# Patient Record
Sex: FEMALE | Race: BLACK OR AFRICAN AMERICAN | NOT HISPANIC OR LATINO | Employment: FULL TIME | URBAN - METROPOLITAN AREA
[De-identification: names, ages, dates, MRNs, and addresses within clinical notes are randomized per-mention and may not be internally consistent; named-entity substitution may affect disease eponyms.]

---

## 2017-01-02 ENCOUNTER — OFFICE VISIT (OUTPATIENT)
Dept: FAMILY MEDICINE | Facility: CLINIC | Age: 47
End: 2017-01-02
Payer: COMMERCIAL

## 2017-01-02 VITALS
BODY MASS INDEX: 23.18 KG/M2 | HEIGHT: 66 IN | RESPIRATION RATE: 20 BRPM | DIASTOLIC BLOOD PRESSURE: 74 MMHG | SYSTOLIC BLOOD PRESSURE: 116 MMHG | HEART RATE: 85 BPM | OXYGEN SATURATION: 98 % | TEMPERATURE: 98.9 F | WEIGHT: 144.2 LBS

## 2017-01-02 DIAGNOSIS — G89.4 CHRONIC PAIN SYNDROME: Chronic | ICD-10-CM

## 2017-01-02 DIAGNOSIS — E55.9 VITAMIN D DEFICIENCY: ICD-10-CM

## 2017-01-02 DIAGNOSIS — E78.2 MIXED HYPERLIPIDEMIA: ICD-10-CM

## 2017-01-02 DIAGNOSIS — E78.5 HYPERLIPIDEMIA LDL GOAL <100: Chronic | ICD-10-CM

## 2017-01-02 PROCEDURE — 99214 OFFICE O/P EST MOD 30 MIN: CPT | Performed by: FAMILY MEDICINE

## 2017-01-02 RX ORDER — ERGOCALCIFEROL 1.25 MG/1
50000 CAPSULE, LIQUID FILLED ORAL
Qty: 8 CAPSULE | Refills: 6 | Status: SHIPPED | OUTPATIENT
Start: 2017-01-02 | End: 2019-06-14

## 2017-01-02 RX ORDER — ATORVASTATIN CALCIUM 80 MG/1
80 TABLET, FILM COATED ORAL DAILY
Qty: 90 TABLET | Refills: 3 | Status: SHIPPED | OUTPATIENT
Start: 2017-01-02 | End: 2017-01-13

## 2017-01-02 NOTE — PROGRESS NOTES
SUBJECTIVE:                                                    Karen Braxton is a 46 year old female who presents to clinic today for the following health issues:  Health Maintenance Due   Topic Date Due     FOOT EXAM Q1 YEAR( NO INBASKET)  07/14/2016     INFLUENZA VACCINE (SYSTEM ASSIGNED)  09/01/2016     Health Maintenance reviewed at today's visit patient asked to schedule/complete:   Immunizations:  Patient declined    Diabetes Follow-up    Patient is checking blood sugars: four times daily.  Results are as follows:         am - 134         lunchtime - 160              postprandial after lunch- 190              postprandial supper- 200    Diabetic concerns: other - high      Symptoms of hypoglycemia (low blood sugar): none     Paresthesias (numbness or burning in feet) or sores: No     Date of last diabetic eye exam: 4/2016       Amount of exercise or physical activity: 6-7 days/week for an average of 15-30 minutes    Problems taking medications regularly: No    Medication side effects: none    Diet: regular (no restrictions)    LETTERS FOR EXERCISE ADULT DAY CARE AND EVENTUALLY [facility]     DENTAL     SHOULDER     VITAMIN D REPLACEMENT     HISTORY OF PREMATURE MENOPAUSAL    HYPERTENSION WITH GOAL OF LESS THAN 140/80     DIARRHEA IMPROVED     IMPROVED DIABETES 2 GOAL 8%     GASTROESOPHAGEAL REFLUX DISEASE WITHOUT ESOPHAGITIS     DEPRESSION with ANXIETY IMPROVED with EXERCISE     DIABETES 2 GOAL 8%     CHRONIC PAIN SYNDROME       Problem list and histories reviewed & adjusted, as indicated.  Additional history: as documented      Patient Active Problem List   Diagnosis     Other dental caries     Disorder of bursae and tendons in shoulder region     Insomnia     Vitamin D deficiency     Premature menopause     ASCUS favor benign     Hyperlipidemia LDL goal <100     Comprehensive diabetic foot examination, type 2 DM, encounter for (H)     Hypertension goal BP (blood pressure) < 140/90     DiarrheaX 2 over last 24hrs w  one explosive r/o 2ndary to acid gastritis     Uncontrolled diabetes mellitus type 2 without complications (H)     Esophageal reflux 2ndary to hi continuous intake of sugared  tea     Depression with anxiety     Hyperlipidemia with target LDL less than 100     Diabetes type 2, controlled (H)     Intractable chronic migraine without aura and without status migrainosus     Chronic pain syndrome     Needle stick injury, subsequent encounter     Past Surgical History   Procedure Laterality Date     Cholecystectomy  9/14/2007     laproscopic     Facial reconstruction surgery  2 years old     cleft lip repair     Orthopedic surgery  6/2001     left knee       Social History   Substance Use Topics     Smoking status: Current Some Day Smoker     Types: Cigarettes     Smokeless tobacco: Never Used      Comment: 3 cigarettes daily     Alcohol Use: No     Family History   Problem Relation Age of Onset     DIABETES Mother      Hypertension Mother      HEART DISEASE Mother      Lipids Mother      Asthma Mother      DIABETES Father      Hypertension Father      CANCER No family hx of      No known family hx of skin cancer     Coronary Artery Disease No family hx of      Hyperlipidemia No family hx of      CEREBROVASCULAR DISEASE No family hx of      Breast Cancer No family hx of      Colon Cancer No family hx of      Prostate Cancer No family hx of      Other Cancer No family hx of      Depression No family hx of      Anxiety Disorder No family hx of      MENTAL ILLNESS No family hx of      Substance Abuse No family hx of      Anesthesia Reaction No family hx of      OSTEOPOROSIS No family hx of      Genetic Disorder No family hx of      Thyroid Disease No family hx of      Obesity No family hx of      Unknown/Adopted No family hx of          Current Outpatient Prescriptions   Medication Sig Dispense Refill     vitamin D (ERGOCALCIFEROL) 98675 UNIT capsule Take 1 capsule (50,000 Units) by mouth every 7 days for 8 doses 8 capsule  "6     atorvastatin (LIPITOR) 80 MG tablet Take 1 tablet (80 mg) by mouth daily 90 tablet 3     insulin lispro (HUMALOG KWIKPEN) 100 UNIT/ML injection 30 units before breakfast, 35 units before lunch, 35 units before dinner 3 Month 3     guaiFENesin-dextromethorphan (ROBITUSSIN DM) 100-10 MG/5ML syrup Take 5 mLs by mouth every 4 hours as needed 240 mL 1     albuterol (PROAIR HFA/PROVENTIL HFA/VENTOLIN HFA) 108 (90 BASE) MCG/ACT Inhaler Inhale 2 puffs into the lungs every 6 hours as needed for shortness of breath / dyspnea or wheezing 1 Inhaler 0     B-D U/F 31G X 8 MM insulin pen needle TEST FOUR TIMES A DAY OR AS DIRECTED 400 each 1     metFORMIN (GLUCOPHAGE) 1000 MG tablet TAKE ONE-HALF TABLET BY MOUTH TWICE DAILY WITH MEALS 90 tablet 1     calcium-vitamin D (CALTRATE) 600-400 MG-UNIT per tablet Take 1 tablet by mouth 2 times daily 180 tablet 3     insulin aspart (NOVOLOG FLEXPEN) 100 UNIT/ML soln Needs 5 pens; continue current amount. 3 Month 3     LANTUS SOLOSTAR 100 UNIT/ML soln 70 units every morning. 15 mL 11     ondansetron (ZOFRAN) 4 MG tablet TAKE 1 TABLET (4MG) BY MOUTH EVERY 8 HOURS AS NEEDED FOR NAUSEA 18 tablet 2     vitamin D (ERGOCALCIFEROL) 73378 UNIT capsule TAKE 1 CAPLET ONCE WEEKLY  Profile Rx: patient will contact pharmacy when needed 4 capsule 5     insulin syringe-needle U-100 (BD INSULIN SYRINGE ULTRAFINE) 30G X 1/2\" 1 ML Use one syringe daily or as directed.  3 month supply 100 each prn     Ostomy Supplies (SKIN PREP WIPES) MISC Use one daily for application of the V-go insulin delivery pod. 3 month supply 100 each 3     insulin pen needle (LITETOUCH PEN NEEDLES) 31G X 8 MM Pt uses Lantus insulin 1x/day 100 each 3     ASPIRIN NOT PRESCRIBED (INTENTIONAL) continuous prn for other Antiplatelet medication not prescribed intentionally due to Not indicated based on age/GI distress       acetaminophen (TYLENOL) 500 MG tablet Take 500-1,000 mg by mouth every 6 hours as needed for mild pain       " diclofenac (VOLTAREN) 1 % GEL Apply 4 grams to knees or 2 grams to hands four times daily using enclosed dosing card. 100 g 11     order for DME Equipment being ordered: Glucometer- Asmita contour- patient lost hers 1 Device 0     lisinopril (PRINIVIL,ZESTRIL) 2.5 MG tablet Take 1 tablet (2.5 mg) by mouth daily 90 tablet 3     fluocin-hydroquinone-tretinoin 0.01-4-0.05 % CREA Use a pea sized amount to the face at night, avoid the mouth and eyes 30 g 3     blood glucose monitoring (ASMITA CONTOUR NEXT) test strip Use to test blood sugar 4 times daily or as directed. 200 each 11     nystatin (MYCOSTATIN) cream Apply topically 2 times daily 60 g 6     pioglitazone (ACTOS) 45 MG tablet Take 1 tablet (45 mg) by mouth daily 90 tablet 3     ezetimibe (ZETIA) 10 MG tablet Take 1 tablet (10 mg) by mouth daily 90 tablet 3     canagliflozin (INVOKANA) 300 MG tablet Take 1 tablet (300 mg) by mouth every morning (before breakfast) 90 tablet 3     Aluminum & Magnesium Hydroxide (MYLANTA ULTIMATE STRENGTH) 500-500 MG/5ML SUSP Take 1-2 tsp. by mouth 4 times daily (with meals and nightly) 355 mL PRN     rosuvastatin (CRESTOR) 40 MG tablet Take 1 tablet (40 mg) by mouth daily 90 tablet 3     blood glucose monitoring (ASMITA MICROLET) lancets Use to test blood sugar 4 times daily or as directed. 2 Box 6     ORDER FOR DME Equipment being ordered: two Trilok braces 2 Device 0     ORDER FOR DME Equipment being ordered: LONGITUDINAL ARCH SUPPORTS ONE PAIR USE IN WORK SHOES  FOR HEEL; PAIN AND DIABETES NEUROPATHY 1 Device 1     No Known Allergies  Recent Labs   Lab Test  11/14/16   1058  03/21/16   1030  01/25/16   1703  06/23/15   1652  12/01/14   1222  07/30/14   0829  05/29/14   1045  03/18/14   1518   A1C  8.1*  8.1*  8.1*  8.1*  8.5*  10.1*  11.4*   --    LDL   --   142*   --    --   114  146*  64   --    HDL   --   44*   --    --   36*  39*  36*   --    TRIG   --   223*   --    --   323*  261*  362*   --    ALT   --   30   --   21   --   "  --   23  30   CR  0.83  0.70   --   0.80  0.80  0.60  0.70  0.69   GFRESTIMATED  73  90   --   77  77  >90  >90  >90   GFRESTBLACK  89  >90   --   >90  >90  >90  >90  >90   POTASSIUM  4.1  4.4   --   4.2  4.0  4.6  4.1  4.0   TSH   --   1.31   --    --    --    --    --   1.55      BP Readings from Last 3 Encounters:   01/02/17 116/74   12/20/16 118/68   12/05/16 131/84    Wt Readings from Last 3 Encounters:   01/02/17 144 lb 3.2 oz (65.409 kg)   12/20/16 147 lb (66.679 kg)   12/05/16 146 lb 12.8 oz (66.588 kg)                  Labs reviewed in [facility]  Problem list, Medication list, Allergies, and Medical/Social/Surgical histories reviewed in [facility] and updated as appropriate.    ROS: has Other dental caries; Disorder of bursae and tendons in shoulder region; Insomnia; Vitamin D deficiency; Premature menopause; ASCUS favor benign; Hyperlipidemia LDL goal <100; Comprehensive diabetic foot examination, type 2 DM, encounter for (H); Hypertension goal BP (blood pressure) < 140/90; DiarrheaX 2 over last 24hrs w one explosive r/o 2ndary to acid gastritis; Uncontrolled diabetes mellitus type 2 without complications (H); Esophageal reflux 2ndary to hi continuous intake of sugared  tea; Depression with anxiety; Hyperlipidemia with target LDL less than 100; Diabetes type 2, controlled (H); Intractable chronic migraine without aura and without status migrainosus; Chronic pain syndrome; and Needle stick injury, subsequent encounter on her problem list.   Constitutional, HEENT, cardiovascular, pulmonary, gi and gu systems are negative, except as otherwise noted.    OBJECTIVE:                                                      /74 mmHg  Pulse 85  Temp(Src) 98.9  F (37.2  C) (Tympanic)  Resp 20  Ht 5' 5.5\" (1.664 m)  Wt 144 lb 3.2 oz (65.409 kg)  BMI 23.62 kg/m2  SpO2 98%  LMP  (LMP Unknown)  Body mass index is 23.62 kg/(m^2).  GENERAL: healthy, alert and no distress  EYES: Eyes grossly normal to inspection, PERRL " and conjunctivae and sclerae normal  HENT: ear canals and TM's normal, nose and mouth without ulcers or lesions  NECK: no adenopathy, no asymmetry, masses, or scars and thyroid normal to palpation  RESP: lungs clear to auscultation - no rales, rhonchi or wheezes  CV: regular rate and rhythm, normal S1 S2, no S3 or S4, no murmur, click or rub, no peripheral edema and peripheral pulses strong  ABDOMEN: soft, nontender, no hepatosplenomegaly, no masses and bowel sounds normal  MS: no gross musculoskeletal defects noted, no edema  SKIN: no suspicious lesions or rashes  NEURO: Normal strength and tone, mentation intact and speech normal  BACK: no CVA tenderness, no paralumbar tenderness  PSYCH: mentation appears normal, affect normal/bright  LYMPH: no cervical, supraclavicular, axillary, or inguinal adenopathy    Diagnostic Test Results:  Results for orders placed or performed in visit on 11/14/16   Basic metabolic panel   Result Value Ref Range    Sodium 139 133 - 144 mmol/L    Potassium 4.1 3.4 - 5.3 mmol/L    Chloride 104 94 - 109 mmol/L    Carbon Dioxide 28 20 - 32 mmol/L    Anion Gap 7.5 3 - 14 mmol/L    Glucose 301 (H) 70 - 99 mg/dL    Urea Nitrogen 8 5 - 24 mg/dL    Creatinine 0.83 0.52 - 1.04 mg/dL    GFR Estimate 73 >60 mL/min/1.7m2    GFR Estimate If Black 89 >60 mL/min/1.7m2    Calcium 9.0 8.5 - 10.4 mg/dL   Hemoglobin A1c   Result Value Ref Range    Hemoglobin A1C 8.1 (H) 4.3 - 6.0 %   Microalbumin quantitative, random urine   Result Value Ref Range    Creatinine Urine 38 mg/dL    Albumin Urine mg/L 8 mg/L    Albumin Urine mg/g Cr 22.35 0 - 25 mg/g Cr        ASSESSMENT/PLAN:                                                        ICD-10-CM    1. Uncontrolled type 2 diabetes mellitus without complication, with long-term current use of insulin (H) E11.65 insulin lispro (HUMALOG KWIKPEN) 100 UNIT/ML injection    Z79.4    2. Hyperlipidemia LDL goal <100 E78.5 atorvastatin (LIPITOR) 80 MG tablet   3. Chronic pain  syndrome G89.4    4. Vitamin D deficiency E55.9 vitamin D (ERGOCALCIFEROL) 30412 UNIT capsule   5. Mixed hyperlipidemia E78.2        Patient Instructions   (E11.65,  Z79.4) Uncontrolled type 2 diabetes mellitus without complication, with long-term current use of insulin (H)  (primary encounter diagnosis)  Comment:    Plan: insulin lispro (HUMALOG KWIKPEN) 100 UNIT/ML         Injection 30 UNITS IN AM MEAL   35 UNITS IN PM MEAL   35 UNITS IN PM MEAL              (E78.5) [id number] LDL goal <100  Comment:  CHANGE IN CRESTOR 40MG PER INSURANCE   Plan: atorvastatin (LIPITOR) 80 MG tablet             (G89.4) Chronic pain syndrome  Comment:    Plan:      (E55.9) Vitamin D deficiency  Comment:  CHANGE   Plan: vitamin D (ERGOCALCIFEROL) 24297 UNIT capsule             (E78.2) Mixed hyperlipidemia  Comment:    Plan:        LINDA ALCANTAR MD  [facility]

## 2017-01-02 NOTE — MR AVS SNAPSHOT
After Visit Summary   1/2/2017    Karen Braxton    MRN: [id number]           Patient Information     Date Of Birth          1970        Visit Information        Provider Department      1/2/2017 4:00 PM Raymond Engel MD [facility]        Today's Diagnoses     Uncontrolled type 2 diabetes mellitus without complication, with long-term current use of insulin (H)    -  1     Hyperlipidemia LDL goal <100         Chronic pain syndrome         Vitamin D deficiency         Mixed hyperlipidemia           Care Instructions    (E11.65,  Z79.4) Uncontrolled type 2 diabetes mellitus without complication, with long-term current use of insulin (H)  (primary encounter diagnosis)  Comment:    Plan: insulin lispro (HUMALOG KWIKPEN) 100 UNIT/ML         injection             (E78.5) Hyperlipidemia LDL goal <100  Comment:    Plan: atorvastatin (LIPITOR) 80 MG tablet             (G89.4) Chronic pain syndrome  Comment:    Plan:      (E55.9) Vitamin D deficiency  Comment:    Plan: vitamin D (ERGOCALCIFEROL) 87512 UNIT capsule             (E78.2) Mixed hyperlipidemia  Comment:    Plan:                Follow-ups after your visit        Your next 10 appointments already scheduled     Feb 01, 2017  3:00 PM   Return Visit with Ralph Hawley DPM   [facility]    [address] [place] [id number] [phone]            Mar 09, 2017  3:30 PM   (Arrive by 3:00 PM)   RETURN DIABETES with Cecile Juarez PA-C   [facility] ([facility])    [address]  3rd Floor  [facility] [address] [phone]            Mar 21, 2017  4:00 PM   SHORT with Raymond Engel MD   [facility] ([facility])    [address]  [address]  [facility] [id number] [phone]            Jun 05, 2017  3:30  "PM   (Arrive by 3:00 PM)   RETURN DIABETES with Melisa Phillips MD   [facility] ([facility] [place])    [address] [address] [phone]              Who to contact     If you have questions or need follow up information about today's clinic visit or your schedule please contact [facility] directly at [phone].  Normal or non-critical lab and imaging results will be communicated to you by [employer], letter or phone within 4 business days after the clinic has received the results. If you do not hear from us within 7 days, please contact the clinic through "[employer]"t or phone. If you have a critical or abnormal lab result, we will notify you by phone as soon as possible.  Submit refill requests through Slice or call your pharmacy and they will forward the refill request to us. Please allow 3 business days for your refill to be completed.          Additional Information About Your Visit        Slice Information     Slice lets you send messages to your doctor, view your test results, renew your prescriptions, schedule appointments and more. To sign up, go to [email or web address] . Click on \"Log in\" on the left side of the screen, which will take you to the Welcome page. Then click on \"Sign up Now\" on the right side of the page.     You will be asked to enter the access code listed below, as well as some personal information. Please follow the directions to create your username and password.     Your access code is: [email or web address]  Expires: 2017  6:30 AM     Your access code will  in 90 days. If you need help or a new code, please call your [facility] or [phone].        Your Vitals Were     Pulse Temperature Respirations    85 98.9  F (37.2  C) (Tympanic) 20    Height BMI (Body Mass Index) Pulse Oximetry    5' 5.5\" (1.664 m) 23.62 kg/m2 98%    Last Period          (LMP Unknown)         " Blood Pressure from Last 3 Encounters:   01/02/17 116/74   12/20/16 118/68   12/05/16 131/84    Weight from Last 3 Encounters:   01/02/17 144 lb 3.2 oz (65.409 kg)   12/20/16 147 lb (66.679 kg)   12/05/16 146 lb 12.8 oz (66.588 kg)              Today, you had the following     No orders found for display         Today's Medication Changes          These changes are accurate as of: 1/2/17  4:49 PM.  If you have any questions, ask your nurse or doctor.               Start taking these medicines.        Dose/Directions    atorvastatin 80 MG tablet   Commonly known as:  LIPITOR   Used for:  Hyperlipidemia LDL goal <100   Started by:  Raymond Engel MD        Dose:  80 mg   Take 1 tablet (80 mg) by mouth daily   Quantity:  90 tablet   Refills:  3       insulin lispro 100 UNIT/ML injection   Commonly known as:  HumaLOG KWIKpen   Used for:  Uncontrolled type 2 diabetes mellitus without complication, with long-term current use of insulin (H)   Started by:  Raymond Engel MD        30 units before breakfast, 35 units before lunch, 35 units before dinner   Quantity:  3 Month   Refills:  3         These medicines have changed or have updated prescriptions.        Dose/Directions    * vitamin D 59250 UNIT capsule   Commonly known as:  ERGOCALCIFEROL   This may have changed:  Another medication with the same name was added. Make sure you understand how and when to take each.   Used for:  Vitamin D deficiency   Changed by:  Raymond Engel MD        TAKE 1 CAPLET ONCE WEEKLY Profile Rx: patient will contact pharmacy when needed   Quantity:  4 capsule   Refills:  5       * vitamin D 10105 UNIT capsule   Commonly known as:  ERGOCALCIFEROL   This may have changed:  You were already taking a medication with the same name, and this prescription was added. Make sure you understand how and when to take each.   Used for:  Vitamin D deficiency   Changed by:  Raymond Engel MD        Dose:  98791  Units   Take 1 capsule (50,000 Units) by mouth every 7 days for 8 doses   Quantity:  8 capsule   Refills:  6       * Notice:  This list has 2 medication(s) that are the same as other medications prescribed for you. Read the directions carefully, and ask your doctor or other care provider to review them with you.         Where to get your medicines      These medications were sent to [facility] - [place], [place] - [address], [address]  [address], [facility] [address]     Phone:  [phone]    - atorvastatin 80 MG tablet  - insulin lispro 100 UNIT/ML injection  - vitamin D 98187 UNIT capsule             Primary Care Provider Office Phone # Fax #    Raymond Engel [phone]       [facility] [address]  [facility] [address]        Thank you!     Thank you for choosing [facility]  for your care. Our goal is always to provide you with excellent care. Hearing back from our patients is one way we can continue to improve our services. Please take a few minutes to complete the written survey that you may receive in the mail after your visit with us. Thank you!             Your Updated Medication List - Protect others around you: Learn how to safely use, store and throw away your medicines at www.disposemymeds.org.          This list is accurate as of: 1/2/17  4:49 PM.  Always use your most recent med list.                   Brand Name Dispense Instructions for use    acetaminophen 500 MG tablet    TYLENOL     Take 500-1,000 mg by mouth every 6 hours as needed for mild pain       albuterol 108 (90 BASE) MCG/ACT Inhaler    PROAIR HFA/PROVENTIL HFA/VENTOLIN HFA    1 Inhaler    Inhale 2 puffs into the lungs every 6 hours as needed for shortness of breath / dyspnea or wheezing       Aluminum & Magnesium Hydroxide 500-500 MG/5ML Susp    MYLANTA ULTIMATE STRENGTH    355 mL    Take 1-2 tsp. by mouth 4  "times daily (with meals and nightly)       ASPIRIN NOT PRESCRIBED    INTENTIONAL     continuous prn for other Antiplatelet medication not prescribed intentionally due to Not indicated based on age/GI distress       atorvastatin 80 MG tablet    LIPITOR    90 tablet    Take 1 tablet (80 mg) by mouth daily       blood glucose monitoring lancets     2 Box    Use to test blood sugar 4 times daily or as directed.       blood glucose monitoring test strip    JONATHAN CONTOUR NEXT    200 each    Use to test blood sugar 4 times daily or as directed.       calcium-vitamin D 600-400 MG-UNIT per tablet    CALTRATE    180 tablet    Take 1 tablet by mouth 2 times daily       canagliflozin 300 MG tablet    INVOKANA    90 tablet    Take 1 tablet (300 mg) by mouth every morning (before breakfast)       diclofenac 1 % Gel topical gel    VOLTAREN    100 g    Apply 4 grams to knees or 2 grams to hands four times daily using enclosed dosing card.       ezetimibe 10 MG tablet    ZETIA    90 tablet    Take 1 tablet (10 mg) by mouth daily       fluocin-hydroquinone-tretinoin 0.01-4-0.05 % Crea     30 g    Use a pea sized amount to the face at night, avoid the mouth and eyes       guaiFENesin-dextromethorphan 100-10 MG/5ML syrup    ROBITUSSIN DM    240 mL    Take 5 mLs by mouth every 4 hours as needed       insulin aspart 100 UNIT/ML injection    NovoLOG FLEXPEN    3 Month    Needs 5 pens; continue current amount.       insulin lispro 100 UNIT/ML injection    HumaLOG KWIKpen    3 Month    30 units before breakfast, 35 units before lunch, 35 units before dinner       * insulin pen needle 31G X 8 MM    LITETOUCH PEN NEEDLES    100 each    Pt uses Lantus insulin 1x/day       * B-D U/F 31G X 8 MM   Generic drug:  insulin pen needle     400 each    TEST FOUR TIMES A DAY OR AS DIRECTED       insulin syringe-needle U-100 30G X 1/2\" 1 ML    BD insulin syringe ultrafine    100 each    Use one syringe daily or as directed.  3 month supply       LANTUS " SOLOSTAR 100 UNIT/ML injection   Generic drug:  insulin glargine     15 mL    70 units every morning.       lisinopril 2.5 MG tablet    PRINIVIL/Zestril    90 tablet    Take 1 tablet (2.5 mg) by mouth daily       metFORMIN 1000 MG tablet    GLUCOPHAGE    90 tablet    TAKE ONE-HALF TABLET BY MOUTH TWICE DAILY WITH MEALS       nystatin cream    MYCOSTATIN    60 g    Apply topically 2 times daily       ondansetron 4 MG tablet    ZOFRAN    18 tablet    TAKE 1 TABLET (4MG) BY MOUTH EVERY 8 HOURS AS NEEDED FOR NAUSEA       * order for DME     1 Device    Equipment being ordered: LONGITUDINAL ARCH SUPPORTS ONE PAIR USE IN WORK SHOES  FOR HEEL; PAIN AND DIABETES NEUROPATHY       * order for DME     2 Device    Equipment being ordered: two Trilok braces       order for DME     1 Device    Equipment being ordered: Glucometer- Asmita contour- patient lost hers       pioglitazone 45 MG tablet    ACTOS    90 tablet    Take 1 tablet (45 mg) by mouth daily       rosuvastatin 40 MG tablet    CRESTOR    90 tablet    Take 1 tablet (40 mg) by mouth daily       SKIN PREP WIPES Misc     100 each    Use one daily for application of the V-go insulin delivery pod. 3 month supply       * vitamin D 96652 UNIT capsule    ERGOCALCIFEROL    4 capsule    TAKE 1 CAPLET ONCE WEEKLY Profile Rx: patient will contact pharmacy when needed       * vitamin D 30169 UNIT capsule    ERGOCALCIFEROL    8 capsule    Take 1 capsule (50,000 Units) by mouth every 7 days for 8 doses       * Notice:  This list has 6 medication(s) that are the same as other medications prescribed for you. Read the directions carefully, and ask your doctor or other care provider to review them with you.

## 2017-01-02 NOTE — Clinical Note
[address]  [address]  [facility] [address] [phone]                                                                                                           Karen Braxton  [address]   [facility] [address]    January 2, 2017      Adult day care    Karen Braxton,       please consider this patient for your program  At a reduced rate   She has limited resources   Needs to exercise to help her diabetes   Patient Active Problem List   Diagnosis     Other dental caries     Disorder of bursae and tendons in shoulder region     Insomnia     Vitamin D deficiency     Premature menopause     ASCUS favor benign     Hyperlipidemia LDL goal <100     Comprehensive diabetic foot examination, type 2 DM, encounter for (H)     Hypertension goal BP (blood pressure) < 140/90     DiarrheaX 2 over last 24hrs w one explosive r/o 2ndary to acid gastritis     Uncontrolled diabetes mellitus type 2 without complications (H)     Esophageal reflux 2ndary to hi continuous intake of sugared  tea     Depression with anxiety     Hyperlipidemia with target LDL less than 100     Diabetes type 2, controlled (H)     Intractable chronic migraine without aura and without status migrainosus     Chronic pain syndrome     Needle stick injury, subsequent encounter         Sincerely,    Raymond Engel Jr MD

## 2017-01-02 NOTE — PATIENT INSTRUCTIONS
(E11.65,  Z79.4) Uncontrolled type 2 diabetes mellitus without complication, with long-term current use of insulin (H)  (primary encounter diagnosis)  Comment:    Plan: insulin lispro (HUMALOG KWIKPEN) 100 UNIT/ML         injection             (E78.5) Hyperlipidemia LDL goal <100  Comment:    Plan: atorvastatin (LIPITOR) 80 MG tablet             (G89.4) Chronic pain syndrome  Comment:    Plan:      (E55.9) Vitamin D deficiency  Comment:    Plan: vitamin D (ERGOCALCIFEROL) 78229 UNIT capsule             (E78.2) Mixed hyperlipidemia  Comment:    Plan:

## 2017-01-02 NOTE — NURSING NOTE
"Chief Complaint   Patient presents with     Diabetes       Initial /74 mmHg  Pulse 85  Temp(Src) 98.9  F (37.2  C) (Tympanic)  Resp 20  Ht 5' 5.5\" (1.664 m)  Wt 144 lb 3.2 oz (65.409 kg)  BMI 23.62 kg/m2  SpO2 98%  LMP  (LMP Unknown) Estimated body mass index is 23.62 kg/(m^2) as calculated from the following:    Height as of this encounter: 5' 5.5\" (1.664 m).    Weight as of this encounter: 144 lb 3.2 oz (65.409 kg).  BP completed using cuff size: regular  Radha Pierre CMA      "

## 2017-01-10 DIAGNOSIS — E11.9 DIABETES TYPE 2, CONTROLLED (H): Primary | ICD-10-CM

## 2017-01-10 DIAGNOSIS — I10 HYPERTENSION GOAL BP (BLOOD PRESSURE) < 140/80: ICD-10-CM

## 2017-01-10 DIAGNOSIS — K21.9 GASTROESOPHAGEAL REFLUX DISEASE WITHOUT ESOPHAGITIS: ICD-10-CM

## 2017-01-10 DIAGNOSIS — E78.5 HYPERLIPIDEMIA LDL GOAL <100: ICD-10-CM

## 2017-01-10 DIAGNOSIS — E11.9 DIABETES TYPE 2, CONTROLLED (H): ICD-10-CM

## 2017-01-10 DIAGNOSIS — R11.15 NON-INTRACTABLE CYCLICAL VOMITING WITH NAUSEA: Primary | ICD-10-CM

## 2017-01-10 DIAGNOSIS — E78.5 HYPERLIPIDEMIA LDL GOAL <100: Chronic | ICD-10-CM

## 2017-01-10 NOTE — TELEPHONE ENCOUNTER
Lisinopril 2.5 mg       Last Written Prescription Date: 1/25/16  Last Fill Quantity: 90, # refills: 3  Last Office Visit with [facility] or  Health prescribing provider: 1/2/17   Next 5 appointments (look out 90 days)     Feb 01, 2017  3:00 PM   Return Visit with Ralph Hawley DPM   [facility]    [address] [place] [address]   [phone]            Mar 21, 2017  4:00 PM   SHORT with Raymond Engel MD   [facility]    [address] [facility] [address]  [address]  [facility] [address] [phone]                   POTASSIUM   Date Value Ref Range Status   11/14/2016 4.1 3.4 - 5.3 mmol/L Final     CREATININE   Date Value Ref Range Status   11/14/2016 0.83 0.52 - 1.04 mg/dL Final     BP Readings from Last 3 Encounters:   01/02/17 116/74   12/20/16 118/68   12/05/16 131/84     actos 45 mg          Last Written Prescription Date: 1/5/16  Last Fill Quantity: 90, # refills: 3  Last Office Visit with [facility] or [facility] prescribing provider:  1/2/17   Next 5 appointments (look out 90 days)     Feb 01, 2017  3:00 PM   Return Visit with Ralph Hawley DPM   [facility] ([facility])    [address] [place] [address]   [phone]            Mar 21, 2017  4:00 PM   SHORT with Raymond Engel MD   [facility] ([facility])    [address] [facility] [address]  [address]  [facility] [address] [phone]                   BP Readings from Last 3 Encounters:   01/02/17 116/74   12/20/16 118/68   12/05/16 131/84     MICROL        8   11/14/2016  No results found for this basename: microalbumin  CREATININE   Date Value Ref Range Status   11/14/2016 0.83 0.52 - 1.04 mg/dL Final   ]  GFR ESTIMATE   Date Value Ref Range Status   11/14/2016 73 >60 mL/min/1.7m2  Final   03/21/2016 90 >60 mL/min/1.7m2 Final   06/23/2015 77 >60 mL/min/1.7m2 Final     GFR ESTIMATE IF BLACK   Date Value Ref Range Status   11/14/2016 89 >60 mL/min/1.7m2 Final   03/21/2016 >90 >60 mL/min/1.7m2 Final   06/23/2015 >90 >60 mL/min/1.7m2 Final     CHOL      231   3/21/2016  HDL       44   3/21/2016  LDL      142   3/21/2016  LDL      140   11/26/2013  TRIG      223   3/21/2016  CHOLHDLRATIO      6.0   12/1/2014  AST       17   1/9/2015  ALT       30   3/21/2016  A1C      8.1   11/14/2016  A1C      8.1   3/21/2016  A1C      8.1   1/25/2016  A1C      8.1   6/23/2015  A1C      8.5   12/1/2014  POTASSIUM   Date Value Ref Range Status   11/14/2016 4.1 3.4 - 5.3 mmol/L Final       mylanta ultimate strength       Last Written Prescription Date: 1/5/16  Last Fill Quantity: 355 ml,  # refills: prn   Last Office Visit with [facility] or [facility] prescribing provider: 1/2/17                                         Next 5 appointments (look out 90 days)     Feb 01, 2017  3:00 PM   Return Visit with Ralph Hawley DPM   [facility] ([facility])    [address] [place] [phone]            Mar 21, 2017  4:00 PM   SHORT with Raymond Engel MD   [facility] ([facility])    [address] [address] [phone]

## 2017-01-10 NOTE — TELEPHONE ENCOUNTER
BD PEN NEEDLE SHORT 31G X 8 MM MISC   Last Written Prescription Date: 12/14/16  Last Fill Quantity: 400,  # refills: 1   Last Office Visit with [facility] or [facility] prescribing provider: 1/2/17                                           Next 5 appointments (look out 90 days)     Feb 01, 2017  3:00 PM   Return Visit with Ralph Hawley DPM   [facility]    [address] [place] [address]   [phone]            Mar 21, 2017  4:00 PM   SHORT with Raymond Engel MD   [facility]    [facility] [address] [phone]

## 2017-01-10 NOTE — TELEPHONE ENCOUNTER
Asmita lancets       Last Written Prescription Date: 1/5/16  Last Fill Quantity: 2 boxes,  # refills: 6  Last Office Visit with [facility] or [facility] prescribing provider: 1/2/17                                         Next 5 appointments (look out 90 days)     Feb 01, 2017  3:00 PM   Return Visit with Ralph Hawley DPM   [facility]    [address] [place] [address]   [phone]            Mar 21, 2017  4:00 PM   SHORT with Raymond Engel MD   [facility] ([facility])    [address] [place] [address]  [address]  [facility] [address]   [phone]                                          Asmita strips       Last Written Prescription Date: 1/5/16  Last Fill Quantity: 200,  # refills: 11   Last Office Visit with [facility], [facility] or [facility] prescribing provider: 1/2/17                                         Next 5 appointments (look out 90 days)     Feb 01, 2017  3:00 PM   Return Visit with Ralph Hawley DPM   [facility]    [address] [place] [address]   [phone]            Mar 21, 2017  4:00 PM   SHORT with Raymond Engel MD   [facility] ([facility])    [address] [place] [address]  [address] [place] [address]   [phone]                    zetia 10 mg        Last Written Prescription Date: 1/5/16  Last Fill Quantity: 90, # refills: 3    Last Office Visit with [facility], [facility] or [facility] prescribing provider:  1/2/17   Future Office Visit:    Next 5 appointments (look out 90 days)     Feb 01, 2017  3:00 PM   Return Visit with Ralph Hawley DPM   [facility]    [address] [place] [address]   [phone]            Mar 21, 2017  4:00 PM   SHORT with Raymond Engel MD    [facility] ([facility])    [address] [facility]  [address]  [facility] [address] [phone]                  CHOLESTEROL   Date Value Ref Range Status   03/21/2016 231* <200 mg/dL Final     Comment:     Desirable:       <200 mg/dl     HDL CHOLESTEROL   Date Value Ref Range Status   03/21/2016 44* >49 mg/dL Final     LDL CHOLESTEROL CALCULATED   Date Value Ref Range Status   03/21/2016 142* <100 mg/dL Final     Comment:     Above desirable:  100-129 mg/dl   Borderline High:  130-159 mg/dL   High:             160-189 mg/dL   Very high:       >189 mg/dl       LDL CHOLESTEROL DIRECT   Date Value Ref Range Status   11/26/2013 140* 0 - 129 mg/dL Final     Comment:     Optimal:         <100 mg/dL   Near Optimal:     100-129 mg/dL   Borderline High:  130-159 mg/dL   High:             160-189 mg/dL   Very high:  greater than or equal to 190 mg/dL   Cannot estimate LDL when triglyceride exceeds 400 mg/dL     TRIGLYCERIDES   Date Value Ref Range Status   03/21/2016 223* <150 mg/dL Final     Comment:     Non Fasting   Borderline high:  150-199 mg/dl   High:             200-499 mg/dl   Very high:       >499 mg/dl       CHOLESTEROL/HDL RATIO   Date Value Ref Range Status   12/01/2014 6.0* 0.0 - 5.0 Final     ALT   Date Value Ref Range Status   03/21/2016 30 0 - 50 U/L Final

## 2017-01-10 NOTE — TELEPHONE ENCOUNTER
crestor 40 mg      Last Written Prescription Date: 1/5/16  Last Fill Quantity: 90, # refills: 3  Last Office Visit with [facility], [facility] or [facility] prescribing provider: 1/2/17   Next 5 appointments (look out 90 days)     Feb 01, 2017  3:00 PM   Return Visit with Ralph Hawley DPM   [facility]    [address] [facility] [address]   [phone]            Mar 21, 2017  4:00 PM   SHORT with Raymond Engel MD   [facility]    [address] [place] [address]  [address]  [facility] [address] [phone]                   CHOL      231   3/21/2016  HDL       44   3/21/2016  LDL      142   3/21/2016  LDL      140   11/26/2013  TRIG      223   3/21/2016  CHOLHDLRATIO      6.0   12/1/2014       invokana 300 mg          Last Written Prescription Date: 1/5/16  Last Fill Quantity: 90, # refills: 3  Last Office Visit with [facility], [facility] or [facility] prescribing provider:  1/2/17   Next 5 appointments (look out 90 days)     Feb 01, 2017  3:00 PM   Return Visit with Ralph Hawley DPM   [facility]    [id number] [place] [address]   [phone]            Mar 21, 2017  4:00 PM   SHORT with Raymond Engel MD   [facility]    [address] [place] [address]  [address]  [facility] [address]   [phone]                   BP Readings from Last 3 Encounters:   01/02/17 116/74   12/20/16 118/68   12/05/16 131/84     MICROL        8   11/14/2016  No results found for this basename: microalbumin  CREATININE   Date Value Ref Range Status   11/14/2016 0.83 0.52 - 1.04 mg/dL Final   ]  GFR ESTIMATE   Date Value Ref Range Status   11/14/2016 73 >60 mL/min/1.7m2 Final   03/21/2016 90 >60 mL/min/1.7m2 Final   06/23/2015 77 >60 mL/min/1.7m2 Final     GFR  ESTIMATE IF BLACK   Date Value Ref Range Status   11/14/2016 89 >60 mL/min/1.7m2 Final   03/21/2016 >90 >60 mL/min/1.7m2 Final   06/23/2015 >90 >60 mL/min/1.7m2 Final     CHOL      231   3/21/2016  HDL       44   3/21/2016  LDL      142   3/21/2016  LDL      140   11/26/2013  TRIG      223   3/21/2016  CHOLHDLRATIO      6.0   12/1/2014  AST       17   1/9/2015  ALT       30   3/21/2016  A1C      8.1   11/14/2016  A1C      8.1   3/21/2016  A1C      8.1   1/25/2016  A1C      8.1   6/23/2015  A1C      8.5   12/1/2014  POTASSIUM   Date Value Ref Range Status   11/14/2016 4.1 3.4 - 5.3 mmol/L Final

## 2017-01-11 RX ORDER — PEN NEEDLE, DIABETIC 31 GX5/16"
NEEDLE, DISPOSABLE MISCELLANEOUS
Qty: 100 EACH | Refills: 6 | Status: SHIPPED | OUTPATIENT
Start: 2017-01-11 | End: 2017-08-07

## 2017-01-12 ENCOUNTER — TELEPHONE (OUTPATIENT)
Dept: FAMILY MEDICINE | Facility: CLINIC | Age: 47
End: 2017-01-12

## 2017-01-12 DIAGNOSIS — E78.5 HYPERLIPIDEMIA LDL GOAL <100: Primary | Chronic | ICD-10-CM

## 2017-01-12 RX ORDER — PIOGLITAZONEHYDROCHLORIDE 45 MG/1
45 TABLET ORAL DAILY
Qty: 90 TABLET | Refills: 3 | Status: SHIPPED | OUTPATIENT
Start: 2017-01-12 | End: 2017-12-27

## 2017-01-12 RX ORDER — EZETIMIBE 10 MG/1
10 TABLET ORAL DAILY
Qty: 90 TABLET | Refills: 3 | Status: SHIPPED | OUTPATIENT
Start: 2017-01-12 | End: 2019-07-30

## 2017-01-12 RX ORDER — LISINOPRIL 2.5 MG/1
2.5 TABLET ORAL DAILY
Qty: 90 TABLET | Refills: 3 | Status: SHIPPED | OUTPATIENT
Start: 2017-01-12 | End: 2017-12-27

## 2017-01-12 RX ORDER — ROSUVASTATIN CALCIUM 40 MG/1
40 TABLET, COATED ORAL DAILY
Qty: 90 TABLET | Refills: 3 | Status: SHIPPED | OUTPATIENT
Start: 2017-01-12 | End: 2017-01-13

## 2017-01-12 NOTE — TELEPHONE ENCOUNTER
blood glucose monitoring (JONATHAN CONTOUR NEXT) test strip & blood glucose monitoring (JONATHAN MICROLET) lancets are no longer covered by insurance please send new rx's for meter, test strips and lancets

## 2017-01-12 NOTE — TELEPHONE ENCOUNTER
rosuvastatin (CRESTOR) 40 MG tablet not covered send alternative or start PA   Insurance ID : [id number]  Insurance number [phone]    CHANGED TO ATROV 80MG    EARLIER OFFICE VISIT     SIDE EFFECTS BENEFITS AND RISKS DISCUSSED      TREATMENT PROGNOSIS BENEFITS AND RISKS DISCUSSED     MEDICATION RISKS SIDE EFFECTS BENEFITS AND RISKS DISCUSSED         LINDA ALCANTAR JR., MD

## 2017-01-12 NOTE — TELEPHONE ENCOUNTER
Prescription approved per [facility] Refill Protocol.

## 2017-01-12 NOTE — TELEPHONE ENCOUNTER
Prior Authorization Request    1. Prior Authorization for the medication       LANTUS SOLOSTAR 100 UNIT/ML soln         Requesting Provider: Raymond Engel          Pt name: Karen Braxton        Pt : 1970        Pt MRN: [id number]        Last Office Visit: 2017           Insurance: Payor: MEDICA / Plan: MEDICA CHOICE [place] CARE / Product Type: HMO /         Insurance ID Number: [unfilled] [id number]        Prior Auth Contact Phone number:[phone]     pharmacy believes that Tresiba is covered     To be completed by provider:     2.   Refuse or Start Prior Auth:  Do not start Prior Auth, medication change required.  Please see the updated orders.      If requesting prior auth initiation:     Diagnosis (with code):     ICD-10-CM    1. Uncontrolled type 2 diabetes mellitus without complication, with long-term current use of insulin (H) E11.65 insulin degludec (TRESIBA FLEXTOUCH) 200 UNIT/ML pen    Z79.4

## 2017-01-13 RX ORDER — ATORVASTATIN CALCIUM 80 MG/1
80 TABLET, FILM COATED ORAL DAILY
Qty: 90 TABLET | Refills: 3 | Status: SHIPPED | OUTPATIENT
Start: 2017-01-13 | End: 2017-03-27

## 2017-01-16 ENCOUNTER — TELEPHONE (OUTPATIENT)
Dept: NURSING | Facility: CLINIC | Age: 47
End: 2017-01-16

## 2017-01-16 NOTE — TELEPHONE ENCOUNTER
[facility] Prior Authorization Team Request    Medication: PA request for diclofenac  Dosing:   NDC (required for Medicaid members):     Insurance   BIN: [id number]  PCN: [id number]  Grp: [id number]  ID: [id number]    CoverMyMeds Key (if applicable):     Additional documentation:     Filling Pharmacy: [facility] [address]  Phone Number: [phone]  Contact: VALERIA [facility]ARM CHRISTEN BRANCH (P [id number]) please send all responses to this pool.  Pharmacy NPI (required for Medicaid members):

## 2017-02-01 ENCOUNTER — TELEPHONE (OUTPATIENT)
Dept: PHARMACY | Facility: CLINIC | Age: 47
End: 2017-02-01

## 2017-02-01 NOTE — TELEPHONE ENCOUNTER
Called patient to schedule a f/u MTM appt. LM for patient to return call.  Rhiannon Montaño, Pharm.D, BCACP  Medication Therapy Management Pharmacist

## 2017-02-01 NOTE — TELEPHONE ENCOUNTER
[facility] Prior Authorization Team   Phone: [phone]  Fax: [phone]      PA Initiation    Medication: diclofenac  Insurance Company: [employer] - Phone [phone] Fax [phone]  Pharmacy Filling the Rx: [place], [place] - [address] [address] [place], [place] [address], [address]  Filling Pharmacy Phone: [phone]  Filling Pharmacy Fax: [phone]  Start Date: 2/1/2017

## 2017-02-03 NOTE — TELEPHONE ENCOUNTER
PRIOR AUTHORIZATION DENIED    Medication: diclofenac- denied    Denial Date: 2/3/2017    Denial Rational: script is denied because this script is considered experimental or not proven to be safe or effective for pt's diagnosis              Appeal Information:

## 2017-02-06 ENCOUNTER — OFFICE VISIT (OUTPATIENT)
Dept: PODIATRY | Facility: CLINIC | Age: 47
End: 2017-02-06
Payer: COMMERCIAL

## 2017-02-06 VITALS — OXYGEN SATURATION: 98 % | DIASTOLIC BLOOD PRESSURE: 78 MMHG | SYSTOLIC BLOOD PRESSURE: 118 MMHG | HEART RATE: 87 BPM

## 2017-02-06 DIAGNOSIS — L84 TYLOMA: Primary | ICD-10-CM

## 2017-02-06 DIAGNOSIS — E11.49 DIABETIC NEUROPATHY WITH NEUROLOGIC COMPLICATION (H): ICD-10-CM

## 2017-02-06 DIAGNOSIS — E11.40 DIABETIC NEUROPATHY WITH NEUROLOGIC COMPLICATION (H): ICD-10-CM

## 2017-02-06 PROCEDURE — 11055 PARING/CUTG B9 HYPRKER LES 1: CPT | Performed by: PODIATRIST

## 2017-02-06 PROCEDURE — 99212 OFFICE O/P EST SF 10 MIN: CPT | Mod: 25 | Performed by: PODIATRIST

## 2017-02-06 NOTE — PROGRESS NOTES
Past Medical History   Diagnosis Date     ASCUS favor benign 2012     neg HPV  Plan cotest in 3 yrs.     Arthritis      Diabetes (H)      Hypertension      Patient Active Problem List   Diagnosis     Other dental caries     Disorder of bursae and tendons in shoulder region     Insomnia     Vitamin D deficiency     Premature menopause     ASCUS favor benign     Hyperlipidemia LDL goal <100     Comprehensive diabetic foot examination, type 2 DM, encounter for (H)     Hypertension goal BP (blood pressure) < 140/90     DiarrheaX 2 over last 24hrs w one explosive r/o 2ndary to acid gastritis     Uncontrolled diabetes mellitus type 2 without complications (H)     Esophageal reflux 2ndary to hi continuous intake of sugared  tea     Depression with anxiety     Hyperlipidemia with target LDL less than 100     Diabetes type 2, controlled (H)     Intractable chronic migraine without aura and without status migrainosus     Chronic pain syndrome     Needle stick injury, subsequent encounter     Past Surgical History   Procedure Laterality Date     Cholecystectomy  9/14/2007     laproscopic     Facial reconstruction surgery  2 years old     cleft lip repair     Orthopedic surgery  6/2001     left knee     Social History     Social History     Marital Status:      Spouse Name: N/A     Number of Children: N/A     Years of Education: N/A     Occupational History     Not on file.     Social History Main Topics     Smoking status: Current Some Day Smoker     Types: Cigarettes     Smokeless tobacco: Never Used      Comment: 3 cigarettes daily     Alcohol Use: No     Drug Use: No     Sexual Activity:     Partners: Male     Other Topics Concern     Parent/Sibling W/ Cabg, Mi Or Angioplasty Before 65f 55m? No     Social History Narrative     Family History   Problem Relation Age of Onset     DIABETES Mother      Hypertension Mother      HEART DISEASE Mother      Lipids Mother      Asthma Mother      DIABETES Father       Hypertension Father      CANCER No family hx of      No known family hx of skin cancer     Coronary Artery Disease No family hx of      Hyperlipidemia No family hx of      CEREBROVASCULAR DISEASE No family hx of      Breast Cancer No family hx of      Colon Cancer No family hx of      Prostate Cancer No family hx of      Other Cancer No family hx of      Depression No family hx of      Anxiety Disorder No family hx of      MENTAL ILLNESS No family hx of      Substance Abuse No family hx of      Anesthesia Reaction No family hx of      OSTEOPOROSIS No family hx of      Genetic Disorder No family hx of      Thyroid Disease No family hx of      Obesity No family hx of      Unknown/Adopted No family hx of        A1C      8.1   11/14/2016   SUBJECTIVE:  A 46-year-old female returns to clinic for diabetic foot check and callus on the right fourth toe that is painful.  She relates no ulcers or sores since we have seen her last.  She relates no neuropathy.  She relates she has her diabetic shoes and she has been wearing those.  She did not have surgery on it yet.  She is going to maybe do that in June for the hammertoe.  She relates to relief with previous debridement and recurrence.      OBJECTIVE:  DP and PT are 2/4 bilaterally.  Deep tendon reflexes are intact bilaterally.  Sharp/dull is decreased with a 5.07 [place] monofilament on the right plantar heel.  Otherwise, it is intact bilaterally.  There is no erythema, no drainage, no odor, no calor bilaterally.  She has nucleated hyperkeratotic tissue buildup, dorsal right fourth toe.  She has dorsally contracted digits 1-5 bilaterally.      ASSESSMENT AND PLAN:  Tyloma causing pain, right fourth toe.  She is diabetic with peripheral neuropathy, although that is doing well today.  Diagnosis and treatment options discussed with the patient.  Tyloma on the right fourth toe was sharp debrided with a #15 blade upon consent.  She related if it starts coming back in  about a month, she wants to come back in about a month.  I will see her back in 1 month.

## 2017-02-06 NOTE — NURSING NOTE
"Karen Bratxon's goals for this visit include: Recheck left foot pain  She requests these members of her care team be copied on today's visit information: yes    PCP: Raymond Engel    Referring Provider:  No referring provider defined for this encounter.    Chief Complaint   Patient presents with     RECHECK     Right foot pain recheck       Initial /78 mmHg  Pulse 87  SpO2 98%  LMP  (LMP Unknown) Estimated body mass index is 23.62 kg/(m^2) as calculated from the following:    Height as of 1/2/17: 1.664 m (5' 5.5\").    Weight as of 1/2/17: 65.409 kg (144 lb 3.2 oz).  Medication Reconciliation: complete    "

## 2017-02-14 NOTE — TELEPHONE ENCOUNTER
Patient not reachable after several attempts. Would be happy to see patient in the future. Routing to PCP as FYI.  Rhiannon Montaño, Pharm.D, BCACP  Medication Therapy Management Pharmacist

## 2017-03-09 ENCOUNTER — OFFICE VISIT (OUTPATIENT)
Dept: ENDOCRINOLOGY | Facility: CLINIC | Age: 47
End: 2017-03-09

## 2017-03-09 VITALS
WEIGHT: 142 LBS | HEIGHT: 66 IN | SYSTOLIC BLOOD PRESSURE: 131 MMHG | DIASTOLIC BLOOD PRESSURE: 84 MMHG | BODY MASS INDEX: 22.82 KG/M2 | HEART RATE: 80 BPM

## 2017-03-09 DIAGNOSIS — Z79.4 TYPE 2 DIABETES MELLITUS WITH HYPERGLYCEMIA, WITH LONG-TERM CURRENT USE OF INSULIN (H): Primary | ICD-10-CM

## 2017-03-09 DIAGNOSIS — E11.65 TYPE 2 DIABETES MELLITUS WITH HYPERGLYCEMIA, WITH LONG-TERM CURRENT USE OF INSULIN (H): Primary | ICD-10-CM

## 2017-03-09 RX ORDER — MAGNESIUM HYDROXIDE/ALUMINUM HYDROXICE/SIMETHICONE 120; 1200; 1200 MG/30ML; MG/30ML; MG/30ML
30 SUSPENSION ORAL EVERY 4 HOURS PRN
COMMUNITY
End: 2017-05-11

## 2017-03-09 ASSESSMENT — PAIN SCALES - GENERAL: PAINLEVEL: NO PAIN (0)

## 2017-03-09 NOTE — PROGRESS NOTES
HPI:  Irais Jones is a 46 year old female with type 2 diabetes  Here today for a follow up visit.  For her diabetes, she is currently taking Tresiba 70 units SQ at hs, Humalog 30 units with breakfast, 35 units with lunch and 35 units with dinner, Invokana 300 mg daily,   Metformin 500 mg BID and Actos 45 mg daily.  Her A1C is 7.8 % today and her previous A1C was 8.1 % in Nov 2016.  We downloaded her glucose meter and her blood sugar values have improved.  Her average FBS is 146 and average predinner bs is 156.  No frequent hypoglycemia.  On ROS today, she reports feeling well.  Pt denies any visual problems, SOB at rest, cough or diarrhea.  No dysuria or hematuria.  She denies any pain or numbness in her feet or hands.  No edema.  No groin rash or pruritis.    ROS:   Please see under HPI.    ALLERGIES:     No Known Allergies      Current Outpatient Prescriptions   Medication Sig Dispense Refill     alum & mag hydroxide-simethicone (ANTACID) 200-200-20 MG/5ML SUSP suspension Take 30 mLs by mouth every 4 hours as needed for indigestion       insulin degludec (TRESIBA FLEXTOUCH) 200 UNIT/ML pen Inject 70 Units Subcutaneous daily 9 mL 11     atorvastatin (LIPITOR) 80 MG tablet Take 1 tablet (80 mg) by mouth daily 90 tablet 3     blood glucose monitoring (JONATHAN CONTOUR NEXT) test strip Use to test blood sugar 4 times daily or as directed. 200 each 11     blood glucose monitoring (JONATHAN MICROLET) lancets Use to test blood sugar 4 times daily or as directed. 2 Box 6     ezetimibe (ZETIA) 10 MG tablet Take 1 tablet (10 mg) by mouth daily 90 tablet 3     pioglitazone (ACTOS) 45 MG tablet Take 1 tablet (45 mg) by mouth daily 90 tablet 3     lisinopril (PRINIVIL/ZESTRIL) 2.5 MG tablet Take 1 tablet (2.5 mg) by mouth daily 90 tablet 3     canagliflozin (INVOKANA) 300 MG tablet Take 1 tablet (300 mg) by mouth every morning (before breakfast) 90 tablet 3     B-D U/F 31G X 8 MM insulin pen needle TEST FOUR TIMES A DAY OR AS  "DIRECTED 100 each 6     insulin lispro (HUMALOG KWIKPEN) 100 UNIT/ML injection 30 units before breakfast, 35 units before lunch, 35 units before dinner 3 Month 3     B-D U/F 31G X 8 MM insulin pen needle TEST FOUR TIMES A DAY OR AS DIRECTED 400 each 1     metFORMIN (GLUCOPHAGE) 1000 MG tablet TAKE ONE-HALF TABLET BY MOUTH TWICE DAILY WITH MEALS 90 tablet 1     calcium-vitamin D (CALTRATE) 600-400 MG-UNIT per tablet Take 1 tablet by mouth 2 times daily 180 tablet 3     vitamin D (ERGOCALCIFEROL) 57283 UNIT capsule TAKE 1 CAPLET ONCE WEEKLY  Profile Rx: patient will contact pharmacy when needed 4 capsule 5     insulin syringe-needle U-100 (BD INSULIN SYRINGE ULTRAFINE) 30G X 1/2\" 1 ML Use one syringe daily or as directed.  3 month supply 100 each prn     ASPIRIN NOT PRESCRIBED (INTENTIONAL) continuous prn for other Antiplatelet medication not prescribed intentionally due to Not indicated based on age/GI distress       SHX:  Smoke: yes.  ETOH: none.   with 5 children.  She works full time.    FHX:  Several family members with diabetes.    PMHX:   1.  Type 2 DM dx 8 years ago.  2.  Hyperlipidemia.  3.  Amenorrhea.  4.  GERD.  5.  S/P choley.  6.  Sebaceous cyst.  Past Medical History   Diagnosis Date     Arthritis      ASCUS favor benign 2012     neg HPV  Plan cotest in 3 yrs.     Diabetes (H)      Hypertension      Past Surgical History   Procedure Laterality Date     Cholecystectomy  9/14/2007     laproscopic     Facial reconstruction surgery  2 years old     cleft lip repair     Orthopedic surgery  6/2001     left knee       EXAM:  Constitutional:   Vitals:    03/09/17 1527   BP: 131/84   Pulse: 80   Weight: 64.4 kg (142 lb)   Height: 1.664 m (5' 5.5\")         RESULTS:    Creatinine   Date Value Ref Range Status   11/14/2016 0.83 0.52 - 1.04 mg/dL Final     GFR Estimate   Date Value Ref Range Status   11/14/2016 73 >60 mL/min/1.7m2 Final     Hemoglobin A1C   Date Value Ref Range Status   11/14/2016 8.1 (H) " 4.3 - 6.0 % Final     Potassium   Date Value Ref Range Status   11/14/2016 4.1 3.4 - 5.3 mmol/L Final     ALT   Date Value Ref Range Status   03/21/2016 30 0 - 50 U/L Final     AST   Date Value Ref Range Status   01/09/2015 17 0 - 45 U/L Final     TSH   Date Value Ref Range Status   03/21/2016 1.31 0.40 - 5.00 mU/L Final     T4 Free   Date Value Ref Range Status   08/04/2011 1.22 0.70 - 1.85 ng/dL Final         Cholesterol   Date Value Ref Range Status   03/21/2016 231 (H) <200 mg/dL Final     Comment:     Desirable:       <200 mg/dl   12/01/2014 215 (H) <200 mg/dL Final     Comment:     LDL Cholesterol is the primary guide to therapy.   The NCEP recommends further evaluation of: patients with cholesterol greater   than 200 mg/dL if additional risk factors are present, cholesterol greater   than   240 mg/dL, triglycerides greater than 150 mg/dL, or HDL less than 40 mg/dL.       HDL Cholesterol   Date Value Ref Range Status   03/21/2016 44 (L) >49 mg/dL Final   12/01/2014 36 (L) >50 mg/dL Final     LDL Cholesterol Calculated   Date Value Ref Range Status   03/21/2016 142 (H) <100 mg/dL Final     Comment:     Above desirable:  100-129 mg/dl   Borderline High:  130-159 mg/dL   High:             160-189 mg/dL   Very high:       >189 mg/dl     12/01/2014 114 0 - 129 mg/dL Final     Comment:     LDL Cholesterol is the primary guide to therapy: LDL-cholesterol goal in high   risk patients is <100 mg/dL and in very high risk patients is <70 mg/dL.       LDL Cholesterol Direct   Date Value Ref Range Status   11/26/2013 140 (H) 0 - 129 mg/dL Final     Comment:     Optimal:         <100 mg/dL   Near Optimal:     100-129 mg/dL   Borderline High:  130-159 mg/dL   High:             160-189 mg/dL   Very high:  greater than or equal to 190 mg/dL   Cannot estimate LDL when triglyceride exceeds 400 mg/dL   07/24/2013 148 (H) 0 - 129 mg/dL Final     Comment:     Optimal:         <100 mg/dL   Near Optimal:     100-129 mg/dL    Borderline High:  130-159 mg/dL   High:             160-189 mg/dL   Very high:  greater than or equal to 190 mg/dL   Cannot estimate LDL when triglyceride exceeds 400 mg/dL     Triglycerides   Date Value Ref Range Status   03/21/2016 223 (H) <150 mg/dL Final     Comment:     Non Fasting   Borderline high:  150-199 mg/dl   High:             200-499 mg/dl   Very high:       >499 mg/dl     12/01/2014 323 (H) 0 - 150 mg/dL Final     Comment:     Non Fasting     Cholesterol/HDL Ratio   Date Value Ref Range Status   12/01/2014 6.0 (H) 0.0 - 5.0 Final   07/30/2014 6.1 (H) 0.0 - 5.0 Final     A1C      7.8   3/9/2017  A1C      8.1   12/2016  A1C      8.9   6/28/2016  A1C      8.1   6/23/2015  A1C      9.0   3/3/2015  A1C      8.5   12/1/2014  A1C     11.4  5/29/2014  A1C     10.7   4/1/2014  A1C     10.3   1/8/2014  A1C      9.8   11/26/2013  A1C      8.8   7/24/2013  A1C      8.9   5/28/2013    ASSESSMENT/PLAN:    1. TYPE 2 DIABETES MELLITUS: Pt's blood sugar values have improved.  She is to remain on her current dose of Tresiba, Humalog, Invokana, Metformin and Actos.  Encouraged her to continue to check her blood sugar premeals and at hs.   Pt remains on daily ASA.  Pt remains normotensive.  She was seen by Oph in April 2016. She plans to see an [facility] in [facility] this spring.  Pt's creat was 0.83  with GFR 73 mL/min in 11/2016 with normal K+.   Pt's urine microalbuminuria was negative in March 2016.  TSH normal in 3/2016.    2.  HYPERLIPIDEMIA:   in 3/2016.  She is to take Crestor and Zetia daily.    3.  FOOT CARE: Pt has seen Dr. Hawley.    4.  Return to Endocrine Clinic to see Dr. Phillips June 2017.

## 2017-03-09 NOTE — LETTER
3/9/2017       RE: Karen Braxton  [address]   [place] [address]     Dear Colleague,    Thank you for referring your patient, Karen Braxton, to the [facility] at [facility] Please see a copy of my visit note below.    HPI:  Irais Jones is a 46 year old female with type 2 diabetes  Here today for a follow up visit.  For her diabetes, she is currently taking Tresiba 70 units SQ at hs, Humalog 30 units with breakfast, 35 units with lunch and 35 units with dinner, Invokana 300 mg daily,   Metformin 500 mg BID and Actos 45 mg daily.  Her A1C is 7.8 % today and her previous A1C was 8.1 % in Nov 2016.  We downloaded her glucose meter and her blood sugar values have improved.  Her average FBS is 146 and average predinner bs is 156.  No frequent hypoglycemia.  On ROS today, she reports feeling well.  Pt denies any visual problems, SOB at rest, cough or diarrhea.  No dysuria or hematuria.  She denies any pain or numbness in her feet or hands.  No edema.  No groin rash or pruritis.    ROS:   Please see under HPI.    ALLERGIES:     No Known Allergies      Current Outpatient Prescriptions   Medication Sig Dispense Refill     alum & mag hydroxide-simethicone (ANTACID) 200-200-20 MG/5ML SUSP suspension Take 30 mLs by mouth every 4 hours as needed for indigestion       insulin degludec (TRESIBA FLEXTOUCH) 200 UNIT/ML pen Inject 70 Units Subcutaneous daily 9 mL 11     atorvastatin (LIPITOR) 80 MG tablet Take 1 tablet (80 mg) by mouth daily 90 tablet 3     blood glucose monitoring (JONATHAN CONTOUR NEXT) test strip Use to test blood sugar 4 times daily or as directed. 200 each 11     blood glucose monitoring (JONATHAN MICROLET) lancets Use to test blood sugar 4 times daily or as directed. 2 Box 6     ezetimibe (ZETIA) 10 MG tablet Take 1 tablet (10 mg) by mouth daily 90 tablet 3     pioglitazone (ACTOS) 45 MG tablet Take 1 tablet (45 mg) by mouth daily 90 tablet 3      "lisinopril (PRINIVIL/ZESTRIL) 2.5 MG tablet Take 1 tablet (2.5 mg) by mouth daily 90 tablet 3     canagliflozin (INVOKANA) 300 MG tablet Take 1 tablet (300 mg) by mouth every morning (before breakfast) 90 tablet 3     B-D U/F 31G X 8 MM insulin pen needle TEST FOUR TIMES A DAY OR AS DIRECTED 100 each 6     insulin lispro (HUMALOG KWIKPEN) 100 UNIT/ML injection 30 units before breakfast, 35 units before lunch, 35 units before dinner 3 Month 3     B-D U/F 31G X 8 MM insulin pen needle TEST FOUR TIMES A DAY OR AS DIRECTED 400 each 1     metFORMIN (GLUCOPHAGE) 1000 MG tablet TAKE ONE-HALF TABLET BY MOUTH TWICE DAILY WITH MEALS 90 tablet 1     calcium-vitamin D (CALTRATE) 600-400 MG-UNIT per tablet Take 1 tablet by mouth 2 times daily 180 tablet 3     vitamin D (ERGOCALCIFEROL) 31722 UNIT capsule TAKE 1 CAPLET ONCE WEEKLY  Profile Rx: patient will contact pharmacy when needed 4 capsule 5     insulin syringe-needle U-100 (BD INSULIN SYRINGE ULTRAFINE) 30G X 1/2\" 1 ML Use one syringe daily or as directed.  3 month supply 100 each prn     ASPIRIN NOT PRESCRIBED (INTENTIONAL) continuous prn for other Antiplatelet medication not prescribed intentionally due to Not indicated based on age/GI distress       SHX:  Smoke: yes.  ETOH: none.   with 5 children.  She works full time.    FHX:  Several family members with diabetes.    PMHX:   1.  Type 2 DM dx 8 years ago.  2.  Hyperlipidemia.  3.  Amenorrhea.  4.  GERD.  5.  S/P choley.  6.  Sebaceous cyst.  Past Medical History   Diagnosis Date     Arthritis      ASCUS favor benign 2012     neg HPV  Plan cotest in 3 yrs.     Diabetes (H)      Hypertension      Past Surgical History   Procedure Laterality Date     Cholecystectomy  9/14/2007     laproscopic     Facial reconstruction surgery  2 years old     cleft lip repair     Orthopedic surgery  6/2001     left knee       EXAM:  Constitutional:   Vitals:    03/09/17 1527   BP: 131/84   Pulse: 80   Weight: 64.4 kg (142 lb) " "  Height: 1.664 m (5' 5.5\")         RESULTS:    Creatinine   Date Value Ref Range Status   11/14/2016 0.83 0.52 - 1.04 mg/dL Final     GFR Estimate   Date Value Ref Range Status   11/14/2016 73 >60 mL/min/1.7m2 Final     Hemoglobin A1C   Date Value Ref Range Status   11/14/2016 8.1 (H) 4.3 - 6.0 % Final     Potassium   Date Value Ref Range Status   11/14/2016 4.1 3.4 - 5.3 mmol/L Final     ALT   Date Value Ref Range Status   03/21/2016 30 0 - 50 U/L Final     AST   Date Value Ref Range Status   01/09/2015 17 0 - 45 U/L Final     TSH   Date Value Ref Range Status   03/21/2016 1.31 0.40 - 5.00 mU/L Final     T4 Free   Date Value Ref Range Status   08/04/2011 1.22 0.70 - 1.85 ng/dL Final         Cholesterol   Date Value Ref Range Status   03/21/2016 231 (H) <200 mg/dL Final     Comment:     Desirable:       <200 mg/dl   12/01/2014 215 (H) <200 mg/dL Final     Comment:     LDL Cholesterol is the primary guide to therapy.   The NCEP recommends further evaluation of: patients with cholesterol greater   than 200 mg/dL if additional risk factors are present, cholesterol greater   than   240 mg/dL, triglycerides greater than 150 mg/dL, or HDL less than 40 mg/dL.       HDL Cholesterol   Date Value Ref Range Status   03/21/2016 44 (L) >49 mg/dL Final   12/01/2014 36 (L) >50 mg/dL Final     LDL Cholesterol Calculated   Date Value Ref Range Status   03/21/2016 142 (H) <100 mg/dL Final     Comment:     Above desirable:  100-129 mg/dl   Borderline High:  130-159 mg/dL   High:             160-189 mg/dL   Very high:       >189 mg/dl     12/01/2014 114 0 - 129 mg/dL Final     Comment:     LDL Cholesterol is the primary guide to therapy: LDL-cholesterol goal in high   risk patients is <100 mg/dL and in very high risk patients is <70 mg/dL.       LDL Cholesterol Direct   Date Value Ref Range Status   11/26/2013 140 (H) 0 - 129 mg/dL Final     Comment:     Optimal:         <100 mg/dL   Near Optimal:     100-129 mg/dL   Borderline High:  " 130-159 mg/dL   High:             160-189 mg/dL   Very high:  greater than or equal to 190 mg/dL   Cannot estimate LDL when triglyceride exceeds 400 mg/dL   07/24/2013 148 (H) 0 - 129 mg/dL Final     Comment:     Optimal:         <100 mg/dL   Near Optimal:     100-129 mg/dL   Borderline High:  130-159 mg/dL   High:             160-189 mg/dL   Very high:  greater than or equal to 190 mg/dL   Cannot estimate LDL when triglyceride exceeds 400 mg/dL     Triglycerides   Date Value Ref Range Status   03/21/2016 223 (H) <150 mg/dL Final     Comment:     Non Fasting   Borderline high:  150-199 mg/dl   High:             200-499 mg/dl   Very high:       >499 mg/dl     12/01/2014 323 (H) 0 - 150 mg/dL Final     Comment:     Non Fasting     Cholesterol/HDL Ratio   Date Value Ref Range Status   12/01/2014 6.0 (H) 0.0 - 5.0 Final   07/30/2014 6.1 (H) 0.0 - 5.0 Final     A1C      7.8   3/9/2017  A1C      8.1   12/2016  A1C      8.9   6/28/2016  A1C      8.1   6/23/2015  A1C      9.0   3/3/2015  A1C      8.5   12/1/2014  A1C     11.4  5/29/2014  A1C     10.7   4/1/2014  A1C     10.3   1/8/2014  A1C      9.8   11/26/2013  A1C      8.8   7/24/2013  A1C      8.9   5/28/2013    ASSESSMENT/PLAN:    1. TYPE 2 DIABETES MELLITUS: Pt's blood sugar values have improved.  She is to remain on her current dose of Tresiba, Humalog, Invokana, Metformin and Actos.  Encouraged her to continue to check her blood sugar premeals and at hs.   Pt remains on daily ASA.  Pt remains normotensive.  She was seen by Oph in April 2016. She plans to see an Oph in [facility] this spring.  Pt's creat was 0.83  with GFR 73 mL/min in 11/2016 with normal K+.   Pt's urine microalbuminuria was negative in March 2016.  TSH normal in 3/2016.    2.  HYPERLIPIDEMIA:   in 3/2016.  She is to take Crestor and Zetia daily.    3.  FOOT CARE: Pt has seen Dr. Chandan.    4.  Return to Endocrine Clinic to see Dr. Phillips June 2017.    Again, thank you for allowing me to  participate in the care of your patient.      Sincerely,    Cecile Juarez PA-C

## 2017-03-09 NOTE — MR AVS SNAPSHOT
After Visit Summary   3/9/2017    Karen Braxton    MRN: [id number]           Patient Information     Date Of Birth          1970        Visit Information        Provider Department      3/9/2017 3:30 PM Cecile Juarez PA-C [facility]        Today's Diagnoses     Type 2 diabetes mellitus with hyperglycemia, with long-term current use of insulin (H)    -  1       Follow-ups after your visit        Your next 10 appointments already scheduled     Mar 27, 2017  2:00 PM CDT   SHORT with Raymond Engel MD   [facility] ([facility]    [address]  [address]  [place] [address]   [phone]            Jun 05, 2017  3:30 PM CDT   (Arrive by 3:00 PM)   RETURN DIABETES with Melisa Phillips MD   [facility] ([facility] [place])    [address]  [address] [place] [id number] [phone]              Who to contact     Please call your clinic at [phone] to:    Ask questions about your health    Make or cancel appointments    Discuss your medicines    Learn about your test results    Speak to your doctor   If you have compliments or concerns about an experience at your clinic, or if you wish to file a complaint, please contact [facility] Patient Relations at [phone] or email us at [email or web address]         Additional Information About Your Visit        MyChart Information     [email or web address] is an electronic gateway that provides easy, online access to your medical records. With [employer] you can request a clinic appointment, read your test results, renew a prescription or communicate with your care team.     To sign up for [email or web address] visit the website at [email or web address]   You will be asked to enter the access code listed below, as well as some personal information. Please follow the directions to  "create your username and password.     Your access code is: [email or web address]  Expires: 2017  6:31 AM     Your access code will  in 90 days. If you need help or a new code, please contact your [facility] or call [phone] for assistance.        Care EveryWhere ID     This is your Care EveryWhere ID. This could be used by other organizations to access your [place] medical records  [id number]        Your Vitals Were     Pulse Height Last Period BMI (Body Mass Index)          80 1.664 m (5' 5.5\") (LMP Unknown) 23.27 kg/m2         Blood Pressure from Last 3 Encounters:   17 131/84   17 118/78   17 116/74    Weight from Last 3 Encounters:   17 64.4 kg (142 lb)   17 65.4 kg (144 lb 3.2 oz)   16 66.7 kg (147 lb)              Today, you had the following     No orders found for display         Today's Medication Changes          These changes are accurate as of: 3/9/17  4:47 PM.  If you have any questions, ask your nurse or doctor.               These medicines have changed or have updated prescriptions.        Dose/Directions    * B-D U/F 31G X 8 MM   This may have changed:  Another medication with the same name was removed. Continue taking this medication, and follow the directions you see here.   Used for:  Diabetes type 2, controlled (H)   Generic drug:  insulin pen needle   Changed by:  Dimitri Duke MD        TEST FOUR TIMES A DAY OR AS DIRECTED   Quantity:  400 each   Refills:  1       * B-D U/F 31G X 8 MM   This may have changed:  Another medication with the same name was removed. Continue taking this medication, and follow the directions you see here.   Used for:  Diabetes type 2, controlled (H)   Generic drug:  insulin pen needle   Changed by:  Dimitri Duke MD        TEST FOUR TIMES A DAY OR AS DIRECTED   Quantity:  100 each   Refills:  6       * Notice:  This list has 2 medication(s) that are the same as other " medications prescribed for you. Read the directions carefully, and ask your doctor or other care provider to review them with you.      Stop taking these medicines if you haven't already. Please contact your care team if you have questions.     acetaminophen 500 MG tablet   Commonly known as:  TYLENOL   Stopped by:  Cecile Juarez PA-C           albuterol 108 (90 BASE) MCG/ACT Inhaler   Commonly known as:  PROAIR HFA/PROVENTIL HFA/VENTOLIN HFA   Stopped by:  Cecile Juarez PA-C           Aluminum & Magnesium Hydroxide 500-500 MG/5ML Susp   Commonly known as:  MYLANTA ULTIMATE STRENGTH   Stopped by:  Cecile Juarez PA-C           diclofenac 1 % Gel topical gel   Commonly known as:  VOLTAREN   Stopped by:  Cecile Juarez PA-C           fluocin-hydroquinone-tretinoin 0.01-4-0.05 % Crea   Stopped by:  Cecile Juarez PA-C           guaiFENesin-dextromethorphan 100-10 MG/5ML syrup   Commonly known as:  ROBITUSSIN DM   Stopped by:  Cecile Juarez PA-C           insulin aspart 100 UNIT/ML injection   Commonly known as:  NovoLOG FLEXPEN   Stopped by:  Cecile Juarez PA-C           LANTUS SOLOSTAR 100 UNIT/ML injection   Generic drug:  insulin glargine   Stopped by:  Cecile Juarez PA-C           nystatin cream   Commonly known as:  MYCOSTATIN   Stopped by:  Cecile Juarez PA-C           ondansetron 4 MG tablet   Commonly known as:  ZOFRAN   Stopped by:  Cecile Juarez PA-C           order for DME   Stopped by:  Cecile Juarez PA-C           order for DME   Stopped by:  Cecile Juarez PA-C           SKIN PREP WIPES Misc   Stopped by:  Cecile Juarez PA-C                    Primary Care Provider Office Phone # Fax #    Raymond Engel [phone]       [facility] [address]  [facility] [address]        Thank you!     Thank you for choosing [facility] ENDOCRIN[facility]  for your care.  Our goal is always to provide you with excellent care. Hearing back from our patients is one way we can continue to improve our services. Please take a few minutes to complete the written survey that you may receive in the mail after your visit with us. Thank you!             Your Updated Medication List - Protect others around you: Learn how to safely use, store and throw away your medicines at www.disposemymeds.org.          This list is accurate as of: 3/9/17  4:47 PM.  Always use your most recent med list.                   Brand Name Dispense Instructions for use    antacid 200-200-20 MG/5ML Susp suspension   Generic drug:  alum & mag hydroxide-simethicone      Take 30 mLs by mouth every 4 hours as needed for indigestion       ASPIRIN NOT PRESCRIBED    INTENTIONAL     continuous prn for other Antiplatelet medication not prescribed intentionally due to Not indicated based on age/GI distress       atorvastatin 80 MG tablet    LIPITOR    90 tablet    Take 1 tablet (80 mg) by mouth daily       * B-D U/F 31G X 8 MM   Generic drug:  insulin pen needle     400 each    TEST FOUR TIMES A DAY OR AS DIRECTED       * B-D U/F 31G X 8 MM   Generic drug:  insulin pen needle     100 each    TEST FOUR TIMES A DAY OR AS DIRECTED       blood glucose monitoring lancets     2 Box    Use to test blood sugar 4 times daily or as directed.       blood glucose monitoring test strip    JONATHAN CONTOUR NEXT    200 each    Use to test blood sugar 4 times daily or as directed.       calcium-vitamin D 600-400 MG-UNIT per tablet    CALTRATE    180 tablet    Take 1 tablet by mouth 2 times daily       canagliflozin 300 MG tablet    INVOKANA    90 tablet    Take 1 tablet (300 mg) by mouth every morning (before breakfast)       ezetimibe 10 MG tablet    ZETIA    90 tablet    Take 1 tablet (10 mg) by mouth daily       insulin degludec 200 UNIT/ML pen    TRESIBA FLEXTOUCH    9 mL    Inject 70 Units Subcutaneous daily       insulin lispro 100 UNIT/ML  "injection    HumaLOG Donato    3 Month    30 units before breakfast, 35 units before lunch, 35 units before dinner       insulin syringe-needle U-100 30G X 1/2\" 1 ML    BD insulin syringe ultrafine    100 each    Use one syringe daily or as directed.  3 month supply       lisinopril 2.5 MG tablet    PRINIVIL/Zestril    90 tablet    Take 1 tablet (2.5 mg) by mouth daily       metFORMIN 1000 MG tablet    GLUCOPHAGE    90 tablet    TAKE ONE-HALF TABLET BY MOUTH TWICE DAILY WITH MEALS       pioglitazone 45 MG tablet    ACTOS    90 tablet    Take 1 tablet (45 mg) by mouth daily       vitamin D 21122 UNIT capsule    ERGOCALCIFEROL    4 capsule    TAKE 1 CAPLET ONCE WEEKLY Profile Rx: patient will contact pharmacy when needed       * Notice:  This list has 2 medication(s) that are the same as other medications prescribed for you. Read the directions carefully, and ask your doctor or other care provider to review them with you.      "

## 2017-03-27 ENCOUNTER — OFFICE VISIT (OUTPATIENT)
Dept: FAMILY MEDICINE | Facility: CLINIC | Age: 47
End: 2017-03-27
Payer: COMMERCIAL

## 2017-03-27 VITALS
SYSTOLIC BLOOD PRESSURE: 108 MMHG | BODY MASS INDEX: 22.7 KG/M2 | HEART RATE: 91 BPM | RESPIRATION RATE: 16 BRPM | WEIGHT: 138.5 LBS | TEMPERATURE: 98.6 F | OXYGEN SATURATION: 98 % | DIASTOLIC BLOOD PRESSURE: 62 MMHG

## 2017-03-27 DIAGNOSIS — Z13.89 SCREENING FOR DIABETIC PERIPHERAL NEUROPATHY: ICD-10-CM

## 2017-03-27 DIAGNOSIS — I10 HYPERTENSION GOAL BP (BLOOD PRESSURE) < 140/90: Chronic | ICD-10-CM

## 2017-03-27 DIAGNOSIS — M20.41 HAMMER TOE OF RIGHT FOOT: ICD-10-CM

## 2017-03-27 DIAGNOSIS — E55.9 VITAMIN D DEFICIENCY: ICD-10-CM

## 2017-03-27 DIAGNOSIS — E11.9 COMPREHENSIVE DIABETIC FOOT EXAMINATION, TYPE 2 DM, ENCOUNTER FOR (H): ICD-10-CM

## 2017-03-27 DIAGNOSIS — K21.9 GASTROESOPHAGEAL REFLUX DISEASE WITHOUT ESOPHAGITIS: ICD-10-CM

## 2017-03-27 DIAGNOSIS — E78.5 HYPERLIPIDEMIA LDL GOAL <100: Chronic | ICD-10-CM

## 2017-03-27 DIAGNOSIS — Z20.2 STD EXPOSURE: ICD-10-CM

## 2017-03-27 LAB — HBA1C MFR BLD: 8.2 % (ref 4.3–6)

## 2017-03-27 PROCEDURE — 83036 HEMOGLOBIN GLYCOSYLATED A1C: CPT | Performed by: FAMILY MEDICINE

## 2017-03-27 PROCEDURE — 99214 OFFICE O/P EST MOD 30 MIN: CPT | Performed by: FAMILY MEDICINE

## 2017-03-27 PROCEDURE — 87389 HIV-1 AG W/HIV-1&-2 AB AG IA: CPT | Performed by: FAMILY MEDICINE

## 2017-03-27 PROCEDURE — 36415 COLL VENOUS BLD VENIPUNCTURE: CPT | Performed by: FAMILY MEDICINE

## 2017-03-27 PROCEDURE — 86803 HEPATITIS C AB TEST: CPT | Performed by: FAMILY MEDICINE

## 2017-03-27 PROCEDURE — 80061 LIPID PANEL: CPT | Performed by: FAMILY MEDICINE

## 2017-03-27 PROCEDURE — 86706 HEP B SURFACE ANTIBODY: CPT | Performed by: FAMILY MEDICINE

## 2017-03-27 PROCEDURE — 87340 HEPATITIS B SURFACE AG IA: CPT | Performed by: FAMILY MEDICINE

## 2017-03-27 RX ORDER — ATORVASTATIN CALCIUM 80 MG/1
80 TABLET, FILM COATED ORAL DAILY
Qty: 90 TABLET | Refills: 3 | Status: SHIPPED | OUTPATIENT
Start: 2017-03-27 | End: 2018-04-14

## 2017-03-27 NOTE — LETTER
[place] [address] [facility]  [address]  [facility] [address] [phone]                                                                                                           Karen Braxton  [address]   [place] [address]    March 29, 2017      Dear Karen,    The results of your recent tests were reviewed and are enclosed.     NORMAL HUMAN IMMUNO VIRUS   NORMAL HEPATITIS C   NORMAL HEPATITIS B ANTIBODY AND ANTIGEN   THREE MONTH GLUCOSE AVERAGE 8.2MG%     Results for orders placed or performed in visit on 03/27/17   Lipid panel reflex to direct LDL   Result Value Ref Range    Cholesterol 242 (H) <200 mg/dL    Triglycerides 171 (H) <150 mg/dL    HDL Cholesterol 48 (L) >49 mg/dL    LDL Cholesterol Calculated 160 (H) <100 mg/dL    Non HDL Cholesterol 194 (H) <130 mg/dL   Hemoglobin A1c   Result Value Ref Range    Hemoglobin A1C 8.2 (H) 4.3 - 6.0 %   HIV Antigen Antibody Combo   Result Value Ref Range    HIV Antigen Antibody Combo  NR     Nonreactive   HIV-1 p24 Ag & HIV-1/HIV-2 Ab Not Detected     Hepatitis C antibody   Result Value Ref Range    Hepatitis C Antibody  NR     Nonreactive   Assay performance characteristics have not been established for newborns,   infants, and children     Hepatitis B Surface Antibody   Result Value Ref Range    Hepatitis B Surface Antibody 0.19 <8.00 m[IU]/mL   Hepatitis B surface antigen   Result Value Ref Range    Hep B Surface Agn Nonreactive NR           Thank you for choosing [facility].  We appreciate the opportunity to serve you and look forward to supporting your healthcare needs in the future.    If you have any questions or concerns, please call me or my staff at [phone].      Sincerely,    Raymond Engel Jr MD

## 2017-03-27 NOTE — PROGRESS NOTES
SUBJECTIVE:                                                    Karen Braxton is a 46 year old female who presents to clinic today for the following health issues:  Health Maintenance Due   Topic Date Due     FOOT EXAM Q1 YEAR( NO INBASKET)  07/14/2016     LIPID MONITORING Q1 YEAR( NO INBASKET)  03/21/2017     Health Maintenance reviewed at today's visit patient asked to schedule/complete:   labs      Diabetes Follow-up    Patient is checking blood sugars: four times daily.    Blood sugar testing frequency justification: Adjustment of medication(s) and Patient modifying lifestyle changes (diet, exercise) with blood sugars  Results are as follows:         am - 30              postprandial after lunch- 135              postprandial after supper- 35         bedtime - 145    Diabetic concerns: None     Symptoms of hypoglycemia (low blood sugar): none     Paresthesias (numbness or burning in feet) or sores: No     Date of last diabetic eye exam: 5/2016       Amount of exercise or physical activity: 4-5 days/week for an average of 15-30 minutes    Problems taking medications regularly: No    Medication side effects: none    Diet: regular (no restrictions)      Hyperlipidemia Follow-Up      Rate your low fat/cholesterol diet?: good    Taking statin?  Yes, no muscle aches from statin    Other lipid medications/supplements?:  Zetia, without side effects              Problem list and histories reviewed & adjusted, as indicated.  Additional history: as documented    Patient Active Problem List   Diagnosis     Other dental caries     Disorder of bursae and tendons in shoulder region     Insomnia     Vitamin D deficiency     Premature menopause     ASCUS favor benign     Hyperlipidemia LDL goal <100     Comprehensive diabetic foot examination, type 2 DM, encounter for (H)     Hypertension goal BP (blood pressure) < 140/90     DiarrheaX 2 over last 24hrs w one explosive r/o 2ndary to acid gastritis     Uncontrolled diabetes  mellitus type 2 without complications (H)     Esophageal reflux 2ndary to hi continuous intake of sugared  tea     Hyperlipidemia with target LDL less than 100     Diabetes type 2, controlled (H)     Intractable chronic migraine without aura and without status migrainosus     Needle stick injury, subsequent encounter     Hammer toe of right foot     Past Surgical History:   Procedure Laterality Date     CHOLECYSTECTOMY  9/14/2007    laproscopic     FACIAL RECONSTRUCTION SURGERY  2 years old    cleft lip repair     ORTHOPEDIC SURGERY  6/2001    left knee       Social History   Substance Use Topics     Smoking status: Current Some Day Smoker     Types: Cigarettes     Smokeless tobacco: Never Used      Comment: 3 cigarettes daily     Alcohol use No     Family History   Problem Relation Age of Onset     DIABETES Mother      Hypertension Mother      HEART DISEASE Mother      Lipids Mother      Asthma Mother      DIABETES Father      Hypertension Father      CANCER No family hx of      No known family hx of skin cancer     Coronary Artery Disease No family hx of      Hyperlipidemia No family hx of      CEREBROVASCULAR DISEASE No family hx of      Breast Cancer No family hx of      Colon Cancer No family hx of      Prostate Cancer No family hx of      Other Cancer No family hx of      Depression No family hx of      Anxiety Disorder No family hx of      MENTAL ILLNESS No family hx of      Substance Abuse No family hx of      Anesthesia Reaction No family hx of      OSTEOPOROSIS No family hx of      Genetic Disorder No family hx of      Thyroid Disease No family hx of      Obesity No family hx of      Unknown/Adopted No family hx of          Current Outpatient Prescriptions   Medication Sig Dispense Refill     atorvastatin (LIPITOR) 80 MG tablet Take 1 tablet (80 mg) by mouth daily 90 tablet 3     alum & mag hydroxide-simethicone (ANTACID) 200-200-20 MG/5ML SUSP suspension Take 30 mLs by mouth every 4 hours as needed for  "indigestion       insulin degludec (TRESIBA FLEXTOUCH) 200 UNIT/ML pen Inject 70 Units Subcutaneous daily 9 mL 11     blood glucose monitoring (JONATHAN CONTOUR NEXT) test strip Use to test blood sugar 4 times daily or as directed. 200 each 11     blood glucose monitoring (JONATHAN MICROLET) lancets Use to test blood sugar 4 times daily or as directed. 2 Box 6     ezetimibe (ZETIA) 10 MG tablet Take 1 tablet (10 mg) by mouth daily 90 tablet 3     pioglitazone (ACTOS) 45 MG tablet Take 1 tablet (45 mg) by mouth daily 90 tablet 3     lisinopril (PRINIVIL/ZESTRIL) 2.5 MG tablet Take 1 tablet (2.5 mg) by mouth daily 90 tablet 3     canagliflozin (INVOKANA) 300 MG tablet Take 1 tablet (300 mg) by mouth every morning (before breakfast) 90 tablet 3     B-D U/F 31G X 8 MM insulin pen needle TEST FOUR TIMES A DAY OR AS DIRECTED 100 each 6     insulin lispro (HUMALOG KWIKPEN) 100 UNIT/ML injection 30 units before breakfast, 35 units before lunch, 35 units before dinner 3 Month 3     B-D U/F 31G X 8 MM insulin pen needle TEST FOUR TIMES A DAY OR AS DIRECTED 400 each 1     metFORMIN (GLUCOPHAGE) 1000 MG tablet TAKE ONE-HALF TABLET BY MOUTH TWICE DAILY WITH MEALS 90 tablet 1     calcium-vitamin D (CALTRATE) 600-400 MG-UNIT per tablet Take 1 tablet by mouth 2 times daily 180 tablet 3     vitamin D (ERGOCALCIFEROL) 41805 UNIT capsule TAKE 1 CAPLET ONCE WEEKLY  Profile Rx: patient will contact pharmacy when needed 4 capsule 5     insulin syringe-needle U-100 (BD INSULIN SYRINGE ULTRAFINE) 30G X 1/2\" 1 ML Use one syringe daily or as directed.  3 month supply 100 each prn     ASPIRIN NOT PRESCRIBED (INTENTIONAL) continuous prn for other Antiplatelet medication not prescribed intentionally due to Not indicated based on age/GI distress       [DISCONTINUED] atorvastatin (LIPITOR) 80 MG tablet Take 1 tablet (80 mg) by mouth daily 90 tablet 3     No Known Allergies  Recent Labs   Lab Test  03/27/17   1442  11/14/16   1058  03/21/16   1030   " 06/23/15   1652  12/01/14   1222  07/30/14   0829  05/29/14   1045  03/18/14   1518   A1C  8.2*  8.1*  8.1*   < >  8.1*  8.5*  10.1*  11.4*   --    LDL   --    --   142*   --    --   114  146*  64   --    HDL   --    --   44*   --    --   36*  39*  36*   --    TRIG   --    --   223*   --    --   323*  261*  362*   --    ALT   --    --   30   --   21   --    --   23  30   CR   --   0.83  0.70   --   0.80  0.80  0.60  0.70  0.69   GFRESTIMATED   --   73  90   --   77  77  >90  >90  >90   GFRESTBLACK   --   89  >90   --   >90  >90  >90  >90  >90   POTASSIUM   --   4.1  4.4   --   4.2  4.0  4.6  4.1  4.0   TSH   --    --   1.31   --    --    --    --    --   1.55    < > = values in this interval not displayed.      BP Readings from Last 3 Encounters:   03/27/17 108/62   03/09/17 131/84   02/06/17 118/78    Wt Readings from Last 3 Encounters:   03/27/17 138 lb 8 oz (62.8 kg)   03/09/17 142 lb (64.4 kg)   01/02/17 144 lb 3.2 oz (65.4 kg)                  Labs reviewed in EPIC    Reviewed and updated as needed this visit by clinical staff       Reviewed and updated as needed this visit by Provider         ROS: has Other dental caries; Disorder of bursae and tendons in shoulder region; Insomnia; Vitamin D deficiency; Premature menopause; ASCUS favor benign; Hyperlipidemia LDL goal <100; Comprehensive diabetic foot examination, type 2 DM, encounter for (H); Hypertension goal BP (blood pressure) < 140/90; DiarrheaX 2 over last 24hrs w one explosive r/o 2ndary to acid gastritis; Uncontrolled diabetes mellitus type 2 without complications (H); Esophageal reflux 2ndary to hi continuous intake of sugared  tea; Hyperlipidemia with target LDL less than 100; Diabetes type 2, controlled (H); Intractable chronic migraine without aura and without status migrainosus; Needle stick injury, subsequent encounter; and Hammer toe of right foot on her problem list.    C: NEGATIVE for fever, chills, change in weight  I: NEGATIVE for worrisome  rashes, moles or lesions  E: NEGATIVE for vision changes or irritation  E/M: NEGATIVE for ear, mouth and throat problems  R: NEGATIVE for significant cough or SOB  B: NEGATIVE for masses, tenderness or discharge  CV: NEGATIVE for chest pain, palpitations or peripheral edema  GI: NEGATIVE for nausea, abdominal pain, heartburn, or change in bowel habits  : NEGATIVE for frequency, dysuria, or hematuria  MUSCULOSKELETAL: BACK AND KNEE PAIN   RIGHT 4TH HAMMERTOE with CALLOSITY  N: NEGATIVE for weakness, dizziness or paresthesias  E: NEGATIVE for temperature intolerance, skin/hair changes  H: NEGATIVE for bleeding problems  P: NEGATIVE for changes in mood or affect    OBJECTIVE:                                                    /62 (BP Location: Right arm, Cuff Size: Adult Regular)  Pulse 91  Temp 98.6  F (37  C) (Tympanic)  Resp 16  Wt 138 lb 8 oz (62.8 kg)  LMP  (LMP Unknown)  SpO2 98%  BMI 22.7 kg/m2  Body mass index is 22.7 kg/(m^2).  GENERAL: healthy, alert and no distress  EYES: Eyes grossly normal to inspection, PERRL and conjunctivae and sclerae normal  HENT: ear canals and TM's normal, nose and mouth without ulcers or lesions  NECK: no adenopathy, no asymmetry, masses, or scars and thyroid normal to palpation  RESP: lungs clear to auscultation - no rales, rhonchi or wheezes  CV: regular rate and rhythm, normal S1 S2, no S3 or S4, no murmur, click or rub, no peripheral edema and peripheral pulses strong  ABDOMEN: soft, nontender, no hepatosplenomegaly, no masses and bowel sounds normal  MS: CALLOSITY RIGHT 4TH FOOT  NAILS TRIMMED WITHOUT COMPLICATION     Diagnostic Test Results:  Results for orders placed or performed in visit on 03/27/17   Hemoglobin A1c   Result Value Ref Range    Hemoglobin A1C 8.2 (H) 4.3 - 6.0 %        ASSESSMENT/PLAN:                                                          ICD-10-CM    1. Uncontrolled type 2 diabetes mellitus without complication, with long-term current use  of insulin (H) E11.65 Hemoglobin A1c    Z79.4    2. Screening for diabetic peripheral neuropathy Z13.89 FOOT EXAM  NO CHARGE [65950.114]   3. Hyperlipidemia LDL goal <100 E78.5 Lipid panel reflex to direct LDL     atorvastatin (LIPITOR) 80 MG tablet   4. Comprehensive diabetic foot examination, type 2 DM, encounter for (H) E11.9    5. Gastroesophageal reflux disease without esophagitis K21.9    6. Hypertension goal BP (blood pressure) < 140/90 I10    7. Hammer toe of right foot M20.41    8. STD exposure Z20.2 HIV Antigen Antibody Combo     Hepatitis C antibody     Hepatitis B Surface Antibody     Hepatitis B surface antigen   9. Vitamin D deficiency E55.9        Patient Instructions       LINDA ALCANTAR MD  [facility]

## 2017-03-27 NOTE — NURSING NOTE
"Chief Complaint   Patient presents with     Diabetes       Initial /62 (BP Location: Right arm, Cuff Size: Adult Regular)  Pulse 91  Temp 98.6  F (37  C) (Tympanic)  Resp 16  Wt 138 lb 8 oz (62.8 kg)  LMP  (LMP Unknown)  SpO2 98%  BMI 22.7 kg/m2 Estimated body mass index is 22.7 kg/(m^2) as calculated from the following:    Height as of 3/9/17: 5' 5.5\" (1.664 m).    Weight as of this encounter: 138 lb 8 oz (62.8 kg).  Medication Reconciliation: complete   Radha Pierre CMA    "

## 2017-03-27 NOTE — MR AVS SNAPSHOT
After Visit Summary   3/27/2017    Karen Braxton    MRN: [id number]           Patient Information     Date Of Birth          1970        Visit Information        Provider Department      3/27/2017 2:00 PM Raymond Engel MD [facility]        Today's Diagnoses     Screening for diabetic peripheral neuropathy    -  1    Uncontrolled type 2 diabetes mellitus without complication, with long-term current use of insulin (H)        Hyperlipidemia LDL goal <100        Comprehensive diabetic foot examination, type 2 DM, encounter for (H)        Gastroesophageal reflux disease without esophagitis        Hypertension goal BP (blood pressure) < 140/90        Hammer toe of right foot          Care Instructions      ICD-10-CM    1. Screening for diabetic peripheral neuropathy Z13.89 FOOT EXAM  NO CHARGE [11429.114]   2. Uncontrolled type 2 diabetes mellitus without complication, with long-term current use of insulin (H) E11.65 Hemoglobin A1c    Z79.4    3. Hyperlipidemia LDL goal <100 E78.5 Lipid panel reflex to direct LDL     atorvastatin (LIPITOR) 80 MG tablet   4. Comprehensive diabetic foot examination, type 2 DM, encounter for (H) E11.9    5. Gastroesophageal reflux disease without esophagitis K21.9    6. Hypertension goal BP (blood pressure) < 140/90 I10    7. Hammer toe of right foot M20.41              Follow-ups after your visit        Your next 10 appointments already scheduled     Jun 05, 2017  3:30 PM CDT   (Arrive by 3:00 PM)   RETURN DIABETES with Melisa Phillips MD   [facility] ([facility])    [address] [place] [id number] [phone]              Who to contact     If you have questions or need follow up information about today's clinic visit or your schedule please contact [facility] directly at [phone].  Normal or non-critical lab and  "imaging results will be communicated to you by MyChart, letter or phone within 4 business days after the clinic has received the results. If you do not hear from us within 7 days, please contact the clinic through [employer] or phone. If you have a critical or abnormal lab result, we will notify you by phone as soon as possible.  Submit refill requests through [email or web address] or call your pharmacy and they will forward the refill request to us. Please allow 3 business days for your refill to be completed.          Additional Information About Your Visit        [email or web address] Information     [email or web address] lets you send messages to your doctor, view your test results, renew your prescriptions, schedule appointments and more. To sign up, go to [email or web address] . Click on \"Log in\" on the left side of the screen, which will take you to the Welcome page. Then click on \"Sign up Now\" on the right side of the page.     You will be asked to enter the access code listed below, as well as some personal information. Please follow the directions to create your username and password.     Your access code is: [email or web address]  Expires: 2017  7:31 AM     Your access code will  in 90 days. If you need help or a new code, please call your [place] clinic or [phone].        Care EveryWhere ID     This is your Care EveryWhere ID. This could be used by other organizations to access your [place] medical records  [id number]        Your Vitals Were     Pulse Temperature Respirations Last Period Pulse Oximetry BMI (Body Mass Index)    91 98.6  F (37  C) (Tympanic) 16 (LMP Unknown) 98% 22.7 kg/m2       Blood Pressure from Last 3 Encounters:   17 108/62   17 131/84   17 118/78    Weight from Last 3 Encounters:   17 138 lb 8 oz (62.8 kg)   17 142 lb (64.4 kg)   17 144 lb 3.2 oz (65.4 kg)              We Performed the Following     FOOT EXAM  NO CHARGE [86206.114]     Hemoglobin A1c     Lipid panel reflex to " direct LDL          Where to get your medicines      These medications were sent to [facility] - [place], [place] - [address], [address]  [address], [address], [facility] [address]     Phone:  [phone]     atorvastatin 80 MG tablet          Primary Care Provider Office Phone # Fax #    Raymond Aurea Engel [phone]       [facility] [address]  [facility] [address]        Thank you!     Thank you for choosing [facility]  for your care. Our goal is always to provide you with excellent care. Hearing back from our patients is one way we can continue to improve our services. Please take a few minutes to complete the written survey that you may receive in the mail after your visit with us. Thank you!             Your Updated Medication List - Protect others around you: Learn how to safely use, store and throw away your medicines at www.disposemymeds.org.          This list is accurate as of: 3/27/17  2:37 PM.  Always use your most recent med list.                   Brand Name Dispense Instructions for use    antacid 200-200-20 MG/5ML Susp suspension   Generic drug:  alum & mag hydroxide-simethicone      Take 30 mLs by mouth every 4 hours as needed for indigestion       ASPIRIN NOT PRESCRIBED    INTENTIONAL     continuous prn for other Antiplatelet medication not prescribed intentionally due to Not indicated based on age/GI distress       atorvastatin 80 MG tablet    LIPITOR    90 tablet    Take 1 tablet (80 mg) by mouth daily       * B-D U/F 31G X 8 MM   Generic drug:  insulin pen needle     400 each    TEST FOUR TIMES A DAY OR AS DIRECTED       * B-D U/F 31G X 8 MM   Generic drug:  insulin pen needle     100 each    TEST FOUR TIMES A DAY OR AS DIRECTED       blood glucose monitoring lancets     2 Box    Use to test blood sugar 4 times daily or as directed.       blood glucose monitoring test strip     "JONATHAN CONTOUR NEXT    200 each    Use to test blood sugar 4 times daily or as directed.       calcium-vitamin D 600-400 MG-UNIT per tablet    CALTRATE    180 tablet    Take 1 tablet by mouth 2 times daily       canagliflozin 300 MG tablet    INVOKANA    90 tablet    Take 1 tablet (300 mg) by mouth every morning (before breakfast)       ezetimibe 10 MG tablet    ZETIA    90 tablet    Take 1 tablet (10 mg) by mouth daily       insulin degludec 200 UNIT/ML pen    TRESIBA FLEXTOUCH    9 mL    Inject 70 Units Subcutaneous daily       insulin lispro 100 UNIT/ML injection    HumaLOG KWIKpen    3 Month    30 units before breakfast, 35 units before lunch, 35 units before dinner       insulin syringe-needle U-100 30G X 1/2\" 1 ML    BD insulin syringe ultrafine    100 each    Use one syringe daily or as directed.  3 month supply       lisinopril 2.5 MG tablet    PRINIVIL/Zestril    90 tablet    Take 1 tablet (2.5 mg) by mouth daily       metFORMIN 1000 MG tablet    GLUCOPHAGE    90 tablet    TAKE ONE-HALF TABLET BY MOUTH TWICE DAILY WITH MEALS       pioglitazone 45 MG tablet    ACTOS    90 tablet    Take 1 tablet (45 mg) by mouth daily       vitamin D 08347 UNIT capsule    ERGOCALCIFEROL    4 capsule    TAKE 1 CAPLET ONCE WEEKLY Profile Rx: patient will contact pharmacy when needed       * Notice:  This list has 2 medication(s) that are the same as other medications prescribed for you. Read the directions carefully, and ask your doctor or other care provider to review them with you.      "

## 2017-03-27 NOTE — LETTER
"      [address]  [address]  [facility] [address] [phone]                                                                                                           Karen Braxton  [address]   [facility] [address]    March 30, 2017      Dear Karen,    The results of your recent tests were reviewed and are enclosed.     ABNORMAL TOTAL CHOLESTEROL   BORDERLINE  TRIGLYCERIDES   HIGH LDL OR \"BAD\" CHOLESTEROL   HIGH VERY LOW DENSITY CHOLESTEROL   WOULD BE WISE TO GO BACK ON ZETIA 10MG DAILY   PRIOR AUTHORIZATION     NORMAL HUMAN IMMUNO VIRUS   NORMAL HEPATITIS C   NORMAL HEPATITIS B ANTIBODY AND ANTIGEN   THREE MONTH GLUCOSE AVERAGE 8.2MG%     Results for orders placed or performed in visit on 03/27/17   Lipid panel reflex to direct LDL   Result Value Ref Range    Cholesterol 242 (H) <200 mg/dL    Triglycerides 171 (H) <150 mg/dL    HDL Cholesterol 48 (L) >49 mg/dL    LDL Cholesterol Calculated 160 (H) <100 mg/dL    Non HDL Cholesterol 194 (H) <130 mg/dL   Hemoglobin A1c   Result Value Ref Range    Hemoglobin A1C 8.2 (H) 4.3 - 6.0 %   HIV Antigen Antibody Combo   Result Value Ref Range    HIV Antigen Antibody Combo  NR     Nonreactive   HIV-1 p24 Ag & HIV-1/HIV-2 Ab Not Detected     Hepatitis C antibody   Result Value Ref Range    Hepatitis C Antibody  NR     Nonreactive   Assay performance characteristics have not been established for newborns,   infants, and children     Hepatitis B Surface Antibody   Result Value Ref Range    Hepatitis B Surface Antibody 0.19 <8.00 m[IU]/mL   Hepatitis B surface antigen   Result Value Ref Range    Hep B Surface Agn Nonreactive NR             Thank you for choosing [facility].  We appreciate the opportunity to serve you and look forward to supporting your healthcare needs in the future.    If you have any questions or concerns, please call me or my staff at [phone].      Sincerely,    Raymond Engel Jr MD

## 2017-03-28 LAB
HBV SURFACE AB SERPL IA-ACNC: 0.19 M[IU]/ML
HBV SURFACE AG SERPL QL IA: NONREACTIVE
HCV AB SERPL QL IA: NORMAL
HIV 1+2 AB+HIV1 P24 AG SERPL QL IA: NORMAL

## 2017-03-28 NOTE — PROGRESS NOTES
THREE MONTH GLUCOSE AVERAGE ABOVE TARGET LEVEL    FIT BIT TWITCH RECOMMENDED  OR WALK AFTER EACH MEAL   CUT BACK ON SUGAR   LINDA ALCANTAR JR., MD

## 2017-03-29 LAB
CHOLEST SERPL-MCNC: 242 MG/DL
HDLC SERPL-MCNC: 48 MG/DL
LDLC SERPL CALC-MCNC: 160 MG/DL
NONHDLC SERPL-MCNC: 194 MG/DL
TRIGL SERPL-MCNC: 171 MG/DL

## 2017-03-29 NOTE — PROGRESS NOTES
Please send normal lab letter when labs are complete  NORMAL HUMAN IMMUNO VIRUS   NORMAL HEPATITIS C   NORMAL HEPATITIS B ANTIBODY AND ANTIGEN   THREE MONTH GLUCOSE AVERAGE 8.2MG%   LINDA ALCANTAR JR., MD

## 2017-05-11 DIAGNOSIS — K21.9 GASTROESOPHAGEAL REFLUX DISEASE WITHOUT ESOPHAGITIS: Primary | ICD-10-CM

## 2017-05-11 RX ORDER — MAGNESIUM HYDROXIDE/ALUMINUM HYDROXICE/SIMETHICONE 120; 1200; 1200 MG/30ML; MG/30ML; MG/30ML
30 SUSPENSION ORAL EVERY 4 HOURS PRN
Qty: 30 ML | Refills: 11 | Status: SHIPPED | OUTPATIENT
Start: 2017-05-11 | End: 2018-03-07

## 2017-05-11 NOTE — TELEPHONE ENCOUNTER
Prescription approved per [facility] Refill Protocol for 12 months of refills since last appointment, which was 3/27/17

## 2017-05-11 NOTE — TELEPHONE ENCOUNTER
alum & mag hydroxide-simethicone (ANTACID) 200-200-20 MG/5ML SUSP suspension      Last Written Prescription Date:    Last Fill Quantity:  ,  # refills:     Last Office Visit with [facility], [facility] or [facility] prescribing provider: 3/27/17

## 2017-05-12 ENCOUNTER — CARE COORDINATION (OUTPATIENT)
Dept: CARE COORDINATION | Facility: CLINIC | Age: 47
End: 2017-05-12

## 2017-05-12 NOTE — PROGRESS NOTES
Clinic Care Coordination       Per chart review. 46 year old insulin dependent diabetic. Patient had decreased A1C from 11.4 to 7.8. Patient is followed by endocrinology. She has routine foot care done by podiatry. Patient works full time. She has had not ED visits or inpatient stays in past two years.    Clinic care coordination is not indicated.    Carmen Hemphill RN, CCM - Care Coordinator     5/12/2017    9:32 AM  [phone]

## 2017-05-23 ENCOUNTER — TRANSFERRED RECORDS (OUTPATIENT)
Dept: HEALTH INFORMATION MANAGEMENT | Facility: CLINIC | Age: 47
End: 2017-05-23

## 2017-06-05 ENCOUNTER — OFFICE VISIT (OUTPATIENT)
Dept: ENDOCRINOLOGY | Facility: CLINIC | Age: 47
End: 2017-06-05

## 2017-06-05 VITALS
BODY MASS INDEX: 22.64 KG/M2 | DIASTOLIC BLOOD PRESSURE: 84 MMHG | SYSTOLIC BLOOD PRESSURE: 131 MMHG | HEIGHT: 66 IN | WEIGHT: 140.9 LBS | HEART RATE: 82 BPM

## 2017-06-05 LAB — HBA1C MFR BLD: 8.2 % (ref 4.3–6)

## 2017-06-05 ASSESSMENT — PAIN SCALES - GENERAL: PAINLEVEL: NO PAIN (0)

## 2017-06-05 NOTE — LETTER
6/5/2017       RE: Karen Braxton  [address]     [facility] [address]     Dear Colleague,    Thank you for referring your patient, Karen Braxton, to the [facility] ENDOCRINOLOGY at [facility]. Please see a copy of my visit note below.    This 47 year old woman with a 10+  year history of type 2 diabetes is here for f/u.She was seen without an  today.  She doesn't think she needs one.  She sees Dr. Engel for PC and is co managed by me with Micaela Juarez.  She now is taking  Tresiba U200 70 units in the PM, actos, invokana, metformin and humalog 30 units at breakfast and 35 units with  lunch and her evening meal.  She checks her sugars 4 times a day.  Fasting values average 162, pre lunch values average ~ 180, she checks less often later in the day. Overall her average was 153 this month with  Range of 48 - 313.  She feels low when she has values less than 90.  She knows that values above 70 are not considered low, but she is concerned about them, feels bad, and consequently will drink 1/2 cup of milk or eat a piece of fruit when her sugars are below 90.  She is concerned she might be gaining weight with this practice.  She has values < 90 1 - 2 times each day. She is now working only one job during the day until 3:30 pm.  She isn't fasting for Ramadan.     She recently saw the eye MD and was told she needed surgery on her right eye. She has trouble reading things up close both otherwise denies vision problems.  Denies CP, SOB, vaginal itching, polyuria.  Reports she is taking her meds.  No foot concerns.      Current Outpatient Prescriptions on File Prior to Visit:  alum & mag hydroxide-simethicone (ANTACID) 200-200-20 MG/5ML SUSP suspension Take 30 mLs by mouth every 4 hours as needed for indigestion   atorvastatin (LIPITOR) 80 MG tablet Take 1 tablet (80 mg) by mouth daily   insulin degludec (TRESIBA FLEXTOUCH) 200 UNIT/ML pen Inject 70 Units  "Subcutaneous daily   blood glucose monitoring (JONATHAN CONTOUR NEXT) test strip Use to test blood sugar 4 times daily or as directed.   blood glucose monitoring (JONATHAN MICROLET) lancets Use to test blood sugar 4 times daily or as directed.   ezetimibe (ZETIA) 10 MG tablet Take 1 tablet (10 mg) by mouth daily   pioglitazone (ACTOS) 45 MG tablet Take 1 tablet (45 mg) by mouth daily   lisinopril (PRINIVIL/ZESTRIL) 2.5 MG tablet Take 1 tablet (2.5 mg) by mouth daily   canagliflozin (INVOKANA) 300 MG tablet Take 1 tablet (300 mg) by mouth every morning (before breakfast)   B-D U/F 31G X 8 MM insulin pen needle TEST FOUR TIMES A DAY OR AS DIRECTED   insulin lispro (HUMALOG KWIKPEN) 100 UNIT/ML injection 30 units before breakfast, 35 units before lunch, 35 units before dinner   B-D U/F 31G X 8 MM insulin pen needle TEST FOUR TIMES A DAY OR AS DIRECTED   metFORMIN (GLUCOPHAGE) 1000 MG tablet TAKE ONE-HALF TABLET BY MOUTH TWICE DAILY WITH MEALS   calcium-vitamin D (CALTRATE) 600-400 MG-UNIT per tablet Take 1 tablet by mouth 2 times daily   vitamin D (ERGOCALCIFEROL) 39622 UNIT capsule TAKE 1 CAPLET ONCE WEEKLYProfile Rx: patient will contact pharmacy when needed   insulin syringe-needle U-100 (BD INSULIN SYRINGE ULTRAFINE) 30G X 1/2\" 1 ML Use one syringe daily or as directed.  3 month supply   ASPIRIN NOT PRESCRIBED (INTENTIONAL) continuous prn for other Antiplatelet medication not prescribed intentionally due to Not indicated based on age/GI distress     No current facility-administered medications on file prior to visit.     ROS: 10 point ROS neg other than the symptoms noted above in the HPI.    So Hx - Works as   Vital signs:   /84  Pulse 82  Ht 1.664 m (5' 5.5\")  Wt 63.9 kg (140 lb 14.4 oz)  LMP  (LMP Unknown)  BMI 23.09 kg/m2  Estimated body mass index is 23.09 kg/(m^2) as calculated from the following:    Height as of this encounter: 1.664 m (5' 5.5\").    Weight as of this encounter: 63.9 kg " (140 lb 14.4 oz).   Weight not up from last time    VSS  NAD  Eyes - no periorbital edema, conjunctival injection, scleral icterus  CV -No edema  Skin - normal texture   Feet - no ulcers     Recent Labs   Lab Test  03/27/17   1442  11/14/16   1105  11/14/16   1058 06/28/16 03/21/16   1039  03/21/16   1030  09/21/15   03/18/14   1518   08/04/11   1620   A1C  8.2*   --   8.1*   --    --   8.1*   < >   --    < >   --    < >   --    HEMOGLOBINA1   --    --    --   8.9*   --    --    --   8.3*   < >   --    < >   --    TSH   --    --    --    --    --   1.31   --    --    --   1.55   < >  1.48   T4   --    --    --    --    --    --    --    --    --    --    --   1.22   LDL  160*   --    --    --    --   142*   --    --    < >   --    < >   --    HDL  48*   --    --    --    --   44*   --    --    < >   --    < >   --    TRIG  171*   --    --    --    --   223*   --    --    < >   --    < >   --    CR   --    --   0.83   --    --   0.70   --    --    < >  0.69   < >  0.63   MICROL   --   8   --    --   8   --    --    --    < >   --    < >   --     < > = values in this interval not displayed.       A1c today 8.2    Assessment and plan:    1.  Diabetes control. Her sugars look good but her A1c is above target.  She is feeling low a couple of times each day and while these values are all > 70 they are troubling to her. Will reduce tresiba by 2 units.  Tolerating meds well.    2.  Diabetes complications.  No retinopathy.  Has normal albumin excretion and normal Cr on ACE.  Feet are OK.    3.  Hyperlipidemia.  She remains above target on high dose statin and ezetimibe. She hasn't had event, but would consider starting PSCK9 inhibitor if she did    4.  HTN. BP at target.      I spent 25 minutes with this patient face to face and explained the conditions and plans (more than 50% of time was counseling/coordination of diabetes care, including reviewing her complicated regimen.) . The patient understood and is satisfied with  today's visit.     F/u with Micaela Juarez in 3 mo and f/u with me in 6 mo    Melisa Phillips MD

## 2017-06-05 NOTE — PROGRESS NOTES
This 47 year old woman with a 10+  year history of type 2 diabetes is here for f/u.She was seen without an  today.  She doesn't think she needs one.  She sees Dr. Engel for PC and is co managed by me with Micaela Juarez.  She now is taking  Tresiba U200 70 units in the PM, actos, invokana, metformin and humalog 30 units at breakfast and 35 units with  lunch and her evening meal.  She checks her sugars 4 times a day.  Fasting values average 162, pre lunch values average ~ 180, she checks less often later in the day. Overall her average was 153 this month with  Range of 48 - 313.  She feels low when she has values less than 90.  She knows that values above 70 are not considered low, but she is concerned about them, feels bad, and consequently will drink 1/2 cup of milk or eat a piece of fruit when her sugars are below 90.  She is concerned she might be gaining weight with this practice.  She has values < 90 1 - 2 times each day. She is now working only one job during the day until 3:30 pm.  She isn't fasting for Ramadan.     She recently saw the eye MD and was told she needed surgery on her right eye. She has trouble reading things up close both otherwise denies vision problems.  Denies CP, SOB, vaginal itching, polyuria.  Reports she is taking her meds.  No foot concerns.      Current Outpatient Prescriptions on File Prior to Visit:  alum & mag hydroxide-simethicone (ANTACID) 200-200-20 MG/5ML SUSP suspension Take 30 mLs by mouth every 4 hours as needed for indigestion   atorvastatin (LIPITOR) 80 MG tablet Take 1 tablet (80 mg) by mouth daily   insulin degludec (TRESIBA FLEXTOUCH) 200 UNIT/ML pen Inject 70 Units Subcutaneous daily   blood glucose monitoring (JONATHAN CONTOUR NEXT) test strip Use to test blood sugar 4 times daily or as directed.   blood glucose monitoring (JONATHAN MICROLET) lancets Use to test blood sugar 4 times daily or as directed.   ezetimibe (ZETIA) 10 MG tablet Take 1 tablet (10 mg) by  "mouth daily   pioglitazone (ACTOS) 45 MG tablet Take 1 tablet (45 mg) by mouth daily   lisinopril (PRINIVIL/ZESTRIL) 2.5 MG tablet Take 1 tablet (2.5 mg) by mouth daily   canagliflozin (INVOKANA) 300 MG tablet Take 1 tablet (300 mg) by mouth every morning (before breakfast)   B-D U/F 31G X 8 MM insulin pen needle TEST FOUR TIMES A DAY OR AS DIRECTED   insulin lispro (HUMALOG KWIKPEN) 100 UNIT/ML injection 30 units before breakfast, 35 units before lunch, 35 units before dinner   B-D U/F 31G X 8 MM insulin pen needle TEST FOUR TIMES A DAY OR AS DIRECTED   metFORMIN (GLUCOPHAGE) 1000 MG tablet TAKE ONE-HALF TABLET BY MOUTH TWICE DAILY WITH MEALS   calcium-vitamin D (CALTRATE) 600-400 MG-UNIT per tablet Take 1 tablet by mouth 2 times daily   vitamin D (ERGOCALCIFEROL) 71018 UNIT capsule TAKE 1 CAPLET ONCE WEEKLYProfile Rx: patient will contact pharmacy when needed   insulin syringe-needle U-100 (BD INSULIN SYRINGE ULTRAFINE) 30G X 1/2\" 1 ML Use one syringe daily or as directed.  3 month supply   ASPIRIN NOT PRESCRIBED (INTENTIONAL) continuous prn for other Antiplatelet medication not prescribed intentionally due to Not indicated based on age/GI distress     No current facility-administered medications on file prior to visit.     ROS: 10 point ROS neg other than the symptoms noted above in the HPI.    So Hx - Works as   Vital signs:   /84  Pulse 82  Ht 1.664 m (5' 5.5\")  Wt 63.9 kg (140 lb 14.4 oz)  LMP  (LMP Unknown)  BMI 23.09 kg/m2  Estimated body mass index is 23.09 kg/(m^2) as calculated from the following:    Height as of this encounter: 1.664 m (5' 5.5\").    Weight as of this encounter: 63.9 kg (140 lb 14.4 oz).   Weight not up from last time    VSS  NAD  Eyes - no periorbital edema, conjunctival injection, scleral icterus  CV -No edema  Skin - normal texture   Feet - no ulcers     Recent Labs   Lab Test  03/27/17   1442  11/14/16   1105  11/14/16   1058 06/28/16 03/21/16   1039  " 03/21/16   1030  09/21/15   03/18/14   1518   08/04/11   1620   A1C  8.2*   --   8.1*   --    --   8.1*   < >   --    < >   --    < >   --    HEMOGLOBINA1   --    --    --   8.9*   --    --    --   8.3*   < >   --    < >   --    TSH   --    --    --    --    --   1.31   --    --    --   1.55   < >  1.48   T4   --    --    --    --    --    --    --    --    --    --    --   1.22   LDL  160*   --    --    --    --   142*   --    --    < >   --    < >   --    HDL  48*   --    --    --    --   44*   --    --    < >   --    < >   --    TRIG  171*   --    --    --    --   223*   --    --    < >   --    < >   --    CR   --    --   0.83   --    --   0.70   --    --    < >  0.69   < >  0.63   MICROL   --   8   --    --   8   --    --    --    < >   --    < >   --     < > = values in this interval not displayed.       A1c today 8.2    Assessment and plan:    1.  Diabetes control. Her sugars look good but her A1c is above target.  She is feeling low a couple of times each day and while these values are all > 70 they are troubling to her. Will reduce tresiba by 2 units.  Tolerating meds well.    2.  Diabetes complications.  No retinopathy.  Has normal albumin excretion and normal Cr on ACE.  Feet are OK.    3.  Hyperlipidemia.  She remains above target on high dose statin and ezetimibe. She hasn't had event, but would consider starting PSCK9 inhibitor if she did    4.  HTN. BP at target.      I spent 25 minutes with this patient face to face and explained the conditions and plans (more than 50% of time was counseling/coordination of diabetes care, including reviewing her complicated regimen.) . The patient understood and is satisfied with today's visit.     F/u with Micaela Juarez in 3 mo and f/u with me in 6 mo    Melisa Phillips MD

## 2017-06-05 NOTE — MR AVS SNAPSHOT
After Visit Summary   6/5/2017    Karen Braxton    MRN: [id number]           Patient Information     Date Of Birth          1970        Visit Information        Provider Department      6/5/2017 3:30 PM Melisa Phillips MD [facility]        Care Instructions    Reduce tresiba to 68 units each day          Follow-ups after your visit        Follow-up notes from your care team     Return for f/u 3 mo with Micaela Juarez and f/u 6 mo with me.      Your next 10 appointments already scheduled     Sep 05, 2017 11:00 AM CDT   (Arrive by 10:45 AM)   RETURN DIABETES with JEAN CLAUDE Zimmerman [facility] ([facility])    [address] [place] [id number] [phone]            Dec 05, 2017 11:00 AM CST   (Arrive by 10:45 AM)   RETURN DIABETES with MD DERECK Mills [facility] ([facility])    [address] [place] [address] [phone]              Who to contact     Please call your clinic at [phone] to:    Ask questions about your health    Make or cancel appointments    Discuss your medicines    Learn about your test results    Speak to your doctor   If you have compliments or concerns about an experience at your clinic, or if you wish to file a complaint, please contact [facility] Patient Relations at [phone] or email us at [email or web address]         Additional Information About Your Visit        MyChart Information     [employer] is an electronic gateway that provides easy, online access to your medical records. With [employer], you can request a clinic appointment, read your test results, renew a prescription or communicate with your care team.     To sign up for [email or web address] visit the website at [email or web address]   You will be asked to enter the access code listed below, as well as some personal  "information. Please follow the directions to create your username and password.     Your access code is: [email or web address]  Expires: 9/3/2017  4:22 PM     Your access code will  in 90 days. If you need help or a new code, please contact your [facility] or call [phone] for assistance.        Care EveryWhere ID     This is your Care EveryWhere ID. This could be used by other organizations to access your [place] medical records  [id number]        Your Vitals Were     Pulse Height Last Period BMI (Body Mass Index)          82 1.664 m (5' 5.5\") (LMP Unknown) 23.09 kg/m2         Blood Pressure from Last 3 Encounters:   17 131/84   17 108/62   17 131/84    Weight from Last 3 Encounters:   17 63.9 kg (140 lb 14.4 oz)   17 62.8 kg (138 lb 8 oz)   17 64.4 kg (142 lb)              Today, you had the following     No orders found for display       Primary Care Provider Office Phone # Fax #    Raymond Aurea Engel [phone]       [facility] [address] [facility] [address]        Thank you!     Thank you for choosing [facility]  for your care. Our goal is always to provide you with excellent care. Hearing back from our patients is one way we can continue to improve our services. Please take a few minutes to complete the written survey that you may receive in the mail after your visit with us. Thank you!             Your Updated Medication List - Protect others around you: Learn how to safely use, store and throw away your medicines at www.disposemymeds.org.          This list is accurate as of: 17  4:22 PM.  Always use your most recent med list.                   Brand Name Dispense Instructions for use    alum & mag hydroxide-simethicone 200-200-20 MG/5ML Susp suspension    antacid    30 mL    Take 30 mLs by mouth every 4 hours as needed for indigestion       ASPIRIN NOT PRESCRIBED    INTENTIONAL " "    continuous prn for other Antiplatelet medication not prescribed intentionally due to Not indicated based on age/GI distress       atorvastatin 80 MG tablet    LIPITOR    90 tablet    Take 1 tablet (80 mg) by mouth daily       * B-D U/F 31G X 8 MM   Generic drug:  insulin pen needle     400 each    TEST FOUR TIMES A DAY OR AS DIRECTED       * B-D U/F 31G X 8 MM   Generic drug:  insulin pen needle     100 each    TEST FOUR TIMES A DAY OR AS DIRECTED       blood glucose monitoring lancets     2 Box    Use to test blood sugar 4 times daily or as directed.       blood glucose monitoring test strip    JONATHAN CONTOUR NEXT    200 each    Use to test blood sugar 4 times daily or as directed.       calcium-vitamin D 600-400 MG-UNIT per tablet    CALTRATE    180 tablet    Take 1 tablet by mouth 2 times daily       canagliflozin 300 MG tablet    INVOKANA    90 tablet    Take 1 tablet (300 mg) by mouth every morning (before breakfast)       ezetimibe 10 MG tablet    ZETIA    90 tablet    Take 1 tablet (10 mg) by mouth daily       insulin degludec 200 UNIT/ML pen    TRESIBA FLEXTOUCH    9 mL    Inject 70 Units Subcutaneous daily       insulin lispro 100 UNIT/ML injection    HumaLOG KWIKpen    3 Month    30 units before breakfast, 35 units before lunch, 35 units before dinner       insulin syringe-needle U-100 30G X 1/2\" 1 ML    BD insulin syringe ultrafine    100 each    Use one syringe daily or as directed.  3 month supply       lisinopril 2.5 MG tablet    PRINIVIL/Zestril    90 tablet    Take 1 tablet (2.5 mg) by mouth daily       metFORMIN 1000 MG tablet    GLUCOPHAGE    90 tablet    TAKE ONE-HALF TABLET BY MOUTH TWICE DAILY WITH MEALS       pioglitazone 45 MG tablet    ACTOS    90 tablet    Take 1 tablet (45 mg) by mouth daily       vitamin D 79612 UNIT capsule    ERGOCALCIFEROL    4 capsule    TAKE 1 CAPLET ONCE WEEKLY Profile Rx: patient will contact pharmacy when needed       * Notice:  This list has 2 medication(s) " that are the same as other medications prescribed for you. Read the directions carefully, and ask your doctor or other care provider to review them with you.

## 2017-06-09 DIAGNOSIS — E11.9 DIABETES MELLITUS, TYPE 2 (H): Primary | ICD-10-CM

## 2017-06-09 NOTE — TELEPHONE ENCOUNTER
metFORMIN (GLUCOPHAGE) 1000 MG tablet         Last Written Prescription Date: 12/14/16  Last Fill Quantity: 90, # refills: 1  Last Office Visit with G, [facility] or [facility] prescribing provider:  3/27/17        BP Readings from Last 3 Encounters:   06/05/17 131/84   03/27/17 108/62   03/09/17 131/84     Lab Results   Component Value Date    MICROL 8 11/14/2016     Lab Results   Component Value Date    UMALCR 22.35 11/14/2016     Creatinine   Date Value Ref Range Status   11/14/2016 0.83 0.52 - 1.04 mg/dL Final   ]  GFR Estimate   Date Value Ref Range Status   11/14/2016 73 >60 mL/min/1.7m2 Final   03/21/2016 90 >60 mL/min/1.7m2 Final   06/23/2015 77 >60 mL/min/1.7m2 Final     GFR Estimate If Black   Date Value Ref Range Status   11/14/2016 89 >60 mL/min/1.7m2 Final   03/21/2016 >90 >60 mL/min/1.7m2 Final   06/23/2015 >90 >60 mL/min/1.7m2 Final     Lab Results   Component Value Date    CHOL 242 03/27/2017     Lab Results   Component Value Date    HDL 48 03/27/2017     Lab Results   Component Value Date     03/27/2017     Lab Results   Component Value Date    TRIG 171 03/27/2017     Lab Results   Component Value Date    CHOLHDLRATIO 6.0 12/01/2014     Lab Results   Component Value Date    AST 17 01/09/2015     Lab Results   Component Value Date    ALT 30 03/21/2016     Lab Results   Component Value Date    A1C 8.2 03/27/2017    A1C 8.1 11/14/2016    A1C 8.1 03/21/2016    A1C 8.1 01/25/2016    A1C 8.1 06/23/2015     Potassium   Date Value Ref Range Status   11/14/2016 4.1 3.4 - 5.3 mmol/L Final

## 2017-08-07 ENCOUNTER — TELEPHONE (OUTPATIENT)
Dept: FAMILY MEDICINE | Facility: CLINIC | Age: 47
End: 2017-08-07

## 2017-08-07 DIAGNOSIS — E11.9 TYPE 2 DIABETES MELLITUS WITHOUT COMPLICATION, WITH LONG-TERM CURRENT USE OF INSULIN (H): Primary | ICD-10-CM

## 2017-08-07 DIAGNOSIS — Z79.4 TYPE 2 DIABETES MELLITUS WITHOUT COMPLICATION, WITH LONG-TERM CURRENT USE OF INSULIN (H): Primary | ICD-10-CM

## 2017-08-07 DIAGNOSIS — E11.9 DIABETES TYPE 2, CONTROLLED (H): ICD-10-CM

## 2017-08-07 NOTE — TELEPHONE ENCOUNTER
Prior Authorization Request    1. Prior Authorization for the medication insulin degludec (TRESIBA FLEXTOUCH) 200 UNIT/ML pen        Requesting Provider: Raymond Engel          Pt name: Karen Braxton        Pt : 1970        Pt MRN: [id number]        Last Office Visit: 3/27/2017           Insurance: Payor: [facility] / Plan: [facility] / Product Type: HMO /         Insurance ID Number: [unfilled] [id number]        Prior Auth Contact Phone number: [phone]       BIN#:[id number]   PCN#:            To be completed by provider:     2.   Refuse or Start Prior Auth:  Change medication      If requesting prior auth initiation:     Diagnosis (with code):     ICD-10-CM    1. Type 2 diabetes mellitus without complication, with long-term current use of insulin (H) E11.9 insulin glargine (LANTUS VIAL) 100 UNIT/ML injection    Z79.4           (70 units at hour of sleep via syringe )      Patient has been on this medication since:  Change medications from tresiba

## 2017-08-08 RX ORDER — PEN NEEDLE, DIABETIC 31 GX5/16"
NEEDLE, DISPOSABLE MISCELLANEOUS
Qty: 100 EACH | Refills: 1 | Status: SHIPPED | OUTPATIENT
Start: 2017-08-08 | End: 2017-10-04

## 2017-08-08 RX ORDER — INSULIN GLARGINE 100 [IU]/ML
14 INJECTION, SOLUTION SUBCUTANEOUS AT BEDTIME
Qty: 15 ML | Refills: 3 | Status: SHIPPED | OUTPATIENT
Start: 2017-08-08 | End: 2017-09-05

## 2017-08-14 ENCOUNTER — OFFICE VISIT (OUTPATIENT)
Dept: FAMILY MEDICINE | Facility: CLINIC | Age: 47
End: 2017-08-14
Payer: COMMERCIAL

## 2017-08-14 VITALS
BODY MASS INDEX: 22.86 KG/M2 | DIASTOLIC BLOOD PRESSURE: 64 MMHG | HEART RATE: 87 BPM | OXYGEN SATURATION: 98 % | WEIGHT: 139.5 LBS | SYSTOLIC BLOOD PRESSURE: 112 MMHG | TEMPERATURE: 98.5 F | RESPIRATION RATE: 16 BRPM

## 2017-08-14 DIAGNOSIS — R00.2 PALPITATIONS: ICD-10-CM

## 2017-08-14 DIAGNOSIS — E11.9 COMPREHENSIVE DIABETIC FOOT EXAMINATION, TYPE 2 DM, ENCOUNTER FOR (H): ICD-10-CM

## 2017-08-14 PROCEDURE — 93005 ELECTROCARDIOGRAM TRACING: CPT | Performed by: FAMILY MEDICINE

## 2017-08-14 PROCEDURE — 99213 OFFICE O/P EST LOW 20 MIN: CPT | Performed by: FAMILY MEDICINE

## 2017-08-14 PROCEDURE — 99207 ZZC FOOT EXAM  NO CHARGE: CPT | Performed by: FAMILY MEDICINE

## 2017-08-14 NOTE — PATIENT INSTRUCTIONS
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

## 2017-08-14 NOTE — NURSING NOTE
"Chief Complaint   Patient presents with     Forms     DMV Insulin treated diabetes     Diabetes       Initial /64  Pulse 87  Temp 98.5  F (36.9  C) (Tympanic)  Resp 16  Wt 139 lb 8 oz (63.3 kg)  LMP  (LMP Unknown)  SpO2 98%  BMI 22.86 kg/m2 Estimated body mass index is 22.86 kg/(m^2) as calculated from the following:    Height as of 6/5/17: 5' 5.5\" (1.664 m).    Weight as of this encounter: 139 lb 8 oz (63.3 kg).  Medication Reconciliation: complete   Radha Pierre CMA    "

## 2017-08-14 NOTE — PROGRESS NOTES
SUBJECTIVE:                                                    Karen Braxton is a 47 year old female who presents to clinic today for the following health issues:      Diabetes Follow-up    Patient is checking blood sugars: four times daily.    Blood sugar testing frequency justification: Uncontrolled diabetes  Results are as follows:       am - 180            postprandial after lunch- 178            BEFORE DINNER- 140-150       bedtime - 160-200        Diabetic concerns: need for form filled out to drive     Symptoms of hypoglycemia (low blood sugar): shaky     Paresthesias (numbness or burning in feet) or sores: No   Date of last diabetic eye exam: 5/2017      Amount of exercise or physical activity: 4-5 days/week for an average of 30-45 minutes    Problems taking medications regularly: No    Medication side effects: none  Diet: regular (no restrictions)          Form for DMV  No SIGNIFICANT HYPOGLYCEMIA  TWO YEAR RETURN TO CLINIC  GIVEN   SHE HAS HAS TO CHANGE BACK TO LANTUS FOR INSURANCE REASONS  DOING WELL ON LANTUS 60 UNITS AT HOUR OF SLEEP   DOING WELL NOVOLOG 30 UNITS WITH BREAKFAST AND LUNCH  35 UNITS AT PM MEAL       Problem list and histories reviewed & ajusted, as indicated.  Additional history: {NONE - AS DOCUMEdjusted, as indicated.  Additional history: as documented      Patient Active Problem List   Diagnosis     Other dental caries     Disorder of bursae and tendons in shoulder region     Insomnia     Vitamin D deficiency     Premature menopause     ASCUS favor benign     Hyperlipidemia LDL goal <100     Comprehensive diabetic foot examination, type 2 DM, encounter for (H)     Hypertension goal BP (blood pressure) < 140/90     DiarrheaX 2 over last 24hrs w one explosive r/o 2ndary to acid gastritis     Uncontrolled diabetes mellitus type 2 without complications (H)     Esophageal reflux 2ndary to hi continuous intake of sugared  tea     Hyperlipidemia with target LDL less than 100     Diabetes type  2, controlled (H)     Intractable chronic migraine without aura and without status migrainosus     Needle stick injury, subsequent encounter     Hammer toe of right foot     Past Surgical History:   Procedure Laterality Date     CHOLECYSTECTOMY  9/14/2007    laproscopic     FACIAL RECONSTRUCTION SURGERY  2 years old    cleft lip repair     ORTHOPEDIC SURGERY  6/2001    left knee       Social History   Substance Use Topics     Smoking status: Current Some Day Smoker     Types: Cigarettes     Smokeless tobacco: Never Used      Comment: 3 cigarettes daily     Alcohol use No     Family History   Problem Relation Age of Onset     DIABETES Mother      Hypertension Mother      HEART DISEASE Mother      Lipids Mother      Asthma Mother      DIABETES Father      Hypertension Father      CANCER No family hx of      No known family hx of skin cancer     Coronary Artery Disease No family hx of      Hyperlipidemia No family hx of      CEREBROVASCULAR DISEASE No family hx of      Breast Cancer No family hx of      Colon Cancer No family hx of      Prostate Cancer No family hx of      Other Cancer No family hx of      Depression No family hx of      Anxiety Disorder No family hx of      MENTAL ILLNESS No family hx of      Substance Abuse No family hx of      Anesthesia Reaction No family hx of      OSTEOPOROSIS No family hx of      Genetic Disorder No family hx of      Thyroid Disease No family hx of      Obesity No family hx of      Unknown/Adopted No family hx of          Current Outpatient Prescriptions   Medication Sig Dispense Refill     insulin glargine (LANTUS SOLOSTAR) 100 UNIT/ML injection Inject 60 Units Subcutaneous At Bedtime 15 mL 11     insulin lispro (HUMALOG KWIKPEN) 100 UNIT/ML injection 30 units before breakfast, 35 units before lunch, 35 units before dinner 15 mL 11     B-D U/F 31G X 8 MM insulin pen needle TEST FOUR TIMES A DAY OR AS DIRECTED 100 each 1     metFORMIN (GLUCOPHAGE) 1000 MG tablet TAKE ONE-HALF  "TABLET BY MOUTH TWICE DAILY WITH MEALS 90 tablet 1     alum & mag hydroxide-simethicone (ANTACID) 200-200-20 MG/5ML SUSP suspension Take 30 mLs by mouth every 4 hours as needed for indigestion 30 mL 11     atorvastatin (LIPITOR) 80 MG tablet Take 1 tablet (80 mg) by mouth daily 90 tablet 3     blood glucose monitoring (JONATHAN CONTOUR NEXT) test strip Use to test blood sugar 4 times daily or as directed. 200 each 11     blood glucose monitoring (JONATHAN MICROLET) lancets Use to test blood sugar 4 times daily or as directed. 2 Box 6     ezetimibe (ZETIA) 10 MG tablet Take 1 tablet (10 mg) by mouth daily 90 tablet 3     pioglitazone (ACTOS) 45 MG tablet Take 1 tablet (45 mg) by mouth daily 90 tablet 3     lisinopril (PRINIVIL/ZESTRIL) 2.5 MG tablet Take 1 tablet (2.5 mg) by mouth daily 90 tablet 3     canagliflozin (INVOKANA) 300 MG tablet Take 1 tablet (300 mg) by mouth every morning (before breakfast) 90 tablet 3     B-D U/F 31G X 8 MM insulin pen needle TEST FOUR TIMES A DAY OR AS DIRECTED 400 each 1     calcium-vitamin D (CALTRATE) 600-400 MG-UNIT per tablet Take 1 tablet by mouth 2 times daily 180 tablet 3     vitamin D (ERGOCALCIFEROL) 09165 UNIT capsule TAKE 1 CAPLET ONCE WEEKLY  Profile Rx: patient will contact pharmacy when needed 4 capsule 5     insulin syringe-needle U-100 (BD INSULIN SYRINGE ULTRAFINE) 30G X 1/2\" 1 ML Use one syringe daily or as directed.  3 month supply 100 each prn     ASPIRIN NOT PRESCRIBED (INTENTIONAL) continuous prn for other Antiplatelet medication not prescribed intentionally due to Not indicated based on age/GI distress       insulin glargine (LANTUS VIAL) 100 UNIT/ML injection Inject 14 Units Subcutaneous At Bedtime (Patient not taking: Reported on 8/14/2017) 15 mL 3     No Known Allergies  Recent Labs   Lab Test  03/27/17   1442  11/14/16   1058  03/21/16   1030   06/23/15   1652  12/01/14   1222   05/29/14   1045  03/18/14   1518   A1C  8.2*  8.1*  8.1*   < >  8.1*  8.5*   < >  " 11.4*   --    LDL  160*   --   142*   --    --   114   < >  64   --    HDL  48*   --   44*   --    --   36*   < >  36*   --    TRIG  171*   --   223*   --    --   323*   < >  362*   --    ALT   --    --   30   --   21   --    --   23  30   CR   --   0.83  0.70   --   0.80  0.80   < >  0.70  0.69   GFRESTIMATED   --   73  90   --   77  77   < >  >90  >90   GFRESTBLACK   --   89  >90   --   >90  >90   < >  >90  >90   POTASSIUM   --   4.1  4.4   --   4.2  4.0   < >  4.1  4.0   TSH   --    --   1.31   --    --    --    --    --   1.55    < > = values in this interval not displayed.      BP Readings from Last 3 Encounters:   08/14/17 112/64   06/05/17 131/84   03/27/17 108/62    Wt Readings from Last 3 Encounters:   08/14/17 139 lb 8 oz (63.3 kg)   06/05/17 140 lb 14.4 oz (63.9 kg)   03/27/17 138 lb 8 oz (62.8 kg)                  Labs reviewed in EPIC          Reviewed and updated as needed this visit by clinical staff     Reviewed and updated as needed this visit by Provider         ROS:  C: NEGATIVE for fever, chills, change in weight  I: NEGATIVE for worrisome rashes, moles or lesions  E: NEGATIVE for vision changes or irritation  E/M: NEGATIVE for ear, mouth and throat problems  R: NEGATIVE for significant cough or SOB  B: NEGATIVE for masses, tenderness or discharge  CV: NEGATIVE for chest pain, palpitations or peripheral edema  CV: RECENT EXCESSIVE COFFEE INTAKE and palpitations  GI: NEGATIVE for nausea, abdominal pain, heartburn, or change in bowel habits  : NEGATIVE for frequency, dysuria, or hematuria  M: NEGATIVE for significant arthralgias or myalgia  N: NEGATIVE for weakness, dizziness or paresthesias  E: NEGATIVE for temperature intolerance, skin/hair changes  H: NEGATIVE for bleeding problems  P: NEGATIVE for changes in mood or affect    OBJECTIVE:     /64  Pulse 87  Temp 98.5  F (36.9  C) (Tympanic)  Resp 16  Wt 139 lb 8 oz (63.3 kg)  LMP  (LMP Unknown)  SpO2 98%  BMI 22.86 kg/m2  Body mass  index is 22.86 kg/(m^2).  GENERAL: healthy, alert and no distress  NECK: no adenopathy, no asymmetry, masses, or scars and thyroid normal to palpation  RESP: lungs clear to auscultation - no rales, rhonchi or wheezes  CV: regular rate and rhythm, normal S1 S2, no S3 or S4, no murmur, click or rub, no peripheral edema and peripheral pulses strong  ABDOMEN: soft, nontender, no hepatosplenomegaly, no masses and bowel sounds normal  MS: no gross musculoskeletal defects noted, no edema  SKIN: no suspicious lesions or rashes  Diabetic foot exam: normal DP and PT pulses, no trophic changes or ulcerative lesions, normal sensory exam and normal monofilament exam    Diagnostic Test Results:  Results for orders placed or performed in visit on 06/05/17   Hemoglobin A1c POCT   Result Value Ref Range    Hemoglobin A1C 8.2 (A) 4.3 - 6 %       ASSESSMENT/PLAN:           ICD-10-CM    1. Uncontrolled type 1 diabetes mellitus without complication (H) E10.9 insulin glargine (LANTUS SOLOSTAR) 100 UNIT/ML injection   2. Uncontrolled type 2 diabetes mellitus without complication, with long-term current use of insulin (H) E11.65 insulin lispro (HUMALOG KWIKPEN) 100 UNIT/ML injection    Z79.4    3. 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

## 2017-08-14 NOTE — MR AVS SNAPSHOT
After Visit Summary   8/14/2017    Karen Braxton    MRN: [id number]           Patient Information     Date Of Birth          1970        Visit Information        Provider Department      8/14/2017 2:45 PM Raymond Engel MD [facility]        Today's Diagnoses     Uncontrolled type 1 diabetes mellitus without complication (H)    -  1    Uncontrolled type 2 diabetes mellitus without complication, with long-term current use of insulin (H)        Palpitations          Care Instructions    (E10.9) Uncontrolled type 1 diabetes mellitus without complication (H)  (primary encounter diagnosis)  Comment:    Plan: insulin glargine (LANTUS SOLOSTAR) 100 UNIT/ML         injection             (E11.65,  Z79.4) Uncontrolled type 2 diabetes mellitus without complication, with long-term current use of insulin (H)  Comment:    Plan: insulin lispro (HUMALOG KWIKPEN) 100 UNIT/ML         injection             (R00.2) Palpitations  Comment:    Plan: EKG 12-lead complete w/read - Clinics  License 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                           Follow-ups after your visit        Your next 10 appointments already scheduled     Sep 05, 2017 11:00 AM CDT   (Arrive by 10:45 AM)   RETURN DIABETES with Cecile Juarez PA-C   [facility] ([facility])    [address] [place] [id number] [phone]            Dec 05, 2017 11:00 AM CST   (Arrive by 10:45 AM)   RETURN DIABETES with Melisa Phillips MD   [facility] ([facility])    [address] [place] [id number] [phone]              Who to contact     If you have questions or need follow up information about today's clinic visit or your schedule please contact [facility] directly at [phone].  Normal or non-critical lab and imaging results will be  "communicated to you by Zelnashart, letter or phone within 4 business days after the clinic has received the results. If you do not hear from us within 7 days, please contact the clinic through ZanAqua or phone. If you have a critical or abnormal lab result, we will notify you by phone as soon as possible.  Submit refill requests through ZanAqua or call your pharmacy and they will forward the refill request to us. Please allow 3 business days for your refill to be completed.          Additional Information About Your Visit        ZanAqua Information     ZanAqua lets you send messages to your doctor, view your test results, renew your prescriptions, schedule appointments and more. To sign up, go to [email or web address] . Click on \"Log in\" on the left side of the screen, which will take you to the Welcome page. Then click on \"Sign up Now\" on the right side of the page.     You will be asked to enter the access code listed below, as well as some personal information. Please follow the directions to create your username and password.     Your access code is: [email or web address]  Expires: 9/3/2017  4:22 PM     Your access code will  in 90 days. If you need help or a new code, please call your [facility] clinic or [phone].        Care EveryWhere ID     This is your Care EveryWhere ID. This could be used by other organizations to access your [facility] medical records  [id number]        Your Vitals Were     Pulse Temperature Respirations Last Period Pulse Oximetry BMI (Body Mass Index)    87 98.5  F (36.9  C) (Tympanic) 16 (LMP Unknown) 98% 22.86 kg/m2       Blood Pressure from Last 3 Encounters:   17 112/64   17 131/84   17 108/62    Weight from Last 3 Encounters:   17 139 lb 8 oz (63.3 kg)   17 140 lb 14.4 oz (63.9 kg)   17 138 lb 8 oz (62.8 kg)              We Performed the Following     EKG 12-lead complete w/read - Clinics          Today's Medication Changes          These changes " are accurate as of: 8/14/17  4:01 PM.  If you have any questions, ask your nurse or doctor.               These medicines have changed or have updated prescriptions.        Dose/Directions    * insulin glargine 100 UNIT/ML injection   Commonly known as:  LANTUS VIAL   This may have changed:  Another medication with the same name was added. Make sure you understand how and when to take each.   Used for:  Type 2 diabetes mellitus without complication, with long-term current use of insulin (H)   Changed by:  Raymond Engel MD        Dose:  14 Units   Inject 14 Units Subcutaneous At Bedtime   Quantity:  15 mL   Refills:  3       * insulin glargine 100 UNIT/ML injection   Commonly known as:  LANTUS SOLOSTAR   This may have changed:  You were already taking a medication with the same name, and this prescription was added. Make sure you understand how and when to take each.   Used for:  Uncontrolled type 1 diabetes mellitus without complication (H)   Changed by:  Raymond Engel MD        Dose:  60 Units   Inject 60 Units Subcutaneous At Bedtime   Quantity:  15 mL   Refills:  11       * Notice:  This list has 2 medication(s) that are the same as other medications prescribed for you. Read the directions carefully, and ask your doctor or other care provider to review them with you.         Where to get your medicines      These medications were sent to [facility] [address], [address]  [address], [address], [facility] [address]     Phone:  [phone]     insulin glargine 100 UNIT/ML injection    insulin lispro 100 UNIT/ML injection                Primary Care Provider Office Phone # Fax #    Raymond Engel [phone] [address] [facility] [address]        Equal Access to Services     PEYTON BURGOS AH: Anmol Stringer, therese veras, jeanmarie jonas, nrianjan varela  la'yayon timo. So [facility] [phone].    ATENCIÓN: Si habla español, tiene a merrill disposición servicios gratuitos de asistencia lingüística. Jonathan doyle [phone].    We comply with applicable federal civil rights laws and [place] laws. We do not discriminate on the basis of race, color, national origin, age, disability sex, sexual orientation or gender identity.            Thank you!     Thank you for choosing [facility]  for your care. Our goal is always to provide you with excellent care. Hearing back from our patients is one way we can continue to improve our services. Please take a few minutes to complete the written survey that you may receive in the mail after your visit with us. Thank you!             Your Updated Medication List - Protect others around you: Learn how to safely use, store and throw away your medicines at www.disposemymeds.org.          This list is accurate as of: 8/14/17  4:01 PM.  Always use your most recent med list.                   Brand Name Dispense Instructions for use Diagnosis    alum & mag hydroxide-simethicone 200-200-20 MG/5ML Susp suspension    antacid    30 mL    Take 30 mLs by mouth every 4 hours as needed for indigestion    Gastroesophageal reflux disease without esophagitis       ASPIRIN NOT PRESCRIBED    INTENTIONAL     continuous prn for other Antiplatelet medication not prescribed intentionally due to Not indicated based on age/GI distress        atorvastatin 80 MG tablet    LIPITOR    90 tablet    Take 1 tablet (80 mg) by mouth daily    Hyperlipidemia LDL goal <100       * B-D U/F 31G X 8 MM   Generic drug:  insulin pen needle     400 each    TEST FOUR TIMES A DAY OR AS DIRECTED    Diabetes type 2, controlled (H)       * B-D U/F 31G X 8 MM   Generic drug:  insulin pen needle     100 each    TEST FOUR TIMES A DAY OR AS DIRECTED    Diabetes type 2, controlled (H)       blood glucose monitoring lancets     2 Box    Use to test blood sugar 4  "times daily or as directed.    Diabetes type 2, controlled (H)       blood glucose monitoring test strip    JONATHAN CONTOUR NEXT    200 each    Use to test blood sugar 4 times daily or as directed.    Diabetes type 2, controlled (H)       calcium-vitamin D 600-400 MG-UNIT per tablet    CALTRATE    180 tablet    Take 1 tablet by mouth 2 times daily    Vitamin D deficiency       canagliflozin 300 MG tablet    INVOKANA    90 tablet    Take 1 tablet (300 mg) by mouth every morning (before breakfast)    Diabetes type 2, controlled (H)       ezetimibe 10 MG tablet    ZETIA    90 tablet    Take 1 tablet (10 mg) by mouth daily    Hyperlipidemia LDL goal <100       * insulin glargine 100 UNIT/ML injection    LANTUS VIAL    15 mL    Inject 14 Units Subcutaneous At Bedtime    Type 2 diabetes mellitus without complication, with long-term current use of insulin (H)       * insulin glargine 100 UNIT/ML injection    LANTUS SOLOSTAR    15 mL    Inject 60 Units Subcutaneous At Bedtime    Uncontrolled type 1 diabetes mellitus without complication (H)       insulin lispro 100 UNIT/ML injection    HumaLOG KWIKpen    15 mL    30 units before breakfast, 35 units before lunch, 35 units before dinner    Uncontrolled type 2 diabetes mellitus without complication, with long-term current use of insulin (H)       insulin syringe-needle U-100 30G X 1/2\" 1 ML    BD insulin syringe ultrafine    100 each    Use one syringe daily or as directed.  3 month supply    Diabetes type 2, controlled (H)       lisinopril 2.5 MG tablet    PRINIVIL/Zestril    90 tablet    Take 1 tablet (2.5 mg) by mouth daily    Hypertension goal BP (blood pressure) < 140/80       metFORMIN 1000 MG tablet    GLUCOPHAGE    90 tablet    TAKE ONE-HALF TABLET BY MOUTH TWICE DAILY WITH MEALS    Diabetes mellitus, type 2 (H)       pioglitazone 45 MG tablet    ACTOS    90 tablet    Take 1 tablet (45 mg) by mouth daily    Diabetes type 2, controlled (H)       vitamin D 56969 UNIT " capsule    ERGOCALCIFEROL    4 capsule    TAKE 1 CAPLET ONCE WEEKLY Profile Rx: patient will contact pharmacy when needed    Vitamin D deficiency       * Notice:  This list has 4 medication(s) that are the same as other medications prescribed for you. Read the directions carefully, and ask your doctor or other care provider to review them with you.

## 2017-08-31 ENCOUNTER — OFFICE VISIT (OUTPATIENT)
Dept: FAMILY MEDICINE | Facility: CLINIC | Age: 47
End: 2017-08-31
Payer: COMMERCIAL

## 2017-08-31 ENCOUNTER — RADIANT APPOINTMENT (OUTPATIENT)
Dept: GENERAL RADIOLOGY | Facility: CLINIC | Age: 47
End: 2017-08-31
Attending: FAMILY MEDICINE
Payer: COMMERCIAL

## 2017-08-31 VITALS
OXYGEN SATURATION: 97 % | RESPIRATION RATE: 15 BRPM | BODY MASS INDEX: 22.78 KG/M2 | WEIGHT: 139 LBS | TEMPERATURE: 98.5 F | HEART RATE: 87 BPM | SYSTOLIC BLOOD PRESSURE: 130 MMHG | DIASTOLIC BLOOD PRESSURE: 78 MMHG

## 2017-08-31 DIAGNOSIS — S43.421A SPRAIN OF RIGHT ROTATOR CUFF CAPSULE, INITIAL ENCOUNTER: ICD-10-CM

## 2017-08-31 DIAGNOSIS — Z23 NEED FOR PROPHYLACTIC VACCINATION AND INOCULATION AGAINST INFLUENZA: Primary | ICD-10-CM

## 2017-08-31 PROCEDURE — 99214 OFFICE O/P EST MOD 30 MIN: CPT | Performed by: FAMILY MEDICINE

## 2017-08-31 PROCEDURE — 73030 X-RAY EXAM OF SHOULDER: CPT | Mod: RT

## 2017-08-31 RX ORDER — MELOXICAM 15 MG/1
15 TABLET ORAL DAILY
Qty: 30 TABLET | Refills: 1 | Status: SHIPPED | OUTPATIENT
Start: 2017-08-31 | End: 2019-07-30

## 2017-08-31 ASSESSMENT — PATIENT HEALTH QUESTIONNAIRE - PHQ9
5. POOR APPETITE OR OVEREATING: NOT AT ALL
SUM OF ALL RESPONSES TO PHQ QUESTIONS 1-9: 0

## 2017-08-31 ASSESSMENT — ANXIETY QUESTIONNAIRES
2. NOT BEING ABLE TO STOP OR CONTROL WORRYING: NOT AT ALL
GAD7 TOTAL SCORE: 0
IF YOU CHECKED OFF ANY PROBLEMS ON THIS QUESTIONNAIRE, HOW DIFFICULT HAVE THESE PROBLEMS MADE IT FOR YOU TO DO YOUR WORK, TAKE CARE OF THINGS AT HOME, OR GET ALONG WITH OTHER PEOPLE: NOT DIFFICULT AT ALL
1. FEELING NERVOUS, ANXIOUS, OR ON EDGE: NOT AT ALL
6. BECOMING EASILY ANNOYED OR IRRITABLE: NOT AT ALL
7. FEELING AFRAID AS IF SOMETHING AWFUL MIGHT HAPPEN: NOT AT ALL
5. BEING SO RESTLESS THAT IT IS HARD TO SIT STILL: NOT AT ALL
3. WORRYING TOO MUCH ABOUT DIFFERENT THINGS: NOT AT ALL

## 2017-08-31 NOTE — LETTER
September 1, 2017      Karen Braxton  [address]   [place] [place] [facility] [address]        Dear ,    We are writing to inform you of your test results.    Your test results fall within the expected range(s) or remain unchanged from previous results.  Please continue with current treatment plan.    Resulted Orders   XR Shoulder Right G/E 3 Views    Narrative    XR SHOULDER RT G/E 3 VW 8/31/2017 1:59 PM    COMPARISON: None.    HISTORY: Rotator cuff sprain.      Impression    IMPRESSION: No fracture or dislocation seen in the right shoulder.  Joints are preserved and in normal alignment. No significant soft  tissue swelling.    LIDA STILES MD       If you have any questions or concerns, please call the clinic at the number listed above.       Sincerely,        [facility] XRAY ROOM 1

## 2017-08-31 NOTE — MR AVS SNAPSHOT
After Visit Summary   8/31/2017    Karen Braxton    MRN: [id number]           Patient Information     Date Of Birth          1970        Visit Information        Provider Department      8/31/2017 1:30 PM Raymond Engel MD [facility]        Today's Diagnoses     Need for prophylactic vaccination and inoculation against influenza    -  1    Sprain of right rotator cuff capsule, initial encounter          Care Instructions    (Z23) Need for prophylactic vaccination and inoculation against influenza  (primary encounter diagnosis)  Comment:      Plan:      (S43.421A) Sprain of right rotator cuff capsule, initial encounter  Comment:    Plan: XR Shoulder Right G/E 3 Views, PHYSICAL THERAPY        REFERRAL, meloxicam (MOBIC) 15 MG tablet           CODMAN'S EXERCISES  ,THAT IS PENDULUM RANGE OF MOTION 30 EACH TWICE DAILY    THUMB UP EXERCISES INTO PAIN 30 EACH TWICE DAILY    INTERNAL  AND EXTERNAL ROTATION  with AND WITHOUT RESISTANCE OR WEIGHT 30 EACH TWICE DAILY    SWORD SHEATH EXERCISE 30 EACH TWICE DAILY    WALL STRETCH EXERCISE 30 SECONDS EACH TWICE DAILY    POSTERIOR SHOULDER STRETCH AND HOLD 3 10 SECOND STRETCHES TWICE DAILY    ISOMETRIC EXERCISE 60 DEGREES ABDUCTION, ADDUCTION, 90 DEGREE THUMB UP POSITION    AVOID PAINFUL ARC    ICE TWICE DAILY X 5 MINUTES PRIOR TO EXERCISE OR AS NEEDED FOR PAIN CONTROL    Raymond Engel Jr., MD   '                Follow-ups after your visit        Additional Services     PHYSICAL THERAPY REFERRAL       PHYSICAL THERAPY: [facility]  [address] [place] [address] [phone]        DIAGNOSIS ; right rotator cuff syndrome  In diabetic   LENGTH :; 2 x per week x 4 wee   SPECIAL:  As indicated       Treatment: Evaluation & Treatment  Special Instructions/Modalities:  DEEP MASSAGE  TENS UNIT   HOME EXERCISE PROGRAM PLEASE   Special Programs:   Right shoulder     Please be aware that coverage of  "these services is subject to the terms and limitations of your health insurance plan.  Call member services at your health plan with any benefit or coverage questions.      **Note to Provider** To refer patients to therapy outside of the location list, change the order class to External Referral in the order composer.                  Your next 10 appointments already scheduled     Sep 05, 2017 11:00 AM CDT   (Arrive by 10:45 AM)   RETURN DIABETES with Cecile Juarez PA-C   [facility] ([facility])    [address] [place] [id number] [phone]            Dec 05, 2017 11:00 AM CST   (Arrive by 10:45 AM)   RETURN DIABETES with MD [facility] ([facility])    [address] [place] [id number] [phone]              Who to contact     If you have questions or need follow up information about today's clinic visit or your schedule please contact [facility] directly at [phone].  Normal or non-critical lab and imaging results will be communicated to you by Patient Communicatorhart, letter or phone within 4 business days after the clinic has received the results. If you do not hear from us within 7 days, please contact the clinic through Patient Communicatorhart or phone. If you have a critical or abnormal lab result, we will notify you by phone as soon as possible.  Submit refill requests through LayerGloss or call your pharmacy and they will forward the refill request to us. Please allow 3 business days for your refill to be completed.          Additional Information About Your Visit        Patient Communicatorhart Information     LayerGloss lets you send messages to your doctor, view your test results, renew your prescriptions, schedule appointments and more. To sign up, go to [email or web address] . Click on \"Log in\" on the left side of the screen, which will take you to the " "Welcome page. Then click on \"Sign up Now\" on the right side of the page.     You will be asked to enter the access code listed below, as well as some personal information. Please follow the directions to create your username and password.     Your access code is: [email or web address]  Expires: 9/3/2017  4:22 PM     Your access code will  in 90 days. If you need help or a new code, please call your [facility] clinic or [phone].        Care EveryWhere ID     This is your Care EveryWhere ID. This could be used by other organizations to access your [facility] medical records  [id number]        Your Vitals Were     Pulse Temperature Respirations Last Period Pulse Oximetry BMI (Body Mass Index)    87 98.5  F (36.9  C) (Tympanic) 15 (LMP Unknown) 97% 22.78 kg/m2       Blood Pressure from Last 3 Encounters:   17 130/78   17 112/64   17 131/84    Weight from Last 3 Encounters:   17 139 lb (63 kg)   17 139 lb 8 oz (63.3 kg)   17 140 lb 14.4 oz (63.9 kg)              We Performed the Following     PHYSICAL THERAPY REFERRAL          Today's Medication Changes          These changes are accurate as of: 17  2:25 PM.  If you have any questions, ask your nurse or doctor.               Start taking these medicines.        Dose/Directions    meloxicam 15 MG tablet   Commonly known as:  MOBIC   Used for:  Sprain of right rotator cuff capsule, initial encounter   Started by:  Raymond Engel MD        Dose:  15 mg   Take 1 tablet (15 mg) by mouth daily   Quantity:  30 tablet   Refills:  1            Where to get your medicines      These medications were sent to [facility] - [place], [place] - [address], [address]  [address], [address], [facility] [address]     Phone:  [phone]     meloxicam 15 MG tablet                Primary Care Provider Office Phone # Fax #    Raymond Engel [phone] [address]  [facility] [address]        Equal Access to Services     PEYTON BURGOS : Hadii pilar ku hadjennifero Soidaali, waaxda luqadaha, qaybta kaalmada veronicaana luisasanjana, niranjan ovallesphilenrique greenwood. So [facility] [phone].    ATENCIÓN: Si habla español, tiene a merrill disposición servicios gratuitos de asistencia lingüística. Jonathan al [phone].    We comply with applicable federal civil rights laws and [place] laws. We do not discriminate on the basis of race, color, national origin, age, disability sex, sexual orientation or gender identity.            Thank you!     Thank you for choosing [facility]  for your care. Our goal is always to provide you with excellent care. Hearing back from our patients is one way we can continue to improve our services. Please take a few minutes to complete the written survey that you may receive in the mail after your visit with us. Thank you!             Your Updated Medication List - Protect others around you: Learn how to safely use, store and throw away your medicines at www.disposemymeds.org.          This list is accurate as of: 8/31/17  2:25 PM.  Always use your most recent med list.                   Brand Name Dispense Instructions for use Diagnosis    alum & mag hydroxide-simethicone 200-200-20 MG/5ML Susp suspension    antacid    30 mL    Take 30 mLs by mouth every 4 hours as needed for indigestion    Gastroesophageal reflux disease without esophagitis       ASPIRIN NOT PRESCRIBED    INTENTIONAL     continuous prn for other Antiplatelet medication not prescribed intentionally due to Not indicated based on age/GI distress        atorvastatin 80 MG tablet    LIPITOR    90 tablet    Take 1 tablet (80 mg) by mouth daily    Hyperlipidemia LDL goal <100       * B-D U/F 31G X 8 MM   Generic drug:  insulin pen needle     400 each    TEST FOUR TIMES A DAY OR AS DIRECTED    Diabetes type 2, controlled (H)       * B-D U/F 31G X 8 MM   Generic drug:   "insulin pen needle     100 each    TEST FOUR TIMES A DAY OR AS DIRECTED    Diabetes type 2, controlled (H)       blood glucose monitoring lancets     2 Box    Use to test blood sugar 4 times daily or as directed.    Diabetes type 2, controlled (H)       blood glucose monitoring test strip    JONATHAN CONTOUR NEXT    200 each    Use to test blood sugar 4 times daily or as directed.    Diabetes type 2, controlled (H)       calcium-vitamin D 600-400 MG-UNIT per tablet    CALTRATE    180 tablet    Take 1 tablet by mouth 2 times daily    Vitamin D deficiency       canagliflozin 300 MG tablet    INVOKANA    90 tablet    Take 1 tablet (300 mg) by mouth every morning (before breakfast)    Diabetes type 2, controlled (H)       ezetimibe 10 MG tablet    ZETIA    90 tablet    Take 1 tablet (10 mg) by mouth daily    Hyperlipidemia LDL goal <100       * insulin glargine 100 UNIT/ML injection    LANTUS VIAL    15 mL    Inject 14 Units Subcutaneous At Bedtime    Type 2 diabetes mellitus without complication, with long-term current use of insulin (H)       * insulin glargine 100 UNIT/ML injection    LANTUS SOLOSTAR    15 mL    Inject 60 Units Subcutaneous At Bedtime    Uncontrolled type 1 diabetes mellitus without complication (H)       insulin lispro 100 UNIT/ML injection    HumaLOG KWIKpen    15 mL    30 units before breakfast, 35 units before lunch, 35 units before dinner    Uncontrolled type 2 diabetes mellitus without complication, with long-term current use of insulin (H)       insulin syringe-needle U-100 30G X 1/2\" 1 ML    BD insulin syringe ultrafine    100 each    Use one syringe daily or as directed.  3 month supply    Diabetes type 2, controlled (H)       lisinopril 2.5 MG tablet    PRINIVIL/Zestril    90 tablet    Take 1 tablet (2.5 mg) by mouth daily    Hypertension goal BP (blood pressure) < 140/80       meloxicam 15 MG tablet    MOBIC    30 tablet    Take 1 tablet (15 mg) by mouth daily    Sprain of right rotator cuff " capsule, initial encounter       metFORMIN 1000 MG tablet    GLUCOPHAGE    90 tablet    TAKE ONE-HALF TABLET BY MOUTH TWICE DAILY WITH MEALS    Diabetes mellitus, type 2 (H)       pioglitazone 45 MG tablet    ACTOS    90 tablet    Take 1 tablet (45 mg) by mouth daily    Diabetes type 2, controlled (H)       vitamin D 30822 UNIT capsule    ERGOCALCIFEROL    4 capsule    TAKE 1 CAPLET ONCE WEEKLY Profile Rx: patient will contact pharmacy when needed    Vitamin D deficiency       * Notice:  This list has 4 medication(s) that are the same as other medications prescribed for you. Read the directions carefully, and ask your doctor or other care provider to review them with you.

## 2017-08-31 NOTE — PROGRESS NOTES
Please send normal lab letter when labs are complete  NORMAL SHOULDER XRAY AS DISCUSSED    XR SHOULDER RT G/E 3 VW 8/31/2017 1:59 PM    COMPARISON: None.    HISTORY: Rotator cuff sprain.           Impression            IMPRESSION: No fracture or dislocation seen in the right shoulder.  Joints are preserved and in normal alignment. No significant soft  tissue swelling.    MD LINDA JJ JR., MD

## 2017-08-31 NOTE — NURSING NOTE
"Chief Complaint   Patient presents with     Shoulder Pain       Initial /78 (BP Location: Left arm, Patient Position: Chair, Cuff Size: Adult Regular)  Pulse 87  Temp 98.5  F (36.9  C) (Tympanic)  Resp 15  Wt 139 lb (63 kg)  LMP  (LMP Unknown)  SpO2 97%  BMI 22.78 kg/m2 Estimated body mass index is 22.78 kg/(m^2) as calculated from the following:    Height as of 6/5/17: 5' 5.5\" (1.664 m).    Weight as of this encounter: 139 lb (63 kg).  Medication Reconciliation: complete    "

## 2017-08-31 NOTE — PROGRESS NOTES
SUBJECTIVE:   Karen Braxton is a 47 year old female who presents to clinic today for the following health issues:      Joint Pain    RIGHT SHOULDER PAINOnset: 2 weeks    Description:   Location: right shoulder  Character: Sharp    Intensity: moderate, severe    Progression of Symptoms: worse    Accompanying Signs & Symptoms:  Other symptoms: radiation of pain to fingers    History:   Previous similar pain: no       Precipitating factors:   Trauma or overuse: no     Alleviating factors:  Improved by: nothing    Therapies Tried and outcome: ibuprofen-helps for 3 hours and then stops working      .LINDA ALCANTAR MD .8/31/2017 7:54 AM .September 1, 2017      Current Outpatient Prescriptions   Medication Sig Dispense Refill     meloxicam (MOBIC) 15 MG tablet Take 1 tablet (15 mg) by mouth daily 30 tablet 1     insulin glargine (LANTUS SOLOSTAR) 100 UNIT/ML injection Inject 60 Units Subcutaneous At Bedtime 15 mL 11     insulin lispro (HUMALOG KWIKPEN) 100 UNIT/ML injection 30 units before breakfast, 35 units before lunch, 35 units before dinner 15 mL 11     B-D U/F 31G X 8 MM insulin pen needle TEST FOUR TIMES A DAY OR AS DIRECTED 100 each 1     insulin glargine (LANTUS VIAL) 100 UNIT/ML injection Inject 14 Units Subcutaneous At Bedtime 15 mL 3     metFORMIN (GLUCOPHAGE) 1000 MG tablet TAKE ONE-HALF TABLET BY MOUTH TWICE DAILY WITH MEALS 90 tablet 1     alum & mag hydroxide-simethicone (ANTACID) 200-200-20 MG/5ML SUSP suspension Take 30 mLs by mouth every 4 hours as needed for indigestion 30 mL 11     atorvastatin (LIPITOR) 80 MG tablet Take 1 tablet (80 mg) by mouth daily 90 tablet 3     blood glucose monitoring (JONATHAN CONTOUR NEXT) test strip Use to test blood sugar 4 times daily or as directed. 200 each 11     blood glucose monitoring (JONATHAN MICROLET) lancets Use to test blood sugar 4 times daily or as directed. 2 Box 6     ezetimibe (ZETIA) 10 MG tablet Take 1 tablet (10 mg) by mouth daily 90 tablet 3      "pioglitazone (ACTOS) 45 MG tablet Take 1 tablet (45 mg) by mouth daily 90 tablet 3     lisinopril (PRINIVIL/ZESTRIL) 2.5 MG tablet Take 1 tablet (2.5 mg) by mouth daily 90 tablet 3     canagliflozin (INVOKANA) 300 MG tablet Take 1 tablet (300 mg) by mouth every morning (before breakfast) 90 tablet 3     B-D U/F 31G X 8 MM insulin pen needle TEST FOUR TIMES A DAY OR AS DIRECTED 400 each 1     calcium-vitamin D (CALTRATE) 600-400 MG-UNIT per tablet Take 1 tablet by mouth 2 times daily 180 tablet 3     vitamin D (ERGOCALCIFEROL) 11152 UNIT capsule TAKE 1 CAPLET ONCE WEEKLY  Profile Rx: patient will contact pharmacy when needed 4 capsule 5     insulin syringe-needle U-100 (BD INSULIN SYRINGE ULTRAFINE) 30G X 1/2\" 1 ML Use one syringe daily or as directed.  3 month supply 100 each prn     ASPIRIN NOT PRESCRIBED (INTENTIONAL) continuous prn for other Antiplatelet medication not prescribed intentionally due to Not indicated based on age/GI distress          No Known Allergies    Immunization History   Administered Date(s) Administered     Influenza (IIV3) 11/26/2007, 10/22/2008, 09/18/2009, 09/20/2010, 11/26/2013, 10/28/2014     Influenza Vaccine IM 3yrs+ 4 Valent IIV4 09/29/2015     Pneumococcal (PCV 13) 10/03/2011     TD (ADULT, 7+) 01/01/1998     Tdap (Adacel,Boostrix) 02/10/2009         reports that she does not drink alcohol.      reports that she does not use illicit drugs.    family history includes Asthma in her mother; DIABETES in her father and mother; HEART DISEASE in her mother; Hypertension in her father and mother; Lipids in her mother. There is no history of CANCER, Coronary Artery Disease, Hyperlipidemia, CEREBROVASCULAR DISEASE, Breast Cancer, Colon Cancer, Prostate Cancer, Other Cancer, Depression, Anxiety Disorder, MENTAL ILLNESS, Substance Abuse, Anesthesia Reaction, OSTEOPOROSIS, Genetic Disorder, Thyroid Disease, Obesity, or Unknown/Adopted.    indicated that the status of her no family hx of is " unknown. She indicated that her mother is . She indicated that her father is .      has a past surgical history that includes Cholecystectomy (2007); Facial reconstruction surgery (2 years old); and orthopedic surgery (2001).     reports that she currently engages in sexual activity and has had male partners.  .  Pediatric History   Patient Guardian Status     Not on file.     Other Topics Concern     Parent/Sibling W/ Cabg, Mi Or Angioplasty Before 65f 55m? No     Social History Narrative         reports that she has been smoking Cigarettes.  She has never used smokeless tobacco.    Medical, social, surgical, and family histories reviewed.    Labs reviewed in EPIC  Patient Active Problem List   Diagnosis     Other dental caries     Disorder of bursae and tendons in shoulder region     Insomnia     Vitamin D deficiency     Premature menopause     ASCUS favor benign     Hyperlipidemia LDL goal <100     Comprehensive diabetic foot examination, type 2 DM, encounter for (H)     Hypertension goal BP (blood pressure) < 140/90     DiarrheaX 2 over last 24hrs w one explosive r/o 2ndary to acid gastritis     Uncontrolled diabetes mellitus type 2 without complications (H)     Esophageal reflux 2ndary to hi continuous intake of sugared  tea     Hyperlipidemia with target LDL less than 100     Diabetes type 2, controlled (H)     Intractable chronic migraine without aura and without status migrainosus     Needle stick injury, subsequent encounter     Hammer toe of right foot     Past Surgical History:   Procedure Laterality Date     CHOLECYSTECTOMY  2007    laproscopic     FACIAL RECONSTRUCTION SURGERY  2 years old    cleft lip repair     ORTHOPEDIC SURGERY  2001    left knee       Social History   Substance Use Topics     Smoking status: Current Some Day Smoker     Types: Cigarettes     Smokeless tobacco: Never Used      Comment: 3 cigarettes daily     Alcohol use No     Family History   Problem  Relation Age of Onset     DIABETES Mother      Hypertension Mother      HEART DISEASE Mother      Lipids Mother      Asthma Mother      DIABETES Father      Hypertension Father      CANCER No family hx of      No known family hx of skin cancer     Coronary Artery Disease No family hx of      Hyperlipidemia No family hx of      CEREBROVASCULAR DISEASE No family hx of      Breast Cancer No family hx of      Colon Cancer No family hx of      Prostate Cancer No family hx of      Other Cancer No family hx of      Depression No family hx of      Anxiety Disorder No family hx of      MENTAL ILLNESS No family hx of      Substance Abuse No family hx of      Anesthesia Reaction No family hx of      OSTEOPOROSIS No family hx of      Genetic Disorder No family hx of      Thyroid Disease No family hx of      Obesity No family hx of      Unknown/Adopted No family hx of          No Known Allergies  Recent Labs   Lab Test  03/27/17   1442  11/14/16   1058  03/21/16   1030   06/23/15   1652  12/01/14   1222   05/29/14   1045  03/18/14   1518   A1C  8.2*  8.1*  8.1*   < >  8.1*  8.5*   < >  11.4*   --    LDL  160*   --   142*   --    --   114   < >  64   --    HDL  48*   --   44*   --    --   36*   < >  36*   --    TRIG  171*   --   223*   --    --   323*   < >  362*   --    ALT   --    --   30   --   21   --    --   23  30   CR   --   0.83  0.70   --   0.80  0.80   < >  0.70  0.69   GFRESTIMATED   --   73  90   --   77  77   < >  >90  >90   GFRESTBLACK   --   89  >90   --   >90  >90   < >  >90  >90   POTASSIUM   --   4.1  4.4   --   4.2  4.0   < >  4.1  4.0   TSH   --    --   1.31   --    --    --    --    --   1.55    < > = values in this interval not displayed.        BP Readings from Last 6 Encounters:   08/31/17 130/78   08/14/17 112/64   06/05/17 131/84   03/27/17 108/62   03/09/17 131/84   02/06/17 118/78       Wt Readings from Last 3 Encounters:   08/31/17 139 lb (63 kg)   08/14/17 139 lb 8 oz (63.3 kg)   06/05/17 140 lb 14.4  oz (63.9 kg)         Positive symptoms or findings indicated by bold designation:     ROS: 10 point ROS neg other than the symptoms noted above in the HPI.except  has Other dental caries; Disorder of bursae and tendons in shoulder region; Insomnia; Vitamin D deficiency; Premature menopause; ASCUS favor benign; Hyperlipidemia LDL goal <100; Comprehensive diabetic foot examination, type 2 DM, encounter for (H); Hypertension goal BP (blood pressure) < 140/90; DiarrheaX 2 over last 24hrs w one explosive r/o 2ndary to acid gastritis; Uncontrolled diabetes mellitus type 2 without complications (H); Esophageal reflux 2ndary to hi continuous intake of sugared  tea; Hyperlipidemia with target LDL less than 100; Diabetes type 2, controlled (H); Intractable chronic migraine without aura and without status migrainosus; Needle stick injury, subsequent encounter; and Hammer toe of right foot on her problem list.   Constitutional: The patient denied fatigue, fever, insomnia, night sweats, recent illness and weight loss.  WEIGHT STABLE     Eyes: The patient denied blindness, eye pain, eye tearing, photophobia, vision change and visual disturbance. NORMAL VISION       Ears/Nose/Throat/Neck: The patient denied dizziness, facial pain, hearing loss, nasal discharge, oral pain, otalgia, postnasal drip, sinus congestion, sore throat, tinnitus and voice change.       Cardiovascular: The patient denied arrhythmia, chest pain/pressure, claudication, edema, exercise intolerance, fatigue, orthopnea, palpitations and syncope.      Respiratory: The patient denied asthma, chest congestion, cough, dyspnea on exertion, dyspnea/shortness of breath, hemoptysis, pedal edema, pleuritic pain, productive sputum, snoring and wheezing.     Gastrointestinal: The patient denied abdominal pain, anorexia, constipation, diarrhea, dysphagia, gastroesophageal reflux, hematochezia, hemorrhoids, melena, nausea and vomiting .      Genitourinary/Nephrology: The  patient denied breast complaint, dysuria, nocturia sexual dysfunction, t, urinary frequency, urinary incontinence, urinary urgency          Musculoskeletal: The patient denied arthralgia(s), back pain, joint complaint, muscle weakness, myalgias, osteoporosis, sciatica, stiffness and swelling.  SEE HISTORY OF PRESENT ILLNESS   RIGHT SHOULDER PAIN X 2 WEEKS     Dermatoligic:: The patient denied acne, dermatitis, ecchymosis, itching, mole change, rash, skin cancer, skin lesion and sores.      Neurologic: The patient denied dizziness, gait abnormality, headache, memory loss, mental status change, paresis, paresthesia, seizure, syncope, tremor and vision change.     Psychiatric: The patient denied anxiety, depression, disturbances of memory, drug abuse, insomnia, mood swings and relationship difficulties.      Endocrine: The patient denied , goiter, obesity, polyuria and thyroid disease.      Hematologic/Lymphatic: The patient denied abnormal bleeding and bruising, abnormal ecchymoses, anemia, lymph node enlargement/mass, petechiae and venous  Thrombosis.      Allergy/Immunology: The patient denied food allergy and  Allergic rhinitis or conjunctivitis.        PE:  /78 (BP Location: Left arm, Patient Position: Chair, Cuff Size: Adult Regular)  Pulse 87  Temp 98.5  F (36.9  C) (Tympanic)  Resp 15  Wt 139 lb (63 kg)  LMP  (LMP Unknown)  SpO2 97%  BMI 22.78 kg/m2 Body mass index is 22.78 kg/(m^2).    Constitutional: general appearance, well nourished, well developed, in no acute distress, well developed, appears stated age, normal body habitus,      Eyes:; The patient has normal eyelids sclerae and conjunctivae :      Ears/Nose/Throat: external ear, overall: normal appearance; external nose, overall: benign appearance, normal moujth gums and lips  The patient has:      Neck: thyroid, overall: normal size, normal consistency, nontender,      Respiratory:  palpation of chest, overall: normal excursion,    Clear to  percussion and auscultation  NORMAL     Tachypnea  NORMAL   Color  NORMAL     Cardiovascular:  Good color with no peripheral edema  NORMAL   Regular sinus rhythm without murmur. Physiologic heart sounds Heart is unelarged  .   Musculoskeletal:  Brief ortho exam normal except:   RIGHT SUBACHROMIAL BURSA  PAIN   PAIN INTERNAL  AND EXTERNAL ROTATION   PAINFUL ARC   ABLE TO DO THUMB UP AND CODMAN'S EXERCISES     Integument: inspection of skin, no rash, lesions; and, palpation, no induration, no tenderness.      Neurologic mental status, overall: alert and oriented; gait, no ataxia, no unsteadiness; coordination, no tremors; cranial nerves, overall: normal motor, overall: normal bulk, tone.      Psychiatric: orientation/consciousness, overall: oriented to person, place and time; behavior/psychomotor activity, no tics, normal psychomotor activity; mood and affect, overall: normal mood and affect; appearance, overall: well-groomed, good eye contact; speech, overall: normal quality, no aphasia and normal quality, quantity, intact.      Diagnostic Test Results:  Results for orders placed or performed in visit on 06/05/17   Hemoglobin A1c POCT   Result Value Ref Range    Hemoglobin A1C 8.2 (A) 4.3 - 6 %         ICD-10-CM    1. Need for prophylactic vaccination and inoculation against influenza Z23    2. Sprain of right rotator cuff capsule, initial encounter S43.421A XR Shoulder Right G/E 3 Views     PHYSICAL THERAPY REFERRAL     meloxicam (MOBIC) 15 MG tablet        .    Side effects benefits and risks thoroughly discussed. .she may come in early if unimproved or getting worse          Importance of adhering to regimen discussed and if medications were dispensed, the importance of taking medications discussed and bringing in the medications after every visit for chronic problems         Please drink 2 glasses of water prior to meals and walk 15-30 minutes after meals    I spent  25 MINUTES SPENT  with patient discussing the  following issues   The primary encounter diagnosis was Need for prophylactic vaccination and inoculation against influenza. A diagnosis of Sprain of right rotator cuff capsule, initial encounter was also pertinent to this visit. over half of which involved counseling and coordination of care.    Patient Instructions   (Z23) Need for prophylactic vaccination and inoculation against influenza  (primary encounter diagnosis)  Comment:      Plan:      (S43.421A) Sprain of right rotator cuff capsule, initial encounter  Comment:    Plan: XR Shoulder Right G/E 3 Views, PHYSICAL THERAPY        REFERRAL, meloxicam (MOBIC) 15 MG tablet           CODMAN'S EXERCISES  ,THAT IS PENDULUM RANGE OF MOTION 30 EACH TWICE DAILY    THUMB UP EXERCISES INTO PAIN 30 EACH TWICE DAILY    INTERNAL  AND EXTERNAL ROTATION  with AND WITHOUT RESISTANCE OR WEIGHT 30 EACH TWICE DAILY    SWORD SHEATH EXERCISE 30 EACH TWICE DAILY    WALL STRETCH EXERCISE 30 SECONDS EACH TWICE DAILY    POSTERIOR SHOULDER STRETCH AND HOLD 3 10 SECOND STRETCHES TWICE DAILY    ISOMETRIC EXERCISE 60 DEGREES ABDUCTION, ADDUCTION, 90 DEGREE THUMB UP POSITION    AVOID PAINFUL ARC    ICE TWICE DAILY X 5 MINUTES PRIOR TO EXERCISE OR AS NEEDED FOR PAIN CONTROL    Linda Alcantar Jr., MD   '        TREATMENT PROGNOSIS BENEFITS AND RISKS DISCUSSED       ALL THE ABOVE PROBLEMS ARE STABLE AND MED CHANGES AS NOTED    Diet:  MEDITERRANEAN DIET AND DIABETES     Exercise:  FIT BIT TWITCH RECOMMENDED  AND SHOULDER EXERCISES   Exercises Range of motion, balance, isometric, and strengthening exercises 30 repetitions twice daily of involved joints      .LINDA ALCANTAR MD 8/31/2017 7:54 AM  September 1, 2017

## 2017-08-31 NOTE — PATIENT INSTRUCTIONS
(Z23) Need for prophylactic vaccination and inoculation against influenza  (primary encounter diagnosis)  Comment:      Plan:      (S43.421A) Sprain of right rotator cuff capsule, initial encounter  Comment:    Plan: XR Shoulder Right G/E 3 Views, PHYSICAL THERAPY        REFERRAL, meloxicam (MOBIC) 15 MG tablet           CODMAN'S EXERCISES  ,THAT IS PENDULUM RANGE OF MOTION 30 EACH TWICE DAILY    THUMB UP EXERCISES INTO PAIN 30 EACH TWICE DAILY    INTERNAL  AND EXTERNAL ROTATION  with AND WITHOUT RESISTANCE OR WEIGHT 30 EACH TWICE DAILY    SWORD SHEATH EXERCISE 30 EACH TWICE DAILY    WALL STRETCH EXERCISE 30 SECONDS EACH TWICE DAILY    POSTERIOR SHOULDER STRETCH AND HOLD 3 10 SECOND STRETCHES TWICE DAILY    ISOMETRIC EXERCISE 60 DEGREES ABDUCTION, ADDUCTION, 90 DEGREE THUMB UP POSITION    AVOID PAINFUL ARC    ICE TWICE DAILY X 5 MINUTES PRIOR TO EXERCISE OR AS NEEDED FOR PAIN CONTROL    Raymond Engel Jr., MD   '

## 2017-09-01 ASSESSMENT — ANXIETY QUESTIONNAIRES: GAD7 TOTAL SCORE: 0

## 2017-09-05 ENCOUNTER — OFFICE VISIT (OUTPATIENT)
Dept: ENDOCRINOLOGY | Facility: CLINIC | Age: 47
End: 2017-09-05

## 2017-09-05 VITALS
SYSTOLIC BLOOD PRESSURE: 122 MMHG | HEART RATE: 83 BPM | WEIGHT: 140.4 LBS | HEIGHT: 66 IN | BODY MASS INDEX: 22.56 KG/M2 | DIASTOLIC BLOOD PRESSURE: 85 MMHG

## 2017-09-05 DIAGNOSIS — E11.65 TYPE 2 DIABETES MELLITUS WITH HYPERGLYCEMIA, WITH LONG-TERM CURRENT USE OF INSULIN (H): Primary | ICD-10-CM

## 2017-09-05 DIAGNOSIS — Z79.4 TYPE 2 DIABETES MELLITUS WITH HYPERGLYCEMIA, WITH LONG-TERM CURRENT USE OF INSULIN (H): Primary | ICD-10-CM

## 2017-09-05 LAB — HBA1C MFR BLD: 8 % (ref 4.3–6)

## 2017-09-05 ASSESSMENT — PAIN SCALES - GENERAL: PAINLEVEL: NO PAIN (0)

## 2017-09-05 NOTE — LETTER
9/5/2017       RE: Karen Braxton  [address]   [facility] [address]     Dear Colleague,    Thank you for referring your patient, Karen Braxton, to the [facility] ENDOCRINOLOGY at [facility]. Please see a copy of my visit note below.    HPI:  Irais Jones is a 47 year old female with type 2 diabetes  Here today for a follow up visit.  For her diabetes, she is currently taking Latus 60 units SQ at hs, Humalog 30 units with breakfast, 35 units with lunch and 35 units with dinner, Invokana 300 mg daily,   Metformin 500 mg BID and Actos 45 mg daily.  Her A1C is 8.0 % today and her previous A1C was 8.2 % in June 2017.  We downloaded her glucose meter and her FBS values are in the  range.  Her evening blood sugar values are high intermittently in the high 100-low 200 range. Some of these blood sugar readings are postmeals.  No hypoglycemia.  On ROS today,  she reports feeling well and looks good today.  She is working 1 job now Fri,Sat and Sundays 8 hrs days and is less fatigued.  Pt denies any visual problems, SOB at rest, cough or diarrhea.  No dysuria or hematuria.  She denies any pain or numbness in her feet or hands.  No edema.  No groin rash or pruritis.    ROS:   Please see under HPI.    ALLERGIES:     No Known Allergies      Current Outpatient Prescriptions   Medication Sig Dispense Refill     meloxicam (MOBIC) 15 MG tablet Take 1 tablet (15 mg) by mouth daily 30 tablet 1     insulin glargine (LANTUS SOLOSTAR) 100 UNIT/ML injection Inject 60 Units Subcutaneous At Bedtime 15 mL 11     insulin lispro (HUMALOG KWIKPEN) 100 UNIT/ML injection 30 units before breakfast, 35 units before lunch, 35 units before dinner 15 mL 11     B-D U/F 31G X 8 MM insulin pen needle TEST FOUR TIMES A DAY OR AS DIRECTED 100 each 1     metFORMIN (GLUCOPHAGE) 1000 MG tablet TAKE ONE-HALF TABLET BY MOUTH TWICE DAILY WITH MEALS 90 tablet 1     alum & mag hydroxide-simethicone (ANTACID)  "200-200-20 MG/5ML SUSP suspension Take 30 mLs by mouth every 4 hours as needed for indigestion 30 mL 11     atorvastatin (LIPITOR) 80 MG tablet Take 1 tablet (80 mg) by mouth daily 90 tablet 3     blood glucose monitoring (JONATHAN CONTOUR NEXT) test strip Use to test blood sugar 4 times daily or as directed. 200 each 11     blood glucose monitoring (JONATHAN MICROLET) lancets Use to test blood sugar 4 times daily or as directed. 2 Box 6     ezetimibe (ZETIA) 10 MG tablet Take 1 tablet (10 mg) by mouth daily 90 tablet 3     pioglitazone (ACTOS) 45 MG tablet Take 1 tablet (45 mg) by mouth daily 90 tablet 3     lisinopril (PRINIVIL/ZESTRIL) 2.5 MG tablet Take 1 tablet (2.5 mg) by mouth daily 90 tablet 3     canagliflozin (INVOKANA) 300 MG tablet Take 1 tablet (300 mg) by mouth every morning (before breakfast) 90 tablet 3     B-D U/F 31G X 8 MM insulin pen needle TEST FOUR TIMES A DAY OR AS DIRECTED 400 each 1     calcium-vitamin D (CALTRATE) 600-400 MG-UNIT per tablet Take 1 tablet by mouth 2 times daily 180 tablet 3     vitamin D (ERGOCALCIFEROL) 77372 UNIT capsule TAKE 1 CAPLET ONCE WEEKLY  Profile Rx: patient will contact pharmacy when needed 4 capsule 5     insulin syringe-needle U-100 (BD INSULIN SYRINGE ULTRAFINE) 30G X 1/2\" 1 ML Use one syringe daily or as directed.  3 month supply 100 each prn     ASPIRIN NOT PRESCRIBED (INTENTIONAL) continuous prn for other Antiplatelet medication not prescribed intentionally due to Not indicated based on age/GI distress       [DISCONTINUED] insulin glargine (LANTUS VIAL) 100 UNIT/ML injection Inject 14 Units Subcutaneous At Bedtime 15 mL 3     SHX:  Smoke: yes.  ETOH: none.   with 5 children.  Working 3 days per week.    FHX:  Several family members with diabetes.    PMHX:   1.  Type 2 DM dx 8 years ago.  2.  Hyperlipidemia.  3.  Amenorrhea.  4.  GERD.  5.  S/P choley.  6.  Sebaceous cyst.  Past Medical History:   Diagnosis Date     Arthritis      ASCUS favor benign 2012 " "   neg HPV  Plan cotest in 3 yrs.     Diabetes (H)      Hypertension      Past Surgical History:   Procedure Laterality Date     CHOLECYSTECTOMY  9/14/2007    laproscopic     FACIAL RECONSTRUCTION SURGERY  2 years old    cleft lip repair     ORTHOPEDIC SURGERY  6/2001    left knee       EXAM:  Constitutional:   Vitals:    09/05/17 1115   BP: 122/85   BP Location: Right arm   Patient Position: Sitting   Cuff Size: Adult Regular   Pulse: 83   Weight: 63.7 kg (140 lb 6.4 oz)   Height: 1.664 m (5' 5.5\")   GENERAL: Pt looks good and in no distress.  SKIN: no rash.  HEENT: PERRLA; fundi not examined.  NECK: No thyroid tenderness.  LUNGS: Clear b/l.  CARDIAC: RRR.  ABDOMEN: Nontender.  EXTREMITIES: No pretibial edema.  FEET: No ulcers; normal monofilamentous exam.      RESULTS:    Creatinine   Date Value Ref Range Status   11/14/2016 0.83 0.52 - 1.04 mg/dL Final     GFR Estimate   Date Value Ref Range Status   11/14/2016 73 >60 mL/min/1.7m2 Final     Hemoglobin A1C   Date Value Ref Range Status   03/27/2017 8.2 (H) 4.3 - 6.0 % Final     Potassium   Date Value Ref Range Status   11/14/2016 4.1 3.4 - 5.3 mmol/L Final     ALT   Date Value Ref Range Status   03/21/2016 30 0 - 50 U/L Final     AST   Date Value Ref Range Status   01/09/2015 17 0 - 45 U/L Final     TSH   Date Value Ref Range Status   03/21/2016 1.31 0.40 - 5.00 mU/L Final     T4 Free   Date Value Ref Range Status   08/04/2011 1.22 0.70 - 1.85 ng/dL Final         Cholesterol   Date Value Ref Range Status   03/27/2017 242 (H) <200 mg/dL Final     Comment:     Desirable:       <200 mg/dl   03/21/2016 231 (H) <200 mg/dL Final     Comment:     Desirable:       <200 mg/dl     HDL Cholesterol   Date Value Ref Range Status   03/27/2017 48 (L) >49 mg/dL Final   03/21/2016 44 (L) >49 mg/dL Final     LDL Cholesterol Calculated   Date Value Ref Range Status   03/27/2017 160 (H) <100 mg/dL Final     Comment:     Above desirable:  100-129 mg/dl   Borderline High:  130-159 " mg/dL   High:             160-189 mg/dL   Very high:       >189 mg/dl     03/21/2016 142 (H) <100 mg/dL Final     Comment:     Above desirable:  100-129 mg/dl   Borderline High:  130-159 mg/dL   High:             160-189 mg/dL   Very high:       >189 mg/dl       Triglycerides   Date Value Ref Range Status   03/27/2017 171 (H) <150 mg/dL Final     Comment:     Borderline high:  150-199 mg/dl   High:             200-499 mg/dl   Very high:       >499 mg/dl     03/21/2016 223 (H) <150 mg/dL Final     Comment:     Non Fasting   Borderline high:  150-199 mg/dl   High:             200-499 mg/dl   Very high:       >499 mg/dl       Cholesterol/HDL Ratio   Date Value Ref Range Status   12/01/2014 6.0 (H) 0.0 - 5.0 Final   07/30/2014 6.1 (H) 0.0 - 5.0 Final     A1C      8.0   9/5/2017  A1C      8.2   6/2017  A1C      7.8   3/9/2017  A1C      8.1   12/2016  A1C      8.9   6/28/2016  A1C      8.1   6/23/2015  A1C      9.0   3/3/2015  A1C      8.5   12/1/2014  A1C     11.4  5/29/2014  A1C     10.7   4/1/2014  A1C     10.3   1/8/2014  A1C      9.8   11/26/2013  A1C      8.8   7/24/2013  A1C      8.9   5/28/2013    ASSESSMENT/PLAN:    1. TYPE 2 DIABETES MELLITUS: Pt's blood sugar values have improved.  She is to remain on her current dose of Lantus, Humalog, Invokana, Metformin and Actos.  Encouraged her to continue to check her blood sugar premeals and at hs.   If her FBS values are > 140-150 on a regular basis, she was instructed to increase her Lantus 62 units SQ at hs.  Pt remains normotensive.  She was seen by Oph in May 2017.   Pt's creat was 0.83  with GFR 73 mL/min in 11/2016 with normal K+.   Pt's urine microalbuminuria was negative in Nov 2016.  TSH normal in 3/2016.    2.  HYPERLIPIDEMIA:   in 3/2017.   She was instructed to take her Crestor and Zetia daily.    3.  FOOT CARE: Good condition and she has been seen by Podiatry.    4.  Return to Endocrine Clinic to see Dr. Phillips Dec 2017.      Cecile Juarez,  JEAN CLAUDE

## 2017-09-05 NOTE — MR AVS SNAPSHOT
After Visit Summary   2017    Karen Braxton    MRN: [id number]           Patient Information     Date Of Birth          1970        Visit Information        Provider Department      2017 11:00 AM Cecile Juarez PA-C [facility] Endocrinology        Today's Diagnoses     Type 2 diabetes mellitus with hyperglycemia, with long-term current use of insulin (H)    -  1       Follow-ups after your visit        Your next 10 appointments already scheduled     Dec 05, 2017 11:00 AM CST   (Arrive by 10:45 AM)   RETURN DIABETES with MD DERECK [facility] Endocrinology ([facility])    [address] [place] [id number] [phone]              Who to contact     Please call your clinic at [phone] to:    Ask questions about your health    Make or cancel appointments    Discuss your medicines    Learn about your test results    Speak to your doctor   If you have compliments or concerns about an experience at your clinic, or if you wish to file a complaint, please contact [facility] Patient Relations at [phone] or email us at [email or web address]         Additional Information About Your Visit        MyChart Information     [email or web address] is an electronic gateway that provides easy, online access to your medical records. With [employer], you can request a clinic appointment, read your test results, renew a prescription or communicate with your care team.     To sign up for [email or web address] visit the website at [email or web address]   You will be asked to enter the access code listed below, as well as some personal information. Please follow the directions to create your username and password.     Your access code is: [email or web address]  Expires: 2017 11:41 AM     Your access code will  in 90 days. If you need help or a new code, please contact your [facility] Clinic or call  "[phone] for assistance.        Care EveryWhere ID     This is your Care EveryWhere ID. This could be used by other organizations to access your [facility] medical records  [id number]        Your Vitals Were     Pulse Height Last Period BMI (Body Mass Index)          83 1.664 m (5' 5.5\") (LMP Unknown) 23.01 kg/m2         Blood Pressure from Last 3 Encounters:   09/05/17 122/85   08/31/17 130/78   08/14/17 112/64    Weight from Last 3 Encounters:   09/05/17 63.7 kg (140 lb 6.4 oz)   08/31/17 63 kg (139 lb)   08/14/17 63.3 kg (139 lb 8 oz)              Today, you had the following     No orders found for display         Today's Medication Changes          These changes are accurate as of: 9/5/17 11:41 AM.  If you have any questions, ask your nurse or doctor.               These medicines have changed or have updated prescriptions.        Dose/Directions    insulin glargine 100 UNIT/ML injection   Commonly known as:  LANTUS SOLOSTAR   This may have changed:  Another medication with the same name was removed. Continue taking this medication, and follow the directions you see here.   Used for:  Uncontrolled type 1 diabetes mellitus without complication (H)        Dose:  60 Units   Inject 60 Units Subcutaneous At Bedtime   Quantity:  15 mL   Refills:  11                Primary Care Provider Office Phone # Fax #    Raymond Aurea Engel [phone] [address] [facility] [address]        Equal Access to Services     SUSAN BURGOS AH: Hadii aad ku hadasho Soidaali, waaxda luqadaha, qaybta kaalmada adeegyasanjana, waxay bridgett figueroa . So [facility] [phone].    ATENCIÓN: Si habla español, tiene a merrill disposición servicios gratuitos de asistencia lingüística. Llame al [phone].    We comply with applicable federal civil rights laws and [place] laws. We do not discriminate on the basis of race, color, national origin, age, disability sex, sexual orientation or gender " identity.            Thank you!     Thank you for choosing [facility] ENDOCRINOLOGY  for your care. Our goal is always to provide you with excellent care. Hearing back from our patients is one way we can continue to improve our services. Please take a few minutes to complete the written survey that you may receive in the mail after your visit with us. Thank you!             Your Updated Medication List - Protect others around you: Learn how to safely use, store and throw away your medicines at www.disposemymeds.org.          This list is accurate as of: 9/5/17 11:41 AM.  Always use your most recent med list.                   Brand Name Dispense Instructions for use Diagnosis    alum & mag hydroxide-simethicone 200-200-20 MG/5ML Susp suspension    antacid    30 mL    Take 30 mLs by mouth every 4 hours as needed for indigestion    Gastroesophageal reflux disease without esophagitis       ASPIRIN NOT PRESCRIBED    INTENTIONAL     continuous prn for other Antiplatelet medication not prescribed intentionally due to Not indicated based on age/GI distress        atorvastatin 80 MG tablet    LIPITOR    90 tablet    Take 1 tablet (80 mg) by mouth daily    Hyperlipidemia LDL goal <100       * B-D U/F 31G X 8 MM   Generic drug:  insulin pen needle     400 each    TEST FOUR TIMES A DAY OR AS DIRECTED    Diabetes type 2, controlled (H)       * B-D U/F 31G X 8 MM   Generic drug:  insulin pen needle     100 each    TEST FOUR TIMES A DAY OR AS DIRECTED    Diabetes type 2, controlled (H)       blood glucose monitoring lancets     2 Box    Use to test blood sugar 4 times daily or as directed.    Diabetes type 2, controlled (H)       blood glucose monitoring test strip    JONATHAN CONTOUR NEXT    200 each    Use to test blood sugar 4 times daily or as directed.    Diabetes type 2, controlled (H)       calcium-vitamin D 600-400 MG-UNIT per tablet    CALTRATE    180 tablet    Take 1 tablet by mouth 2 times daily    Vitamin D deficiency     "   canagliflozin 300 MG tablet    INVOKANA    90 tablet    Take 1 tablet (300 mg) by mouth every morning (before breakfast)    Diabetes type 2, controlled (H)       ezetimibe 10 MG tablet    ZETIA    90 tablet    Take 1 tablet (10 mg) by mouth daily    Hyperlipidemia LDL goal <100       insulin glargine 100 UNIT/ML injection    LANTUS SOLOSTAR    15 mL    Inject 60 Units Subcutaneous At Bedtime    Uncontrolled type 1 diabetes mellitus without complication (H)       insulin lispro 100 UNIT/ML injection    HumaLOG KWIKpen    15 mL    30 units before breakfast, 35 units before lunch, 35 units before dinner    Uncontrolled type 2 diabetes mellitus without complication, with long-term current use of insulin (H)       insulin syringe-needle U-100 30G X 1/2\" 1 ML    BD insulin syringe ultrafine    100 each    Use one syringe daily or as directed.  3 month supply    Diabetes type 2, controlled (H)       lisinopril 2.5 MG tablet    PRINIVIL/Zestril    90 tablet    Take 1 tablet (2.5 mg) by mouth daily    Hypertension goal BP (blood pressure) < 140/80       meloxicam 15 MG tablet    MOBIC    30 tablet    Take 1 tablet (15 mg) by mouth daily    Sprain of right rotator cuff capsule, initial encounter       metFORMIN 1000 MG tablet    GLUCOPHAGE    90 tablet    TAKE ONE-HALF TABLET BY MOUTH TWICE DAILY WITH MEALS    Diabetes mellitus, type 2 (H)       pioglitazone 45 MG tablet    ACTOS    90 tablet    Take 1 tablet (45 mg) by mouth daily    Diabetes type 2, controlled (H)       vitamin D 68504 UNIT capsule    ERGOCALCIFEROL    4 capsule    TAKE 1 CAPLET ONCE WEEKLY Profile Rx: patient will contact pharmacy when needed    Vitamin D deficiency       * Notice:  This list has 2 medication(s) that are the same as other medications prescribed for you. Read the directions carefully, and ask your doctor or other care provider to review them with you.      "

## 2017-09-05 NOTE — NURSING NOTE
"Chief Complaint   Patient presents with     RECHECK     DIABETES TYPE 2       Initial /85 (BP Location: Right arm, Patient Position: Sitting, Cuff Size: Adult Regular)  Pulse 83  Ht 1.664 m (5' 5.5\")  Wt 63.7 kg (140 lb 6.4 oz)  LMP  (LMP Unknown)  BMI 23.01 kg/m2 Estimated body mass index is 23.01 kg/(m^2) as calculated from the following:    Height as of this encounter: 1.664 m (5' 5.5\").    Weight as of this encounter: 63.7 kg (140 lb 6.4 oz).  Medication Reconciliation: complete       Performed A1C test - patient tolerated well.    Shayla Zurita [facility]       "

## 2017-09-05 NOTE — PROGRESS NOTES
HPI:  Irais Jones is a 47 year old female with type 2 diabetes  Here today for a follow up visit.  For her diabetes, she is currently taking Latus 60 units SQ at hs, Humalog 30 units with breakfast, 35 units with lunch and 35 units with dinner, Invokana 300 mg daily,   Metformin 500 mg BID and Actos 45 mg daily.  Her A1C is 8.0 % today and her previous A1C was 8.2 % in June 2017.  We downloaded her glucose meter and her FBS values are in the  range.  Her evening blood sugar values are high intermittently in the high 100-low 200 range. Some of these blood sugar readings are postmeals.  No hypoglycemia.  On ROS today,  she reports feeling well and looks good today.  She is working 1 job now Fri,Sat and Sundays 8 hrs days and is less fatigued.  Pt denies any visual problems, SOB at rest, cough or diarrhea.  No dysuria or hematuria.  She denies any pain or numbness in her feet or hands.  No edema.  No groin rash or pruritis.    ROS:   Please see under HPI.    ALLERGIES:     No Known Allergies      Current Outpatient Prescriptions   Medication Sig Dispense Refill     meloxicam (MOBIC) 15 MG tablet Take 1 tablet (15 mg) by mouth daily 30 tablet 1     insulin glargine (LANTUS SOLOSTAR) 100 UNIT/ML injection Inject 60 Units Subcutaneous At Bedtime 15 mL 11     insulin lispro (HUMALOG KWIKPEN) 100 UNIT/ML injection 30 units before breakfast, 35 units before lunch, 35 units before dinner 15 mL 11     B-D U/F 31G X 8 MM insulin pen needle TEST FOUR TIMES A DAY OR AS DIRECTED 100 each 1     metFORMIN (GLUCOPHAGE) 1000 MG tablet TAKE ONE-HALF TABLET BY MOUTH TWICE DAILY WITH MEALS 90 tablet 1     alum & mag hydroxide-simethicone (ANTACID) 200-200-20 MG/5ML SUSP suspension Take 30 mLs by mouth every 4 hours as needed for indigestion 30 mL 11     atorvastatin (LIPITOR) 80 MG tablet Take 1 tablet (80 mg) by mouth daily 90 tablet 3     blood glucose monitoring (Pixelle CONTOUR NEXT) test strip Use to test blood sugar 4 times  "daily or as directed. 200 each 11     blood glucose monitoring (JONATHAN MICROLET) lancets Use to test blood sugar 4 times daily or as directed. 2 Box 6     ezetimibe (ZETIA) 10 MG tablet Take 1 tablet (10 mg) by mouth daily 90 tablet 3     pioglitazone (ACTOS) 45 MG tablet Take 1 tablet (45 mg) by mouth daily 90 tablet 3     lisinopril (PRINIVIL/ZESTRIL) 2.5 MG tablet Take 1 tablet (2.5 mg) by mouth daily 90 tablet 3     canagliflozin (INVOKANA) 300 MG tablet Take 1 tablet (300 mg) by mouth every morning (before breakfast) 90 tablet 3     B-D U/F 31G X 8 MM insulin pen needle TEST FOUR TIMES A DAY OR AS DIRECTED 400 each 1     calcium-vitamin D (CALTRATE) 600-400 MG-UNIT per tablet Take 1 tablet by mouth 2 times daily 180 tablet 3     vitamin D (ERGOCALCIFEROL) 47392 UNIT capsule TAKE 1 CAPLET ONCE WEEKLY  Profile Rx: patient will contact pharmacy when needed 4 capsule 5     insulin syringe-needle U-100 (BD INSULIN SYRINGE ULTRAFINE) 30G X 1/2\" 1 ML Use one syringe daily or as directed.  3 month supply 100 each prn     ASPIRIN NOT PRESCRIBED (INTENTIONAL) continuous prn for other Antiplatelet medication not prescribed intentionally due to Not indicated based on age/GI distress       [DISCONTINUED] insulin glargine (LANTUS VIAL) 100 UNIT/ML injection Inject 14 Units Subcutaneous At Bedtime 15 mL 3     SHX:  Smoke: yes.  ETOH: none.   with 5 children.  Working 3 days per week.    FHX:  Several family members with diabetes.    PMHX:   1.  Type 2 DM dx 8 years ago.  2.  Hyperlipidemia.  3.  Amenorrhea.  4.  GERD.  5.  S/P choley.  6.  Sebaceous cyst.  Past Medical History:   Diagnosis Date     Arthritis      ASCUS favor benign 2012    neg HPV  Plan cotest in 3 yrs.     Diabetes (H)      Hypertension      Past Surgical History:   Procedure Laterality Date     CHOLECYSTECTOMY  9/14/2007    laproscopic     FACIAL RECONSTRUCTION SURGERY  2 years old    cleft lip repair     ORTHOPEDIC SURGERY  6/2001    left knee " "      EXAM:  Constitutional:   Vitals:    09/05/17 1115   BP: 122/85   BP Location: Right arm   Patient Position: Sitting   Cuff Size: Adult Regular   Pulse: 83   Weight: 63.7 kg (140 lb 6.4 oz)   Height: 1.664 m (5' 5.5\")   GENERAL: Pt looks good and in no distress.  SKIN: no rash.  HEENT: PERRLA; fundi not examined.  NECK: No thyroid tenderness.  LUNGS: Clear b/l.  CARDIAC: RRR.  ABDOMEN: Nontender.  EXTREMITIES: No pretibial edema.  FEET: No ulcers; normal monofilamentous exam.      RESULTS:    Creatinine   Date Value Ref Range Status   11/14/2016 0.83 0.52 - 1.04 mg/dL Final     GFR Estimate   Date Value Ref Range Status   11/14/2016 73 >60 mL/min/1.7m2 Final     Hemoglobin A1C   Date Value Ref Range Status   03/27/2017 8.2 (H) 4.3 - 6.0 % Final     Potassium   Date Value Ref Range Status   11/14/2016 4.1 3.4 - 5.3 mmol/L Final     ALT   Date Value Ref Range Status   03/21/2016 30 0 - 50 U/L Final     AST   Date Value Ref Range Status   01/09/2015 17 0 - 45 U/L Final     TSH   Date Value Ref Range Status   03/21/2016 1.31 0.40 - 5.00 mU/L Final     T4 Free   Date Value Ref Range Status   08/04/2011 1.22 0.70 - 1.85 ng/dL Final         Cholesterol   Date Value Ref Range Status   03/27/2017 242 (H) <200 mg/dL Final     Comment:     Desirable:       <200 mg/dl   03/21/2016 231 (H) <200 mg/dL Final     Comment:     Desirable:       <200 mg/dl     HDL Cholesterol   Date Value Ref Range Status   03/27/2017 48 (L) >49 mg/dL Final   03/21/2016 44 (L) >49 mg/dL Final     LDL Cholesterol Calculated   Date Value Ref Range Status   03/27/2017 160 (H) <100 mg/dL Final     Comment:     Above desirable:  100-129 mg/dl   Borderline High:  130-159 mg/dL   High:             160-189 mg/dL   Very high:       >189 mg/dl     03/21/2016 142 (H) <100 mg/dL Final     Comment:     Above desirable:  100-129 mg/dl   Borderline High:  130-159 mg/dL   High:             160-189 mg/dL   Very high:       >189 mg/dl       Triglycerides   Date " Value Ref Range Status   03/27/2017 171 (H) <150 mg/dL Final     Comment:     Borderline high:  150-199 mg/dl   High:             200-499 mg/dl   Very high:       >499 mg/dl     03/21/2016 223 (H) <150 mg/dL Final     Comment:     Non Fasting   Borderline high:  150-199 mg/dl   High:             200-499 mg/dl   Very high:       >499 mg/dl       Cholesterol/HDL Ratio   Date Value Ref Range Status   12/01/2014 6.0 (H) 0.0 - 5.0 Final   07/30/2014 6.1 (H) 0.0 - 5.0 Final     A1C      8.0   9/5/2017  A1C      8.2   6/2017  A1C      7.8   3/9/2017  A1C      8.1   12/2016  A1C      8.9   6/28/2016  A1C      8.1   6/23/2015  A1C      9.0   3/3/2015  A1C      8.5   12/1/2014  A1C     11.4  5/29/2014  A1C     10.7   4/1/2014  A1C     10.3   1/8/2014  A1C      9.8   11/26/2013  A1C      8.8   7/24/2013  A1C      8.9   5/28/2013    ASSESSMENT/PLAN:    1. TYPE 2 DIABETES MELLITUS: Pt's blood sugar values have improved.  She is to remain on her current dose of Lantus, Humalog, Invokana, Metformin and Actos.  Encouraged her to continue to check her blood sugar premeals and at hs.   If her FBS values are > 140-150 on a regular basis, she was instructed to increase her Lantus 62 units SQ at hs.  Pt remains normotensive.  She was seen by Oph in May 2017.   Pt's creat was 0.83  with GFR 73 mL/min in 11/2016 with normal K+.   Pt's urine microalbuminuria was negative in Nov 2016.  TSH normal in 3/2016.    2.  HYPERLIPIDEMIA:   in 3/2017.   She was instructed to take her Crestor and Zetia daily.    3.  FOOT CARE: Good condition and she has been seen by Podiatry.    4.  Return to Endocrine Clinic to see Dr. Phillips Dec 2017.

## 2017-10-04 DIAGNOSIS — E11.9 DIABETES TYPE 2, CONTROLLED (H): ICD-10-CM

## 2017-10-04 RX ORDER — PEN NEEDLE, DIABETIC 31 GX5/16"
NEEDLE, DISPOSABLE MISCELLANEOUS
Qty: 400 EACH | Refills: 1 | Status: SHIPPED | OUTPATIENT
Start: 2017-10-04 | End: 2018-03-16

## 2017-10-04 NOTE — TELEPHONE ENCOUNTER
BD PEN NEEDLE SHORT 31G X 8 MM MISC      Last Written Prescription Date: 8/8/2017  Last Fill Quantity: 100,  # refills: 1   Last Office Visit with [facility], [facility] or [facility] prescribing provider: 8/31/2017

## 2017-10-25 ENCOUNTER — ALLIED HEALTH/NURSE VISIT (OUTPATIENT)
Dept: NURSING | Facility: CLINIC | Age: 47
End: 2017-10-25
Payer: COMMERCIAL

## 2017-10-25 VITALS — TEMPERATURE: 97.8 F

## 2017-10-25 DIAGNOSIS — Z23 NEED FOR PROPHYLACTIC VACCINATION AND INOCULATION AGAINST INFLUENZA: Primary | ICD-10-CM

## 2017-10-25 PROCEDURE — 90686 IIV4 VACC NO PRSV 0.5 ML IM: CPT

## 2017-10-25 PROCEDURE — 90471 IMMUNIZATION ADMIN: CPT

## 2017-10-25 NOTE — PROGRESS NOTES
"  Injectable Influenza Immunization Documentation    1.  Is the person to be vaccinated sick today?  {YES/NO DEFAULT NO:00516::\" No\"}    2. Does the person to be vaccinated have an allergy to a component   of the vaccine?  {YES/NO DEFAULT NO:81144::\" No\"}  Egg Allergy Algorithm Link    3. Has the person to be vaccinated ever had a serious reaction   to influenza vaccine in the past?  {YES/NO DEFAULT NO:21009::\" No\"}    4. Has the person to be vaccinated ever had Guillain-Barré syndrome?  {YES/NO DEFAULT NO:96920::\" No\"}    Form completed by ***         "

## 2017-10-25 NOTE — MR AVS SNAPSHOT
"              After Visit Summary   10/25/2017    Karen Braxton    MRN: [id number]           Patient Information     Date Of Birth          1970        Visit Information        Provider Department      10/25/2017 1:00 PM BM NURSE [facility]        Today's Diagnoses     Need for prophylactic vaccination and inoculation against influenza    -  1       Follow-ups after your visit        Your next 10 appointments already scheduled     Dec 05, 2017 11:00 AM CST   (Arrive by 10:45 AM)   RETURN DIABETES with Melisa Phillips MD   [facility] End[facility]ology ([facility])    [address] [place] [address] [phone]              Who to contact     If you have questions or need follow up information about today's clinic visit or your schedule please contact [facility] directly at [phone].  Normal or non-critical lab and imaging results will be communicated to you by MyChart, letter or phone within 4 business days after the clinic has received the results. If you do not hear from us within 7 days, please contact the clinic through MyChart or phone. If you have a critical or abnormal lab result, we will notify you by phone as soon as possible.  Submit refill requests through TandemLaunch or call your pharmacy and they will forward the refill request to us. Please allow 3 business days for your refill to be completed.          Additional Information About Your Visit        MyChart Information     TandemLaunch lets you send messages to your doctor, view your test results, renew your prescriptions, schedule appointments and more. To sign up, go to [email or web address] . Click on \"Log in\" on the left side of the screen, which will take you to the Welcome page. Then click on \"Sign up Now\" on the right side of the page.     You will be asked to enter the access code listed below, as well as some personal " information. Please follow the directions to create your username and password.     Your access code is: [email or web address]  Expires: 2017 11:41 AM     Your access code will  in 90 days. If you need help or a new code, please call your [place] clinic or [phone].        Care EveryWhere ID     This is your Care EveryWhere ID. This could be used by other organizations to access your [place] medical records  [id number]        Your Vitals Were     Temperature Last Period                97.8  F (36.6  C) (Tympanic) (LMP Unknown)           Blood Pressure from Last 3 Encounters:   17 122/85   17 130/78   17 112/64    Weight from Last 3 Encounters:   17 140 lb 6.4 oz (63.7 kg)   17 139 lb (63 kg)   17 139 lb 8 oz (63.3 kg)              We Performed the Following     FLU VAC, SPLIT VIRUS IM > 3 YO (QUADRIVALENT) [90310]        Primary Care Provider Office Phone # Fax #    Raymond Aurea Engel [phone] [address] [facility] [address]        Equal Access to Services     PEYTON BURGOS AH: Hadii pilar ku hadasho Soomaali, waaxda luqadaha, qaybta kaalmada adeegyada, niranjan greenwood. So [facility] [phone].    ATENCIÓN: Si habla español, tiene a merrill disposición servicios gratuitos de asistencia lingüística. Llame al [phone].    We comply with applicable federal civil rights laws and [place] laws. We do not discriminate on the basis of race, color, national origin, age, disability, sex, sexual orientation, or gender identity.            Thank you!     Thank you for choosing [facility]  for your care. Our goal is always to provide you with excellent care. Hearing back from our patients is one way we can continue to improve our services. Please take a few minutes to complete the written survey that you may receive in the mail after your visit with us. Thank you!             Your Updated  Medication List - Protect others around you: Learn how to safely use, store and throw away your medicines at www.disposemymeds.org.          This list is accurate as of: 10/25/17  1:22 PM.  Always use your most recent med list.                   Brand Name Dispense Instructions for use Diagnosis    alum & mag hydroxide-simethicone 200-200-20 MG/5ML Susp suspension    antacid    30 mL    Take 30 mLs by mouth every 4 hours as needed for indigestion    Gastroesophageal reflux disease without esophagitis       ASPIRIN NOT PRESCRIBED    INTENTIONAL     continuous prn for other Antiplatelet medication not prescribed intentionally due to Not indicated based on age/GI distress        atorvastatin 80 MG tablet    LIPITOR    90 tablet    Take 1 tablet (80 mg) by mouth daily    Hyperlipidemia LDL goal <100       * B-D U/F 31G X 8 MM   Generic drug:  insulin pen needle     400 each    TEST FOUR TIMES A DAY OR AS DIRECTED    Diabetes type 2, controlled (H)       * B-D U/F 31G X 8 MM   Generic drug:  insulin pen needle     400 each    TEST FOUR TIMES A DAY OR AS DIRECTED    Diabetes type 2, controlled (H)       blood glucose monitoring lancets     2 Box    Use to test blood sugar 4 times daily or as directed.    Diabetes type 2, controlled (H)       blood glucose monitoring test strip    JONATHAN CONTOUR NEXT    200 each    Use to test blood sugar 4 times daily or as directed.    Diabetes type 2, controlled (H)       calcium-vitamin D 600-400 MG-UNIT per tablet    CALTRATE    180 tablet    Take 1 tablet by mouth 2 times daily    Vitamin D deficiency       canagliflozin 300 MG tablet    INVOKANA    90 tablet    Take 1 tablet (300 mg) by mouth every morning (before breakfast)    Diabetes type 2, controlled (H)       ezetimibe 10 MG tablet    ZETIA    90 tablet    Take 1 tablet (10 mg) by mouth daily    Hyperlipidemia LDL goal <100       insulin glargine 100 UNIT/ML injection    LANTUS SOLOSTAR    15 mL    Inject 60 Units Subcutaneous  "At Bedtime    Uncontrolled type 1 diabetes mellitus without complication (H)       insulin lispro 100 UNIT/ML injection    HumaLOG KWIKpen    15 mL    30 units before breakfast, 35 units before lunch, 35 units before dinner    Uncontrolled type 2 diabetes mellitus without complication, with long-term current use of insulin (H)       insulin syringe-needle U-100 30G X 1/2\" 1 ML    BD insulin syringe ultrafine    100 each    Use one syringe daily or as directed.  3 month supply    Diabetes type 2, controlled (H)       lisinopril 2.5 MG tablet    PRINIVIL/Zestril    90 tablet    Take 1 tablet (2.5 mg) by mouth daily    Hypertension goal BP (blood pressure) < 140/80       meloxicam 15 MG tablet    MOBIC    30 tablet    Take 1 tablet (15 mg) by mouth daily    Sprain of right rotator cuff capsule, initial encounter       metFORMIN 1000 MG tablet    GLUCOPHAGE    90 tablet    TAKE ONE-HALF TABLET BY MOUTH TWICE DAILY WITH MEALS    Diabetes mellitus, type 2 (H)       pioglitazone 45 MG tablet    ACTOS    90 tablet    Take 1 tablet (45 mg) by mouth daily    Diabetes type 2, controlled (H)       vitamin D 46322 UNIT capsule    ERGOCALCIFEROL    4 capsule    TAKE 1 CAPLET ONCE WEEKLY Profile Rx: patient will contact pharmacy when needed    Vitamin D deficiency       * Notice:  This list has 2 medication(s) that are the same as other medications prescribed for you. Read the directions carefully, and ask your doctor or other care provider to review them with you.      "

## 2017-10-25 NOTE — NURSING NOTE
"Injectable Influenza Immunization Documentation    1.  Are you sick today? (Fever of 100.5 or higher on the day of the clinic)   No    2.  Have you ever had Guillain-Schenevus Syndrome within 6 weeks of an influenza vaccionation?  No    3. Do you have a life-threatening allergy to eggs?  No    4. Do you have a life-threatening allergy to a component of the vaccine? May include antibiotics, gelatin or latex.  No     5. Have you ever had a reaction to a dose of flu vaccine that needed immediate medical attention?  No     Form completed by Princess MADYSON Zamora CMA      No chief complaint on file.      Initial Temp 97.8  F (36.6  C) (Tympanic)  LMP  (LMP Unknown) Estimated body mass index is 23.01 kg/(m^2) as calculated from the following:    Height as of 9/5/17: 5' 5.5\" (1.664 m).    Weight as of 9/5/17: 140 lb 6.4 oz (63.7 kg).  Medication Reconciliation: unable or not appropriate to perform    "

## 2017-11-29 DIAGNOSIS — E55.9 VITAMIN D DEFICIENCY: ICD-10-CM

## 2017-11-30 NOTE — TELEPHONE ENCOUNTER
Prescription approved per [facility] Refill Protocol for 12 months of refills since last appointment, which was 8/31/17    Prescription approved per [facility] Refill Protocol for 6 months of refills since last appointment, which was 8/31/17

## 2017-12-05 ENCOUNTER — OFFICE VISIT (OUTPATIENT)
Dept: ENDOCRINOLOGY | Facility: CLINIC | Age: 47
End: 2017-12-05

## 2017-12-05 VITALS
HEART RATE: 77 BPM | DIASTOLIC BLOOD PRESSURE: 85 MMHG | BODY MASS INDEX: 22.18 KG/M2 | WEIGHT: 138 LBS | SYSTOLIC BLOOD PRESSURE: 127 MMHG | HEIGHT: 66 IN

## 2017-12-05 DIAGNOSIS — E11.9 TYPE 2 DIABETES MELLITUS WITHOUT COMPLICATION, WITH LONG-TERM CURRENT USE OF INSULIN (H): Primary | ICD-10-CM

## 2017-12-05 DIAGNOSIS — Z79.4 TYPE 2 DIABETES MELLITUS WITHOUT COMPLICATION, WITH LONG-TERM CURRENT USE OF INSULIN (H): Primary | ICD-10-CM

## 2017-12-05 LAB — HBA1C MFR BLD: 8.2 % (ref 4.3–6)

## 2017-12-05 ASSESSMENT — PAIN SCALES - GENERAL: PAINLEVEL: NO PAIN (0)

## 2017-12-05 NOTE — NURSING NOTE
Chief Complaint   Patient presents with     RECHECK     F/U TYPE I DM     Arlin Peña, [facility]  Endocrinology & Diabetes 3G

## 2017-12-05 NOTE — MR AVS SNAPSHOT
After Visit Summary   12/5/2017    Karen Braxton    MRN: [id number]           Patient Information     Date Of Birth          1970        Visit Information        Provider Department      12/5/2017 11:00 AM Melisa Phillips MD [facility] Endocrinology        Today's Diagnoses     Type 2 diabetes mellitus without complication, with long-term current use of insulin (H)    -  1       Follow-ups after your visit        Additional Services     PODIATRY/FOOT & ANKLE SURGERY REFERRAL       Your provider has referred you to: [facility]: [facility]: [address] [place] [address] Patient Scheduling Line: [phone] option 1 (scheduling and directions)    Please be aware that coverage of these services is subject to the terms and limitations of your health insurance plan.  Call member services at your health plan with any benefit or coverage questions.      Please bring the following to your appointment:  >>   Any x-rays, CTs or MRIs which have been performed.  Contact the facility where they were done to arrange for  prior to your scheduled appointment.    >>   List of current medications   >>   This referral request   >>   Any documents/labs given to you for this referral                  Follow-up notes from your care team     Return for f/u 3 mo with Micaela Juarez and f/u 6 mo with me.      Your next 10 appointments already scheduled     Dec 05, 2017  2:20 PM CST   (Arrive by 2:05 PM)   RETURN FOOT/ANKLE with Yohan Ashley DPM   [facility] ([facility])    [address] [place] [address]  [address] [facility] [address] [phone]            Mar 06, 2018 11:00 AM CST   (Arrive by 10:45 AM)   RETURN DIABETES with Cecile Juarez PA-C   [facility] End[facility]ology ([facility])    [address] [place] [address]  [address]rd [facility] [address]   [phone]            Jun 05, 2018 11:30 AM CDT   (Arrive by 11:15 AM)    RETURN DIABETES with Melisa Phillips MD   [facility] Endocrinology ([facility])    [address] [address] [phone]              Future tests that were ordered for you today     Open Future Orders        Priority Expected Expires Ordered    Albumin Random Urine Quantitative with Creat Ratio Routine 2017    Creatinine Routine 2017    Electrolyte panel Routine 2017    Urea nitrogen Routine 2017    Vitamin B12 Routine 2017            Who to contact     Please call your clinic at [phone] to:    Ask questions about your health    Make or cancel appointments    Discuss your medicines    Learn about your test results    Speak to your doctor   If you have compliments or concerns about an experience at your clinic, or if you wish to file a complaint, please contact [facility] Patient Relations at [phone] or email us at [email or web address]         Additional Information About Your Visit        [employer] Information     Algramot is an electronic gateway that provides easy, online access to your medical records. With Xero, you can request a clinic appointment, read your test results, renew a prescription or communicate with your care team.     To sign up for [employer] visit the website at [email or web address]   You will be asked to enter the access code listed below, as well as some personal information. Please follow the directions to create your username and password.     Your access code is: [email or web address]  Expires: 3/5/2018 11:42 AM     Your access code will  in 90 days. If you need help or a new code, please contact your [facility] or call [phone] for assistance.        Care EveryWhere ID     This is your Care EveryWhere ID. This could  "be used by other organizations to access your [place] medical records  [id number]        Your Vitals Were     Pulse Height Last Period BMI (Body Mass Index)          77 1.664 m (5' 5.5\") (LMP Unknown) 22.62 kg/m2         Blood Pressure from Last 3 Encounters:   12/05/17 127/85   09/05/17 122/85   08/31/17 130/78    Weight from Last 3 Encounters:   12/05/17 62.6 kg (138 lb)   09/05/17 63.7 kg (140 lb 6.4 oz)   08/31/17 63 kg (139 lb)              We Performed the Following     PODIATRY/FOOT & ANKLE SURGERY REFERRAL        Primary Care Provider Office Phone # Fax #    Raymond Engel [phone] [address] [facility] [address]        Equal Access to Services     [facility]: Hadii pilar stack hadasho Soeris, waaxda luqadaha, qaybta kaalmada adeegyada, niranjan figueroa . So [facility] [phone].    ATENCIÓN: Si habla español, tiene a merrill disposición servicios gratuitos de asistencia lingüística. Llame al [phone].    We comply with applicable federal civil rights laws and [place] laws. We do not discriminate on the basis of race, color, national origin, age, disability, sex, sexual orientation, or gender identity.            Thank you!     Thank you for choosing [facility] ENDOCRINOLOGY  for your care. Our goal is always to provide you with excellent care. Hearing back from our patients is one way we can continue to improve our services. Please take a few minutes to complete the written survey that you may receive in the mail after your visit with us. Thank you!             Your Updated Medication List - Protect others around you: Learn how to safely use, store and throw away your medicines at www.disposemymeds.org.          This list is accurate as of: 12/5/17 11:42 AM.  Always use your most recent med list.                   Brand Name Dispense Instructions for use Diagnosis    alum & mag hydroxide-simethicone 200-200-20 MG/5ML Susp suspension    " antacid    30 mL    Take 30 mLs by mouth every 4 hours as needed for indigestion    Gastroesophageal reflux disease without esophagitis       ASPIRIN NOT PRESCRIBED    INTENTIONAL     continuous prn for other Antiplatelet medication not prescribed intentionally due to Not indicated based on age/GI distress        atorvastatin 80 MG tablet    LIPITOR    90 tablet    Take 1 tablet (80 mg) by mouth daily    Hyperlipidemia LDL goal <100       * B-D U/F 31G X 8 MM   Generic drug:  insulin pen needle     400 each    TEST FOUR TIMES A DAY OR AS DIRECTED    Diabetes type 2, controlled (H)       * B-D U/F 31G X 8 MM   Generic drug:  insulin pen needle     400 each    TEST FOUR TIMES A DAY OR AS DIRECTED    Diabetes type 2, controlled (H)       * B-D U/F 31G X 8 MM   Generic drug:  insulin pen needle     400 each    TEST FOUR TIMES A DAY OR AS DIRECTED    Diabetes type 2, controlled (H)       blood glucose monitoring lancets     2 Box    Use to test blood sugar 4 times daily or as directed.    Diabetes type 2, controlled (H)       blood glucose monitoring test strip    JONATHAN CONTOUR NEXT    200 each    Use to test blood sugar 4 times daily or as directed.    Diabetes type 2, controlled (H)       calcium-vitamin D 600-400 MG-UNIT per tablet    CALTRATE    180 tablet    Take 1 tablet by mouth 2 times daily    Vitamin D deficiency       canagliflozin 300 MG tablet    INVOKANA    90 tablet    Take 1 tablet (300 mg) by mouth every morning (before breakfast)    Diabetes type 2, controlled (H)       ezetimibe 10 MG tablet    ZETIA    90 tablet    Take 1 tablet (10 mg) by mouth daily    Hyperlipidemia LDL goal <100       insulin glargine 100 UNIT/ML injection    LANTUS SOLOSTAR    15 mL    Inject 60 Units Subcutaneous At Bedtime    Uncontrolled type 1 diabetes mellitus without complication (H)       insulin lispro 100 UNIT/ML injection    HumaLOG KWIKpen    15 mL    30 units before breakfast, 35 units before lunch, 35 units before  "dinner    Uncontrolled type 2 diabetes mellitus without complication, with long-term current use of insulin (H)       insulin syringe-needle U-100 30G X 1/2\" 1 ML    BD insulin syringe ultrafine    100 each    Use one syringe daily or as directed.  3 month supply    Diabetes type 2, controlled (H)       lisinopril 2.5 MG tablet    PRINIVIL/Zestril    90 tablet    Take 1 tablet (2.5 mg) by mouth daily    Hypertension goal BP (blood pressure) < 140/80       meloxicam 15 MG tablet    MOBIC    30 tablet    Take 1 tablet (15 mg) by mouth daily    Sprain of right rotator cuff capsule, initial encounter       metFORMIN 1000 MG tablet    GLUCOPHAGE    90 tablet    TAKE ONE-HALF TABLET BY MOUTH TWICE DAILY WITH MEALS    Uncontrolled type 2 diabetes mellitus without complication, with long-term current use of insulin (H)       pioglitazone 45 MG tablet    ACTOS    90 tablet    Take 1 tablet (45 mg) by mouth daily    Diabetes type 2, controlled (H)       vitamin D 70912 UNIT capsule    ERGOCALCIFEROL    4 capsule    TAKE 1 CAPLET ONCE WEEKLY Profile Rx: patient will contact pharmacy when needed    Vitamin D deficiency       * Notice:  This list has 3 medication(s) that are the same as other medications prescribed for you. Read the directions carefully, and ask your doctor or other care provider to review them with you.      "

## 2017-12-05 NOTE — PROGRESS NOTES
This 47 year old woman with a 10+  year history of type 2 diabetes is here for f/u.She was seen without an  today.  She doesn't think she needs one.  She sees Dr. Engel for PC and is co managed by me with Micaela Juarez.  She now is taking  Lantus 60 units at hs, actos 45 mg a day, invokana 300 mg a day,  Metformin 500 bid  and humalog 30 units at breakfast and 35 units with  lunch and 30 with  her evening meal.  She checks her sugars 3- 4 times a day.  Average over the last month was 174 with range from 62 - 400.  She feels low when she has values less than 90. The highest values occur with after an argument with one of her children. She is in the mid 100s or lower at least once each day.  She reports she sometimes does get sx of hypoglycemia and will ask her children to bring her some food/drink as a treatment. She has never needed the paramedics to be called.  She is now working as a  for 5.5 hours on Monday, Tues, Wed.  She is also very active on her days off cleaning her house, etc.  She doesn't miss doses of her meds.    She saw eye MD earlier this year.  She has no paresthesias in her feet but does have pain on the 5th toe on the left and the 2nd toe on the right. Her shoes press against these toes and have caused her to develop corns.  Sometimes they are so painful she cannot wear her shoes.      Denies CP, SOB, diarrhea.  No vaginal infections. No GI sx.    Current Outpatient Prescriptions on File Prior to Visit:  calcium-vitamin D (CALTRATE) 600-400 MG-UNIT per tablet Take 1 tablet by mouth 2 times daily   metFORMIN (GLUCOPHAGE) 1000 MG tablet TAKE ONE-HALF TABLET BY MOUTH TWICE DAILY WITH MEALS   B-D U/F 31G X 8 MM insulin pen needle TEST FOUR TIMES A DAY OR AS DIRECTED   B-D U/F 31G X 8 MM insulin pen needle TEST FOUR TIMES A DAY OR AS DIRECTED   meloxicam (MOBIC) 15 MG tablet Take 1 tablet (15 mg) by mouth daily   insulin glargine (LANTUS SOLOSTAR) 100 UNIT/ML injection Inject 60 Units  "Subcutaneous At Bedtime   insulin lispro (HUMALOG KWIKPEN) 100 UNIT/ML injection 30 units before breakfast, 35 units before lunch, 35 units before dinner   alum & mag hydroxide-simethicone (ANTACID) 200-200-20 MG/5ML SUSP suspension Take 30 mLs by mouth every 4 hours as needed for indigestion   atorvastatin (LIPITOR) 80 MG tablet Take 1 tablet (80 mg) by mouth daily   blood glucose monitoring (JONATHAN CONTOUR NEXT) test strip Use to test blood sugar 4 times daily or as directed.   blood glucose monitoring (JONATHAN MICROLET) lancets Use to test blood sugar 4 times daily or as directed.   ezetimibe (ZETIA) 10 MG tablet Take 1 tablet (10 mg) by mouth daily   pioglitazone (ACTOS) 45 MG tablet Take 1 tablet (45 mg) by mouth daily   lisinopril (PRINIVIL/ZESTRIL) 2.5 MG tablet Take 1 tablet (2.5 mg) by mouth daily   canagliflozin (INVOKANA) 300 MG tablet Take 1 tablet (300 mg) by mouth every morning (before breakfast)   B-D U/F 31G X 8 MM insulin pen needle TEST FOUR TIMES A DAY OR AS DIRECTED   vitamin D (ERGOCALCIFEROL) 37731 UNIT capsule TAKE 1 CAPLET ONCE WEEKLYProfile Rx: patient will contact pharmacy when needed   insulin syringe-needle U-100 (BD INSULIN SYRINGE ULTRAFINE) 30G X 1/2\" 1 ML Use one syringe daily or as directed.  3 month supply   ASPIRIN NOT PRESCRIBED (INTENTIONAL) continuous prn for other Antiplatelet medication not prescribed intentionally due to Not indicated based on age/GI distress     No current facility-administered medications on file prior to visit.     ROS: 10 point ROS neg other than the symptoms noted above in the HPI.  Vital signs:   /85  Pulse 77  Ht 1.664 m (5' 5.5\")  Wt 62.6 kg (138 lb)  LMP  (LMP Unknown)  BMI 22.62 kg/m2  Estimated body mass index is 22.62 kg/(m^2) as calculated from the following:    Height as of this encounter: 1.664 m (5' 5.5\").    Weight as of this encounter: 62.6 kg (138 lb).   VSS  NAD  Eyes - no periorbital edema, conjunctival injection, scleral " icterus  CV - RRR.  Normal pulses in feet.  No edema  Neuro - sensation intact to monofilament on soles of feet.  DTR 2/4 biceps  Skin - normal texture    Feet - no ulcers, has corn on anterior surface 4th toe on right about 4 mm in diameter, smaller corn on 5th toe on right.    Recent Labs   Lab Test 09/05/17 06/05/17 03/27/17   1442  11/14/16   1105  11/14/16   1058   03/21/16   1039  03/21/16   1030   03/18/14   1518   08/04/11   1620   A1C   --    --   8.2*   --   8.1*   --    --   8.1*   < >   --    < >   --    HEMOGLOBINA1  8.0*  8.2*   --    --    --    < >   --    --    < >   --    < >   --    TSH   --    --    --    --    --    --    --   1.31   --   1.55   < >  1.48   T4   --    --    --    --    --    --    --    --    --    --    --   1.22   LDL   --    --   160*   --    --    --    --   142*   < >   --    < >   --    HDL   --    --   48*   --    --    --    --   44*   < >   --    < >   --    TRIG   --    --   171*   --    --    --    --   223*   < >   --    < >   --    CR   --    --    --    --   0.83   --    --   0.70   < >  0.69   < >  0.63   MICROL   --    --    --   8   --    --   8   --    < >   --    < >   --     < > = values in this interval not displayed.       Assessment and plan:    1.  Diabetes control.  Doing well.  No changes today.     2.  Diabetes complications.  Will check renal function today.  Up to date with eye visits.  Has pressure related lesions on feet. Will refer to podiatry.    3. CVD risk.  BP is OK.  Patient is on an appropriate dose of statin for the risk factors present.    4.  Risk B12 deficiency.  Will check today.    F/u with Micaela Juarez in 3 mo and f/u with me in 6 mo    Melisa Phillips MD

## 2017-12-05 NOTE — LETTER
12/5/2017       RE: Karen Braxton  [address]   [place] [facility] [id number]     Dear Colleague,    Thank you for referring your patient, Karen Braxton, to the [facility] at [facility]. Please see a copy of my visit note below.    This 47 year old woman with a 10+  year history of type 2 diabetes is here for f/u.She was seen without an  today.  She doesn't think she needs one.  She sees Dr. Engel for PC and is co managed by me with Micaela Juarez.  She now is taking  Lantus 60 units at hs, actos 45 mg a day, invokana 300 mg a day,  Metformin 500 bid  and humalog 30 units at breakfast and 35 units with  lunch and 30 with  her evening meal.  She checks her sugars 3- 4 times a day.  Average over the last month was 174 with range from 62 - 400.  She feels low when she has values less than 90. The highest values occur with after an argument with one of her children. She is in the mid 100s or lower at least once each day.  She reports she sometimes does get sx of hypoglycemia and will ask her children to bring her some food/drink as a treatment. She has never needed the paramedics to be called.  She is now working as a  for 5.5 hours on Monday, Tues, Wed.  She is also very active on her days off cleaning her house, etc.  She doesn't miss doses of her meds.    She saw eye MD earlier this year.  She has no paresthesias in her feet but does have pain on the 5th toe on the left and the 2nd toe on the right. Her shoes press against these toes and have caused her to develop corns.  Sometimes they are so painful she cannot wear her shoes.      Denies CP, SOB, diarrhea.  No vaginal infections. No GI sx.    Current Outpatient Prescriptions on File Prior to Visit:  calcium-vitamin D (CALTRATE) 600-400 MG-UNIT per tablet Take 1 tablet by mouth 2 times daily   metFORMIN (GLUCOPHAGE) 1000 MG tablet TAKE ONE-HALF TABLET BY MOUTH TWICE DAILY WITH MEALS   B-D U/F 31G  "X 8 MM insulin pen needle TEST FOUR TIMES A DAY OR AS DIRECTED   B-D U/F 31G X 8 MM insulin pen needle TEST FOUR TIMES A DAY OR AS DIRECTED   meloxicam (MOBIC) 15 MG tablet Take 1 tablet (15 mg) by mouth daily   insulin glargine (LANTUS SOLOSTAR) 100 UNIT/ML injection Inject 60 Units Subcutaneous At Bedtime   insulin lispro (HUMALOG KWIKPEN) 100 UNIT/ML injection 30 units before breakfast, 35 units before lunch, 35 units before dinner   alum & mag hydroxide-simethicone (ANTACID) 200-200-20 MG/5ML SUSP suspension Take 30 mLs by mouth every 4 hours as needed for indigestion   atorvastatin (LIPITOR) 80 MG tablet Take 1 tablet (80 mg) by mouth daily   blood glucose monitoring (JONATHAN CONTOUR NEXT) test strip Use to test blood sugar 4 times daily or as directed.   blood glucose monitoring (JONATHAN MICROLET) lancets Use to test blood sugar 4 times daily or as directed.   ezetimibe (ZETIA) 10 MG tablet Take 1 tablet (10 mg) by mouth daily   pioglitazone (ACTOS) 45 MG tablet Take 1 tablet (45 mg) by mouth daily   lisinopril (PRINIVIL/ZESTRIL) 2.5 MG tablet Take 1 tablet (2.5 mg) by mouth daily   canagliflozin (INVOKANA) 300 MG tablet Take 1 tablet (300 mg) by mouth every morning (before breakfast)   B-D U/F 31G X 8 MM insulin pen needle TEST FOUR TIMES A DAY OR AS DIRECTED   vitamin D (ERGOCALCIFEROL) 01944 UNIT capsule TAKE 1 CAPLET ONCE WEEKLYProfile Rx: patient will contact pharmacy when needed   insulin syringe-needle U-100 (BD INSULIN SYRINGE ULTRAFINE) 30G X 1/2\" 1 ML Use one syringe daily or as directed.  3 month supply   ASPIRIN NOT PRESCRIBED (INTENTIONAL) continuous prn for other Antiplatelet medication not prescribed intentionally due to Not indicated based on age/GI distress     No current facility-administered medications on file prior to visit.     ROS: 10 point ROS neg other than the symptoms noted above in the HPI.  Vital signs:   /85  Pulse 77  Ht 1.664 m (5' 5.5\")  Wt 62.6 kg (138 lb)  LMP  (LMP " "Unknown)  BMI 22.62 kg/m2  Estimated body mass index is 22.62 kg/(m^2) as calculated from the following:    Height as of this encounter: 1.664 m (5' 5.5\").    Weight as of this encounter: 62.6 kg (138 lb).   VSS  NAD  Eyes - no periorbital edema, conjunctival injection, scleral icterus  CV - RRR.  Normal pulses in feet.  No edema  Neuro - sensation intact to monofilament on soles of feet.  DTR 2/4 biceps  Skin - normal texture    Feet - no ulcers, has corn on anterior surface 4th toe on right about 4 mm in diameter, smaller corn on 5th toe on right.    Recent Labs   Lab Test 09/05/17 06/05/17 03/27/17   1442  11/14/16   1105  11/14/16   1058   03/21/16   1039  03/21/16   1030   03/18/14   1518   08/04/11   1620   A1C   --    --   8.2*   --   8.1*   --    --   8.1*   < >   --    < >   --    HEMOGLOBINA1  8.0*  8.2*   --    --    --    < >   --    --    < >   --    < >   --    TSH   --    --    --    --    --    --    --   1.31   --   1.55   < >  1.48   T4   --    --    --    --    --    --    --    --    --    --    --   1.22   LDL   --    --   160*   --    --    --    --   142*   < >   --    < >   --    HDL   --    --   48*   --    --    --    --   44*   < >   --    < >   --    TRIG   --    --   171*   --    --    --    --   223*   < >   --    < >   --    CR   --    --    --    --   0.83   --    --   0.70   < >  0.69   < >  0.63   MICROL   --    --    --   8   --    --   8   --    < >   --    < >   --     < > = values in this interval not displayed.       Assessment and plan:    1.  Diabetes control.  Doing well.  No changes today.     2.  Diabetes complications.  Will check renal function today.  Up to date with eye visits.  Has pressure related lesions on feet. Will refer to podiatry.    3. CVD risk.  BP is OK.  Patient is on an appropriate dose of statin for the risk factors present.    4.  Risk B12 deficiency.  Will check today.    F/u with Micaela Juarez in 3 mo and f/u with me in 6 mo    Melisa Phillips " MD

## 2017-12-13 ENCOUNTER — OFFICE VISIT (OUTPATIENT)
Dept: ORTHOPEDICS | Facility: CLINIC | Age: 47
End: 2017-12-13
Payer: COMMERCIAL

## 2017-12-13 DIAGNOSIS — L84 TYLOMA: Primary | ICD-10-CM

## 2017-12-13 DIAGNOSIS — E11.51 CONTROLLED TYPE 2 DIABETES MELLITUS WITH DIABETIC PERIPHERAL ANGIOPATHY WITHOUT GANGRENE, WITH LONG-TERM CURRENT USE OF INSULIN (H): ICD-10-CM

## 2017-12-13 DIAGNOSIS — M20.41 HAMMER TOE OF RIGHT FOOT: ICD-10-CM

## 2017-12-13 DIAGNOSIS — Z79.4 CONTROLLED TYPE 2 DIABETES MELLITUS WITH DIABETIC PERIPHERAL ANGIOPATHY WITHOUT GANGRENE, WITH LONG-TERM CURRENT USE OF INSULIN (H): ICD-10-CM

## 2017-12-13 RX ORDER — UREA 200 MG/G
CREAM TOPICAL PRN
Qty: 85 G | Refills: 3 | Status: SHIPPED | OUTPATIENT
Start: 2017-12-13 | End: 2020-09-23

## 2017-12-13 NOTE — NURSING NOTE
Reason For Visit:   Chief Complaint   Patient presents with     Consult     Tyloma, right 4th toe. Pt stated that she it causes pain. Pt stated that she is a type 2 diabetic. Pt seen Dr. Hawley in February, 2017. Pt stated that her shoes cause a lot of pain.        Pain Assessment  Patient Currently in Pain: Yes  Primary Pain Location:  (4th toe)  Pain Orientation: Right  Aggravating Factors: Walking, Standing (shoes)            Current Outpatient Prescriptions   Medication Sig Dispense Refill     calcium-vitamin D (CALTRATE) 600-400 MG-UNIT per tablet Take 1 tablet by mouth 2 times daily 180 tablet 3     metFORMIN (GLUCOPHAGE) 1000 MG tablet TAKE ONE-HALF TABLET BY MOUTH TWICE DAILY WITH MEALS 90 tablet 1     B-D U/F 31G X 8 MM insulin pen needle TEST FOUR TIMES A DAY OR AS DIRECTED 400 each 3     B-D U/F 31G X 8 MM insulin pen needle TEST FOUR TIMES A DAY OR AS DIRECTED 400 each 1     meloxicam (MOBIC) 15 MG tablet Take 1 tablet (15 mg) by mouth daily 30 tablet 1     insulin glargine (LANTUS SOLOSTAR) 100 UNIT/ML injection Inject 60 Units Subcutaneous At Bedtime 15 mL 11     insulin lispro (HUMALOG KWIKPEN) 100 UNIT/ML injection 30 units before breakfast, 35 units before lunch, 35 units before dinner 15 mL 11     alum & mag hydroxide-simethicone (ANTACID) 200-200-20 MG/5ML SUSP suspension Take 30 mLs by mouth every 4 hours as needed for indigestion 30 mL 11     atorvastatin (LIPITOR) 80 MG tablet Take 1 tablet (80 mg) by mouth daily 90 tablet 3     blood glucose monitoring (JONATHAN CONTOUR NEXT) test strip Use to test blood sugar 4 times daily or as directed. 200 each 11     blood glucose monitoring (JONATHAN MICROLET) lancets Use to test blood sugar 4 times daily or as directed. 2 Box 6     ezetimibe (ZETIA) 10 MG tablet Take 1 tablet (10 mg) by mouth daily 90 tablet 3     pioglitazone (ACTOS) 45 MG tablet Take 1 tablet (45 mg) by mouth daily 90 tablet 3     lisinopril (PRINIVIL/ZESTRIL) 2.5 MG tablet Take 1 tablet  "(2.5 mg) by mouth daily 90 tablet 3     canagliflozin (INVOKANA) 300 MG tablet Take 1 tablet (300 mg) by mouth every morning (before breakfast) 90 tablet 3     B-D U/F 31G X 8 MM insulin pen needle TEST FOUR TIMES A DAY OR AS DIRECTED 400 each 1     vitamin D (ERGOCALCIFEROL) 17152 UNIT capsule TAKE 1 CAPLET ONCE WEEKLY  Profile Rx: patient will contact pharmacy when needed 4 capsule 5     insulin syringe-needle U-100 (BD INSULIN SYRINGE ULTRAFINE) 30G X 1/2\" 1 ML Use one syringe daily or as directed.  3 month supply 100 each prn     ASPIRIN NOT PRESCRIBED (INTENTIONAL) continuous prn for other Antiplatelet medication not prescribed intentionally due to Not indicated based on age/GI distress          No Known Allergies            "

## 2017-12-13 NOTE — PROGRESS NOTES
Chief Complaint:   Chief Complaint   Patient presents with     Consult     Tyloma, right 4th toe. Pt stated that she it causes pain. Pt stated that she is a type 2 diabetic. Pt seen Dr. Hawley in February, 2017. Pt stated that her shoes cause a lot of pain.         No Known Allergies      Subjective: Karen is a 47 year old female who presents to the clinic today for a follow up of right 4th toe callus. She relates that she has not been seen since 2/2017. She relates that she will be moving to [place] from [place] in May and wants to transfer all of her care here. She relates that the callus has returned on the right 4th toe and causes pain when she is wearing shoes.     Objective    Lab Results   Component Value Date    A1C 8.2 03/27/2017    A1C 8.1 11/14/2016    A1C 8.1 03/21/2016    A1C 8.1 01/25/2016    A1C 8.1 06/23/2015         DP pulse is 2/4 on the right. Non-palpable PT. Diminished pedal hair.   Gross sensation is intact.  Equinus and pes planus noted with flexor stabilizing hammertoes to the lesser digits on the right.   Tyloma noted on the right 4th digit at the PIPJ. No wounds. Nails normal.    Assessment: DM2 with mild peripheral angiopathy and tyloma.     Plan:   - Pt seen and evaluated  - Tyloma debrided x 1.  - Rx for urea cream for the tyloma.  - Dispensed a toe sleeve.   - Pt to return to clinic in 4 months.

## 2017-12-13 NOTE — MR AVS SNAPSHOT
After Visit Summary   12/13/2017    Karen Braxton    MRN: [id number]           Patient Information     Date Of Birth          1970        Visit Information        Provider Department      12/13/2017 10:40 AM Yohan Ashley DPM [facility]        Today's Diagnoses     Tyloma    -  1    Hammer toe of right foot        Controlled type 2 diabetes mellitus with diabetic peripheral angiopathy without gangrene, with long-term current use of insulin (H)           Follow-ups after your visit        Your next 10 appointments already scheduled     Dec 13, 2017 10:40 AM CST   (Arrive by 10:25 AM)   RETURN FOOT/ANKLE with Yohan Ashley DPM   [facility] ([facility])    [address] [place] [address]  4th [facility] [id number] [phone]            Mar 06, 2018 11:00 AM CST   (Arrive by 10:45 AM)   RETURN DIABETES with Cecile Juarez PA-C   [facility] ([facility])    [address] [place] [address]  3rd [facility] [id number] [phone]            Jun 05, 2018 11:30 AM CDT   (Arrive by 11:15 AM)   RETURN DIABETES with Melisa Phillips MD   [facility] ([facility])    [address] [place] [id number] [phone]              Who to contact     Please call your clinic at [phone] to:    Ask questions about your health    Make or cancel appointments    Discuss your medicines    Learn about your test results    Speak to your doctor   If you have compliments or concerns about an experience at your clinic, or if you wish to file a complaint, please contact [facility] Patient Relations at [phone] or email us at [email or web address]         Additional Information About Your Visit        MyChart Information     Ping4 is an electronic gateway that provides easy, online access to  your medical records. With Stat, you can request a clinic appointment, read your test results, renew a prescription or communicate with your care team.     To sign up for Stat visit the website at [email or web address]   You will be asked to enter the access code listed below, as well as some personal information. Please follow the directions to create your username and password.     Your access code is: [email or web address]  Expires: 3/5/2018 11:42 AM     Your access code will  in 90 days. If you need help or a new code, please contact your [facility] or call [phone] for assistance.        Care EveryWhere ID     This is your Care EveryWhere ID. This could be used by other organizations to access your [facility]  [id number]        Your Vitals Were     Last Period                   (LMP Unknown)            Blood Pressure from Last 3 Encounters:   17 127/85   17 122/85   17 130/78    Weight from Last 3 Encounters:   17 62.6 kg (138 lb)   17 63.7 kg (140 lb 6.4 oz)   17 63 kg (139 lb)              We Performed the Following     TRIM HYPERKERATOTIC SKIN LESION, ONE          Today's Medication Changes          These changes are accurate as of: 17 10:37 AM.  If you have any questions, ask your nurse or doctor.               Start taking these medicines.        Dose/Directions    Urea 20 % Crea cream   Used for:  Tyloma   Started by:  Yohan Ashley DPM        Apply topically as needed   Quantity:  85 g   Refills:  3            Where to get your medicines      These medications were sent to [facility] - [place], [place] - [address], [address]  [address], [address], [facility] [address]     Phone:  [phone]     Urea 20 % Crea cream                Primary Care Provider Office Phone # Fax #    Raymond Engel [phone] [address]  [facility] [address]        Equal Access to Services     [facility] CenterGIANNA : Hadii pilar ku hadjennifero Soeris, waaxda luqadaha, qaybta kaalmada veronicaana luisasanjana, niranjan ovallesphilenrique greenwood. So [facility] [phone].    ATENCIÓN: Si habla español, tiene a merrill disposición servicios gratuitos de asistencia lingüística. Jonathan al [phone].    We comply with applicable federal civil rights laws and [place] laws. We do not discriminate on the basis of race, color, national origin, age, disability, sex, sexual orientation, or gender identity.            Thank you!     Thank you for choosing [facility]  for your care. Our goal is always to provide you with excellent care. Hearing back from our patients is one way we can continue to improve our services. Please take a few minutes to complete the written survey that you may receive in the mail after your visit with us. Thank you!             Your Updated Medication List - Protect others around you: Learn how to safely use, store and throw away your medicines at www.disposemymeds.org.          This list is accurate as of: 12/13/17 10:37 AM.  Always use your most recent med list.                   Brand Name Dispense Instructions for use Diagnosis    alum & mag hydroxide-simethicone 200-200-20 MG/5ML Susp suspension    antacid    30 mL    Take 30 mLs by mouth every 4 hours as needed for indigestion    Gastroesophageal reflux disease without esophagitis       ASPIRIN NOT PRESCRIBED    INTENTIONAL     continuous prn for other Antiplatelet medication not prescribed intentionally due to Not indicated based on age/GI distress        atorvastatin 80 MG tablet    LIPITOR    90 tablet    Take 1 tablet (80 mg) by mouth daily    Hyperlipidemia LDL goal <100       * B-D U/F 31G X 8 MM   Generic drug:  insulin pen needle     400 each    TEST FOUR TIMES A DAY OR AS DIRECTED    Diabetes type 2, controlled (H)       * B-D U/F 31G X 8 MM   Generic drug:  insulin pen needle  "    400 each    TEST FOUR TIMES A DAY OR AS DIRECTED    Diabetes type 2, controlled (H)       * B-D U/F 31G X 8 MM   Generic drug:  insulin pen needle     400 each    TEST FOUR TIMES A DAY OR AS DIRECTED    Diabetes type 2, controlled (H)       blood glucose monitoring lancets     2 Box    Use to test blood sugar 4 times daily or as directed.    Diabetes type 2, controlled (H)       blood glucose monitoring test strip    JONATHAN CONTOUR NEXT    200 each    Use to test blood sugar 4 times daily or as directed.    Diabetes type 2, controlled (H)       calcium-vitamin D 600-400 MG-UNIT per tablet    CALTRATE    180 tablet    Take 1 tablet by mouth 2 times daily    Vitamin D deficiency       canagliflozin 300 MG tablet    INVOKANA    90 tablet    Take 1 tablet (300 mg) by mouth every morning (before breakfast)    Diabetes type 2, controlled (H)       ezetimibe 10 MG tablet    ZETIA    90 tablet    Take 1 tablet (10 mg) by mouth daily    Hyperlipidemia LDL goal <100       insulin glargine 100 UNIT/ML injection    LANTUS SOLOSTAR    15 mL    Inject 60 Units Subcutaneous At Bedtime    Uncontrolled type 1 diabetes mellitus without complication (H)       insulin lispro 100 UNIT/ML injection    HumaLOG KWIKpen    15 mL    30 units before breakfast, 35 units before lunch, 35 units before dinner    Uncontrolled type 2 diabetes mellitus without complication, with long-term current use of insulin (H)       insulin syringe-needle U-100 30G X 1/2\" 1 ML    BD insulin syringe ultrafine    100 each    Use one syringe daily or as directed.  3 month supply    Diabetes type 2, controlled (H)       lisinopril 2.5 MG tablet    PRINIVIL/Zestril    90 tablet    Take 1 tablet (2.5 mg) by mouth daily    Hypertension goal BP (blood pressure) < 140/80       meloxicam 15 MG tablet    MOBIC    30 tablet    Take 1 tablet (15 mg) by mouth daily    Sprain of right rotator cuff capsule, initial encounter       metFORMIN 1000 MG tablet    GLUCOPHAGE    " 90 tablet    TAKE ONE-HALF TABLET BY MOUTH TWICE DAILY WITH MEALS    Uncontrolled type 2 diabetes mellitus without complication, with long-term current use of insulin (H)       pioglitazone 45 MG tablet    ACTOS    90 tablet    Take 1 tablet (45 mg) by mouth daily    Diabetes type 2, controlled (H)       Urea 20 % Crea cream     85 g    Apply topically as needed    Tyloma       vitamin D 48845 UNIT capsule    ERGOCALCIFEROL    4 capsule    TAKE 1 CAPLET ONCE WEEKLY Profile Rx: patient will contact pharmacy when needed    Vitamin D deficiency       * Notice:  This list has 3 medication(s) that are the same as other medications prescribed for you. Read the directions carefully, and ask your doctor or other care provider to review them with you.

## 2017-12-13 NOTE — LETTER
12/13/2017       RE: Karen Braxton  [address]   [facility] [address]     Dear Colleague,    Thank you for referring your patient, Karen Braxton, to the [facility] at [facility]. Please see a copy of my visit note below.    Chief Complaint:   Chief Complaint   Patient presents with     Consult     Tyloma, right 4th toe. Pt stated that she it causes pain. Pt stated that she is a type 2 diabetic. Pt seen Dr. Hawley in February, 2017. Pt stated that her shoes cause a lot of pain.         No Known Allergies      Subjective: Karen is a 47 year old female who presents to the clinic today for a follow up of right 4th toe callus. She relates that she has not been seen since 2/2017. She relates that she will be moving to [facility] from [place] in May and wants to transfer all of her care here. She relates that the callus has returned on the right 4th toe and causes pain when she is wearing shoes.     Objective    Lab Results   Component Value Date    A1C 8.2 03/27/2017    A1C 8.1 11/14/2016    A1C 8.1 03/21/2016    A1C 8.1 01/25/2016    A1C 8.1 06/23/2015         DP pulse is 2/4 on the right. Non-palpable PT. Diminished pedal hair.   Gross sensation is intact.  Equinus and pes planus noted with flexor stabilizing hammertoes to the lesser digits on the right.   Tyloma noted on the right 4th digit at the PIPJ. No wounds. Nails normal.    Assessment: DM2 with mild peripheral angiopathy and tyloma.     Plan:   - Pt seen and evaluated  - Tyloma debrided x 1.  - Rx for urea cream for the tyloma.  - Dispensed a toe sleeve.   - Pt to return to clinic in 4 months.       Again, thank you for allowing me to participate in the care of your patient.      Sincerely,    Yohan Ashley DPM

## 2017-12-27 DIAGNOSIS — I10 HYPERTENSION GOAL BP (BLOOD PRESSURE) < 140/80: ICD-10-CM

## 2017-12-27 NOTE — TELEPHONE ENCOUNTER
Requested Prescriptions   Pending Prescriptions Disp Refills     canagliflozin (INVOKANA) 300 MG tablet  Last Written Prescription Date:  1/12/17  Last Fill Quantity: 90 t,  # refills: 3   Last Office Visit with [facility] prescribing provider:  8/31/17   Future Office Visit:      90 tablet 3     Sig: Take 1 tablet (300 mg) by mouth every morning (before breakfast)    There is no refill protocol information for this order        Requested Prescriptions   Pending Prescriptions Disp Refills     pioglitazone (ACTOS) 45 MG tablet  Last Written Prescription Date:  1/12/17  Last Fill Quantity: 90 t,  # refills: 3   Last Office Visit with [facility] or [facility] prescribing provider:  8/31/17   Future Office Visit:      90 tablet 3         There is no refill protocol information for this order        pioglitazone (ACTOS) 45 MG tablet 90 tablet 3     Sig: Take 1 tablet (45 mg) by mouth daily    There is no refill protocol information for this order        Requested Prescriptions   Pending Prescriptions Disp Refills     lisinopril (PRINIVIL/ZESTRIL) 2.5 MG tablet       Last Written Prescription Date:  1/12/17  Last Fill Quantity: 90,  # refills: 3   Last Office Visit with [facility] or [facility] prescribing provider:  8/31/17   Future Office Visit:       There is no refill protocol information for this order                           lisinopril (PRINIVIL/ZESTRIL) 2.5 MG tablet 90 tablet 3     Sig: Take 1 tablet (2.5 mg) by mouth daily    There is no refill protocol information for this order

## 2018-01-04 RX ORDER — PIOGLITAZONEHYDROCHLORIDE 45 MG/1
45 TABLET ORAL DAILY
Qty: 90 TABLET | Refills: 0 | Status: SHIPPED | OUTPATIENT
Start: 2018-01-04 | End: 2018-04-14

## 2018-01-04 RX ORDER — LISINOPRIL 2.5 MG/1
2.5 TABLET ORAL DAILY
Qty: 90 TABLET | Refills: 0 | Status: SHIPPED | OUTPATIENT
Start: 2018-01-04 | End: 2018-04-14

## 2018-01-04 NOTE — TELEPHONE ENCOUNTER
Requested Prescriptions   Pending Prescriptions Disp Refills     canagliflozin (INVOKANA) 300 MG tablet 90 tablet 0     Sig: Take 1 tablet (300 mg) by mouth every morning (before breakfast)    Sodium Glucose Co-Transport Inhibitor Agents Failed    12/27/2017 12:18 PM       Failed - Patient has had a Microalbumin in the past 12 mos.    Recent Labs   Lab Test  11/14/16   1105   07/16/12   1539   VC1381   --    --   5.0   PO0362   --    --   13.3   MICROL  8   < >   --    UMALCR  22.35   < >   --     < > = values in this interval not displayed.            Failed - Patient has documented normal Potassium within the last 12 mos.    Recent Labs   Lab Test  11/14/16   1058   POTASSIUM  4.1            Passed - Patient's BP is less than 140/90    BP Readings from Last 3 Encounters:   12/05/17 127/85   09/05/17 122/85   08/31/17 130/78                Passed - Patient has documented LDL within the past 12 mos.    Recent Labs   Lab Test  03/27/17   1442   LDL  160*            Passed - Patient has documented A1c within the specified period of time.    Recent Labs   Lab Test  03/27/17   1442   A1C  8.2*            Passed - No creatinine >1.4 or GFR <45 within the past 12 mos    Recent Labs   Lab Test  11/14/16   1058   GFRESTIMATED  73   GFRESTBLACK  89       Recent Labs   Lab Test  11/14/16   1058   CR  0.83            Passed - Patient is age 18 or older       Passed - Patient is not pregnant       Passed - Patient has no positive pregnancy test within the past 12 mos.       Passed - Patient has had an appointment within the past 6 mos.or has a future appt within the next 30 days    Patient had office visit in the last 6 months or has a visit in the next 30 days with authorizing provider.  See chart review.             pioglitazone (ACTOS) 45 MG tablet 90 tablet 3     Sig: Take 1 tablet (45 mg) by mouth daily    Thiazolidinedione Agents (TZD's)  Failed    12/27/2017 12:18 PM       Failed - Patient has a normal ALT within the  past 12 mos.    Recent Labs   Lab Test  03/21/16   1030   ALT  30            Failed - Patient has a normal AST within the past 12 mos.     Recent Labs   Lab Test  01/09/15   1656   AST  17            Failed - Patient has had a Microalbumin in the past 12 mos.    Recent Labs   Lab Test  11/14/16   1105   07/16/12   1539   [id number]   --    --   5.0   [id number]   --    --   13.3   MICROL  8   < >   --    UMALCR  22.35   < >   --     < > = values in this interval not displayed.            Failed - Patient has a normal serum Creatinine in the past 12 months    Recent Labs   Lab Test  11/14/16   1058   CR  0.83            Passed - Patient's BP is less than 140/90    BP Readings from Last 3 Encounters:   12/05/17 127/85   09/05/17 122/85   08/31/17 130/78                Passed - Patient has documented LDL within the past 12 mos.    Recent Labs   Lab Test  03/27/17   1442   LDL  160*            Passed - Patient has documented A1c within the specified period of time.    Recent Labs   Lab Test  03/27/17   1442   A1C  8.2*            Passed - Diagnosis not CHF       Passed - Patient is age 18 or older       Passed - Patient is not pregnant       Passed - Patient has not had a positive pregnancy test within the past 12 mos.       Passed - Patient has had an appointment with authorizing provider within the past 6 mos.or within the next 30 days.    Patient had office visit in the last 6 months or has a visit in the next 30 days with authorizing provider.  See chart review.             lisinopril (PRINIVIL/ZESTRIL) 2.5 MG tablet 90 tablet 3     Sig: Take 1 tablet (2.5 mg) by mouth daily    ACE Inhibitors (Including Combos) Protocol Failed    12/27/2017 12:18 PM       Failed - Normal serum creatinine on file in past 12 months    Recent Labs   Lab Test  11/14/16   1058   CR  0.83            Failed - Normal serum potassium on file in past 12 months    Recent Labs   Lab Test  11/14/16   1058   POTASSIUM  4.1            Passed - Blood  pressure under 140/90    BP Readings from Last 3 Encounters:   12/05/17 127/85   09/05/17 122/85   08/31/17 130/78                Passed - Recent or future visit with authorizing provider's specialty    Patient had office visit in the last year or has a visit in the next 30 days with authorizing provider.  See chart review.              Passed - Patient is age 18 or older       Passed - No active pregnancy on record       Passed - No positive pregnancy test in past 12 months

## 2018-01-04 NOTE — TELEPHONE ENCOUNTER
Routing refill request to provider for review/approval because:  Labs not current:  Labs are in place --thanks

## 2018-02-10 RX ORDER — INSULIN GLARGINE 100 [IU]/ML
60 INJECTION, SOLUTION SUBCUTANEOUS AT BEDTIME
Qty: 15 ML | Refills: 12 | Status: SHIPPED | OUTPATIENT
Start: 2018-02-10 | End: 2018-03-08

## 2018-03-07 DIAGNOSIS — E55.9 VITAMIN D DEFICIENCY: ICD-10-CM

## 2018-03-07 DIAGNOSIS — K21.9 GASTROESOPHAGEAL REFLUX DISEASE WITHOUT ESOPHAGITIS: ICD-10-CM

## 2018-03-08 ENCOUNTER — TELEPHONE (OUTPATIENT)
Dept: ENDOCRINOLOGY | Facility: CLINIC | Age: 48
End: 2018-03-08

## 2018-03-08 ENCOUNTER — OFFICE VISIT (OUTPATIENT)
Dept: ENDOCRINOLOGY | Facility: CLINIC | Age: 48
End: 2018-03-08
Payer: COMMERCIAL

## 2018-03-08 VITALS
SYSTOLIC BLOOD PRESSURE: 139 MMHG | HEART RATE: 74 BPM | DIASTOLIC BLOOD PRESSURE: 82 MMHG | BODY MASS INDEX: 22.29 KG/M2 | WEIGHT: 138.7 LBS | HEIGHT: 66 IN

## 2018-03-08 DIAGNOSIS — E11.65 TYPE 2 DIABETES MELLITUS WITH HYPERGLYCEMIA, WITH LONG-TERM CURRENT USE OF INSULIN (H): Primary | ICD-10-CM

## 2018-03-08 DIAGNOSIS — Z79.4 TYPE 2 DIABETES MELLITUS WITH HYPERGLYCEMIA, WITH LONG-TERM CURRENT USE OF INSULIN (H): Primary | ICD-10-CM

## 2018-03-08 LAB — HBA1C MFR BLD: 8.5 % (ref 4.3–6)

## 2018-03-08 RX ORDER — INSULIN GLARGINE 100 [IU]/ML
INJECTION, SOLUTION SUBCUTANEOUS
Qty: 15 ML | Refills: 12 | COMMUNITY
Start: 2018-03-08 | End: 2018-03-08

## 2018-03-08 RX ORDER — INSULIN GLARGINE 100 [IU]/ML
INJECTION, SOLUTION SUBCUTANEOUS
Qty: 15 ML | Refills: 12 | COMMUNITY
Start: 2018-03-08 | End: 2018-03-16

## 2018-03-08 RX ORDER — FLASH GLUCOSE SENSOR
KIT MISCELLANEOUS
Qty: 9 EACH | Refills: 1 | Status: SHIPPED | OUTPATIENT
Start: 2018-03-08 | End: 2018-04-10

## 2018-03-08 RX ORDER — FLASH GLUCOSE SENSOR
1 KIT MISCELLANEOUS DAILY
Qty: 1 DEVICE | Refills: 0 | Status: SHIPPED | OUTPATIENT
Start: 2018-03-08 | End: 2018-04-10

## 2018-03-08 NOTE — LETTER
3/8/2018       RE: Karen Braxton  [address]   [facility] [address]     Dear Colleague,    Thank you for referring your patient, Karen Braxton, to the [facility] ENDOCRINOLOGY at [facility]. Please see a copy of my visit note below.    HPI:  Irais Jones is a 47 year old female with type 2 diabetes  Here today for a follow up visit.  She was last seen in our clinic by Dr. Phillips in Dec 2017.  No  available today.  For her diabetes, she is currently taking Basaglar 60 units SQ at hs, Humalog 30 units with breakfast, 35 units with lunch and 35 units with dinner, Invokana 300 mg daily, Metformin 500 mg BID and Actos 45 mg daily.  Her A1C is 8.5 % today and her previous A1C was 8.2 % in Dec 2017.  She has no glucose meter with her today and she is no really able to tell me what her blood sugar values have been lately.  She denied symptoms of frequent hypoglycemia.  On ROS today,  she reports feeling well.  She has pain 5th toe left foot and was seen by Podiatry here in Dec 2017.  She was given a shoe sleeve which she does not wear.  I suggested she follow the Podiatrist recommendations.  No foot ulcers.  She denies blurred vision, n/v, SOB at rest, cough or chest pain.  Pt denies abd pain, diarrhea, dysuria, hematuria, vaginal discharge or rash/pruritis.  No edema.  She denies numbness or tingling in her feet or hands.    ROS:   Please see under HPI.    ALLERGIES:   No Known Allergies      Current Outpatient Prescriptions   Medication Sig Dispense Refill     Continuous Blood Gluc  (FREESTYLE DODIE READER) DENNISE 1 Device daily 1 Device 0     continuous blood glucose monitoring (FREESTYLE DODIE) sensor For use with Freestyle Dodie Flash  for continuous monitioring of blood glucose levels. Replace sensor every 10 days. 9 each 1     BASAGLAR 100 UNIT/ML injection Inject 60 units SQ each at bedtime. 15 mL 12     canagliflozin (INVOKANA) 300 MG tablet  "Take 1 tablet (300 mg) by mouth every morning (before breakfast) 90 tablet 0     pioglitazone (ACTOS) 45 MG tablet Take 1 tablet (45 mg) by mouth daily 90 tablet 0     lisinopril (PRINIVIL/ZESTRIL) 2.5 MG tablet Take 1 tablet (2.5 mg) by mouth daily 90 tablet 0     Urea 20 % CREA cream Apply topically as needed 85 g 3     calcium-vitamin D (CALTRATE) 600-400 MG-UNIT per tablet Take 1 tablet by mouth 2 times daily 180 tablet 3     metFORMIN (GLUCOPHAGE) 1000 MG tablet TAKE ONE-HALF TABLET BY MOUTH TWICE DAILY WITH MEALS 90 tablet 1     B-D U/F 31G X 8 MM insulin pen needle TEST FOUR TIMES A DAY OR AS DIRECTED 400 each 3     B-D U/F 31G X 8 MM insulin pen needle TEST FOUR TIMES A DAY OR AS DIRECTED 400 each 1     meloxicam (MOBIC) 15 MG tablet Take 1 tablet (15 mg) by mouth daily 30 tablet 1     insulin lispro (HUMALOG KWIKPEN) 100 UNIT/ML injection 30 units before breakfast, 35 units before lunch, 35 units before dinner 15 mL 11     alum & mag hydroxide-simethicone (ANTACID) 200-200-20 MG/5ML SUSP suspension Take 30 mLs by mouth every 4 hours as needed for indigestion 30 mL 11     atorvastatin (LIPITOR) 80 MG tablet Take 1 tablet (80 mg) by mouth daily 90 tablet 3     blood glucose monitoring (JONATHAN CONTOUR NEXT) test strip Use to test blood sugar 4 times daily or as directed. 200 each 11     blood glucose monitoring (JONATHAN MICROLET) lancets Use to test blood sugar 4 times daily or as directed. 2 Box 6     ezetimibe (ZETIA) 10 MG tablet Take 1 tablet (10 mg) by mouth daily 90 tablet 3     B-D U/F 31G X 8 MM insulin pen needle TEST FOUR TIMES A DAY OR AS DIRECTED 400 each 1     vitamin D (ERGOCALCIFEROL) 93435 UNIT capsule TAKE 1 CAPLET ONCE WEEKLY  Profile Rx: patient will contact pharmacy when needed 4 capsule 5     insulin syringe-needle U-100 (BD INSULIN SYRINGE ULTRAFINE) 30G X 1/2\" 1 ML Use one syringe daily or as directed.  3 month supply 100 each prn     ASPIRIN NOT PRESCRIBED (INTENTIONAL) continuous prn for " "other Antiplatelet medication not prescribed intentionally due to Not indicated based on age/GI distress       order for DME Test strips for pt's glucometer, brand as covered by insurance Test bid and prn. 200 each 4     order for DME Lancets bid and prn.  Fill per patient's insurance. 200 each 4     SHX:  Smoke: yes.  ETOH: none.   with 5 children.    FHX:  Several family members with diabetes.    PMHX:   1.  Type 2 DM dx 8 years ago.  2.  Hyperlipidemia.  3.  Amenorrhea.  4.  GERD.  5.  S/P choley.  6.  Sebaceous cyst.  Past Medical History:   Diagnosis Date     Arthritis      ASCUS favor benign 2012    neg HPV  Plan cotest in 3 yrs.     Diabetes (H)      Hypertension      Past Surgical History:   Procedure Laterality Date     CHOLECYSTECTOMY  9/14/2007    laproscopic     FACIAL RECONSTRUCTION SURGERY  2 years old    cleft lip repair     ORTHOPEDIC SURGERY  6/2001    left knee       EXAM:  Constitutional:   Vitals:    03/08/18 1159 03/08/18 1200   BP: 145/82 139/82   BP Location: Right arm    Pulse: 74    Weight: 62.9 kg (138 lb 11.2 oz)    Height: 1.664 m (5' 5.5\")    GENERAL: Pt looks good and in no distress.  SKIN: no rash.  HEENT: PERRLA; fundi not examined.  NECK: No thyroid tenderness.  LUNGS: Clear b/l.  CARDIAC: RRR.  ABDOMEN: Nontender.  EXTREMITIES: No pretibial edema.  FEET: No ulcers; normal monofilamentous exam. Her 5 th toe left foot without ulcer.      RESULTS:    Creatinine   Date Value Ref Range Status   11/14/2016 0.83 0.52 - 1.04 mg/dL Final     GFR Estimate   Date Value Ref Range Status   11/14/2016 73 >60 mL/min/1.7m2 Final     Hemoglobin A1C   Date Value Ref Range Status   03/27/2017 8.2 (H) 4.3 - 6.0 % Final     Potassium   Date Value Ref Range Status   11/14/2016 4.1 3.4 - 5.3 mmol/L Final     ALT   Date Value Ref Range Status   03/21/2016 30 0 - 50 U/L Final     AST   Date Value Ref Range Status   01/09/2015 17 0 - 45 U/L Final     TSH   Date Value Ref Range Status   03/21/2016 " 1.31 0.40 - 5.00 mU/L Final     T4 Free   Date Value Ref Range Status   08/04/2011 1.22 0.70 - 1.85 ng/dL Final         Cholesterol   Date Value Ref Range Status   03/27/2017 242 (H) <200 mg/dL Final     Comment:     Desirable:       <200 mg/dl   03/21/2016 231 (H) <200 mg/dL Final     Comment:     Desirable:       <200 mg/dl     HDL Cholesterol   Date Value Ref Range Status   03/27/2017 48 (L) >49 mg/dL Final   03/21/2016 44 (L) >49 mg/dL Final     LDL Cholesterol Calculated   Date Value Ref Range Status   03/27/2017 160 (H) <100 mg/dL Final     Comment:     Above desirable:  100-129 mg/dl   Borderline High:  130-159 mg/dL   High:             160-189 mg/dL   Very high:       >189 mg/dl     03/21/2016 142 (H) <100 mg/dL Final     Comment:     Above desirable:  100-129 mg/dl   Borderline High:  130-159 mg/dL   High:             160-189 mg/dL   Very high:       >189 mg/dl       Triglycerides   Date Value Ref Range Status   03/27/2017 171 (H) <150 mg/dL Final     Comment:     Borderline high:  150-199 mg/dl   High:             200-499 mg/dl   Very high:       >499 mg/dl     03/21/2016 223 (H) <150 mg/dL Final     Comment:     Non Fasting   Borderline high:  150-199 mg/dl   High:             200-499 mg/dl   Very high:       >499 mg/dl       Cholesterol/HDL Ratio   Date Value Ref Range Status   12/01/2014 6.0 (H) 0.0 - 5.0 Final   07/30/2014 6.1 (H) 0.0 - 5.0 Final     A1C      8.5   3/8/2018  A1C      8.2   12/5/2017  A1C      8.0   9/5/2017  A1C      8.2   6/2017  A1C      7.8   3/9/2017  A1C      8.1   12/2016  A1C      8.9   6/28/2016  A1C      8.1   6/23/2015  A1C      9.0   3/3/2015  A1C      8.5   12/1/2014  A1C     11.4  5/29/2014  A1C     10.7   4/1/2014  A1C     10.3   1/8/2014  A1C      9.8   11/26/2013  A1C      8.8   7/24/2013  A1C      8.9   5/28/2013    ASSESSMENT/PLAN:    1. TYPE 2 DIABETES MELLITUS: Uncontrolled type 2 diabetes mellitus.  I discussed using a Freestyle Dodie sensor with patient today and  she is interested in using this sensor.  I told her I would like her to meet with our diabetes educator prior to using the sensor.  She is to remain on her current dose of Lantus, Humalog, Invokana, Metformin and Actos.  Encouraged her to continue to check her blood sugar fasting and premeal until she has been shown how to use the Dodie sensor device.  Her BP was higher today- reassess next visit.  She was seen by Oph in Dec 2017.   Pt's creat was 0.83  with GFR 73 mL/min in 11/2016 with normal K+.   Pt's urine microalbuminuria was negative in Nov 2016.  TSH normal in 3/2016.  Reminded her to have her labs done today.    2.  HYPERLIPIDEMIA:   in 3/2017.   She was instructed to take her Crestor and Zetia daily.    3.  FOOT CARE: Pt seen by Podiatry here in Dec 2017.  Instructed pt to follow Podiatrist recommendations.    4.  Return to Endocrine Clinic to see in 4/2018 and  Dr. Phillips in June 2018.      Sincerely,    Cecile Juarez PA-C

## 2018-03-08 NOTE — MR AVS SNAPSHOT
After Visit Summary   3/8/2018    Karen Braxton    MRN: [id number]           Patient Information     Date Of Birth          1970        Visit Information        Provider Department      3/8/2018 12:00 PM Cecile Juarez PA-C [facility]        Today's Diagnoses     Type 2 diabetes mellitus with hyperglycemia, with long-term current use of insulin (H)    -  1    Uncontrolled type 1 diabetes mellitus without complication (H)        Uncontrolled type 2 diabetes mellitus without complication, with long-term current use of insulin (H)           Follow-ups after your visit        Your next 10 appointments already scheduled     Mar 30, 2018  8:30 AM CDT   (Arrive by 8:15 AM)   Office Visit with Melvina Gustafson RN   [facility] ([facility])    [address] [place] [address] [phone]           Bring a current list of meds and any records pertaining to this visit. For Physicals, please bring immunization records and any forms needing to be filled out. Please arrive 10 minutes early to complete paperwork.            Apr 13, 2018 11:00 AM CDT   (Arrive by 10:45 AM)   RETURN DIABETES with JEAN CLAUDE Zimmerman [facility] Endocrinology ([facility])    [address] [place] [address] [phone]            Jun 05, 2018 11:30 AM CDT   (Arrive by 11:15 AM)   RETURN DIABETES with Melisa Phillips MD   [facility] Endocrinology ([facility])    [address] [place] [address] [phone]              Who to contact     Please call your clinic at [phone] to:    Ask questions about your health    Make or cancel appointments    Discuss your medicines    Learn about your test results    Speak to your doctor            Additional Information About Your Visit        MyChart Information     People to Remembert is an electronic gateway  "that provides easy, online access to your medical records. With Tech urSelf, you can request a clinic appointment, read your test results, renew a prescription or communicate with your care team.     To sign up for Tech [employer]Self visit the website at [email or web address]   You will be asked to enter the access code listed below, as well as some personal information. Please follow the directions to create your username and password.     Your access code is: [email or web address]  Expires: 2018  6:30 AM     Your access code will  in 90 days. If you need help or a new code, please contact your [facility] or call [phone] for assistance.        Care EveryWhere ID     This is your Care EveryWhere ID. This could be used by other organizations to access your [place] medical records  [id number]        Your Vitals Were     Pulse Height Last Period BMI (Body Mass Index)          74 1.664 m (5' 5.5\") (LMP Unknown) 22.73 kg/m2         Blood Pressure from Last 3 Encounters:   18 139/82   17 127/85   17 122/85    Weight from Last 3 Encounters:   18 62.9 kg (138 lb 11.2 oz)   17 62.6 kg (138 lb)   17 63.7 kg (140 lb 6.4 oz)              Today, you had the following     No orders found for display         Today's Medication Changes          These changes are accurate as of 3/8/18 12:35 PM.  If you have any questions, ask your nurse or doctor.               Start taking these medicines.        Dose/Directions    continuous blood glucose monitoring sensor   Used for:  Type 2 diabetes mellitus with hyperglycemia, with long-term current use of insulin (H)        For use with Freestyle Dodie Flash  for continuous monitioring of blood glucose levels. Replace sensor every 10 days.   Quantity:  9 each   Refills:  1       FREESTYLE DODIE READER Cathy   Used for:  Type 2 diabetes mellitus with hyperglycemia, with long-term current use of insulin (H)        Dose:  1 " Device   1 Device daily   Quantity:  1 Device   Refills:  0         These medicines have changed or have updated prescriptions.        Dose/Directions    BASAGLAR 100 UNIT/ML injection   This may have changed:    - how much to take  - how to take this  - when to take this  - additional instructions   Used for:  Uncontrolled type 1 diabetes mellitus without complication (H), Uncontrolled type 2 diabetes mellitus without complication, with long-term current use of insulin (H)        Inject 60 units SQ each at bedtime.   Quantity:  15 mL   Refills:  12            Where to get your medicines      These medications were sent to [facility] - [place], [place] - [address], [address]  [address], [facility] [address]     Phone:  [phone]     continuous blood glucose monitoring sensor    FREESTYLE RAHEEM READER Cathy                Primary Care Provider Office Phone # Fax #    Raymond Engel [phone] [address] [facility] [address]        Equal Access to Services     SUSAN BURGOS : Hadii aad ku hadasho Soomaali, waaxda luqadaha, qaybta kaalmada adeegyada, waxay mitchelin toy figueroa . So [facility] [phone].    ATENCIÓN: Si habla español, tiene a merrill disposición servicios gratuitos de asistencia lingüística. [facility] [phone].    We comply with applicable federal civil rights laws and [place] laws. We do not discriminate on the basis of race, color, national origin, age, disability, sex, sexual orientation, or gender identity.            Thank you!     Thank you for choosing [facility] ENDOCRINOLOGY  for your care. Our goal is always to provide you with excellent care. Hearing back from our patients is one way we can continue to improve our services. Please take a few minutes to complete the written survey that you may receive in the mail after your visit with us. Thank you!             Your Updated Medication List -  Protect others around you: Learn how to safely use, store and throw away your medicines at www.disposemymeds.org.          This list is accurate as of 3/8/18 12:35 PM.  Always use your most recent med list.                   Brand Name Dispense Instructions for use Diagnosis    alum & mag hydroxide-simethicone 200-200-20 MG/5ML Susp suspension    antacid    30 mL    Take 30 mLs by mouth every 4 hours as needed for indigestion    Gastroesophageal reflux disease without esophagitis       ASPIRIN NOT PRESCRIBED    INTENTIONAL     continuous prn for other Antiplatelet medication not prescribed intentionally due to Not indicated based on age/GI distress        atorvastatin 80 MG tablet    LIPITOR    90 tablet    Take 1 tablet (80 mg) by mouth daily    Hyperlipidemia LDL goal <100       * B-D U/F 31G X 8 MM   Generic drug:  insulin pen needle     400 each    TEST FOUR TIMES A DAY OR AS DIRECTED    Diabetes type 2, controlled (H)       * B-D U/F 31G X 8 MM   Generic drug:  insulin pen needle     400 each    TEST FOUR TIMES A DAY OR AS DIRECTED    Diabetes type 2, controlled (H)       * B-D U/F 31G X 8 MM   Generic drug:  insulin pen needle     400 each    TEST FOUR TIMES A DAY OR AS DIRECTED    Diabetes type 2, controlled (H)       BASAGLAR 100 UNIT/ML injection     15 mL    Inject 60 units SQ each at bedtime.    Uncontrolled type 1 diabetes mellitus without complication (H), Uncontrolled type 2 diabetes mellitus without complication, with long-term current use of insulin (H)       blood glucose monitoring lancets     2 Box    Use to test blood sugar 4 times daily or as directed.    Diabetes type 2, controlled (H)       blood glucose monitoring test strip    JONATHAN CONTOUR NEXT    200 each    Use to test blood sugar 4 times daily or as directed.    Diabetes type 2, controlled (H)       calcium-vitamin D 600-400 MG-UNIT per tablet    CALTRATE    180 tablet    Take 1 tablet by mouth 2 times daily    Vitamin D deficiency        "canagliflozin 300 MG tablet    INVOKANA    90 tablet    Take 1 tablet (300 mg) by mouth every morning (before breakfast)    Uncontrolled type 2 diabetes mellitus without complication, with long-term current use of insulin (H)       continuous blood glucose monitoring sensor     9 each    For use with Freestyle Dodie Flash  for continuous monitioring of blood glucose levels. Replace sensor every 10 days.    Type 2 diabetes mellitus with hyperglycemia, with long-term current use of insulin (H)       ezetimibe 10 MG tablet    ZETIA    90 tablet    Take 1 tablet (10 mg) by mouth daily    Hyperlipidemia LDL goal <100       FREESTYLE DODIE READER Cathy     1 Device    1 Device daily    Type 2 diabetes mellitus with hyperglycemia, with long-term current use of insulin (H)       insulin lispro 100 UNIT/ML injection    HumaLOG KWIKpen    15 mL    30 units before breakfast, 35 units before lunch, 35 units before dinner    Uncontrolled type 2 diabetes mellitus without complication, with long-term current use of insulin (H)       insulin syringe-needle U-100 30G X 1/2\" 1 ML    BD insulin syringe ultrafine    100 each    Use one syringe daily or as directed.  3 month supply    Diabetes type 2, controlled (H)       lisinopril 2.5 MG tablet    PRINIVIL/Zestril    90 tablet    Take 1 tablet (2.5 mg) by mouth daily    Hypertension goal BP (blood pressure) < 140/80, Uncontrolled type 2 diabetes mellitus without complication, with long-term current use of insulin (H)       meloxicam 15 MG tablet    MOBIC    30 tablet    Take 1 tablet (15 mg) by mouth daily    Sprain of right rotator cuff capsule, initial encounter       metFORMIN 1000 MG tablet    GLUCOPHAGE    90 tablet    TAKE ONE-HALF TABLET BY MOUTH TWICE DAILY WITH MEALS    Uncontrolled type 2 diabetes mellitus without complication, with long-term current use of insulin (H)       pioglitazone 45 MG tablet    ACTOS    90 tablet    Take 1 tablet (45 mg) by mouth daily    " Uncontrolled type 2 diabetes mellitus without complication, with long-term current use of insulin (H)       Urea 20 % Crea cream     85 g    Apply topically as needed    Tyloma       vitamin D 99978 UNIT capsule    ERGOCALCIFEROL    4 capsule    TAKE 1 CAPLET ONCE WEEKLY Profile Rx: patient will contact pharmacy when needed    Vitamin D deficiency       * Notice:  This list has 3 medication(s) that are the same as other medications prescribed for you. Read the directions carefully, and ask your doctor or other care provider to review them with you.

## 2018-03-08 NOTE — NURSING NOTE
"Chief Complaint   Patient presents with     RECHECK     type 2       Initial /82  Pulse 74  Ht 1.664 m (5' 5.5\")  Wt 62.9 kg (138 lb 11.2 oz)  LMP  (LMP Unknown)  BMI 22.73 kg/m2 Estimated body mass index is 22.73 kg/(m^2) as calculated from the following:    Height as of this encounter: 1.664 m (5' 5.5\").    Weight as of this encounter: 62.9 kg (138 lb 11.2 oz).  Medication Reconciliation: complete       Performed A1C test - patient tolerated well.    Shayla Zurita, [facility]       "

## 2018-03-09 DIAGNOSIS — K21.9 GASTROESOPHAGEAL REFLUX DISEASE WITHOUT ESOPHAGITIS: ICD-10-CM

## 2018-03-09 NOTE — TELEPHONE ENCOUNTER
One touch delica Lancets    Last Written Prescription Date:  historical  Last Fill Quantity: historical ,   # refills: historical  Last Office Visit: historical   Future Office visit:    Next 5 appointments (look out 90 days)     Mar 30, 2018  8:30 AM CDT   (Arrive by 8:15 AM)   Office Visit with Melvina Gustafson RN    [facility] ([facility])    [address] [place] [address]   [phone]                   Routing refill request to provider for review/approval because:  Drug not on the [facility], [facility] or [facility] refill protocol or controlled substance    Ultra Blue Test Strips      Last Written Prescription Date:  Historical   Last Fill Quantity: Historical,   # refills: Historical  Last Office Visit: Historical  Future Office visit:    Next 5 appointments (look out 90 days)     Mar 30, 2018  8:30 AM CDT   (Arrive by 8:15 AM)   Office Visit with Melvina Gustafson RN   [facility] ([facility])    [address] [place] [id number]   [phone]                   Routing refill request to provider for review/approval because:  Drug not on the [facility], [facility] or [facility] refill protocol or controlled substance

## 2018-03-09 NOTE — TELEPHONE ENCOUNTER
Pt called requesting one touch lancets and test strips. DME orders pended. Please print and sign if agree with orders.

## 2018-03-12 RX ORDER — ERGOCALCIFEROL 1.25 MG/1
CAPSULE, LIQUID FILLED ORAL
Qty: 4 CAPSULE | Refills: 5 | Status: SHIPPED | OUTPATIENT
Start: 2018-03-12 | End: 2018-09-07

## 2018-03-12 RX ORDER — MAGNESIUM HYDROXIDE/ALUMINUM HYDROXICE/SIMETHICONE 120; 1200; 1200 MG/30ML; MG/30ML; MG/30ML
15 SUSPENSION ORAL EVERY 4 HOURS PRN
Qty: 30 ML | Refills: 11 | Status: SHIPPED | OUTPATIENT
Start: 2018-03-12 | End: 2018-03-13

## 2018-03-12 NOTE — PROGRESS NOTES
HPI:  Irais Jones is a 47 year old female with type 2 diabetes  Here today for a follow up visit.  She was last seen in our clinic by Dr. Phillips in Dec 2017.  No  available today.  For her diabetes, she is currently taking Basaglar 60 units SQ at hs, Humalog 30 units with breakfast, 35 units with lunch and 35 units with dinner, Invokana 300 mg daily, Metformin 500 mg BID and Actos 45 mg daily.  Her A1C is 8.5 % today and her previous A1C was 8.2 % in Dec 2017.  She has no glucose meter with her today and she is no really able to tell me what her blood sugar values have been lately.  She denied symptoms of frequent hypoglycemia.  On ROS today,  she reports feeling well.  She has pain 5th toe left foot and was seen by Podiatry here in Dec 2017.  She was given a shoe sleeve which she does not wear.  I suggested she follow the Podiatrist recommendations.  No foot ulcers.  She denies blurred vision, n/v, SOB at rest, cough or chest pain.  Pt denies abd pain, diarrhea, dysuria, hematuria, vaginal discharge or rash/pruritis.  No edema.  She denies numbness or tingling in her feet or hands.    ROS:   Please see under HPI.    ALLERGIES:   No Known Allergies      Current Outpatient Prescriptions   Medication Sig Dispense Refill     Continuous Blood Gluc  (FREESTYLE RAHEEM READER) DENNISE 1 Device daily 1 Device 0     continuous blood glucose monitoring (FREESTYLE RAHEEM) sensor For use with Freestyle Raheem Flash  for continuous monitioring of blood glucose levels. Replace sensor every 10 days. 9 each 1     BASAGLAR 100 UNIT/ML injection Inject 60 units SQ each at bedtime. 15 mL 12     canagliflozin (INVOKANA) 300 MG tablet Take 1 tablet (300 mg) by mouth every morning (before breakfast) 90 tablet 0     pioglitazone (ACTOS) 45 MG tablet Take 1 tablet (45 mg) by mouth daily 90 tablet 0     lisinopril (PRINIVIL/ZESTRIL) 2.5 MG tablet Take 1 tablet (2.5 mg) by mouth daily 90 tablet 0     Urea 20 % CREA  "cream Apply topically as needed 85 g 3     calcium-vitamin D (CALTRATE) 600-400 MG-UNIT per tablet Take 1 tablet by mouth 2 times daily 180 tablet 3     metFORMIN (GLUCOPHAGE) 1000 MG tablet TAKE ONE-HALF TABLET BY MOUTH TWICE DAILY WITH MEALS 90 tablet 1     B-D U/F 31G X 8 MM insulin pen needle TEST FOUR TIMES A DAY OR AS DIRECTED 400 each 3     B-D U/F 31G X 8 MM insulin pen needle TEST FOUR TIMES A DAY OR AS DIRECTED 400 each 1     meloxicam (MOBIC) 15 MG tablet Take 1 tablet (15 mg) by mouth daily 30 tablet 1     insulin lispro (HUMALOG KWIKPEN) 100 UNIT/ML injection 30 units before breakfast, 35 units before lunch, 35 units before dinner 15 mL 11     alum & mag hydroxide-simethicone (ANTACID) 200-200-20 MG/5ML SUSP suspension Take 30 mLs by mouth every 4 hours as needed for indigestion 30 mL 11     atorvastatin (LIPITOR) 80 MG tablet Take 1 tablet (80 mg) by mouth daily 90 tablet 3     blood glucose monitoring (JONATHAN CONTOUR NEXT) test strip Use to test blood sugar 4 times daily or as directed. 200 each 11     blood glucose monitoring (JONATHAN MICROLET) lancets Use to test blood sugar 4 times daily or as directed. 2 Box 6     ezetimibe (ZETIA) 10 MG tablet Take 1 tablet (10 mg) by mouth daily 90 tablet 3     B-D U/F 31G X 8 MM insulin pen needle TEST FOUR TIMES A DAY OR AS DIRECTED 400 each 1     vitamin D (ERGOCALCIFEROL) 29030 UNIT capsule TAKE 1 CAPLET ONCE WEEKLY  Profile Rx: patient will contact pharmacy when needed 4 capsule 5     insulin syringe-needle U-100 (BD INSULIN SYRINGE ULTRAFINE) 30G X 1/2\" 1 ML Use one syringe daily or as directed.  3 month supply 100 each prn     ASPIRIN NOT PRESCRIBED (INTENTIONAL) continuous prn for other Antiplatelet medication not prescribed intentionally due to Not indicated based on age/GI distress       order for DME Test strips for pt's glucometer, brand as covered by insurance Test bid and prn. 200 each 4     order for DME Lancets bid and prn.  Fill per patient's " "insurance. 200 each 4     SHX:  Smoke: yes.  ETOH: none.   with 5 children.    FHX:  Several family members with diabetes.    PMHX:   1.  Type 2 DM dx 8 years ago.  2.  Hyperlipidemia.  3.  Amenorrhea.  4.  GERD.  5.  S/P choley.  6.  Sebaceous cyst.  Past Medical History:   Diagnosis Date     Arthritis      ASCUS favor benign 2012    neg HPV  Plan cotest in 3 yrs.     Diabetes (H)      Hypertension      Past Surgical History:   Procedure Laterality Date     CHOLECYSTECTOMY  9/14/2007    laproscopic     FACIAL RECONSTRUCTION SURGERY  2 years old    cleft lip repair     ORTHOPEDIC SURGERY  6/2001    left knee       EXAM:  Constitutional:   Vitals:    03/08/18 1159 03/08/18 1200   BP: 145/82 139/82   BP Location: Right arm    Pulse: 74    Weight: 62.9 kg (138 lb 11.2 oz)    Height: 1.664 m (5' 5.5\")    GENERAL: Pt looks good and in no distress.  SKIN: no rash.  HEENT: PERRLA; fundi not examined.  NECK: No thyroid tenderness.  LUNGS: Clear b/l.  CARDIAC: RRR.  ABDOMEN: Nontender.  EXTREMITIES: No pretibial edema.  FEET: No ulcers; normal monofilamentous exam. Her 5 th toe left foot without ulcer.      RESULTS:    Creatinine   Date Value Ref Range Status   11/14/2016 0.83 0.52 - 1.04 mg/dL Final     GFR Estimate   Date Value Ref Range Status   11/14/2016 73 >60 mL/min/1.7m2 Final     Hemoglobin A1C   Date Value Ref Range Status   03/27/2017 8.2 (H) 4.3 - 6.0 % Final     Potassium   Date Value Ref Range Status   11/14/2016 4.1 3.4 - 5.3 mmol/L Final     ALT   Date Value Ref Range Status   03/21/2016 30 0 - 50 U/L Final     AST   Date Value Ref Range Status   01/09/2015 17 0 - 45 U/L Final     TSH   Date Value Ref Range Status   03/21/2016 1.31 0.40 - 5.00 mU/L Final     T4 Free   Date Value Ref Range Status   08/04/2011 1.22 0.70 - 1.85 ng/dL Final         Cholesterol   Date Value Ref Range Status   03/27/2017 242 (H) <200 mg/dL Final     Comment:     Desirable:       <200 mg/dl   03/21/2016 231 (H) <200 mg/dL " Final     Comment:     Desirable:       <200 mg/dl     HDL Cholesterol   Date Value Ref Range Status   03/27/2017 48 (L) >49 mg/dL Final   03/21/2016 44 (L) >49 mg/dL Final     LDL Cholesterol Calculated   Date Value Ref Range Status   03/27/2017 160 (H) <100 mg/dL Final     Comment:     Above desirable:  100-129 mg/dl   Borderline High:  130-159 mg/dL   High:             160-189 mg/dL   Very high:       >189 mg/dl     03/21/2016 142 (H) <100 mg/dL Final     Comment:     Above desirable:  100-129 mg/dl   Borderline High:  130-159 mg/dL   High:             160-189 mg/dL   Very high:       >189 mg/dl       Triglycerides   Date Value Ref Range Status   03/27/2017 171 (H) <150 mg/dL Final     Comment:     Borderline high:  150-199 mg/dl   High:             200-499 mg/dl   Very high:       >499 mg/dl     03/21/2016 223 (H) <150 mg/dL Final     Comment:     Non Fasting   Borderline high:  150-199 mg/dl   High:             200-499 mg/dl   Very high:       >499 mg/dl       Cholesterol/HDL Ratio   Date Value Ref Range Status   12/01/2014 6.0 (H) 0.0 - 5.0 Final   07/30/2014 6.1 (H) 0.0 - 5.0 Final     A1C      8.5   3/8/2018  A1C      8.2   12/5/2017  A1C      8.0   9/5/2017  A1C      8.2   6/2017  A1C      7.8   3/9/2017  A1C      8.1   12/2016  A1C      8.9   6/28/2016  A1C      8.1   6/23/2015  A1C      9.0   3/3/2015  A1C      8.5   12/1/2014  A1C     11.4  5/29/2014  A1C     10.7   4/1/2014  A1C     10.3   1/8/2014  A1C      9.8   11/26/2013  A1C      8.8   7/24/2013  A1C      8.9   5/28/2013    ASSESSMENT/PLAN:    1. TYPE 2 DIABETES MELLITUS: Uncontrolled type 2 diabetes mellitus.  I discussed using a Freestyle Dodie sensor with patient today and she is interested in using this sensor.  I told her I would like her to meet with our diabetes educator prior to using the sensor.  She is to remain on her current dose of Lantus, Humalog, Invokana, Metformin and Actos.  Encouraged her to continue to check her blood sugar  fasting and premeal until she has been shown how to use the Dodie sensor device.  Her BP was higher today- reassess next visit.  She was seen by [facility] in Dec 2017.   Pt's creat was 0.83  with GFR 73 mL/min in 11/2016 with normal K+.   Pt's urine microalbuminuria was negative in Nov 2016.  TSH normal in 3/2016.  Reminded her to have her labs done today.    2.  HYPERLIPIDEMIA:   in 3/2017.   She was instructed to take her Crestor and Zetia daily.    3.  FOOT CARE: Pt seen by Podiatry here in Dec 2017.  Instructed pt to follow Podiatrist recommendations.    4.  Return to Endocrine Clinic to see in 4/2018 and  Dr. Phillips in June 2018.

## 2018-03-13 RX ORDER — MAGNESIUM HYDROXIDE/ALUMINUM HYDROXICE/SIMETHICONE 120; 1200; 1200 MG/30ML; MG/30ML; MG/30ML
15 SUSPENSION ORAL EVERY 4 HOURS PRN
Qty: 355 ML | Refills: 11 | Status: SHIPPED | OUTPATIENT
Start: 2018-03-13 | End: 2019-04-10

## 2018-03-16 ENCOUNTER — OFFICE VISIT (OUTPATIENT)
Dept: FAMILY MEDICINE | Facility: CLINIC | Age: 48
End: 2018-03-16
Payer: COMMERCIAL

## 2018-03-16 VITALS
BODY MASS INDEX: 22.04 KG/M2 | DIASTOLIC BLOOD PRESSURE: 68 MMHG | RESPIRATION RATE: 16 BRPM | HEART RATE: 74 BPM | OXYGEN SATURATION: 98 % | SYSTOLIC BLOOD PRESSURE: 116 MMHG | TEMPERATURE: 97.3 F | WEIGHT: 134.5 LBS

## 2018-03-16 DIAGNOSIS — E78.5 HYPERLIPIDEMIA LDL GOAL <100: Primary | Chronic | ICD-10-CM

## 2018-03-16 DIAGNOSIS — E55.9 VITAMIN D DEFICIENCY: ICD-10-CM

## 2018-03-16 DIAGNOSIS — F17.200 TOBACCO DEPENDENCE SYNDROME: ICD-10-CM

## 2018-03-16 LAB — HBA1C MFR BLD: 8.5 % (ref 4.3–6)

## 2018-03-16 PROCEDURE — 84443 ASSAY THYROID STIM HORMONE: CPT | Performed by: FAMILY MEDICINE

## 2018-03-16 PROCEDURE — 80061 LIPID PANEL: CPT | Performed by: FAMILY MEDICINE

## 2018-03-16 PROCEDURE — 82043 UR ALBUMIN QUANTITATIVE: CPT | Performed by: FAMILY MEDICINE

## 2018-03-16 PROCEDURE — 80048 BASIC METABOLIC PNL TOTAL CA: CPT | Performed by: FAMILY MEDICINE

## 2018-03-16 PROCEDURE — 99214 OFFICE O/P EST MOD 30 MIN: CPT | Performed by: FAMILY MEDICINE

## 2018-03-16 PROCEDURE — 84460 ALANINE AMINO (ALT) (SGPT): CPT | Performed by: FAMILY MEDICINE

## 2018-03-16 PROCEDURE — 83036 HEMOGLOBIN GLYCOSYLATED A1C: CPT | Performed by: FAMILY MEDICINE

## 2018-03-16 PROCEDURE — 36415 COLL VENOUS BLD VENIPUNCTURE: CPT | Performed by: FAMILY MEDICINE

## 2018-03-16 RX ORDER — INSULIN GLARGINE 100 [IU]/ML
INJECTION, SOLUTION SUBCUTANEOUS
Qty: 15 ML | Refills: 12 | Status: SHIPPED | OUTPATIENT
Start: 2018-03-16 | End: 2018-10-30

## 2018-03-16 NOTE — MR AVS SNAPSHOT
After Visit Summary   3/16/2018    Karen Braxton    MRN: [id number]           Patient Information     Date Of Birth          1970        Visit Information        Provider Department      3/16/2018 11:00 AM Raymond Engel MD [facility]        Today's Diagnoses     Hyperlipidemia LDL goal <100    -  1    Uncontrolled type 2 diabetes mellitus without complication, with long-term current use of insulin (H)        Uncontrolled type 1 diabetes mellitus without complication (H)        Vitamin D deficiency        Tobacco dependence syndrome          Care Instructions    The 10-year ASCVD risk score (Pleasantonchelita GUZMAN Jr, et al., 2013) is: 13.9%    Values used to calculate the score:      Age: 47 years      Sex: Female      Is Non- : Yes      Diabetic: Yes      Tobacco smoker: Yes      Systolic Blood Pressure: 116 mmHg      Is BP treated: Yes      HDL Cholesterol: 48 mg/dL      Total Cholesterol: 242 mg/dL   (E78.5) Hyperlipidemia LDL goal <100  (primary encounter diagnosis)  Comment:    Plan: ALT, Albumin Random Urine Quantitative with         Creat Ratio, Lipid panel reflex to direct LDL         Fasting, Basic metabolic panel, aspirin 81 MG         EC tablet             (E11.65,  Z79.4) Uncontrolled type 2 diabetes mellitus without complication, with long-term current use of insulin (H)  Comment:    Plan: canagliflozin (INVOKANA) 300 MG tablet,         BASAGLAR 100 UNIT/ML injection, blood glucose         monitoring (NO BRAND SPECIFIED) test strip,         Hemoglobin A1c, TSH with free T4 reflex,         aspirin 81 MG EC tablet             (E10.65) Uncontrolled type 1 diabetes mellitus without complication (H)  Comment:    Plan: BASAGLAR 100 UNIT/ML injection, blood glucose         monitoring (NO BRAND SPECIFIED) test strip,         aspirin 81 MG EC tablet             (E55.9) Vitamin D deficiency  Comment:    Plan:      (F17.200) Tobacco dependence  syndrome  Comment:    Plan: nicotine (NICOTROL) 10 MG Inhaler                           Follow-ups after your visit        Your next 10 appointments already scheduled     Mar 30, 2018  8:30 AM CDT   (Arrive by 8:15 AM)   Office Visit with Melvina Gustafson RN   [facility] ([facility])    [address] [place] [id number] [phone]           Bring a current list of meds and any records pertaining to this visit. For Physicals, please bring immunization records and any forms needing to be filled out. Please arrive 10 minutes early to complete paperwork.            Apr 13, 2018 11:00 AM CDT   (Arrive by 10:45 AM)   RETURN DIABETES with Cecile Juarez PA-C   [facility] ([facility])    [address] [place] [id number] [phone]            Jun 05, 2018 11:30 AM CDT   (Arrive by 11:15 AM)   RETURN DIABETES with Melisa Phillips MD   [facility] ([facility])    [address] [place] [id number] [phone]              Who to contact     If you have questions or need follow up information about today's clinic visit or your schedule please contact [facility] directly at [phone].  Normal or non-critical lab and imaging results will be communicated to you by MyChart, letter or phone within 4 business days after the clinic has received the results. If you do not hear from us within 7 days, please contact the clinic through MyChart or phone. If you have a critical or abnormal lab result, we will notify you by phone as soon as possible.  Submit refill requests through [employer] or call your pharmacy and they will forward the refill request to us. Please allow 3 business days for your refill to be completed.          Additional Information About Your Visit        MyChart Information      "[employer]io lets you send messages to your doctor, view your test results, renew your prescriptions, schedule appointments and more. To sign up, go to [email or web address] . Click on \"Log in\" on the left side of the screen, which will take you to the Welcome page. Then click on \"Sign up Now\" on the right side of the page.     You will be asked to enter the access code listed below, as well as some personal information. Please follow the directions to create your username and password.     Your access code is: [email or web address]  Expires: 2018  7:30 AM     Your access code will  in 90 days. If you need help or a new code, please call your [facility] clinic or [phone].        Care EveryWhere ID     This is your Care EveryWhere ID. This could be used by other organizations to access your [facility] medical records  [id number]        Your Vitals Were     Pulse Temperature Respirations Last Period Pulse Oximetry BMI (Body Mass Index)    74 97.3  F (36.3  C) (Tympanic) 16 (LMP Unknown) 98% 22.04 kg/m2       Blood Pressure from Last 3 Encounters:   18 116/68   18 139/82   17 127/85    Weight from Last 3 Encounters:   18 134 lb 8 oz (61 kg)   18 138 lb 11.2 oz (62.9 kg)   17 138 lb (62.6 kg)              We Performed the Following     Albumin Random Urine Quantitative with Creat Ratio     ALT     Basic metabolic panel     Hemoglobin A1c     Lipid panel reflex to direct LDL Fasting     TSH with free T4 reflex          Today's Medication Changes          These changes are accurate as of 3/16/18 12:08 PM.  If you have any questions, ask your nurse or doctor.               Start taking these medicines.        Dose/Directions    aspirin 81 MG EC tablet   Used for:  Uncontrolled type 1 diabetes mellitus without complication (H), Uncontrolled type 2 diabetes mellitus without complication, with long-term current use of insulin (H), Hyperlipidemia LDL goal <100   Started by:  Maren " Raymond Villar MD        Dose:  81 mg   Take 1 tablet (81 mg) by mouth daily   Quantity:  90 tablet   Refills:  3       nicotine 10 MG Inhaler   Commonly known as:  NICOTROL   Used for:  Tobacco dependence syndrome   Started by:  Raymond Engel MD        Dose:  6-16 Cartridge   Inhale 6-16 Cartridges into the lungs daily as needed for smoking cessation   Quantity:  180 each   Refills:  11         These medicines have changed or have updated prescriptions.        Dose/Directions    * blood glucose monitoring test strip   Commonly known as:  JONATHAN CONTOUR NEXT   This may have changed:  Another medication with the same name was added. Make sure you understand how and when to take each.   Used for:  Diabetes type 2, controlled (H)   Changed by:  Raymond Engel MD        Use to test blood sugar 4 times daily or as directed.   Quantity:  200 each   Refills:  11       * blood glucose monitoring test strip   Commonly known as:  no brand specified   This may have changed:  You were already taking a medication with the same name, and this prescription was added. Make sure you understand how and when to take each.   Used for:  Uncontrolled type 2 diabetes mellitus without complication, with long-term current use of insulin (H), Uncontrolled type 1 diabetes mellitus without complication (H)   Changed by:  Raymond Engel MD        Use to test blood sugars qid  times daily or as directed   Quantity:  200 strip   Refills:  11       * Notice:  This list has 2 medication(s) that are the same as other medications prescribed for you. Read the directions carefully, and ask your doctor or other care provider to review them with you.         Where to get your medicines      These medications were sent to [facility] - [place], [place] - [address], [address]  [address], [address], [facility] [address]     Phone:  [phone]     aspirin 81 MG EC tablet    BASAGLAR 100  UNIT/ML injection    blood glucose monitoring test strip    canagliflozin 300 MG tablet    nicotine 10 MG Inhaler                Primary Care Provider Office Phone # Fax #    Raymond Aurea Engel [phone] [address]  [facility] [address]        Equal Access to Services     PEYTON BURGOS : Hadii aad ku hadasho Soomaali, waaxda luqadaha, qaybta kaalmada adeegyada, waxay idiin hayaan adeeg khphilsh la'yayon ah. So [facility] [phone].    ATENCIÓN: Si habla español, tiene a merrill disposición servicios gratuitos de asistencia lingüística. Llame al [phone].    We comply with applicable federal civil rights laws and [place] laws. We do not discriminate on the basis of race, color, national origin, age, disability, sex, sexual orientation, or gender identity.            Thank you!     Thank you for choosing [facility]  for your care. Our goal is always to provide you with excellent care. Hearing back from our patients is one way we can continue to improve our services. Please take a few minutes to complete the written survey that you may receive in the mail after your visit with us. Thank you!             Your Updated Medication List - Protect others around you: Learn how to safely use, store and throw away your medicines at www.disposemymeds.org.          This list is accurate as of 3/16/18 12:08 PM.  Always use your most recent med list.                   Brand Name Dispense Instructions for use Diagnosis    alum & mag hydroxide-simethicone 200-200-20 MG/5ML Susp suspension    antacid    355 mL    Take 15 mLs by mouth every 4 hours as needed for indigestion    Gastroesophageal reflux disease without esophagitis       aspirin 81 MG EC tablet     90 tablet    Take 1 tablet (81 mg) by mouth daily    Uncontrolled type 1 diabetes mellitus without complication (H), Uncontrolled type 2 diabetes mellitus without complication, with long-term current use of insulin  (H), Hyperlipidemia LDL goal <100       ASPIRIN NOT PRESCRIBED    INTENTIONAL     continuous prn for other Antiplatelet medication not prescribed intentionally due to Not indicated based on age/GI distress        atorvastatin 80 MG tablet    LIPITOR    90 tablet    Take 1 tablet (80 mg) by mouth daily    Hyperlipidemia LDL goal <100       B-D U/F 31G X 8 MM   Generic drug:  insulin pen needle     400 each    TEST FOUR TIMES A DAY OR AS DIRECTED    Diabetes type 2, controlled (H)       BASAGLAR 100 UNIT/ML injection     15 mL    Inject 60 units SQ each at bedtime.    Uncontrolled type 1 diabetes mellitus without complication (H), Uncontrolled type 2 diabetes mellitus without complication, with long-term current use of insulin (H)       blood glucose monitoring lancets     2 Box    Use to test blood sugar 4 times daily or as directed.    Diabetes type 2, controlled (H)       * blood glucose monitoring test strip    JONATHAN CONTOUR NEXT    200 each    Use to test blood sugar 4 times daily or as directed.    Diabetes type 2, controlled (H)       * blood glucose monitoring test strip    no brand specified    200 strip    Use to test blood sugars qid  times daily or as directed    Uncontrolled type 2 diabetes mellitus without complication, with long-term current use of insulin (H), Uncontrolled type 1 diabetes mellitus without complication (H)       calcium-vitamin D 600-400 MG-UNIT per tablet    CALTRATE    180 tablet    Take 1 tablet by mouth 2 times daily    Vitamin D deficiency       canagliflozin 300 MG tablet    INVOKANA    90 tablet    Take 1 tablet (300 mg) by mouth every morning (before breakfast)    Uncontrolled type 2 diabetes mellitus without complication, with long-term current use of insulin (H)       continuous blood glucose monitoring sensor     9 each    For use with Freestyle Dodie Flash  for continuous monitioring of blood glucose levels. Replace sensor every 10 days.    Type 2 diabetes mellitus  "with hyperglycemia, with long-term current use of insulin (H)       ezetimibe 10 MG tablet    ZETIA    90 tablet    Take 1 tablet (10 mg) by mouth daily    Hyperlipidemia LDL goal <100       FREESTYLE RAHEEM READER Cathy     1 Device    1 Device daily    Type 2 diabetes mellitus with hyperglycemia, with long-term current use of insulin (H)       insulin lispro 100 UNIT/ML injection    HumaLOG KWIKpen    15 mL    30 units before breakfast, 35 units before lunch, 35 units before dinner    Uncontrolled type 2 diabetes mellitus without complication, with long-term current use of insulin (H)       insulin syringe-needle U-100 30G X 1/2\" 1 ML    BD insulin syringe ultrafine    100 each    Use one syringe daily or as directed.  3 month supply    Diabetes type 2, controlled (H)       lisinopril 2.5 MG tablet    PRINIVIL/Zestril    90 tablet    Take 1 tablet (2.5 mg) by mouth daily    Hypertension goal BP (blood pressure) < 140/80, Uncontrolled type 2 diabetes mellitus without complication, with long-term current use of insulin (H)       meloxicam 15 MG tablet    MOBIC    30 tablet    Take 1 tablet (15 mg) by mouth daily    Sprain of right rotator cuff capsule, initial encounter       metFORMIN 1000 MG tablet    GLUCOPHAGE    90 tablet    TAKE ONE-HALF TABLET BY MOUTH TWICE DAILY WITH MEALS    Uncontrolled type 2 diabetes mellitus without complication, with long-term current use of insulin (H)       nicotine 10 MG Inhaler    NICOTROL    180 each    Inhale 6-16 Cartridges into the lungs daily as needed for smoking cessation    Tobacco dependence syndrome       pioglitazone 45 MG tablet    ACTOS    90 tablet    Take 1 tablet (45 mg) by mouth daily    Uncontrolled type 2 diabetes mellitus without complication, with long-term current use of insulin (H)       Urea 20 % Crea cream     85 g    Apply topically as needed    Tyloma       vitamin D 86045 UNIT capsule    ERGOCALCIFEROL    4 capsule    TAKE 1 CAPLET ONCE WEEKLY Profile Rx: " patient will contact pharmacy when needed    Vitamin D deficiency       * Notice:  This list has 2 medication(s) that are the same as other medications prescribed for you. Read the directions carefully, and ask your doctor or other care provider to review them with you.

## 2018-03-16 NOTE — LETTER
My Depression Action Plan  Name: Karen Braxton   Date of Birth 1970  Date: 3/16/2018    My doctor: Raymond Engel   My clinic: [address]  [facility] [address] [phone]          GREEN    ZONE   Good Control    What it looks like:     Things are going generally well. You have normal up s and down s. You may even feel depressed from time to time, but bad moods usually last less than a day.   What you need to do:  1. Continue to care for yourself (see self care plan)  2. Check your depression survival kit and update it as needed  3. Follow your physician s recommendations including any medication.  4. Do not stop taking medication unless you consult with your physician first.           YELLOW         ZONE Getting Worse    What it looks like:     Depression is starting to interfere with your life.     It may be hard to get out of bed; you may be starting to isolate yourself from others.    Symptoms of depression are starting to last most all day and this has happened for several days.     You may have suicidal thoughts but they are not constant.   What you need to do:     1. Call your care team, your response to treatment will improve if you keep your care team informed of your progress. Yellow periods are signs an adjustment may need to be made.     2. Continue your self-care, even if you have to fake it!    3. Talk to someone in your support network    4. Open up your depression survival kit           RED    ZONE Medical Alert - Get Help    What it looks like:     Depression is seriously interfering with your life.     You may experience these or other symptoms: You can t get out of bed most days, can t work or engage in other necessary activities, you have trouble taking care of basic hygiene, or basic responsibilities, thoughts of suicide or death that will not go away, self-injurious behavior.     What you need to  do:  1. Call your care team and request a same-day appointment. If they are not available (weekends or after hours) call your local crisis line, emergency room or 911.            Depression Self Care Plan / Survival Kit    Self-Care for Depression  Here s the deal. Your body and mind are really not as separate as most people think.  What you do and think affects how you feel and how you feel influences what you do and think. This means if you do things that people who feel good do, it will help you feel better.  Sometimes this is all it takes.  There is also a place for medication and therapy depending on how severe your depression is, so be sure to consult with your medical provider and/ or Behavioral Health Consultant if your symptoms are worsening or not improving.     In order to better manage my stress, I will:    Exercise  Get some form of exercise, every day. This will help reduce pain and release endorphins, the  feel good  chemicals in your brain. This is almost as good as taking antidepressants!  This is not the same as joining a gym and then never going! (they count on that by the way ) It can be as simple as just going for a walk or doing some gardening, anything that will get you moving.      Hygiene   Maintain good hygiene (Get out of bed in the morning, Make your bed, Brush your teeth, Take a shower, and Get dressed like you were going to work, even if you are unemployed).  If your clothes don't fit try to get ones that do.    Diet  I will strive to eat foods that are good for me, drink plenty of water, and avoid excessive sugar, caffeine, alcohol, and other mood-altering substances.  Some foods that are helpful in depression are: complex carbohydrates, B vitamins, flaxseed, fish or fish oil, fresh fruits and vegetables.    Psychotherapy  I agree to participate in Individual Therapy (if recommended).    Medication  If prescribed medications, I agree to take them.  Missing doses can result in serious  side effects.  I understand that drinking alcohol, or other illicit drug use, may cause potential side effects.  I will not stop my medication abruptly without first discussing it with my provider.    Staying Connected With Others  I will stay in touch with my friends, family members, and my primary care provider/team.    Use your imagination  Be creative.  We all have a creative side; it doesn t matter if it s oil painting, sand castles, or mud pies! This will also kick up the endorphins.    Witness Beauty  (AKA stop and smell the roses) Take a look outside, even in mid-winter. Notice colors, textures. Watch the squirrels and birds.     Service to others  Be of service to others.  There is always someone else in need.  By helping others we can  get out of ourselves  and remember the really important things.  This also provides opportunities for practicing all the other parts of the program.    Humor  Laugh and be silly!  Adjust your TV habits for less news and crime-drama and more comedy.    Control your stress  Try breathing deep, massage therapy, biofeedback, and meditation. Find time to relax each day.     My support system    Clinic Contact:  Phone number:    Contact 1:  Phone number:    Contact 2:  Phone number:    Anabaptist/:  Phone number:    Therapist:  Phone number:    Local crisis center:    Phone number:    Other community support:  Phone number:

## 2018-03-16 NOTE — NURSING NOTE
"Chief Complaint   Patient presents with     Diabetes       Initial /68 (Cuff Size: Adult Regular)  Pulse 74  Temp 97.3  F (36.3  C) (Tympanic)  Resp 16  Wt 134 lb 8 oz (61 kg)  LMP  (LMP Unknown)  SpO2 98%  BMI 22.04 kg/m2 Estimated body mass index is 22.04 kg/(m^2) as calculated from the following:    Height as of 3/8/18: 5' 5.5\" (1.664 m).    Weight as of this encounter: 134 lb 8 oz (61 kg).  Medication Reconciliation: complete   Radha Pierre CMA    "

## 2018-03-16 NOTE — PATIENT INSTRUCTIONS
The 10-year ASCVD risk score (Yazmin GUZMAN Jr, et al., 2013) is: 13.9%    Values used to calculate the score:      Age: 47 years      Sex: Female      Is Non- : Yes      Diabetic: Yes      Tobacco smoker: Yes      Systolic Blood Pressure: 116 mmHg      Is BP treated: Yes      HDL Cholesterol: 48 mg/dL      Total Cholesterol: 242 mg/dL   (E78.5) Hyperlipidemia LDL goal <100  (primary encounter diagnosis)  Comment:    Plan: ALT, Albumin Random Urine Quantitative with         Creat Ratio, Lipid panel reflex to direct LDL         Fasting, Basic metabolic panel, aspirin 81 MG         EC tablet             (E11.65,  Z79.4) Uncontrolled type 2 diabetes mellitus without complication, with long-term current use of insulin (H)  Comment:    Plan: canagliflozin (INVOKANA) 300 MG tablet,         BASAGLAR 100 UNIT/ML injection, blood glucose         monitoring (NO BRAND SPECIFIED) test strip,         Hemoglobin A1c, TSH with free T4 reflex,         aspirin 81 MG EC tablet             (E10.65) Uncontrolled type 1 diabetes mellitus without complication (H)  Comment:    Plan: BASAGLAR 100 UNIT/ML injection, blood glucose         monitoring (NO BRAND SPECIFIED) test strip,         aspirin 81 MG EC tablet             (E55.9) Vitamin D deficiency  Comment:    Plan:      (F17.200) Tobacco dependence syndrome  Comment:    Plan: nicotine (NICOTROL) 10 MG Inhaler

## 2018-03-16 NOTE — LETTER
"March 19, 2018      Karen Braxton  [address]   [facility] [address]        Dear ,    We are writing to inform you of your test results.    Normal DIABETES URINE PROTEIN TEST   NORMAL THYROID STIMULATING HORMONE TEST   NORMAL LIVER FUNCTION TEST   GLUCOSE, RENAL AND BLOOD SALTS  WITHIN NORMAL LIMITS   EXCEPT VERY HIGH GLUCOSE LEVEL BUT BETTER THAN ONE YEAR AGO   HIGH TOTAL CHOLESTEROL   HIGH TRIGLYCERIDES   LOW HDL OR \"GOOD\" CHOLESTEROL   MODERATE HIGH LDL OR \"BAD\" CHOLESTEROL   HIGH VERY LOW DENSITY CHOLESTEROL   THREE MONTH GLUCOSE AVERAGE ABOVE TARGET LEVEL  OF 8     Resulted Orders   Hemoglobin A1c   Result Value Ref Range    Hemoglobin A1C 8.5 (H) 4.3 - 6.0 %   ALT   Result Value Ref Range    ALT 16 0 - 50 U/L   Albumin Random Urine Quantitative with Creat Ratio   Result Value Ref Range    Creatinine Urine 48 mg/dL    Albumin Urine mg/L 12 mg/L    Albumin Urine mg/g Cr 24.21 0 - 25 mg/g Cr   Lipid panel reflex to direct LDL Fasting   Result Value Ref Range    Cholesterol 229 (H) <200 mg/dL      Comment:      Desirable:       <200 mg/dl    Triglycerides 183 (H) <150 mg/dL      Comment:      Borderline high:  150-199 mg/dl  High:             200-499 mg/dl  Very high:       >499 mg/dl  Non Fasting      HDL Cholesterol 43 (L) >49 mg/dL    LDL Cholesterol Calculated 149 (H) <100 mg/dL      Comment:      Above desirable:  100-129 mg/dl  Borderline High:  130-159 mg/dL  High:             160-189 mg/dL  Very high:       >189 mg/dl      Non HDL Cholesterol 186 (H) <130 mg/dL      Comment:      Above Desirable:  130-159 mg/dl  Borderline high:  160-189 mg/dl  High:             190-219 mg/dl  Very high:       >219 mg/dl     Basic metabolic panel   Result Value Ref Range    Sodium 136 133 - 144 mmol/L    Potassium 4.2 3.4 - 5.3 mmol/L    Chloride 104 94 - 109 mmol/L    Carbon Dioxide 27 20 - 32 mmol/L    Anion Gap 5 3 - 14 mmol/L    Glucose 268 (H) 70 - 99 mg/dL      Comment:      Non Fasting    Urea " Nitrogen 10 7 - 30 mg/dL    Creatinine 0.67 0.52 - 1.04 mg/dL    GFR Estimate >90 >60 mL/min/1.7m2      Comment:      Non  GFR Calc    GFR Estimate If Black >90 >60 mL/min/1.7m2      Comment:       GFR Calc    Calcium 9.0 8.5 - 10.1 mg/dL   TSH with free T4 reflex   Result Value Ref Range    TSH 2.12 0.40 - 4.00 mU/L       If you have any questions or concerns, please call the clinic at the number listed above.       Sincerely,        LINDA ALCANTAR MD

## 2018-03-16 NOTE — PROGRESS NOTES
SUBJECTIVE:   Karen Braxton is a 47 year old female who presents to clinic today for the following health issues:      Diabetes Follow-up     Patient is checking blood sugars: 4 times daily.    Blood sugar testing frequency justification: Uncontrolled diabetes  Results are as follows:         am - 179-300 varies             Diabetic concerns: other - change of medication do to insurance     Symptoms of hypoglycemia (low blood sugar): none     Paresthesias (numbness or burning in feet) or sores: No     Date of last diabetic eye exam: 5/2017    BP Readings from Last 2 Encounters:   03/08/18 139/82   12/05/17 127/85     Hemoglobin A1C (%)   Date Value   03/27/2017 8.2 (H)   11/14/2016 8.1 (H)     LDL Cholesterol Calculated (mg/dL)   Date Value   03/27/2017 160 (H)   03/21/2016 142 (H)       Amount of exercise or physical activity: walking    Problems taking medications regularly: No    Medication side effects: none    Diet: regular (no restrictions)      .LINDA ALCANTAR MD .3/16/2018 3:32 PM .March 16, 2018        Karen Braxton is a 47 year old female who is who presents with  smoker    Onset : many years      Severity: moderate       Home treatments   Prolonged taper  Down to 2 cigarettes per day      Additional Symptoms: none     Course  Improving     Wants to try inhalers                 Topic Date Due     CREATININE Q1 YEAR  11/14/2017     MICROALBUMIN Q1 YEAR  11/14/2017     DEPRESSION ACTION PLAN Q1 YR  12/20/2017     PHQ-9 Q6 MONTHS  02/28/2018     TSH W/ FREE T4 REFLEX Q2 YEAR  03/21/2018     LIPID MONITORING Q1 YEAR  03/27/2018     PAP Q3 YR  07/14/2018               .  Current Outpatient Prescriptions   Medication Sig Dispense Refill     canagliflozin (INVOKANA) 300 MG tablet Take 1 tablet (300 mg) by mouth every morning (before breakfast) 90 tablet 3     BASAGLAR 100 UNIT/ML injection Inject 60 units SQ each at bedtime. 15 mL 12     blood glucose monitoring (NO BRAND SPECIFIED) test strip Use to test blood  sugars qid  times daily or as directed 200 strip 11     aspirin 81 MG EC tablet Take 1 tablet (81 mg) by mouth daily 90 tablet 3     nicotine (NICOTROL) 10 MG Inhaler Inhale 6-16 Cartridges into the lungs daily as needed for smoking cessation 180 each 11     alum & mag hydroxide-simethicone (ANTACID) 200-200-20 MG/5ML SUSP suspension Take 15 mLs by mouth every 4 hours as needed for indigestion 355 mL 11     vitamin D (ERGOCALCIFEROL) 64362 UNIT capsule TAKE 1 CAPLET ONCE WEEKLY Profile Rx: patient will contact pharmacy when needed 4 capsule 5     Continuous Blood Gluc  (FREESTYLE RAHEEM READER) DENNISE 1 Device daily 1 Device 0     continuous blood glucose monitoring (FREESTYLE RAHEEM) sensor For use with Freestyle Raheem Flash  for continuous monitioring of blood glucose levels. Replace sensor every 10 days. 9 each 1     pioglitazone (ACTOS) 45 MG tablet Take 1 tablet (45 mg) by mouth daily 90 tablet 0     lisinopril (PRINIVIL/ZESTRIL) 2.5 MG tablet Take 1 tablet (2.5 mg) by mouth daily 90 tablet 0     Urea 20 % CREA cream Apply topically as needed 85 g 3     calcium-vitamin D (CALTRATE) 600-400 MG-UNIT per tablet Take 1 tablet by mouth 2 times daily 180 tablet 3     metFORMIN (GLUCOPHAGE) 1000 MG tablet TAKE ONE-HALF TABLET BY MOUTH TWICE DAILY WITH MEALS 90 tablet 1     meloxicam (MOBIC) 15 MG tablet Take 1 tablet (15 mg) by mouth daily 30 tablet 1     insulin lispro (HUMALOG KWIKPEN) 100 UNIT/ML injection 30 units before breakfast, 35 units before lunch, 35 units before dinner 15 mL 11     atorvastatin (LIPITOR) 80 MG tablet Take 1 tablet (80 mg) by mouth daily 90 tablet 3     blood glucose monitoring (JONATHAN CONTOUR NEXT) test strip Use to test blood sugar 4 times daily or as directed. 200 each 11     blood glucose monitoring (JONATHAN MICROLET) lancets Use to test blood sugar 4 times daily or as directed. 2 Box 6     ezetimibe (ZETIA) 10 MG tablet Take 1 tablet (10 mg) by mouth daily 90 tablet 3     B-D  "U/F 31G X 8 MM insulin pen needle TEST FOUR TIMES A DAY OR AS DIRECTED 400 each 1     insulin syringe-needle U-100 (BD INSULIN SYRINGE ULTRAFINE) 30G X 1/2\" 1 ML Use one syringe daily or as directed.  3 month supply 100 each prn     [DISCONTINUED] BASAGLAR 100 UNIT/ML injection Inject 60 units SQ each at bedtime. 15 mL 12     ASPIRIN NOT PRESCRIBED (INTENTIONAL) continuous prn for other Antiplatelet medication not prescribed intentionally due to Not indicated based on age/GI distress                No Known Allergies      Immunization History   Administered Date(s) Administered     Influenza (IIV3) PF 2007, 10/22/2008, 2009, 2010, 2013, 10/28/2014     Influenza Vaccine IM 3yrs+ 4 Valent IIV4 2015, 10/25/2017     Pneumo Conj 13-V (2010&after) 10/03/2011     TD (ADULT, 7+) 1998     Tdap (Adacel,Boostrix) 02/10/2009               reports that she does not drink alcohol.          reports that she does not use illicit drugs.        family history includes Asthma in her mother; DIABETES in her father and mother; HEART DISEASE in her mother; Hypertension in her father and mother; Lipids in her mother. There is no history of CANCER, Coronary Artery Disease, Hyperlipidemia, CEREBROVASCULAR DISEASE, Breast Cancer, Colon Cancer, Prostate Cancer, Other Cancer, Depression, Anxiety Disorder, MENTAL ILLNESS, Substance Abuse, Anesthesia Reaction, OSTEOPOROSIS, Genetic Disorder, Thyroid Disease, Obesity, or Unknown/Adopted.        indicated that the status of her no family hx of is unknown. She indicated that her mother is . She indicated that her father is .          has a past surgical history that includes Cholecystectomy (2007); Facial reconstruction surgery (2 years old); and orthopedic surgery (2001).         reports that she currently engages in sexual activity and has had male partners.    .  Pediatric History   Patient Guardian Status     Not on file.     Other " Topics Concern     Parent/Sibling W/ Cabg, Mi Or Angioplasty Before 65f 55m? No     Social History Narrative               reports that she has been smoking Cigarettes.  She has never used smokeless tobacco.        Medical, social, surgical, and family histories reviewed.        Labs reviewed in EPIC  Patient Active Problem List   Diagnosis     Other dental caries     Disorder of bursae and tendons in shoulder region     Insomnia     Vitamin D deficiency     Premature menopause     ASCUS favor benign     Hyperlipidemia LDL goal <100     Comprehensive diabetic foot examination, type 2 DM, encounter for (H)     Hypertension goal BP (blood pressure) < 140/90     DiarrheaX 2 over last 24hrs w one explosive r/o 2ndary to acid gastritis     Uncontrolled type 2 diabetes mellitus without complication, with long-term current use of insulin (H)     Esophageal reflux 2ndary to hi continuous intake of sugared  tea     Hyperlipidemia with target LDL less than 100     Diabetes type 2, controlled (H)     Intractable chronic migraine without aura and without status migrainosus     Needle stick injury, subsequent encounter     Hammer toe of right foot     Type 2 diabetes mellitus without complication, with long-term current use of insulin (H)       Past Surgical History:   Procedure Laterality Date     CHOLECYSTECTOMY  9/14/2007    laproscopic     FACIAL RECONSTRUCTION SURGERY  2 years old    cleft lip repair     ORTHOPEDIC SURGERY  6/2001    left knee         Social History   Substance Use Topics     Smoking status: Current Some Day Smoker     Types: Cigarettes     Smokeless tobacco: Never Used      Comment: 3 cigarettes daily     Alcohol use No       Family History   Problem Relation Age of Onset     DIABETES Mother      Hypertension Mother      HEART DISEASE Mother      Lipids Mother      Asthma Mother      DIABETES Father      Hypertension Father      CANCER No family hx of      No known family hx of skin cancer     Coronary  Artery Disease No family hx of      Hyperlipidemia No family hx of      CEREBROVASCULAR DISEASE No family hx of      Breast Cancer No family hx of      Colon Cancer No family hx of      Prostate Cancer No family hx of      Other Cancer No family hx of      Depression No family hx of      Anxiety Disorder No family hx of      MENTAL ILLNESS No family hx of      Substance Abuse No family hx of      Anesthesia Reaction No family hx of      OSTEOPOROSIS No family hx of      Genetic Disorder No family hx of      Thyroid Disease No family hx of      Obesity No family hx of      Unknown/Adopted No family hx of              Current Outpatient Prescriptions   Medication Sig Dispense Refill     canagliflozin (INVOKANA) 300 MG tablet Take 1 tablet (300 mg) by mouth every morning (before breakfast) 90 tablet 3     BASAGLAR 100 UNIT/ML injection Inject 60 units SQ each at bedtime. 15 mL 12     blood glucose monitoring (NO BRAND SPECIFIED) test strip Use to test blood sugars qid  times daily or as directed 200 strip 11     aspirin 81 MG EC tablet Take 1 tablet (81 mg) by mouth daily 90 tablet 3     nicotine (NICOTROL) 10 MG Inhaler Inhale 6-16 Cartridges into the lungs daily as needed for smoking cessation 180 each 11     alum & mag hydroxide-simethicone (ANTACID) 200-200-20 MG/5ML SUSP suspension Take 15 mLs by mouth every 4 hours as needed for indigestion 355 mL 11     vitamin D (ERGOCALCIFEROL) 44101 UNIT capsule TAKE 1 CAPLET ONCE WEEKLY Profile Rx: patient will contact pharmacy when needed 4 capsule 5     Continuous Blood Gluc  (FREESTYLE RAHEEM READER) DENNISE 1 Device daily 1 Device 0     continuous blood glucose monitoring (FREESTYLE RAHEEM) sensor For use with Freestyle Raheem Flash  for continuous monitioring of blood glucose levels. Replace sensor every 10 days. 9 each 1     pioglitazone (ACTOS) 45 MG tablet Take 1 tablet (45 mg) by mouth daily 90 tablet 0     lisinopril (PRINIVIL/ZESTRIL) 2.5 MG tablet Take  "1 tablet (2.5 mg) by mouth daily 90 tablet 0     Urea 20 % CREA cream Apply topically as needed 85 g 3     calcium-vitamin D (CALTRATE) 600-400 MG-UNIT per tablet Take 1 tablet by mouth 2 times daily 180 tablet 3     metFORMIN (GLUCOPHAGE) 1000 MG tablet TAKE ONE-HALF TABLET BY MOUTH TWICE DAILY WITH MEALS 90 tablet 1     meloxicam (MOBIC) 15 MG tablet Take 1 tablet (15 mg) by mouth daily 30 tablet 1     insulin lispro (HUMALOG KWIKPEN) 100 UNIT/ML injection 30 units before breakfast, 35 units before lunch, 35 units before dinner 15 mL 11     atorvastatin (LIPITOR) 80 MG tablet Take 1 tablet (80 mg) by mouth daily 90 tablet 3     blood glucose monitoring (JONATHAN CONTOUR NEXT) test strip Use to test blood sugar 4 times daily or as directed. 200 each 11     blood glucose monitoring (JONATHAN MICROLET) lancets Use to test blood sugar 4 times daily or as directed. 2 Box 6     ezetimibe (ZETIA) 10 MG tablet Take 1 tablet (10 mg) by mouth daily 90 tablet 3     B-D U/F 31G X 8 MM insulin pen needle TEST FOUR TIMES A DAY OR AS DIRECTED 400 each 1     insulin syringe-needle U-100 (BD INSULIN SYRINGE ULTRAFINE) 30G X 1/2\" 1 ML Use one syringe daily or as directed.  3 month supply 100 each prn     [DISCONTINUED] BASAGLAR 100 UNIT/ML injection Inject 60 units SQ each at bedtime. 15 mL 12     ASPIRIN NOT PRESCRIBED (INTENTIONAL) continuous prn for other Antiplatelet medication not prescribed intentionally due to Not indicated based on age/GI distress             Recent Labs   Lab Test  03/16/18   1211  03/27/17   1442  11/14/16   1058  03/21/16   1030   06/23/15   1652  12/01/14   1222   05/29/14   1045  03/18/14   1518   A1C  8.5*  8.2*  8.1*  8.1*   < >  8.1*  8.5*   < >  11.4*   --    LDL   --   160*   --   142*   --    --   114   < >  64   --    HDL   --   48*   --   44*   --    --   36*   < >  36*   --    TRIG   --   171*   --   223*   --    --   323*   < >  362*   --    ALT   --    --    --   30   --   21   --    --   23 30 "   CR   --    --   0.83  0.70   --   0.80  0.80   < >  0.70  0.69   GFRESTIMATED   --    --   73  90   --   77  77   < >  >90  >90   GFRESTBLACK   --    --   89  >90   --   >90  >90   < >  >90  >90   POTASSIUM   --    --   4.1  4.4   --   4.2  4.0   < >  4.1  4.0   TSH   --    --    --   1.31   --    --    --    --    --   1.55    < > = values in this interval not displayed.            BP Readings from Last 6 Encounters:   03/16/18 116/68   03/08/18 139/82   12/05/17 127/85   09/05/17 122/85   08/31/17 130/78   08/14/17 112/64           Wt Readings from Last 3 Encounters:   03/16/18 134 lb 8 oz (61 kg)   03/08/18 138 lb 11.2 oz (62.9 kg)   12/05/17 138 lb (62.6 kg)                 Positive symptoms or findings indicated by bold designation:         ROS: 10 point ROS neg other than the symptoms noted above in the HPI.except  has Other dental caries; Disorder of bursae and tendons in shoulder region; Insomnia; Vitamin D deficiency; Premature menopause; ASCUS favor benign; Hyperlipidemia LDL goal <100; Comprehensive diabetic foot examination, type 2 DM, encounter for (H); Hypertension goal BP (blood pressure) < 140/90; DiarrheaX 2 over last 24hrs w one explosive r/o 2ndary to acid gastritis; Uncontrolled type 2 diabetes mellitus without complication, with long-term current use of insulin (H); Esophageal reflux 2ndary to hi continuous intake of sugared  tea; Hyperlipidemia with target LDL less than 100; Diabetes type 2, controlled (H); Intractable chronic migraine without aura and without status migrainosus; Needle stick injury, subsequent encounter; Hammer toe of right foot; and Type 2 diabetes mellitus without complication, with long-term current use of insulin (H) on her problem list.  Review Of Systems    Skin: negative    Eyes: negative    Ears/Nose/Throat: negative    Respiratory: No shortness of breath, dyspnea on exertion, cough, or hemoptysis    Smoker      Cardiovascular: negative    Gastrointestinal:  negative    Genitourinary: negative    Musculoskeletal: negative    Neurologic: negative    Psychiatric: negative    Hematologic/Lymphatic/Immunologic: negative    Endocrine: negative and diabetes                PE:  /68 (Cuff Size: Adult Regular)  Pulse 74  Temp 97.3  F (36.3  C) (Tympanic)  Resp 16  Wt 134 lb 8 oz (61 kg)  LMP  (LMP Unknown)  SpO2 98%  BMI 22.04 kg/m2 Body mass index is 22.04 kg/(m^2).        Constitutional: general appearance, well nourished, well developed, in no acute distress, well developed, appears stated age, normal body habitus,          Eyes:; The patient has normal eyelids sclerae and conjunctivae :          Ears/Nose/Throat: external ear, overall: normal appearance; external nose, overall: benign appearance, normal moujth gums and lips           Neck: thyroid, overall: normal size, normal consistency, nontender,          Respiratory:  palpation of chest, overall: normal excursion,    Clear to percussion and auscultation     NO Tachypnea    NORMAL  Color          Cardiovascular:  Good color with no peripheral edema    Regular sinus rhythm without murmur.  Physiologic heart sounds   Heart is unelarged    .     Chest/Breast: normal shape           Abdominal exam,  Liver and spleen are  unenlarged        Tenderness    Scars              Urogenital; no renal, flank or bladder  tenderness;          Lymphatic: neck nodes,     Other nodes         Musculoskeletal:  Brief ortho exam normal except:   Within normal limits         Integument: inspection of skin, no rash, lesions; and, palpation, no induration, no tenderness.          Neurologic mental status, overall: alert and oriented; gait, no ataxia, no unsteadiness; coordination, no tremors; cranial nerves, overall: normal motor, overall: normal bulk, tone.          Psychiatric: orientation/consciousness, overall: oriented to person, place and time; behavior/psychomotor activity, no tics, normal psychomotor activity; mood and  affect, overall: normal mood and affect; appearance, overall: well-groomed, good eye contact; speech, overall: normal quality, no aphasia and normal quality, quantity, intact.        Diagnostic Test Results:  Results for orders placed or performed in visit on 03/16/18   Hemoglobin A1c   Result Value Ref Range    Hemoglobin A1C 8.5 (H) 4.3 - 6.0 %           ICD-10-CM    1. Hyperlipidemia LDL goal <100 E78.5 ALT     Albumin Random Urine Quantitative with Creat Ratio     Lipid panel reflex to direct LDL Fasting     Basic metabolic panel     aspirin 81 MG EC tablet   2. Uncontrolled type 2 diabetes mellitus without complication, with long-term current use of insulin (H) E11.65 canagliflozin (INVOKANA) 300 MG tablet    Z79.4 BASAGLAR 100 UNIT/ML injection     blood glucose monitoring (NO BRAND SPECIFIED) test strip     Hemoglobin A1c     TSH with free T4 reflex     aspirin 81 MG EC tablet   3. Uncontrolled type 1 diabetes mellitus without complication (H) E10.65 BASAGLAR 100 UNIT/ML injection     blood glucose monitoring (NO BRAND SPECIFIED) test strip     aspirin 81 MG EC tablet   4. Vitamin D deficiency E55.9    5. Tobacco dependence syndrome F17.200 nicotine (NICOTROL) 10 MG Inhaler              .    Side effects benefits and risks thoroughly discussed. .she may come in early if unimproved or getting worse          Please drink 2 glasses of water prior to meals and walk 15-30 minutes after meals        I spent  25 minutes   with patient discussing the following issues    The primary encounter diagnosis was Hyperlipidemia LDL goal <100. Diagnoses of Uncontrolled type 2 diabetes mellitus without complication, with long-term current use of insulin (H), Uncontrolled type 1 diabetes mellitus without complication (H), Vitamin D deficiency, and Tobacco dependence syndrome were also pertinent to this visit. over half of which involved counseling and coordination of care.      Patient Instructions   The 10-year ASCVD risk  score (Yazmin GUZMAN Jr, et al., 2013) is: 13.9%    Values used to calculate the score:      Age: 47 years      Sex: Female      Is Non- : Yes      Diabetic: Yes      Tobacco smoker: Yes      Systolic Blood Pressure: 116 mmHg      Is BP treated: Yes      HDL Cholesterol: 48 mg/dL      Total Cholesterol: 242 mg/dL   (E78.5) Hyperlipidemia LDL goal <100  (primary encounter diagnosis)  Comment:    Plan: ALT, Albumin Random Urine Quantitative with         Creat Ratio, Lipid panel reflex to direct LDL         Fasting, Basic metabolic panel, aspirin 81 MG         EC tablet             (E11.65,  Z79.4) Uncontrolled type 2 diabetes mellitus without complication, with long-term current use of insulin (H)  Comment:    Plan: canagliflozin (INVOKANA) 300 MG tablet,         BASAGLAR 100 UNIT/ML injection, blood glucose         monitoring (NO BRAND SPECIFIED) test strip,         Hemoglobin A1c, TSH with free T4 reflex,         aspirin 81 MG EC tablet             (E10.65) Uncontrolled type 1 diabetes mellitus without complication (H)  Comment:    Plan: BASAGLAR 100 UNIT/ML injection, blood glucose         monitoring (NO BRAND SPECIFIED) test strip,         aspirin 81 MG EC tablet             (E55.9) Vitamin D deficiency  Comment:    Plan:      (F17.200) Tobacco dependence syndrome  Comment:    Plan: nicotine (NICOTROL) 10 MG Inhaler                             ALL THE ABOVE PROBLEMS ARE STABLE AND MED CHANGES AS NOTED        Diet:  Mediterranean diet and Diabetes         Exercise: aerobic   Exercises Range of motion, balance, isometric, and strengthening exercises 30 repetitions twice daily of involved joints            .LINDA ALCANTAR MD 3/16/2018 3:32 PM  March 16, 2018

## 2018-03-17 LAB
ALT SERPL W P-5'-P-CCNC: 16 U/L (ref 0–50)
ANION GAP SERPL CALCULATED.3IONS-SCNC: 5 MMOL/L (ref 3–14)
BUN SERPL-MCNC: 10 MG/DL (ref 7–30)
CALCIUM SERPL-MCNC: 9 MG/DL (ref 8.5–10.1)
CHLORIDE SERPL-SCNC: 104 MMOL/L (ref 94–109)
CHOLEST SERPL-MCNC: 229 MG/DL
CO2 SERPL-SCNC: 27 MMOL/L (ref 20–32)
CREAT SERPL-MCNC: 0.67 MG/DL (ref 0.52–1.04)
CREAT UR-MCNC: 48 MG/DL
GFR SERPL CREATININE-BSD FRML MDRD: >90 ML/MIN/1.7M2
GLUCOSE SERPL-MCNC: 268 MG/DL (ref 70–99)
HDLC SERPL-MCNC: 43 MG/DL
LDLC SERPL CALC-MCNC: 149 MG/DL
MICROALBUMIN UR-MCNC: 12 MG/L
MICROALBUMIN/CREAT UR: 24.21 MG/G CR (ref 0–25)
NONHDLC SERPL-MCNC: 186 MG/DL
POTASSIUM SERPL-SCNC: 4.2 MMOL/L (ref 3.4–5.3)
SODIUM SERPL-SCNC: 136 MMOL/L (ref 133–144)
TRIGL SERPL-MCNC: 183 MG/DL
TSH SERPL DL<=0.005 MIU/L-ACNC: 2.12 MU/L (ref 0.4–4)

## 2018-03-17 ASSESSMENT — PATIENT HEALTH QUESTIONNAIRE - PHQ9: SUM OF ALL RESPONSES TO PHQ QUESTIONS 1-9: 0

## 2018-03-30 ENCOUNTER — OFFICE VISIT (OUTPATIENT)
Dept: EDUCATION SERVICES | Facility: CLINIC | Age: 48
End: 2018-03-30
Payer: COMMERCIAL

## 2018-03-30 DIAGNOSIS — E11.9 TYPE 2 DIABETES MELLITUS WITHOUT COMPLICATION, WITH LONG-TERM CURRENT USE OF INSULIN (H): Primary | ICD-10-CM

## 2018-03-30 DIAGNOSIS — Z79.4 TYPE 2 DIABETES MELLITUS WITHOUT COMPLICATION, WITH LONG-TERM CURRENT USE OF INSULIN (H): Primary | ICD-10-CM

## 2018-03-30 NOTE — PROGRESS NOTES
Diabetes Education Note:    Karen came in to start a Dodie Flash monitoring system, but unfortunately we do not have any of these available, so we were unable to accomplish this.    I explained to her how the system works and demonstrated with a demonstration unit how glucose is obtained from the sensor.   She has [email or web address] insurance and this system is not covered by [email or web address] at this time, so all costs would be out of pocket for her, about $100.00 per month.  She was unaware of this cost, and not sure that this is something she can afford.      We agreed that once I have some of the starter units back in stock, we can discuss whether or not she wants to move forward with getting it.      Time spent in this visit was less than 30 minutes.  No charges apply.

## 2018-03-30 NOTE — MR AVS SNAPSHOT
After Visit Summary   3/30/2018    Karen Braxton    MRN: [id number]           Patient Information     Date Of Birth          1970        Visit Information        Provider Department      3/30/2018 8:30 AM Melvina Gustafson RN [facility]        Today's Diagnoses     Type 2 diabetes mellitus without complication, with long-term current use of insulin (H)    -  1       Follow-ups after your visit        Your next 10 appointments already scheduled     2018 11:00 AM CDT   (Arrive by 10:45 AM)   RETURN DIABETES with Cecile Juarez PA-C   [facility] ([facility])    [address] [place] [id number] [phone]            2018 11:30 AM CDT   (Arrive by 11:15 AM)   RETURN DIABETES with Melisa Phillips MD   [facility] ([facility])    [address] [place] [id number] [phone]              Who to contact     Please call your clinic at [phone] to:    Ask questions about your health    Make or cancel appointments    Discuss your medicines    Learn about your test results    Speak to your doctor            Additional Information About Your Visit        MyChart Information     [employer] is an electronic gateway that provides easy, online access to your medical records. With [employer], you can request a clinic appointment, read your test results, renew a prescription or communicate with your care team.     To sign up for [email or web address] visit the website at [email or web address]   You will be asked to enter the access code listed below, as well as some personal information. Please follow the directions to create your username and password.     Your access code is: [email or web address]  Expires: 2018  7:30 AM     Your access code will  in 90 days. If you need help or a new code, please contact your [facility] or call  [phone] for assistance.        Care EveryWhere ID     This is your Care EveryWhere ID. This could be used by other organizations to access your [place] medical records  [id number]        Your Vitals Were     Last Period                   (LMP Unknown)            Blood Pressure from Last 3 Encounters:   03/16/18 116/68   03/08/18 139/82   12/05/17 127/85    Weight from Last 3 Encounters:   03/16/18 61 kg (134 lb 8 oz)   03/08/18 62.9 kg (138 lb 11.2 oz)   12/05/17 62.6 kg (138 lb)              Today, you had the following     No orders found for display       Primary Care Provider Office Phone # Fax #    Raymond Aurea Engel [phone] [address] [facility] [address]        Equal Access to Services     [facility]: Hadii aad ku hadasho Soomaali, waaxda luqadaha, qaybta kaalmada adeegyada, niranjan figueroa . So [facility] [phone].    ATENCIÓN: Si habla español, tiene a merrill disposición servicios gratuitos de asistencia lingüística. Jonathan al [phone].    We comply with applicable federal civil rights laws and [place] laws. We do not discriminate on the basis of race, color, national origin, age, disability, sex, sexual orientation, or gender identity.            Thank you!     Thank you for choosing [facility]  for your care. Our goal is always to provide you with excellent care. Hearing back from our patients is one way we can continue to improve our services. Please take a few minutes to complete the written survey that you may receive in the mail after your visit with us. Thank you!             Your Updated Medication List - Protect others around you: Learn how to safely use, store and throw away your medicines at www.disposemymeds.org.          This list is accurate as of 3/30/18 12:52 PM.  Always use your most recent med list.                   Brand Name Dispense Instructions for use Diagnosis    alum & mag hydroxide-simethicone  200-200-20 MG/5ML Susp suspension    antacid    355 mL    Take 15 mLs by mouth every 4 hours as needed for indigestion    Gastroesophageal reflux disease without esophagitis       aspirin 81 MG EC tablet     90 tablet    Take 1 tablet (81 mg) by mouth daily    Uncontrolled type 1 diabetes mellitus without complication (H), Uncontrolled type 2 diabetes mellitus without complication, with long-term current use of insulin (H), Hyperlipidemia LDL goal <100       ASPIRIN NOT PRESCRIBED    INTENTIONAL     continuous prn for other Antiplatelet medication not prescribed intentionally due to Not indicated based on age/GI distress        atorvastatin 80 MG tablet    LIPITOR    90 tablet    Take 1 tablet (80 mg) by mouth daily    Hyperlipidemia LDL goal <100       B-D U/F 31G X 8 MM   Generic drug:  insulin pen needle     400 each    TEST FOUR TIMES A DAY OR AS DIRECTED    Diabetes type 2, controlled (H)       BASAGLAR 100 UNIT/ML injection     15 mL    Inject 60 units SQ each at bedtime.    Uncontrolled type 1 diabetes mellitus without complication (H), Uncontrolled type 2 diabetes mellitus without complication, with long-term current use of insulin (H)       blood glucose monitoring lancets     2 Box    Use to test blood sugar 4 times daily or as directed.    Diabetes type 2, controlled (H)       * blood glucose monitoring test strip    JONATHAN CONTOUR NEXT    200 each    Use to test blood sugar 4 times daily or as directed.    Diabetes type 2, controlled (H)       * blood glucose monitoring test strip    no brand specified    200 strip    Use to test blood sugars qid  times daily or as directed    Uncontrolled type 2 diabetes mellitus without complication, with long-term current use of insulin (H), Uncontrolled type 1 diabetes mellitus without complication (H)       calcium-vitamin D 600-400 MG-UNIT per tablet    CALTRATE    180 tablet    Take 1 tablet by mouth 2 times daily    Vitamin D deficiency       canagliflozin 300 MG  "tablet    INVOKANA    90 tablet    Take 1 tablet (300 mg) by mouth every morning (before breakfast)    Uncontrolled type 2 diabetes mellitus without complication, with long-term current use of insulin (H)       continuous blood glucose monitoring sensor     9 each    For use with Freestyle Dodie Flash  for continuous monitioring of blood glucose levels. Replace sensor every 10 days.    Type 2 diabetes mellitus with hyperglycemia, with long-term current use of insulin (H)       ezetimibe 10 MG tablet    ZETIA    90 tablet    Take 1 tablet (10 mg) by mouth daily    Hyperlipidemia LDL goal <100       FREESTYLE DODIE READER Cathy     1 Device    1 Device daily    Type 2 diabetes mellitus with hyperglycemia, with long-term current use of insulin (H)       insulin lispro 100 UNIT/ML injection    HumaLOG KWIKpen    15 mL    30 units before breakfast, 35 units before lunch, 35 units before dinner    Uncontrolled type 2 diabetes mellitus without complication, with long-term current use of insulin (H)       insulin syringe-needle U-100 30G X 1/2\" 1 ML    BD insulin syringe ultrafine    100 each    Use one syringe daily or as directed.  3 month supply    Diabetes type 2, controlled (H)       lisinopril 2.5 MG tablet    PRINIVIL/Zestril    90 tablet    Take 1 tablet (2.5 mg) by mouth daily    Hypertension goal BP (blood pressure) < 140/80, Uncontrolled type 2 diabetes mellitus without complication, with long-term current use of insulin (H)       meloxicam 15 MG tablet    MOBIC    30 tablet    Take 1 tablet (15 mg) by mouth daily    Sprain of right rotator cuff capsule, initial encounter       metFORMIN 1000 MG tablet    GLUCOPHAGE    90 tablet    TAKE ONE-HALF TABLET BY MOUTH TWICE DAILY WITH MEALS    Uncontrolled type 2 diabetes mellitus without complication, with long-term current use of insulin (H)       nicotine 10 MG Inhaler    NICOTROL    180 each    Inhale 6-16 Cartridges into the lungs daily as needed for smoking " cessation    Tobacco dependence syndrome       pioglitazone 45 MG tablet    ACTOS    90 tablet    Take 1 tablet (45 mg) by mouth daily    Uncontrolled type 2 diabetes mellitus without complication, with long-term current use of insulin (H)       Urea 20 % Crea cream     85 g    Apply topically as needed    Tyloma       vitamin D 53124 UNIT capsule    ERGOCALCIFEROL    4 capsule    TAKE 1 CAPLET ONCE WEEKLY Profile Rx: patient will contact pharmacy when needed    Vitamin D deficiency       * Notice:  This list has 2 medication(s) that are the same as other medications prescribed for you. Read the directions carefully, and ask your doctor or other care provider to review them with you.

## 2018-04-10 ENCOUNTER — TELEPHONE (OUTPATIENT)
Dept: ENDOCRINOLOGY | Facility: CLINIC | Age: 48
End: 2018-04-10

## 2018-04-10 NOTE — TELEPHONE ENCOUNTER
Karen called questioning  why she cannot get the Dodie meter that is not covered by  Insurance  . She tells me she has another meter and strips she can use. SHe also was aksing for Parking as she was charged doubled  At her last visit. I asked her to bring her receipt with Friday  When she is here and take it to the [facility] parking department.  for refund. She was very difficult to understand with bad connection  so I did call her back  with .

## 2018-04-13 ENCOUNTER — TELEPHONE (OUTPATIENT)
Dept: ENDOCRINOLOGY | Facility: CLINIC | Age: 48
End: 2018-04-13

## 2018-04-13 ENCOUNTER — OFFICE VISIT (OUTPATIENT)
Dept: ENDOCRINOLOGY | Facility: CLINIC | Age: 48
End: 2018-04-13
Payer: COMMERCIAL

## 2018-04-13 VITALS
HEIGHT: 66 IN | WEIGHT: 137.4 LBS | HEART RATE: 74 BPM | SYSTOLIC BLOOD PRESSURE: 135 MMHG | BODY MASS INDEX: 22.08 KG/M2 | DIASTOLIC BLOOD PRESSURE: 81 MMHG

## 2018-04-13 DIAGNOSIS — E11.65 TYPE 2 DIABETES MELLITUS WITH HYPERGLYCEMIA, WITH LONG-TERM CURRENT USE OF INSULIN (H): ICD-10-CM

## 2018-04-13 DIAGNOSIS — Z79.4 TYPE 2 DIABETES MELLITUS WITH HYPERGLYCEMIA, WITH LONG-TERM CURRENT USE OF INSULIN (H): Primary | ICD-10-CM

## 2018-04-13 DIAGNOSIS — E11.65 TYPE 2 DIABETES MELLITUS WITH HYPERGLYCEMIA, WITH LONG-TERM CURRENT USE OF INSULIN (H): Primary | ICD-10-CM

## 2018-04-13 DIAGNOSIS — Z79.4 TYPE 2 DIABETES MELLITUS WITH HYPERGLYCEMIA, WITH LONG-TERM CURRENT USE OF INSULIN (H): ICD-10-CM

## 2018-04-13 RX ORDER — FLASH GLUCOSE SENSOR
KIT MISCELLANEOUS
Qty: 9 EACH | Refills: 3 | Status: SHIPPED | OUTPATIENT
Start: 2018-04-13 | End: 2018-04-13

## 2018-04-13 RX ORDER — FLASH GLUCOSE SENSOR
1 KIT MISCELLANEOUS DAILY
Qty: 1 DEVICE | Refills: 0 | COMMUNITY
Start: 2018-04-13 | End: 2018-10-30

## 2018-04-13 RX ORDER — FLASH GLUCOSE SENSOR
KIT MISCELLANEOUS
Qty: 9 EACH | Refills: 3 | Status: SHIPPED | OUTPATIENT
Start: 2018-04-13 | End: 2018-10-30

## 2018-04-13 ASSESSMENT — PAIN SCALES - GENERAL: PAINLEVEL: NO PAIN (0)

## 2018-04-13 NOTE — LETTER
4/13/2018       RE: Karen Braxton  [address]   [place] [address]     Dear Colleague,    Thank you for referring your patient, Karen Braxton, to the [facility] ENDOCRINOLOGY at [facility]. Please see a copy of my visit note below.    HPI:  Irais Jones is a 47 year old female with type 2 diabetes  Here today for a follow up visit.  For her diabetes, she is currently taking Basaglar 60 units SQ at hs, Humalog 30 units with breakfast, 35 units with lunch and 35 units with dinner, Invokana 300 mg daily, Metformin 500 mg BID and Actos 45 mg daily.  Her A1C was 8.5 % in March 2018 and her previous A1C was 8.2 % in Dec 2017.  We downloaded her glucose meter today and her average glucose was 183 with SD 72.  Her FBS values range from .  Her prelunch bs range from .  Her predinner blood sugars are usually in the high 100-low 200 range.  No frequent hypoglycemia.  On ROS today,  she reports feeling well.  She denies frequent headaches, blurred vision, n/v, SOB at rest, cough or chest pain.  Pt denies abd pain, diarrhea, dysuria, hematuria, vaginal discharge or rash/pruritis.  No edema.  She denies numbness or tingling in her feet or hands.  No ulcers.    ROS:   Please see under HPI.    ALLERGIES:   No Known Allergies      Current Outpatient Prescriptions   Medication Sig Dispense Refill     continuous blood glucose monitoring (FREESTYLE DODIE) sensor For use with Freestyle Dodie Flash  for continuous monitioring of blood glucose levels. Replace sensor every 10 days. 9 each 3     Continuous Blood Gluc  (FREESTYLE DODIE READER) DENNISE 1 Device daily 1 Device 0     canagliflozin (INVOKANA) 300 MG tablet Take 1 tablet (300 mg) by mouth every morning (before breakfast) 90 tablet 3     BASAGLAR 100 UNIT/ML injection Inject 60 units SQ each at bedtime. 15 mL 12     blood glucose monitoring (NO BRAND SPECIFIED) test strip Use to test blood sugars qid  times  "daily or as directed 200 strip 11     aspirin 81 MG EC tablet Take 1 tablet (81 mg) by mouth daily 90 tablet 3     nicotine (NICOTROL) 10 MG Inhaler Inhale 6-16 Cartridges into the lungs daily as needed for smoking cessation 180 each 11     alum & mag hydroxide-simethicone (ANTACID) 200-200-20 MG/5ML SUSP suspension Take 15 mLs by mouth every 4 hours as needed for indigestion 355 mL 11     vitamin D (ERGOCALCIFEROL) 36899 UNIT capsule TAKE 1 CAPLET ONCE WEEKLY Profile Rx: patient will contact pharmacy when needed 4 capsule 5     pioglitazone (ACTOS) 45 MG tablet Take 1 tablet (45 mg) by mouth daily 90 tablet 0     lisinopril (PRINIVIL/ZESTRIL) 2.5 MG tablet Take 1 tablet (2.5 mg) by mouth daily 90 tablet 0     Urea 20 % CREA cream Apply topically as needed 85 g 3     calcium-vitamin D (CALTRATE) 600-400 MG-UNIT per tablet Take 1 tablet by mouth 2 times daily 180 tablet 3     metFORMIN (GLUCOPHAGE) 1000 MG tablet TAKE ONE-HALF TABLET BY MOUTH TWICE DAILY WITH MEALS 90 tablet 1     meloxicam (MOBIC) 15 MG tablet Take 1 tablet (15 mg) by mouth daily 30 tablet 1     insulin lispro (HUMALOG KWIKPEN) 100 UNIT/ML injection 30 units before breakfast, 35 units before lunch, 35 units before dinner 15 mL 11     atorvastatin (LIPITOR) 80 MG tablet Take 1 tablet (80 mg) by mouth daily 90 tablet 3     blood glucose monitoring (JONATHAN CONTOUR NEXT) test strip Use to test blood sugar 4 times daily or as directed. 200 each 11     blood glucose monitoring (JONATHAN MICROLET) lancets Use to test blood sugar 4 times daily or as directed. 2 Box 6     ezetimibe (ZETIA) 10 MG tablet Take 1 tablet (10 mg) by mouth daily 90 tablet 3     B-D U/F 31G X 8 MM insulin pen needle TEST FOUR TIMES A DAY OR AS DIRECTED 400 each 1     insulin syringe-needle U-100 (BD INSULIN SYRINGE ULTRAFINE) 30G X 1/2\" 1 ML Use one syringe daily or as directed.  3 month supply 100 each prn     ASPIRIN NOT PRESCRIBED (INTENTIONAL) continuous prn for other Antiplatelet " "medication not prescribed intentionally due to Not indicated based on age/GI distress       SHX:  Smoke: yes.  ETOH: none.   with 5 children.    FHX:  Several family members with diabetes.    PMHX:   1.  Type 2 DM dx 8 years ago.  2.  Hyperlipidemia.  3.  Amenorrhea.  4.  GERD.  5.  S/P choley.  6.  Sebaceous cyst.  Past Medical History:   Diagnosis Date     Arthritis      ASCUS favor benign 2012    neg HPV  Plan cotest in 3 yrs.     Diabetes (H)      Hypertension      Past Surgical History:   Procedure Laterality Date     CHOLECYSTECTOMY  9/14/2007    laproscopic     FACIAL RECONSTRUCTION SURGERY  2 years old    cleft lip repair     ORTHOPEDIC SURGERY  6/2001    left knee       EXAM:  Constitutional:   Vitals:    04/13/18 1057   BP: 135/81   Pulse: 74   Weight: 62.3 kg (137 lb 6.4 oz)   Height: 1.664 m (5' 5.5\")   GENERAL: Pt looks good and in no distress.  SKIN: no rash.  HEENT: PERRLA; fundi not examined.  NECK: No thyroid tenderness.  LUNGS: Clear b/l.  CARDIAC: RRR.  ABDOMEN: Nontender.  EXTREMITIES: No pretibial edema.  FEET: No ulcers; normal monofilamentous exam.     RESULTS:    Creatinine   Date Value Ref Range Status   03/16/2018 0.67 0.52 - 1.04 mg/dL Final     GFR Estimate   Date Value Ref Range Status   03/16/2018 >90 >60 mL/min/1.7m2 Final     Comment:     Non  GFR Calc     Hemoglobin A1C   Date Value Ref Range Status   03/16/2018 8.5 (H) 4.3 - 6.0 % Final     Potassium   Date Value Ref Range Status   03/16/2018 4.2 3.4 - 5.3 mmol/L Final     ALT   Date Value Ref Range Status   03/16/2018 16 0 - 50 U/L Final     AST   Date Value Ref Range Status   01/09/2015 17 0 - 45 U/L Final     TSH   Date Value Ref Range Status   03/16/2018 2.12 0.40 - 4.00 mU/L Final     T4 Free   Date Value Ref Range Status   08/04/2011 1.22 0.70 - 1.85 ng/dL Final         Cholesterol   Date Value Ref Range Status   03/16/2018 229 (H) <200 mg/dL Final     Comment:     Desirable:       <200 mg/dl "   03/27/2017 242 (H) <200 mg/dL Final     Comment:     Desirable:       <200 mg/dl     HDL Cholesterol   Date Value Ref Range Status   03/16/2018 43 (L) >49 mg/dL Final   03/27/2017 48 (L) >49 mg/dL Final     LDL Cholesterol Calculated   Date Value Ref Range Status   03/16/2018 149 (H) <100 mg/dL Final     Comment:     Above desirable:  100-129 mg/dl  Borderline High:  130-159 mg/dL  High:             160-189 mg/dL  Very high:       >189 mg/dl     03/27/2017 160 (H) <100 mg/dL Final     Comment:     Above desirable:  100-129 mg/dl   Borderline High:  130-159 mg/dL   High:             160-189 mg/dL   Very high:       >189 mg/dl       Triglycerides   Date Value Ref Range Status   03/16/2018 183 (H) <150 mg/dL Final     Comment:     Borderline high:  150-199 mg/dl  High:             200-499 mg/dl  Very high:       >499 mg/dl  Non Fasting     03/27/2017 171 (H) <150 mg/dL Final     Comment:     Borderline high:  150-199 mg/dl   High:             200-499 mg/dl   Very high:       >499 mg/dl       Cholesterol/HDL Ratio   Date Value Ref Range Status   12/01/2014 6.0 (H) 0.0 - 5.0 Final   07/30/2014 6.1 (H) 0.0 - 5.0 Final     A1C      8.5   3/8/2018  A1C      8.2   12/5/2017  A1C      8.0   9/5/2017  A1C      8.2   6/2017  A1C      7.8   3/9/2017  A1C      8.1   12/2016  A1C      8.9   6/28/2016  A1C      8.1   6/23/2015  A1C      9.0   3/3/2015  A1C      8.5   12/1/2014  A1C     11.4  5/29/2014  A1C     10.7   4/1/2014  A1C     10.3   1/8/2014  A1C      9.8   11/26/2013  A1C      8.8   7/24/2013  A1C      8.9   5/28/2013    ASSESSMENT/PLAN:    1. TYPE 2 DIABETES MELLITUS: Uncontrolled type 2 diabetes mellitus.  I discussed using a Freestyle Dodie sensor with patient last visit and she did meet with our CDE, but our CDE did not have a Dodie device or sensor and patient was concerned about the monthly cost of using the Dodie.  Karen would now like me to order the Dodie device and sensors.  She is aware that she may need to  pay $ 100-120.00 per month.  She is to remain on her current dose of Lantus, Humalog, Invokana, Metformin and Actos.  Reminded her to bring her Dodie device to her appointment so we can review her blood sugar data. If she has any questions, she is to call me.  Her BP is better today.  She was seen by Oph in Dec 2017.   Pt's creat was 0.67with GFR >90 mL/min in 3/2018 with normal K+.   Pt's urine microalbuminuria was negative in March 2018.   TSH normal in 3/2018.    2.  HYPERLIPIDEMIA:   on 3/16/2018.  She was instructed to take her Crestor and Zetia daily.  She will also work on reducing fat in her diet and get her diabetes under better control.    3.  FOOT CARE: Pt seen by Podiatry here in Dec 2017.  Instructed pt to follow Podiatrist recommendations.    4.  Return to Endocrine Clinic to see  Dr. Phillips in early June 2018.  She is to call me if she has any questions regarding her Dodie device or diabetes care.      Sincerely,    Cecile Juarez PA-C

## 2018-04-13 NOTE — PROGRESS NOTES
HPI:  Irais Jones is a 47 year old female with type 2 diabetes  Here today for a follow up visit.  For her diabetes, she is currently taking Basaglar 60 units SQ at hs, Humalog 30 units with breakfast, 35 units with lunch and 35 units with dinner, Invokana 300 mg daily, Metformin 500 mg BID and Actos 45 mg daily.  Her A1C was 8.5 % in March 2018 and her previous A1C was 8.2 % in Dec 2017.  We downloaded her glucose meter today and her average glucose was 183 with SD 72.  Her FBS values range from .  Her prelunch bs range from .  Her predinner blood sugars are usually in the high 100-low 200 range.  No frequent hypoglycemia.  On ROS today,  she reports feeling well.  She denies frequent headaches, blurred vision, n/v, SOB at rest, cough or chest pain.  Pt denies abd pain, diarrhea, dysuria, hematuria, vaginal discharge or rash/pruritis.  No edema.  She denies numbness or tingling in her feet or hands.  No ulcers.    ROS:   Please see under HPI.    ALLERGIES:   No Known Allergies      Current Outpatient Prescriptions   Medication Sig Dispense Refill     continuous blood glucose monitoring (FREESTYLE RAHEEM) sensor For use with Freestyle Raheem Flash  for continuous monitioring of blood glucose levels. Replace sensor every 10 days. 9 each 3     Continuous Blood Gluc  (FREESTYLE RAHEEM READER) DENNISE 1 Device daily 1 Device 0     canagliflozin (INVOKANA) 300 MG tablet Take 1 tablet (300 mg) by mouth every morning (before breakfast) 90 tablet 3     BASAGLAR 100 UNIT/ML injection Inject 60 units SQ each at bedtime. 15 mL 12     blood glucose monitoring (NO BRAND SPECIFIED) test strip Use to test blood sugars qid  times daily or as directed 200 strip 11     aspirin 81 MG EC tablet Take 1 tablet (81 mg) by mouth daily 90 tablet 3     nicotine (NICOTROL) 10 MG Inhaler Inhale 6-16 Cartridges into the lungs daily as needed for smoking cessation 180 each 11     alum & mag hydroxide-simethicone (ANTACID)  "200-200-20 MG/5ML SUSP suspension Take 15 mLs by mouth every 4 hours as needed for indigestion 355 mL 11     vitamin D (ERGOCALCIFEROL) 14510 UNIT capsule TAKE 1 CAPLET ONCE WEEKLY Profile Rx: patient will contact pharmacy when needed 4 capsule 5     pioglitazone (ACTOS) 45 MG tablet Take 1 tablet (45 mg) by mouth daily 90 tablet 0     lisinopril (PRINIVIL/ZESTRIL) 2.5 MG tablet Take 1 tablet (2.5 mg) by mouth daily 90 tablet 0     Urea 20 % CREA cream Apply topically as needed 85 g 3     calcium-vitamin D (CALTRATE) 600-400 MG-UNIT per tablet Take 1 tablet by mouth 2 times daily 180 tablet 3     metFORMIN (GLUCOPHAGE) 1000 MG tablet TAKE ONE-HALF TABLET BY MOUTH TWICE DAILY WITH MEALS 90 tablet 1     meloxicam (MOBIC) 15 MG tablet Take 1 tablet (15 mg) by mouth daily 30 tablet 1     insulin lispro (HUMALOG KWIKPEN) 100 UNIT/ML injection 30 units before breakfast, 35 units before lunch, 35 units before dinner 15 mL 11     atorvastatin (LIPITOR) 80 MG tablet Take 1 tablet (80 mg) by mouth daily 90 tablet 3     blood glucose monitoring (JONATHAN CONTOUR NEXT) test strip Use to test blood sugar 4 times daily or as directed. 200 each 11     blood glucose monitoring (JONATHAN MICROLET) lancets Use to test blood sugar 4 times daily or as directed. 2 Box 6     ezetimibe (ZETIA) 10 MG tablet Take 1 tablet (10 mg) by mouth daily 90 tablet 3     B-D U/F 31G X 8 MM insulin pen needle TEST FOUR TIMES A DAY OR AS DIRECTED 400 each 1     insulin syringe-needle U-100 (BD INSULIN SYRINGE ULTRAFINE) 30G X 1/2\" 1 ML Use one syringe daily or as directed.  3 month supply 100 each prn     ASPIRIN NOT PRESCRIBED (INTENTIONAL) continuous prn for other Antiplatelet medication not prescribed intentionally due to Not indicated based on age/GI distress       SHX:  Smoke: yes.  ETOH: none.   with 5 children.    FHX:  Several family members with diabetes.    PMHX:   1.  Type 2 DM dx 8 years ago.  2.  Hyperlipidemia.  3.  Amenorrhea.  4.  " "GERD.  5.  S/P choley.  6.  Sebaceous cyst.  Past Medical History:   Diagnosis Date     Arthritis      ASCUS favor benign 2012    neg HPV  Plan cotest in 3 yrs.     Diabetes (H)      Hypertension      Past Surgical History:   Procedure Laterality Date     CHOLECYSTECTOMY  9/14/2007    laproscopic     FACIAL RECONSTRUCTION SURGERY  2 years old    cleft lip repair     ORTHOPEDIC SURGERY  6/2001    left knee       EXAM:  Constitutional:   Vitals:    04/13/18 1057   BP: 135/81   Pulse: 74   Weight: 62.3 kg (137 lb 6.4 oz)   Height: 1.664 m (5' 5.5\")   GENERAL: Pt looks good and in no distress.  SKIN: no rash.  HEENT: PERRLA; fundi not examined.  NECK: No thyroid tenderness.  LUNGS: Clear b/l.  CARDIAC: RRR.  ABDOMEN: Nontender.  EXTREMITIES: No pretibial edema.  FEET: No ulcers; normal monofilamentous exam.     RESULTS:    Creatinine   Date Value Ref Range Status   03/16/2018 0.67 0.52 - 1.04 mg/dL Final     GFR Estimate   Date Value Ref Range Status   03/16/2018 >90 >60 mL/min/1.7m2 Final     Comment:     Non  GFR Calc     Hemoglobin A1C   Date Value Ref Range Status   03/16/2018 8.5 (H) 4.3 - 6.0 % Final     Potassium   Date Value Ref Range Status   03/16/2018 4.2 3.4 - 5.3 mmol/L Final     ALT   Date Value Ref Range Status   03/16/2018 16 0 - 50 U/L Final     AST   Date Value Ref Range Status   01/09/2015 17 0 - 45 U/L Final     TSH   Date Value Ref Range Status   03/16/2018 2.12 0.40 - 4.00 mU/L Final     T4 Free   Date Value Ref Range Status   08/04/2011 1.22 0.70 - 1.85 ng/dL Final         Cholesterol   Date Value Ref Range Status   03/16/2018 229 (H) <200 mg/dL Final     Comment:     Desirable:       <200 mg/dl   03/27/2017 242 (H) <200 mg/dL Final     Comment:     Desirable:       <200 mg/dl     HDL Cholesterol   Date Value Ref Range Status   03/16/2018 43 (L) >49 mg/dL Final   03/27/2017 48 (L) >49 mg/dL Final     LDL Cholesterol Calculated   Date Value Ref Range Status   03/16/2018 149 (H) <100 " mg/dL Final     Comment:     Above desirable:  100-129 mg/dl  Borderline High:  130-159 mg/dL  High:             160-189 mg/dL  Very high:       >189 mg/dl     03/27/2017 160 (H) <100 mg/dL Final     Comment:     Above desirable:  100-129 mg/dl   Borderline High:  130-159 mg/dL   High:             160-189 mg/dL   Very high:       >189 mg/dl       Triglycerides   Date Value Ref Range Status   03/16/2018 183 (H) <150 mg/dL Final     Comment:     Borderline high:  150-199 mg/dl  High:             200-499 mg/dl  Very high:       >499 mg/dl  Non Fasting     03/27/2017 171 (H) <150 mg/dL Final     Comment:     Borderline high:  150-199 mg/dl   High:             200-499 mg/dl   Very high:       >499 mg/dl       Cholesterol/HDL Ratio   Date Value Ref Range Status   12/01/2014 6.0 (H) 0.0 - 5.0 Final   07/30/2014 6.1 (H) 0.0 - 5.0 Final     A1C      8.5   3/8/2018  A1C      8.2   12/5/2017  A1C      8.0   9/5/2017  A1C      8.2   6/2017  A1C      7.8   3/9/2017  A1C      8.1   12/2016  A1C      8.9   6/28/2016  A1C      8.1   6/23/2015  A1C      9.0   3/3/2015  A1C      8.5   12/1/2014  A1C     11.4  5/29/2014  A1C     10.7   4/1/2014  A1C     10.3   1/8/2014  A1C      9.8   11/26/2013  A1C      8.8   7/24/2013  A1C      8.9   5/28/2013    ASSESSMENT/PLAN:    1. TYPE 2 DIABETES MELLITUS: Uncontrolled type 2 diabetes mellitus.  I discussed using a Freestyle Dodie sensor with patient last visit and she did meet with our CDE, but our CDE did not have a Dodie device or sensor and patient was concerned about the monthly cost of using the Dodie.  Karen would now like me to order the Dodie device and sensors.  She is aware that she may need to pay $ 100-120.00 per month.  She is to remain on her current dose of Lantus, Humalog, Invokana, Metformin and Actos.  Reminded her to bring her Dodie device to her appointment so we can review her blood sugar data. If she has any questions, she is to call me.  Her BP is better today.  She  was seen by Oph in Dec 2017.   Pt's creat was 0.67with GFR >90 mL/min in 3/2018 with normal K+.   Pt's urine microalbuminuria was negative in March 2018.   TSH normal in 3/2018.    2.  HYPERLIPIDEMIA:   on 3/16/2018.  She was instructed to take her Crestor and Zetia daily.  She will also work on reducing fat in her diet and get her diabetes under better control.    3.  FOOT CARE: Pt seen by Podiatry here in Dec 2017.  Instructed pt to follow Podiatrist recommendations.    4.  Return to Endocrine Clinic to see  Dr. Phillips in early June 2018.  She is to call me if she has any questions regarding her Dodie device or diabetes care.

## 2018-04-13 NOTE — TELEPHONE ENCOUNTER
Hossein w/ Pt who requested email be sent with clinic number to inform us of her pharmacy choice for the Dodie, as she was waiting for a bus and couldn't speak.

## 2018-04-13 NOTE — MR AVS SNAPSHOT
"              After Visit Summary   2018    Karen Braxton    MRN: [id number]           Patient Information     Date Of Birth          1970        Visit Information        Provider Department      2018 11:00 AM eCcile Juarez PA-C [facility]         Follow-ups after your visit        Your next 10 appointments already scheduled     2018 11:30 AM CDT   (Arrive by 11:15 AM)   RETURN DIABETES with MD [facility] ([facility])    [address] [place] [id number] [phone]              Who to contact     Please call your clinic at [phone] to:    Ask questions about your health    Make or cancel appointments    Discuss your medicines    Learn about your test results    Speak to your doctor            Additional Information About Your Visit        MyChart Information     [employer] is an electronic gateway that provides easy, online access to your medical records. With [employer]elleflex, you can request a clinic appointment, read your test results, renew a prescription or communicate with your care team.     To sign up for [employer] visit the website at [email or web address]   You will be asked to enter the access code listed below, as well as some personal information. Please follow the directions to create your username and password.     Your access code is: [email or web address]  Expires: 2018  7:30 AM     Your access code will  in 90 days. If you need help or a new code, please contact your [facility] or call [phone] for assistance.        Care EveryWhere ID     This is your Care EveryWhere ID. This could be used by other organizations to access your [facility] medical records  [id number]        Your Vitals Were     Pulse Height Last Period BMI (Body Mass Index)          74 1.664 m (5' 5.5\") (LMP Unknown) 22.52 kg/m2         Blood Pressure from Last 3 " Encounters:   04/13/18 135/81   03/16/18 116/68   03/08/18 139/82    Weight from Last 3 Encounters:   04/13/18 62.3 kg (137 lb 6.4 oz)   03/16/18 61 kg (134 lb 8 oz)   03/08/18 62.9 kg (138 lb 11.2 oz)              Today, you had the following     No orders found for display       Primary Care Provider Office Phone # Fax #    Raymond Aurea Engel [phone] [address] [facility] [address]        Equal Access to Services     [facility]: Hadii aad ku hadasho Soeris, waaxda luqadaha, qaybta kaalmada adefernandoyasanjana, niranjan figueroa . So [facility] [phone].    ATENCIÓN: Si habla español, tiene a merrill disposición servicios gratuitos de asistencia lingüística. [facility] [phone].    We comply with applicable federal civil rights laws and [place] laws. We do not discriminate on the basis of race, color, national origin, age, disability, sex, sexual orientation, or gender identity.            Thank you!     Thank you for choosing [facility]  for your care. Our goal is always to provide you with excellent care. Hearing back from our patients is one way we can continue to improve our services. Please take a few minutes to complete the written survey that you may receive in the mail after your visit with us. Thank you!             Your Updated Medication List - Protect others around you: Learn how to safely use, store and throw away your medicines at www.disposemymeds.org.          This list is accurate as of 4/13/18 11:16 AM.  Always use your most recent med list.                   Brand Name Dispense Instructions for use Diagnosis    alum & mag hydroxide-simethicone 200-200-20 MG/5ML Susp suspension    antacid    355 mL    Take 15 mLs by mouth every 4 hours as needed for indigestion    Gastroesophageal reflux disease without esophagitis       aspirin 81 MG EC tablet     90 tablet    Take 1 tablet (81 mg) by mouth daily    Uncontrolled type 1 diabetes  mellitus without complication (H), Uncontrolled type 2 diabetes mellitus without complication, with long-term current use of insulin (H), Hyperlipidemia LDL goal <100       ASPIRIN NOT PRESCRIBED    INTENTIONAL     continuous prn for other Antiplatelet medication not prescribed intentionally due to Not indicated based on age/GI distress        atorvastatin 80 MG tablet    LIPITOR    90 tablet    Take 1 tablet (80 mg) by mouth daily    Hyperlipidemia LDL goal <100       B-D U/F 31G X 8 MM   Generic drug:  insulin pen needle     400 each    TEST FOUR TIMES A DAY OR AS DIRECTED    Diabetes type 2, controlled (H)       BASAGLAR 100 UNIT/ML injection     15 mL    Inject 60 units SQ each at bedtime.    Uncontrolled type 1 diabetes mellitus without complication (H), Uncontrolled type 2 diabetes mellitus without complication, with long-term current use of insulin (H)       blood glucose monitoring lancets     2 Box    Use to test blood sugar 4 times daily or as directed.    Diabetes type 2, controlled (H)       * blood glucose monitoring test strip    JONATHAN CONTOUR NEXT    200 each    Use to test blood sugar 4 times daily or as directed.    Diabetes type 2, controlled (H)       * blood glucose monitoring test strip    no brand specified    200 strip    Use to test blood sugars qid  times daily or as directed    Uncontrolled type 2 diabetes mellitus without complication, with long-term current use of insulin (H), Uncontrolled type 1 diabetes mellitus without complication (H)       calcium-vitamin D 600-400 MG-UNIT per tablet    CALTRATE    180 tablet    Take 1 tablet by mouth 2 times daily    Vitamin D deficiency       canagliflozin 300 MG tablet    INVOKANA    90 tablet    Take 1 tablet (300 mg) by mouth every morning (before breakfast)    Uncontrolled type 2 diabetes mellitus without complication, with long-term current use of insulin (H)       ezetimibe 10 MG tablet    ZETIA    90 tablet    Take 1 tablet (10 mg) by mouth  "daily    Hyperlipidemia LDL goal <100       insulin lispro 100 UNIT/ML injection    HumaLOG KWIKpen    15 mL    30 units before breakfast, 35 units before lunch, 35 units before dinner    Uncontrolled type 2 diabetes mellitus without complication, with long-term current use of insulin (H)       insulin syringe-needle U-100 30G X 1/2\" 1 ML    BD insulin syringe ultrafine    100 each    Use one syringe daily or as directed.  3 month supply    Diabetes type 2, controlled (H)       lisinopril 2.5 MG tablet    PRINIVIL/Zestril    90 tablet    Take 1 tablet (2.5 mg) by mouth daily    Hypertension goal BP (blood pressure) < 140/80, Uncontrolled type 2 diabetes mellitus without complication, with long-term current use of insulin (H)       meloxicam 15 MG tablet    MOBIC    30 tablet    Take 1 tablet (15 mg) by mouth daily    Sprain of right rotator cuff capsule, initial encounter       metFORMIN 1000 MG tablet    GLUCOPHAGE    90 tablet    TAKE ONE-HALF TABLET BY MOUTH TWICE DAILY WITH MEALS    Uncontrolled type 2 diabetes mellitus without complication, with long-term current use of insulin (H)       nicotine 10 MG Inhaler    NICOTROL    180 each    Inhale 6-16 Cartridges into the lungs daily as needed for smoking cessation    Tobacco dependence syndrome       pioglitazone 45 MG tablet    ACTOS    90 tablet    Take 1 tablet (45 mg) by mouth daily    Uncontrolled type 2 diabetes mellitus without complication, with long-term current use of insulin (H)       Urea 20 % Crea cream     85 g    Apply topically as needed    Tyloma       vitamin D 68549 UNIT capsule    ERGOCALCIFEROL    4 capsule    TAKE 1 CAPLET ONCE WEEKLY Profile Rx: patient will contact pharmacy when needed    Vitamin D deficiency       * Notice:  This list has 2 medication(s) that are the same as other medications prescribed for you. Read the directions carefully, and ask your doctor or other care provider to review them with you.      "

## 2018-04-17 ENCOUNTER — TELEPHONE (OUTPATIENT)
Dept: EDUCATION SERVICES | Facility: CLINIC | Age: 48
End: 2018-04-17

## 2018-04-17 DIAGNOSIS — E11.65 TYPE 2 DIABETES MELLITUS WITH HYPERGLYCEMIA, WITH LONG-TERM CURRENT USE OF INSULIN (H): ICD-10-CM

## 2018-04-17 DIAGNOSIS — Z79.4 TYPE 2 DIABETES MELLITUS WITH HYPERGLYCEMIA, WITH LONG-TERM CURRENT USE OF INSULIN (H): ICD-10-CM

## 2018-04-17 NOTE — TELEPHONE ENCOUNTER
I called the pharmacy  and they have been trying to reach her as the insurance she has does not cover  the Dodie.  Sensors alone  are $130  . I left Karen a message that itis not covered by her insurance so if she wants it she has to  pay  out of pocket. No PA can be done .

## 2018-04-17 NOTE — TELEPHONE ENCOUNTER
[facility] Call Center    Phone Message    May a detailed message be left on voicemail: yes    Reason for Call: Other: Patient is requesting a call back regarding her Dodie.     Action Taken: Message routed to:  [facility] ([facility]): Leela

## 2018-04-19 ENCOUNTER — TELEPHONE (OUTPATIENT)
Dept: FAMILY MEDICINE | Facility: CLINIC | Age: 48
End: 2018-04-19

## 2018-04-19 DIAGNOSIS — E11.65 CONTROLLED TYPE 2 DIABETES MELLITUS WITH HYPERGLYCEMIA, WITH LONG-TERM CURRENT USE OF INSULIN (H): ICD-10-CM

## 2018-04-19 DIAGNOSIS — Z79.4 CONTROLLED TYPE 2 DIABETES MELLITUS WITH HYPERGLYCEMIA, WITH LONG-TERM CURRENT USE OF INSULIN (H): ICD-10-CM

## 2018-04-19 DIAGNOSIS — E11.9 DIABETES TYPE 2, CONTROLLED (H): ICD-10-CM

## 2018-04-19 NOTE — TELEPHONE ENCOUNTER
Hello,    Pt would like new rx's for both pen needles and test strips-- pt states that she is using 4 times daily for both.  Can we please get updated rx    Thank You,  Cara Frederick  Pharmacy Technician  [facility]  [phone]

## 2018-04-19 NOTE — TELEPHONE ENCOUNTER
(E11.9) Diabetes type 2, controlled (H)  Comment:    Plan: DISCONTINUED: insulin pen needle (B-D U/F) 31G         X 8 MM             (E11.65,  Z79.4) Uncontrolled type 2 diabetes mellitus without complication, with long-term current use of insulin (H)  Comment:    Plan: blood glucose monitoring (NO BRAND SPECIFIED)         test strip, DISCONTINUED: blood glucose         monitoring (NO BRAND SPECIFIED) test strip             (E10.65) Uncontrolled type 1 diabetes mellitus without complication (H)  Comment:    Plan: blood glucose monitoring (NO BRAND SPECIFIED)         test strip, DISCONTINUED: blood glucose         monitoring (NO BRAND SPECIFIED) test strip             (E11.65,  Z79.4) Controlled type 2 diabetes mellitus with hyperglycemia, with long-term current use of insulin (H)  Comment:    Plan: insulin pen needle (B-D U/F) 31G X 8 MM

## 2018-06-05 ENCOUNTER — OFFICE VISIT (OUTPATIENT)
Dept: ENDOCRINOLOGY | Facility: CLINIC | Age: 48
End: 2018-06-05
Payer: COMMERCIAL

## 2018-06-05 VITALS
HEART RATE: 82 BPM | HEIGHT: 66 IN | BODY MASS INDEX: 22.5 KG/M2 | WEIGHT: 140 LBS | SYSTOLIC BLOOD PRESSURE: 105 MMHG | DIASTOLIC BLOOD PRESSURE: 72 MMHG

## 2018-06-05 LAB — HBA1C MFR BLD: 8.6 % (ref 4.3–6)

## 2018-06-05 NOTE — Clinical Note
Is it true that [facility] won't cover a shauna sensor?  This patient would really benefit from it. Have you had any luck with appealing their decision not to cover it? Carmelita

## 2018-06-05 NOTE — PROGRESS NOTES
This 48 year old woman with a 10+  year history of type 2 diabetes is here for f/u.She was seen without an  today.  She doesn't think she needs one.  She sees Dr. Engel for PC and is co managed by me with Micaela Juarez.  She now is taking  basaglar 50 units at hs, actos 45 mg a day, invokana 300 mg a day,  Metformin 500 bid  and humalog 30 units at breakfast and 35 units with  lunch and 30 with  her evening meal.  She checks her sugars 3- 4 times a day.  Average over the last month was 190 with range from 77 - 393.  She feels low when she has values less than 90. She says she isn't really having lows lately.  She is now working as a  for 5.5 hours on Monday, Tues, Wed.  She is also very active on her days off cleaning her house, etc.  She doesn't miss doses of her meds.    She wore a shauna sensor in April and liked it very much.  She would like to wear one all the time but [facility] will not cover it.  She says it let her be more confident with her control. If she was high she would do a correction, something she would never do if she was only checking blood sugars.  She continues to eat a small breakfast of tea and one piece of bread.  Lunch is her largest meal and is bread, chicken.  Dinner is small.  Review of the shauna data from April shows she usually drops overnight from around 200 to 100 by 6 am.  She goes up with her meals and comes back to target about 50% of the time.  About 50% of the time she stays high after lunch for hours.    She saw eye MD earlier this year.  She has no paresthesias in her feet.    Denies CP, SOB, diarrhea.  No vaginal infections. No GI sx.  She says she feels well.      Current Outpatient Prescriptions on File Prior to Visit:  alum & mag hydroxide-simethicone (ANTACID) 200-200-20 MG/5ML SUSP suspension Take 15 mLs by mouth every 4 hours as needed for indigestion   aspirin 81 MG EC tablet Take 1 tablet (81 mg) by mouth daily   atorvastatin (LIPITOR) 80 MG tablet Take 1  tablet (80 mg) by mouth daily   BASAGLAR 100 UNIT/ML injection Inject 60 units SQ each at bedtime.   calcium-vitamin D (CALTRATE) 600-400 MG-UNIT per tablet Take 1 tablet by mouth 2 times daily   canagliflozin (INVOKANA) 300 MG tablet Take 1 tablet (300 mg) by mouth every morning (before breakfast)   ezetimibe (ZETIA) 10 MG tablet Take 1 tablet (10 mg) by mouth daily   insulin lispro (HUMALOG KWIKPEN) 100 UNIT/ML injection 30 units before breakfast, 35 units before lunch, 35 units before dinner   lisinopril (PRINIVIL/ZESTRIL) 2.5 MG tablet Take 1 tablet (2.5 mg) by mouth daily   meloxicam (MOBIC) 15 MG tablet Take 1 tablet (15 mg) by mouth daily   metFORMIN (GLUCOPHAGE) 1000 MG tablet TAKE ONE-HALF TABLET BY MOUTH TWICE DAILY WITH MEALS   nicotine (NICOTROL) 10 MG Inhaler Inhale 6-16 Cartridges into the lungs daily as needed for smoking cessation   pioglitazone (ACTOS) 45 MG tablet Take 1 tablet (45 mg) by mouth daily   vitamin D (ERGOCALCIFEROL) 97506 UNIT capsule TAKE 1 CAPLET ONCE WEEKLY Profile Rx: patient will contact pharmacy when needed   ASPIRIN NOT PRESCRIBED (INTENTIONAL) continuous prn for other Antiplatelet medication not prescribed intentionally due to Not indicated based on age/GI distress   blood glucose monitoring (JONATHAN CONTOUR NEXT) test strip Use to test blood sugar 4 times daily or as directed.   blood glucose monitoring (JONATHAN MICROLET) lancets Use to test blood sugar 4 times daily or as directed.   blood glucose monitoring (NO BRAND SPECIFIED) test strip Use to test blood sugars qid  times daily or as directed   Continuous Blood Gluc  (FREESTYLE RAHEEM READER) DENNISE 1 Device daily   continuous blood glucose monitoring (FREESTYLE RAHEEM) sensor For use with Freestyle Raheem Flash  for continuous monitioring of blood glucose levels. Replace sensor every 10 days.   insulin pen needle (B-D U/F) 31G X 8 MM TEST FOUR TIMES A DAY OR AS DIRECTED   insulin syringe-needle U-100 (BD INSULIN  "SYRINGE ULTRAFINE) 30G X 1/2\" 1 ML Use one syringe daily or as directed.  3 month supply   Urea 20 % CREA cream Apply topically as needed (Patient not taking: Reported on 6/5/2018)     No current facility-administered medications on file prior to visit.     ROS: 10 point ROS neg other than the symptoms noted above in the HPI.    So Hx -  with 5 children    Vital signs:   /72 (BP Location: Right arm, Patient Position: Sitting, Cuff Size: Adult Regular)  Pulse 82  Ht 1.664 m (5' 5.5\")  Wt 63.5 kg (140 lb)  LMP  (LMP Unknown)  BMI 22.94 kg/m2  Estimated body mass index is 22.94 kg/(m^2) as calculated from the following:    Height as of this encounter: 1.664 m (5' 5.5\").    Weight as of this encounter: 63.5 kg (140 lb).    VSS  NAD  Eyes - no periorbital edema, conjunctival injection, scleral icterus  CV -  No edema  Skin - normal texture   Mood - cheerful    Recent Labs   Lab Test 06/05/18 03/16/18   1212  03/16/18   1211 03/08/18 03/27/17   1442  11/14/16   1105  11/14/16   1058   03/21/16   1030   08/04/11   1620   A1C   --    --   8.5*   --    --   8.2*   --   8.1*   --   8.1*   < >   --    HEMOGLOBINA1  8.6*   --    --   8.5*   < >   --    --    --    < >   --    < >   --    TSH   --    --   2.12   --    --    --    --    --    --   1.31   < >  1.48   T4   --    --    --    --    --    --    --    --    --    --    --   1.22   LDL   --    --   149*   --    --   160*   --    --    --   142*   < >   --    HDL   --    --   43*   --    --   48*   --    --    --   44*   < >   --    TRIG   --    --   183*   --    --   171*   --    --    --   223*   < >   --    CR   --    --   0.67   --    --    --    --   0.83   --   0.70   < >  0.63   MICROL   --   12   --    --    --    --   8   --    < >   --    < >   --     < > = values in this interval not displayed.       Assessment and plan:    1.  Diabetes control.  She is about the same as always and is above optimal target.  She is very fearful of " hypoglycemia and is reluctant to do corrections without wearing the shauna. Will see if we can obtain one for her.  No changes in meds today. She is tolerating them all OK.    2.  Diabetes complications. Up to date with screens and has none.    3. CVD risk. BP is OK. She is on statin      F/u with Micaela Juarez in 3 mo and f/u with me in 6 mo      I spent 25 minutes with this patient face to face and explained the conditions and plans (more than 50% of time was counseling/coordination of diabetes care with emphasis on obtaining sensor) . The patient understood and is satisfied with today's visit.     Melisa Phillips MD

## 2018-06-05 NOTE — NURSING NOTE
Chief Complaint   Patient presents with     RECHECK     Type II Diabetes f/u      Jayson Arreguin, CMA

## 2018-06-05 NOTE — LETTER
6/5/2018       RE: Karen Braxton  [address]  [facility] [address]     Dear Colleague,    Thank you for referring your patient, Karen Braxton, to the [facility] ENDOCRINOLOGY at [facility]. Please see a copy of my visit note below.    This 48 year old woman with a 10+  year history of type 2 diabetes is here for f/u.She was seen without an  today.  She doesn't think she needs one.  She sees Dr. Engel for PC and is co managed by me with Micaela Juarez.  She now is taking  basaglar 50 units at hs, actos 45 mg a day, invokana 300 mg a day,  Metformin 500 bid  and humalog 30 units at breakfast and 35 units with  lunch and 30 with  her evening meal.  She checks her sugars 3- 4 times a day.  Average over the last month was 190 with range from 77 - 393.  She feels low when she has values less than 90. She says she isn't really having lows lately.  She is now working as a  for 5.5 hours on Monday, Tues, Wed.  She is also very active on her days off cleaning her house, etc.  She doesn't miss doses of her meds.    She wore a shauna sensor in April and liked it very much.  She would like to wear one all the time but [facility] will not cover it.  She says it let her be more confident with her control. If she was high she would do a correction, something she would never do if she was only checking blood sugars.  She continues to eat a small breakfast of tea and one piece of bread.  Lunch is her largest meal and is bread, chicken.  Dinner is small.  Review of the shauna data from April shows she usually drops overnight from around 200 to 100 by 6 am.  She goes up with her meals and comes back to target about 50% of the time.  About 50% of the time she stays high after lunch for hours.    She saw eye MD earlier this year.  She has no paresthesias in her feet.    Denies CP, SOB, diarrhea.  No vaginal infections. No GI sx.  She says she feels well.      Current Outpatient  Prescriptions on File Prior to Visit:  alum & mag hydroxide-simethicone (ANTACID) 200-200-20 MG/5ML SUSP suspension Take 15 mLs by mouth every 4 hours as needed for indigestion   aspirin 81 MG EC tablet Take 1 tablet (81 mg) by mouth daily   atorvastatin (LIPITOR) 80 MG tablet Take 1 tablet (80 mg) by mouth daily   BASAGLAR 100 UNIT/ML injection Inject 60 units SQ each at bedtime.   calcium-vitamin D (CALTRATE) 600-400 MG-UNIT per tablet Take 1 tablet by mouth 2 times daily   canagliflozin (INVOKANA) 300 MG tablet Take 1 tablet (300 mg) by mouth every morning (before breakfast)   ezetimibe (ZETIA) 10 MG tablet Take 1 tablet (10 mg) by mouth daily   insulin lispro (HUMALOG KWIKPEN) 100 UNIT/ML injection 30 units before breakfast, 35 units before lunch, 35 units before dinner   lisinopril (PRINIVIL/ZESTRIL) 2.5 MG tablet Take 1 tablet (2.5 mg) by mouth daily   meloxicam (MOBIC) 15 MG tablet Take 1 tablet (15 mg) by mouth daily   metFORMIN (GLUCOPHAGE) 1000 MG tablet TAKE ONE-HALF TABLET BY MOUTH TWICE DAILY WITH MEALS   nicotine (NICOTROL) 10 MG Inhaler Inhale 6-16 Cartridges into the lungs daily as needed for smoking cessation   pioglitazone (ACTOS) 45 MG tablet Take 1 tablet (45 mg) by mouth daily   vitamin D (ERGOCALCIFEROL) 69461 UNIT capsule TAKE 1 CAPLET ONCE WEEKLY Profile Rx: patient will contact pharmacy when needed   ASPIRIN NOT PRESCRIBED (INTENTIONAL) continuous prn for other Antiplatelet medication not prescribed intentionally due to Not indicated based on age/GI distress   blood glucose monitoring (JONATHAN CONTOUR NEXT) test strip Use to test blood sugar 4 times daily or as directed.   blood glucose monitoring (JONATHAN MICROLET) lancets Use to test blood sugar 4 times daily or as directed.   blood glucose monitoring (NO BRAND SPECIFIED) test strip Use to test blood sugars qid  times daily or as directed   Continuous Blood Gluc  (FREESTYLE RAHEEM READER) DENNISE 1 Device daily   continuous blood glucose  "monitoring (FREESTYLE RAHEEM) sensor For use with Freestyle Raheem Flash  for continuous monitioring of blood glucose levels. Replace sensor every 10 days.   insulin pen needle (B-D U/F) 31G X 8 MM TEST FOUR TIMES A DAY OR AS DIRECTED   insulin syringe-needle U-100 (BD INSULIN SYRINGE ULTRAFINE) 30G X 1/2\" 1 ML Use one syringe daily or as directed.  3 month supply   Urea 20 % CREA cream Apply topically as needed (Patient not taking: Reported on 6/5/2018)     No current facility-administered medications on file prior to visit.     ROS: 10 point ROS neg other than the symptoms noted above in the HPI.    So Hx -  with 5 children    Vital signs:   /72 (BP Location: Right arm, Patient Position: Sitting, Cuff Size: Adult Regular)  Pulse 82  Ht 1.664 m (5' 5.5\")  Wt 63.5 kg (140 lb)  LMP  (LMP Unknown)  BMI 22.94 kg/m2  Estimated body mass index is 22.94 kg/(m^2) as calculated from the following:    Height as of this encounter: 1.664 m (5' 5.5\").    Weight as of this encounter: 63.5 kg (140 lb).    VSS  NAD  Eyes - no periorbital edema, conjunctival injection, scleral icterus  CV -  No edema  Skin - normal texture   Mood - cheerful    Recent Labs   Lab Test 06/05/18 03/16/18   1212  03/16/18   1211 03/08/18 03/27/17   1442  11/14/16   1105  11/14/16   1058   03/21/16   1030   08/04/11   1620   A1C   --    --   8.5*   --    --   8.2*   --   8.1*   --   8.1*   < >   --    HEMOGLOBINA1  8.6*   --    --   8.5*   < >   --    --    --    < >   --    < >   --    TSH   --    --   2.12   --    --    --    --    --    --   1.31   < >  1.48   T4   --    --    --    --    --    --    --    --    --    --    --   1.22   LDL   --    --   149*   --    --   160*   --    --    --   142*   < >   --    HDL   --    --   43*   --    --   48*   --    --    --   44*   < >   --    TRIG   --    --   183*   --    --   171*   --    --    --   223*   < >   --    CR   --    --   0.67   --    --    --    --   0.83   --   " 0.70   < >  0.63   MICROL   --   12   --    --    --    --   8   --    < >   --    < >   --     < > = values in this interval not displayed.       Assessment and plan:    1.  Diabetes control.  She is about the same as always and is above optimal target.  She is very fearful of hypoglycemia and is reluctant to do corrections without wearing the shauna. Will see if we can obtain one for her.  No changes in meds today. She is tolerating them all OK.    2.  Diabetes complications. Up to date with screens and has none.    3. CVD risk. BP is OK. She is on statin      F/u with Micaela Juarez in 3 mo and f/u with me in 6 mo      I spent 25 minutes with this patient face to face and explained the conditions and plans (more than 50% of time was counseling/coordination of diabetes care with emphasis on obtaining sensor) . The patient understood and is satisfied with today's visit.     Melisa Phillips MD

## 2018-06-05 NOTE — MR AVS SNAPSHOT
"              After Visit Summary   6/5/2018    Karen Braxton    MRN: [id number]           Patient Information     Date Of Birth          1970        Visit Information        Provider Department      6/5/2018 11:30 AM eMlisa Phillips MD [facility]        Today's Diagnoses     Diabetes mellitus, type 2 (H)    -  1       Follow-ups after your visit        Follow-up notes from your care team     Return for f/u 3 mo with Micaela Juarez and f/u 6 mo with me.      Your next 10 appointments already scheduled     Sep 11, 2018  8:30 AM CDT   (Arrive by 8:15 AM)   RETURN DIABETES with Cecile Jaurez PA-C   [facility] ([facility])    [address] [place] [address] [phone]            Dec 18, 2018 10:00 AM CST   (Arrive by 9:45 AM)   RETURN DIABETES with Melisa Phillips MD   [facility] ([facility])    [address] [place] [address] [phone]              Who to contact     Please call your clinic at [phone] to:    Ask questions about your health    Make or cancel appointments    Discuss your medicines    Learn about your test results    Speak to your doctor            Additional Information About Your Visit        Care EveryWhere ID     This is your Care EveryWhere ID. This could be used by other organizations to access your [place] medical records  [id number]        Your Vitals Were     Pulse Height Last Period BMI (Body Mass Index)          82 1.664 m (5' 5.5\") (LMP Unknown) 22.94 kg/m2         Blood Pressure from Last 3 Encounters:   06/05/18 105/72   04/13/18 135/81   03/16/18 116/68    Weight from Last 3 Encounters:   06/05/18 63.5 kg (140 lb)   04/13/18 62.3 kg (137 lb 6.4 oz)   03/16/18 61 kg (134 lb 8 oz)              We Performed the Following     Hemoglobin A1c POCT        Primary Care Provider Office Phone # Fax #    Raymond Engel, " [phone]       [address]  [facility] [address]        Equal Access to Services     PEYTON BURGOS : Hadii aad ku hadjenniferporfirio Stringer, therese ghadajenniferha, jeanmarie lornefaisalda pritesh, niranjan cartagenajeaneth timo. So [facility] [phone].    ATENCIÓN: Si habla español, tiene a merrill disposición servicios gratuitos de asistencia lingüística. Llame al [phone].    We comply with applicable federal civil rights laws and [place] laws. We do not discriminate on the basis of race, color, national origin, age, disability, sex, sexual orientation, or gender identity.            Thank you!     Thank you for choosing [facility]  for your care. Our goal is always to provide you with excellent care. Hearing back from our patients is one way we can continue to improve our services. Please take a few minutes to complete the written survey that you may receive in the mail after your visit with us. Thank you!             Your Updated Medication List - Protect others around you: Learn how to safely use, store and throw away your medicines at www.disposemymeds.org.          This list is accurate as of 6/5/18 11:39 AM.  Always use your most recent med list.                   Brand Name Dispense Instructions for use Diagnosis    alum & mag hydroxide-simethicone 200-200-20 MG/5ML Susp suspension    antacid    355 mL    Take 15 mLs by mouth every 4 hours as needed for indigestion    Gastroesophageal reflux disease without esophagitis       aspirin 81 MG EC tablet     90 tablet    Take 1 tablet (81 mg) by mouth daily    Uncontrolled type 1 diabetes mellitus without complication (H), Uncontrolled type 2 diabetes mellitus without complication, with long-term current use of insulin (H), Hyperlipidemia LDL goal <100       ASPIRIN NOT PRESCRIBED    INTENTIONAL     continuous prn for other Antiplatelet medication not prescribed intentionally due to Not indicated based on age/GI distress         atorvastatin 80 MG tablet    LIPITOR    90 tablet    Take 1 tablet (80 mg) by mouth daily    Hyperlipidemia LDL goal <100       BASAGLAR 100 UNIT/ML injection     15 mL    Inject 60 units SQ each at bedtime.    Uncontrolled type 1 diabetes mellitus without complication (H), Uncontrolled type 2 diabetes mellitus without complication, with long-term current use of insulin (H)       blood glucose monitoring lancets     2 Box    Use to test blood sugar 4 times daily or as directed.    Diabetes type 2, controlled (H)       * blood glucose monitoring test strip    JONATHAN CONTOUR NEXT    200 each    Use to test blood sugar 4 times daily or as directed.    Diabetes type 2, controlled (H)       * blood glucose monitoring test strip    no brand specified    400 strip    Use to test blood sugars qid  times daily or as directed    Uncontrolled type 2 diabetes mellitus without complication, with long-term current use of insulin (H), Uncontrolled type 1 diabetes mellitus without complication (H)       calcium-vitamin D 600-400 MG-UNIT per tablet    CALTRATE    180 tablet    Take 1 tablet by mouth 2 times daily    Vitamin D deficiency       canagliflozin 300 MG tablet    INVOKANA    90 tablet    Take 1 tablet (300 mg) by mouth every morning (before breakfast)    Uncontrolled type 2 diabetes mellitus without complication, with long-term current use of insulin (H)       continuous blood glucose monitoring sensor     9 each    For use with Freestyle Dodie Flash  for continuous monitioring of blood glucose levels. Replace sensor every 10 days.    Type 2 diabetes mellitus with hyperglycemia, with long-term current use of insulin (H)       ezetimibe 10 MG tablet    ZETIA    90 tablet    Take 1 tablet (10 mg) by mouth daily    Hyperlipidemia LDL goal <100       FREESTYLE DODIE READER Cathy     1 Device    1 Device daily        insulin lispro 100 UNIT/ML injection    HumaLOG KWIKpen    15 mL    30 units before breakfast, 35 units  "before lunch, 35 units before dinner    Uncontrolled type 2 diabetes mellitus without complication, with long-term current use of insulin (H)       insulin pen needle 31G X 8 MM    B-D U/F    400 each    TEST FOUR TIMES A DAY OR AS DIRECTED    Controlled type 2 diabetes mellitus with hyperglycemia, with long-term current use of insulin (H)       insulin syringe-needle U-100 30G X 1/2\" 1 ML    BD insulin syringe ultrafine    100 each    Use one syringe daily or as directed.  3 month supply    Diabetes type 2, controlled (H)       lisinopril 2.5 MG tablet    PRINIVIL/Zestril    90 tablet    Take 1 tablet (2.5 mg) by mouth daily    Hypertension goal BP (blood pressure) < 140/80, Uncontrolled type 2 diabetes mellitus without complication, with long-term current use of insulin (H)       meloxicam 15 MG tablet    MOBIC    30 tablet    Take 1 tablet (15 mg) by mouth daily    Sprain of right rotator cuff capsule, initial encounter       metFORMIN 1000 MG tablet    GLUCOPHAGE    90 tablet    TAKE ONE-HALF TABLET BY MOUTH TWICE DAILY WITH MEALS    Uncontrolled type 2 diabetes mellitus without complication, with long-term current use of insulin (H)       nicotine 10 MG Inhaler    NICOTROL    180 each    Inhale 6-16 Cartridges into the lungs daily as needed for smoking cessation    Tobacco dependence syndrome       pioglitazone 45 MG tablet    ACTOS    90 tablet    Take 1 tablet (45 mg) by mouth daily    Uncontrolled type 2 diabetes mellitus without complication, with long-term current use of insulin (H)       Urea 20 % Crea cream     85 g    Apply topically as needed    Tyloma       vitamin D 32643 UNIT capsule    ERGOCALCIFEROL    4 capsule    TAKE 1 CAPLET ONCE WEEKLY Profile Rx: patient will contact pharmacy when needed    Vitamin D deficiency       * Notice:  This list has 2 medication(s) that are the same as other medications prescribed for you. Read the directions carefully, and ask your doctor or other care provider to " review them with you.

## 2018-06-11 NOTE — TELEPHONE ENCOUNTER
Requested Prescriptions   Pending Prescriptions Disp Refills     metFORMIN (GLUCOPHAGE) 1000 MG tablet  Last Written Prescription Date:  11/30/17  Last Fill Quantity: 90,  # refills: 1   Last office visit: 3/16/2018 with prescribing provider:  Maren   Future Office Visit:     90 tablet 1     Sig: TAKE ONE-HALF TABLET BY MOUTH TWICE DAILY WITH MEALS    Biguanide Agents Passed    6/11/2018  2:09 PM       Passed - Blood pressure less than 140/90 in past 6 months    BP Readings from Last 3 Encounters:   06/05/18 105/72   04/13/18 135/81   03/16/18 116/68                Passed - Patient has documented LDL within the past 12 mos.    Recent Labs   Lab Test  03/16/18   1211   LDL  149*            Passed - Patient has had a Microalbumin in the past 12 mos.    Recent Labs   Lab Test  03/16/18   1212   07/16/12   1539   [id number]   --    --   5.0   [id number]   --    --   13.3   MICROL  12   < >   --    UMALCR  24.21   < >   --     < > = values in this interval not displayed.            Passed - Patient is age 10 or older       Passed - Patient has documented A1c within the specified period of time.    If HgbA1C is 8 or greater, it needs to be on file within the past 3 months.  If less than 8, must be on file within the past 6 months.     Recent Labs   Lab Test 06/05/18 03/16/18   1211   A1C   --   8.5*   HEMOGLOBINA1  8.6*   --             Passed - Patient's CR is NOT>1.4 OR Patient's EGFR is NOT<45 within past 12 mos.    Recent Labs   Lab Test  03/16/18   1211   GFRESTIMATED  >90   GFRESTBLACK  >90       Recent Labs   Lab Test  03/16/18   1211   CR  0.67            Passed - Patient does NOT have a diagnosis of CHF.       Passed - Patient is not pregnant       Passed - Patient has not had a positive pregnancy test within the past 12 mos.        Passed - Recent (6 mo) or future (30 days) visit within the authorizing provider's specialty    Patient had office visit in the last 6 months or has a visit in the next 30 days with  "authorizing provider or within the authorizing provider's specialty.  See \"Patient Info\" tab in inbasket, or \"Choose Columns\" in Meds & Orders section of the refill encounter.              "

## 2018-06-11 NOTE — TELEPHONE ENCOUNTER
Prescription approved per [facility] Refill Protocol for 6 months of refills since last appointment, which was 3/16/18

## 2018-07-09 ENCOUNTER — OFFICE VISIT (OUTPATIENT)
Dept: FAMILY MEDICINE | Facility: CLINIC | Age: 48
End: 2018-07-09
Payer: COMMERCIAL

## 2018-07-09 ENCOUNTER — TRANSFERRED RECORDS (OUTPATIENT)
Dept: HEALTH INFORMATION MANAGEMENT | Facility: CLINIC | Age: 48
End: 2018-07-09

## 2018-07-09 VITALS
TEMPERATURE: 98.3 F | SYSTOLIC BLOOD PRESSURE: 120 MMHG | OXYGEN SATURATION: 98 % | BODY MASS INDEX: 23.11 KG/M2 | HEART RATE: 79 BPM | RESPIRATION RATE: 16 BRPM | DIASTOLIC BLOOD PRESSURE: 64 MMHG | WEIGHT: 141 LBS

## 2018-07-09 DIAGNOSIS — F51.01 PRIMARY INSOMNIA: ICD-10-CM

## 2018-07-09 DIAGNOSIS — I10 HYPERTENSION GOAL BP (BLOOD PRESSURE) < 140/90: Chronic | ICD-10-CM

## 2018-07-09 DIAGNOSIS — M71.9 DISORDER OF BURSAE AND TENDONS IN SHOULDER REGION: ICD-10-CM

## 2018-07-09 DIAGNOSIS — M67.919 DISORDER OF BURSAE AND TENDONS IN SHOULDER REGION: ICD-10-CM

## 2018-07-09 DIAGNOSIS — Z79.4 TYPE 2 DIABETES MELLITUS WITH DIABETIC NEUROPATHY, WITH LONG-TERM CURRENT USE OF INSULIN (H): ICD-10-CM

## 2018-07-09 DIAGNOSIS — E11.40 TYPE 2 DIABETES MELLITUS WITH DIABETIC NEUROPATHY, WITH LONG-TERM CURRENT USE OF INSULIN (H): ICD-10-CM

## 2018-07-09 DIAGNOSIS — E78.5 HYPERLIPIDEMIA LDL GOAL <100: Chronic | ICD-10-CM

## 2018-07-09 DIAGNOSIS — K91.5 POST-CHOLECYSTECTOMY SYNDROME: ICD-10-CM

## 2018-07-09 DIAGNOSIS — E55.9 VITAMIN D DEFICIENCY: ICD-10-CM

## 2018-07-09 DIAGNOSIS — M20.41 HAMMER TOE OF RIGHT FOOT: ICD-10-CM

## 2018-07-09 LAB
ANION GAP SERPL CALCULATED.3IONS-SCNC: 12 MMOL/L (ref 3–14)
BUN SERPL-MCNC: 16 MG/DL (ref 7–30)
CALCIUM SERPL-MCNC: 8.6 MG/DL (ref 8.5–10.1)
CHLORIDE SERPL-SCNC: 105 MMOL/L (ref 94–109)
CO2 SERPL-SCNC: 21 MMOL/L (ref 20–32)
CREAT SERPL-MCNC: 0.66 MG/DL (ref 0.52–1.04)
GFR SERPL CREATININE-BSD FRML MDRD: >90 ML/MIN/1.7M2
GLUCOSE SERPL-MCNC: 257 MG/DL (ref 70–99)
HBA1C MFR BLD: 8.4 % (ref 0–5.6)
POTASSIUM SERPL-SCNC: 3.6 MMOL/L (ref 3.4–5.3)
SODIUM SERPL-SCNC: 138 MMOL/L (ref 133–144)

## 2018-07-09 PROCEDURE — 80048 BASIC METABOLIC PNL TOTAL CA: CPT | Performed by: FAMILY MEDICINE

## 2018-07-09 PROCEDURE — 36415 COLL VENOUS BLD VENIPUNCTURE: CPT | Performed by: FAMILY MEDICINE

## 2018-07-09 PROCEDURE — 99214 OFFICE O/P EST MOD 30 MIN: CPT | Performed by: FAMILY MEDICINE

## 2018-07-09 PROCEDURE — 83036 HEMOGLOBIN GLYCOSYLATED A1C: CPT | Performed by: FAMILY MEDICINE

## 2018-07-09 RX ORDER — COLESEVELAM HYDROCHLORIDE 3.75 G/1
3.75 POWDER, FOR SUSPENSION ORAL
Qty: 30 EACH | Refills: 11 | Status: SHIPPED | OUTPATIENT
Start: 2018-07-09 | End: 2019-07-19

## 2018-07-09 NOTE — PROGRESS NOTES
"  SUBJECTIVE:   Karen Braxton is a 48 year old female who presents to clinic today for the following health issues:      Diabetes Follow-up    Patient is checking blood sugars: once daily.  Results are as follows:         am - 144    Diabetic concerns: None     Symptoms of hypoglycemia (low blood sugar): none     Paresthesias (numbness or burning in feet) or sores: No     Date of last diabetic eye exam: going later today    BP Readings from Last 2 Encounters:   06/05/18 105/72   04/13/18 135/81     Hemoglobin A1C (%)   Date Value   03/16/2018 8.5 (H)   03/27/2017 8.2 (H)     LDL Cholesterol Calculated (mg/dL)   Date Value   03/16/2018 149 (H)   03/27/2017 160 (H)       Diabetes Management Resources    Amount of exercise or physical activity: 6-7 days/week for an average of 30-45 minutes    Problems taking medications regularly: No    Medication side effects: none    Diet: low fat/cholesterol      .LINDA ALCANTAR MD .7/9/2018 8:38 AM .July 9, 2018        Karen Braxton is a 48 year old female who is who presents with FOLLOW UP  DIABETES         Onset : DIFFICULT TO CONTROL FOR LAST 10 YEARS      Severity: SEVERE       Home treatments POORLY DONTROLLED LDL OR \"BAD\" CHOLESTEROL AND BORDERLINE  TRIGLYCERIDES      Additional Symptoms: RIGHT UPPER QUADRANT ABDOMINAL PAIN AFTER GALLBLADDER REMOVAL      Course ONGOING SYMPTOMS         Concern -  RIGHT UPPER QUADRANT ABDOMINAL PAIN      Onset:  LAST YEAR    Description:      RIGHT UPPER QUADRANT PAIN WITH WITH VOMITING  ,THAT IS POSTCHOLECYSTECTOMY SYNDDROME     Intensity: moderate    Progression of Symptoms:  worsening    Accompanying Signs & Symptoms:     EPISODE LAST NIGTH            Previous history of similar problem:      YES     Precipitating factors:     Worsened by:  UNCERTAIN     Alleviating factors:    Improved by:  UNCERTAIN          Therapies Tried and outcome:  NONE                Diabetes Follow-up    Patient is checking blood sugars:  FOUR TIMES DAILY "   Diabetic concerns: blood sugar frequently over 200   Symptoms of hypoglycemia (low blood sugar): none   Paresthesias (numbness or burning in feet) or sores: YES    Date of last diabetic eye exam:      Diabetes Management Resources    Hyperlipidemia Follow-Up    Rate your low fat/cholesterol diet?: good  Taking statin?  Yes, no muscle aches from statin  Other lipid medications/supplements?:  none    Hypertension Follow-up    Outpatient blood pressures are not being checked.  Low Salt Diet: no added salt    BP Readings from Last 2 Encounters:   07/09/18 120/64   06/05/18 105/72     Hemoglobin A1C (%)   Date Value   03/16/2018 8.5 (H)   03/27/2017 8.2 (H)     LDL Cholesterol Calculated (mg/dL)   Date Value   03/16/2018 149 (H)   03/27/2017 160 (H)     Amount of exercise or physical activity: 6-7 days/week for an average of 45-60 minutes  Problems taking medications regularly: No  Medication side effects: none  Diet: low salt, low fat/cholesterol and diabetic                 Topic Date Due     EYE EXAM Q1 YEAR  05/23/2018     MAMMO SCREEN Q2 YR (SYSTEM ASSIGNED)  05/30/2018     COLON CANCER SCREEN (SYSTEM ASSIGNED)  05/30/2018     PAP Q3 YR  07/14/2018               .  Current Outpatient Prescriptions   Medication Sig Dispense Refill     alum & mag hydroxide-simethicone (ANTACID) 200-200-20 MG/5ML SUSP suspension Take 15 mLs by mouth every 4 hours as needed for indigestion 355 mL 11     aspirin 81 MG EC tablet Take 1 tablet (81 mg) by mouth daily 90 tablet 3     atorvastatin (LIPITOR) 80 MG tablet Take 1 tablet (80 mg) by mouth daily 90 tablet 3     BASAGLAR 100 UNIT/ML injection Inject 60 units SQ each at bedtime. 15 mL 12     calcium-vitamin D (CALTRATE) 600-400 MG-UNIT per tablet Take 1 tablet by mouth 2 times daily 180 tablet 3     canagliflozin (INVOKANA) 300 MG tablet Take 1 tablet (300 mg) by mouth every morning (before breakfast) 90 tablet 1     colesevelam (WELCHOL) 3.75 g PACK Packet Take 3.75 g by mouth  "daily (with breakfast) 30 each 11     ezetimibe (ZETIA) 10 MG tablet Take 1 tablet (10 mg) by mouth daily 90 tablet 3     insulin lispro (HUMALOG KWIKPEN) 100 UNIT/ML injection 30 units before breakfast, 35 units before lunch, 35 units before dinner 15 mL 11     lisinopril (PRINIVIL/ZESTRIL) 2.5 MG tablet Take 1 tablet (2.5 mg) by mouth daily 90 tablet 2     meloxicam (MOBIC) 15 MG tablet Take 1 tablet (15 mg) by mouth daily 30 tablet 1     metFORMIN (GLUCOPHAGE) 1000 MG tablet TAKE ONE-HALF TABLET BY MOUTH TWICE DAILY WITH MEALS 90 tablet 1     pioglitazone (ACTOS) 45 MG tablet Take 1 tablet (45 mg) by mouth daily 90 tablet 2     vitamin D (ERGOCALCIFEROL) 07348 UNIT capsule TAKE 1 CAPLET ONCE WEEKLY Profile Rx: patient will contact pharmacy when needed 4 capsule 5     ASPIRIN NOT PRESCRIBED (INTENTIONAL) continuous prn for other Antiplatelet medication not prescribed intentionally due to Not indicated based on age/GI distress       blood glucose monitoring (JONATHAN CONTOUR NEXT) test strip Use to test blood sugar 4 times daily or as directed. 200 each 11     blood glucose monitoring (JONATHAN MICROLET) lancets Use to test blood sugar 4 times daily or as directed. 2 Box 6     blood glucose monitoring (NO BRAND SPECIFIED) test strip Use to test blood sugars qid  times daily or as directed 400 strip 11     Continuous Blood Gluc  (FREESTYLE RAHEEM READER) DENNISE 1 Device daily 1 Device 0     continuous blood glucose monitoring (FREESTYLE RAHEEM) sensor For use with Freestyle Raheem Flash  for continuous monitioring of blood glucose levels. Replace sensor every 10 days. 9 each 3     insulin pen needle (B-D U/F) 31G X 8 MM TEST FOUR TIMES A DAY OR AS DIRECTED 400 each 11     insulin syringe-needle U-100 (BD INSULIN SYRINGE ULTRAFINE) 30G X 1/2\" 1 ML Use one syringe daily or as directed.  3 month supply 100 each prn     nicotine (NICOTROL) 10 MG Inhaler Inhale 6-16 Cartridges into the lungs daily as needed for " smoking cessation (Patient not taking: Reported on 2018) 180 each 11     Urea 20 % CREA cream Apply topically as needed (Patient not taking: Reported on 2018) 85 g 3              No Known Allergies      Immunization History   Administered Date(s) Administered     Influenza (IIV3) PF 2007, 10/22/2008, 2009, 2010, 2013, 10/28/2014     Influenza Vaccine IM 3yrs+ 4 Valent IIV4 2015, 10/25/2017     Pneumo Conj 13-V (2010&after) 10/03/2011     TD (ADULT, 7+) 1998     Tdap (Adacel,Boostrix) 02/10/2009               reports that she does not drink alcohol.          reports that she does not use illicit drugs.        family history includes Asthma in her mother; Diabetes in her father and mother; HEART DISEASE in her mother; Hypertension in her father and mother; Lipids in her mother. There is no history of Cancer, Coronary Artery Disease, Hyperlipidemia, Cerebrovascular Disease, Breast Cancer, Colon Cancer, Prostate Cancer, Other Cancer, Depression, Anxiety Disorder, Mental Illness, Substance Abuse, Anesthesia Reaction, Osteoperosis, Genetic Disorder, Thyroid Disease, Obesity, or Unknown/Adopted.        indicated that the status of her no family hx of is unknown. She indicated that her mother is . She indicated that her father is .          has a past surgical history that includes Cholecystectomy (2007); Facial reconstruction surgery (2 years old); and orthopedic surgery (2001).         reports that she currently engages in sexual activity and has had male partners.    .  Pediatric History   Patient Guardian Status     Not on file.     Other Topics Concern     Parent/Sibling W/ Cabg, Mi Or Angioplasty Before 65f 55m? No     Social History Narrative               reports that she has been smoking Cigarettes.  She has never used smokeless tobacco.        Medical, social, surgical, and family histories reviewed.        Labs reviewed in EPIC  Patient Active  Problem List   Diagnosis     Other dental caries     Disorder of bursae and tendons in shoulder region     Insomnia     Vitamin D deficiency     Premature menopause     ASCUS favor benign     Hyperlipidemia LDL goal <100     Comprehensive diabetic foot examination, type 2 DM, encounter for (H)     Hypertension goal BP (blood pressure) < 140/90     DiarrheaX 2 over last 24hrs w one explosive r/o 2ndary to acid gastritis     Uncontrolled type 2 diabetes mellitus without complication, with long-term current use of insulin (H)     Esophageal reflux 2ndary to hi continuous intake of sugared  tea     Hyperlipidemia with target LDL less than 100     Diabetes type 2, controlled (H)     Intractable chronic migraine without aura and without status migrainosus     Needle stick injury, subsequent encounter     Hammer toe of right foot     Type 2 diabetes mellitus without complication, with long-term current use of insulin (H)       Past Surgical History:   Procedure Laterality Date     CHOLECYSTECTOMY  9/14/2007    laproscopic     FACIAL RECONSTRUCTION SURGERY  2 years old    cleft lip repair     ORTHOPEDIC SURGERY  6/2001    left knee         Social History   Substance Use Topics     Smoking status: Current Some Day Smoker     Types: Cigarettes     Smokeless tobacco: Never Used      Comment: 3 cigarettes daily     Alcohol use No       Family History   Problem Relation Age of Onset     Diabetes Mother      Hypertension Mother      HEART DISEASE Mother      Lipids Mother      Asthma Mother      Diabetes Father      Hypertension Father      Cancer No family hx of      No known family hx of skin cancer     Coronary Artery Disease No family hx of      Hyperlipidemia No family hx of      Cerebrovascular Disease No family hx of      Breast Cancer No family hx of      Colon Cancer No family hx of      Prostate Cancer No family hx of      Other Cancer No family hx of      Depression No family hx of      Anxiety Disorder No family hx of       Mental Illness No family hx of      Substance Abuse No family hx of      Anesthesia Reaction No family hx of      Osteoperosis No family hx of      Genetic Disorder No family hx of      Thyroid Disease No family hx of      Obesity No family hx of      Unknown/Adopted No family hx of              Current Outpatient Prescriptions   Medication Sig Dispense Refill     alum & mag hydroxide-simethicone (ANTACID) 200-200-20 MG/5ML SUSP suspension Take 15 mLs by mouth every 4 hours as needed for indigestion 355 mL 11     aspirin 81 MG EC tablet Take 1 tablet (81 mg) by mouth daily 90 tablet 3     atorvastatin (LIPITOR) 80 MG tablet Take 1 tablet (80 mg) by mouth daily 90 tablet 3     BASAGLAR 100 UNIT/ML injection Inject 60 units SQ each at bedtime. 15 mL 12     calcium-vitamin D (CALTRATE) 600-400 MG-UNIT per tablet Take 1 tablet by mouth 2 times daily 180 tablet 3     canagliflozin (INVOKANA) 300 MG tablet Take 1 tablet (300 mg) by mouth every morning (before breakfast) 90 tablet 1     colesevelam (WELCHOL) 3.75 g PACK Packet Take 3.75 g by mouth daily (with breakfast) 30 each 11     ezetimibe (ZETIA) 10 MG tablet Take 1 tablet (10 mg) by mouth daily 90 tablet 3     insulin lispro (HUMALOG KWIKPEN) 100 UNIT/ML injection 30 units before breakfast, 35 units before lunch, 35 units before dinner 15 mL 11     lisinopril (PRINIVIL/ZESTRIL) 2.5 MG tablet Take 1 tablet (2.5 mg) by mouth daily 90 tablet 2     meloxicam (MOBIC) 15 MG tablet Take 1 tablet (15 mg) by mouth daily 30 tablet 1     metFORMIN (GLUCOPHAGE) 1000 MG tablet TAKE ONE-HALF TABLET BY MOUTH TWICE DAILY WITH MEALS 90 tablet 1     pioglitazone (ACTOS) 45 MG tablet Take 1 tablet (45 mg) by mouth daily 90 tablet 2     vitamin D (ERGOCALCIFEROL) 83373 UNIT capsule TAKE 1 CAPLET ONCE WEEKLY Profile Rx: patient will contact pharmacy when needed 4 capsule 5     ASPIRIN NOT PRESCRIBED (INTENTIONAL) continuous prn for other Antiplatelet medication not prescribed  "intentionally due to Not indicated based on age/GI distress       blood glucose monitoring (JONATHAN CONTOUR NEXT) test strip Use to test blood sugar 4 times daily or as directed. 200 each 11     blood glucose monitoring (JONATHAN MICROLET) lancets Use to test blood sugar 4 times daily or as directed. 2 Box 6     blood glucose monitoring (NO BRAND SPECIFIED) test strip Use to test blood sugars qid  times daily or as directed 400 strip 11     Continuous Blood Gluc  (FREESTYLE RAHEEM READER) DENNISE 1 Device daily 1 Device 0     continuous blood glucose monitoring (FREESTYLE RAHEEM) sensor For use with Freestyle Raheem Flash  for continuous monitioring of blood glucose levels. Replace sensor every 10 days. 9 each 3     insulin pen needle (B-D U/F) 31G X 8 MM TEST FOUR TIMES A DAY OR AS DIRECTED 400 each 11     insulin syringe-needle U-100 (BD INSULIN SYRINGE ULTRAFINE) 30G X 1/2\" 1 ML Use one syringe daily or as directed.  3 month supply 100 each prn     nicotine (NICOTROL) 10 MG Inhaler Inhale 6-16 Cartridges into the lungs daily as needed for smoking cessation (Patient not taking: Reported on 7/9/2018) 180 each 11     Urea 20 % CREA cream Apply topically as needed (Patient not taking: Reported on 6/5/2018) 85 g 3           Recent Labs   Lab Test  03/16/18   1211  03/27/17   1442  11/14/16   1058  03/21/16   1030   06/23/15   1652   A1C  8.5*  8.2*  8.1*  8.1*   < >  8.1*   LDL  149*  160*   --   142*   --    --    HDL  43*  48*   --   44*   --    --    TRIG  183*  171*   --   223*   --    --    ALT  16   --    --   30   --   21   CR  0.67   --   0.83  0.70   --   0.80   GFRESTIMATED  >90   --   73  90   --   77   GFRESTBLACK  >90   --   89  >90   --   >90   POTASSIUM  4.2   --   4.1  4.4   --   4.2   TSH  2.12   --    --   1.31   --    --     < > = values in this interval not displayed.            BP Readings from Last 6 Encounters:   07/09/18 120/64   06/05/18 105/72   04/13/18 135/81   03/16/18 116/68 "   03/08/18 139/82   12/05/17 127/85           Wt Readings from Last 3 Encounters:   07/09/18 141 lb (64 kg)   06/05/18 140 lb (63.5 kg)   04/13/18 137 lb 6.4 oz (62.3 kg)                 Positive symptoms or findings indicated by bold designation:         ROS: 10 point ROS neg other than the symptoms noted above in the HPI.except  has Other dental caries; Disorder of bursae and tendons in shoulder region; Insomnia; Vitamin D deficiency; Premature menopause; ASCUS favor benign; Hyperlipidemia LDL goal <100; Comprehensive diabetic foot examination, type 2 DM, encounter for (H); Hypertension goal BP (blood pressure) < 140/90; DiarrheaX 2 over last 24hrs w one explosive r/o 2ndary to acid gastritis; Uncontrolled type 2 diabetes mellitus without complication, with long-term current use of insulin (H); Esophageal reflux 2ndary to hi continuous intake of sugared  tea; Hyperlipidemia with target LDL less than 100; Diabetes type 2, controlled (H); Intractable chronic migraine without aura and without status migrainosus; Needle stick injury, subsequent encounter; Hammer toe of right foot; and Type 2 diabetes mellitus without complication, with long-term current use of insulin (H) on her problem list.  Review Of Systems    Skin: negative    Eyes: negative    Ears/Nose/Throat: negative    Respiratory: No shortness of breath, dyspnea on exertion, cough, or hemoptysis    Cardiovascular: negative    Gastrointestinal: poor appetite, nausea, vomiting, abdominal pain and RIGHT UPPER QUADRANT PAIN    Genitourinary: negative    Musculoskeletal: foot pain  BILATERAL     Neurologic: negative    Psychiatric: negative    Hematologic/Lymphatic/Immunologic: negative    Endocrine: negative                PE:  /64 (Cuff Size: Adult Regular)  Pulse 79  Temp 98.3  F (36.8  C) (Tympanic)  Resp 16  Wt 141 lb (64 kg)  LMP  (LMP Unknown)  SpO2 98%  BMI 23.11 kg/m2 Body mass index is 23.11 kg/(m^2).        Constitutional: general  appearance, well nourished, well developed, in no acute distress, well developed, appears stated age, normal body habitus,          Eyes:; The patient has normal eyelids sclerae and conjunctivae :          Ears/Nose/Throat: external ear, overall: normal appearance; external nose, overall: benign appearance, normal moujth gums and lips           Neck: thyroid, overall: normal size, normal consistency, nontender,          Respiratory:  palpation of chest, overall: normal excursion,     Clear to percussion and auscultation     NO Tachypnea    NORMAL  Color          Cardiovascular:  Good color with no peripheral edema    Regular sinus rhythm without murmur.  Physiologic heart sounds   Heart is unelarged    .     Chest/Breast: normal shape           Abdominal exam,  Liver and spleen are  unenlarged        Tenderness  RIGHT UPPER QUADRANT   Scars              Urogenital; no renal, flank or bladder  tenderness;          Lymphatic: neck nodes,     Other nodes         Musculoskeletal:  Brief ortho exam normal except:           Integument: inspection of skin, no rash, lesions; and, palpation, no induration, no tenderness.          Neurologic mental status, overall: alert and oriented; gait, no ataxia, no unsteadiness; coordination, no tremors; cranial nerves, overall: normal motor, overall: normal bulk, tone.          Psychiatric: orientation/consciousness, overall: oriented to person, place and time; behavior/psychomotor activity, no tics, normal psychomotor activity; mood and affect, overall: normal mood and affect; appearance, overall: well-groomed, good eye contact; speech, overall: normal quality, no aphasia and normal quality, quantity, intact.        Diagnostic Test Results:  Results for orders placed or performed in visit on 06/05/18   Hemoglobin A1c POCT   Result Value Ref Range    Hemoglobin A1C 8.6 (A) 4.3 - 6 %           ICD-10-CM    1. Uncontrolled type 2 diabetes mellitus without complication, with long-term  current use of insulin (H) E11.65 Hemoglobin A1c    Z79.4 Basic metabolic panel   2. Hyperlipidemia LDL goal <100 E78.5    3. Hypertension goal BP (blood pressure) < 140/90 I10    4. Vitamin D deficiency E55.9    5. Hammer toe of right foot M20.41    6. Disorder of bursae and tendons in shoulder region M71.9     M67.919    7. Primary insomnia F51.01    8. Type 2 diabetes mellitus with diabetic neuropathy, with long-term current use of insulin (H) E11.40     Z79.4    9. Post-cholecystectomy syndrome K91.5 colesevelam (WELCHOL) 3.75 g PACK Packet    ruq pain wth vominiting  recurrent              .    Side effects benefits and risks thoroughly discussed. .she may come in early if unimproved or getting worse          Please drink 2 glasses of water prior to meals and walk 15-30 minutes after meals        I spent  25 MINUTES SPENT  with patient discussing the following issues   The primary encounter diagnosis was Uncontrolled type 2 diabetes mellitus without complication, with long-term current use of insulin (H). Diagnoses of Hyperlipidemia LDL goal <100, Hypertension goal BP (blood pressure) < 140/90, Vitamin D deficiency, Hammer toe of right foot, Disorder of bursae and tendons in shoulder region, Primary insomnia, Type 2 diabetes mellitus with diabetic neuropathy, with long-term current use of insulin (H), and Post-cholecystectomy syndrome were also pertinent to this visit. over half of which involved counseling and coordination of care.      Patient Instructions   Right upper quadrant abdominal pain recurrent  (E11.65,  Z79.4) Uncontrolled type 2 diabetes mellitus without complication, with long-term current use of insulin (H)  (primary encounter diagnosis)  Comment:    Plan: Hemoglobin A1c, Basic metabolic panel  1/2 package of welchol dialy              (E78.5) Hyperlipidemia LDL goal <100  Comment:    Plan:      (I10) Hypertension goal BP (blood pressure) < 140/90  Comment:    Plan:      (E55.9) Vitamin D  deficiency  Comment:    Plan:      (M20.41) Hammer toe of right foot  Comment:    Plan:       (M71.9,  M67.919) Disorder of bursae and tendons in shoulder region  Comment:    Plan:      (F51.01) Primary insomnia  Comment:    Plan:      (E11.40,  Z79.4) Type 2 diabetes mellitus with diabetic neuropathy, with long-term current use of insulin (H)  Comment:    Plan:                      ALL THE ABOVE PROBLEMS ARE STABLE AND MED CHANGES AS NOTED        Diet:  MEDITERRANEAN DIET AND DIABETES         Exercise:  UPPER EXTREMETIES AND LOWER EXTREMITY AND WALKING  Exercises Range of motion, balance, isometric, and strengthening exercises 30 repetitions twice daily of involved joints    INFORMED REFUSAL FOR ALPHALIPOIC ACID         .LINDA ALCANTAR MD 7/9/2018 8:38 AM  July 9, 2018

## 2018-07-09 NOTE — LETTER
July 10, 2018      Karen Braxton  [address]   [place] [place] [id number]        Dear ,    We are writing to inform you of your test results.    HIGH BLOOD SUGAR   ABNORMAL THREE MONTH GLUCOSE AVERAGE   REST BLOOD SALTS WITHIN NORMAL LIMITS   NORMAL RENAL FUNCTION     Resulted Orders   Hemoglobin A1c   Result Value Ref Range    Hemoglobin A1C 8.4 (H) 0 - 5.6 %      Comment:      Normal <5.7% Prediabetes 5.7-6.4%  Diabetes 6.5% or higher - adopted from ADA   consensus guidelines.     Basic metabolic panel   Result Value Ref Range    Sodium 138 133 - 144 mmol/L    Potassium 3.6 3.4 - 5.3 mmol/L    Chloride 105 94 - 109 mmol/L    Carbon Dioxide 21 20 - 32 mmol/L    Anion Gap 12 3 - 14 mmol/L    Glucose 257 (H) 70 - 99 mg/dL    Urea Nitrogen 16 7 - 30 mg/dL    Creatinine 0.66 0.52 - 1.04 mg/dL    GFR Estimate >90 >60 mL/min/1.7m2      Comment:      Non  GFR Calc    GFR Estimate If Black >90 >60 mL/min/1.7m2      Comment:       GFR Calc    Calcium 8.6 8.5 - 10.1 mg/dL       If you have any questions or concerns, please call the clinic at the number listed above.       Sincerely,        LINDA ALCANTAR MD

## 2018-07-09 NOTE — MR AVS SNAPSHOT
After Visit Summary   7/9/2018    Karen Braxton    MRN: [id number]           Patient Information     Date Of Birth          1970        Visit Information        Provider Department      7/9/2018 7:45 AM Raymond Engel MD [facility]        Today's Diagnoses     Uncontrolled type 2 diabetes mellitus without complication, with long-term current use of insulin (H)    -  1    Hyperlipidemia LDL goal <100        Hypertension goal BP (blood pressure) < 140/90        Vitamin D deficiency        Hammer toe of right foot        Disorder of bursae and tendons in shoulder region        Primary insomnia        Type 2 diabetes mellitus with diabetic neuropathy, with long-term current use of insulin (H)        Post-cholecystectomy syndrome          Care Instructions    Right upper quadrant abdominal pain recurrent  (E11.65,  Z79.4) Uncontrolled type 2 diabetes mellitus without complication, with long-term current use of insulin (H)  (primary encounter diagnosis)  Comment:    Plan: Hemoglobin A1c, Basic metabolic panel  1/2 package of welchol dialy              (E78.5) Hyperlipidemia LDL goal <100  Comment:    Plan:      (I10) Hypertension goal BP (blood pressure) < 140/90  Comment:    Plan:      (E55.9) Vitamin D deficiency  Comment:    Plan:      (M20.41) Hammer toe of right foot  Comment:    Plan:       (M71.9,  M67.919) Disorder of bursae and tendons in shoulder region  Comment:    Plan:      (F51.01) Primary insomnia  Comment:    Plan:      (E11.40,  Z79.4) Type 2 diabetes mellitus with diabetic neuropathy, with long-term current use of insulin (H)  Comment:    Plan:                    Follow-ups after your visit        Your next 10 appointments already scheduled     Sep 11, 2018  8:30 AM CDT   (Arrive by 8:15 AM)   RETURN DIABETES with Cecile Juarez PA-C   [facility] ([facility])    [address] [place] [address]  [facility] [id number] [phone]            Dec 18, 2018 10:00 AM CST   (Arrive by 9:45 AM)   RETURN DIABETES with Melisa Phillips MD   [facility] ([facility])    [address] [facility]  [address] [facility] [id number] [phone]              Who to contact     If you have questions or need follow up information about today's clinic visit or your schedule please contact [facility] directly at [phone].  Normal or non-critical lab and imaging results will be communicated to you by MyChart, letter or phone within 4 business days after the clinic has received the results. If you do not hear from us within 7 days, please contact the clinic through MyChart or phone. If you have a critical or abnormal lab result, we will notify you by phone as soon as possible.  Submit refill requests through [employer] or call your pharmacy and they will forward the refill request to us. Please allow 3 business days for your refill to be completed.          Additional Information About Your Visit        Care EveryWhere ID     This is your Care EveryWhere ID. This could be used by other organizations to access your [place] medical records  [id number]        Your Vitals Were     Pulse Temperature Respirations Last Period Pulse Oximetry BMI (Body Mass Index)    79 98.3  F (36.8  C) (Tympanic) 16 (LMP Unknown) 98% 23.11 kg/m2       Blood Pressure from Last 3 Encounters:   07/09/18 120/64   06/05/18 105/72   04/13/18 135/81    Weight from Last 3 Encounters:   07/09/18 141 lb (64 kg)   06/05/18 140 lb (63.5 kg)   04/13/18 137 lb 6.4 oz (62.3 kg)              We Performed the Following     Basic metabolic panel     Hemoglobin A1c          Today's Medication Changes          These changes are accurate as of 7/9/18  8:20 AM.  If you have any questions, ask your nurse or doctor.               Start taking these medicines.        Dose/Directions     colesevelam 3.75 g Pack Packet   Commonly known as:  WELCHOL   Used for:  Post-cholecystectomy syndrome   Started by:  Raymond Engel MD        Dose:  3.75 g   Take 3.75 g by mouth daily (with breakfast)   Quantity:  30 each   Refills:  11            Where to get your medicines      These medications were sent to [facility] - [place], [place] - [address], [address]  [address], [address], [facility] [address]     Phone:  [phone]     colesevelam 3.75 g Pack Packet                Primary Care Provider Office Phone # Fax #    Raymond Engel [phone] [address] [facility] [address]        Equal Access to Services     [facility]: Hadii aad ku hadasho Soomaali, waaxda luqadaha, qaybta kaalmada adeegyada, waxay mitchelin toy martin kharaenrique figueroa . So [facility] [phone].    ATENCIÓN: Si habla español, tiene a merrill disposición servicios gratuitos de asistencia lingüística. [facility] [phone].    We comply with applicable federal civil rights laws and [place] laws. We do not discriminate on the basis of race, color, national origin, age, disability, sex, sexual orientation, or gender identity.            Thank you!     Thank you for choosing [facility]  for your care. Our goal is always to provide you with excellent care. Hearing back from our patients is one way we can continue to improve our services. Please take a few minutes to complete the written survey that you may receive in the mail after your visit with us. Thank you!             Your Updated Medication List - Protect others around you: Learn how to safely use, store and throw away your medicines at www.disposemymeds.org.          This list is accurate as of 7/9/18  8:20 AM.  Always use your most recent med list.                   Brand Name Dispense Instructions for use Diagnosis    alum & mag hydroxide-simethicone 200-200-20  MG/5ML Susp suspension    antacid    355 mL    Take 15 mLs by mouth every 4 hours as needed for indigestion    Gastroesophageal reflux disease without esophagitis       aspirin 81 MG EC tablet     90 tablet    Take 1 tablet (81 mg) by mouth daily    Uncontrolled type 1 diabetes mellitus without complication (H), Uncontrolled type 2 diabetes mellitus without complication, with long-term current use of insulin (H), Hyperlipidemia LDL goal <100       ASPIRIN NOT PRESCRIBED    INTENTIONAL     continuous prn for other Antiplatelet medication not prescribed intentionally due to Not indicated based on age/GI distress        atorvastatin 80 MG tablet    LIPITOR    90 tablet    Take 1 tablet (80 mg) by mouth daily    Hyperlipidemia LDL goal <100       BASAGLAR 100 UNIT/ML injection     15 mL    Inject 60 units SQ each at bedtime.    Uncontrolled type 1 diabetes mellitus without complication (H), Uncontrolled type 2 diabetes mellitus without complication, with long-term current use of insulin (H)       blood glucose monitoring lancets     2 Box    Use to test blood sugar 4 times daily or as directed.    Diabetes type 2, controlled (H)       * blood glucose monitoring test strip    JONATHAN CONTOUR NEXT    200 each    Use to test blood sugar 4 times daily or as directed.    Diabetes type 2, controlled (H)       * blood glucose monitoring test strip    no brand specified    400 strip    Use to test blood sugars qid  times daily or as directed    Uncontrolled type 2 diabetes mellitus without complication, with long-term current use of insulin (H), Uncontrolled type 1 diabetes mellitus without complication (H)       calcium-vitamin D 600-400 MG-UNIT per tablet    CALTRATE    180 tablet    Take 1 tablet by mouth 2 times daily    Vitamin D deficiency       canagliflozin 300 MG tablet    INVOKANA    90 tablet    Take 1 tablet (300 mg) by mouth every morning (before breakfast)    Uncontrolled type 2 diabetes mellitus without  "complication, with long-term current use of insulin (H)       colesevelam 3.75 g Pack Packet    WELCHOL    30 each    Take 3.75 g by mouth daily (with breakfast)    Post-cholecystectomy syndrome       continuous blood glucose monitoring sensor     9 each    For use with Freestyle Dodie Flash  for continuous monitioring of blood glucose levels. Replace sensor every 10 days.    Type 2 diabetes mellitus with hyperglycemia, with long-term current use of insulin (H)       ezetimibe 10 MG tablet    ZETIA    90 tablet    Take 1 tablet (10 mg) by mouth daily    Hyperlipidemia LDL goal <100       FREESTYLE DODIE READER Cathy     1 Device    1 Device daily        insulin lispro 100 UNIT/ML injection    HumaLOG KWIKpen    15 mL    30 units before breakfast, 35 units before lunch, 35 units before dinner    Uncontrolled type 2 diabetes mellitus without complication, with long-term current use of insulin (H)       insulin pen needle 31G X 8 MM    B-D U/F    400 each    TEST FOUR TIMES A DAY OR AS DIRECTED    Controlled type 2 diabetes mellitus with hyperglycemia, with long-term current use of insulin (H)       insulin syringe-needle U-100 30G X 1/2\" 1 ML    BD insulin syringe ultrafine    100 each    Use one syringe daily or as directed.  3 month supply    Diabetes type 2, controlled (H)       lisinopril 2.5 MG tablet    PRINIVIL/Zestril    90 tablet    Take 1 tablet (2.5 mg) by mouth daily    Hypertension goal BP (blood pressure) < 140/80, Uncontrolled type 2 diabetes mellitus without complication, with long-term current use of insulin (H)       meloxicam 15 MG tablet    MOBIC    30 tablet    Take 1 tablet (15 mg) by mouth daily    Sprain of right rotator cuff capsule, initial encounter       metFORMIN 1000 MG tablet    GLUCOPHAGE    90 tablet    TAKE ONE-HALF TABLET BY MOUTH TWICE DAILY WITH MEALS    Uncontrolled type 2 diabetes mellitus without complication, with long-term current use of insulin (H)       nicotine 10 MG " Inhaler    NICOTROL    180 each    Inhale 6-16 Cartridges into the lungs daily as needed for smoking cessation    Tobacco dependence syndrome       pioglitazone 45 MG tablet    ACTOS    90 tablet    Take 1 tablet (45 mg) by mouth daily    Uncontrolled type 2 diabetes mellitus without complication, with long-term current use of insulin (H)       Urea 20 % Crea cream     85 g    Apply topically as needed    Tyloma       vitamin D 31589 UNIT capsule    ERGOCALCIFEROL    4 capsule    TAKE 1 CAPLET ONCE WEEKLY Profile Rx: patient will contact pharmacy when needed    Vitamin D deficiency       * Notice:  This list has 2 medication(s) that are the same as other medications prescribed for you. Read the directions carefully, and ask your doctor or other care provider to review them with you.

## 2018-07-09 NOTE — PATIENT INSTRUCTIONS
Right upper quadrant abdominal pain recurrent  (E11.65,  Z79.4) Uncontrolled type 2 diabetes mellitus without complication, with long-term current use of insulin (H)  (primary encounter diagnosis)  Comment:    Plan: Hemoglobin A1c, Basic metabolic panel  1/2 package of welchol dialy              (E78.5) Hyperlipidemia LDL goal <100  Comment:    Plan:      (I10) Hypertension goal BP (blood pressure) < 140/90  Comment:    Plan:      (E55.9) Vitamin D deficiency  Comment:    Plan:      (M20.41) Hammer toe of right foot  Comment:    Plan:       (M71.9,  M67.919) Disorder of bursae and tendons in shoulder region  Comment:    Plan:      (F51.01) Primary insomnia  Comment:    Plan:      (E11.40,  Z79.4) Type 2 diabetes mellitus with diabetic neuropathy, with long-term current use of insulin (H)  Comment:    Plan:

## 2018-08-26 ENCOUNTER — HOSPITAL ENCOUNTER (EMERGENCY)
Facility: CLINIC | Age: 48
Discharge: HOME | End: 2018-08-26
Attending: EMERGENCY MEDICINE | Admitting: EMERGENCY MEDICINE
Payer: COMMERCIAL

## 2018-08-26 VITALS
OXYGEN SATURATION: 96 % | RESPIRATION RATE: 16 BRPM | DIASTOLIC BLOOD PRESSURE: 88 MMHG | SYSTOLIC BLOOD PRESSURE: 133 MMHG | BODY MASS INDEX: 24.16 KG/M2 | TEMPERATURE: 97.7 F | HEIGHT: 65 IN | WEIGHT: 145 LBS

## 2018-08-26 DIAGNOSIS — F43.22 ADJUSTMENT DISORDER WITH ANXIOUS MOOD: ICD-10-CM

## 2018-08-26 PROCEDURE — 99283 EMERGENCY DEPT VISIT LOW MDM: CPT | Performed by: EMERGENCY MEDICINE

## 2018-08-26 PROCEDURE — 99283 EMERGENCY DEPT VISIT LOW MDM: CPT | Mod: Z6 | Performed by: EMERGENCY MEDICINE

## 2018-08-26 PROCEDURE — 25000132 ZZH RX MED GY IP 250 OP 250 PS 637: Performed by: EMERGENCY MEDICINE

## 2018-08-26 RX ORDER — HYDROXYZINE HYDROCHLORIDE 25 MG/1
25 TABLET, FILM COATED ORAL ONCE
Status: COMPLETED | OUTPATIENT
Start: 2018-08-26 | End: 2018-08-26

## 2018-08-26 RX ORDER — HYDROXYZINE HYDROCHLORIDE 25 MG/1
25 TABLET, FILM COATED ORAL EVERY 8 HOURS PRN
Qty: 30 TABLET | Refills: 0 | Status: SHIPPED | OUTPATIENT
Start: 2018-08-26 | End: 2018-09-11

## 2018-08-26 RX ADMIN — HYDROXYZINE HYDROCHLORIDE 25 MG: 25 TABLET ORAL at 04:35

## 2018-08-26 NOTE — ED PROVIDER NOTES
History     Chief Complaint   Patient presents with     Panic Attack     HPI  Karen Braxton is a 48 year old female who pesents with panic attack.  She comes by ambulance.  She states that she has a history of diabetes hypercholesterolemia.  She is is having a conflict with her landlord is been notified that they are possibly being evicted in the next week.  She is upset because they have accused her family of using drugs in the home and she adamantly denies this.  She has been crying a lot and having a hard time calming down this evening. States that she is planning on working with  and a  as she feels that they are being wrongly evicted.  Denying abdominal pain, nausea, vomiting, diarrhea, chest pain.  Vitals are stable.  She screened positive on risk of harm screening and was placed on one-to-one monitoring. This was able to be cleared when she denied suicidal ideation or plan, and ability to stay safe while in the ED.  She denies use of alcohol or other drugs.  No past history of anxiety or depression that she is aware of.    [facility].    I have reviewed the Medications, Allergies, Past Medical and Surgical History, and Social History in the [employer] system.    Past Medical History:   Diagnosis Date     Arthritis      ASCUS favor benign 2012    neg HPV  Plan cotest in 3 yrs.     Diabetes (H)      Hypertension        Past Surgical History:   Procedure Laterality Date     CHOLECYSTECTOMY  9/14/2007    laproscopic     FACIAL RECONSTRUCTION SURGERY  2 years old    cleft lip repair     ORTHOPEDIC SURGERY  6/2001    left knee       Family History   Problem Relation Age of Onset     Diabetes Mother      Hypertension Mother      HEART DISEASE Mother      Lipids Mother      Asthma Mother      Diabetes Father      Hypertension Father      Cancer No family hx of      No known family hx of skin cancer     Coronary Artery Disease No family hx of   "    Hyperlipidemia No family hx of      Cerebrovascular Disease No family hx of      Breast Cancer No family hx of      Colon Cancer No family hx of      Prostate Cancer No family hx of      Other Cancer No family hx of      Depression No family hx of      Anxiety Disorder No family hx of      Mental Illness No family hx of      Substance Abuse No family hx of      Anesthesia Reaction No family hx of      Osteoperosis No family hx of      Genetic Disorder No family hx of      Thyroid Disease No family hx of      Obesity No family hx of      Unknown/Adopted No family hx of        Social History   Substance Use Topics     Smoking status: Current Some Day Smoker     Types: Cigarettes     Smokeless tobacco: Never Used      Comment: 3 cigarettes daily     Alcohol use No       Review of Systems   Constitutional: Negative.    HENT: Negative.    Respiratory: Negative.    Cardiovascular: Negative.    Genitourinary: Negative.    Psychiatric/Behavioral: Positive for dysphoric mood and sleep disturbance. Negative for hallucinations and suicidal ideas. The patient is nervous/anxious.    All other systems reviewed and are negative.         Physical Exam   BP: 144/89  Heart Rate: 80  Temp: 97.7  F (36.5  C)  Resp: 16  Height: 165.1 cm (5' 5\")  Weight: 65.8 kg (145 lb)  SpO2: 97 %      Physical Exam   Gen:A&Ox3, upset but able to calm and give history  HEENT:PERRL, no facial tenderness or wounds, head atraumatic  CV:RRR without murmurs  PULM:Clear to auscultation bilaterally  Abd:soft, nontender, nondistended. Bowel sounds present and normal  UE:No traumatic injuries, skin normal  LE:no traumatic injuries, skin normal  Neuro:CN II-XII intact, strength 5/5 throughout, gait stable.   Skin: no rashes or ecchymoses    ED Course     ED Course     Procedures             Critical Care time:  none    Assessments & Plan (with Medical Decision Making)   49 yo F presenting with anxiety and depressed mood in the setting of adjustment disorder " from stress of housing issue.   Vitals stable.   No SI, HI or psychosis.   I offered virtual DEC assessment for referral to therapy but pt is feeling better and would prefer to go home and follow up with primary care.  Pt and her family were also given information about [facility] in the event of worsening symptoms or prolonged delay to follow up in clinic.   Given hydroxyzine 25mg PO x1 now. Will continue the PRN at bedtime and up to q8 hrs if needed.     I have reviewed the nursing notes.    I have reviewed the findings, diagnosis, plan and need for follow up with the patient.    Discharge Medication List as of 8/26/2018  4:42 AM      START taking these medications    Details   hydrOXYzine (ATARAX) 25 MG tablet Take 1 tablet (25 mg) by mouth every 8 hours as needed for anxiety, Disp-30 tablet, R-0, Local Print             Final diagnoses:   Adjustment disorder with anxious mood       8/26/2018   [facility] [place], EMERGENCY DEPARTMENT    MD ESAU Brock Katrina Anne, MD  08/27/18 1048

## 2018-08-26 NOTE — DISCHARGE INSTRUCTIONS
Adjustment Disorder  Life changes--work, family, parents, children--each can cause a great deal of stress in life. An adjustment disorder means you have trouble dealing with this change and stress. This problem can have serious results. You may feel helpless, depressed, make bad decisions, or even feel like you want to hurt yourself.  Adjustment disorder can cause anxiety or depression. It is triggered by daily stresses such as:    Death of a loved one    Divorce    Marriage    General life changes such as changing or leaving a job    Moving    Illness or other health issue for you or a family member    Sex    Money     Symptoms may include:    Sadness or crying    Anxiety    Insomnia    Poor concentration    Trouble doing simple things    New problems at work or with family or friends    Loss of self-esteem    Sense of hopelessness    Feeling trapped or cut off from others  With this condition, it is common to feel sad, guilty, hopeless, and restless. These feelings may continue for weeks or months. It can be helpful to identify what is causing the additional stress and take steps to get extra support. If new stressful events do not happen, it is likely that you will gradually start feeling better.  Home care    Try hydroxyzine at bedtime, or when really upset and unable to calm down.     It helps to talk about your feelings and thoughts with family or friends who understand and support you.  Follow-up care  Follow up with your primary care doctor as soon as possible regarding referral to therapy.   IF you are having a hard time getting in to see your primary care doctor, you can be seen for mental health assessment in the [facility].   When to seek medical advice  Call your healthcare provider right away if any of these happen:    Worsening depression or anxiety    Feeling out of control    Thoughts of harming yourself or another    Being unable to care for yourself  Date Last Reviewed:  10/1/2017    6941-3964 [employer]. [address] [place] [address], [place] [address]. All rights reserved. This information is not intended as a substitute for professional medical care. Always follow your healthcare professional's instructions.

## 2018-08-26 NOTE — ED NOTES
I have performed an in person assessment of the patient. Based on this assessment the patient no longer requires a one on one attendant at this point in time.    Zulema Tariq MD  4:26 AM  August 26, 2018               Zulema Tariq MD  08/26/18 0426

## 2018-08-26 NOTE — ED TRIAGE NOTES
"BIBA for panic attack and suicidal ideation. Patient states that her  at her apartment is racist and is kicking her and her 5 children out because she is accusing her of using recreational drugs. Patient denies using recreational drugs. Patient states that she needs to be out by 8/30/18 and has not found a new place to live. Patient states that she wants to end her life because \"[place] is hard\". She denies a plan right now, ER staff placed outside patient's door.     1 mg versed given PIV en route.   "

## 2018-08-26 NOTE — ED AVS SNAPSHOT
[facility], Emergency Department    [address] [facility] [address]    Phone:  [phone]                                       Karen Braxton   MRN: [id number]    Department:  [facility], Emergency Department   Date of Visit:  8/26/2018           Patient Information     Date Of Birth          1970        Your diagnoses for this visit were:     Adjustment disorder with anxious mood        You were seen by Zulema Tariq MD.        Discharge Instructions         Adjustment Disorder  Life changes--work, family, parents, children--each can cause a great deal of stress in life. An adjustment disorder means you have trouble dealing with this change and stress. This problem can have serious results. You may feel helpless, depressed, make bad decisions, or even feel like you want to hurt yourself.  Adjustment disorder can cause anxiety or depression. It is triggered by daily stresses such as:    Death of a loved one    Divorce    Marriage    General life changes such as changing or leaving a job    Moving    Illness or other health issue for you or a family member    Sex    Money     Symptoms may include:    Sadness or crying    Anxiety    Insomnia    Poor concentration    Trouble doing simple things    New problems at work or with family or friends    Loss of self-esteem    Sense of hopelessness    Feeling trapped or cut off from others  With this condition, it is common to feel sad, guilty, hopeless, and restless. These feelings may continue for weeks or months. It can be helpful to identify what is causing the additional stress and take steps to get extra support. If new stressful events do not happen, it is likely that you will gradually start feeling better.  Home care    Try hydroxyzine at bedtime, or when really upset and unable to calm down.     It helps to talk about your feelings and thoughts with family or friends who understand and support you.  Follow-up care  Follow up with your  primary care doctor as soon as possible regarding referral to therapy.   IF you are having a hard time getting in to see your primary care doctor, you can be seen for mental health assessment in the [facility].   When to seek medical advice  Call your healthcare provider right away if any of these happen:    Worsening depression or anxiety    Feeling out of control    Thoughts of harming yourself or another    Being unable to care for yourself  Date Last Reviewed: 10/1/2017    [id number] [employer]. [address] [place] [address]. All rights reserved. This information is not intended as a substitute for professional medical care. Always follow your healthcare professional's instructions.          Your next 10 appointments already scheduled     Sep 11, 2018  8:30 AM CDT   (Arrive by 8:15 AM)   RETURN DIABETES with Cecile Juarez PA-C   [facility] ([facility])    [address] [place] [id number] [phone]            Dec 18, 2018 10:00 AM CST   (Arrive by 9:45 AM)   RETURN DIABETES with Melisa Phillips MD   [facility] ([facility])    [address] [place] [id number] [phone]              24 Hour Appointment Hotline       To make an appointment at any [facility], call [phone] ([phone]). If you don't have a family doctor or clinic, we will help you find one. [facility] clinics are conveniently located to serve the needs of you and your family.             Review of your medicines      START taking        Dose / Directions Last dose taken    hydrOXYzine 25 MG tablet   Commonly known as:  ATARAX   Dose:  25 mg   Quantity:  30 tablet        Take 1 tablet (25 mg) by mouth every 8 hours as needed for anxiety   Refills:  0          Our records show that you are taking the medicines listed below. If these are incorrect,  please call your family doctor or clinic.        Dose / Directions Last dose taken    alum & mag hydroxide-simethicone 200-200-20 MG/5ML Susp suspension   Commonly known as:  antacid   Dose:  15 mL   Quantity:  355 mL        Take 15 mLs by mouth every 4 hours as needed for indigestion   Refills:  11        aspirin 81 MG EC tablet   Dose:  81 mg   Quantity:  90 tablet        Take 1 tablet (81 mg) by mouth daily   Refills:  3        ASPIRIN NOT PRESCRIBED   Commonly known as:  INTENTIONAL        continuous prn for other Antiplatelet medication not prescribed intentionally due to Not indicated based on age/GI distress   Refills:  0        atorvastatin 80 MG tablet   Commonly known as:  LIPITOR   Dose:  80 mg   Quantity:  90 tablet        Take 1 tablet (80 mg) by mouth daily   Refills:  3        BASAGLAR 100 UNIT/ML injection   Quantity:  15 mL        Inject 60 units SQ each at bedtime.   Refills:  12        blood glucose monitoring lancets   Quantity:  2 Box        Use to test blood sugar 4 times daily or as directed.   Refills:  6        * blood glucose monitoring test strip   Commonly known as:  JONATHAN CONTOUR NEXT   Quantity:  200 each        Use to test blood sugar 4 times daily or as directed.   Refills:  11        * blood glucose monitoring test strip   Commonly known as:  no brand specified   Quantity:  400 strip        Use to test blood sugars qid  times daily or as directed   Refills:  11        calcium carbonate 600 mg-vitamin D 400 units 600-400 MG-UNIT per tablet   Commonly known as:  CALTRATE   Dose:  1 tablet   Quantity:  180 tablet        Take 1 tablet by mouth 2 times daily   Refills:  3        canagliflozin 300 MG tablet   Commonly known as:  INVOKANA   Dose:  300 mg   Quantity:  90 tablet        Take 1 tablet (300 mg) by mouth every morning (before breakfast)   Refills:  1        colesevelam 3.75 g Pack Packet   Commonly known as:  WELCHOL   Dose:  3.75 g   Quantity:  30 each        Take 3.75 g by  "mouth daily (with breakfast)   Refills:  11        continuous blood glucose monitoring sensor   Quantity:  9 each        For use with Freestyle Dodie Flash  for continuous monitioring of blood glucose levels. Replace sensor every 10 days.   Refills:  3        ezetimibe 10 MG tablet   Commonly known as:  ZETIA   Dose:  10 mg   Quantity:  90 tablet        Take 1 tablet (10 mg) by mouth daily   Refills:  3        FREESTYLE DODIE READER Cathy   Dose:  1 Device   Quantity:  1 Device        1 Device daily   Refills:  0        insulin lispro 100 UNIT/ML injection   Commonly known as:  HumaLOG KWIKpen   Quantity:  15 mL        30 units before breakfast, 35 units before lunch, 35 units before dinner   Refills:  11        insulin pen needle 31G X 8 MM   Commonly known as:  B-D U/F   Quantity:  400 each        TEST FOUR TIMES A DAY OR AS DIRECTED   Refills:  11        insulin syringe-needle U-100 30G X 1/2\" 1 ML   Commonly known as:  BD insulin syringe ultrafine   Quantity:  100 each        Use one syringe daily or as directed.  3 month supply   Refills:  prn        lisinopril 2.5 MG tablet   Commonly known as:  PRINIVIL/Zestril   Dose:  2.5 mg   Quantity:  90 tablet        Take 1 tablet (2.5 mg) by mouth daily   Refills:  2        meloxicam 15 MG tablet   Commonly known as:  MOBIC   Dose:  15 mg   Quantity:  30 tablet        Take 1 tablet (15 mg) by mouth daily   Refills:  1        metFORMIN 1000 MG tablet   Commonly known as:  GLUCOPHAGE   Quantity:  90 tablet        TAKE ONE-HALF TABLET BY MOUTH TWICE DAILY WITH MEALS   Refills:  1        nicotine 10 MG Inhaler   Commonly known as:  NICOTROL   Dose:  6-16 Cartridge   Quantity:  180 each        Inhale 6-16 Cartridges into the lungs daily as needed for smoking cessation   Refills:  11        pioglitazone 45 MG tablet   Commonly known as:  ACTOS   Dose:  45 mg   Quantity:  90 tablet        Take 1 tablet (45 mg) by mouth daily   Refills:  2        Urea 20 % Crea cream "   Quantity:  85 g        Apply topically as needed   Refills:  3        vitamin D 57538 UNIT capsule   Commonly known as:  ERGOCALCIFEROL   Quantity:  4 capsule        TAKE 1 CAPLET ONCE WEEKLY Profile Rx: patient will contact pharmacy when needed   Refills:  5        * Notice:  This list has 2 medication(s) that are the same as other medications prescribed for you. Read the directions carefully, and ask your doctor or other care provider to review them with you.            Prescriptions were sent or printed at these locations (1 Prescription)                   Other Prescriptions                Printed at Department/Unit printer (1 of 1)         hydrOXYzine (ATARAX) 25 MG tablet                Orders Needing Specimen Collection     None      Pending Results     No orders found from 8/24/2018 to 8/27/2018.            Pending Culture Results     No orders found from 8/24/2018 to 8/27/2018.            Pending Results Instructions     If you had any lab results that were not finalized at the time of your Discharge, you can call the ED Lab Result RN at [phone]. You will be contacted by this team for any positive Lab results or changes in treatment. The nurses are available 7 days a week from 10A to 6:30P.  You can leave a message 24 hours per day and they will return your call.        Thank you for choosing [facility]       Thank you for choosing [facility] for your care. Our goal is always to provide you with excellent care. Hearing back from our patients is one way we can continue to improve our services. Please take a few minutes to complete the written survey that you may receive in the mail after you visit with us. Thank you!        Care EveryWhere ID     This is your Care EveryWhere ID. This could be used by other organizations to access your [facility] medical records  [id number]        Equal Access to Services     PEYTON BURGOS AH: therese Ellis qaybta kaalmada adeegyada, waxay  bridgett figueroa ah. So [facility] [phone].    ATENCIÓN: Si habla español, tiene a merrill disposición servicios gratuitos de asistencia lingüística. Llame al [phone].    We comply with applicable federal civil rights laws and [place] laws. We do not discriminate on the basis of race, color, national origin, age, disability, sex, sexual orientation, or gender identity.            After Visit Summary       This is your record. Keep this with you and show to your community pharmacist(s) and doctor(s) at your next visit.

## 2018-08-26 NOTE — ED NOTES
Bed: ED22  Expected date:   Expected time:   Means of arrival:   Comments:  N713 - 48F with panic attack - triaged yellow

## 2018-08-27 ASSESSMENT — ENCOUNTER SYMPTOMS
CARDIOVASCULAR NEGATIVE: 1
SLEEP DISTURBANCE: 1
DYSPHORIC MOOD: 1
RESPIRATORY NEGATIVE: 1
HALLUCINATIONS: 0
NERVOUS/ANXIOUS: 1
CONSTITUTIONAL NEGATIVE: 1

## 2018-09-07 DIAGNOSIS — E55.9 VITAMIN D DEFICIENCY: ICD-10-CM

## 2018-09-07 NOTE — TELEPHONE ENCOUNTER
Requested Prescriptions   Pending Prescriptions Disp Refills     vitamin D (ERGOCALCIFEROL) 79209 UNIT capsule 4 capsule 5      Last Written Prescription Date:  3/12/18  Last Fill Quantity: 4,  # refills: 5   Last office visit: 2018 with prescribing provider:     Future Office Visit:   Next 5 appointments (look out 90 days)     Sep 11, 2018 10:45 AM CDT   [facility]    [facility] [address] [address]   [phone]                  Sig: TAKE 1 CAPLET ONCE WEEKLY Profile Rx: patient will contact pharmacy when needed    There is no refill protocol information for this order        insulin lispro (HUMALOG KWIKPEN) 100 UNIT/ML injection 15 mL 11      Last Written Prescription Date:  17  Last Fill Quantity: 15ML,  # refills: 11   Last office visit: 2018 with prescribing provider:     Future Office Visit:   Next 5 appointments (look out 90 days)     Sep 11, 2018 10:45 AM CDT   [facility]   [facility]    [address] [place] [id number]   [phone]                  Si units before breakfast, 35 units before lunch, 35 units before dinner    Short Acting Insulin Protocol Passed    2018  9:48 AM       Passed - Blood pressure less than 140/90 in past 6 months    BP Readings from Last 3 Encounters:   18 133/88   18 120/64   18 105/72                Passed - LDL on file in past 12 months    Recent Labs   Lab Test  18   1211   LDL  149*            Passed - Microalbumin on file in past 12 months    Recent Labs   Lab Test  18   1212   12   1539   [id number]   --    --   5.0   [id number]   --    --   13.3   MICROL  12   < >   --    UMALCR  24.21   < >   --     < > = values in this interval  "not displayed.            Passed - Serum creatinine on file in past 12 months    Recent Labs   Lab Test  07/09/18   0819   CR  0.66            Passed - HgbA1C in past 3 or 6 months    If HgbA1C is 8 or greater, it needs to be on file within the past 3 months.  If less than 8, must be on file within the past 6 months.     Recent Labs   Lab Test  07/09/18   0819 06/05/18   A1C  8.4*   --    HEMOGLOBINA1   --   8.6*            Passed - Patient is age 18 or older       Passed - Recent (6 mo) or future (30 days) visit within the authorizing provider's specialty    Patient had office visit in the last 6 months or has a visit in the next 30 days with authorizing provider or within the authorizing provider's specialty.  See \"Patient Info\" tab in inbasket, or \"Choose Columns\" in Meds & Orders section of the refill encounter.              "

## 2018-09-08 RX ORDER — ERGOCALCIFEROL 1.25 MG/1
CAPSULE, LIQUID FILLED ORAL
Qty: 4 CAPSULE | Refills: 5 | Status: SHIPPED | OUTPATIENT
Start: 2018-09-08 | End: 2019-02-18

## 2018-09-11 ENCOUNTER — OFFICE VISIT (OUTPATIENT)
Dept: FAMILY MEDICINE | Facility: CLINIC | Age: 48
End: 2018-09-11
Payer: COMMERCIAL

## 2018-09-11 VITALS
WEIGHT: 133 LBS | SYSTOLIC BLOOD PRESSURE: 116 MMHG | RESPIRATION RATE: 16 BRPM | HEART RATE: 95 BPM | OXYGEN SATURATION: 98 % | TEMPERATURE: 98.9 F | BODY MASS INDEX: 22.13 KG/M2 | DIASTOLIC BLOOD PRESSURE: 72 MMHG

## 2018-09-11 DIAGNOSIS — F43.22 ADJUSTMENT DISORDER WITH ANXIOUS MOOD: ICD-10-CM

## 2018-09-11 DIAGNOSIS — J06.9 UPPER RESPIRATORY TRACT INFECTION, UNSPECIFIED TYPE: Primary | ICD-10-CM

## 2018-09-11 DIAGNOSIS — J20.9 ACUTE BRONCHITIS, UNSPECIFIED ORGANISM: ICD-10-CM

## 2018-09-11 DIAGNOSIS — R06.2 WHEEZING: ICD-10-CM

## 2018-09-11 PROCEDURE — 99214 OFFICE O/P EST MOD 30 MIN: CPT | Performed by: FAMILY MEDICINE

## 2018-09-11 RX ORDER — HYDROXYZINE HYDROCHLORIDE 25 MG/1
25 TABLET, FILM COATED ORAL EVERY 8 HOURS PRN
Qty: 30 TABLET | Refills: 0 | Status: SHIPPED | OUTPATIENT
Start: 2018-09-11 | End: 2019-07-30

## 2018-09-11 RX ORDER — GUAIFENESIN/DEXTROMETHORPHAN 100-10MG/5
5 SYRUP ORAL EVERY 4 HOURS PRN
Qty: 240 ML | Refills: 1 | Status: SHIPPED | OUTPATIENT
Start: 2018-09-11 | End: 2018-09-17

## 2018-09-11 RX ORDER — ALBUTEROL SULFATE 90 UG/1
2 AEROSOL, METERED RESPIRATORY (INHALATION) EVERY 6 HOURS PRN
Qty: 1 INHALER | Refills: 1 | Status: SHIPPED | OUTPATIENT
Start: 2018-09-11 | End: 2019-07-30

## 2018-09-11 RX ORDER — AMOXICILLIN 500 MG/1
500 CAPSULE ORAL 3 TIMES DAILY
Qty: 30 CAPSULE | Refills: 0 | Status: SHIPPED | OUTPATIENT
Start: 2018-09-11 | End: 2018-09-17

## 2018-09-11 ASSESSMENT — ANXIETY QUESTIONNAIRES
1. FEELING NERVOUS, ANXIOUS, OR ON EDGE: NOT AT ALL
GAD7 TOTAL SCORE: 0
6. BECOMING EASILY ANNOYED OR IRRITABLE: NOT AT ALL
3. WORRYING TOO MUCH ABOUT DIFFERENT THINGS: NOT AT ALL
2. NOT BEING ABLE TO STOP OR CONTROL WORRYING: NOT AT ALL
7. FEELING AFRAID AS IF SOMETHING AWFUL MIGHT HAPPEN: NOT AT ALL
5. BEING SO RESTLESS THAT IT IS HARD TO SIT STILL: NOT AT ALL

## 2018-09-11 ASSESSMENT — PATIENT HEALTH QUESTIONNAIRE - PHQ9: 5. POOR APPETITE OR OVEREATING: NOT AT ALL

## 2018-09-11 NOTE — PATIENT INSTRUCTIONS
(J06.9) Upper respiratory tract infection, unspecified type  (primary encounter diagnosis)  Comment:    Plan:      (J20.9) Acute bronchitis, unspecified organism  Comment:    Plan: amoxicillin (AMOXIL) 500 MG capsule             (R06.2) Wheezing  Comment:    Plan: albuterol (PROAIR HFA/PROVENTIL HFA/VENTOLIN         HFA) 108 (90 Base) MCG/ACT inhaler,         guaiFENesin-dextromethorphan (ROBITUSSIN DM)         100-10 MG/5ML syrup             (F43.22) Adjustment disorder with anxious mood  Comment:    Plan: hydrOXYzine (ATARAX) 25 MG tablet                      N/A

## 2018-09-11 NOTE — MR AVS SNAPSHOT
After Visit Summary   9/11/2018    Karen Braxton    MRN: [id number]           Patient Information     Date Of Birth          1970        Visit Information        Provider Department      9/11/2018 10:45 AM Raymond Engel MD [facility]        Today's Diagnoses     Upper respiratory tract infection, unspecified type    -  1    Acute bronchitis, unspecified organism        Wheezing        Adjustment disorder with anxious mood          Care Instructions    (J06.9) Upper respiratory tract infection, unspecified type  (primary encounter diagnosis)  Comment:    Plan:      (J20.9) Acute bronchitis, unspecified organism  Comment:    Plan: amoxicillin (AMOXIL) 500 MG capsule             (R06.2) Wheezing  Comment:    Plan: albuterol (PROAIR HFA/PROVENTIL HFA/VENTOLIN         HFA) 108 (90 Base) MCG/ACT inhaler,         guaiFENesin-dextromethorphan (ROBITUSSIN DM)         100-10 MG/5ML syrup             (F43.22) Adjustment disorder with anxious mood  Comment:    Plan: hydrOXYzine (ATARAX) 25 MG tablet                             Follow-ups after your visit        Follow-up notes from your care team     Return in about 1 week (around 9/18/2018) for FOLLOW UP ACUTE ILLNESS.      Your next 10 appointments already scheduled     Oct 30, 2018 11:00 AM CDT   (Arrive by 10:45 AM)   RETURN DIABETES with Cecile Juarez PA-C   [facility] ([facility])    [address] [place] [address] [id number] [phone]            Dec 18, 2018 10:00 AM CST   (Arrive by 9:45 AM)   RETURN DIABETES with Melisa Phillips MD   [facility] ([facility])    [address] [place] [id number] [phone]              Who to contact     If you have questions or need follow up information about today's clinic visit or your schedule please contact [facility]  "[facility] directly at [phone].  Normal or non-critical lab and imaging results will be communicated to you by MyChart, letter or phone within 4 business days after the clinic has received the results. If you do not hear from us within 7 days, please contact the clinic through [facility] ([facility] or phone. If you have a critical or abnormal lab result, we will notify you by phone as soon as possible.  Submit refill requests through [employer] or call your pharmacy and they will forward the refill request to us. Please allow 3 business days for your refill to be completed.          Additional Information About Your Visit        [facility] (HDS[employer] Information     [employer] lets you send messages to your doctor, view your test results, renew your prescriptions, schedule appointments and more. To sign up, go to [email or web address] . Click on \"Log in\" on the left side of the screen, which will take you to the Welcome page. Then click on \"Sign up Now\" on the right side of the page.     You will be asked to enter the access code listed below, as well as some personal information. Please follow the directions to create your username and password.     Your access code is: [email or web address]  Expires: 12/10/2018 12:25 PM     Your access code will  in 90 days. If you need help or a new code, please call your [facility] clinic or [phone].        Care EveryWhere ID     This is your Care EveryWhere ID. This could be used by other organizations to access your [facility] medical records  [id number]        Your Vitals Were     Pulse Temperature Respirations Last Period Pulse Oximetry BMI (Body Mass Index)    95 98.9  F (37.2  C) (Tympanic) 16 (LMP Unknown) 98% 22.13 kg/m2       Blood Pressure from Last 3 Encounters:   18 116/72   18 133/88   18 120/64    Weight from Last 3 Encounters:   18 133 lb (60.3 kg)   18 145 lb (65.8 kg)   18 141 lb (64 kg)              Today, you had the following     No " orders found for display         Today's Medication Changes          These changes are accurate as of 9/11/18 12:25 PM.  If you have any questions, ask your nurse or doctor.               Start taking these medicines.        Dose/Directions    albuterol 108 (90 Base) MCG/ACT inhaler   Commonly known as:  PROAIR HFA/PROVENTIL HFA/VENTOLIN HFA   Used for:  Wheezing   Started by:  Raymond Engel MD        Dose:  2 puff   Inhale 2 puffs into the lungs every 6 hours as needed for shortness of breath / dyspnea or wheezing   Quantity:  1 Inhaler   Refills:  1       amoxicillin 500 MG capsule   Commonly known as:  AMOXIL   Used for:  Acute bronchitis, unspecified organism   Started by:  Raymond Engel MD        Dose:  500 mg   Take 1 capsule (500 mg) by mouth 3 times daily   Quantity:  30 capsule   Refills:  0       guaiFENesin-dextromethorphan 100-10 MG/5ML syrup   Commonly known as:  ROBITUSSIN DM   Used for:  Wheezing   Started by:  Raymond Engel MD        Dose:  5 mL   Take 5 mLs by mouth every 4 hours as needed   Quantity:  240 mL   Refills:  1            Where to get your medicines      Some of these will need a paper prescription and others can be bought over the counter.  Ask your nurse if you have questions.     Bring a paper prescription for each of these medications     albuterol 108 (90 Base) MCG/ACT inhaler    amoxicillin 500 MG capsule    guaiFENesin-dextromethorphan 100-10 MG/5ML syrup    hydrOXYzine 25 MG tablet                Primary Care Provider Office Phone # Fax #    Raymond Engel [phone] [address] [facility] [address]        Equal Access to Services     [facility] AH: Hadii pilar Stringer, walisada ludiego, qaybta kaalmaniranjan slade. So [facility] [phone].    ATENCIÓN: Si habla español, tiene a merrill disposición servicios gratuitos de asistencia lingüística. Llame al  [phone].    We comply with applicable federal civil rights laws and [place] laws. We do not discriminate on the basis of race, color, national origin, age, disability, sex, sexual orientation, or gender identity.            Thank you!     Thank you for choosing [facility]  for your care. Our goal is always to provide you with excellent care. Hearing back from our patients is one way we can continue to improve our services. Please take a few minutes to complete the written survey that you may receive in the mail after your visit with us. Thank you!             Your Updated Medication List - Protect others around you: Learn how to safely use, store and throw away your medicines at www.disposemymeds.org.          This list is accurate as of 9/11/18 12:25 PM.  Always use your most recent med list.                   Brand Name Dispense Instructions for use Diagnosis    albuterol 108 (90 Base) MCG/ACT inhaler    PROAIR HFA/PROVENTIL HFA/VENTOLIN HFA    1 Inhaler    Inhale 2 puffs into the lungs every 6 hours as needed for shortness of breath / dyspnea or wheezing    Wheezing       alum & mag hydroxide-simethicone 200-200-20 MG/5ML Susp suspension    antacid    355 mL    Take 15 mLs by mouth every 4 hours as needed for indigestion    Gastroesophageal reflux disease without esophagitis       amoxicillin 500 MG capsule    AMOXIL    30 capsule    Take 1 capsule (500 mg) by mouth 3 times daily    Acute bronchitis, unspecified organism       aspirin 81 MG EC tablet     90 tablet    Take 1 tablet (81 mg) by mouth daily    Uncontrolled type 1 diabetes mellitus without complication (H), Uncontrolled type 2 diabetes mellitus without complication, with long-term current use of insulin (H), Hyperlipidemia LDL goal <100       ASPIRIN NOT PRESCRIBED    INTENTIONAL     continuous prn for other Antiplatelet medication not prescribed intentionally due to Not indicated based on age/GI distress         atorvastatin 80 MG tablet    LIPITOR    90 tablet    Take 1 tablet (80 mg) by mouth daily    Hyperlipidemia LDL goal <100       BASAGLAR 100 UNIT/ML injection     15 mL    Inject 60 units SQ each at bedtime.    Uncontrolled type 1 diabetes mellitus without complication (H), Uncontrolled type 2 diabetes mellitus without complication, with long-term current use of insulin (H)       blood glucose monitoring lancets     2 Box    Use to test blood sugar 4 times daily or as directed.    Diabetes type 2, controlled (H)       blood glucose monitoring test strip    no brand specified    400 strip    Use to test blood sugars qid  times daily or as directed    Uncontrolled type 2 diabetes mellitus without complication, with long-term current use of insulin (H), Uncontrolled type 1 diabetes mellitus without complication (H)       calcium carbonate 600 mg-vitamin D 400 units 600-400 MG-UNIT per tablet    CALTRATE    180 tablet    Take 1 tablet by mouth 2 times daily    Vitamin D deficiency       canagliflozin 300 MG tablet    INVOKANA    90 tablet    Take 1 tablet (300 mg) by mouth every morning (before breakfast)    Uncontrolled type 2 diabetes mellitus without complication, with long-term current use of insulin (H)       colesevelam 3.75 g Pack Packet    WELCHOL    30 each    Take 3.75 g by mouth daily (with breakfast)    Post-cholecystectomy syndrome       continuous blood glucose monitoring sensor     9 each    For use with Freestyle Dodie Flash  for continuous monitioring of blood glucose levels. Replace sensor every 10 days.    Type 2 diabetes mellitus with hyperglycemia, with long-term current use of insulin (H)       ezetimibe 10 MG tablet    ZETIA    90 tablet    Take 1 tablet (10 mg) by mouth daily    Hyperlipidemia LDL goal <100       FREESTYLE DODIE READER Cathy     1 Device    1 Device daily        guaiFENesin-dextromethorphan 100-10 MG/5ML syrup    ROBITUSSIN DM    240 mL    Take 5 mLs by mouth every 4  "hours as needed    Wheezing       hydrOXYzine 25 MG tablet    ATARAX    30 tablet    Take 1 tablet (25 mg) by mouth every 8 hours as needed for anxiety    Adjustment disorder with anxious mood       insulin lispro 100 UNIT/ML injection    HumaLOG KWIKpen    15 mL    30 units before breakfast, 35 units before lunch, 35 units before dinner    Uncontrolled type 2 diabetes mellitus without complication, with long-term current use of insulin (H)       insulin pen needle 31G X 8 MM    B-D U/F    400 each    TEST FOUR TIMES A DAY OR AS DIRECTED    Controlled type 2 diabetes mellitus with hyperglycemia, with long-term current use of insulin (H)       insulin syringe-needle U-100 30G X 1/2\" 1 ML    BD insulin syringe ultrafine    100 each    Use one syringe daily or as directed.  3 month supply    Diabetes type 2, controlled (H)       lisinopril 2.5 MG tablet    PRINIVIL/Zestril    90 tablet    Take 1 tablet (2.5 mg) by mouth daily    Hypertension goal BP (blood pressure) < 140/80, Uncontrolled type 2 diabetes mellitus without complication, with long-term current use of insulin (H)       meloxicam 15 MG tablet    MOBIC    30 tablet    Take 1 tablet (15 mg) by mouth daily    Sprain of right rotator cuff capsule, initial encounter       metFORMIN 1000 MG tablet    GLUCOPHAGE    90 tablet    TAKE ONE-HALF TABLET BY MOUTH TWICE DAILY WITH MEALS    Uncontrolled type 2 diabetes mellitus without complication, with long-term current use of insulin (H)       nicotine 10 MG Inhaler    NICOTROL    180 each    Inhale 6-16 Cartridges into the lungs daily as needed for smoking cessation    Tobacco dependence syndrome       pioglitazone 45 MG tablet    ACTOS    90 tablet    Take 1 tablet (45 mg) by mouth daily    Uncontrolled type 2 diabetes mellitus without complication, with long-term current use of insulin (H)       Urea 20 % Crea cream     85 g    Apply topically as needed    Tyloma       vitamin D 42243 UNIT capsule    ERGOCALCIFEROL "    4 capsule    TAKE 1 CAPLET ONCE WEEKLY Profile Rx: patient will contact pharmacy when needed    Vitamin D deficiency

## 2018-09-11 NOTE — PROGRESS NOTES
SUBJECTIVE:   Karen Braxton is a 48 year old female who presents to clinic today for the following health issues:      Diabetes Follow-up      Patient is checking blood sugars: 4 times, this morning 140    Diabetic concerns: None     Symptoms of hypoglycemia (low blood sugar): none     Paresthesias (numbness or burning in feet) or sores: No     Date of last diabetic eye exam: 7/2018    BP Readings from Last 2 Encounters:   08/26/18 133/88   07/09/18 120/64     Hemoglobin A1C (%)   Date Value   07/09/2018 8.4 (H)   03/16/2018 8.5 (H)     LDL Cholesterol Calculated (mg/dL)   Date Value   03/16/2018 149 (H)   03/27/2017 160 (H)       Diabetes Management Resources    Amount of exercise or physical activity: 6-7 days/week for an average of 15-30 minutes    Problems taking medications regularly: No    Medication side effects: none    Diet: low fat/cholesterol        Acute Illness   Acute illness concerns: cough, cold  Onset: 5 days    Fever: YES    Chills/Sweats: YES    Headache (location?): YES    Sinus Pressure:YES    Conjunctivitis:  YES-     Ear Pain: no    Rhinorrhea: YES    Congestion: YES    Sore Throat: YES     Cough: YES-productive of clear sputum    Wheeze: no    Decreased Appetite: no    Nausea: no    Vomiting: no    Diarrhea:  no    Dysuria/Freq.: no    Fatigue/Achiness: YES    Sick/Strep Exposure: no     Therapies Tried and outcome: ibuprofin                Topic Date Due     MAMMO SCREEN Q2 YR (SYSTEM ASSIGNED)  05/30/2018     COLON CANCER SCREEN (SYSTEM ASSIGNED)  05/30/2018     FOOT EXAM Q1 YEAR  08/14/2018     PAP Q3 YR  07/14/2018     PHQ-9 Q6 MONTHS  09/16/2018               .  Current Outpatient Prescriptions   Medication Sig Dispense Refill     albuterol (PROAIR HFA/PROVENTIL HFA/VENTOLIN HFA) 108 (90 Base) MCG/ACT inhaler Inhale 2 puffs into the lungs every 6 hours as needed for shortness of breath / dyspnea or wheezing 1 Inhaler 1     alum & mag hydroxide-simethicone (ANTACID) 200-200-20 MG/5ML SUSP  suspension Take 15 mLs by mouth every 4 hours as needed for indigestion 355 mL 11     amoxicillin (AMOXIL) 500 MG capsule Take 1 capsule (500 mg) by mouth 3 times daily 30 capsule 0     aspirin 81 MG EC tablet Take 1 tablet (81 mg) by mouth daily 90 tablet 3     ASPIRIN NOT PRESCRIBED (INTENTIONAL) continuous prn for other Antiplatelet medication not prescribed intentionally due to Not indicated based on age/GI distress       atorvastatin (LIPITOR) 80 MG tablet Take 1 tablet (80 mg) by mouth daily 90 tablet 3     BASAGLAR 100 UNIT/ML injection Inject 60 units SQ each at bedtime. 15 mL 12     blood glucose monitoring (JONATHAN MICROLET) lancets Use to test blood sugar 4 times daily or as directed. 2 Box 6     blood glucose monitoring (NO BRAND SPECIFIED) test strip Use to test blood sugars qid  times daily or as directed 400 strip 11     calcium-vitamin D (CALTRATE) 600-400 MG-UNIT per tablet Take 1 tablet by mouth 2 times daily 180 tablet 3     canagliflozin (INVOKANA) 300 MG tablet Take 1 tablet (300 mg) by mouth every morning (before breakfast) 90 tablet 1     colesevelam (WELCHOL) 3.75 g PACK Packet Take 3.75 g by mouth daily (with breakfast) 30 each 11     Continuous Blood Gluc  (FREESTYLE RAHEEM READER) DENNISE 1 Device daily 1 Device 0     continuous blood glucose monitoring (FREESTYLE RAHEEM) sensor For use with Freestyle Raheem Flash  for continuous monitioring of blood glucose levels. Replace sensor every 10 days. 9 each 3     ezetimibe (ZETIA) 10 MG tablet Take 1 tablet (10 mg) by mouth daily 90 tablet 3     guaiFENesin-dextromethorphan (ROBITUSSIN DM) 100-10 MG/5ML syrup Take 5 mLs by mouth every 4 hours as needed 240 mL 1     hydrOXYzine (ATARAX) 25 MG tablet Take 1 tablet (25 mg) by mouth every 8 hours as needed for anxiety 30 tablet 0     insulin lispro (HUMALOG KWIKPEN) 100 UNIT/ML injection 30 units before breakfast, 35 units before lunch, 35 units before dinner 15 mL 1     insulin pen needle  "(B-D U/F) 31G X 8 MM TEST FOUR TIMES A DAY OR AS DIRECTED 400 each 11     insulin syringe-needle U-100 (BD INSULIN SYRINGE ULTRAFINE) 30G X 1/2\" 1 ML Use one syringe daily or as directed.  3 month supply 100 each prn     lisinopril (PRINIVIL/ZESTRIL) 2.5 MG tablet Take 1 tablet (2.5 mg) by mouth daily 90 tablet 2     meloxicam (MOBIC) 15 MG tablet Take 1 tablet (15 mg) by mouth daily 30 tablet 1     metFORMIN (GLUCOPHAGE) 1000 MG tablet TAKE ONE-HALF TABLET BY MOUTH TWICE DAILY WITH MEALS 90 tablet 1     nicotine (NICOTROL) 10 MG Inhaler Inhale 6-16 Cartridges into the lungs daily as needed for smoking cessation 180 each 11     pioglitazone (ACTOS) 45 MG tablet Take 1 tablet (45 mg) by mouth daily 90 tablet 2     Urea 20 % CREA cream Apply topically as needed 85 g 3     vitamin D (ERGOCALCIFEROL) 23912 UNIT capsule TAKE 1 CAPLET ONCE WEEKLY Profile Rx: patient will contact pharmacy when needed 4 capsule 5              No Known Allergies      Immunization History   Administered Date(s) Administered     Influenza (IIV3) PF 11/26/2007, 10/22/2008, 09/18/2009, 09/20/2010, 11/26/2013, 10/28/2014     Influenza Vaccine IM 3yrs+ 4 Valent IIV4 09/29/2015, 10/25/2017     Pneumo Conj 13-V (2010&after) 10/03/2011     TD (ADULT, 7+) 01/01/1998     Tdap (Adacel,Boostrix) 02/10/2009               reports that she does not drink alcohol.          reports that she does not use illicit drugs.        family history includes Asthma in her mother; Diabetes in her father and mother; HEART DISEASE in her mother; Hypertension in her father and mother; Lipids in her mother. There is no history of Cancer, Coronary Artery Disease, Hyperlipidemia, Cerebrovascular Disease, Breast Cancer, Colon Cancer, Prostate Cancer, Other Cancer, Depression, Anxiety Disorder, Mental Illness, Substance Abuse, Anesthesia Reaction, Osteoporosis, Genetic Disorder, Thyroid Disease, Obesity, or Unknown/Adopted.        indicated that the status of her no family hx " of is unknown. She indicated that her mother is . She indicated that her father is .          has a past surgical history that includes Cholecystectomy (2007); Facial reconstruction surgery (2 years old); and orthopedic surgery (2001).         reports that she currently engages in sexual activity and has had male partners.    .  Pediatric History   Patient Guardian Status     Not on file.     Other Topics Concern     Parent/Sibling W/ Cabg, Mi Or Angioplasty Before 65f 55m? No     Social History Narrative               reports that she has been smoking Cigarettes.  She has never used smokeless tobacco.        Medical, social, surgical, and family histories reviewed.        Labs reviewed in EPIC  Patient Active Problem List   Diagnosis     Other dental caries     Disorder of bursae and tendons in shoulder region     Insomnia     Vitamin D deficiency     Premature menopause     ASCUS favor benign     Hyperlipidemia LDL goal <100     Comprehensive diabetic foot examination, type 2 DM, encounter for (H)     Hypertension goal BP (blood pressure) < 140/90     DiarrheaX 2 over last 24hrs w one explosive r/o 2ndary to acid gastritis     Uncontrolled type 2 diabetes mellitus without complication, with long-term current use of insulin (H)     Esophageal reflux 2ndary to hi continuous intake of sugared  tea     Hyperlipidemia with target LDL less than 100     Diabetes type 2, controlled (H)     Intractable chronic migraine without aura and without status migrainosus     Needle stick injury, subsequent encounter     Hammer toe of right foot     Type 2 diabetes mellitus without complication, with long-term current use of insulin (H)       Past Surgical History:   Procedure Laterality Date     CHOLECYSTECTOMY  2007    laproscopic     FACIAL RECONSTRUCTION SURGERY  2 years old    cleft lip repair     ORTHOPEDIC SURGERY  2001    left knee         Social History   Substance Use Topics     Smoking  status: Current Some Day Smoker     Types: Cigarettes     Smokeless tobacco: Never Used      Comment: 3 cigarettes daily     Alcohol use No       Family History   Problem Relation Age of Onset     Diabetes Mother      Hypertension Mother      HEART DISEASE Mother      Lipids Mother      Asthma Mother      Diabetes Father      Hypertension Father      Cancer No family hx of      No known family hx of skin cancer     Coronary Artery Disease No family hx of      Hyperlipidemia No family hx of      Cerebrovascular Disease No family hx of      Breast Cancer No family hx of      Colon Cancer No family hx of      Prostate Cancer No family hx of      Other Cancer No family hx of      Depression No family hx of      Anxiety Disorder No family hx of      Mental Illness No family hx of      Substance Abuse No family hx of      Anesthesia Reaction No family hx of      Osteoporosis No family hx of      Genetic Disorder No family hx of      Thyroid Disease No family hx of      Obesity No family hx of      Unknown/Adopted No family hx of              Current Outpatient Prescriptions   Medication Sig Dispense Refill     albuterol (PROAIR HFA/PROVENTIL HFA/VENTOLIN HFA) 108 (90 Base) MCG/ACT inhaler Inhale 2 puffs into the lungs every 6 hours as needed for shortness of breath / dyspnea or wheezing 1 Inhaler 1     alum & mag hydroxide-simethicone (ANTACID) 200-200-20 MG/5ML SUSP suspension Take 15 mLs by mouth every 4 hours as needed for indigestion 355 mL 11     amoxicillin (AMOXIL) 500 MG capsule Take 1 capsule (500 mg) by mouth 3 times daily 30 capsule 0     aspirin 81 MG EC tablet Take 1 tablet (81 mg) by mouth daily 90 tablet 3     ASPIRIN NOT PRESCRIBED (INTENTIONAL) continuous prn for other Antiplatelet medication not prescribed intentionally due to Not indicated based on age/GI distress       atorvastatin (LIPITOR) 80 MG tablet Take 1 tablet (80 mg) by mouth daily 90 tablet 3     BASAGLAR 100 UNIT/ML injection Inject 60 units  "SQ each at bedtime. 15 mL 12     blood glucose monitoring (JONATHAN MICROLET) lancets Use to test blood sugar 4 times daily or as directed. 2 Box 6     blood glucose monitoring (NO BRAND SPECIFIED) test strip Use to test blood sugars qid  times daily or as directed 400 strip 11     calcium-vitamin D (CALTRATE) 600-400 MG-UNIT per tablet Take 1 tablet by mouth 2 times daily 180 tablet 3     canagliflozin (INVOKANA) 300 MG tablet Take 1 tablet (300 mg) by mouth every morning (before breakfast) 90 tablet 1     colesevelam (WELCHOL) 3.75 g PACK Packet Take 3.75 g by mouth daily (with breakfast) 30 each 11     Continuous Blood Gluc  (FREESTYLE RAHEEM READER) DENNISE 1 Device daily 1 Device 0     continuous blood glucose monitoring (TalkLifeSTYLE RAHEEM) sensor For use with Freestyle Raheem Flash  for continuous monitioring of blood glucose levels. Replace sensor every 10 days. 9 each 3     ezetimibe (ZETIA) 10 MG tablet Take 1 tablet (10 mg) by mouth daily 90 tablet 3     guaiFENesin-dextromethorphan (ROBITUSSIN DM) 100-10 MG/5ML syrup Take 5 mLs by mouth every 4 hours as needed 240 mL 1     hydrOXYzine (ATARAX) 25 MG tablet Take 1 tablet (25 mg) by mouth every 8 hours as needed for anxiety 30 tablet 0     insulin lispro (HUMALOG KWIKPEN) 100 UNIT/ML injection 30 units before breakfast, 35 units before lunch, 35 units before dinner 15 mL 1     insulin pen needle (B-D U/F) 31G X 8 MM TEST FOUR TIMES A DAY OR AS DIRECTED 400 each 11     insulin syringe-needle U-100 (BD INSULIN SYRINGE ULTRAFINE) 30G X 1/2\" 1 ML Use one syringe daily or as directed.  3 month supply 100 each prn     lisinopril (PRINIVIL/ZESTRIL) 2.5 MG tablet Take 1 tablet (2.5 mg) by mouth daily 90 tablet 2     meloxicam (MOBIC) 15 MG tablet Take 1 tablet (15 mg) by mouth daily 30 tablet 1     metFORMIN (GLUCOPHAGE) 1000 MG tablet TAKE ONE-HALF TABLET BY MOUTH TWICE DAILY WITH MEALS 90 tablet 1     nicotine (NICOTROL) 10 MG Inhaler Inhale 6-16 Cartridges " into the lungs daily as needed for smoking cessation 180 each 11     pioglitazone (ACTOS) 45 MG tablet Take 1 tablet (45 mg) by mouth daily 90 tablet 2     Urea 20 % CREA cream Apply topically as needed 85 g 3     vitamin D (ERGOCALCIFEROL) 65395 UNIT capsule TAKE 1 CAPLET ONCE WEEKLY Profile Rx: patient will contact pharmacy when needed 4 capsule 5           Recent Labs   Lab Test  07/09/18   0819  03/16/18   1211  03/27/17   1442   03/21/16   1030   06/23/15   1652   A1C  8.4*  8.5*  8.2*   < >  8.1*   < >  8.1*   LDL   --   149*  160*   --   142*   --    --    HDL   --   43*  48*   --   44*   --    --    TRIG   --   183*  171*   --   223*   --    --    ALT   --   16   --    --   30   --   21   CR  0.66  0.67   --    < >  0.70   --   0.80   GFRESTIMATED  >90  >90   --    < >  90   --   77   GFRESTBLACK  >90  >90   --    < >  >90   --   >90   POTASSIUM  3.6  4.2   --    < >  4.4   --   4.2   TSH   --   2.12   --    --   1.31   --    --     < > = values in this interval not displayed.            BP Readings from Last 6 Encounters:   09/11/18 116/72   08/26/18 133/88   07/09/18 120/64   06/05/18 105/72   04/13/18 135/81   03/16/18 116/68           Wt Readings from Last 3 Encounters:   09/11/18 133 lb (60.3 kg)   08/26/18 145 lb (65.8 kg)   07/09/18 141 lb (64 kg)                 Positive symptoms or findings indicated by bold designation:         ROS: 10 point ROS neg other than the symptoms noted above in the HPI.except  has Other dental caries; Disorder of bursae and tendons in shoulder region; Insomnia; Vitamin D deficiency; Premature menopause; ASCUS favor benign; Hyperlipidemia LDL goal <100; Comprehensive diabetic foot examination, type 2 DM, encounter for (H); Hypertension goal BP (blood pressure) < 140/90; DiarrheaX 2 over last 24hrs w one explosive r/o 2ndary to acid gastritis; Uncontrolled type 2 diabetes mellitus without complication, with long-term current use of insulin (H); Esophageal reflux 2ndary to  hi continuous intake of sugared  tea; Hyperlipidemia with target LDL less than 100; Diabetes type 2, controlled (H); Intractable chronic migraine without aura and without status migrainosus; Needle stick injury, subsequent encounter; Hammer toe of right foot; and Type 2 diabetes mellitus without complication, with long-term current use of insulin (H) on her problem list.  Review Of Systems    Skin: negative    Eyes: negative    Ears/Nose/Throat:  RHINITIS NO FACIAL PAIN OR HEADACHE    Respiratory: COUGHING AND WHEEZOING     Cardiovascular: negative    Gastrointestinal: negative    Genitourinary: negative    Musculoskeletal: negative    Neurologic: negative    Psychiatric: negative    Hematologic/Lymphatic/Immunologic: negative    Endocrine: diabetes                PE:  /72 (Cuff Size: Adult Regular)  Pulse 95  Temp 98.9  F (37.2  C) (Tympanic)  Resp 16  Wt 133 lb (60.3 kg)  LMP  (LMP Unknown)  SpO2 98%  BMI 22.13 kg/m2 Body mass index is 22.13 kg/(m^2).        Constitutional: general appearance, well nourished, well developed, in no acute distress, well developed, appears stated age, normal body habitus,          Eyes:; The patient has normal eyelids sclerae and conjunctivae :          Ears/Nose/Throat: external ear, overall: normal appearance; external nose, overall: benign appearance, normal moujth gums and lips       SIGNIFICANT RHINITIS     NORMAL TYMPANIC MEMBRANES         Neck: thyroid, overall: normal size, normal consistency, nontender,          Respiratory:  palpation of chest, overall: normal excursion,    Clear to percussion and auscultation     NO Tachypnea    NORMAL  Color          Cardiovascular:  Good color with no peripheral edema    Regular sinus rhythm without murmur.  Physiologic heart sounds   Heart is unelarged    .     Chest/Breast: normal shape           Abdominal exam,  Liver and spleen are  unenlarged        Tenderness    Scars              Urogenital; no renal, flank or bladder   tenderness;          Lymphatic: neck nodes,     Other nodes WITHIN NORMAL LIMITS         Musculoskeletal:  Brief ortho exam normal except:   NORMAL          Integument: inspection of skin, no rash, lesions; and, palpation, no induration, no tenderness.          Neurologic mental status, overall: alert and oriented; gait, no ataxia, no unsteadiness; coordination, no tremors; cranial nerves, overall: normal motor, overall: normal bulk, tone.          Psychiatric: orientation/consciousness, overall: oriented to person, place and time; behavior/psychomotor activity, no tics, normal psychomotor activity; mood and affect, overall: normal mood and affect; appearance, overall: well-groomed, good eye contact; speech, overall: normal quality, no aphasia and normal quality, quantity, intact.        Diagnostic Test Results:  Results for orders placed or performed in visit on 07/09/18   Hemoglobin A1c   Result Value Ref Range    Hemoglobin A1C 8.4 (H) 0 - 5.6 %   Basic metabolic panel   Result Value Ref Range    Sodium 138 133 - 144 mmol/L    Potassium 3.6 3.4 - 5.3 mmol/L    Chloride 105 94 - 109 mmol/L    Carbon Dioxide 21 20 - 32 mmol/L    Anion Gap 12 3 - 14 mmol/L    Glucose 257 (H) 70 - 99 mg/dL    Urea Nitrogen 16 7 - 30 mg/dL    Creatinine 0.66 0.52 - 1.04 mg/dL    GFR Estimate >90 >60 mL/min/1.7m2    GFR Estimate If Black >90 >60 mL/min/1.7m2    Calcium 8.6 8.5 - 10.1 mg/dL           ICD-10-CM    1. Upper respiratory tract infection, unspecified type J06.9    2. Acute bronchitis, unspecified organism J20.9 amoxicillin (AMOXIL) 500 MG capsule   3. Wheezing R06.2 albuterol (PROAIR HFA/PROVENTIL HFA/VENTOLIN HFA) 108 (90 Base) MCG/ACT inhaler     guaiFENesin-dextromethorphan (ROBITUSSIN DM) 100-10 MG/5ML syrup   4. Adjustment disorder with anxious mood F43.22 hydrOXYzine (ATARAX) 25 MG tablet              .    Side effects benefits and risks thoroughly discussed. .she may come in early if unimproved or getting worse           Please drink 2 glasses of water prior to meals and walk 15-30 minutes after meals        I spent  25 MINUTES SPENT  with patient discussing the following issues   The primary encounter diagnosis was Upper respiratory tract infection, unspecified type. Diagnoses of Acute bronchitis, unspecified organism, Wheezing, and Adjustment disorder with anxious mood were also pertinent to this visit. over half of which involved counseling and coordination of care.      Patient Instructions   (J06.9) Upper respiratory tract infection, unspecified type  (primary encounter diagnosis)  Comment:    Plan:      (J20.9) Acute bronchitis, unspecified organism  Comment:    Plan: amoxicillin (AMOXIL) 500 MG capsule             (R06.2) Wheezing  Comment:    Plan: albuterol (PROAIR HFA/PROVENTIL HFA/VENTOLIN         HFA) 108 (90 Base) MCG/ACT inhaler,         guaiFENesin-dextromethorphan (ROBITUSSIN DM)         100-10 MG/5ML syrup             (F43.22) Adjustment disorder with anxious mood  Comment:    Plan: hydrOXYzine (ATARAX) 25 MG tablet                               ALL THE ABOVE PROBLEMS ARE STABLE AND MED CHANGES AS NOTED        Diet:  MEDITERRANEAN DIET         Exercise:  AS TOLERATED   Exercises Range of motion, balance, isometric, and strengthening exercises 30 repetitions twice daily of involved joints            .LINDA ALCANTAR MD 9/11/2018 1:17 PM  September 11, 2018

## 2018-09-12 ASSESSMENT — PATIENT HEALTH QUESTIONNAIRE - PHQ9: SUM OF ALL RESPONSES TO PHQ QUESTIONS 1-9: 0

## 2018-09-12 ASSESSMENT — ANXIETY QUESTIONNAIRES: GAD7 TOTAL SCORE: 0

## 2018-09-15 ENCOUNTER — HEALTH MAINTENANCE LETTER (OUTPATIENT)
Age: 48
End: 2018-09-15

## 2018-09-17 ENCOUNTER — OFFICE VISIT (OUTPATIENT)
Dept: FAMILY MEDICINE | Facility: CLINIC | Age: 48
End: 2018-09-17
Payer: COMMERCIAL

## 2018-09-17 VITALS
HEART RATE: 89 BPM | WEIGHT: 134 LBS | BODY MASS INDEX: 22.3 KG/M2 | DIASTOLIC BLOOD PRESSURE: 68 MMHG | SYSTOLIC BLOOD PRESSURE: 108 MMHG | TEMPERATURE: 98.3 F | OXYGEN SATURATION: 96 % | RESPIRATION RATE: 16 BRPM

## 2018-09-17 DIAGNOSIS — R06.2 WHEEZING: ICD-10-CM

## 2018-09-17 DIAGNOSIS — J20.9 ACUTE BRONCHITIS, UNSPECIFIED ORGANISM: Primary | ICD-10-CM

## 2018-09-17 LAB — HBA1C MFR BLD: 8.9 % (ref 0–5.6)

## 2018-09-17 PROCEDURE — 36416 COLLJ CAPILLARY BLOOD SPEC: CPT | Performed by: FAMILY MEDICINE

## 2018-09-17 PROCEDURE — 83036 HEMOGLOBIN GLYCOSYLATED A1C: CPT | Performed by: FAMILY MEDICINE

## 2018-09-17 PROCEDURE — 99214 OFFICE O/P EST MOD 30 MIN: CPT | Performed by: FAMILY MEDICINE

## 2018-09-17 RX ORDER — GUAIFENESIN/DEXTROMETHORPHAN 100-10MG/5
5 SYRUP ORAL EVERY 4 HOURS PRN
Qty: 240 ML | Refills: 1 | Status: SHIPPED | OUTPATIENT
Start: 2018-09-17 | End: 2019-02-18

## 2018-09-17 RX ORDER — AMOXICILLIN 500 MG/1
500 CAPSULE ORAL 3 TIMES DAILY
Qty: 30 CAPSULE | Refills: 0 | Status: SHIPPED | OUTPATIENT
Start: 2018-09-17 | End: 2019-02-18

## 2018-09-17 NOTE — LETTER
September 18, 2018      Karen Braxton  [address]   [facility] [id number]        Dear ,    We are writing to inform you of your test results.    Abnormal THREE MONTH GLUCOSE AVERAGE ABOVE TARGET LEVEL     AS DISCUSSED    Resulted Orders   Hemoglobin A1c   Result Value Ref Range    Hemoglobin A1C 8.9 (H) 0 - 5.6 %      Comment:      Normal <5.7% Prediabetes 5.7-6.4%  Diabetes 6.5% or higher - adopted from ADA   consensus guidelines.         If you have any questions or concerns, please call the clinic at the number listed above.       Sincerely,        LINDA ALCANTAR MD

## 2018-09-17 NOTE — MR AVS SNAPSHOT
After Visit Summary   9/17/2018    Karen Braxton    MRN: [id number]           Patient Information     Date Of Birth          1970        Visit Information        Provider Department      9/17/2018 9:30 AM Raymond Alcantar MD [facility]        Today's Diagnoses     Acute bronchitis, unspecified organism    -  1    Wheezing        Uncontrolled type 2 diabetes mellitus without complication, with long-term current use of insulin (H)          Care Instructions    (J20.9) Acute bronchitis, unspecified organism  (primary encounter diagnosis)  Comment:    Plan: amoxicillin (AMOXIL) 500 MG capsule  INCOMPLETE RESPONSE TO ALBUTEROL AND COUGH MEDICATIONS              (R06.2) Wheezing  Comment:    Plan: guaiFENesin-dextromethorphan (ROBITUSSIN DM)         100-10 MG/5ML syrup             (E11.65,  Z79.4) Uncontrolled type 2 diabetes mellitus without complication, with long-term current use of insulin (H)  Comment:    Plan: Hemoglobin A1c  9/17/18 10:14 AM     Component Results   Component Value Flag Ref Range Units Status Collected Lab   Hemoglobin A1C 8.9 (H) 0 - 5.6 % Final 09/17/2018 10:14    Comment:   Normal <5.7% Prediabetes 5.7-6.4%  Diabetes 6.5% or higher - adopted from ADA   consensus guidelines.                 HOMELESS AS WELL    RAYMOND ALCANTAR JR., MD     '           Follow-ups after your visit        Follow-up notes from your care team     Return in about 1 week (around 9/24/2018), or BRONCHITIS FOLLOW UP , for FOLLOW UP ACUTE ILLNESS.      Your next 10 appointments already scheduled     Oct 30, 2018 11:00 AM CDT   (Arrive by 10:45 AM)   RETURN DIABETES with Cecile Juarez PA-C   [facility] Endocrinology ([facility])    [address] [place] [id number] [phone]            Dec 18, 2018 10:00 AM CST   (Arrive by 9:45 AM)   RETURN DIABETES with Melisa Phillips MD   [facility]  "Endocrinology ([facility] [place])    [address]  [address] [facility] [address] [phone]              Who to contact     If you have questions or need follow up information about today's clinic visit or your schedule please contact [facility] directly at [phone].  Normal or non-critical lab and imaging results will be communicated to you by MyChart, letter or phone within 4 business days after the clinic has received the results. If you do not hear from us within 7 days, please contact the clinic through MyChart or phone. If you have a critical or abnormal lab result, we will notify you by phone as soon as possible.  Submit refill requests through [employer] or call your pharmacy and they will forward the refill request to us. Please allow 3 business days for your refill to be completed.          Additional Information About Your Visit        [employer] Information     [employer] lets you send messages to your doctor, view your test results, renew your prescriptions, schedule appointments and more. To sign up, go to [email or web address] . Click on \"Log in\" on the left side of the screen, which will take you to the Welcome page. Then click on \"Sign up Now\" on the right side of the page.     You will be asked to enter the access code listed below, as well as some personal information. Please follow the directions to create your username and password.     Your access code is: [email or web address]  Expires: 12/10/2018 12:25 PM     Your access code will  in 90 days. If you need help or a new code, please call your [facility] or [phone].        Care EveryWhere ID     This is your Care EveryWhere ID. This could be used by other organizations to access your [place] medical records  [id number]        Your Vitals Were     Pulse Temperature Respirations Last Period Pulse Oximetry BMI (Body Mass Index)    89 98.3  F (36.8  C) (Tympanic) 16 (LMP " Unknown) 96% 22.3 kg/m2       Blood Pressure from Last 3 Encounters:   09/17/18 108/68   09/11/18 116/72   08/26/18 133/88    Weight from Last 3 Encounters:   09/17/18 134 lb (60.8 kg)   09/11/18 133 lb (60.3 kg)   08/26/18 145 lb (65.8 kg)              We Performed the Following     Hemoglobin A1c          Where to get your medicines      These medications were sent to [facility] [place], [place] - [address]  [address] [address]     Phone:  [phone]     amoxicillin 500 MG capsule    guaiFENesin-dextromethorphan 100-10 MG/5ML syrup          Primary Care Provider Office Phone # Fax #    Raymond Engel [phone] [address]  [facility] [address]        Equal Access to Services     SUSAN BURGOS : Hadii aad ku hadasho Soomaali, waaxda luqadaha, qaybta kaalmada adeegyada, waxay idiin hayyayon veronica kharaenrique figueroa . So [facility] [phone].    ATENCIÓN: Si habla español, tiene a merrill disposición servicios gratuitos de asistencia lingüística. Llame al [phone].    We comply with applicable federal civil rights laws and [place] laws. We do not discriminate on the basis of race, color, national origin, age, disability, sex, sexual orientation, or gender identity.            Thank you!     Thank you for choosing [facility]  for your care. Our goal is always to provide you with excellent care. Hearing back from our patients is one way we can continue to improve our services. Please take a few minutes to complete the written survey that you may receive in the mail after your visit with us. Thank you!             Your Updated Medication List - Protect others around you: Learn how to safely use, store and throw away your medicines at www.disposemymeds.org.          This list is accurate as of 9/17/18 10:29 AM.  Always use your most recent med list.                   Brand Name Dispense Instructions for  use Diagnosis    albuterol 108 (90 Base) MCG/ACT inhaler    PROAIR HFA/PROVENTIL HFA/VENTOLIN HFA    1 Inhaler    Inhale 2 puffs into the lungs every 6 hours as needed for shortness of breath / dyspnea or wheezing    Wheezing       alum & mag hydroxide-simethicone 200-200-20 MG/5ML Susp suspension    antacid    355 mL    Take 15 mLs by mouth every 4 hours as needed for indigestion    Gastroesophageal reflux disease without esophagitis       amoxicillin 500 MG capsule    AMOXIL    30 capsule    Take 1 capsule (500 mg) by mouth 3 times daily    Acute bronchitis, unspecified organism       aspirin 81 MG EC tablet     90 tablet    Take 1 tablet (81 mg) by mouth daily    Uncontrolled type 1 diabetes mellitus without complication (H), Uncontrolled type 2 diabetes mellitus without complication, with long-term current use of insulin (H), Hyperlipidemia LDL goal <100       ASPIRIN NOT PRESCRIBED    INTENTIONAL     continuous prn for other Antiplatelet medication not prescribed intentionally due to Not indicated based on age/GI distress        atorvastatin 80 MG tablet    LIPITOR    90 tablet    Take 1 tablet (80 mg) by mouth daily    Hyperlipidemia LDL goal <100       BASAGLAR 100 UNIT/ML injection     15 mL    Inject 60 units SQ each at bedtime.    Uncontrolled type 1 diabetes mellitus without complication (H), Uncontrolled type 2 diabetes mellitus without complication, with long-term current use of insulin (H)       blood glucose monitoring lancets     2 Box    Use to test blood sugar 4 times daily or as directed.    Diabetes type 2, controlled (H)       blood glucose monitoring test strip    no brand specified    400 strip    Use to test blood sugars qid  times daily or as directed    Uncontrolled type 2 diabetes mellitus without complication, with long-term current use of insulin (H), Uncontrolled type 1 diabetes mellitus without complication (H)       calcium carbonate 600 mg-vitamin D 400 units 600-400 MG-UNIT per  "tablet    CALTRATE    180 tablet    Take 1 tablet by mouth 2 times daily    Vitamin D deficiency       canagliflozin 300 MG tablet    INVOKANA    90 tablet    Take 1 tablet (300 mg) by mouth every morning (before breakfast)    Uncontrolled type 2 diabetes mellitus without complication, with long-term current use of insulin (H)       colesevelam 3.75 g Pack Packet    WELCHOL    30 each    Take 3.75 g by mouth daily (with breakfast)    Post-cholecystectomy syndrome       continuous blood glucose monitoring sensor     9 each    For use with Freestyle Dodie Flash  for continuous monitioring of blood glucose levels. Replace sensor every 10 days.    Type 2 diabetes mellitus with hyperglycemia, with long-term current use of insulin (H)       ezetimibe 10 MG tablet    ZETIA    90 tablet    Take 1 tablet (10 mg) by mouth daily    Hyperlipidemia LDL goal <100       FREESTYLE DODIE READER Cathy     1 Device    1 Device daily        guaiFENesin-dextromethorphan 100-10 MG/5ML syrup    ROBITUSSIN DM    240 mL    Take 5 mLs by mouth every 4 hours as needed    Wheezing       hydrOXYzine 25 MG tablet    ATARAX    30 tablet    Take 1 tablet (25 mg) by mouth every 8 hours as needed for anxiety    Adjustment disorder with anxious mood       insulin lispro 100 UNIT/ML injection    HumaLOG KWIKpen    15 mL    30 units before breakfast, 35 units before lunch, 35 units before dinner    Uncontrolled type 2 diabetes mellitus without complication, with long-term current use of insulin (H)       insulin pen needle 31G X 8 MM    B-D U/F    400 each    TEST FOUR TIMES A DAY OR AS DIRECTED    Controlled type 2 diabetes mellitus with hyperglycemia, with long-term current use of insulin (H)       insulin syringe-needle U-100 30G X 1/2\" 1 ML    BD insulin syringe ultrafine    100 each    Use one syringe daily or as directed.  3 month supply    Diabetes type 2, controlled (H)       lisinopril 2.5 MG tablet    PRINIVIL/Zestril    90 tablet    " Take 1 tablet (2.5 mg) by mouth daily    Hypertension goal BP (blood pressure) < 140/80, Uncontrolled type 2 diabetes mellitus without complication, with long-term current use of insulin (H)       meloxicam 15 MG tablet    MOBIC    30 tablet    Take 1 tablet (15 mg) by mouth daily    Sprain of right rotator cuff capsule, initial encounter       metFORMIN 1000 MG tablet    GLUCOPHAGE    90 tablet    TAKE ONE-HALF TABLET BY MOUTH TWICE DAILY WITH MEALS    Uncontrolled type 2 diabetes mellitus without complication, with long-term current use of insulin (H)       nicotine 10 MG Inhaler    NICOTROL    180 each    Inhale 6-16 Cartridges into the lungs daily as needed for smoking cessation    Tobacco dependence syndrome       pioglitazone 45 MG tablet    ACTOS    90 tablet    Take 1 tablet (45 mg) by mouth daily    Uncontrolled type 2 diabetes mellitus without complication, with long-term current use of insulin (H)       Urea 20 % Crea cream     85 g    Apply topically as needed    Tyloma       vitamin D 86462 UNIT capsule    ERGOCALCIFEROL    4 capsule    TAKE 1 CAPLET ONCE WEEKLY Profile Rx: patient will contact pharmacy when needed    Vitamin D deficiency

## 2018-09-17 NOTE — PATIENT INSTRUCTIONS
(J20.9) Acute bronchitis, unspecified organism  (primary encounter diagnosis)  Comment:    Plan: amoxicillin (AMOXIL) 500 MG capsule  INCOMPLETE RESPONSE TO ALBUTEROL AND COUGH MEDICATIONS              (R06.2) Wheezing  Comment:    Plan: guaiFENesin-dextromethorphan (ROBITUSSIN DM)         100-10 MG/5ML syrup             (E11.65,  Z79.4) Uncontrolled type 2 diabetes mellitus without complication, with long-term current use of insulin (H)  Comment:    Plan: Hemoglobin A1c  9/17/18 10:14 AM     Component Results   Component Value Flag Ref Range Units Status Collected Lab   Hemoglobin A1C 8.9 (H) 0 - 5.6 % Final 09/17/2018 10:14    Comment:   Normal <5.7% Prediabetes 5.7-6.4%  Diabetes 6.5% or higher - adopted from ADA   consensus guidelines.                 HOMELESS AS WELL    LINDA ALCANTAR JR., MD     '

## 2018-09-17 NOTE — PROGRESS NOTES
SUBJECTIVE:   Karen Braxton is a 48 year old female who presents to clinic today for the following health issues:      Acute Illness   Acute illness concerns: cough, runny nose  Onset: 2 -3 weeks    Fever: YES- little    Chills/Sweats: YES    Headache (location?): YES    Sinus Pressure:YES    Conjunctivitis:  no    Ear Pain: no    Rhinorrhea: YES    Congestion: YES    Sore Throat: no     Cough: YES-productive of yellow sputum    Wheeze: no    Decreased Appetite: YES    Nausea: no    Vomiting: no    Diarrhea:  no    Dysuria/Freq.: no    Fatigue/Achiness: no    Sick/Strep Exposure: YES               Diabetes Follow-up      Patient is checking blood sugars: 4 times, this morning 140    Diabetic concerns: None     Symptoms of hypoglycemia (low blood sugar): none     Paresthesias (numbness or burning in feet) or sores: No     Date of last diabetic eye exam: 7/2018    BP Readings from Last 2 Encounters:   09/17/18 108/68   09/11/18 116/72     Hemoglobin A1C (%)   Date Value   07/09/2018 8.4 (H)   03/16/2018 8.5 (H)     LDL Cholesterol Calculated (mg/dL)   Date Value   03/16/2018 149 (H)   03/27/2017 160 (H)       Diabetes Management Resources    Amount of exercise or physical activity: 6-7 days/week for an average of 15-30 minutes    Problems taking medications regularly: No    Medication side effects: none    Diet: low fat/cholesterol        Acute Illness   Acute illness concerns: cough, cold  Onset: TWO WEEKS  MILD SLOW IMPROVEMENT     Fever: YES    Chills/Sweats: YES    Headache (location?): YES    Sinus Pressure:YES    Conjunctivitis:  YES-     Ear Pain: no    Rhinorrhea: YES    Congestion: YES    Sore Throat: YES     Cough: YES-productive of clear sputum    Wheeze: no    Decreased Appetite: no    Nausea: no    Vomiting: no    Diarrhea:  no    Dysuria/Freq.: no    Fatigue/Achiness: YES    Sick/Strep Exposure: no     Therapies Tried and outcome: ibuprofin                Topic Date Due     MAMMO SCREEN Q2 YR (SYSTEM  ASSIGNED)  05/30/2018     COLON CANCER SCREEN (SYSTEM ASSIGNED)  05/30/2018     FOOT EXAM Q1 YEAR  08/14/2018     PAP Q3 YR  07/14/2018     PHQ-9 Q6 MONTHS  09/16/2018               .  Current Outpatient Prescriptions   Medication Sig Dispense Refill     albuterol (PROAIR HFA/PROVENTIL HFA/VENTOLIN HFA) 108 (90 Base) MCG/ACT inhaler Inhale 2 puffs into the lungs every 6 hours as needed for shortness of breath / dyspnea or wheezing 1 Inhaler 1     alum & mag hydroxide-simethicone (ANTACID) 200-200-20 MG/5ML SUSP suspension Take 15 mLs by mouth every 4 hours as needed for indigestion 355 mL 11     aspirin 81 MG EC tablet Take 1 tablet (81 mg) by mouth daily 90 tablet 3     atorvastatin (LIPITOR) 80 MG tablet Take 1 tablet (80 mg) by mouth daily 90 tablet 3     BASAGLAR 100 UNIT/ML injection Inject 60 units SQ each at bedtime. 15 mL 12     blood glucose monitoring (CARDFREE MICROLET) lancets Use to test blood sugar 4 times daily or as directed. 2 Box 6     blood glucose monitoring (NO BRAND SPECIFIED) test strip Use to test blood sugars qid  times daily or as directed 400 strip 11     calcium-vitamin D (CALTRATE) 600-400 MG-UNIT per tablet Take 1 tablet by mouth 2 times daily 180 tablet 3     canagliflozin (INVOKANA) 300 MG tablet Take 1 tablet (300 mg) by mouth every morning (before breakfast) 90 tablet 1     colesevelam (WELCHOL) 3.75 g PACK Packet Take 3.75 g by mouth daily (with breakfast) 30 each 11     Continuous Blood Gluc  (FREESTYLE RAHEEM READER) DENNISE 1 Device daily 1 Device 0     continuous blood glucose monitoring (FREESTYLE RAHEEM) sensor For use with Freestyle Raheem Flash  for continuous monitioring of blood glucose levels. Replace sensor every 10 days. 9 each 3     ezetimibe (ZETIA) 10 MG tablet Take 1 tablet (10 mg) by mouth daily 90 tablet 3     hydrOXYzine (ATARAX) 25 MG tablet Take 1 tablet (25 mg) by mouth every 8 hours as needed for anxiety 30 tablet 0     insulin lispro (HUMALOG  "KWIKPEN) 100 UNIT/ML injection 30 units before breakfast, 35 units before lunch, 35 units before dinner 15 mL 1     insulin pen needle (B-D U/F) 31G X 8 MM TEST FOUR TIMES A DAY OR AS DIRECTED 400 each 11     insulin syringe-needle U-100 (BD INSULIN SYRINGE ULTRAFINE) 30G X 1/2\" 1 ML Use one syringe daily or as directed.  3 month supply 100 each prn     lisinopril (PRINIVIL/ZESTRIL) 2.5 MG tablet Take 1 tablet (2.5 mg) by mouth daily 90 tablet 2     meloxicam (MOBIC) 15 MG tablet Take 1 tablet (15 mg) by mouth daily 30 tablet 1     metFORMIN (GLUCOPHAGE) 1000 MG tablet TAKE ONE-HALF TABLET BY MOUTH TWICE DAILY WITH MEALS 90 tablet 1     nicotine (NICOTROL) 10 MG Inhaler Inhale 6-16 Cartridges into the lungs daily as needed for smoking cessation 180 each 11     pioglitazone (ACTOS) 45 MG tablet Take 1 tablet (45 mg) by mouth daily 90 tablet 2     Urea 20 % CREA cream Apply topically as needed 85 g 3     vitamin D (ERGOCALCIFEROL) 57533 UNIT capsule TAKE 1 CAPLET ONCE WEEKLY Profile Rx: patient will contact pharmacy when needed 4 capsule 5     ASPIRIN NOT PRESCRIBED (INTENTIONAL) continuous prn for other Antiplatelet medication not prescribed intentionally due to Not indicated based on age/GI distress       guaiFENesin-dextromethorphan (ROBITUSSIN DM) 100-10 MG/5ML syrup Take 5 mLs by mouth every 4 hours as needed (Patient not taking: Reported on 9/17/2018) 240 mL 1            No Known Allergies      Immunization History   Administered Date(s) Administered     Influenza (IIV3) PF 11/26/2007, 10/22/2008, 09/18/2009, 09/20/2010, 11/26/2013, 10/28/2014     Influenza Vaccine IM 3yrs+ 4 Valent IIV4 09/29/2015, 10/25/2017     Pneumo Conj 13-V (2010&after) 10/03/2011     TD (ADULT, 7+) 01/01/1998     Tdap (Adacel,Boostrix) 02/10/2009               reports that she does not drink alcohol.        reports that she does not use illicit drugs.      family history includes Asthma in her mother; Diabetes in her father and mother; " HEART DISEASE in her mother; Hypertension in her father and mother; Lipids in her mother. There is no history of Cancer, Coronary Artery Disease, Hyperlipidemia, Cerebrovascular Disease, Breast Cancer, Colon Cancer, Prostate Cancer, Other Cancer, Depression, Anxiety Disorder, Mental Illness, Substance Abuse, Anesthesia Reaction, Osteoporosis, Genetic Disorder, Thyroid Disease, Obesity, or Unknown/Adopted.      indicated that the status of her no family hx of is unknown. She indicated that her mother is . She indicated that her father is .        has a past surgical history that includes Cholecystectomy (2007); Facial reconstruction surgery (2 years old); and orthopedic surgery (2001).       reports that she currently engages in sexual activity and has had male partners.    .  Pediatric History   Patient Guardian Status     Not on file.     Other Topics Concern     Parent/Sibling W/ Cabg, Mi Or Angioplasty Before 65f 55m? No     Social History Narrative             reports that she has been smoking Cigarettes.  She has never used smokeless tobacco.        Medical, social, surgical, and family histories reviewed.        Labs reviewed in EPIC  Patient Active Problem List   Diagnosis     Other dental caries     Disorder of bursae and tendons in shoulder region     Insomnia     Vitamin D deficiency     Premature menopause     ASCUS favor benign     Hyperlipidemia LDL goal <100     Comprehensive diabetic foot examination, type 2 DM, encounter for (H)     Hypertension goal BP (blood pressure) < 140/90     DiarrheaX 2 over last 24hrs w one explosive r/o 2ndary to acid gastritis     Uncontrolled type 2 diabetes mellitus without complication, with long-term current use of insulin (H)     Esophageal reflux 2ndary to hi continuous intake of sugared  tea     Hyperlipidemia with target LDL less than 100     Diabetes type 2, controlled (H)     Intractable chronic migraine without aura and without status  migrainosus     Needle stick injury, subsequent encounter     Hammer toe of right foot     Type 2 diabetes mellitus without complication, with long-term current use of insulin (H)       Past Surgical History:   Procedure Laterality Date     CHOLECYSTECTOMY  9/14/2007    laproscopic     FACIAL RECONSTRUCTION SURGERY  2 years old    cleft lip repair     ORTHOPEDIC SURGERY  6/2001    left knee         Social History   Substance Use Topics     Smoking status: Current Some Day Smoker     Types: Cigarettes     Smokeless tobacco: Never Used      Comment: 3 cigarettes daily     Alcohol use No       Family History   Problem Relation Age of Onset     Diabetes Mother      Hypertension Mother      HEART DISEASE Mother      Lipids Mother      Asthma Mother      Diabetes Father      Hypertension Father      Cancer No family hx of      No known family hx of skin cancer     Coronary Artery Disease No family hx of      Hyperlipidemia No family hx of      Cerebrovascular Disease No family hx of      Breast Cancer No family hx of      Colon Cancer No family hx of      Prostate Cancer No family hx of      Other Cancer No family hx of      Depression No family hx of      Anxiety Disorder No family hx of      Mental Illness No family hx of      Substance Abuse No family hx of      Anesthesia Reaction No family hx of      Osteoporosis No family hx of      Genetic Disorder No family hx of      Thyroid Disease No family hx of      Obesity No family hx of      Unknown/Adopted No family hx of              Current Outpatient Prescriptions   Medication Sig Dispense Refill     albuterol (PROAIR HFA/PROVENTIL HFA/VENTOLIN HFA) 108 (90 Base) MCG/ACT inhaler Inhale 2 puffs into the lungs every 6 hours as needed for shortness of breath / dyspnea or wheezing 1 Inhaler 1     alum & mag hydroxide-simethicone (ANTACID) 200-200-20 MG/5ML SUSP suspension Take 15 mLs by mouth every 4 hours as needed for indigestion 355 mL 11     aspirin 81 MG EC tablet  "Take 1 tablet (81 mg) by mouth daily 90 tablet 3     atorvastatin (LIPITOR) 80 MG tablet Take 1 tablet (80 mg) by mouth daily 90 tablet 3     BASAGLAR 100 UNIT/ML injection Inject 60 units SQ each at bedtime. 15 mL 12     blood glucose monitoring (JONATHAN MICROLET) lancets Use to test blood sugar 4 times daily or as directed. 2 Box 6     blood glucose monitoring (NO BRAND SPECIFIED) test strip Use to test blood sugars qid  times daily or as directed 400 strip 11     calcium-vitamin D (CALTRATE) 600-400 MG-UNIT per tablet Take 1 tablet by mouth 2 times daily 180 tablet 3     canagliflozin (INVOKANA) 300 MG tablet Take 1 tablet (300 mg) by mouth every morning (before breakfast) 90 tablet 1     colesevelam (WELCHOL) 3.75 g PACK Packet Take 3.75 g by mouth daily (with breakfast) 30 each 11     Continuous Blood Gluc  (FREESTYLE RAHEEM READER) DENNISE 1 Device daily 1 Device 0     continuous blood glucose monitoring (KueskiSTYLE RAHEEM) sensor For use with Freestyle Raheem Flash  for continuous monitioring of blood glucose levels. Replace sensor every 10 days. 9 each 3     ezetimibe (ZETIA) 10 MG tablet Take 1 tablet (10 mg) by mouth daily 90 tablet 3     hydrOXYzine (ATARAX) 25 MG tablet Take 1 tablet (25 mg) by mouth every 8 hours as needed for anxiety 30 tablet 0     insulin lispro (HUMALOG KWIKPEN) 100 UNIT/ML injection 30 units before breakfast, 35 units before lunch, 35 units before dinner 15 mL 1     insulin pen needle (B-D U/F) 31G X 8 MM TEST FOUR TIMES A DAY OR AS DIRECTED 400 each 11     insulin syringe-needle U-100 (BD INSULIN SYRINGE ULTRAFINE) 30G X 1/2\" 1 ML Use one syringe daily or as directed.  3 month supply 100 each prn     lisinopril (PRINIVIL/ZESTRIL) 2.5 MG tablet Take 1 tablet (2.5 mg) by mouth daily 90 tablet 2     meloxicam (MOBIC) 15 MG tablet Take 1 tablet (15 mg) by mouth daily 30 tablet 1     metFORMIN (GLUCOPHAGE) 1000 MG tablet TAKE ONE-HALF TABLET BY MOUTH TWICE DAILY WITH MEALS 90 " tablet 1     nicotine (NICOTROL) 10 MG Inhaler Inhale 6-16 Cartridges into the lungs daily as needed for smoking cessation 180 each 11     pioglitazone (ACTOS) 45 MG tablet Take 1 tablet (45 mg) by mouth daily 90 tablet 2     Urea 20 % CREA cream Apply topically as needed 85 g 3     vitamin D (ERGOCALCIFEROL) 59681 UNIT capsule TAKE 1 CAPLET ONCE WEEKLY Profile Rx: patient will contact pharmacy when needed 4 capsule 5     ASPIRIN NOT PRESCRIBED (INTENTIONAL) continuous prn for other Antiplatelet medication not prescribed intentionally due to Not indicated based on age/GI distress       guaiFENesin-dextromethorphan (ROBITUSSIN DM) 100-10 MG/5ML syrup Take 5 mLs by mouth every 4 hours as needed (Patient not taking: Reported on 9/17/2018) 240 mL 1         Recent Labs   Lab Test  07/09/18   0819  03/16/18   1211  03/27/17   1442   03/21/16   1030   06/23/15   1652   A1C  8.4*  8.5*  8.2*   < >  8.1*   < >  8.1*   LDL   --   149*  160*   --   142*   --    --    HDL   --   43*  48*   --   44*   --    --    TRIG   --   183*  171*   --   223*   --    --    ALT   --   16   --    --   30   --   21   CR  0.66  0.67   --    < >  0.70   --   0.80   GFRESTIMATED  >90  >90   --    < >  90   --   77   GFRESTBLACK  >90  >90   --    < >  >90   --   >90   POTASSIUM  3.6  4.2   --    < >  4.4   --   4.2   TSH   --   2.12   --    --   1.31   --    --     < > = values in this interval not displayed.            BP Readings from Last 6 Encounters:   09/17/18 108/68   09/11/18 116/72   08/26/18 133/88   07/09/18 120/64   06/05/18 105/72   04/13/18 135/81         Wt Readings from Last 3 Encounters:   09/17/18 134 lb (60.8 kg)   09/11/18 133 lb (60.3 kg)   08/26/18 145 lb (65.8 kg)               Positive symptoms or findings indicated by bold designation:         ROS: 10 point ROS neg other than the symptoms noted above in the HPI.except  has Other dental caries; Disorder of bursae and tendons in shoulder region; Insomnia; Vitamin D  "deficiency; Premature menopause; ASCUS favor benign; Hyperlipidemia LDL goal <100; Comprehensive diabetic foot examination, type 2 DM, encounter for (H); Hypertension goal BP (blood pressure) < 140/90; DiarrheaX 2 over last 24hrs w one explosive r/o 2ndary to acid gastritis; Uncontrolled type 2 diabetes mellitus without complication, with long-term current use of insulin (H); Esophageal reflux 2ndary to hi continuous intake of sugared  tea; Hyperlipidemia with target LDL less than 100; Diabetes type 2, controlled (H); Intractable chronic migraine without aura and without status migrainosus; Needle stick injury, subsequent encounter; Hammer toe of right foot; and Type 2 diabetes mellitus without complication, with long-term current use of insulin (H) on her problem list.  Review Of Systems    Skin: negative    Eyes: negative    Ears/Nose/Throat:  RHINITIS NO FACIAL PAIN OR HEADACHE    Respiratory: COUGHING AND WHEEZING     Cardiovascular: negative    Gastrointestinal: negative    Genitourinary: negative    Musculoskeletal: negative    Neurologic: negative    Psychiatric: negative    Hematologic/Lymphatic/Immunologic: negative    Endocrine: diabetes    LDL OR \"BAD\" CHOLESTEROL  GOAL < 100    HOMELESS                     PE:  /68 (Cuff Size: Adult Regular)  Pulse 89  Temp 98.3  F (36.8  C) (Tympanic)  Resp 16  Wt 134 lb (60.8 kg)  LMP  (LMP Unknown)  SpO2 96%  BMI 22.3 kg/m2 Body mass index is 22.3 kg/(m^2).        Constitutional: general appearance, well nourished, well developed, in no acute distress, well developed, appears stated age, normal body habitus,          Eyes:; The patient has normal eyelids sclerae and conjunctivae :          Ears/Nose/Throat: external ear, overall: normal appearance; external nose, overall: benign appearance, normal moujth gums and lips       SIGNIFICANT RHINITIS     NORMAL TYMPANIC MEMBRANES         Neck: thyroid, overall: normal size, normal consistency, nontender,  "         Respiratory:  palpation of chest, overall: normal excursion,    Clear to percussion and auscultation  RHONCHI NOTED MILD PROLONGED EXPIRATORY PHASE NO WHEEZING   NO Tachypnea    NORMAL  Color          Cardiovascular:  Good color with no peripheral edema    Regular sinus rhythm without murmur.  Physiologic heart sounds   Heart is unelarged    .     Chest/Breast: normal shape           Abdominal exam,  Liver and spleen are  unenlarged        Tenderness    Scars              Urogenital; no renal, flank or bladder  tenderness;          Lymphatic: neck nodes,     Other nodes WITHIN NORMAL LIMITS         Musculoskeletal:  Brief ortho exam normal except:   NORMAL          Integument: inspection of skin, no rash, lesions; and, palpation, no induration, no tenderness.          Neurologic mental status, overall: alert and oriented; gait, no ataxia, no unsteadiness; coordination, no tremors; cranial nerves, overall: normal motor, overall: normal bulk, tone.          Psychiatric: orientation/consciousness, overall: oriented to person, place and time; behavior/psychomotor activity, no tics, normal psychomotor activity; mood and affect, overall: normal mood and affect; appearance, overall: well-groomed, good eye contact; speech, overall: normal quality, no aphasia and normal quality, quantity, intact.        Diagnostic Test Results:  Results for orders placed or performed in visit on 07/09/18   Hemoglobin A1c   Result Value Ref Range    Hemoglobin A1C 8.4 (H) 0 - 5.6 %   Basic metabolic panel   Result Value Ref Range    Sodium 138 133 - 144 mmol/L    Potassium 3.6 3.4 - 5.3 mmol/L    Chloride 105 94 - 109 mmol/L    Carbon Dioxide 21 20 - 32 mmol/L    Anion Gap 12 3 - 14 mmol/L    Glucose 257 (H) 70 - 99 mg/dL    Urea Nitrogen 16 7 - 30 mg/dL    Creatinine 0.66 0.52 - 1.04 mg/dL    GFR Estimate >90 >60 mL/min/1.7m2    GFR Estimate If Black >90 >60 mL/min/1.7m2    Calcium 8.6 8.5 - 10.1 mg/dL           No diagnosis  found.         .    Side effects benefits and risks thoroughly discussed. .she may come in early if unimproved or getting worse          Please drink 2 glasses of water prior to meals and walk 15-30 minutes after meals        I spent  25 MINUTES SPENT  with patient discussing the following issues   There were no encounter diagnoses. over half of which involved counseling and coordination of care.      There are no Patient Instructions on file for this visit.        ALL THE ABOVE PROBLEMS ARE STABLE AND MED CHANGES AS NOTED        Diet:  MEDITERRANEAN DIET         Exercise:  AS TOLERATED   Exercises Range of motion, balance, isometric, and strengthening exercises 30 repetitions twice daily of involved joints            .LINDA ALCANTAR MD 9/11/2018 1:17 PM  September 11, 2018

## 2018-09-24 ENCOUNTER — TELEPHONE (OUTPATIENT)
Dept: FAMILY MEDICINE | Facility: CLINIC | Age: 48
End: 2018-09-24

## 2018-09-24 NOTE — TELEPHONE ENCOUNTER
Reason for Call:  Same Day Appointment, Requested Provider:  Bal Engel MD    PCP: Raymond Engel    Reason for visit: Bronchitis      Duration of symptoms:     Have you been treated for this in the past? Yes    Additional comments: Patient would like to follow up Maren     Can we leave a detailed message on this number? YES    Phone number patient can be reached at: Home number on file [phone] (home)    Best Time: anytime     Call taken on 9/24/2018 at 7:37 AM by Esperanza Barcenas

## 2018-09-24 NOTE — TELEPHONE ENCOUNTER
Called patient back and scheduled an appointment with Dr. Engel for tomorrow, when the provider is in.

## 2018-09-25 ENCOUNTER — OFFICE VISIT (OUTPATIENT)
Dept: FAMILY MEDICINE | Facility: CLINIC | Age: 48
End: 2018-09-25
Payer: COMMERCIAL

## 2018-09-25 VITALS
BODY MASS INDEX: 22.8 KG/M2 | OXYGEN SATURATION: 98 % | DIASTOLIC BLOOD PRESSURE: 74 MMHG | WEIGHT: 137 LBS | TEMPERATURE: 97.9 F | RESPIRATION RATE: 16 BRPM | SYSTOLIC BLOOD PRESSURE: 118 MMHG | HEART RATE: 82 BPM

## 2018-09-25 DIAGNOSIS — Z23 NEED FOR PNEUMOCOCCAL VACCINATION: ICD-10-CM

## 2018-09-25 DIAGNOSIS — R30.0 DYSURIA: ICD-10-CM

## 2018-09-25 DIAGNOSIS — J20.9 ACUTE BRONCHITIS, UNSPECIFIED ORGANISM: Primary | ICD-10-CM

## 2018-09-25 DIAGNOSIS — Z23 NEED FOR VACCINATION: ICD-10-CM

## 2018-09-25 PROCEDURE — 99213 OFFICE O/P EST LOW 20 MIN: CPT | Mod: 25 | Performed by: FAMILY MEDICINE

## 2018-09-25 PROCEDURE — 90471 IMMUNIZATION ADMIN: CPT | Performed by: FAMILY MEDICINE

## 2018-09-25 PROCEDURE — 90732 PPSV23 VACC 2 YRS+ SUBQ/IM: CPT | Performed by: FAMILY MEDICINE

## 2018-09-25 NOTE — PROGRESS NOTES
"  SUBJECTIVE:   Karen Braxton is a 48 year old female who presents to clinic today for the following health issues:      Follow up on last weeks OV for bronchitis  Patient is feeling better       .LINDA ALCANTAR MD .9/25/2018 10:27 PM .September 26, 2018        Karen Braxton is a 48 year old female who is who presents with FOLLOW UP  BRONCHITIS     Onset :  SEVERAL WEEKS      Severity: IMPROVED AND ALMOST ASYMPTOMATIC       Home treatments  ANTIBIOTIC AND MEDICATIONS      Additional Symptoms: COUGHING AND WHEEZING      Course IMPROVED     WANTS HANDICAPPED STICKER     PATIENT HAS IMPROVED LIVING STATUS WAS HOMELESS     IMPROVED SHOULDER PAIN     VITAMIN D REPLACEMENT      LDL OR \"BAD\" CHOLESTEROL  GOAL < 100 DIFFICULT TO CONTROL     HYPERTENSION WITH GOAL OF LESS THAN 140/80 CONTROLLED CURRENT MEDICATIONS WITHOUT COMPLICATION     DIABETES 2 GOAL 8% POORLY CONTROLLED BUT IMPROVING     GASTROESOPHAGEAL REFLUX DISEASE WITHOUT ESOPHAGITIS     HISTORY OF MIGRAINE HEADACHES                    Diabetes Follow-up    Patient is checking blood sugars: DAILY   Diabetic concerns: blood sugar frequently over 200   Symptoms of hypoglycemia (low blood sugar): none   Paresthesias (numbness or burning in feet) or sores: No   Date of last diabetic eye exam: YEARLY RECOMMENDED     Diabetes Management Resources    Hyperlipidemia Follow-Up    Rate your low fat/cholesterol diet?: good  Taking statin?  Yes, no muscle aches from statin  Other lipid medications/supplements?:  none    Hypertension Follow-up    Outpatient blood pressures are not being checked.  Low Salt Diet: no added salt DIABETES  AND MEDITERRANEAN DIET     BP Readings from Last 2 Encounters:   09/25/18 118/74   09/17/18 108/68     Hemoglobin A1C (%)   Date Value   09/17/2018 8.9 (H)   07/09/2018 8.4 (H)     LDL Cholesterol Calculated (mg/dL)   Date Value   03/16/2018 149 (H)   03/27/2017 160 (H)     Amount of exercise or physical activity: 6-7 days/week for an average of " 15-30 minutes  Problems taking medications regularly: No  Medication side effects: none  Diet: low salt, low fat/cholesterol and diabetic                 Topic Date Due     MAMMO SCREEN Q2 YR (SYSTEM ASSIGNED)  05/30/2018     COLON CANCER SCREEN (SYSTEM ASSIGNED)  05/30/2018     PAP Q3 YR  07/14/2018     FOOT EXAM Q1 YEAR  08/14/2018               .  Current Outpatient Prescriptions   Medication Sig Dispense Refill     albuterol (PROAIR HFA/PROVENTIL HFA/VENTOLIN HFA) 108 (90 Base) MCG/ACT inhaler Inhale 2 puffs into the lungs every 6 hours as needed for shortness of breath / dyspnea or wheezing 1 Inhaler 1     alum & mag hydroxide-simethicone (ANTACID) 200-200-20 MG/5ML SUSP suspension Take 15 mLs by mouth every 4 hours as needed for indigestion 355 mL 11     amoxicillin (AMOXIL) 500 MG capsule Take 1 capsule (500 mg) by mouth 3 times daily 30 capsule 0     aspirin 81 MG EC tablet Take 1 tablet (81 mg) by mouth daily 90 tablet 3     ASPIRIN NOT PRESCRIBED (INTENTIONAL) continuous prn for other Antiplatelet medication not prescribed intentionally due to Not indicated based on age/GI distress       atorvastatin (LIPITOR) 80 MG tablet Take 1 tablet (80 mg) by mouth daily 90 tablet 3     BASAGLAR 100 UNIT/ML injection Inject 60 units SQ each at bedtime. 15 mL 12     blood glucose monitoring (JONATHAN MICROLET) lancets Use to test blood sugar 4 times daily or as directed. 2 Box 6     blood glucose monitoring (NO BRAND SPECIFIED) test strip Use to test blood sugars qid  times daily or as directed 400 strip 11     calcium-vitamin D (CALTRATE) 600-400 MG-UNIT per tablet Take 1 tablet by mouth 2 times daily 180 tablet 3     canagliflozin (INVOKANA) 300 MG tablet Take 1 tablet (300 mg) by mouth every morning (before breakfast) 90 tablet 1     colesevelam (WELCHOL) 3.75 g PACK Packet Take 3.75 g by mouth daily (with breakfast) 30 each 11     Continuous Blood Gluc  (FREESTYLE RAHEEM READER) DENNISE 1 Device daily 1 Device 0  "    continuous blood glucose monitoring (FREESTYLE RAHEEM) sensor For use with Freestyle Raheem Flash  for continuous monitioring of blood glucose levels. Replace sensor every 10 days. 9 each 3     ezetimibe (ZETIA) 10 MG tablet Take 1 tablet (10 mg) by mouth daily 90 tablet 3     guaiFENesin-dextromethorphan (ROBITUSSIN DM) 100-10 MG/5ML syrup Take 5 mLs by mouth every 4 hours as needed 240 mL 1     hydrOXYzine (ATARAX) 25 MG tablet Take 1 tablet (25 mg) by mouth every 8 hours as needed for anxiety 30 tablet 0     insulin lispro (HUMALOG KWIKPEN) 100 UNIT/ML injection 30 units before breakfast, 35 units before lunch, 35 units before dinner 15 mL 1     insulin pen needle (B-D U/F) 31G X 8 MM TEST FOUR TIMES A DAY OR AS DIRECTED 400 each 11     insulin syringe-needle U-100 (BD INSULIN SYRINGE ULTRAFINE) 30G X 1/2\" 1 ML Use one syringe daily or as directed.  3 month supply 100 each prn     lisinopril (PRINIVIL/ZESTRIL) 2.5 MG tablet Take 1 tablet (2.5 mg) by mouth daily 90 tablet 2     meloxicam (MOBIC) 15 MG tablet Take 1 tablet (15 mg) by mouth daily 30 tablet 1     metFORMIN (GLUCOPHAGE) 1000 MG tablet TAKE ONE-HALF TABLET BY MOUTH TWICE DAILY WITH MEALS 90 tablet 1     nicotine (NICOTROL) 10 MG Inhaler Inhale 6-16 Cartridges into the lungs daily as needed for smoking cessation 180 each 11     pioglitazone (ACTOS) 45 MG tablet Take 1 tablet (45 mg) by mouth daily 90 tablet 2     Urea 20 % CREA cream Apply topically as needed 85 g 3     vitamin D (ERGOCALCIFEROL) 88645 UNIT capsule TAKE 1 CAPLET ONCE WEEKLY Profile Rx: patient will contact pharmacy when needed 4 capsule 5            No Known Allergies      Immunization History   Administered Date(s) Administered     Influenza (IIV3) PF 11/26/2007, 10/22/2008, 09/18/2009, 09/20/2010, 11/26/2013, 10/28/2014     Influenza Vaccine IM 3yrs+ 4 Valent IIV4 09/29/2015, 10/25/2017     Pneumo Conj 13-V (2010&after) 10/03/2011     Pneumococcal 23 valent 09/25/2018     TD " (ADULT, 7+) 1998     Tdap (Adacel,Boostrix) 02/10/2009               reports that she does not drink alcohol.        reports that she does not use illicit drugs.      family history includes Asthma in her mother; Diabetes in her father and mother; HEART DISEASE in her mother; Hypertension in her father and mother; Lipids in her mother. There is no history of Cancer, Coronary Artery Disease, Hyperlipidemia, Cerebrovascular Disease, Breast Cancer, Colon Cancer, Prostate Cancer, Other Cancer, Depression, Anxiety Disorder, Mental Illness, Substance Abuse, Anesthesia Reaction, Osteoporosis, Genetic Disorder, Thyroid Disease, Obesity, or Unknown/Adopted.      indicated that the status of her no family hx of is unknown. She indicated that her mother is . She indicated that her father is .        has a past surgical history that includes Cholecystectomy (2007); Facial reconstruction surgery (2 years old); and orthopedic surgery (2001).       reports that she currently engages in sexual activity and has had male partners.    .  Pediatric History   Patient Guardian Status     Not on file.     Other Topics Concern     Parent/Sibling W/ Cabg, Mi Or Angioplasty Before 65f 55m? No     Social History Narrative             reports that she has been smoking Cigarettes.  She has never used smokeless tobacco.        Medical, social, surgical, and family histories reviewed.        Labs reviewed in EPIC  Patient Active Problem List   Diagnosis     Other dental caries     Disorder of bursae and tendons in shoulder region     Insomnia     Vitamin D deficiency     Premature menopause     ASCUS favor benign     Hyperlipidemia LDL goal <100     Comprehensive diabetic foot examination, type 2 DM, encounter for (H)     Hypertension goal BP (blood pressure) < 140/90     DiarrheaX 2 over last 24hrs w one explosive r/o 2ndary to acid gastritis     Uncontrolled type 2 diabetes mellitus without complication, with  long-term current use of insulin (H)     Esophageal reflux 2ndary to hi continuous intake of sugared  tea     Hyperlipidemia with target LDL less than 100     Diabetes type 2, controlled (H)     Intractable chronic migraine without aura and without status migrainosus     Needle stick injury, subsequent encounter     Hammer toe of right foot     Type 2 diabetes mellitus without complication, with long-term current use of insulin (H)       Past Surgical History:   Procedure Laterality Date     CHOLECYSTECTOMY  9/14/2007    laproscopic     FACIAL RECONSTRUCTION SURGERY  2 years old    cleft lip repair     ORTHOPEDIC SURGERY  6/2001    left knee         Social History   Substance Use Topics     Smoking status: Current Some Day Smoker     Types: Cigarettes     Smokeless tobacco: Never Used      Comment: 3 cigarettes daily     Alcohol use No       Family History   Problem Relation Age of Onset     Diabetes Mother      Hypertension Mother      HEART DISEASE Mother      Lipids Mother      Asthma Mother      Diabetes Father      Hypertension Father      Cancer No family hx of      No known family hx of skin cancer     Coronary Artery Disease No family hx of      Hyperlipidemia No family hx of      Cerebrovascular Disease No family hx of      Breast Cancer No family hx of      Colon Cancer No family hx of      Prostate Cancer No family hx of      Other Cancer No family hx of      Depression No family hx of      Anxiety Disorder No family hx of      Mental Illness No family hx of      Substance Abuse No family hx of      Anesthesia Reaction No family hx of      Osteoporosis No family hx of      Genetic Disorder No family hx of      Thyroid Disease No family hx of      Obesity No family hx of      Unknown/Adopted No family hx of              Current Outpatient Prescriptions   Medication Sig Dispense Refill     albuterol (PROAIR HFA/PROVENTIL HFA/VENTOLIN HFA) 108 (90 Base) MCG/ACT inhaler Inhale 2 puffs into the lungs every 6  hours as needed for shortness of breath / dyspnea or wheezing 1 Inhaler 1     alum & mag hydroxide-simethicone (ANTACID) 200-200-20 MG/5ML SUSP suspension Take 15 mLs by mouth every 4 hours as needed for indigestion 355 mL 11     amoxicillin (AMOXIL) 500 MG capsule Take 1 capsule (500 mg) by mouth 3 times daily 30 capsule 0     aspirin 81 MG EC tablet Take 1 tablet (81 mg) by mouth daily 90 tablet 3     ASPIRIN NOT PRESCRIBED (INTENTIONAL) continuous prn for other Antiplatelet medication not prescribed intentionally due to Not indicated based on age/GI distress       atorvastatin (LIPITOR) 80 MG tablet Take 1 tablet (80 mg) by mouth daily 90 tablet 3     BASAGLAR 100 UNIT/ML injection Inject 60 units SQ each at bedtime. 15 mL 12     blood glucose monitoring (JONATHAN MICROLET) lancets Use to test blood sugar 4 times daily or as directed. 2 Box 6     blood glucose monitoring (NO BRAND SPECIFIED) test strip Use to test blood sugars qid  times daily or as directed 400 strip 11     calcium-vitamin D (CALTRATE) 600-400 MG-UNIT per tablet Take 1 tablet by mouth 2 times daily 180 tablet 3     canagliflozin (INVOKANA) 300 MG tablet Take 1 tablet (300 mg) by mouth every morning (before breakfast) 90 tablet 1     colesevelam (WELCHOL) 3.75 g PACK Packet Take 3.75 g by mouth daily (with breakfast) 30 each 11     Continuous Blood Gluc  (FREESTYLE RAHEEM READER) DENNISE 1 Device daily 1 Device 0     continuous blood glucose monitoring (FREESTYLE RAHEEM) sensor For use with Freestyle Raheem Flash  for continuous monitioring of blood glucose levels. Replace sensor every 10 days. 9 each 3     ezetimibe (ZETIA) 10 MG tablet Take 1 tablet (10 mg) by mouth daily 90 tablet 3     guaiFENesin-dextromethorphan (ROBITUSSIN DM) 100-10 MG/5ML syrup Take 5 mLs by mouth every 4 hours as needed 240 mL 1     hydrOXYzine (ATARAX) 25 MG tablet Take 1 tablet (25 mg) by mouth every 8 hours as needed for anxiety 30 tablet 0     insulin lispro  "(HUMALOG KWIKPEN) 100 UNIT/ML injection 30 units before breakfast, 35 units before lunch, 35 units before dinner 15 mL 1     insulin pen needle (B-D U/F) 31G X 8 MM TEST FOUR TIMES A DAY OR AS DIRECTED 400 each 11     insulin syringe-needle U-100 (BD INSULIN SYRINGE ULTRAFINE) 30G X 1/2\" 1 ML Use one syringe daily or as directed.  3 month supply 100 each prn     lisinopril (PRINIVIL/ZESTRIL) 2.5 MG tablet Take 1 tablet (2.5 mg) by mouth daily 90 tablet 2     meloxicam (MOBIC) 15 MG tablet Take 1 tablet (15 mg) by mouth daily 30 tablet 1     metFORMIN (GLUCOPHAGE) 1000 MG tablet TAKE ONE-HALF TABLET BY MOUTH TWICE DAILY WITH MEALS 90 tablet 1     nicotine (NICOTROL) 10 MG Inhaler Inhale 6-16 Cartridges into the lungs daily as needed for smoking cessation 180 each 11     pioglitazone (ACTOS) 45 MG tablet Take 1 tablet (45 mg) by mouth daily 90 tablet 2     Urea 20 % CREA cream Apply topically as needed 85 g 3     vitamin D (ERGOCALCIFEROL) 96529 UNIT capsule TAKE 1 CAPLET ONCE WEEKLY Profile Rx: patient will contact pharmacy when needed 4 capsule 5         Recent Labs   Lab Test  09/17/18   1014  07/09/18   0819  03/16/18   1211  03/27/17   1442   03/21/16   1030   06/23/15   1652   A1C  8.9*  8.4*  8.5*  8.2*   < >  8.1*   < >  8.1*   LDL   --    --   149*  160*   --   142*   --    --    HDL   --    --   43*  48*   --   44*   --    --    TRIG   --    --   183*  171*   --   223*   --    --    ALT   --    --   16   --    --   30   --   21   CR   --   0.66  0.67   --    < >  0.70   --   0.80   GFRESTIMATED   --   >90  >90   --    < >  90   --   77   GFRESTBLACK   --   >90  >90   --    < >  >90   --   >90   POTASSIUM   --   3.6  4.2   --    < >  4.4   --   4.2   TSH   --    --   2.12   --    --   1.31   --    --     < > = values in this interval not displayed.            BP Readings from Last 6 Encounters:   09/25/18 118/74   09/17/18 108/68   09/11/18 116/72   08/26/18 133/88   07/09/18 120/64   06/05/18 105/72 "         Wt Readings from Last 3 Encounters:   09/25/18 137 lb (62.1 kg)   09/17/18 134 lb (60.8 kg)   09/11/18 133 lb (60.3 kg)               Positive symptoms or findings indicated by bold designation:         ROS: 10 point ROS neg other than the symptoms noted above in the HPI.except  has Other dental caries; Disorder of bursae and tendons in shoulder region; Insomnia; Vitamin D deficiency; Premature menopause; ASCUS favor benign; Hyperlipidemia LDL goal <100; Comprehensive diabetic foot examination, type 2 DM, encounter for (H); Hypertension goal BP (blood pressure) < 140/90; DiarrheaX 2 over last 24hrs w one explosive r/o 2ndary to acid gastritis; Uncontrolled type 2 diabetes mellitus without complication, with long-term current use of insulin (H); Esophageal reflux 2ndary to hi continuous intake of sugared  tea; Hyperlipidemia with target LDL less than 100; Diabetes type 2, controlled (H); Intractable chronic migraine without aura and without status migrainosus; Needle stick injury, subsequent encounter; Hammer toe of right foot; and Type 2 diabetes mellitus without complication, with long-term current use of insulin (H) on her problem list.  Review Of Systems    Skin: negative    Eyes: negative    Ears/Nose/Throat: nasal congestion    Respiratory: No shortness of breath, dyspnea on exertion, cough, or hemoptysis    Cardiovascular: negative    Gastrointestinal: negative    Genitourinary: negative    Musculoskeletal: arthritis and joint pain    Neurologic: negative    Psychiatric: negative    Hematologic/Lymphatic/Immunologic: negative    Endocrine: diabetes                PE:  /74 (Cuff Size: Adult Regular)  Pulse 82  Temp 97.9  F (36.6  C) (Tympanic)  Resp 16  Wt 137 lb (62.1 kg)  LMP  (LMP Unknown)  SpO2 98%  BMI 22.8 kg/m2 Body mass index is 22.8 kg/(m^2).        Constitutional: general appearance, well nourished, well developed, in no acute distress, well developed, appears stated age, normal  body habitus,  NORMAL BMI         Eyes:; The patient has normal eyelids sclerae and conjunctivae :          Ears/Nose/Throat: external ear, overall: normal appearance; external nose, overall: benign appearance, normal moujth gums and lips           Neck: thyroid, overall: normal size, normal consistency, nontender,           Respiratory:  palpation of chest, overall: normal excursion,    Clear to percussion and auscultation      NO Tachypnea      NORMAL  Color          Cardiovascular:  Good color with no peripheral edema     Regular sinus rhythm without murmur.  Physiologic heart sounds   Heart is unelarged    .     Chest/Breast: normal shape           Abdominal exam,  Liver and spleen are  unenlarged        Tenderness    Scars              Urogenital; no renal, flank or bladder  tenderness;          Lymphatic: neck nodes,     Other nodes         Musculoskeletal:  Brief ortho exam normal except:   DECREASE RANGE OF MOTION OF BACK with SOME KNEE PAIN         Integument: inspection of skin, no rash, lesions; and, palpation, no induration, no tenderness.          Neurologic mental status, overall: alert and oriented; gait, no ataxia, no unsteadiness; coordination, no tremors; cranial nerves, overall: normal motor, overall: normal bulk, tone.          Psychiatric: orientation/consciousness, overall: oriented to person, place and time; behavior/psychomotor activity, no tics, normal psychomotor activity; mood and affect, overall: normal mood and affect; appearance, overall: well-groomed, good eye contact; speech, overall: normal quality, no aphasia and normal quality, quantity, intact.        Diagnostic Test Results:  Results for orders placed or performed in visit on 09/17/18   Hemoglobin A1c   Result Value Ref Range    Hemoglobin A1C 8.9 (H) 0 - 5.6 %           ICD-10-CM    1. Acute bronchitis, unspecified organism J20.9    2. Uncontrolled type 2 diabetes mellitus without complication, with long-term current use of  insulin (H) E11.65     Z79.4    3. Need for pneumococcal vaccination Z23 pneumococcal vaccine (PNEUMOVAX 23) 25 MCG/0.5ML injection   4. Need for vaccination Z23 Pneumococcal vaccine 23 valent PPSV23  (Pneumovax) [08923]     ADMIN: Vaccine, Initial (88183)   5. Dysuria R30.0 CANCELED: UA reflex to Microscopic and Culture              .    Side effects benefits and risks thoroughly discussed. .she may come in early if unimproved or getting worse          Please drink 2 glasses of water prior to meals and walk 15-30 minutes after meals        I spent 15 MINUTES   with patient discussing the following issues   The primary encounter diagnosis was Acute bronchitis, unspecified organism. Diagnoses of Uncontrolled type 2 diabetes mellitus without complication, with long-term current use of insulin (H), Need for pneumococcal vaccination, Need for vaccination, and Dysuria were also pertinent to this visit. over half of which involved counseling and coordination of care.      Patient Instructions   (J20.9) Acute bronchitis, unspecified organism  (primary encounter diagnosis)  Comment:    Plan:      (E11.65,  Z79.4) Uncontrolled type 2 diabetes mellitus without complication, with long-term current use of insulin (H)  Comment:    Plan:      (Z23) Need for pneumococcal vaccination  Comment:    Plan: pneumococcal vaccine (PNEUMOVAX 23) 25         MCG/0.5ML injection             (Z23) Need for vaccination  Comment:    Plan: Pneumococcal vaccine 23 valent PPSV23          (Pneumovax) [30073], ADMIN: Vaccine, Initial         (62926)             (R30.0) Dysuria  Comment:    Plan: CANCELED: UA reflex to Microscopic and Culture          URINE ANALYSIS ORDERED               ALL THE ABOVE PROBLEMS ARE STABLE AND MED CHANGES AS NOTED        Diet: MEDITERRANEAN DIET         Exercise:  AS TOLERATED   Exercises Range of motion, balance, isometric, and strengthening exercises 30 repetitions twice daily of involved joints            .LINDA  MD KATHARINE 9/25/2018 10:27 PM  September 26, 2018

## 2018-09-25 NOTE — MR AVS SNAPSHOT
After Visit Summary   9/25/2018    Karen Braxton    MRN: [id number]           Patient Information     Date Of Birth          1970        Visit Information        Provider Department      9/25/2018 11:30 AM Raymond Engel MD [facility]        Today's Diagnoses     Acute bronchitis, unspecified organism    -  1    Uncontrolled type 2 diabetes mellitus without complication, with long-term current use of insulin (H)        Need for pneumococcal vaccination        Need for vaccination        Dysuria          Care Instructions    (J20.9) Acute bronchitis, unspecified organism  (primary encounter diagnosis)  Comment:    Plan:      (E11.65,  Z79.4) Uncontrolled type 2 diabetes mellitus without complication, with long-term current use of insulin (H)  Comment:    Plan:      (Z23) Need for pneumococcal vaccination  Comment:    Plan: pneumococcal vaccine (PNEUMOVAX 23) 25         MCG/0.5ML injection             (Z23) Need for vaccination  Comment:    Plan: Pneumococcal vaccine 23 valent PPSV23          (Pneumovax) [84806], ADMIN: Vaccine, Initial         (80100)             (R30.0) Dysuria  Comment:    Plan: CANCELED: UA reflex to Microscopic and Culture          URINE ANALYSIS ORDERED               Follow-ups after your visit        Your next 10 appointments already scheduled     Oct 30, 2018 11:00 AM CDT   (Arrive by 10:45 AM)   RETURN DIABETES with Cecile Juarez PA-C   [facility] ([facility])    [address] [place] [id number] [phone]            Dec 18, 2018 10:00 AM CST   (Arrive by 9:45 AM)   RETURN DIABETES with Melisa Phillips MD   [facility] ([facility])    [address] [place] [address] [id number] [phone]              Who to contact     If you have questions or need follow up information about today's clinic visit  "or your schedule please contact [facility] directly at [phone].  Normal or non-critical lab and imaging results will be communicated to you by MyChart, letter or phone within 4 business days after the clinic has received the results. If you do not hear from us within 7 days, please contact the clinic through [employer] or phone. If you have a critical or abnormal lab result, we will notify you by phone as soon as possible.  Submit refill requests through [employer] or call your pharmacy and they will forward the refill request to us. Please allow 3 business days for your refill to be completed.          Additional Information About Your Visit        [employer] Information     [employer] lets you send messages to your doctor, view your test results, renew your prescriptions, schedule appointments and more. To sign up, go to [email or web address] . Click on \"Log in\" on the left side of the screen, which will take you to the Welcome page. Then click on \"Sign up Now\" on the right side of the page.     You will be asked to enter the access code listed below, as well as some personal information. Please follow the directions to create your username and password.     Your access code is: [email or web address]  Expires: 12/10/2018 12:25 PM     Your access code will  in 90 days. If you need help or a new code, please call your [place] clinic or [phone].        Care EveryWhere ID     This is your Care EveryWhere ID. This could be used by other organizations to access your [place] medical records  [id number]        Your Vitals Were     Pulse Temperature Respirations Last Period Pulse Oximetry BMI (Body Mass Index)    82 97.9  F (36.6  C) (Tympanic) 16 (LMP Unknown) 98% 22.8 kg/m2       Blood Pressure from Last 3 Encounters:   18 118/74   18 108/68   18 116/72    Weight from Last 3 Encounters:   18 137 lb (62.1 kg)   18 134 lb (60.8 kg)   18 133 lb (60.3 " kg)              We Performed the Following     ADMIN: Vaccine, Initial (30862)     Pneumococcal vaccine 23 valent PPSV23  (Pneumovax) [29882]          Today's Medication Changes          These changes are accurate as of 9/25/18 11:59 PM.  If you have any questions, ask your nurse or doctor.               Start taking these medicines.        Dose/Directions    pneumococcal vaccine 25 MCG/0.5ML injection   Commonly known as:  PNEUMOVAX 23   Used for:  Need for pneumococcal vaccination   Started by:  Raymond Engel MD        Dose:  0.5 mL   Inject 0.5 mLs into the muscle once for 1 dose   Quantity:  0.5 mL   Refills:  3            Where to get your medicines      These medications were sent to [facility] [address]  [address] [address]     Phone:  [phone]     pneumococcal vaccine 25 MCG/0.5ML injection                Primary Care Provider Office Phone # Fax #    Raymond Engel [phone] [address]  [facility] [address]        Equal Access to Services     [facility] Center[facility] : Hadii pilar ku hadasho Soomaali, waaxda luqadaha, qaybta kaalmada adeegyada, niranjan greenwood. So [facility] [phone].    ATENCIÓN: Si habla español, tiene a merrill disposición servicios gratuitos de asistencia lingüística. Jonathan al [phone].    We comply with applicable federal civil rights laws and [place] laws. We do not discriminate on the basis of race, color, national origin, age, disability, sex, sexual orientation, or gender identity.            Thank you!     Thank you for choosing [facility]  for your care. Our goal is always to provide you with excellent care. Hearing back from our patients is one way we can continue to improve our services. Please take a few minutes to complete the written survey that you may receive in the mail after your visit with us. Thank  you!             Your Updated Medication List - Protect others around you: Learn how to safely use, store and throw away your medicines at www.disposemymeds.org.          This list is accurate as of 9/25/18 11:59 PM.  Always use your most recent med list.                   Brand Name Dispense Instructions for use Diagnosis    albuterol 108 (90 Base) MCG/ACT inhaler    PROAIR HFA/PROVENTIL HFA/VENTOLIN HFA    1 Inhaler    Inhale 2 puffs into the lungs every 6 hours as needed for shortness of breath / dyspnea or wheezing    Wheezing       alum & mag hydroxide-simethicone 200-200-20 MG/5ML Susp suspension    antacid    355 mL    Take 15 mLs by mouth every 4 hours as needed for indigestion    Gastroesophageal reflux disease without esophagitis       amoxicillin 500 MG capsule    AMOXIL    30 capsule    Take 1 capsule (500 mg) by mouth 3 times daily    Acute bronchitis, unspecified organism       aspirin 81 MG EC tablet     90 tablet    Take 1 tablet (81 mg) by mouth daily    Uncontrolled type 1 diabetes mellitus without complication (H), Uncontrolled type 2 diabetes mellitus without complication, with long-term current use of insulin (H), Hyperlipidemia LDL goal <100       ASPIRIN NOT PRESCRIBED    INTENTIONAL     continuous prn for other Antiplatelet medication not prescribed intentionally due to Not indicated based on age/GI distress        atorvastatin 80 MG tablet    LIPITOR    90 tablet    Take 1 tablet (80 mg) by mouth daily    Hyperlipidemia LDL goal <100       BASAGLAR 100 UNIT/ML injection     15 mL    Inject 60 units SQ each at bedtime.    Uncontrolled type 1 diabetes mellitus without complication (H), Uncontrolled type 2 diabetes mellitus without complication, with long-term current use of insulin (H)       blood glucose monitoring lancets     2 Box    Use to test blood sugar 4 times daily or as directed.    Diabetes type 2, controlled (H)       blood glucose monitoring test strip    no brand specified    400  strip    Use to test blood sugars qid  times daily or as directed    Uncontrolled type 2 diabetes mellitus without complication, with long-term current use of insulin (H), Uncontrolled type 1 diabetes mellitus without complication (H)       calcium carbonate 600 mg-vitamin D 400 units 600-400 MG-UNIT per tablet    CALTRATE    180 tablet    Take 1 tablet by mouth 2 times daily    Vitamin D deficiency       canagliflozin 300 MG tablet    INVOKANA    90 tablet    Take 1 tablet (300 mg) by mouth every morning (before breakfast)    Uncontrolled type 2 diabetes mellitus without complication, with long-term current use of insulin (H)       colesevelam 3.75 g Pack Packet    WELCHOL    30 each    Take 3.75 g by mouth daily (with breakfast)    Post-cholecystectomy syndrome       continuous blood glucose monitoring sensor     9 each    For use with Freestyle Dodie Flash  for continuous monitioring of blood glucose levels. Replace sensor every 10 days.    Type 2 diabetes mellitus with hyperglycemia, with long-term current use of insulin (H)       ezetimibe 10 MG tablet    ZETIA    90 tablet    Take 1 tablet (10 mg) by mouth daily    Hyperlipidemia LDL goal <100       FREESTYLE DODIE READER Cathy     1 Device    1 Device daily        guaiFENesin-dextromethorphan 100-10 MG/5ML syrup    ROBITUSSIN DM    240 mL    Take 5 mLs by mouth every 4 hours as needed    Wheezing       hydrOXYzine 25 MG tablet    ATARAX    30 tablet    Take 1 tablet (25 mg) by mouth every 8 hours as needed for anxiety    Adjustment disorder with anxious mood       insulin lispro 100 UNIT/ML injection    HumaLOG KWIKpen    15 mL    30 units before breakfast, 35 units before lunch, 35 units before dinner    Uncontrolled type 2 diabetes mellitus without complication, with long-term current use of insulin (H)       insulin pen needle 31G X 8 MM    B-D U/F    400 each    TEST FOUR TIMES A DAY OR AS DIRECTED    Controlled type 2 diabetes mellitus with  "hyperglycemia, with long-term current use of insulin (H)       insulin syringe-needle U-100 30G X 1/2\" 1 ML    BD insulin syringe ultrafine    100 each    Use one syringe daily or as directed.  3 month supply    Diabetes type 2, controlled (H)       lisinopril 2.5 MG tablet    PRINIVIL/Zestril    90 tablet    Take 1 tablet (2.5 mg) by mouth daily    Hypertension goal BP (blood pressure) < 140/80, Uncontrolled type 2 diabetes mellitus without complication, with long-term current use of insulin (H)       meloxicam 15 MG tablet    MOBIC    30 tablet    Take 1 tablet (15 mg) by mouth daily    Sprain of right rotator cuff capsule, initial encounter       metFORMIN 1000 MG tablet    GLUCOPHAGE    90 tablet    TAKE ONE-HALF TABLET BY MOUTH TWICE DAILY WITH MEALS    Uncontrolled type 2 diabetes mellitus without complication, with long-term current use of insulin (H)       nicotine 10 MG Inhaler    NICOTROL    180 each    Inhale 6-16 Cartridges into the lungs daily as needed for smoking cessation    Tobacco dependence syndrome       pioglitazone 45 MG tablet    ACTOS    90 tablet    Take 1 tablet (45 mg) by mouth daily    Uncontrolled type 2 diabetes mellitus without complication, with long-term current use of insulin (H)       pneumococcal vaccine 25 MCG/0.5ML injection    PNEUMOVAX 23    0.5 mL    Inject 0.5 mLs into the muscle once for 1 dose    Need for pneumococcal vaccination       Urea 20 % Crea cream     85 g    Apply topically as needed    Tyloma       vitamin D 53287 UNIT capsule    ERGOCALCIFEROL    4 capsule    TAKE 1 CAPLET ONCE WEEKLY Profile Rx: patient will contact pharmacy when needed    Vitamin D deficiency         "

## 2018-09-27 NOTE — PATIENT INSTRUCTIONS
(J20.9) Acute bronchitis, unspecified organism  (primary encounter diagnosis)  Comment:    Plan:      (E11.65,  Z79.4) Uncontrolled type 2 diabetes mellitus without complication, with long-term current use of insulin (H)  Comment:    Plan:      (Z23) Need for pneumococcal vaccination  Comment:    Plan: pneumococcal vaccine (PNEUMOVAX 23) 25         MCG/0.5ML injection             (Z23) Need for vaccination  Comment:    Plan: Pneumococcal vaccine 23 valent PPSV23          (Pneumovax) [39882], ADMIN: Vaccine, Initial         (29553)             (R30.0) Dysuria  Comment:    Plan: CANCELED: UA reflex to Microscopic and Culture          URINE ANALYSIS ORDERED

## 2018-10-08 NOTE — TELEPHONE ENCOUNTER
Requested Prescriptions   Pending Prescriptions Disp Refills     canagliflozin (INVOKANA) 300 MG tablet 90 tablet 1    Last Written Prescription Date:  04/16/2018  Last Fill Quantity: 90,  # refills: 1   Last office visit: 9/25/2018 with prescribing provider:  Dr. Engel   Future Office Visit:   Sig: Take 1 tablet (300 mg) by mouth every morning (before breakfast)    Sodium Glucose Co-Transport Inhibitor Agents Passed    10/5/2018 11:49 AM       Passed - Blood pressure less than 140/90 in past 6 months    BP Readings from Last 3 Encounters:   09/25/18 118/74   09/17/18 108/68   09/11/18 116/72                Passed - Patient has documented LDL within the past 12 mos.    Recent Labs   Lab Test  03/16/18   1211   LDL  149*            Passed - Patient has had a Microalbumin in the past 12 mos.    Recent Labs   Lab Test  03/16/18   1212   07/16/12   1539   [id number]   --    --   5.0   LP8788   --    --   13.3   MICROL  12   < >   --    UMALCR  24.21   < >   --     < > = values in this interval not displayed.            Passed - Patient has documented A1c within the specified period of time.    If HgbA1C is 8 or greater, it needs to be on file within the past 3 months.  If less than 8, must be on file within the past 6 months.     Recent Labs   Lab Test  09/17/18   1014  06/05/18   A1C  8.9*   < >   --    HEMOGLOBINA1   --    --   8.6*    < > = values in this interval not displayed.            Passed - No creatinine >1.4 or GFR <45 within the past 12 mos    Recent Labs   Lab Test  07/09/18   0819   GFRESTIMATED  >90   GFRESTBLACK  >90       Recent Labs   Lab Test  07/09/18   0819   CR  0.66            Passed - Patient is age 18 or older       Passed - Patient is not pregnant       Passed - Patient has documented normal Potassium within the last 12 mos.    Recent Labs   Lab Test  07/09/18   0819   POTASSIUM  3.6            Passed - Patient has no positive pregnancy test within the past 12 mos.       Passed - Recent (6 mo)  "or future (30 days) visit within the authorizing provider's specialty    Patient had office visit in the last 6 months or has a visit in the next 30 days with authorizing provider or within the authorizing provider's specialty.  See \"Patient Info\" tab in inbasket, or \"Choose Columns\" in Meds & Orders section of the refill encounter.            "

## 2018-10-09 NOTE — TELEPHONE ENCOUNTER
Notes Recorded by Linda Alcantar MD on 9/17/2018 at 5:05 PM  Abnormal THREE MONTH GLUCOSE AVERAGE ABOVE TARGET LEVEL    AS DISCUSSED   LINDA ALCANTAR JR., MD    Canagliflozin     Prescription approved per [facility] Refill Protocol.

## 2018-10-30 ENCOUNTER — OFFICE VISIT (OUTPATIENT)
Dept: ENDOCRINOLOGY | Facility: CLINIC | Age: 48
End: 2018-10-30
Payer: COMMERCIAL

## 2018-10-30 VITALS
BODY MASS INDEX: 22.66 KG/M2 | SYSTOLIC BLOOD PRESSURE: 133 MMHG | HEART RATE: 83 BPM | DIASTOLIC BLOOD PRESSURE: 80 MMHG | WEIGHT: 136.2 LBS

## 2018-10-30 DIAGNOSIS — E11.65 TYPE 2 DIABETES MELLITUS WITH HYPERGLYCEMIA, WITH LONG-TERM CURRENT USE OF INSULIN (H): ICD-10-CM

## 2018-10-30 DIAGNOSIS — Z79.4 TYPE 2 DIABETES MELLITUS WITH HYPERGLYCEMIA, WITH LONG-TERM CURRENT USE OF INSULIN (H): ICD-10-CM

## 2018-10-30 RX ORDER — FLASH GLUCOSE SENSOR
KIT MISCELLANEOUS
Qty: 9 EACH | Refills: 3 | Status: SHIPPED | OUTPATIENT
Start: 2018-10-30 | End: 2019-07-30

## 2018-10-30 RX ORDER — INSULIN GLARGINE 100 [IU]/ML
INJECTION, SOLUTION SUBCUTANEOUS
Qty: 15 ML | Refills: 12 | COMMUNITY
Start: 2018-10-30 | End: 2018-12-13

## 2018-10-30 RX ORDER — FLASH GLUCOSE SENSOR
1 KIT MISCELLANEOUS DAILY
Qty: 1 DEVICE | Refills: 0 | Status: SHIPPED | OUTPATIENT
Start: 2018-10-30 | End: 2019-07-30

## 2018-10-30 ASSESSMENT — PAIN SCALES - GENERAL: PAINLEVEL: NO PAIN (0)

## 2018-10-30 NOTE — MR AVS SNAPSHOT
After Visit Summary   10/30/2018    Karen Braxton    MRN: [id number]           Patient Information     Date Of Birth          1970        Visit Information        Provider Department      10/30/2018 11:00 AM Cecile Juarez PA-C [facility]        Today's Diagnoses     Uncontrolled type 1 diabetes mellitus without complication (H)        Uncontrolled type 2 diabetes mellitus without complication, with long-term current use of insulin (H)        Type 2 diabetes mellitus with hyperglycemia, with long-term current use of insulin (H)           Follow-ups after your visit        Your next 10 appointments already scheduled     Dec 18, 2018 10:00 AM CST   (Arrive by 9:45 AM)   RETURN DIABETES with MD DERECK [facility] ([facility])    [address] [place] [id number] [phone]              Who to contact     Please call your clinic at [phone] to:    Ask questions about your health    Make or cancel appointments    Discuss your medicines    Learn about your test results    Speak to your doctor            Additional Information About Your Visit        MyChart Information     [employer] is an electronic gateway that provides easy, online access to your medical records. With Sooligan, you can request a clinic appointment, read your test results, renew a prescription or communicate with your care team.     To sign up for [employer]igan visit the website at [email or web address]   You will be asked to enter the access code listed below, as well as some personal information. Please follow the directions to create your username and password.     Your access code is: [email or web address]  Expires: 12/10/2018 12:25 PM     Your access code will  in 90 days. If you need help or a new code, please contact your [facility] or call [phone] for assistance.        Care EveryWhere ID      This is your Care EveryWhere ID. This could be used by other organizations to access your [place] medical records  [id number]        Your Vitals Were     Pulse Last Period BMI (Body Mass Index)             83 (LMP Unknown) 22.66 kg/m2          Blood Pressure from Last 3 Encounters:   10/30/18 133/80   09/25/18 118/74   09/17/18 108/68    Weight from Last 3 Encounters:   10/30/18 61.8 kg (136 lb 3.2 oz)   09/25/18 62.1 kg (137 lb)   09/17/18 60.8 kg (134 lb)              Today, you had the following     No orders found for display         Today's Medication Changes          These changes are accurate as of 10/30/18  1:39 PM.  If you have any questions, ask your nurse or doctor.               These medicines have changed or have updated prescriptions.        Dose/Directions    BASAGLAR 100 UNIT/ML injection   This may have changed:  additional instructions   Used for:  Uncontrolled type 1 diabetes mellitus without complication (H), Uncontrolled type 2 diabetes mellitus without complication, with long-term current use of insulin (H)   Changed by:  Cecile Juarez PA-C        Inject 50 units SQ each at bedtime.   Quantity:  15 mL   Refills:  12            Where to get your medicines      These medications were sent to [id number] - [place], [place] - [address], [place] [address]     Phone:  [phone]     continuous blood glucose monitoring sensor    FREESTYLE RAHEEM READER Cathy                Primary Care Provider Office Phone # Fax #    Raymond Aurea Engel [phone] [address] [facility] [address]        Equal Access to Services     [facility] Center[facility] : Hadii aad ku hadasho Soomaali, waaxda luqadaha, qaybta kaalmada adeegyada, niranjan greenwood. So [facility] [phone].    ATENCIÓN: Si habla español, tiene a merrill disposición servicios gratuitos de asistencia lingüística. Llame al [phone].    We comply with  applicable federal civil rights laws and [place] laws. We do not discriminate on the basis of race, color, national origin, age, disability, sex, sexual orientation, or gender identity.            Thank you!     Thank you for choosing [facility]  for your care. Our goal is always to provide you with excellent care. Hearing back from our patients is one way we can continue to improve our services. Please take a few minutes to complete the written survey that you may receive in the mail after your visit with us. Thank you!             Your Updated Medication List - Protect others around you: Learn how to safely use, store and throw away your medicines at www.disposemymeds.org.          This list is accurate as of 10/30/18  1:39 PM.  Always use your most recent med list.                   Brand Name Dispense Instructions for use Diagnosis    albuterol 108 (90 Base) MCG/ACT inhaler    PROAIR HFA/PROVENTIL HFA/VENTOLIN HFA    1 Inhaler    Inhale 2 puffs into the lungs every 6 hours as needed for shortness of breath / dyspnea or wheezing    Wheezing       alum & mag hydroxide-simethicone 200-200-20 MG/5ML Susp suspension    antacid    355 mL    Take 15 mLs by mouth every 4 hours as needed for indigestion    Gastroesophageal reflux disease without esophagitis       amoxicillin 500 MG capsule    AMOXIL    30 capsule    Take 1 capsule (500 mg) by mouth 3 times daily    Acute bronchitis, unspecified organism       aspirin 81 MG EC tablet     90 tablet    Take 1 tablet (81 mg) by mouth daily    Uncontrolled type 1 diabetes mellitus without complication (H), Uncontrolled type 2 diabetes mellitus without complication, with long-term current use of insulin (H), Hyperlipidemia LDL goal <100       ASPIRIN NOT PRESCRIBED    INTENTIONAL     continuous prn for other Antiplatelet medication not prescribed intentionally due to Not indicated based on age/GI distress        atorvastatin 80 MG tablet    LIPITOR    90 tablet     Take 1 tablet (80 mg) by mouth daily    Hyperlipidemia LDL goal <100       BASAGLAR 100 UNIT/ML injection     15 mL    Inject 50 units SQ each at bedtime.    Uncontrolled type 1 diabetes mellitus without complication (H), Uncontrolled type 2 diabetes mellitus without complication, with long-term current use of insulin (H)       blood glucose monitoring lancets     2 Box    Use to test blood sugar 4 times daily or as directed.    Diabetes type 2, controlled (H)       blood glucose monitoring test strip    no brand specified    400 strip    Use to test blood sugars qid  times daily or as directed    Uncontrolled type 2 diabetes mellitus without complication, with long-term current use of insulin (H), Uncontrolled type 1 diabetes mellitus without complication (H)       calcium carbonate 600 mg-vitamin D 400 units 600-400 MG-UNIT per tablet    CALTRATE    180 tablet    Take 1 tablet by mouth 2 times daily    Vitamin D deficiency       canagliflozin 300 MG tablet    INVOKANA    90 tablet    Take 1 tablet (300 mg) by mouth every morning (before breakfast)    Uncontrolled type 2 diabetes mellitus without complication, with long-term current use of insulin (H)       colesevelam 3.75 g Pack Packet    WELCHOL    30 each    Take 3.75 g by mouth daily (with breakfast)    Post-cholecystectomy syndrome       continuous blood glucose monitoring sensor     9 each    For use with Freestyle Dodie Flash  for continuous monitioring of blood glucose levels. Replace sensor every 10 days.    Type 2 diabetes mellitus with hyperglycemia, with long-term current use of insulin (H)       ezetimibe 10 MG tablet    ZETIA    90 tablet    Take 1 tablet (10 mg) by mouth daily    Hyperlipidemia LDL goal <100       FREESTYLE DODIE READER Cathy     1 Device    1 Device daily    Type 2 diabetes mellitus with hyperglycemia, with long-term current use of insulin (H)       guaiFENesin-dextromethorphan 100-10 MG/5ML syrup    ROBITUSSIN DM    240  "mL    Take 5 mLs by mouth every 4 hours as needed    Wheezing       hydrOXYzine 25 MG tablet    ATARAX    30 tablet    Take 1 tablet (25 mg) by mouth every 8 hours as needed for anxiety    Adjustment disorder with anxious mood       insulin lispro 100 UNIT/ML injection    HumaLOG KWIKpen    15 mL    30 units before breakfast, 35 units before lunch, 35 units before dinner    Uncontrolled type 2 diabetes mellitus without complication, with long-term current use of insulin (H)       insulin pen needle 31G X 8 MM    B-D U/F    400 each    TEST FOUR TIMES A DAY OR AS DIRECTED    Controlled type 2 diabetes mellitus with hyperglycemia, with long-term current use of insulin (H)       insulin syringe-needle U-100 30G X 1/2\" 1 ML    BD insulin syringe ultrafine    100 each    Use one syringe daily or as directed.  3 month supply    Diabetes type 2, controlled (H)       lisinopril 2.5 MG tablet    PRINIVIL/Zestril    90 tablet    Take 1 tablet (2.5 mg) by mouth daily    Hypertension goal BP (blood pressure) < 140/80, Uncontrolled type 2 diabetes mellitus without complication, with long-term current use of insulin (H)       meloxicam 15 MG tablet    MOBIC    30 tablet    Take 1 tablet (15 mg) by mouth daily    Sprain of right rotator cuff capsule, initial encounter       metFORMIN 1000 MG tablet    GLUCOPHAGE    90 tablet    TAKE ONE-HALF TABLET BY MOUTH TWICE DAILY WITH MEALS    Uncontrolled type 2 diabetes mellitus without complication, with long-term current use of insulin (H)       nicotine 10 MG Inhaler    NICOTROL    180 each    Inhale 6-16 Cartridges into the lungs daily as needed for smoking cessation    Tobacco dependence syndrome       pioglitazone 45 MG tablet    ACTOS    90 tablet    Take 1 tablet (45 mg) by mouth daily    Uncontrolled type 2 diabetes mellitus without complication, with long-term current use of insulin (H)       Urea 20 % Crea cream     85 g    Apply topically as needed    Tyloma       vitamin D " 02131 UNIT capsule    ERGOCALCIFEROL    4 capsule    TAKE 1 CAPLET ONCE WEEKLY Profile Rx: patient will contact pharmacy when needed    Vitamin D deficiency

## 2018-10-30 NOTE — LETTER
10/30/2018       RE: Karen Braxton  [address]  [facility] [address]     Dear Colleague,    Thank you for referring your patient, Karen Braxton, to the [facility] at [facility]. Please see a copy of my visit note below.    HPI:  Irais Jones is a 48 year old female with type 2 diabetes here today for a follow up visit.  She reports doing well.   For her diabetes, she is currently taking Basaglar 50 units SQ at hs, Humalog 30 units with breakfast, 35 units with lunch and 30 units with dinner, Invokana 300 mg daily, Metformin 500 mg BID and Actos 45 mg daily.  Her A1C was 8.9 % on 9/17/2018.  Pt's previous A1C was 8.4 % in 7/2018.  We downloaded her glucose meter today and her average glucose was 149 with SD 57 over the past 14 days.  Her FBS values have been 147, 133, 133, 112, 103, 118, 146, 135, 133 and 181.  Her blood sugars around noon have been in the  range.  Patient's predinner blood sugar values have been 78, 107, 128, 206, 135, 174, 119, 84, 106, 108 and 184.  No frequent documented hypoglycemia.  On ROS today,  she reports feeling well.  She denies frequent headaches, blurred vision, n/v, SOB at rest, cough or chest pain.  Pt denies abd pain, diarrhea, dysuria, hematuria, vaginal discharge or rash/pruritis.  No edema.  She denies numbness or tingling in her feet or hands.  No ulcers.    ROS:   Please see under HPI.    ALLERGIES:   No Known Allergies      Current Outpatient Prescriptions   Medication Sig Dispense Refill     albuterol (PROAIR HFA/PROVENTIL HFA/VENTOLIN HFA) 108 (90 Base) MCG/ACT inhaler Inhale 2 puffs into the lungs every 6 hours as needed for shortness of breath / dyspnea or wheezing 1 Inhaler 1     alum & mag hydroxide-simethicone (ANTACID) 200-200-20 MG/5ML SUSP suspension Take 15 mLs by mouth every 4 hours as needed for indigestion 355 mL 11     amoxicillin (AMOXIL) 500 MG capsule Take 1 capsule (500 mg) by mouth 3 times  "daily 30 capsule 0     aspirin 81 MG EC tablet Take 1 tablet (81 mg) by mouth daily 90 tablet 3     ASPIRIN NOT PRESCRIBED (INTENTIONAL) continuous prn for other Antiplatelet medication not prescribed intentionally due to Not indicated based on age/GI distress       atorvastatin (LIPITOR) 80 MG tablet Take 1 tablet (80 mg) by mouth daily 90 tablet 3     BASAGLAR 100 UNIT/ML injection Inject 50 units SQ each at bedtime. 15 mL 12     blood glucose monitoring (JONATHAN MICROLET) lancets Use to test blood sugar 4 times daily or as directed. 2 Box 6     blood glucose monitoring (NO BRAND SPECIFIED) test strip Use to test blood sugars qid  times daily or as directed 400 strip 11     calcium-vitamin D (CALTRATE) 600-400 MG-UNIT per tablet Take 1 tablet by mouth 2 times daily 180 tablet 3     canagliflozin (INVOKANA) 300 MG tablet Take 1 tablet (300 mg) by mouth every morning (before breakfast) 90 tablet 1     colesevelam (WELCHOL) 3.75 g PACK Packet Take 3.75 g by mouth daily (with breakfast) 30 each 11     Continuous Blood Gluc  (FREESTYLE RAHEEM READER) DENNISE 1 Device daily 1 Device 0     continuous blood glucose monitoring (FREESTYLE RAHEEM) sensor For use with Freestyle Raheem Flash  for continuous monitioring of blood glucose levels. Replace sensor every 10 days. 9 each 3     ezetimibe (ZETIA) 10 MG tablet Take 1 tablet (10 mg) by mouth daily 90 tablet 3     guaiFENesin-dextromethorphan (ROBITUSSIN DM) 100-10 MG/5ML syrup Take 5 mLs by mouth every 4 hours as needed 240 mL 1     hydrOXYzine (ATARAX) 25 MG tablet Take 1 tablet (25 mg) by mouth every 8 hours as needed for anxiety 30 tablet 0     insulin lispro (HUMALOG KWIKPEN) 100 UNIT/ML injection 30 units before breakfast, 35 units before lunch, 35 units before dinner 15 mL 1     insulin pen needle (B-D U/F) 31G X 8 MM TEST FOUR TIMES A DAY OR AS DIRECTED 400 each 11     insulin syringe-needle U-100 (BD INSULIN SYRINGE ULTRAFINE) 30G X 1/2\" 1 ML Use one " syringe daily or as directed.  3 month supply 100 each prn     lisinopril (PRINIVIL/ZESTRIL) 2.5 MG tablet Take 1 tablet (2.5 mg) by mouth daily 90 tablet 2     meloxicam (MOBIC) 15 MG tablet Take 1 tablet (15 mg) by mouth daily 30 tablet 1     metFORMIN (GLUCOPHAGE) 1000 MG tablet TAKE ONE-HALF TABLET BY MOUTH TWICE DAILY WITH MEALS 90 tablet 1     nicotine (NICOTROL) 10 MG Inhaler Inhale 6-16 Cartridges into the lungs daily as needed for smoking cessation 180 each 11     pioglitazone (ACTOS) 45 MG tablet Take 1 tablet (45 mg) by mouth daily 90 tablet 2     Urea 20 % CREA cream Apply topically as needed 85 g 3     vitamin D (ERGOCALCIFEROL) 00557 UNIT capsule TAKE 1 CAPLET ONCE WEEKLY Profile Rx: patient will contact pharmacy when needed 4 capsule 5     [DISCONTINUED] BASAGLAR 100 UNIT/ML injection Inject 60 units SQ each at bedtime. 15 mL 12     SHX:  Smoke: yes.  ETOH: none.   with 5 children.    FHX:  Several family members with diabetes.    PMHX:   1.  Type 2 DM.  2.  Hyperlipidemia.  3.  Amenorrhea.  4.  GERD.  5.  S/P choley.  6.  Sebaceous cyst.  Past Medical History:   Diagnosis Date     Arthritis      ASCUS favor benign 2012    neg HPV  Plan cotest in 3 yrs.     Diabetes (H)      Hypertension      Past Surgical History:   Procedure Laterality Date     CHOLECYSTECTOMY  9/14/2007    laproscopic     FACIAL RECONSTRUCTION SURGERY  2 years old    cleft lip repair     ORTHOPEDIC SURGERY  6/2001    left knee       EXAM:  Constitutional:   Vitals:    10/30/18 1049   BP: 133/80   Pulse: 83   Weight: 61.8 kg (136 lb 3.2 oz)   GENERAL: Pt looks good and in no distress.  SKIN: no rash.  HEENT: PERRLA; fundi not examined.  NECK: No thyroid tenderness.  LUNGS: Clear b/l.  CARDIAC: RRR.  ABDOMEN: Nontender.  EXTREMITIES: No pretibial edema.  FEET: No ulcers; normal monofilamentous exam.     RESULTS:    Creatinine   Date Value Ref Range Status   07/09/2018 0.66 0.52 - 1.04 mg/dL Final     GFR Estimate   Date Value  Ref Range Status   07/09/2018 >90 >60 mL/min/1.7m2 Final     Comment:     Non  GFR Calc     Hemoglobin A1C   Date Value Ref Range Status   09/17/2018 8.9 (H) 0 - 5.6 % Final     Comment:     Normal <5.7% Prediabetes 5.7-6.4%  Diabetes 6.5% or higher - adopted from ADA   consensus guidelines.       Potassium   Date Value Ref Range Status   07/09/2018 3.6 3.4 - 5.3 mmol/L Final     ALT   Date Value Ref Range Status   03/16/2018 16 0 - 50 U/L Final     AST   Date Value Ref Range Status   01/09/2015 17 0 - 45 U/L Final     TSH   Date Value Ref Range Status   03/16/2018 2.12 0.40 - 4.00 mU/L Final     T4 Free   Date Value Ref Range Status   08/04/2011 1.22 0.70 - 1.85 ng/dL Final         Cholesterol   Date Value Ref Range Status   03/16/2018 229 (H) <200 mg/dL Final     Comment:     Desirable:       <200 mg/dl   03/27/2017 242 (H) <200 mg/dL Final     Comment:     Desirable:       <200 mg/dl     HDL Cholesterol   Date Value Ref Range Status   03/16/2018 43 (L) >49 mg/dL Final   03/27/2017 48 (L) >49 mg/dL Final     LDL Cholesterol Calculated   Date Value Ref Range Status   03/16/2018 149 (H) <100 mg/dL Final     Comment:     Above desirable:  100-129 mg/dl  Borderline High:  130-159 mg/dL  High:             160-189 mg/dL  Very high:       >189 mg/dl     03/27/2017 160 (H) <100 mg/dL Final     Comment:     Above desirable:  100-129 mg/dl   Borderline High:  130-159 mg/dL   High:             160-189 mg/dL   Very high:       >189 mg/dl       Triglycerides   Date Value Ref Range Status   03/16/2018 183 (H) <150 mg/dL Final     Comment:     Borderline high:  150-199 mg/dl  High:             200-499 mg/dl  Very high:       >499 mg/dl  Non Fasting     03/27/2017 171 (H) <150 mg/dL Final     Comment:     Borderline high:  150-199 mg/dl   High:             200-499 mg/dl   Very high:       >499 mg/dl       Cholesterol/HDL Ratio   Date Value Ref Range Status   12/01/2014 6.0 (H) 0.0 - 5.0 Final   07/30/2014 6.1 (H)  0.0 - 5.0 Final     A1C      8.9   9/17/2018  A1C      8.4   7/9/2018  A1C      8.6   6/5/2018  A1C      8.5   3/8/2018  A1C      8.2   12/5/2017  A1C      8.0   9/5/2017  A1C      8.2   6/2017  A1C      7.8   3/9/2017  A1C      8.1   12/2016  A1C      8.9   6/28/2016  A1C      8.1   6/23/2015  A1C      9.0   3/3/2015  A1C      8.5   12/1/2014  A1C     11.4  5/29/2014  A1C     10.7   4/1/2014  A1C     10.3   1/8/2014  A1C      9.8   11/26/2013  A1C      8.8   7/24/2013  A1C      8.9   5/28/2013    ASSESSMENT/PLAN:    1. TYPE 2 DIABETES MELLITUS: Uncontrolled type 2 diabetes mellitus.  I placed an order for a Freestyle Dodie sensor/device.   Her blood sugar values have been good over the past 14 days.  She is to remain on her current dose of Lantus, Humalog, Invokana, Metformin and Actos.  Her BP is stable today.  She was seen by Oph in Dec 2017.   Pt's creat was 0.66 with GFR >90 mL/min in 7/2018 with normal K+.   Pt's urine microalbuminuria was negative in March 2018.   TSH normal in 3/2018.  Pt had the flu vaccine this season.    2.  HYPERLIPIDEMIA:   on 3/16/2018.  She was instructed to take her Crestor and Zetia daily.  She has worked on reducing fat in her diet.     3. Return to Endocrine Clinic to see Dr. Phillips in Dec 2018.  She is to call me if she does not get her Dodie device/sensor.        Again, thank you for allowing me to participate in the care of your patient.      Sincerely,    Cecile Juarez PA-C

## 2018-10-30 NOTE — PROGRESS NOTES
HPI:  Irais Jones is a 48 year old female with type 2 diabetes here today for a follow up visit.  She reports doing well.   For her diabetes, she is currently taking Basaglar 50 units SQ at hs, Humalog 30 units with breakfast, 35 units with lunch and 30 units with dinner, Invokana 300 mg daily, Metformin 500 mg BID and Actos 45 mg daily.  Her A1C was 8.9 % on 9/17/2018.  Pt's previous A1C was 8.4 % in 7/2018.  We downloaded her glucose meter today and her average glucose was 149 with SD 57 over the past 14 days.  Her FBS values have been 147, 133, 133, 112, 103, 118, 146, 135, 133 and 181.  Her blood sugars around noon have been in the  range.  Patient's predinner blood sugar values have been 78, 107, 128, 206, 135, 174, 119, 84, 106, 108 and 184.  No frequent documented hypoglycemia.  On ROS today,  she reports feeling well.  She denies frequent headaches, blurred vision, n/v, SOB at rest, cough or chest pain.  Pt denies abd pain, diarrhea, dysuria, hematuria, vaginal discharge or rash/pruritis.  No edema.  She denies numbness or tingling in her feet or hands.  No ulcers.    ROS:   Please see under HPI.    ALLERGIES:   No Known Allergies      Current Outpatient Prescriptions   Medication Sig Dispense Refill     albuterol (PROAIR HFA/PROVENTIL HFA/VENTOLIN HFA) 108 (90 Base) MCG/ACT inhaler Inhale 2 puffs into the lungs every 6 hours as needed for shortness of breath / dyspnea or wheezing 1 Inhaler 1     alum & mag hydroxide-simethicone (ANTACID) 200-200-20 MG/5ML SUSP suspension Take 15 mLs by mouth every 4 hours as needed for indigestion 355 mL 11     amoxicillin (AMOXIL) 500 MG capsule Take 1 capsule (500 mg) by mouth 3 times daily 30 capsule 0     aspirin 81 MG EC tablet Take 1 tablet (81 mg) by mouth daily 90 tablet 3     ASPIRIN NOT PRESCRIBED (INTENTIONAL) continuous prn for other Antiplatelet medication not prescribed intentionally due to Not indicated based on age/GI distress       atorvastatin  "(LIPITOR) 80 MG tablet Take 1 tablet (80 mg) by mouth daily 90 tablet 3     BASAGLAR 100 UNIT/ML injection Inject 50 units SQ each at bedtime. 15 mL 12     blood glucose monitoring (Tagged MICROLET) lancets Use to test blood sugar 4 times daily or as directed. 2 Box 6     blood glucose monitoring (NO BRAND SPECIFIED) test strip Use to test blood sugars qid  times daily or as directed 400 strip 11     calcium-vitamin D (CALTRATE) 600-400 MG-UNIT per tablet Take 1 tablet by mouth 2 times daily 180 tablet 3     canagliflozin (INVOKANA) 300 MG tablet Take 1 tablet (300 mg) by mouth every morning (before breakfast) 90 tablet 1     colesevelam (WELCHOL) 3.75 g PACK Packet Take 3.75 g by mouth daily (with breakfast) 30 each 11     Continuous Blood Gluc  (FREESTYLE RAHEEM READER) DENNISE 1 Device daily 1 Device 0     continuous blood glucose monitoring (Cogency SoftwareSTYLE RAHEEM) sensor For use with Freestyle Raheem Flash  for continuous monitioring of blood glucose levels. Replace sensor every 10 days. 9 each 3     ezetimibe (ZETIA) 10 MG tablet Take 1 tablet (10 mg) by mouth daily 90 tablet 3     guaiFENesin-dextromethorphan (ROBITUSSIN DM) 100-10 MG/5ML syrup Take 5 mLs by mouth every 4 hours as needed 240 mL 1     hydrOXYzine (ATARAX) 25 MG tablet Take 1 tablet (25 mg) by mouth every 8 hours as needed for anxiety 30 tablet 0     insulin lispro (HUMALOG KWIKPEN) 100 UNIT/ML injection 30 units before breakfast, 35 units before lunch, 35 units before dinner 15 mL 1     insulin pen needle (B-D U/F) 31G X 8 MM TEST FOUR TIMES A DAY OR AS DIRECTED 400 each 11     insulin syringe-needle U-100 (BD INSULIN SYRINGE ULTRAFINE) 30G X 1/2\" 1 ML Use one syringe daily or as directed.  3 month supply 100 each prn     lisinopril (PRINIVIL/ZESTRIL) 2.5 MG tablet Take 1 tablet (2.5 mg) by mouth daily 90 tablet 2     meloxicam (MOBIC) 15 MG tablet Take 1 tablet (15 mg) by mouth daily 30 tablet 1     metFORMIN (GLUCOPHAGE) 1000 MG tablet " TAKE ONE-HALF TABLET BY MOUTH TWICE DAILY WITH MEALS 90 tablet 1     nicotine (NICOTROL) 10 MG Inhaler Inhale 6-16 Cartridges into the lungs daily as needed for smoking cessation 180 each 11     pioglitazone (ACTOS) 45 MG tablet Take 1 tablet (45 mg) by mouth daily 90 tablet 2     Urea 20 % CREA cream Apply topically as needed 85 g 3     vitamin D (ERGOCALCIFEROL) 90654 UNIT capsule TAKE 1 CAPLET ONCE WEEKLY Profile Rx: patient will contact pharmacy when needed 4 capsule 5     [DISCONTINUED] BASAGLAR 100 UNIT/ML injection Inject 60 units SQ each at bedtime. 15 mL 12     SHX:  Smoke: yes.  ETOH: none.   with 5 children.    FHX:  Several family members with diabetes.    PMHX:   1.  Type 2 DM.  2.  Hyperlipidemia.  3.  Amenorrhea.  4.  GERD.  5.  S/P choley.  6.  Sebaceous cyst.  Past Medical History:   Diagnosis Date     Arthritis      ASCUS favor benign 2012    neg HPV  Plan cotest in 3 yrs.     Diabetes (H)      Hypertension      Past Surgical History:   Procedure Laterality Date     CHOLECYSTECTOMY  9/14/2007    laproscopic     FACIAL RECONSTRUCTION SURGERY  2 years old    cleft lip repair     ORTHOPEDIC SURGERY  6/2001    left knee       EXAM:  Constitutional:   Vitals:    10/30/18 1049   BP: 133/80   Pulse: 83   Weight: 61.8 kg (136 lb 3.2 oz)   GENERAL: Pt looks good and in no distress.  SKIN: no rash.  HEENT: PERRLA; fundi not examined.  NECK: No thyroid tenderness.  LUNGS: Clear b/l.  CARDIAC: RRR.  ABDOMEN: Nontender.  EXTREMITIES: No pretibial edema.  FEET: No ulcers; normal monofilamentous exam.     RESULTS:    Creatinine   Date Value Ref Range Status   07/09/2018 0.66 0.52 - 1.04 mg/dL Final     GFR Estimate   Date Value Ref Range Status   07/09/2018 >90 >60 mL/min/1.7m2 Final     Comment:     Non  GFR Calc     Hemoglobin A1C   Date Value Ref Range Status   09/17/2018 8.9 (H) 0 - 5.6 % Final     Comment:     Normal <5.7% Prediabetes 5.7-6.4%  Diabetes 6.5% or higher - adopted from  ADA   consensus guidelines.       Potassium   Date Value Ref Range Status   07/09/2018 3.6 3.4 - 5.3 mmol/L Final     ALT   Date Value Ref Range Status   03/16/2018 16 0 - 50 U/L Final     AST   Date Value Ref Range Status   01/09/2015 17 0 - 45 U/L Final     TSH   Date Value Ref Range Status   03/16/2018 2.12 0.40 - 4.00 mU/L Final     T4 Free   Date Value Ref Range Status   08/04/2011 1.22 0.70 - 1.85 ng/dL Final         Cholesterol   Date Value Ref Range Status   03/16/2018 229 (H) <200 mg/dL Final     Comment:     Desirable:       <200 mg/dl   03/27/2017 242 (H) <200 mg/dL Final     Comment:     Desirable:       <200 mg/dl     HDL Cholesterol   Date Value Ref Range Status   03/16/2018 43 (L) >49 mg/dL Final   03/27/2017 48 (L) >49 mg/dL Final     LDL Cholesterol Calculated   Date Value Ref Range Status   03/16/2018 149 (H) <100 mg/dL Final     Comment:     Above desirable:  100-129 mg/dl  Borderline High:  130-159 mg/dL  High:             160-189 mg/dL  Very high:       >189 mg/dl     03/27/2017 160 (H) <100 mg/dL Final     Comment:     Above desirable:  100-129 mg/dl   Borderline High:  130-159 mg/dL   High:             160-189 mg/dL   Very high:       >189 mg/dl       Triglycerides   Date Value Ref Range Status   03/16/2018 183 (H) <150 mg/dL Final     Comment:     Borderline high:  150-199 mg/dl  High:             200-499 mg/dl  Very high:       >499 mg/dl  Non Fasting     03/27/2017 171 (H) <150 mg/dL Final     Comment:     Borderline high:  150-199 mg/dl   High:             200-499 mg/dl   Very high:       >499 mg/dl       Cholesterol/HDL Ratio   Date Value Ref Range Status   12/01/2014 6.0 (H) 0.0 - 5.0 Final   07/30/2014 6.1 (H) 0.0 - 5.0 Final     A1C      8.9   9/17/2018  A1C      8.4   7/9/2018  A1C      8.6   6/5/2018  A1C      8.5   3/8/2018  A1C      8.2   12/5/2017  A1C      8.0   9/5/2017  A1C      8.2   6/2017  A1C      7.8   3/9/2017  A1C      8.1   12/2016  A1C      8.9   6/28/2016  A1C       8.1   6/23/2015  A1C      9.0   3/3/2015  A1C      8.5   12/1/2014  A1C     11.4  5/29/2014  A1C     10.7   4/1/2014  A1C     10.3   1/8/2014  A1C      9.8   11/26/2013  A1C      8.8   7/24/2013  A1C      8.9   5/28/2013    ASSESSMENT/PLAN:    1. TYPE 2 DIABETES MELLITUS: Uncontrolled type 2 diabetes mellitus.  I placed an order for a Freestyle Dodie sensor/device.   Her blood sugar values have been good over the past 14 days.  She is to remain on her current dose of Lantus, Humalog, Invokana, Metformin and Actos.  Her BP is stable today.  She was seen by Oph in Dec 2017.   Pt's creat was 0.66 with GFR >90 mL/min in 7/2018 with normal K+.   Pt's urine microalbuminuria was negative in March 2018.   TSH normal in 3/2018.  Pt had the flu vaccine this season.    2.  HYPERLIPIDEMIA:   on 3/16/2018.  She was instructed to take her Crestor and Zetia daily.  She has worked on reducing fat in her diet.     3. Return [facility] to see Dr. Phillips in Dec 2018.  She is to call me if she does not get her Dodie device/sensor.

## 2018-12-07 DIAGNOSIS — E55.9 VITAMIN D DEFICIENCY: ICD-10-CM

## 2018-12-12 NOTE — TELEPHONE ENCOUNTER
"Requested Prescriptions   Pending Prescriptions Disp Refills     insulin glargine (BASAGLAR KWIKPEN) 100 UNIT/ML pen  Last Written Prescription Date:  10/30/2018  Last Fill Quantity: 15 mL,  # refills: 12   Last Office Visit: 9/25/2018   Future Office Visit:      15 mL 12     Sig: Inject 50 units SQ each at bedtime.    Long Acting Insulin Protocol Passed - 12/12/2018 12:58 PM       Passed - Blood pressure less than 140/90 in past 6 months    BP Readings from Last 3 Encounters:   10/30/18 133/80   09/25/18 118/74   09/17/18 108/68                Passed - LDL on file in past 12 months    Recent Labs   Lab Test 03/16/18  1211   *            Passed - Microalbumin on file in past 12 months    Recent Labs   Lab Test 03/16/18  1212  07/16/12  1539   [id number]  --   --  5.0   [id number]  --   --  13.3   MICROL 12   < >  --    UMALCR 24.21   < >  --     < > = values in this interval not displayed.            Passed - Serum creatinine on file in past 12 months    Recent Labs   Lab Test 07/09/18  0819   CR 0.66            Passed - HgbA1C in past 3 or 6 months    If HgbA1C is 8 or greater, it needs to be on file within the past 3 months.  If less than 8, must be on file within the past 6 months.     Recent Labs   Lab Test 09/17/18  1014  06/05/18   A1C 8.9*   < >  --    HEMOGLOBINA1  --   --  8.6*    < > = values in this interval not displayed.            Passed - Patient is age 18 or older       Passed - Recent (6 mo) or future (30 days) visit within the authorizing provider's specialty    Patient had office visit in the last 6 months or has a visit in the next 30 days with authorizing provider or within the authorizing provider's specialty.  See \"Patient Info\" tab in inbasket, or \"Choose Columns\" in Meds & Orders section of the refill encounter.              "

## 2018-12-13 RX ORDER — INSULIN GLARGINE 100 [IU]/ML
50 INJECTION, SOLUTION SUBCUTANEOUS AT BEDTIME
Qty: 45 ML | Refills: 0 | Status: SHIPPED | OUTPATIENT
Start: 2018-12-13 | End: 2018-12-18

## 2018-12-17 NOTE — TELEPHONE ENCOUNTER
Reason for Call:  Medication or medication refill:    Do you use a [facility]?  Name of the pharmacy and phone number for the current request:  [facility]      Name of the medication requested:insulin lispro (HUMALOG KWIKPEN) 100 UNIT/ML injection    Other request: out of medication was in clinic needs today    Can we leave a detailed message on this number? YES    Phone number patient can be reached at: Home number on file [phone] (home)    Best Time: today    Call taken on 12/17/2018 at 12:08 PM by CLARIBEL CORREIA

## 2018-12-17 NOTE — TELEPHONE ENCOUNTER
Requested Prescriptions   Pending Prescriptions Disp Refills     insulin lispro (HUMALOG KWIKPEN) 100 UNIT/ML pen 15 mL 1    Last Written Prescription Date:  18  Last Fill Quantity: 15 mL,  # refills: 1   Last office visit: 2018 with prescribing provider:  PAULINO Engel   Future Office Visit:   Next 5 appointments (look out 90 days)    2019 11:00 AM CST  [facility] Visit with Raymond [facility]el MD  [facility] [address] [id number] [phone]          Si units before breakfast, 35 units before lunch, 35 units before dinner    Short Acting Insulin Protocol Failed - 2018 12:10 PM       Failed - HgbA1C in past 3 or 6 months    If HgbA1C is 8 or greater, it needs to be on file within the past 3 months.  If less than 8, must be on file within the past 6 months.     Recent Labs   Lab Test 18  1014  18   A1C 8.9*   < >  --    HEMOGLOBINA1  --   --  8.6*    < > = values in this interval not displayed.            Passed - Blood pressure less than 140/90 in past 6 months    BP Readings from Last 3 Encounters:   10/30/18 133/80   18 118/74   18 108/68                Passed - LDL on file in past 12 months    Recent Labs   Lab Test 18  1211   *            Passed - Microalbumin on file in past 12 months    Recent Labs   Lab Test 18  1212  12  1539   [id number]  --   --  5.0   [id number]  --   --  13.3   MICROL 12   < >  --    UMALCR 24.21   < >  --     < > = values in this interval not displayed.            Passed - Serum creatinine on file in past 12 months    Recent Labs   Lab Test 18  0819   CR 0.66            Passed - Patient is age 18 or older       Passed - Recent (6 mo) or future (30 days) visit within the authorizing provider's specialty    Patient had office visit in the last 6 months or has a visit in the next 30 days with  "authorizing provider or within the authorizing provider's specialty.  See \"Patient Info\" tab in inbasket, or \"Choose Columns\" in Meds & Orders section of the refill encounter.              "

## 2018-12-18 ENCOUNTER — OFFICE VISIT (OUTPATIENT)
Dept: ENDOCRINOLOGY | Facility: CLINIC | Age: 48
End: 2018-12-18
Payer: COMMERCIAL

## 2018-12-18 VITALS
DIASTOLIC BLOOD PRESSURE: 84 MMHG | BODY MASS INDEX: 22.27 KG/M2 | WEIGHT: 133.8 LBS | SYSTOLIC BLOOD PRESSURE: 142 MMHG | HEART RATE: 82 BPM

## 2018-12-18 LAB — HBA1C MFR BLD: 8.8 % (ref 4.3–6)

## 2018-12-18 RX ORDER — INSULIN GLARGINE 100 [IU]/ML
65 INJECTION, SOLUTION SUBCUTANEOUS AT BEDTIME
Qty: 63 ML | Refills: 3 | Status: SHIPPED | OUTPATIENT
Start: 2018-12-18 | End: 2019-06-18

## 2018-12-18 NOTE — LETTER
12/18/2018       RE: Karen Braxton  [address]  [place] [address]     Dear Colleague,    Thank you for referring your patient, Karen Braxton, to the [facility] ENDOCRINOLOGY at [facility]. Please see a copy of my visit note below.    This 48 year old woman with a 10+  year history of type 2 diabetes is here for f/u.She was seen without an  today.  She doesn't think she needs one.  She sees Dr. Engel for PC and is co managed by me with Micaela Juarez.  She now is taking  basaglar 60 units at hs, actos 45 mg a day, invokana 300 mg a day,  Metformin 500 bid  and humalog 30 units at breakfast and 35 units with  lunch and 30 with  her evening meal.  She wears a shauna CGM and finds it is very helpful.  Over the last 2 weeks she wore it 81% of time.  65% of values are above 180, 35% are between .  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  She works ~ 7 hrs a day 4 days a week.  She eats breakfast at 10 am then lunch and dinner at more variable times.  Review of the CGM shows she goes up with breakfast but returns to her baseline 3 hours later.  In general her overnight and between meal sugars are 120-250.   She doesn't miss doses of her meds. Denies GI pain, nausea, diarrhea, vaginal infections.  Continues to have chronic headaches that are unchanged.      She saw eye MD earlier this year and has no retinopathy.  She has no paresthesias in her feet.      Current Outpatient Medications on File Prior to Visit:  albuterol (PROAIR HFA/PROVENTIL HFA/VENTOLIN HFA) 108 (90 Base) MCG/ACT inhaler Inhale 2 puffs into the lungs every 6 hours as needed for shortness of breath / dyspnea or wheezing   alum & mag hydroxide-simethicone (ANTACID) 200-200-20 MG/5ML SUSP suspension Take 15 mLs by mouth every 4 hours as needed for indigestion   amoxicillin (AMOXIL) 500 MG capsule Take 1 capsule (500 mg) by mouth 3 times daily   aspirin 81 MG EC tablet Take 1 tablet (81 mg) by mouth  "daily   ASPIRIN NOT PRESCRIBED (INTENTIONAL) continuous prn for other Antiplatelet medication not prescribed intentionally due to Not indicated based on age/GI distress   atorvastatin (LIPITOR) 80 MG tablet Take 1 tablet (80 mg) by mouth daily   blood glucose monitoring (JONATHAN MICROLET) lancets Use to test blood sugar 4 times daily or as directed.   blood glucose monitoring (NO BRAND SPECIFIED) test strip Use to test blood sugars qid  times daily or as directed   calcium carbonate 600 mg-vitamin D 400 units (CALTRATE) 600-400 MG-UNIT per tablet Take 1 tablet by mouth 2 times daily   canagliflozin (INVOKANA) 300 MG tablet Take 1 tablet (300 mg) by mouth every morning (before breakfast)   colesevelam (WELCHOL) 3.75 g PACK Packet Take 3.75 g by mouth daily (with breakfast)   Continuous Blood Gluc  (FREESTYLE RAHEEM READER) DENNISE 1 Device daily   continuous blood glucose monitoring ([employer] CorporationSTYLE RAHEEM) sensor For use with Freestyle Raheem Flash  for continuous monitioring of blood glucose levels. Replace sensor every 10 days.   ezetimibe (ZETIA) 10 MG tablet Take 1 tablet (10 mg) by mouth daily   guaiFENesin-dextromethorphan (ROBITUSSIN DM) 100-10 MG/5ML syrup Take 5 mLs by mouth every 4 hours as needed   hydrOXYzine (ATARAX) 25 MG tablet Take 1 tablet (25 mg) by mouth every 8 hours as needed for anxiety   insulin glargine (BASAGLAR KWIKPEN) 100 UNIT/ML pen Inject 50 Units Subcutaneous At Bedtime Inject 50 units SQ each at bedtime.   insulin lispro (HUMALOG KWIKPEN) 100 UNIT/ML injection 30 units before breakfast, 35 units before lunch, 35 units before dinner   insulin pen needle (B-D U/F) 31G X 8 MM TEST FOUR TIMES A DAY OR AS DIRECTED   insulin syringe-needle U-100 (BD INSULIN SYRINGE ULTRAFINE) 30G X 1/2\" 1 ML Use one syringe daily or as directed.  3 month supply   lisinopril (PRINIVIL/ZESTRIL) 2.5 MG tablet Take 1 tablet (2.5 mg) by mouth daily   meloxicam (MOBIC) 15 MG tablet Take 1 tablet (15 mg) by " "mouth daily   metFORMIN (GLUCOPHAGE) 1000 MG tablet TAKE ONE-HALF TABLET BY MOUTH TWICE DAILY WITH MEALS   nicotine (NICOTROL) 10 MG Inhaler Inhale 6-16 Cartridges into the lungs daily as needed for smoking cessation   pioglitazone (ACTOS) 45 MG tablet Take 1 tablet (45 mg) by mouth daily   Urea 20 % CREA cream Apply topically as needed   vitamin D (ERGOCALCIFEROL) 07355 UNIT capsule TAKE 1 CAPLET ONCE WEEKLY Profile Rx: patient will contact pharmacy when needed   vitamin D (ERGOCALCIFEROL) 46196 UNIT capsule Take 1 capsule (50,000 Units) by mouth every 7 days for 8 doses     No current facility-administered medications on file prior to visit.     ROS: 10 point ROS neg other than the symptoms noted above in the HPI.    So H x- lives with her children.  Vital signs:   LMP  (LMP Unknown)   Estimated body mass index is 22.66 kg/m  as calculated from the following:    Height as of 8/26/18: 1.651 m (5' 5\").    Weight as of 10/30/18: 61.8 kg (136 lb 3.2 oz).   BP today 142/84.  Was in target at 2 other visits this fall.  VSS  NAD  Eyes - no periorbital edema, conjunctival injection, scleral icterus  CV - RRR.  Normal pulses in feet.  No edema  Neuro - sensation intact to monofilament on soles of feet.    Skin - normal texture   Feet - no ulcers   Mood - cheerful    Recent Labs   Lab Test 09/17/18  1014 07/09/18  0819 06/05/18 03/16/18  1212 03/16/18  1211 03/08/18 03/27/17  1442 11/14/16  1105  03/21/16  1030  08/04/11  1620   A1C 8.9* 8.4*  --   --  8.5*  --   --  8.2*  --    < > 8.1*   < >  --    HEMOGLOBINA1  --   --  8.6*  --   --  8.5*   < >  --   --    < >  --    < >  --    TSH  --   --   --   --  2.12  --   --   --   --   --  1.31   < > 1.48   T4  --   --   --   --   --   --   --   --   --   --   --   --  1.22   LDL  --   --   --   --  149*  --   --  160*  --   --  142*   < >  --    HDL  --   --   --   --  43*  --   --  48*  --   --  44*   < >  --    TRIG  --   --   --   --  183*  --   --  171*  --   --  223*   < " >  --    CR  --  0.66  --   --  0.67  --   --   --   --    < > 0.70   < > 0.63   MICROL  --   --   --  12  --   --   --   --  8   < >  --    < >  --     < > = values in this interval not displayed.     A1c today 8.8    Assessment and plan:    1.  Diabetes control.  She has incorporated the shauna sensor into her daily regimen.  This is really helped her with her post meal control.  The data demonstrate that she is not well controlled between meals or overnight.  I recommended we increase the basaglar by 5 units to a total of 65 units each day.  She will continue all the rest of her medications.    2.  Diabetes complications.  Up to date with screens and has none.    3. CVD risk.  She is on statin.  BP is generally in good control    F/u with Micaela Juarez in 3 mo and f/u with me in 6 mo        Melisa Phillips MD

## 2018-12-18 NOTE — NURSING NOTE
Chief Complaint   Patient presents with     RECHECK     Type II Diabetes     Performed capillary puncture for A1C testing. Patient tolerated well.    Jayson Arreguin, [facility]  Endocrinology & Diabetes

## 2018-12-18 NOTE — PROGRESS NOTES
This 48 year old woman with a 10+  year history of type 2 diabetes is here for f/u.She was seen without an  today.  She doesn't think she needs one.  She sees Dr. Engel for PC and is co managed by me with Micaela Juarez.  She now is taking  basaglar 60 units at hs, actos 45 mg a day, invokana 300 mg a day,  Metformin 500 bid  and humalog 30 units at breakfast and 35 units with  lunch and 30 with  her evening meal.  She wears a shauna CGM and finds it is very helpful.  Over the last 2 weeks she wore it 81% of time.  65% of values are above 180, 35% are between .  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  She works ~ 7 hrs a day 4 days a week.  She eats breakfast at 10 am then lunch and dinner at more variable times.  Review of the CGM shows she goes up with breakfast but returns to her baseline 3 hours later.  In general her overnight and between meal sugars are 120-250.   She doesn't miss doses of her meds. Denies GI pain, nausea, diarrhea, vaginal infections.  Continues to have chronic headaches that are unchanged.      She saw eye MD earlier this year and has no retinopathy.  She has no paresthesias in her feet.      Current Outpatient Medications on File Prior to Visit:  albuterol (PROAIR HFA/PROVENTIL HFA/VENTOLIN HFA) 108 (90 Base) MCG/ACT inhaler Inhale 2 puffs into the lungs every 6 hours as needed for shortness of breath / dyspnea or wheezing   alum & mag hydroxide-simethicone (ANTACID) 200-200-20 MG/5ML SUSP suspension Take 15 mLs by mouth every 4 hours as needed for indigestion   amoxicillin (AMOXIL) 500 MG capsule Take 1 capsule (500 mg) by mouth 3 times daily   aspirin 81 MG EC tablet Take 1 tablet (81 mg) by mouth daily   ASPIRIN NOT PRESCRIBED (INTENTIONAL) continuous prn for other Antiplatelet medication not prescribed intentionally due to Not indicated based on age/GI distress   atorvastatin (LIPITOR) 80 MG tablet Take 1 tablet (80 mg) by mouth daily   blood glucose monitoring (Profind  "MICROLET) lancets Use to test blood sugar 4 times daily or as directed.   blood glucose monitoring (NO BRAND SPECIFIED) test strip Use to test blood sugars qid  times daily or as directed   calcium carbonate 600 mg-vitamin D 400 units (CALTRATE) 600-400 MG-UNIT per tablet Take 1 tablet by mouth 2 times daily   canagliflozin (INVOKANA) 300 MG tablet Take 1 tablet (300 mg) by mouth every morning (before breakfast)   colesevelam (WELCHOL) 3.75 g PACK Packet Take 3.75 g by mouth daily (with breakfast)   Continuous Blood Gluc  (FREESTYLE RAHEEM READER) DENNISE 1 Device daily   continuous blood glucose monitoring (FREESTYLE RAHEEM) sensor For use with Freestyle Raheem Flash  for continuous monitioring of blood glucose levels. Replace sensor every 10 days.   ezetimibe (ZETIA) 10 MG tablet Take 1 tablet (10 mg) by mouth daily   guaiFENesin-dextromethorphan (ROBITUSSIN DM) 100-10 MG/5ML syrup Take 5 mLs by mouth every 4 hours as needed   hydrOXYzine (ATARAX) 25 MG tablet Take 1 tablet (25 mg) by mouth every 8 hours as needed for anxiety   insulin glargine (BASAGLAR KWIKPEN) 100 UNIT/ML pen Inject 50 Units Subcutaneous At Bedtime Inject 50 units SQ each at bedtime.   insulin lispro (HUMALOG KWIKPEN) 100 UNIT/ML injection 30 units before breakfast, 35 units before lunch, 35 units before dinner   insulin pen needle (B-D U/F) 31G X 8 MM TEST FOUR TIMES A DAY OR AS DIRECTED   insulin syringe-needle U-100 (BD INSULIN SYRINGE ULTRAFINE) 30G X 1/2\" 1 ML Use one syringe daily or as directed.  3 month supply   lisinopril (PRINIVIL/ZESTRIL) 2.5 MG tablet Take 1 tablet (2.5 mg) by mouth daily   meloxicam (MOBIC) 15 MG tablet Take 1 tablet (15 mg) by mouth daily   metFORMIN (GLUCOPHAGE) 1000 MG tablet TAKE ONE-HALF TABLET BY MOUTH TWICE DAILY WITH MEALS   nicotine (NICOTROL) 10 MG Inhaler Inhale 6-16 Cartridges into the lungs daily as needed for smoking cessation   pioglitazone (ACTOS) 45 MG tablet Take 1 tablet (45 mg) by " "mouth daily   Urea 20 % CREA cream Apply topically as needed   vitamin D (ERGOCALCIFEROL) 15607 UNIT capsule TAKE 1 CAPLET ONCE WEEKLY Profile Rx: patient will contact pharmacy when needed   vitamin D (ERGOCALCIFEROL) 23924 UNIT capsule Take 1 capsule (50,000 Units) by mouth every 7 days for 8 doses     No current facility-administered medications on file prior to visit.     ROS: 10 point ROS neg other than the symptoms noted above in the HPI.    So H x- lives with her children.  Vital signs:   LMP  (LMP Unknown)   Estimated body mass index is 22.66 kg/m  as calculated from the following:    Height as of 8/26/18: 1.651 m (5' 5\").    Weight as of 10/30/18: 61.8 kg (136 lb 3.2 oz).   BP today 142/84.  Was in target at 2 other visits this fall.  VSS  NAD  Eyes - no periorbital edema, conjunctival injection, scleral icterus  CV - RRR.  Normal pulses in feet.  No edema  Neuro - sensation intact to monofilament on soles of feet.    Skin - normal texture   Feet - no ulcers   Mood - cheerful    Recent Labs   Lab Test 09/17/18  1014 07/09/18  0819 06/05/18 03/16/18  1212 03/16/18  1211 03/08/18 03/27/17  1442 11/14/16  1105  03/21/16  1030  08/04/11  1620   A1C 8.9* 8.4*  --   --  8.5*  --   --  8.2*  --    < > 8.1*   < >  --    HEMOGLOBINA1  --   --  8.6*  --   --  8.5*   < >  --   --    < >  --    < >  --    TSH  --   --   --   --  2.12  --   --   --   --   --  1.31   < > 1.48   T4  --   --   --   --   --   --   --   --   --   --   --   --  1.22   LDL  --   --   --   --  149*  --   --  160*  --   --  142*   < >  --    HDL  --   --   --   --  43*  --   --  48*  --   --  44*   < >  --    TRIG  --   --   --   --  183*  --   --  171*  --   --  223*   < >  --    CR  --  0.66  --   --  0.67  --   --   --   --    < > 0.70   < > 0.63   MICROL  --   --   --  12  --   --   --   --  8   < >  --    < >  --     < > = values in this interval not displayed.     A1c today 8.8    Assessment and plan:    1.  Diabetes control.  She has " incorporated the hsauna sensor into her daily regimen.  This is really helped her with her post meal control.  The data demonstrate that she is not well controlled between meals or overnight.  I recommended we increase the basaglar by 5 units to a total of 65 units each day.  She will continue all the rest of her medications.    2.  Diabetes complications.  Up to date with screens and has none.    3. CVD risk.  She is on statin.  BP is generally in good control    F/u with Micaela Juarez in 3 mo and f/u with me in 6 mo    Melisa Phillips MD

## 2019-01-04 DIAGNOSIS — E11.9 TYPE 2 DIABETES MELLITUS (H): Primary | ICD-10-CM

## 2019-01-04 RX ORDER — FLASH GLUCOSE SENSOR
KIT MISCELLANEOUS
Qty: 3 EACH | Refills: 11 | Status: SHIPPED | OUTPATIENT
Start: 2019-01-04 | End: 2019-07-30

## 2019-01-09 ENCOUNTER — PATIENT OUTREACH (OUTPATIENT)
Dept: ENDOCRINOLOGY | Facility: CLINIC | Age: 49
End: 2019-01-09

## 2019-01-09 ENCOUNTER — PATIENT OUTREACH (OUTPATIENT)
Dept: CARE COORDINATION | Facility: CLINIC | Age: 49
End: 2019-01-09

## 2019-01-09 NOTE — PROGRESS NOTES
Social Work Intervention  [facility]    Data/Intervention:    Patient Name:  Karen Braxton  /Age:  1970 (48 year old)    Visit Type: telephone  Referral Source: Dr Melisa Phillips  Reason for Referral:  Cannot get diabetes supplies/meds due to loss of insurance    Collaborated With:    -Karen TAN    Patient Concerns/Issues:   LM for Pt yesterday with a Czech . She called me back today. Inquired about what happened with her insurance and she wasn't clear. [facility] She called [facility] and said she was on hold for an hour and she hung up. Tried to find out if she has been paying her monthly premiums or if it was time to do the yearly re-application and she got upset and then yelled and wouldn't allow me to say anything further. I indicated that I wanted to help her with this but I couldn't help if she kept yelling. Asked her if she wanted to call me back later and she said no she was going to call her .     Intervention/Education/Resources Provided:  Tried to assess the insurance issue so I could help her with the next steps and determine what supplies she needs for her DM until her insurance is back in place.    Assessment/Plan:  Pt frustrated with her phone calls with insurance and couldn't speak rationally on the phone with me. I contacted the  for her PCP, BRITNEY Caruso and requested that she reach out to the Pt to see if she can help her. Maybe another approach/person with make a difference.    Provided patient/family with contact information and availability.    BRITNEY Real, [facility]    [facility]  Clinics and [facility] Center  [phone]

## 2019-01-09 NOTE — PROGRESS NOTES
"Clinic Care Coordination Contact    Clinic Care Coordination Contact  OUTREACH    Referral Information:  Referral Source: Care Team  Primary Diagnosis: Diabetes    Chief Complaint   Patient presents with     Clinic Care Coordination - Initial     Universal Utilization:   Utilization    Last refreshed: 1/8/2019 10:02 PM:  Hospital Admissions 0           Last refreshed: 1/8/2019 10:02 PM:  ED Visits 1           Last refreshed: 1/8/2019 10:02 PM:  No Show Count (past year) 2              Current as of: 1/8/2019 10:02 PM            PAUL SOLIS notified by CC within [facility] that pt currently inactive with [facility] and in need of assistance. CC had spoken with pt but did not have positive outcome and requested additional support.     PAUL SOLIS reviewed pt insurance in [facility] and also contacted [facility] for verification. Pt is not active with any insurance so determined that pt possibly missed re enrolling through the state.     Reviewed pt chart and saw previous conflicts with landlord and a note by PCP and stated pt had been homeless at one point. PAUL SOLIS outreached to pt and discussed insurance concerns. Pt aware insurance is active and agreed that she could have missed forms in the mail due to not updating her address. She not lives in [place].     PAUL SOLIS provided 2 locations of [facility] offices where pt can go tomorrow and get assistance from a navigator to re enroll in [facility].     PAUL SOLIS also inquired about current situation with diabetes medication and supplies. Pt stated she \"has her pen still\" but noted she did not have \"up and down\" which PAUL SOLIS clarified to mean testing supplies. Pt reports she is going to access supplies over the counter at [facility]'s until her insurance is active again. PAUL SOLIS will review medication and supplies needs again at next call as language barrier is present and PAUL SOLIS not confident that there is a full understanding of current needs.      Goals:   Goals        General "    Financial Wellbeing (pt-stated)     Notes - Note created  1/9/2019  3:37 PM by Angeles Almodovar LSW    Goal Statement: I will afford all medication through my insruance  Measure of Success: Re enroll in [facility] and be re activated, fill medication and begin taking as prescribed  Supportive Steps to Achieve: SW CC will provide location and navigator information for [facility], Verify benefits in [place], [facility], check in with pt weekly  Barriers: language barrier  Strengths: Strong self advocacy  Date to Achieve by: 2/2019          Patient/Caregiver understanding: Pt reports understanding and denies any additional questions or concerns at this times. SW CC engaged in AIDET communication during encounter.    Outreach Frequency: weekly  Future Appointments              In 1 week Raymond Engel MD [facility]    In 2 months Cecile Juarez PA-C [facility], [facility]    In 5 months Melisa Phillips MD [facility] End[facility], [facility]        Plan: Pt will go to [facility] office tomorrow and complete paperwork. SW CC will outreach in 1-2 days for check in.     LAYLA Caruso   Social Work Care Coordinator  [phone]

## 2019-01-11 ENCOUNTER — PATIENT OUTREACH (OUTPATIENT)
Dept: ENDOCRINOLOGY | Facility: CLINIC | Age: 49
End: 2019-01-11

## 2019-01-11 ENCOUNTER — TELEPHONE (OUTPATIENT)
Dept: ENDOCRINOLOGY | Facility: CLINIC | Age: 49
End: 2019-01-11

## 2019-01-11 DIAGNOSIS — E11.9 TYPE 2 DIABETES MELLITUS (H): Primary | ICD-10-CM

## 2019-01-11 NOTE — PROGRESS NOTES
Social Work Follow-Up  [facility]    Data/Intervention:  Patient Name:  Karen Braxton  /Age:  1970 (48 year old)    Reason for Follow-Up:  Test strips    Collaborated With:    -Angeles Almodovar MSW  -[facility]  -[facility]    Intervention/Education/Resources Provided:  Hand off from Angeles. [place] and [place] re-[place] in feb. She needs test strips. Applied for funding thru the [employer] and se will come and  the test strips today.    Assessment/Plan:  Pt pleased with outcome. No further f/u planned at this time.    Previously provided patient/family with writer's contact information and availability.       Janine Garcia MSW, [facility]    [facility]  [facility]  [phone]

## 2019-01-11 NOTE — PROGRESS NOTES
"Clinic Care Coordination Contact    Clinic Care Coordination Contact  OUTREACH      PAUL SOLIS outreach to pt who confirmed that yesterday she completed her application for [place] at the [facility] office. Pt reports she was told her insurance would be active again in 2 weeks. When asked pt reported to PAUL SOLIS again that she has insulin but does not have testing supplies. Pt also reports she had sleep difficulties last night \"due to high sugar\". Pt agreeable to having this writer contact CC Janine for further assistance with medication/supplies options.     PAUL SOLIS outreached to Janine and provided update. Janine plans to work with pt and clinic to secure testing supplies to cover until insurance active again.     PAUL SOLIS noted pt has PCP appointment scheduled 1/21/19, which is most likely before her insruance will be active. PAUL SOLIS will outreach to pt to reschedule appointment with PCP.      Goals:   Goals        General    Financial Wellbeing (pt-stated)     Notes - Note created  1/9/2019  3:37 PM by Angeles Almodovar LSW    Goal Statement: I will afford all medication through my insruance  Measure of Success: Re enroll in [facility] and be re activated, fill medication and begin taking as prescribed  Supportive Steps to Achieve: PAUL CC will provide location and navigator information for [facility], Verify benefits in [place], coordinate with [facility], check in with pt weekly  Barriers: language barrier  Strengths: Strong self advocacy  Date to Achieve by: 2/2019          Patient/Caregiver understanding: Pt reports understanding and denies any additional questions or concerns at this times. PAUL CC engaged in AIDET communication during encounter.    Outreach Frequency: weekly  Future Appointments              In 1 week Raymond Engel MD [facility]    In 2 months Cecile Juarez PA-C [facility] Endocrinology, [facility]    In 5 months Melisa Phillips MD [facility] " Endocrinology, [facility]        Plan: LAYLA Caruso   Social Work Care Coordinator  [phone]

## 2019-01-15 ENCOUNTER — PATIENT OUTREACH (OUTPATIENT)
Dept: CARE COORDINATION | Facility: CLINIC | Age: 49
End: 2019-01-15

## 2019-01-18 NOTE — PROGRESS NOTES
Clinic Care Coordination Contact      PAUL SOLIS outreach to check status of insurance to discuss scheduled appointment for 1/21/19. Pt reports she received her new insurance information packet in the mail yesterday and made an online payment 1/17/19 to [facility] for her [place] coverage. PAUL SOLIS discussed with pt that, per this writers understanding, even though she made a payment her insurance may not be active until 2/1/19.     Pt reported she would call and check the effective date for her new plan. Pt understood that she needed to call the clinic directly to reschedule her appointment for Monday 1/21/19 if her insurance was not active.     No further outreaches will be made at this time unless a new referral is made or a change in the pt's status occurs. Patient was provided with this writer's contact information and encouraged to call with any questions or concerns.    LAYLA Caruso   Social Work Care Coordinator  [phone]    Addenda 11:52am  Pt called PAUL SOLIS and stated that she confirmed with [place] her insurance will not be active until 2/1/19. PAUL SOLIS with assistance from clinic staff rescheduled pt for appointment on 2/5/19 at 1pm. Pt had no other concerns at this time.     LAYLA Caruso   Social Work Care Coordinator  [phone]

## 2019-02-18 ENCOUNTER — OFFICE VISIT (OUTPATIENT)
Dept: FAMILY MEDICINE | Facility: CLINIC | Age: 49
End: 2019-02-18
Payer: COMMERCIAL

## 2019-02-18 VITALS
BODY MASS INDEX: 21.63 KG/M2 | DIASTOLIC BLOOD PRESSURE: 74 MMHG | HEART RATE: 70 BPM | TEMPERATURE: 98.1 F | OXYGEN SATURATION: 99 % | RESPIRATION RATE: 16 BRPM | SYSTOLIC BLOOD PRESSURE: 128 MMHG | WEIGHT: 130 LBS

## 2019-02-18 DIAGNOSIS — S69.91XA WRIST INJURY, RIGHT, INITIAL ENCOUNTER: ICD-10-CM

## 2019-02-18 DIAGNOSIS — S49.91XA SHOULDER INJURY, RIGHT, INITIAL ENCOUNTER: ICD-10-CM

## 2019-02-18 DIAGNOSIS — S69.91XA WRIST INJURY, RIGHT, INITIAL ENCOUNTER: Primary | ICD-10-CM

## 2019-02-18 DIAGNOSIS — S52.501A CLOSED FRACTURE OF DISTAL END OF RIGHT RADIUS, UNSPECIFIED FRACTURE MORPHOLOGY, INITIAL ENCOUNTER: ICD-10-CM

## 2019-02-18 PROCEDURE — 73030 X-RAY EXAM OF SHOULDER: CPT | Mod: RT

## 2019-02-18 PROCEDURE — 99214 OFFICE O/P EST MOD 30 MIN: CPT | Performed by: FAMILY MEDICINE

## 2019-02-18 PROCEDURE — 73110 X-RAY EXAM OF WRIST: CPT | Mod: RT

## 2019-02-18 ASSESSMENT — PATIENT HEALTH QUESTIONNAIRE - PHQ9: SUM OF ALL RESPONSES TO PHQ QUESTIONS 1-9: 0

## 2019-02-18 NOTE — PATIENT INSTRUCTIONS
[place] [facility] [id number] FAX [id number]        CODMAN'S EXERCISES  ,THAT IS PENDULUM RANGE OF MOTION 30 EACH TWICE DAILY    THUMB UP EXERCISES INTO PAIN 30 EACH TWICE DAILY    INTERNAL  AND EXTERNAL ROTATION  with AND WITHOUT RESISTANCE OR WEIGHT 30 EACH TWICE DAILY    SWORD SHEATH EXERCISE 30 EACH TWICE DAILY    WALL STRETCH EXERCISE 30 SECONDS EACH TWICE DAILY    POSTERIOR SHOULDER STRETCH AND HOLD 3 10 SECOND STRETCHES TWICE DAILY    ISOMETRIC EXERCISE 60 DEGREES ABDUCTION, ADDUCTION, 90 DEGREE THUMB UP POSITION    AVOID PAINFUL ARC    ICE TWICE DAILY X 5 MINUTES PRIOR TO EXERCISE OR AS NEEDED FOR PAIN CONTROL  PYRODOXINE 25MG 3 TABLETS DAILY  DIVIDED OR IN AM  WRIST SPLINT AT NIGHT  AT DAY TIME IF DOING HEAVY LIFTING, GRIPPING GRASPING, OR HEAVY DUTY TYPING  ERGONOMIC POSITION OF COMPUTER  RANGE OF MOTION WRIST 30 EACH TWICE DAILY  AVOID WRIST FLEXION IT PUTS PRESSURE ON THE MEDIAN NERVE  AVOID REPETITIVE GRIPPING  ICE 5 MINUTES PRN  DICLOFENAC GEL UP TO 4 X DAILY A NEEDED WITH FLARE UPS   CONSIDER CORTISONE INJECTION  SURGERY IF UNRESPONSIVE    Apply heat to the hand for 15 minutes before performing the exercises, and apply ice (a bag  of crushed ice or frozen peas) to the hand for 20 minutes after each exercise session to  prevent inflammation. If numbness steadily worsens, if the exercises increase the pain, or if  the pain does not improve after you have performed the exercises for 3 to 4 weeks, call your    Begin with the affected hand raised.  (1) Make a fist, with the thumb outside the  fingers. (2) Extend the fingers, keeping the  thumb close to the side of the hand.  (3) Extend the hand at the wrist (bend it  backward, toward the forearm), keeping  the fingers straight. (4) With the wrist  straight, extend the thumb as shown.  (5) Keeping the thumb extended, extend  the hand at the wrist. (6) Reach behind  your hand and grasp the thumb with the  thumb and forefinger of the opposite hand.  Pull  the thumb downward, away from the  palm of your hand.  Fracture right wrist  Rotator cuff injury   Wear your wrist splint all the time   Do shoulder twice daily 30 each   (S69.91XA) Wrist injury, right, initial encounter  (primary encounter diagnosis)  Comment:    Plan: [facility] PT, HAND, AND CHIROPRACTIC REFERRAL, ORTHO          REFERRAL, CANCELED: XR Wrist Left G/E        3 Views             (S49.91XA) Shoulder injury, right, initial encounter  Comment:    Plan: XR Shoulder Right G/E 3 Views, [facility] PT, HAND,         AND CHIROPRACTIC REFERRAL, ORTHO          REFERRAL             (S52.501A) Closed fracture of distal end of right radius, unspecified fracture morphology, initial encounter  Comment: 12/28/2018 date of injury  Plan:       Raymond Engel Jr., MD

## 2019-02-18 NOTE — PROGRESS NOTES
SUBJECTIVE:   Karen Braxton is a 48 year old female who presents to clinic today for the following health issues:      Diabetes Follow-up      Patient is checking blood sugars: 4-6 times daily    >350     Diabetic concerns: None     Symptoms of hypoglycemia (low blood sugar): none     Paresthesias (numbness or burning in feet) or sores: No     Date of last diabetic eye exam: 5/2018    BP Readings from Last 2 Encounters:   02/18/19 128/74   12/18/18 142/84     Hemoglobin A1C (%)   Date Value   09/17/2018 8.9 (H)   07/09/2018 8.4 (H)     LDL Cholesterol Calculated (mg/dL)   Date Value   03/16/2018 149 (H)   03/27/2017 160 (H)       Diabetes Management Resources    Amount of exercise or physical activity: 6-7 days/week for an average of 30-45 minutes    Problems taking medications regularly: No    Medication side effects: none    Diet: regular (no restrictions)        Musculoskeletal problem/pain      Duration: 12/28    Description  Location: right SHOULDER    Intensity:  10/10    Accompanying signs and symptoms: pain when lifting arm, unable to sleep on right side    History  Previous similar problem: no   Previous evaluation:  none    Precipitating or alleviating factors:  Trauma or overuse: YES- slipped on sidewalk, fell on sidewalk  Aggravating factors include: lifting    Therapies tried and outcome: ice    Objective findings: This patient has pain with internal and external rotation also with flexion of the shoulder when above 90 degrees    Also has a painful arc    Is no tendency to sublux or dislocate    Assessment right rotator cuff injury with ongoing pain for over 6 weeks  after the injury    Assessment x-ray of the right shoulder showed no bony abnormalities    But clinically she has a right right rotator cuff injury which is actually bothering her more than her fractured wrist        Plan x-ray of the right shoulder at [facility]    Thumb up  exercises are quite painful    Will have the  patient come back so we can demonstrate the exercises provided that there is no fracture    Plan referral to sports medicine clinic and physical therapy at [place] [facility]     Home shoulder exercises were given        Problem #2    Right wrist injury this occurred at the same time on 12/28/2018    This patient has pain in all the extensor portion of the right wrist    It hurts with movement    She is not able to work with that right side    Pain over the extensor and radius of the right forearm    Objective findings tenderness over the right radius area    Prominent veins of the right radius    Assessment right wrist sprain differential diagnosis fracture right wrist    Over 6-week old fracture right wrist minimally displaced dorsally and healing properly        Plan x-ray at [facility] this afternoon and will come back to see me in follow-up to go over the x-ray report    I gave her a wrist splint before she left and she was not actually using it when she came back and said it was in the car    I would suggest she is that especially when she is working and lifting at her work for the next 4 weeks or so referral was made went to the [facility]            Problem #3    Insulin-requiring type 2 diabetes mellitus on multiple medications including Invokana actos metformin WelChol glargine insulin and Humalog insulin for control of diabetes    Assessment type 2 diabetes    Plan difficult to control diabetes mellitus.     -Diabetic diet    -Continue home diabetes regimen              has Other dental caries; Disorder of bursae and tendons in shoulder region; Insomnia; Vitamin D deficiency; Premature menopause; ASCUS favor benign; Hyperlipidemia LDL goal <100; Comprehensive diabetic foot examination, type 2 DM, encounter for (H); Hypertension goal BP (blood pressure) < 140/90; DiarrheaX 2 over last 24hrs w one explosive r/o 2ndary to acid gastritis; Uncontrolled type 2  diabetes mellitus without complication, with long-term current use of insulin (H); Esophageal reflux 2ndary to hi continuous intake of sugared  tea; Hyperlipidemia with target LDL less than 100; Diabetes type 2, controlled (H); Intractable chronic migraine without aura and without status migrainosus; Needle stick injury, subsequent encounter; Hammer toe of right foot; and Type 2 diabetes mellitus without complication, with long-term current use of insulin (H) on their problem list.      Problem list and histories reviewed & adjusted, as indicated.  Additional history: as documented            Patient Active Problem List   Diagnosis     Other dental caries     Disorder of bursae and tendons in shoulder region     Insomnia     Vitamin D deficiency     Premature menopause     ASCUS favor benign     Hyperlipidemia LDL goal <100     Comprehensive diabetic foot examination, type 2 DM, encounter for (H)     Hypertension goal BP (blood pressure) < 140/90     DiarrheaX 2 over last 24hrs w one explosive r/o 2ndary to acid gastritis     Uncontrolled type 2 diabetes mellitus without complication, with long-term current use of insulin (H)     Esophageal reflux 2ndary to hi continuous intake of sugared  tea     Hyperlipidemia with target LDL less than 100     Diabetes type 2, controlled (H)     Intractable chronic migraine without aura and without status migrainosus     Needle stick injury, subsequent encounter     Hammer toe of right foot     Type 2 diabetes mellitus without complication, with long-term current use of insulin (H)     Past Surgical History:   Procedure Laterality Date     CHOLECYSTECTOMY  9/14/2007    laproscopic     FACIAL RECONSTRUCTION SURGERY  2 years old    cleft lip repair     ORTHOPEDIC SURGERY  6/2001    left knee       Social History     Tobacco Use     Smoking status: Current Some Day Smoker     Types: Cigarettes     Smokeless tobacco: Never Used     Tobacco comment: 3 cigarettes daily   Substance Use  Topics     Alcohol use: No     Alcohol/week: 0.0 oz     Family History   Problem Relation Age of Onset     Diabetes Mother      Hypertension Mother      Heart Disease Mother      Lipids Mother      Asthma Mother      Diabetes Father      Hypertension Father      Cancer No family hx of         No known family hx of skin cancer     Coronary Artery Disease No family hx of      Hyperlipidemia No family hx of      Cerebrovascular Disease No family hx of      Breast Cancer No family hx of      Colon Cancer No family hx of      Prostate Cancer No family hx of      Other Cancer No family hx of      Depression No family hx of      Anxiety Disorder No family hx of      Mental Illness No family hx of      Substance Abuse No family hx of      Anesthesia Reaction No family hx of      Osteoporosis No family hx of      Genetic Disorder No family hx of      Thyroid Disease No family hx of      Obesity No family hx of      Unknown/Adopted No family hx of          Current Outpatient Medications   Medication Sig Dispense Refill     albuterol (PROAIR HFA/PROVENTIL HFA/VENTOLIN HFA) 108 (90 Base) MCG/ACT inhaler Inhale 2 puffs into the lungs every 6 hours as needed for shortness of breath / dyspnea or wheezing 1 Inhaler 1     alum & mag hydroxide-simethicone (ANTACID) 200-200-20 MG/5ML SUSP suspension Take 15 mLs by mouth every 4 hours as needed for indigestion 355 mL 11     aspirin 81 MG EC tablet Take 1 tablet (81 mg) by mouth daily 90 tablet 3     atorvastatin (LIPITOR) 80 MG tablet Take 1 tablet (80 mg) by mouth daily 90 tablet 3     blood glucose (FREESTYLE PRECISION CYNTHIA TEST) test strip Use to test blood sugar 4 times daily or as directed. Use with Dodie system as needed. 400 strip 1     blood glucose monitoring (JONATHAN MICROLET) lancets Use to test blood sugar 4 times daily or as directed. 2 Box 6     blood glucose monitoring (NO BRAND SPECIFIED) test strip Use to test blood sugars qid  times daily or as directed 400 strip 11      "calcium carbonate 600 mg-vitamin D 400 units (CALTRATE) 600-400 MG-UNIT per tablet Take 1 tablet by mouth 2 times daily 180 tablet 1     canagliflozin (INVOKANA) 300 MG tablet Take 1 tablet (300 mg) by mouth every morning (before breakfast) 90 tablet 1     colesevelam (WELCHOL) 3.75 g PACK Packet Take 3.75 g by mouth daily (with breakfast) 30 each 11     Continuous Blood Gluc  (FREESTYLE RAHEEM READER) DENNISE 1 Device daily 1 Device 0     continuous blood glucose monitoring (FREESTYLE RAHEEM) sensor For use with Freestyle Raheem Flash  for continuous monitioring of blood glucose levels. Replace sensor every 10 days. 9 each 3     ezetimibe (ZETIA) 10 MG tablet Take 1 tablet (10 mg) by mouth daily 90 tablet 3     hydrOXYzine (ATARAX) 25 MG tablet Take 1 tablet (25 mg) by mouth every 8 hours as needed for anxiety 30 tablet 0     insulin glargine (BASAGLAR KWIKPEN) 100 UNIT/ML pen Inject 65 Units Subcutaneous At Bedtime 63 mL 3     insulin lispro (HUMALOG KWIKPEN) 100 UNIT/ML pen 30 units before breakfast, 35 units before lunch, 35 units before dinner 15 mL 1     insulin pen needle (B-D U/F) 31G X 8 MM TEST FOUR TIMES A DAY OR AS DIRECTED 400 each 11     insulin syringe-needle U-100 (BD INSULIN SYRINGE ULTRAFINE) 30G X 1/2\" 1 ML Use one syringe daily or as directed.  3 month supply 100 each prn     lisinopril (PRINIVIL/ZESTRIL) 2.5 MG tablet Take 1 tablet (2.5 mg) by mouth daily 90 tablet 2     meloxicam (MOBIC) 15 MG tablet Take 1 tablet (15 mg) by mouth daily 30 tablet 1     metFORMIN (GLUCOPHAGE) 1000 MG tablet TAKE ONE-HALF TABLET BY MOUTH TWICE DAILY WITH MEALS 90 tablet 1     nicotine (NICOTROL) 10 MG Inhaler Inhale 6-16 Cartridges into the lungs daily as needed for smoking cessation 180 each 11     pioglitazone (ACTOS) 45 MG tablet Take 1 tablet (45 mg) by mouth daily 90 tablet 2     Urea 20 % CREA cream Apply topically as needed 85 g 3     amoxicillin (AMOXIL) 500 MG capsule Take 1 capsule (500 mg) by " mouth 3 times daily (Patient not taking: Reported on 2/18/2019) 30 capsule 0     ASPIRIN NOT PRESCRIBED (INTENTIONAL) continuous prn for other Antiplatelet medication not prescribed intentionally due to Not indicated based on age/GI distress       continuous blood glucose monitoring (FREESTYLE RAHEEM) sensor For use with Freestyle Raheem  . Replace sensor every 10 days. (Patient not taking: Reported on 2/18/2019) 3 each 11     guaiFENesin-dextromethorphan (ROBITUSSIN DM) 100-10 MG/5ML syrup Take 5 mLs by mouth every 4 hours as needed (Patient not taking: Reported on 2/18/2019) 240 mL 1     vitamin D (ERGOCALCIFEROL) 35912 UNIT capsule Take 1 capsule (50,000 Units) by mouth every 7 days for 8 doses 8 capsule 6     No Known Allergies  Recent Labs   Lab Test 09/17/18  1014 07/09/18  0819 03/16/18  1211 03/27/17  1442  03/21/16  1030  06/23/15  1652   A1C 8.9* 8.4* 8.5* 8.2*   < > 8.1*   < > 8.1*   LDL  --   --  149* 160*  --  142*  --   --    HDL  --   --  43* 48*  --  44*  --   --    TRIG  --   --  183* 171*  --  223*  --   --    ALT  --   --  16  --   --  30  --  21   CR  --  0.66 0.67  --    < > 0.70  --  0.80   GFRESTIMATED  --  >90 >90  --    < > 90  --  77   GFRESTBLACK  --  >90 >90  --    < > >90  --  >90   POTASSIUM  --  3.6 4.2  --    < > 4.4  --  4.2   TSH  --   --  2.12  --   --  1.31  --   --     < > = values in this interval not displayed.      BP Readings from Last 3 Encounters:   02/18/19 128/74   12/18/18 142/84   10/30/18 133/80    Wt Readings from Last 3 Encounters:   02/18/19 59 kg (130 lb)   12/18/18 60.7 kg (133 lb 12.8 oz)   10/30/18 61.8 kg (136 lb 3.2 oz)                  Labs reviewed in EPIC    Reviewed and updated as needed this visit by clinical staff       Reviewed and updated as needed this visit by Provider         ROS: has Other dental caries; Disorder of bursae and tendons in shoulder region; Insomnia; Vitamin D deficiency; Premature menopause; ASCUS favor benign; Hyperlipidemia  LDL goal <100; Comprehensive diabetic foot examination, type 2 DM, encounter for (H); Hypertension goal BP (blood pressure) < 140/90; DiarrheaX 2 over last 24hrs w one explosive r/o 2ndary to acid gastritis; Uncontrolled type 2 diabetes mellitus without complication, with long-term current use of insulin (H); Esophageal reflux 2ndary to hi continuous intake of sugared  tea; Hyperlipidemia with target LDL less than 100; Diabetes type 2, controlled (H); Intractable chronic migraine without aura and without status migrainosus; Needle stick injury, subsequent encounter; Hammer toe of right foot; and Type 2 diabetes mellitus without complication, with long-term current use of insulin (H) on their problem list.    CONSTITUTIONAL: NEGATIVE for fever, chills, change in weight  INTEGUMENTARY/SKIN: NEGATIVE for worrisome rashes, moles or lesions  EYES: NEGATIVE for vision changes or irritation  ENT/MOUTH: NEGATIVE for ear, mouth and throat problems  RESP: NEGATIVE for significant cough or SOB  BREAST: NEGATIVE for masses, tenderness or discharge  CV: NEGATIVE for chest pain, palpitations or peripheral edema  GI: NEGATIVE for nausea, abdominal pain, heartburn, or change in bowel habits  : NEGATIVE for frequency, dysuria, or hematuria  MUSCULOSKELETAL: NEGATIVE for significant arthralgias or myalgia  Right shoulder and wrist injury see HISTORY OF PRESENT ILLNESS   NEURO: NEGATIVE for weakness, dizziness or paresthesias  ENDOCRINE: NEGATIVE for temperature intolerance, skin/hair changes  HEME: NEGATIVE for bleeding problems  PSYCHIATRIC: NEGATIVE for changes in mood or affect    OBJECTIVE:     /74   Pulse 70   Temp 98.1  F (36.7  C) (Tympanic)   Resp 16   Wt 59 kg (130 lb)   LMP  (LMP Unknown)   SpO2 99%   BMI 21.63 kg/m    Body mass index is 21.63 kg/m .  GENERAL: healthy, alert and no distress  EYES: Eyes grossly normal to inspection, PERRL and conjunctivae and sclerae normal  HENT: ear canals and TM's normal,  nose and mouth without ulcers or lesions  NECK: no adenopathy, no asymmetry, masses, or scars and thyroid normal to palpation  RESP: lungs clear to auscultation - no rales, rhonchi or wheezes  CV: regular rate and rhythm, normal S1 S2, no S3 or S4, no murmur, click or rub, no peripheral edema and peripheral pulses strong  ABDOMEN: soft, nontender, no hepatosplenomegaly, no masses and bowel sounds normal  MS: Pain with internal and external motion of the shoulder positive painful arc painful with flexion abduction positive painful arc  No subluxation painful right wrist extensor portion of the radius  Subjective improvement of pain with application of wrist splint on the right wrist    Diagnostic Test Results:  Results for orders placed or performed in visit on 12/18/18   Hemoglobin A1c POCT   Result Value Ref Range    Hemoglobin A1C 8.8 (A) 4.3 - 6 %     Last A1c was 8.8 which is good for her  ASSESSMENT/PLAN:           ICD-10-CM    1. Wrist injury, right, initial encounter S69.91XA XR Wrist Left G/E 3 Views   2. Shoulder injury, right, initial encounter S49.91XA XR Shoulder Right G/E 3 Views      Poorly controlled diabetes mellitus type 2 insulin requiring  Patient Instructions   [facility] [id number] FAX [phone]        LINDA ALCANTAR MD  [facility]

## 2019-02-18 NOTE — Clinical Note
Please notify also sent [facility] referral for right shoulder pain evidently different from wrist and hand therapy Please notify patient Raymond Engel Jr., MD

## 2019-02-21 ENCOUNTER — THERAPY VISIT (OUTPATIENT)
Dept: OCCUPATIONAL THERAPY | Facility: CLINIC | Age: 49
End: 2019-02-21
Attending: FAMILY MEDICINE
Payer: COMMERCIAL

## 2019-02-21 DIAGNOSIS — M25.531 RIGHT WRIST PAIN: Primary | ICD-10-CM

## 2019-02-21 DIAGNOSIS — S69.91XA WRIST INJURY, RIGHT, INITIAL ENCOUNTER: ICD-10-CM

## 2019-02-21 DIAGNOSIS — S49.91XA SHOULDER INJURY, RIGHT, INITIAL ENCOUNTER: ICD-10-CM

## 2019-02-21 PROCEDURE — 97165 OT EVAL LOW COMPLEX 30 MIN: CPT | Mod: GO | Performed by: OCCUPATIONAL THERAPIST

## 2019-02-21 PROCEDURE — 97535 SELF CARE MNGMENT TRAINING: CPT | Mod: GO | Performed by: OCCUPATIONAL THERAPIST

## 2019-02-21 PROCEDURE — 97110 THERAPEUTIC EXERCISES: CPT | Mod: GO | Performed by: OCCUPATIONAL THERAPIST

## 2019-02-22 NOTE — PROGRESS NOTES
Hand Therapy Initial Evaluation    Current Date:  2/21/2019       Diagnosis: Right wrist pain, suspected distal radius fracture  DOI: 12/28/18  Referring physician: Raymond Engel MD         Subjective:  Karen Braxton is a 48 year old right hand dominant female.    Patient reports symptoms of pain, stiffness/loss of motion and weakness/loss of strength of the right wrist which occurred due to a slip and fall on ice when shoveling her driveway on 12/28/2018. Patient reports immediate swelling and bruising of the wrist after injury. She did not have her wrist evaluated until 2/18/2019. Patient also injured her shoulder when she fell. Since onset symptoms are Gradually getting better.  Special tests:  x-ray.  Previous treatment: None.    General health as reported by patient is good.  Pertinent medical history includes:Diabetes, High Blood Pressure, Smoking  Medical allergies:none.  Surgical history: other: Left knee in 2001.  Medication history: Diabetic medication, statin.    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  Currently working in normal job without restrictions  Job Tasks: Lifting, Carrying, Prolonged Standing, Pushing, Pulling, Repetitive Tasks  Prior functional level:  no limitations  Barriers include:none  Mobility: No difficulty  Transportation: drives  Leisure activities/hobbies: Reading, watching TV. Has 5 children, ages 18 to 25.    Functional Outcome Measure:  Upper Extremity Functional Index Score:  SCORE: 20/80   (A lower score indicates greater disability.)          Objective:  Pain Level Report  VAS(0-10) 2/21/2019   At Rest: 5/10   With Use: 7-8/10     Report of Pain:  Location:  wrist  Pain Quality:  Sharp  Frequency: intermittent    Pain is worst:  daytime  Exacerbated by:  Lifting  Relieved by:  NSAIDs  Progression:  Gradually getting better  Palpation: Painful to palpation at radial styloid and along radial  shaft    Edema  Circumference:  (Measured in cm)  [facility]  2/21/2019   Right 15.3   Left 14.8     Pain Report:  - none    + mild    ++ moderate    +++ severe   Wrist  2/21/2019    AROM (PROM) Left Right   Extension 78 55   Flexion 53 40   RD 20 5++   UD 10 35   Supination 85 85+   Pronation 85 85+   Patient has full finger flexion and extension. She has 10/10 thumb opposition on the Kapandji scale. Of note, patient has approximately 90 degrees of shoulder flexion and abduction with 10/10 pain.    Strength:   (Measured in pounds)  Contraindicated at this time    Assessment:  Patient presents with symptoms consistent with diagnosis of right wrist pain, with conservative intervention.     Patient's limitations or Problem List includes:  Pain, Decreased ROM/motion, Weakness, Decreased  and pinch strength of the right wrist which interferes with the patient's ability to perform Self Care Tasks (dressing, eating, bathing, hygiene/toileting), Work Tasks, Sleep Patterns, Recreational Activities, Household Chores and Driving  as compared to previous level of function.    Rehab Potential:  Excellent - Return to full activity, no limitations  Patient will benefit from skilled Occupational Therapy to increase wrist and forearm ROM, flexibility,  strength and pinch strength and decrease pain to return to previous activity level and resume normal daily tasks and to reach their rehab potential.    Barriers to Learning:  Language    Communication Issues:  Patient appears to be able to clearly communicate and understand verbal and written communication and follow directions correctly.    Chart Review: Chart Review and Brief history including review of medical and/or therapy records relating to the presenting problem    Identified Performance Deficits: bathing/showering, toileting, dressing, feeding, hygiene and grooming, care of others, driving and community mobility, home establishment and management, meal preparation and  cleanup, shopping, sleep, work and social participation    Assessment of Occupational Performance:  1-3 Performance Deficits    Clinical Decision Making (Complexity): Low complexity    Treatment Explanation:  The following has been discussed with the patient:    RX ordered/plan of care  Anticipated outcomes  Possible risks and side effects    Plan:  Frequency:  1 X week, once daily  Duration:  for 6 weeks    Treatment Plan:    Modalities:    Fluidotherapy and Paraffin   Therapeutic Exercise:   AROM of finger, thumb, wrist, forearm, and elbow  PROM of wrist E/F and P/S   Overhead fisting to control edema  Progress to  and Pinch strengthening  Progress to Wrist Isotonic strengthening  Manual Techniques:  Joint mobilization techniques   Manual Edema Mobilization  K tape    Discharge Plan:    Achieve all LTG.  Independent in home treatment program.  Reach maximal therapeutic benefit.    Home Program:   Overhead fisting  Warmth  AROM of wrist and forearm all planes  OTC wrist brace full time, remove for exercise and hygiene  Avoid activities that exacerbate pain     Next Visit:  Progress wrist AROM  Heat prior to AROM (?)

## 2019-02-23 PROBLEM — S69.91XA WRIST INJURY, RIGHT, INITIAL ENCOUNTER: Status: ACTIVE | Noted: 2019-02-23

## 2019-02-23 PROBLEM — M25.531 RIGHT WRIST PAIN: Status: ACTIVE | Noted: 2019-02-23

## 2019-02-27 DIAGNOSIS — E11.65 CONTROLLED TYPE 2 DIABETES MELLITUS WITH HYPERGLYCEMIA, WITH LONG-TERM CURRENT USE OF INSULIN (H): ICD-10-CM

## 2019-02-27 DIAGNOSIS — Z79.4 CONTROLLED TYPE 2 DIABETES MELLITUS WITH HYPERGLYCEMIA, WITH LONG-TERM CURRENT USE OF INSULIN (H): ICD-10-CM

## 2019-02-27 NOTE — TELEPHONE ENCOUNTER
"insulin pen needle (B-D U/F) 31G X 8 MM miscellaneous  Last Written Prescription Date:  04/19/18  Last Fill Quantity: 400,  # refills: 11   Last office visit: 2/18/2019 with prescribing provider:  02/18/19   Future Office Visit:    Requested Prescriptions   Pending Prescriptions Disp Refills     insulin pen needle (B-D U/F) 31G X 8 MM miscellaneous 400 each 1     Sig: TEST FOUR TIMES A DAY OR AS DIRECTED    Diabetic Supplies Protocol Passed - 2/27/2019 11:47 AM       Passed - Medication is active on med list       Passed - Patient is 18 years of age or older       Passed - Recent (6 mo) or future (30 days) visit within the authorizing provider's specialty    Patient had office visit in the last 6 months or has a visit in the next 30 days with authorizing provider.  See \"Patient Info\" tab in inbasket, or \"Choose Columns\" in Meds & Orders section of the refill encounter.            metFORMIN (GLUCOPHAGE) 1000 MG tablet 90 tablet 1     Sig: TAKE ONE-HALF TABLET BY MOUTH TWICE DAILY WITH MEALS    Biguanide Agents Passed - 2/27/2019 11:47 AM       Passed - Blood pressure less than 140/90 in past 6 months    BP Readings from Last 3 Encounters:   02/18/19 128/74   12/18/18 142/84   10/30/18 133/80                Passed - Patient has documented LDL within the past 12 mos.    Recent Labs   Lab Test 03/16/18  1211   *            Passed - Patient has had a Microalbumin in the past 15 mos.    Recent Labs   Lab Test 03/16/18  1212  07/16/12  1539   [id number]  --   --  5.0   [id number]  --   --  13.3   MICROL 12   < >  --    UMALCR 24.21   < >  --     < > = values in this interval not displayed.            Passed - Patient is age 10 or older       Passed - Patient has documented A1c within the specified period of time.    If HgbA1C is 8 or greater, it needs to be on file within the past 3 months.  If less than 8, must be on file within the past 6 months.     Recent Labs   Lab Test 12/18/18 09/17/18  1014   A1C  --  8.9* " "  HEMOGLOBINA1 8.8*  --             Passed - Patient's CR is NOT>1.4 OR Patient's EGFR is NOT<45 within past 12 mos.    Recent Labs   Lab Test 07/09/18  0819   GFRESTIMATED >90   GFRESTBLACK >90       Recent Labs   Lab Test 07/09/18  0819   CR 0.66            Passed - Patient does NOT have a diagnosis of CHF.       Passed - Medication is active on med list       Passed - Patient is not pregnant       Passed - Patient has not had a positive pregnancy test within the past 12 mos.        Passed - Recent (6 mo) or future (30 days) visit within the authorizing provider's specialty    Patient had office visit in the last 6 months or has a visit in the next 30 days with authorizing provider or within the authorizing provider's specialty.  See \"Patient Info\" tab in inbasket, or \"Choose Columns\" in Meds & Orders section of the refill encounter.            blood glucose (NO BRAND SPECIFIED) test strip 400 strip 1     Sig: Use to test blood sugars qid  times daily or as directed    Diabetic Supplies Protocol Passed - 2/27/2019 11:47 AM       Passed - Medication is active on med list       Passed - Patient is 18 years of age or older       Passed - Recent (6 mo) or future (30 days) visit within the authorizing provider's specialty    Patient had office visit in the last 6 months or has a visit in the next 30 days with authorizing provider.  See \"Patient Info\" tab in inbasket, or \"Choose Columns\" in Meds & Orders section of the refill encounter.            metFORMIN (GLUCOPHAGE) 1000 MG tablet  Last Written Prescription Date:  06/11/18  Last Fill Quantity: 90,  # refills: 1   Last office visit: 2/18/2019 with prescribing provider:  02/18/19   Future Office Visit:    Requested Prescriptions   Pending Prescriptions Disp Refills     insulin pen needle (B-D U/F) 31G X 8 MM miscellaneous 400 each 1     Sig: TEST FOUR TIMES A DAY OR AS DIRECTED    Diabetic Supplies Protocol Passed - 2/27/2019 11:49 AM       Passed - Medication is " "active on med list       Passed - Patient is 18 years of age or older       Passed - Recent (6 mo) or future (30 days) visit within the authorizing provider's specialty    Patient had office visit in the last 6 months or has a visit in the next 30 days with authorizing provider.  See \"Patient Info\" tab in inbasket, or \"Choose Columns\" in Meds & Orders section of the refill encounter.            metFORMIN (GLUCOPHAGE) 1000 MG tablet 90 tablet 1     Sig: TAKE ONE-HALF TABLET BY MOUTH TWICE DAILY WITH MEALS    Biguanide Agents Passed - 2/27/2019 11:49 AM       Passed - Blood pressure less than 140/90 in past 6 months    BP Readings from Last 3 Encounters:   02/18/19 128/74   12/18/18 142/84   10/30/18 133/80                Passed - Patient has documented LDL within the past 12 mos.    Recent Labs   Lab Test 03/16/18  1211   *            Passed - Patient has had a Microalbumin in the past 15 mos.    Recent Labs   Lab Test 03/16/18  1212  07/16/12  1539   [id number]  --   --  5.0   [id number]  --   --  13.3   MICROL 12   < >  --    UMALCR 24.21   < >  --     < > = values in this interval not displayed.            Passed - Patient is age 10 or older       Passed - Patient has documented A1c within the specified period of time.    If HgbA1C is 8 or greater, it needs to be on file within the past 3 months.  If less than 8, must be on file within the past 6 months.     Recent Labs   Lab Test 12/18/18 09/17/18  1014   A1C  --  8.9*   HEMOGLOBINA1 8.8*  --             Passed - Patient's CR is NOT>1.4 OR Patient's EGFR is NOT<45 within past 12 mos.    Recent Labs   Lab Test 07/09/18  0819   GFRESTIMATED >90   GFRESTBLACK >90       Recent Labs   Lab Test 07/09/18  0819   CR 0.66            Passed - Patient does NOT have a diagnosis of CHF.       Passed - Medication is active on med list       Passed - Patient is not pregnant       Passed - Patient has not had a positive pregnancy test within the past 12 mos.        Passed - " "Recent (6 mo) or future (30 days) visit within the authorizing provider's specialty    Patient had office visit in the last 6 months or has a visit in the next 30 days with authorizing provider or within the authorizing provider's specialty.  See \"Patient Info\" tab in inbasket, or \"Choose Columns\" in Meds & Orders section of the refill encounter.            blood glucose (NO BRAND SPECIFIED) test strip 400 strip 1     Sig: Use to test blood sugars qid  times daily or as directed    Diabetic Supplies Protocol Passed - 2/27/2019 11:49 AM       Passed - Medication is active on med list       Passed - Patient is 18 years of age or older       Passed - Recent (6 mo) or future (30 days) visit within the authorizing provider's specialty    Patient had office visit in the last 6 months or has a visit in the next 30 days with authorizing provider.  See \"Patient Info\" tab in inbasket, or \"Choose Columns\" in Meds & Orders section of the refill encounter.            blood glucose (NO BRAND SPECIFIED) test strip  Last Written Prescription Date:  01/11/20  Last Fill Quantity: 400,  # refills: 1   Last office visit: 2/18/2019 with prescribing provider:  02/18/19   Future Office Visit:    Requested Prescriptions   Pending Prescriptions Disp Refills     insulin pen needle (B-D U/F) 31G X 8 MM miscellaneous 400 each 1     Sig: TEST FOUR TIMES A DAY OR AS DIRECTED    Diabetic Supplies Protocol Passed - 2/27/2019 11:49 AM       Passed - Medication is active on med list       Passed - Patient is 18 years of age or older       Passed - Recent (6 mo) or future (30 days) visit within the authorizing provider's specialty    Patient had office visit in the last 6 months or has a visit in the next 30 days with authorizing provider.  See \"Patient Info\" tab in inbasket, or \"Choose Columns\" in Meds & Orders section of the refill encounter.            metFORMIN (GLUCOPHAGE) 1000 MG tablet 90 tablet 1     Sig: TAKE ONE-HALF TABLET BY MOUTH TWICE " "DAILY WITH MEALS    Biguanide Agents Passed - 2/27/2019 11:49 AM       Passed - Blood pressure less than 140/90 in past 6 months    BP Readings from Last 3 Encounters:   02/18/19 128/74   12/18/18 142/84   10/30/18 133/80                Passed - Patient has documented LDL within the past 12 mos.    Recent Labs   Lab Test 03/16/18  1211   *            Passed - Patient has had a Microalbumin in the past 15 mos.    Recent Labs   Lab Test 03/16/18  1212  07/16/12  1539   [id number]  --   --  5.0   [id number]  --   --  13.3   MICROL 12   < >  --    UMALCR 24.21   < >  --     < > = values in this interval not displayed.            Passed - Patient is age 10 or older       Passed - Patient has documented A1c within the specified period of time.    If HgbA1C is 8 or greater, it needs to be on file within the past 3 months.  If less than 8, must be on file within the past 6 months.     Recent Labs   Lab Test 12/18/18 09/17/18  1014   A1C  --  8.9*   HEMOGLOBINA1 8.8*  --             Passed - Patient's CR is NOT>1.4 OR Patient's EGFR is NOT<45 within past 12 mos.    Recent Labs   Lab Test 07/09/18  0819   GFRESTIMATED >90   GFRESTBLACK >90       Recent Labs   Lab Test 07/09/18  0819   CR 0.66            Passed - Patient does NOT have a diagnosis of CHF.       Passed - Medication is active on med list       Passed - Patient is not pregnant       Passed - Patient has not had a positive pregnancy test within the past 12 mos.        Passed - Recent (6 mo) or future (30 days) visit within the authorizing provider's specialty    Patient had office visit in the last 6 months or has a visit in the next 30 days with authorizing provider or within the authorizing provider's specialty.  See \"Patient Info\" tab in inbasket, or \"Choose Columns\" in Meds & Orders section of the refill encounter.            blood glucose (NO BRAND SPECIFIED) test strip 400 strip 1     Sig: Use to test blood sugars qid  times daily or as directed    Diabetic " "Supplies Protocol Passed - 2/27/2019 11:49 AM       Passed - Medication is active on med list       Passed - Patient is 18 years of age or older       Passed - Recent (6 mo) or future (30 days) visit within the authorizing provider's specialty    Patient had office visit in the last 6 months or has a visit in the next 30 days with authorizing provider.  See \"Patient Info\" tab in inbasket, or \"Choose Columns\" in Meds & Orders section of the refill encounter.                "

## 2019-02-28 NOTE — TELEPHONE ENCOUNTER
Prescription approved per [facility]ill Protocol for 6 months of refills since last appointment, which was 2/18/2019.

## 2019-03-09 NOTE — TELEPHONE ENCOUNTER
"Requested Prescriptions   Pending Prescriptions Disp Refills     insulin lispro (HUMALOG KWIKPEN) 100 UNIT/ML pen  Last Written Prescription Date:  2018  Last Fill Quantity: 15mL,  # refills: 1   Last office visit: 2019 with prescribing provider:  PAULINO ALCANTAR    Future Office Visit:     15 mL 1     Si units before breakfast, 35 units before lunch, 35 units before dinner    Short Acting Insulin Protocol Passed - 3/9/2019  9:27 AM       Passed - Blood pressure less than 140/90 in past 6 months    BP Readings from Last 3 Encounters:   19 128/74   18 142/84   10/30/18 133/80                Passed - LDL on file in past 12 months    Recent Labs   Lab Test 18  1211   *            Passed - Microalbumin on file in past 12 months    Recent Labs   Lab Test 18  1212  12  1539   [id number]  --   --  5.0   [id number]  --   --  13.3   MICROL 12   < >  --    UMALCR 24.21   < >  --     < > = values in this interval not displayed.            Passed - Serum creatinine on file in past 12 months    Recent Labs   Lab Test 18  0819   CR 0.66            Passed - HgbA1C in past 3 or 6 months    If HgbA1C is 8 or greater, it needs to be on file within the past 3 months.  If less than 8, must be on file within the past 6 months.     Recent Labs   Lab Test 18  1014   A1C  --  8.9*   HEMOGLOBINA1 8.8*  --             Passed - Medication is active on med list       Passed - Patient is age 18 or older       Passed - Recent (6 mo) or future (30 days) visit within the authorizing provider's specialty    Patient had office visit in the last 6 months or has a visit in the next 30 days with authorizing provider or within the authorizing provider's specialty.  See \"Patient Info\" tab in inbasket, or \"Choose Columns\" in Meds & Orders section of the refill encounter.              "

## 2019-03-12 ENCOUNTER — TELEPHONE (OUTPATIENT)
Dept: ENDOCRINOLOGY | Facility: CLINIC | Age: 49
End: 2019-03-12

## 2019-03-12 NOTE — TELEPHONE ENCOUNTER
[facility]    Phone Message    May a detailed message be left on voicemail: yes    Reason for Call: Other: PT is unable to make it to appt she had scheduled on [id number]. She is looking to be seen either [id number] or [id number]. Cecile Juarez does not have anything available. PT wondering if she can see another provider on one of those two days. Please connect with PT as soon as possible to discuss options at [phone].     Action Taken: Message routed to:  [facility] ([facility]): Leela

## 2019-04-01 ENCOUNTER — OFFICE VISIT (OUTPATIENT)
Dept: ENDOCRINOLOGY | Facility: CLINIC | Age: 49
End: 2019-04-01
Payer: COMMERCIAL

## 2019-04-01 VITALS
WEIGHT: 127 LBS | HEART RATE: 88 BPM | BODY MASS INDEX: 21.13 KG/M2 | SYSTOLIC BLOOD PRESSURE: 155 MMHG | DIASTOLIC BLOOD PRESSURE: 88 MMHG

## 2019-04-01 DIAGNOSIS — Z79.4 CONTROLLED TYPE 2 DIABETES MELLITUS WITH DIABETIC PERIPHERAL ANGIOPATHY WITHOUT GANGRENE, WITH LONG-TERM CURRENT USE OF INSULIN (H): Primary | ICD-10-CM

## 2019-04-01 DIAGNOSIS — E11.51 CONTROLLED TYPE 2 DIABETES MELLITUS WITH DIABETIC PERIPHERAL ANGIOPATHY WITHOUT GANGRENE, WITH LONG-TERM CURRENT USE OF INSULIN (H): Primary | ICD-10-CM

## 2019-04-01 LAB — HBA1C MFR BLD: 9.1 % (ref 4.3–6)

## 2019-04-01 ASSESSMENT — PAIN SCALES - GENERAL: PAINLEVEL: WORST PAIN (10)

## 2019-04-01 NOTE — LETTER
4/1/2019       RE: Karen Braxton  [address]  [place] [address]     Dear Colleague,    Thank you for referring your patient, Karen Braxton, to the [facility] END[facility] at [facility]. Please see a copy of my visit note below.    HPI:   Karen is a 49 yo woman here for follow up of type 2 diabetes that she has had since around 2003.  She was seen without an  today.  She doesn't think she needs one.   She sees Dr. Engel for PC and also sees Dr. Phillips and Micaela Juarez.   This is my first time seeing her.  She reports that her blood sugars have been hard to control over the past few months.  Unfortunately,  She lost her insurance in January and at that time she fell backwards and injured her right shoulder and broke her forearm.  She did not go in for evaluation until 5 weeks later. Her arm feels better, but her shoulder has been quite bothersome.  She has an appointment for ortho in a few days.  She thinks this is why her sugars have been higher.     Karen is now taking  basaglar 50 units at hs, actos 45 mg a day, invokana 300 mg a day,  Metformin 500 bid  and humalog 30 units at breakfast and 35 units with  lunch and 30 with  her evening meal.  She does not miss doses often. She wears a shauna CGM and finds it is very helpful.  She forgot to bring the  today.       She is from [place].  She has been here since 1987.  She was in [facility] prior to that.  She has 5 children.  Her youngest child is 18 years old. She is .  Her children live with her but are rarely there.  She does not like to be home alone much, so she keeps busy outside of the home.   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  She works ~ 7 hrs a day 4 days a week.     She saw eye MD earlier last year and has no retinopathy.  She has no paresthesias in her feet. She has no other concerns today.       Past Medical History:   Diagnosis Date      Arthritis      ASCUS favor benign 2012    neg HPV  Plan cotest in 3 yrs.     Diabetes (H)      Hypertension        Past Surgical History:   Procedure Laterality Date     CHOLECYSTECTOMY  9/14/2007    laproscopic     FACIAL RECONSTRUCTION SURGERY  2 years old    cleft lip repair     ORTHOPEDIC SURGERY  6/2001    left knee       Family History   Problem Relation Age of Onset     Diabetes Mother      Hypertension Mother      Heart Disease Mother      Lipids Mother      Asthma Mother      Diabetes Father      Hypertension Father      Cancer No family hx of         No known family hx of skin cancer     Coronary Artery Disease No family hx of      Hyperlipidemia No family hx of      Cerebrovascular Disease No family hx of      Breast Cancer No family hx of      Colon Cancer No family hx of      Prostate Cancer No family hx of      Other Cancer No family hx of      Depression No family hx of      Anxiety Disorder No family hx of      Mental Illness No family hx of      Substance Abuse No family hx of      Anesthesia Reaction No family hx of      Osteoporosis No family hx of      Genetic Disorder No family hx of      Thyroid Disease No family hx of      Obesity No family hx of      Unknown/Adopted No family hx of        Social History     Socioeconomic History     Marital status:      Spouse name: None     Number of children: None     Years of education: None     Highest education level: None   Occupational History     None   Social Needs     Financial resource strain: None     Food insecurity:     Worry: None     Inability: None     Transportation needs:     Medical: None     Non-medical: None   Tobacco Use     Smoking status: Current Some Day Smoker     Types: Cigarettes     Smokeless tobacco: Never Used     Tobacco comment: 3 cigarettes daily   Substance and Sexual Activity     Alcohol use: No     Alcohol/week: 0.0 oz     Drug use: No     Sexual activity: Yes     Partners: Male   Lifestyle     Physical activity:  "    Days per week: None     Minutes per session: None     Stress: None   Relationships     Social connections:     Talks on phone: None     Gets together: None     Attends [employer] service: None     Active member of club or organization: None     Attends meetings of clubs or organizations: None     Relationship status: None     Intimate partner violence:     Fear of current or ex partner: None     Emotionally abused: None     Physically abused: None     Forced sexual activity: None   Other Topics Concern     Parent/sibling w/ CABG, MI or angioplasty before 65F 55M? No   Social History Narrative     None       Current Outpatient Medications   Medication     albuterol (PROAIR HFA/PROVENTIL HFA/VENTOLIN HFA) 108 (90 Base) MCG/ACT inhaler     alum & mag hydroxide-simethicone (ANTACID) 200-200-20 MG/5ML SUSP suspension     aspirin 81 MG EC tablet     ASPIRIN NOT PRESCRIBED (INTENTIONAL)     atorvastatin (LIPITOR) 80 MG tablet     blood glucose (FREESTYLE PRECISION CYNTHIA TEST) test strip     blood glucose (NO BRAND SPECIFIED) test strip     blood glucose monitoring (JONATHAN MICROLET) lancets     calcium carbonate 600 mg-vitamin D 400 units (CALTRATE) 600-400 MG-UNIT per tablet     canagliflozin (INVOKANA) 300 MG tablet     colesevelam (WELCHOL) 3.75 g PACK Packet     Continuous Blood Gluc  (FREESTYLE RAHEEM READER) DENNISE     continuous blood glucose monitoring (FREESTYLE RAHEEM) sensor     continuous blood glucose monitoring (FREESTYLE RAHEEM) sensor     ezetimibe (ZETIA) 10 MG tablet     hydrOXYzine (ATARAX) 25 MG tablet     insulin glargine (BASAGLAR KWIKPEN) 100 UNIT/ML pen     insulin lispro (HUMALOG KWIKPEN) 100 UNIT/ML pen     insulin pen needle (B-D U/F) 31G X 8 MM miscellaneous     insulin syringe-needle U-100 (BD INSULIN SYRINGE ULTRAFINE) 30G X 1/2\" 1 ML     lisinopril (PRINIVIL/ZESTRIL) 2.5 MG tablet     meloxicam (MOBIC) 15 MG tablet     metFORMIN (GLUCOPHAGE) 1000 MG tablet     pioglitazone (ACTOS) 45 MG " tablet     Urea 20 % CREA cream     nicotine (NICOTROL) 10 MG Inhaler     vitamin D (ERGOCALCIFEROL) 80366 UNIT capsule     No current facility-administered medications for this visit.         No Known Allergies    Physical Exam  /88   Pulse 88   Wt 57.6 kg (127 lb)   LMP  (LMP Unknown)   BMI 21.13 kg/m     GENERAL:  Alert and oriented X3, NAD, well dressed, answering questions appropriately, appears stated age.  EXTREMITIES: no edema, +pulses, no rashes, no lesions    RESULTS  Lab Results   Component Value Date    A1C 8.9 (H) 09/17/2018    A1C 8.4 (H) 07/09/2018    A1C 8.5 (H) 03/16/2018    A1C 8.2 (H) 03/27/2017    A1C 8.1 (H) 11/14/2016    HEMOGLOBINA1 9.1 (A) 04/01/2019    HEMOGLOBINA1 8.8 (A) 12/18/2018    HEMOGLOBINA1 8.6 (A) 06/05/2018    HEMOGLOBINA1 8.5 (A) 03/08/2018    HEMOGLOBINA1 8.2 (A) 12/05/2017       TSH   Date Value Ref Range Status   03/16/2018 2.12 0.40 - 4.00 mU/L Final   03/21/2016 1.31 0.40 - 5.00 mU/L Final   03/18/2014 1.55 0.4 - 5.0 mU/L Final   07/24/2013 1.95 0.4 - 5.0 mU/L Final   08/04/2011 1.48 0.4 - 5.0 mU/L Final     T4 Free   Date Value Ref Range Status   08/04/2011 1.22 0.70 - 1.85 ng/dL Final       ALT   Date Value Ref Range Status   03/16/2018 16 0 - 50 U/L Final   03/21/2016 30 0 - 50 U/L Final   ]    Recent Labs   Lab Test 03/16/18  1211 03/27/17  1442  12/01/14  1222 07/30/14  0829   CHOL 229* 242*   < > 215* 237*   HDL 43* 48*   < > 36* 39*   * 160*   < > 114 146*   TRIG 183* 171*   < > 323* 261*   CHOLHDLRATIO  --   --   --  6.0* 6.1*    < > = values in this interval not displayed.       Lab Results   Component Value Date     07/09/2018      Lab Results   Component Value Date    POTASSIUM 3.6 07/09/2018     Lab Results   Component Value Date    CHLORIDE 105 07/09/2018     Lab Results   Component Value Date    MARLEEN 8.6 07/09/2018     Lab Results   Component Value Date    CO2 21 07/09/2018     Lab Results   Component Value Date    BUN 16 07/09/2018      Lab Results   Component Value Date    CR 0.66 07/09/2018       GFR Estimate   Date Value Ref Range Status   07/09/2018 >90 >60 mL/min/1.7m2 Final     Comment:     Non  GFR Calc   03/16/2018 >90 >60 mL/min/1.7m2 Final     Comment:     Non  GFR Calc   11/14/2016 73 >60 mL/min/1.7m2 Final     GFR Estimate If Black   Date Value Ref Range Status   07/09/2018 >90 >60 mL/min/1.7m2 Final     Comment:      GFR Calc   03/16/2018 >90 >60 mL/min/1.7m2 Final     Comment:      GFR Calc   11/14/2016 89 >60 mL/min/1.7m2 Final       Lab Results   Component Value Date    MICROL 12 03/16/2018     No results found for: MICROALBUMIN  No results found for: CPEPT, GADAB, ISCAB    No results found for: B12]    Most recent eye exam date: : 07/09/2018       Assessment/Plan:     1.  Type 2 diabetes- This is poorly controlled based upon her a1c if 9.1% today.  She feels this is due to her pain in her shoulder. Unfortunately, I have no glucose data to review at our meeting.  Patient will return with her meter tomorrow and I will call her to make recommendations.  She did not increase her basaglar as recommended at her last visit and I suspect she may have been having hypoglycemia (she actually reduced it from 60 units to 50 units).  Will review her data tomorrow.     2.  Risk factors-     Retinopathy:  No.  Had eye exam within last year.   Nephropathy:  BP elevated today.  Was ok at last visit.  Will watch.  She has no microalbuminuria.  Creatinine stable.   Neuropathy: No.    Feet: OK, no ulcers.   Lipids:  LDL at target.  Patient taking statin    3.  F/U in 3 months with Dr. Phillips.     I spent 35 minutes with this patient face to face and explained the conditions and plans (more than 50% of time was counseling/coordination of care, diabetes management, follow up plan for worsening hyper and hypoglycemia) . The patient understood and is satisfied with today's visit.       Vania De Jesus PA-C, MPAS   [facility]  Department of Medicine  Division of Endocrinology and Diabetes

## 2019-04-01 NOTE — PROGRESS NOTES
HPI:   Karen is a 49 yo woman here for follow up of type 2 diabetes that she has had since around 2003.  She was seen without an  today.  She doesn't think she needs one.   She sees Dr. Engel for PC and also sees Dr. Phillips and Micaela Juarez.   This is my first time seeing her.  She reports that her blood sugars have been hard to control over the past few months.  Unfortunately,  She lost her insurance in January and at that time she fell backwards and injured her right shoulder and broke her forearm.  She did not go in for evaluation until 5 weeks later. Her arm feels better, but her shoulder has been quite bothersome.  She has an appointment for ortho in a few days.  She thinks this is why her sugars have been higher.     Karen is now taking  basaglar 50 units at hs, actos 45 mg a day, invokana 300 mg a day,  Metformin 500 bid  and humalog 30 units at breakfast and 35 units with  lunch and 30 with  her evening meal.  She does not miss doses often. She wears a shauna CGM and finds it is very helpful.  She forgot to bring the  today.       She is from [facility] originally.  She has been here since 1987.  She was in [facility] prior to that.  She has 5 children.  Her youngest child is 18 years old. She is .  Her children live with her but are rarely there.  She does not like to be home alone much, so she keeps busy outside of the home.   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  She works ~ 7 hrs a day 4 days a week.     She saw eye MD earlier last year and has no retinopathy.  She has no paresthesias in her feet. She has no other concerns today.       Past Medical History:   Diagnosis Date     Arthritis      ASCUS favor benign 2012    neg HPV  Plan cotest in 3 yrs.     Diabetes (H)      Hypertension        Past Surgical History:   Procedure Laterality Date     CHOLECYSTECTOMY  9/14/2007    laproscopic     FACIAL RECONSTRUCTION SURGERY  2 years old    cleft lip  repair     ORTHOPEDIC SURGERY  6/2001    left knee       Family History   Problem Relation Age of Onset     Diabetes Mother      Hypertension Mother      Heart Disease Mother      Lipids Mother      Asthma Mother      Diabetes Father      Hypertension Father      Cancer No family hx of         No known family hx of skin cancer     Coronary Artery Disease No family hx of      Hyperlipidemia No family hx of      Cerebrovascular Disease No family hx of      Breast Cancer No family hx of      Colon Cancer No family hx of      Prostate Cancer No family hx of      Other Cancer No family hx of      Depression No family hx of      Anxiety Disorder No family hx of      Mental Illness No family hx of      Substance Abuse No family hx of      Anesthesia Reaction No family hx of      Osteoporosis No family hx of      Genetic Disorder No family hx of      Thyroid Disease No family hx of      Obesity No family hx of      Unknown/Adopted No family hx of        Social History     Socioeconomic History     Marital status:      Spouse name: None     Number of children: None     Years of education: None     Highest education level: None   Occupational History     None   Social Needs     Financial resource strain: None     Food insecurity:     Worry: None     Inability: None     Transportation needs:     Medical: None     Non-medical: None   Tobacco Use     Smoking status: Current Some Day Smoker     Types: Cigarettes     Smokeless tobacco: Never Used     Tobacco comment: 3 cigarettes daily   Substance and Sexual Activity     Alcohol use: No     Alcohol/week: 0.0 oz     Drug use: No     Sexual activity: Yes     Partners: Male   Lifestyle     Physical activity:     Days per week: None     Minutes per session: None     Stress: None   Relationships     Social connections:     Talks on phone: None     Gets together: None     Attends [employer] service: None     Active member of club or organization: None     Attends meetings of  "clubs or organizations: None     Relationship status: None     Intimate partner violence:     Fear of current or ex partner: None     Emotionally abused: None     Physically abused: None     Forced sexual activity: None   Other Topics Concern     Parent/sibling w/ CABG, MI or angioplasty before 65F 55M? No   Social History Narrative     None       Current Outpatient Medications   Medication     albuterol (PROAIR HFA/PROVENTIL HFA/VENTOLIN HFA) 108 (90 Base) MCG/ACT inhaler     alum & mag hydroxide-simethicone (ANTACID) 200-200-20 MG/5ML SUSP suspension     aspirin 81 MG EC tablet     ASPIRIN NOT PRESCRIBED (INTENTIONAL)     atorvastatin (LIPITOR) 80 MG tablet     blood glucose (FREESTYLE PRECISION CYNTHIA TEST) test strip     blood glucose (NO BRAND SPECIFIED) test strip     blood glucose monitoring (BoujuET) lancets     calcium carbonate 600 mg-vitamin D 400 units (CALTRATE) 600-400 MG-UNIT per tablet     canagliflozin (INVOKANA) 300 MG tablet     colesevelam (WELCHOL) 3.75 g PACK Packet     Continuous Blood Gluc  (FREESTYLE RAHEEM READER) DENNISE     continuous blood glucose monitoring ([employer]STYLE RAHEEM) sensor     continuous blood glucose monitoring ([employer]STYLE RAHEEM) sensor     ezetimibe (ZETIA) 10 MG tablet     hydrOXYzine (ATARAX) 25 MG tablet     insulin glargine (BASAGLAR KWIKPEN) 100 UNIT/ML pen     insulin lispro (HUMALOG KWIKPEN) 100 UNIT/ML pen     insulin pen needle (B-D U/F) 31G X 8 MM miscellaneous     insulin syringe-needle U-100 (BD INSULIN SYRINGE ULTRAFINE) 30G X 1/2\" 1 ML     lisinopril (PRINIVIL/ZESTRIL) 2.5 MG tablet     meloxicam (MOBIC) 15 MG tablet     metFORMIN (GLUCOPHAGE) 1000 MG tablet     pioglitazone (ACTOS) 45 MG tablet     Urea 20 % CREA cream     nicotine (NICOTROL) 10 MG Inhaler     vitamin D (ERGOCALCIFEROL) 08671 UNIT capsule     No current facility-administered medications for this visit.         No Known Allergies    Physical Exam  /88   Pulse 88   Wt 57.6 kg (127 " lb)   LMP  (LMP Unknown)   BMI 21.13 kg/m    GENERAL:  Alert and oriented X3, NAD, well dressed, answering questions appropriately, appears stated age.  EXTREMITIES: no edema, +pulses, no rashes, no lesions    RESULTS  Lab Results   Component Value Date    A1C 8.9 (H) 09/17/2018    A1C 8.4 (H) 07/09/2018    A1C 8.5 (H) 03/16/2018    A1C 8.2 (H) 03/27/2017    A1C 8.1 (H) 11/14/2016    HEMOGLOBINA1 9.1 (A) 04/01/2019    HEMOGLOBINA1 8.8 (A) 12/18/2018    HEMOGLOBINA1 8.6 (A) 06/05/2018    HEMOGLOBINA1 8.5 (A) 03/08/2018    HEMOGLOBINA1 8.2 (A) 12/05/2017       TSH   Date Value Ref Range Status   03/16/2018 2.12 0.40 - 4.00 mU/L Final   03/21/2016 1.31 0.40 - 5.00 mU/L Final   03/18/2014 1.55 0.4 - 5.0 mU/L Final   07/24/2013 1.95 0.4 - 5.0 mU/L Final   08/04/2011 1.48 0.4 - 5.0 mU/L Final     T4 Free   Date Value Ref Range Status   08/04/2011 1.22 0.70 - 1.85 ng/dL Final       ALT   Date Value Ref Range Status   03/16/2018 16 0 - 50 U/L Final   03/21/2016 30 0 - 50 U/L Final   ]    Recent Labs   Lab Test 03/16/18  1211 03/27/17  1442  12/01/14  1222 07/30/14  0829   CHOL 229* 242*   < > 215* 237*   HDL 43* 48*   < > 36* 39*   * 160*   < > 114 146*   TRIG 183* 171*   < > 323* 261*   CHOLHDLRATIO  --   --   --  6.0* 6.1*    < > = values in this interval not displayed.       Lab Results   Component Value Date     07/09/2018      Lab Results   Component Value Date    POTASSIUM 3.6 07/09/2018     Lab Results   Component Value Date    CHLORIDE 105 07/09/2018     Lab Results   Component Value Date    MARLEEN 8.6 07/09/2018     Lab Results   Component Value Date    CO2 21 07/09/2018     Lab Results   Component Value Date    BUN 16 07/09/2018     Lab Results   Component Value Date    CR 0.66 07/09/2018       GFR Estimate   Date Value Ref Range Status   07/09/2018 >90 >60 mL/min/1.7m2 Final     Comment:     Non  GFR Calc   03/16/2018 >90 >60 mL/min/1.7m2 Final     Comment:     Non  GFR  Calc   11/14/2016 73 >60 mL/min/1.7m2 Final     GFR Estimate If Black   Date Value Ref Range Status   07/09/2018 >90 >60 mL/min/1.7m2 Final     Comment:      GFR Calc   03/16/2018 >90 >60 mL/min/1.7m2 Final     Comment:      GFR Calc   11/14/2016 89 >60 mL/min/1.7m2 Final       Lab Results   Component Value Date    MICROL 12 03/16/2018     No results found for: MICROALBUMIN  No results found for: CPEPT, GADAB, ISCAB    No results found for: B12]    Most recent eye exam date: : 07/09/2018       Assessment/Plan:     1.  Type 2 diabetes- This is poorly controlled based upon her a1c if 9.1% today.  She feels this is due to her pain in her shoulder. Unfortunately, I have no glucose data to review at our meeting.  Patient will return with her meter tomorrow and I will call her to make recommendations.  She did not increase her basaglar as recommended at her last visit and I suspect she may have been having hypoglycemia (she actually reduced it from 60 units to 50 units).  Will review her data tomorrow.     2.  Risk factors-     Retinopathy:  No.  Had eye exam within last year.   Nephropathy:  BP elevated today.  Was ok at last visit.  Will watch.  She has no microalbuminuria.  Creatinine stable.   Neuropathy: No.    Feet: OK, no ulcers.   Lipids:  LDL at target.  Patient taking statin    3.  F/U in 3 months with Dr. Phillips.     I spent 35 minutes with this patient face to face and explained the conditions and plans (more than 50% of time was counseling/coordination of care, diabetes management, follow up plan for worsening hyper and hypoglycemia) . The patient understood and is satisfied with today's visit.      Vania De Jesus PA-C, MPAS   [facility]  Department of Medicine  Division of Endocrinology and Diabetes    Addendum: 4/4/19.  Patient dropped off her shauna for review.  Download shows an overall average of 203 mg/dL with a large spike after breakfast.  Overnight glucose  is quite stable.  I called patient and told her to continue basaglar 50 units, but to increase her breakfast Novolog from 30 units to 35 units.  No other changes.  She will let us know if she develops hypoglycemia.

## 2019-04-08 DIAGNOSIS — K21.9 GASTROESOPHAGEAL REFLUX DISEASE WITHOUT ESOPHAGITIS: ICD-10-CM

## 2019-04-08 DIAGNOSIS — I10 HYPERTENSION GOAL BP (BLOOD PRESSURE) < 140/80: ICD-10-CM

## 2019-04-08 NOTE — LETTER
[address]  [address]  [facility] [address]  Phone: [phone]  Fax: [phone]       April 10, 2019         Karen Braxton  [address] [place] [id number]            Dear Karen:    We are concerned about your health care.  We recently provided you with medication refills.  Many medications require routine follow-up with your doctor. You are due for a physical with fasting labs    Your prescription(s) have been refilled for a 30 day supply so you may have time for the above noted follow-up. Please call to schedule soon so we can assure you have an appointment before your next refills are needed.      Thank you,  [facility]

## 2019-04-09 NOTE — TELEPHONE ENCOUNTER
Requested Prescriptions   Pending Prescriptions Disp Refills     pioglitazone (ACTOS) 45 MG tablet 90 tablet 2     Sig: Take 1 tablet (45 mg) by mouth daily    Last Written Prescription Date:  4/16/2018  Last Fill Quantity: 90 tablet,  # refills: 2   Last office visit: 2/18/2019 with prescribing provider:  PAULINO Engel   Future Office Visit:           Thiazolidinedione Agents (TZDs)  Failed - 4/8/2019  2:26 PM        Failed - Blood pressure less than 140/90 in past 6 months     BP Readings from Last 3 Encounters:   04/01/19 155/88   02/18/19 128/74   12/18/18 142/84                 Failed - Patient has documented LDL within the past 12 mos.     Recent Labs   Lab Test 03/16/18  1211   *             Failed - Patient has a normal ALT within the past 12 mos.     Recent Labs   Lab Test 03/16/18  1211   ALT 16             Failed - Patient has a normal AST within the past 12 mos.      Recent Labs   Lab Test 01/09/15  1656   AST 17             Passed - Patient has had a Microalbumin in the past 12 mos.     Recent Labs   Lab Test 03/16/18  1212  07/16/12  1539   [id number]  --   --  5.0   [id number]  --   --  13.3   MICROL 12   < >  --    UMALCR 24.21   < >  --     < > = values in this interval not displayed.             Passed - Patient has documented A1c within the specified period of time.     If HgbA1C is 8 or greater, it needs to be on file within the past 3 months.  If less than 8, must be on file within the past 6 months.     Recent Labs   Lab Test 04/01/19 09/17/18  1014   A1C  --   --  8.9*   HEMOGLOBINA1 9.1*   < >  --     < > = values in this interval not displayed.             Passed - Diagnosis not CHF        Passed - Medication is active on med list        Passed - Patient is age 18 or older        Passed - Patient is not pregnant        Passed - Patient has a normal serum Creatinine in the past 12 months     Recent Labs   Lab Test 07/09/18  0819   CR 0.66             Passed - Patient has not had a positive  "pregnancy test within the past 12 mos.        Passed - Recent (6 mo) or future (30 days) visit within the authorizing provider's specialty     Patient had office visit in the last 6 months or has a visit in the next 30 days with authorizing provider or within the authorizing provider's specialty.  See \"Patient Info\" tab in inbasket, or \"Choose Columns\" in Meds & Orders section of the refill encounter.            lisinopril (PRINIVIL/ZESTRIL) 2.5 MG tablet 90 tablet 2     Sig: Take 1 tablet (2.5 mg) by mouth daily    Last Written Prescription Date:  4/16/2018  Last Fill Quantity: 90 tablet,  # refills: 2   Last office visit: 2/18/2019 with prescribing provider:  PAULINO Engel   Future Office Visit:           ACE Inhibitors (Including Combos) Protocol Failed - 4/8/2019  2:26 PM        Failed - Blood pressure under 140/90 in past 12 months     BP Readings from Last 3 Encounters:   04/01/19 155/88   02/18/19 128/74   12/18/18 142/84                 Passed - Recent (12 mo) or future (30 days) visit within the authorizing provider's specialty     Patient had office visit in the last 12 months or has a visit in the next 30 days with authorizing provider or within the authorizing provider's specialty.  See \"Patient Info\" tab in inbasket, or \"Choose Columns\" in Meds & Orders section of the refill encounter.              Passed - Medication is active on med list        Passed - Patient is age 18 or older        Passed - No active pregnancy on record        Passed - Normal serum creatinine on file in past 12 months     Recent Labs   Lab Test 07/09/18  0819   CR 0.66             Passed - Normal serum potassium on file in past 12 months     Recent Labs   Lab Test 07/09/18  0819   POTASSIUM 3.6             Passed - No positive pregnancy test within past 12 months        alum & mag hydroxide-simethicone (ANTACID) 200-200-20 MG/5ML SUSP suspension      Last Written Prescription Date:  3/13/2018  Last Fill Quantity: 355 mL,  # " refills: 11   Last office visit: 2/18/2019 with prescribing provider:  PAULINO Engel   Future Office Visit:          Routing refill request to provider for review/approval because:  Drug not on the [facility], [facility] or [facility] refill protocol or controlled substance

## 2019-04-10 RX ORDER — LISINOPRIL 2.5 MG/1
2.5 TABLET ORAL DAILY
Qty: 30 TABLET | Refills: 0 | Status: SHIPPED | OUTPATIENT
Start: 2019-04-10 | End: 2019-05-14

## 2019-04-10 RX ORDER — MAGNESIUM HYDROXIDE/ALUMINUM HYDROXICE/SIMETHICONE 120; 1200; 1200 MG/30ML; MG/30ML; MG/30ML
15 SUSPENSION ORAL EVERY 4 HOURS PRN
Qty: 150 ML | Refills: 0 | Status: SHIPPED | OUTPATIENT
Start: 2019-04-10 | End: 2019-06-14

## 2019-04-10 RX ORDER — PIOGLITAZONEHYDROCHLORIDE 45 MG/1
45 TABLET ORAL DAILY
Qty: 30 TABLET | Refills: 0 | Status: SHIPPED | OUTPATIENT
Start: 2019-04-10 | End: 2019-05-14

## 2019-04-10 NOTE — TELEPHONE ENCOUNTER
Medication is being filled for 1 time refill only due to:  Patient needs to be seen because due for physical with fasting labs.    Sent letter.    Isidra Sloan RN

## 2019-04-16 ENCOUNTER — TRANSFERRED RECORDS (OUTPATIENT)
Dept: HEALTH INFORMATION MANAGEMENT | Facility: CLINIC | Age: 49
End: 2019-04-16

## 2019-05-11 DIAGNOSIS — E78.5 HYPERLIPIDEMIA LDL GOAL <100: Chronic | ICD-10-CM

## 2019-05-11 NOTE — TELEPHONE ENCOUNTER
"Requested Prescriptions   Pending Prescriptions Disp Refills     atorvastatin (LIPITOR) 80 MG tablet  Last Written Prescription Date:  04/16/2018  Last Fill Quantity: 90,  # refills: 3   Last office visit: 2/18/2019 with prescribing provider:  PAULINO ALCANTAR    Future Office Visit:     90 tablet 3     Sig: Take 1 tablet (80 mg) by mouth daily       Statins Protocol Failed - 5/11/2019  8:52 AM        Failed - LDL on file in past 12 months     Recent Labs   Lab Test 03/16/18  1211   *             Passed - No abnormal creatine kinase in past 12 months     Recent Labs   Lab Test 03/18/14  1518   CKT 59                Passed - Recent (12 mo) or future (30 days) visit within the authorizing provider's specialty     Patient had office visit in the last 12 months or has a visit in the next 30 days with authorizing provider or within the authorizing provider's specialty.  See \"Patient Info\" tab in inbasket, or \"Choose Columns\" in Meds & Orders section of the refill encounter.              Passed - Medication is active on med list        Passed - Patient is age 18 or older        Passed - No active pregnancy on record        Passed - No positive pregnancy test in past 12 months          "

## 2019-05-14 RX ORDER — ATORVASTATIN CALCIUM 80 MG/1
80 TABLET, FILM COATED ORAL DAILY
Qty: 90 TABLET | Refills: 2 | Status: SHIPPED | OUTPATIENT
Start: 2019-05-14 | End: 2019-07-30

## 2019-05-31 PROBLEM — M25.531 RIGHT WRIST PAIN: Status: RESOLVED | Noted: 2019-02-23 | Resolved: 2019-05-31

## 2019-05-31 PROBLEM — S69.91XA WRIST INJURY, RIGHT, INITIAL ENCOUNTER: Status: RESOLVED | Noted: 2019-02-23 | Resolved: 2019-05-31

## 2019-06-12 DIAGNOSIS — E55.9 VITAMIN D DEFICIENCY: ICD-10-CM

## 2019-06-12 DIAGNOSIS — I10 HYPERTENSION GOAL BP (BLOOD PRESSURE) < 140/80: ICD-10-CM

## 2019-06-12 DIAGNOSIS — K21.9 GASTROESOPHAGEAL REFLUX DISEASE WITHOUT ESOPHAGITIS: ICD-10-CM

## 2019-06-12 DIAGNOSIS — E78.5 HYPERLIPIDEMIA WITH TARGET LDL LESS THAN 100: Primary | ICD-10-CM

## 2019-06-14 RX ORDER — LISINOPRIL 2.5 MG/1
2.5 TABLET ORAL DAILY
Qty: 30 TABLET | Refills: 0 | Status: SHIPPED | OUTPATIENT
Start: 2019-06-14 | End: 2019-07-11

## 2019-06-14 RX ORDER — MAGNESIUM HYDROXIDE/ALUMINUM HYDROXICE/SIMETHICONE 120; 1200; 1200 MG/30ML; MG/30ML; MG/30ML
15 SUSPENSION ORAL EVERY 4 HOURS PRN
Qty: 150 ML | Refills: 0 | Status: SHIPPED | OUTPATIENT
Start: 2019-06-14 | End: 2019-07-30

## 2019-06-14 RX ORDER — ERGOCALCIFEROL 1.25 MG/1
50000 CAPSULE, LIQUID FILLED ORAL
Qty: 8 CAPSULE | Refills: 6 | Status: SHIPPED | OUTPATIENT
Start: 2019-06-14

## 2019-06-14 RX ORDER — PIOGLITAZONEHYDROCHLORIDE 45 MG/1
45 TABLET ORAL DAILY
Qty: 30 TABLET | Refills: 0 | Status: SHIPPED | OUTPATIENT
Start: 2019-06-14 | End: 2019-07-11

## 2019-06-14 NOTE — TELEPHONE ENCOUNTER
Routing refill request to provider for review/approval because:    Actos: BP out of range. (labs due and pended for provider: LDL, AST, ALT, Microalbumin)    Antacid:Drug not on the [facility] refill protocol     Lisinopril: BP is out of range.     Vitamin D2: Drug not on the [facility] refill protocol

## 2019-06-18 ENCOUNTER — OFFICE VISIT (OUTPATIENT)
Dept: ENDOCRINOLOGY | Facility: CLINIC | Age: 49
End: 2019-06-18
Payer: COMMERCIAL

## 2019-06-18 VITALS
WEIGHT: 124 LBS | HEART RATE: 77 BPM | DIASTOLIC BLOOD PRESSURE: 75 MMHG | HEIGHT: 65 IN | SYSTOLIC BLOOD PRESSURE: 117 MMHG | BODY MASS INDEX: 20.66 KG/M2

## 2019-06-18 DIAGNOSIS — Z79.4 TYPE 2 DIABETES MELLITUS WITHOUT COMPLICATION, WITH LONG-TERM CURRENT USE OF INSULIN (H): Primary | ICD-10-CM

## 2019-06-18 DIAGNOSIS — E11.9 TYPE 2 DIABETES MELLITUS WITHOUT COMPLICATION, WITH LONG-TERM CURRENT USE OF INSULIN (H): ICD-10-CM

## 2019-06-18 DIAGNOSIS — Z79.4 TYPE 2 DIABETES MELLITUS WITHOUT COMPLICATION, WITH LONG-TERM CURRENT USE OF INSULIN (H): ICD-10-CM

## 2019-06-18 DIAGNOSIS — E11.9 TYPE 2 DIABETES MELLITUS WITHOUT COMPLICATION, WITH LONG-TERM CURRENT USE OF INSULIN (H): Primary | ICD-10-CM

## 2019-06-18 LAB
BASOPHILS # BLD AUTO: 0 10E9/L (ref 0–0.2)
BASOPHILS NFR BLD AUTO: 0.4 %
BUN SERPL-MCNC: 9 MG/DL (ref 7–30)
CHOLEST SERPL-MCNC: 250 MG/DL
CREAT SERPL-MCNC: 0.64 MG/DL (ref 0.52–1.04)
CREAT UR-MCNC: 62 MG/DL
DIFFERENTIAL METHOD BLD: NORMAL
EOSINOPHIL # BLD AUTO: 0.2 10E9/L (ref 0–0.7)
EOSINOPHIL NFR BLD AUTO: 3 %
ERYTHROCYTE [DISTWIDTH] IN BLOOD BY AUTOMATED COUNT: 13.5 % (ref 10–15)
GFR SERPL CREATININE-BSD FRML MDRD: >90 ML/MIN/{1.73_M2}
HBA1C MFR BLD: 7.7 % (ref 0–5.6)
HCT VFR BLD AUTO: 46.1 % (ref 35–47)
HDLC SERPL-MCNC: 54 MG/DL
HGB BLD-MCNC: 14.7 G/DL (ref 11.7–15.7)
IMM GRANULOCYTES # BLD: 0 10E9/L (ref 0–0.4)
IMM GRANULOCYTES NFR BLD: 0.1 %
LDLC SERPL CALC-MCNC: 152 MG/DL
LYMPHOCYTES # BLD AUTO: 3.9 10E9/L (ref 0.8–5.3)
LYMPHOCYTES NFR BLD AUTO: 56.4 %
MCH RBC QN AUTO: 29.6 PG (ref 26.5–33)
MCHC RBC AUTO-ENTMCNC: 31.9 G/DL (ref 31.5–36.5)
MCV RBC AUTO: 93 FL (ref 78–100)
MICROALBUMIN UR-MCNC: 8 MG/L
MICROALBUMIN/CREAT UR: 12.69 MG/G CR (ref 0–25)
MONOCYTES # BLD AUTO: 0.5 10E9/L (ref 0–1.3)
MONOCYTES NFR BLD AUTO: 7.7 %
NEUTROPHILS # BLD AUTO: 2.3 10E9/L (ref 1.6–8.3)
NEUTROPHILS NFR BLD AUTO: 32.4 %
NONHDLC SERPL-MCNC: 196 MG/DL
NRBC # BLD AUTO: 0 10*3/UL
NRBC BLD AUTO-RTO: 0 /100
PLATELET # BLD AUTO: 219 10E9/L (ref 150–450)
RBC # BLD AUTO: 4.97 10E12/L (ref 3.8–5.2)
TRIGL SERPL-MCNC: 220 MG/DL
VIT B12 SERPL-MCNC: 341 PG/ML (ref 193–986)
WBC # BLD AUTO: 7 10E9/L (ref 4–11)

## 2019-06-18 RX ORDER — INSULIN ASPART 100 [IU]/ML
INJECTION, SOLUTION INTRAVENOUS; SUBCUTANEOUS
Qty: 90 ML | Refills: 3 | Status: SHIPPED | OUTPATIENT
Start: 2019-06-18 | End: 2020-03-31

## 2019-06-18 ASSESSMENT — MIFFLIN-ST. JEOR: SCORE: 1178.34

## 2019-06-18 ASSESSMENT — PAIN SCALES - GENERAL: PAINLEVEL: NO PAIN (0)

## 2019-06-18 NOTE — LETTER
6/18/2019       RE: Karen Braxton  [address]  [place] [address]     Dear Colleague,    Thank you for referring your patient, Karen Braxton, to the [facility] ENDOCRINOLOGY at [facility]. Please see a copy of my visit note below.    na    This 49 year old woman with a 10+  year history of type 2 diabetes is here for f/u.She was seen without an  today.  She doesn't think she needs one.  She sees Dr. Engel for PC and is co managed by me with Micaela Juarez.  She now is taking  basaglar 40 units at hs, actos 45 mg a day, invokana 300 mg a day,  Metformin 500 bid  and humalog 35 units at breakfast and 30 units with  lunch and 30 with  her evening meal.   She eats breakfast at home in the morning and always gets her insulin before that.  This is usually her largest meal of the day.  She will have a cup of milk at about 1 PM.  She does take her insulin before she drinks this.  She will have a small amount of pasta again with some milk before dinner.  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  She wears a shauna CGM and finds it is very helpful. .Over the last 2 weeks she wore it 79% of time.  27% of values are above 180, 35% are between .  . Denies GI pain, nausea, diarrhea, vaginal infections. Does have pain in her shoulders and feet.  Pain is worst when she wakes in AM and then goes away during the day. Did have steroid injection 2-3 mo ago into right shoulder that helped a lot.  Unfortunately, pain is now back.  Hasn't discussed this with her PCP.      She saw eye MD earlier this year and has no retinopathy.  She has no paresthesias in her feet.      Current Outpatient Medications on File Prior to Visit:  albuterol (PROAIR HFA/PROVENTIL HFA/VENTOLIN HFA) 108 (90 Base) MCG/ACT inhaler Inhale 2 puffs into the lungs every 6 hours as needed for shortness of breath / dyspnea or  wheezing   alum & mag hydroxide-simethicone (ANTACID) 200-200-20 MG/5ML SUSP suspension Take 15 mLs by mouth every 4 hours as needed for indigestion   aspirin 81 MG EC tablet Take 1 tablet (81 mg) by mouth daily   ASPIRIN NOT PRESCRIBED (INTENTIONAL) continuous prn for other Antiplatelet medication not prescribed intentionally due to Not indicated based on age/GI distress   atorvastatin (LIPITOR) 80 MG tablet Take 1 tablet (80 mg) by mouth daily   blood glucose (FREESTYLE PRECISION CYNTHIA TEST) test strip Use to test blood sugar 4 times daily or as directed. Use with Dodie system as needed.   blood glucose (NO BRAND SPECIFIED) test strip Use to test blood sugars qid  times daily or as directed   blood glucose monitoring (JONATHAN MICROLET) lancets Use to test blood sugar 4 times daily or as directed.   calcium carbonate 600 mg-vitamin D 400 units (CALTRATE) 600-400 MG-UNIT per tablet Take 1 tablet by mouth 2 times daily   canagliflozin (INVOKANA) 300 MG tablet Take 1 tablet (300 mg) by mouth every morning (before breakfast)   colesevelam (WELCHOL) 3.75 g PACK Packet Take 3.75 g by mouth daily (with breakfast)   Continuous Blood Gluc  (FREESTYLE DODIE READER) DENNISE 1 Device daily   continuous blood glucose monitoring (FREESTYLE DODIE) sensor For use with Freestyle Dodie  . Replace sensor every 10 days.   continuous blood glucose monitoring (FREESTYLE DODIE) sensor For use with Freestyle Dodie Flash  for continuous monitioring of blood glucose levels. Replace sensor every 10 days.   ezetimibe (ZETIA) 10 MG tablet Take 1 tablet (10 mg) by mouth daily   hydrOXYzine (ATARAX) 25 MG tablet Take 1 tablet (25 mg) by mouth every 8 hours as needed for anxiety   insulin lispro (HUMALOG KWIKPEN) 100 UNIT/ML pen 35 units before breakfast, 35 units before lunch, 35 units before dinner   insulin pen needle (B-D U/F) 31G X 8 MM miscellaneous TEST FOUR TIMES A DAY OR AS DIRECTED   insulin syringe-needle U-100 (BD  "INSULIN SYRINGE ULTRAFINE) 30G X 1/2\" 1 ML Use one syringe daily or as directed.  3 month supply   lisinopril (PRINIVIL/ZESTRIL) 2.5 MG tablet Take 1 tablet (2.5 mg) by mouth daily   meloxicam (MOBIC) 15 MG tablet Take 1 tablet (15 mg) by mouth daily   metFORMIN (GLUCOPHAGE) 1000 MG tablet TAKE ONE-HALF TABLET BY MOUTH TWICE DAILY WITH MEALS   nicotine (NICOTROL) 10 MG Inhaler Inhale 6-16 Cartridges into the lungs daily as needed for smoking cessation (Patient not taking: Reported on 4/1/2019)   pioglitazone (ACTOS) 45 MG tablet Take 1 tablet (45 mg) by mouth daily   Urea 20 % CREA cream Apply topically as needed   vitamin D2 (ERGOCALCIFEROL) 51712 units (1250 mcg) capsule Take 1 capsule (50,000 Units) by mouth every 7 days     No current facility-administered medications on file prior to visit.     ROS: 10 point ROS neg other than the symptoms noted above in the HPI.    So Hx - youngest child is now 19.  She is     Vital signs:   /75   Pulse 77   Ht 1.651 m (5' 5\")   Wt 56.2 kg (124 lb)   LMP  (LMP Unknown)   BMI 20.63 kg/m     Estimated body mass index is 20.63 kg/m  as calculated from the following:    Height as of this encounter: 1.651 m (5' 5\").    Weight as of this encounter: 56.2 kg (124 lb).   Weight is down about 8 kg in last year  VSS  NAD  Eyes - no periorbital edema, conjunctival injection, scleral icterus  CV - RRR.  Normal pulses in feet.  No edema  Neuro - sensation intact to monofilament on soles of feet.  DTR 2/4 biceps  Skin - normal texture   Feet - no ulcers     Recent Labs   Lab Test 06/18/19  1319 06/18/19  1314 04/01/19 12/18/18 09/17/18  1014 07/09/18  0819  03/16/18  1212 03/16/18  1211  03/21/16  1030  08/04/11  1620   A1C  --   --   --   --  8.9* 8.4*  --   --  8.5*   < > 8.1*   < >  --    HEMOGLOBINA1  --   --  9.1* 8.8*  --   --    < >  --   --    < >  --    < >  --    TSH  --   --   --   --   --   --   --   --  2.12  --  1.31   < > 1.48   T4  --   --   --   --   --   " --   --   --   --   --   --   --  1.22   *  --   --   --   --   --   --   --  149*   < > 142*   < >  --    HDL 54  --   --   --   --   --   --   --  43*   < > 44*   < >  --    TRIG 220*  --   --   --   --   --   --   --  183*   < > 223*   < >  --    CR 0.64  --   --   --   --  0.66  --   --  0.67   < > 0.70   < > 0.63   MICROL  --  8  --   --   --   --   --  12  --    < >  --    < >  --     < > = values in this interval not displayed.     Assessment and plan:    1.  Diabetes control.  Her glucose control by sensor looks way better than the A1c of 9.1 in April would suggest.  I will repeat it today.  Will also Check a fructosamine and her CBC to be certain that there is not some abnormality with her red cell turnover that would influence her A1c.  Based on her sensor A1c, I will not make any changes in her medications today.    2.  Diabetes complications.  I will check her renal function today.  Her feet are fine.  She has seen the eye doctor in the last year.    3.weight loss.  She has lost a substantial amount of weight over the last year or so.  I am sure this is contributed to her glucose control.  However, the cause of the weight loss is not clear to me.  I will communicate with her primary care doctor to see if he is concerned about this.    4.shoulder pain.  She will discuss this with her primary care doctor.    5.CVD risk.  Her blood pressure is okay today.  I will check her lipid panel.    F/u with Micaela Juarez in 3 mo and f/u with me in 6 mo      Melisa Phillips MD

## 2019-06-18 NOTE — Clinical Note
Hello  I saw our mutual patient this week.  Her A1c is down into the low 7s, which was a surprise to me.  I note that she has had substantial weight loss over the last year, which may be the cause of the improvement in her A1c.  It does not seem to me that she is really been working on weight loss so I thought I draw your attention to this. Aside from her joint pain she seems to be thriving.  Are you concerned about the weight loss?Carmelita Phillips

## 2019-06-19 LAB — FRUCTOSAMINE SERPL-SCNC: 300 UMOL/L (ref 170–285)

## 2019-06-20 ENCOUNTER — TELEPHONE (OUTPATIENT)
Dept: ENDOCRINOLOGY | Facility: CLINIC | Age: 49
End: 2019-06-20

## 2019-06-20 NOTE — TELEPHONE ENCOUNTER
Prior Authorization Retail Medication Request    Medication/Dose: insulin glargine (LANTUS SOLOSTAR PEN) 100 UNIT/ML  ICD code (if different than what is on RX):E11.65  Rationale: Uncontrolled type 2 diabetes mellitus without complication, with long-term current use of insulin    Insurance Name: [employer]  Insurance ID: [id number]     Pharmacy Information (if different than what is on RX)  [facility]  Phone: [phone]

## 2019-06-21 NOTE — PROGRESS NOTES
This 49 year old woman with a 10+  year history of type 2 diabetes is here for f/u.She was seen without an  today.  She doesn't think she needs one.  She sees Dr. Engel for PC and is co managed by me with Micaela Juarez.  She now is taking  basaglar 40 units at hs, actos 45 mg a day, invokana 300 mg a day,  Metformin 500 bid  and humalog 35 units at breakfast and 30 units with  lunch and 30 with  her evening meal.   She eats breakfast at home in the morning and always gets her insulin before that.  This is usually her largest meal of the day.  She will have a cup of milk at about 1 PM.  She does take her insulin before she drinks this.  She will have a small amount of pasta again with some milk before dinner.  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  She wears a shauna CGM and finds it is very helpful. .Over the last 2 weeks she wore it 79% of time.  27% of values are above 180, 35% are between .  . Denies GI pain, nausea, diarrhea, vaginal infections. Does have pain in her shoulders and feet.  Pain is worst when she wakes in AM and then goes away during the day. Did have steroid injection 2-3 mo ago into right shoulder that helped a lot.  Unfortunately, pain is now back.  Hasn't discussed this with her PCP.      She saw eye MD earlier this year and has no retinopathy.  She has no paresthesias in her feet.      Current Outpatient Medications on File Prior to Visit:  albuterol (PROAIR HFA/PROVENTIL HFA/VENTOLIN HFA) 108 (90 Base) MCG/ACT inhaler Inhale 2 puffs into the lungs every 6 hours as needed for shortness of breath / dyspnea or wheezing   alum & mag hydroxide-simethicone (ANTACID) 200-200-20 MG/5ML SUSP suspension Take 15 mLs by mouth every 4 hours as needed for indigestion   aspirin 81 MG EC tablet Take 1 tablet (81 mg) by mouth daily   ASPIRIN NOT PRESCRIBED (INTENTIONAL) continuous prn for other  "Antiplatelet medication not prescribed intentionally due to Not indicated based on age/GI distress   atorvastatin (LIPITOR) 80 MG tablet Take 1 tablet (80 mg) by mouth daily   blood glucose (FREESTYLE PRECISION CYNTHIA TEST) test strip Use to test blood sugar 4 times daily or as directed. Use with Dodie system as needed.   blood glucose (NO BRAND SPECIFIED) test strip Use to test blood sugars qid  times daily or as directed   blood glucose monitoring (JONATHAN MICROLET) lancets Use to test blood sugar 4 times daily or as directed.   calcium carbonate 600 mg-vitamin D 400 units (CALTRATE) 600-400 MG-UNIT per tablet Take 1 tablet by mouth 2 times daily   canagliflozin (INVOKANA) 300 MG tablet Take 1 tablet (300 mg) by mouth every morning (before breakfast)   colesevelam (WELCHOL) 3.75 g PACK Packet Take 3.75 g by mouth daily (with breakfast)   Continuous Blood Gluc  (FREESTYLE DODIE READER) DENNISE 1 Device daily   continuous blood glucose monitoring (FREESTYLE DODIE) sensor For use with Freestyle Dodie  . Replace sensor every 10 days.   continuous blood glucose monitoring (FREESTYLE DODIE) sensor For use with Freestyle Dodie Flash  for continuous monitioring of blood glucose levels. Replace sensor every 10 days.   ezetimibe (ZETIA) 10 MG tablet Take 1 tablet (10 mg) by mouth daily   hydrOXYzine (ATARAX) 25 MG tablet Take 1 tablet (25 mg) by mouth every 8 hours as needed for anxiety   insulin lispro (HUMALOG KWIKPEN) 100 UNIT/ML pen 35 units before breakfast, 35 units before lunch, 35 units before dinner   insulin pen needle (B-D U/F) 31G X 8 MM miscellaneous TEST FOUR TIMES A DAY OR AS DIRECTED   insulin syringe-needle U-100 (BD INSULIN SYRINGE ULTRAFINE) 30G X 1/2\" 1 ML Use one syringe daily or as directed.  3 month supply   lisinopril (PRINIVIL/ZESTRIL) 2.5 MG tablet Take 1 tablet (2.5 mg) by mouth daily   meloxicam (MOBIC) 15 MG tablet Take 1 tablet (15 mg) by mouth daily   metFORMIN (GLUCOPHAGE) " "1000 MG tablet TAKE ONE-HALF TABLET BY MOUTH TWICE DAILY WITH MEALS   nicotine (NICOTROL) 10 MG Inhaler Inhale 6-16 Cartridges into the lungs daily as needed for smoking cessation (Patient not taking: Reported on 4/1/2019)   pioglitazone (ACTOS) 45 MG tablet Take 1 tablet (45 mg) by mouth daily   Urea 20 % CREA cream Apply topically as needed   vitamin D2 (ERGOCALCIFEROL) 33206 units (1250 mcg) capsule Take 1 capsule (50,000 Units) by mouth every 7 days     No current facility-administered medications on file prior to visit.     ROS: 10 point ROS neg other than the symptoms noted above in the HPI.    So Hx - youngest child is now 19.  She is     Vital signs:   /75   Pulse 77   Ht 1.651 m (5' 5\")   Wt 56.2 kg (124 lb)   LMP  (LMP Unknown)   BMI 20.63 kg/m    Estimated body mass index is 20.63 kg/m  as calculated from the following:    Height as of this encounter: 1.651 m (5' 5\").    Weight as of this encounter: 56.2 kg (124 lb).   Weight is down about 8 kg in last year  VSS  NAD  Eyes - no periorbital edema, conjunctival injection, scleral icterus  CV - RRR.  Normal pulses in feet.  No edema  Neuro - sensation intact to monofilament on soles of feet.  DTR 2/4 biceps  Skin - normal texture   Feet - no ulcers     Recent Labs   Lab Test 06/18/19  1319 06/18/19  1314 04/01/19 12/18/18 09/17/18  1014 07/09/18  0819  03/16/18  1212 03/16/18  1211  03/21/16  1030  08/04/11  1620   A1C  --   --   --   --  8.9* 8.4*  --   --  8.5*   < > 8.1*   < >  --    HEMOGLOBINA1  --   --  9.1* 8.8*  --   --    < >  --   --    < >  --    < >  --    TSH  --   --   --   --   --   --   --   --  2.12  --  1.31   < > 1.48   T4  --   --   --   --   --   --   --   --   --   --   --   --  1.22   *  --   --   --   --   --   --   --  149*   < > 142*   < >  --    HDL 54  --   --   --   --   --   --   --  43*   < > 44*   < >  --    TRIG 220*  --   --   --   --   --   --   --  183*   < > 223*   < >  --    CR 0.64  --   --   " --   --  0.66  --   --  0.67   < > 0.70   < > 0.63   MICROL  --  8  --   --   --   --   --  12  --    < >  --    < >  --     < > = values in this interval not displayed.     Assessment and plan:    1.  Diabetes control.  Her glucose control by sensor looks way better than the A1c of 9.1 in April would suggest.  I will repeat it today.  Will also Check a fructosamine and her CBC to be certain that there is not some abnormality with her red cell turnover that would influence her A1c.  Based on her sensor A1c, I will not make any changes in her medications today.    2.  Diabetes complications.  I will check her renal function today.  Her feet are fine.  She has seen the eye doctor in the last year.    3.weight loss.  She has lost a substantial amount of weight over the last year or so.  I am sure this is contributed to her glucose control.  However, the cause of the weight loss is not clear to me.  I will communicate with her primary care doctor to see if he is concerned about this.    4.shoulder pain.  She will discuss this with her primary care doctor.    5.CVD risk.  Her blood pressure is okay today.  I will check her lipid panel.    F/u with Micaela Juarez in 3 mo and f/u with me in 6 mo      Melisa Phillips MD

## 2019-06-24 NOTE — TELEPHONE ENCOUNTER
Prior Authorization Approval    Authorization Effective Date: 5/25/2019  Authorization Expiration Date: 6/23/2020  Medication: insulin glargine (LANTUS SOLOSTAR PEN) 100 UNIT/ML-PA approved  Approved Dose/Quantity:   Reference #: [id number]   Insurance Company: [employer] - Phone [phone] Fax [phone]  Expected CoPay:       CoPay Card Available:      Foundation Assistance Needed:    Which Pharmacy is filling the prescription (Not needed for infusion/clinic administered[facility] [address] [place] [address] AT [facility] [address]  Pharmacy Notified: Yes  Patient Notified: No-Pharmacy will contact

## 2019-06-24 NOTE — TELEPHONE ENCOUNTER
Central Prior Authorization Team   Phone: [phone]      PA Initiation    Medication: insulin glargine (LANTUS SOLOSTAR PEN) 100 UNIT/ML-PA initiated  Insurance Company: [employer] - Phone [phone] Fax [phone]  [facility] the Rx[facility] [address] [place] [address] [address] AT [facility] OF [address]  Filling Pharmacy Phone: [phone]  Filling Pharmacy Fax:    Start Date: 6/24/2019

## 2019-06-29 NOTE — TELEPHONE ENCOUNTER
Requested Prescriptions   Pending Prescriptions Disp Refills     alum & mag hydroxide-simethicone (ANTACID) 200-200-20 MG/5ML SUSP suspension  Last Written Prescription Date:  5/11/17  Last Fill Quantity: 30 mL,  # refills: 11   Last office visit: 8/31/2017 with prescribing provider:  Maren   Future Office Visit:     30 mL 11     Sig: Take 30 mLs by mouth every 4 hours as needed for indigestion    There is no refill protocol information for this order        vitamin D (ERGOCALCIFEROL) 74544 UNIT capsule  Last Written Prescription Date:  7/12/16  Last Fill Quantity: 4,  # refills: 5   Last office visit: 8/31/2017 with prescribing provider:  Maren   Future Office Visit:     4 capsule 5     Sig: TAKE 1 CAPLET ONCE WEEKLY  Profile Rx: patient will contact pharmacy when needed    There is no refill protocol information for this order          
Routing refill request to provider for review/approval because:  Drug not on the [facility] refill protocol         
Universal Safety Interventions

## 2019-07-02 ENCOUNTER — TELEPHONE (OUTPATIENT)
Dept: ENDOCRINOLOGY | Facility: CLINIC | Age: 49
End: 2019-07-02

## 2019-07-02 NOTE — TELEPHONE ENCOUNTER
Prior Authorization Retail Medication Request    Medication/Dose: Freestyle dodie sensor . For use with Freestyle Dodie  . Replace sensor every 10 days.    ICD code E 11.9    Rationale:  Her glucose control by sensor looks way better than the A1c of 9.1 in April    Insurance Name: [facility]   Insurance ID: [id number]        Pharmacy Information   Name:  [facility]    Phone:  [phone]

## 2019-07-05 NOTE — TELEPHONE ENCOUNTER
Central Prior Authorization Team   Phone: [phone]      PA Initiation    Medication: Freestyle shauna sensor -PA initiated  Insurance Company: [employer] - Phone [phone] Fax [phone]  [facility] [address] [place] [address] [address] AT [facility] OF [address]  Filling Pharmacy Phone: [phone]  Filling Pharmacy Fax:    Start Date: 7/5/2019

## 2019-07-05 NOTE — TELEPHONE ENCOUNTER
Prior Authorization Not Needed per Insurance-I spoke to Natasha at pharmacy who verified they contacted insurance for override. Patient has picked up with $0 copay.    Medication: Freestyle shauna sensor -PA initiated  Insurance Company: [employer] - Phone [phone] Fax [phone]  Expected CoPay:      [facility] [address] [place] [address] AT [facility] OF [address]  Pharmacy Notified:  Yes  Patient Notified:  No

## 2019-07-11 DIAGNOSIS — I10 HYPERTENSION GOAL BP (BLOOD PRESSURE) < 140/80: ICD-10-CM

## 2019-07-11 NOTE — TELEPHONE ENCOUNTER
Requested Prescriptions   Pending Prescriptions Disp Refills     canagliflozin (INVOKANA) 300 MG tablet  Last Written Prescription Date:  10/9/2018  Last Fill Quantity: 90 tablet,  # refills: 1   Last office visit: 2/18/2019 with prescribing provider:  PAULINO Engel   Future Office Visit:   Next 5 appointments (look out 90 days)    Jul 30, 2019  2:00 PM CDT  Office Visit with Raymond Engel MD  [facility] ([facility]) [address] [address] [phone]          90 tablet 1     Sig: Take 1 tablet (300 mg) by mouth every morning (before breakfast)       Sodium Glucose Co-Transport Inhibitor Agents Failed - 7/11/2019  1:59 PM        Failed - Patient has documented normal Potassium within the last 12 mos.     Recent Labs   Lab Test 07/09/18  0819   POTASSIUM 3.6             Passed - Blood pressure less than 140/90 in past 6 months     BP Readings from Last 3 Encounters:   06/18/19 117/75   04/01/19 155/88   02/18/19 128/74                 Passed - Patient has documented LDL within the past 12 mos.     Recent Labs   Lab Test 06/18/19  1319   *             Passed - Patient has had a Microalbumin in the past 15 mos.     Recent Labs   Lab Test 06/18/19  1314  07/16/12  1539   [id number]  --   --  5.0   [id number]  --   --  13.3   MICROL 8   < >  --    UMALCR 12.69   < >  --     < > = values in this interval not displayed.             Passed - Patient has documented A1c within the specified period of time.     If HgbA1C is 8 or greater, it needs to be on file within the past 3 months.  If less than 8, must be on file within the past 6 months.     Recent Labs   Lab Test 06/18/19  1319 04/01/19   A1C 7.7*  --    HEMOGLOBINA1  --  9.1*             Passed - No creatinine >1.4 or GFR <45 within the past 12 mos     Recent Labs   Lab Test 06/18/19  1319   GFRESTIMATED >90   GFRESTBLACK >90       Recent Labs   Lab Test  "06/18/19  1319   CR 0.64             Passed - Medication is active on med list        Passed - Patient is age 18 or older        Passed - Patient is not pregnant        Passed - Patient has no positive pregnancy test within the past 12 mos.        Passed - Recent (6 mo) or future (30 days) visit within the authorizing provider's specialty     Patient had office visit in the last 6 months or has a visit in the next 30 days with authorizing provider or within the authorizing provider's specialty.  See \"Patient Info\" tab in inbasket, or \"Choose Columns\" in Meds & Orders section of the refill encounter.            lisinopril (PRINIVIL/ZESTRIL) 2.5 MG tablet  Last Written Prescription Date:  6/14/2019  Last Fill Quantity: 30 tablet,  # refills: 0   Last office visit: 2/18/2019 with prescribing provider:  PAULINO Engel   Future Office Visit:   Next 5 appointments (look out 90 days)    Jul 30, 2019  2:00 PM CDT  Office Visit with Raymond Engel MD  [facility] ([facility]) [facility] [address] [address] [phone]          90 tablet 1     Sig: Take 1 tablet (2.5 mg) by mouth daily       ACE Inhibitors (Including Combos) Protocol Failed - 7/11/2019  1:59 PM        Failed - Normal serum potassium on file in past 12 months     Recent Labs   Lab Test 07/09/18  0819   POTASSIUM 3.6             Passed - Blood pressure under 140/90 in past 12 months     BP Readings from Last 3 Encounters:   06/18/19 117/75   04/01/19 155/88   02/18/19 128/74                 Passed - Recent (12 mo) or future (30 days) visit within the authorizing provider's specialty     Patient had office visit in the last 12 months or has a visit in the next 30 days with authorizing provider or within the authorizing provider's specialty.  See \"Patient Info\" tab in inbasket, or \"Choose Columns\" in Meds & Orders section of the refill encounter.              " Passed - Medication is active on med list        Passed - Patient is age 18 or older        Passed - No active pregnancy on record        Passed - Normal serum creatinine on file in past 12 months     Recent Labs   Lab Test 06/18/19  1319   CR 0.64             Passed - No positive pregnancy test within past 12 months        pioglitazone (ACTOS) 45 MG tablet  Last Written Prescription Date:  6/14/2019  Last Fill Quantity: 30 tablet,  # refills: 0   Last office visit: 2/18/2019 with prescribing provider:  PAULINO Engel   Future Office Visit:   Next 5 appointments (look out 90 days)    Jul 30, 2019  2:00 PM CDT  Office Visit with Raymond Engel MD  [facility] ([facility]) [address] [address] [phone]          90 tablet 1     Sig: Take 1 tablet (45 mg) by mouth daily       Thiazolidinedione Agents (TZDs)  Failed - 7/11/2019  1:59 PM        Failed - Patient has a normal ALT within the past 12 mos.     Recent Labs   Lab Test 03/16/18  1211   ALT 16             Failed - Patient has a normal AST within the past 12 mos.      Recent Labs   Lab Test 01/09/15  1656   AST 17             Passed - Blood pressure less than 140/90 in past 6 months     BP Readings from Last 3 Encounters:   06/18/19 117/75   04/01/19 155/88   02/18/19 128/74                 Passed - Patient has documented LDL within the past 12 mos.     Recent Labs   Lab Test 06/18/19  1319   *             Passed - Patient has had a Microalbumin in the past 12 mos.     Recent Labs   Lab Test 06/18/19  1314  07/16/12  1539   [id number]  --   --  5.0   [id number]  --   --  13.3   MICROL 8   < >  --    UMALCR 12.69   < >  --     < > = values in this interval not displayed.             Passed - Patient has documented A1c within the specified period of time.     If HgbA1C is 8 or greater, it needs to be on file within the past 3 months.  If less than 8,  "must be on file within the past 6 months.     Recent Labs   Lab Test 06/18/19  1319 04/01/19   A1C 7.7*  --    HEMOGLOBINA1  --  9.1*             Passed - Diagnosis not CHF        Passed - Medication is active on med list        Passed - Patient is age 18 or older        Passed - Patient is not pregnant        Passed - Patient has a normal serum Creatinine in the past 12 months     Recent Labs   Lab Test 06/18/19  1319   CR 0.64             Passed - Patient has not had a positive pregnancy test within the past 12 mos.        Passed - Recent (6 mo) or future (30 days) visit within the authorizing provider's specialty     Patient had office visit in the last 6 months or has a visit in the next 30 days with authorizing provider or within the authorizing provider's specialty.  See \"Patient Info\" tab in inbasket, or \"Choose Columns\" in Meds & Orders section of the refill encounter.               "

## 2019-07-15 ENCOUNTER — TRANSFERRED RECORDS (OUTPATIENT)
Dept: HEALTH INFORMATION MANAGEMENT | Facility: CLINIC | Age: 49
End: 2019-07-15

## 2019-07-15 LAB — RETINOPATHY: NEGATIVE

## 2019-07-15 RX ORDER — PIOGLITAZONEHYDROCHLORIDE 45 MG/1
45 TABLET ORAL DAILY
Qty: 30 TABLET | Refills: 0 | Status: SHIPPED | OUTPATIENT
Start: 2019-07-15 | End: 2019-09-24

## 2019-07-15 RX ORDER — LISINOPRIL 2.5 MG/1
2.5 TABLET ORAL DAILY
Qty: 30 TABLET | Refills: 0 | Status: SHIPPED | OUTPATIENT
Start: 2019-07-15 | End: 2022-03-14

## 2019-07-15 NOTE — TELEPHONE ENCOUNTER
Due for office visit, only 1 month supply submitted. Additional refills to be addressed at upcoming OV.

## 2019-07-15 NOTE — TELEPHONE ENCOUNTER
Routing refill request to provider for review/approval because:  Bettina given x1 and patient did not follow up, please advise  Labs out of range:  LDL    FYI- pt has a future appointment scheduled 07/30/2019.

## 2019-07-19 DIAGNOSIS — K91.5 POST-CHOLECYSTECTOMY SYNDROME: ICD-10-CM

## 2019-07-19 RX ORDER — COLESEVELAM HYDROCHLORIDE 3.75 G/1
3.75 POWDER, FOR SUSPENSION ORAL
Qty: 30 EACH | Refills: 5 | Status: SHIPPED | OUTPATIENT
Start: 2019-07-19 | End: 2019-07-30

## 2019-07-19 NOTE — TELEPHONE ENCOUNTER
"Requested Prescriptions   Pending Prescriptions Disp Refills     colesevelam (WELCHOL) 3.75 g PACK Packet  Last Written Prescription Date:  7/9/2018  Last Fill Quantity: 30 each,  # refills: 11   Last office visit: 2/18/2019 with prescribing provider:  PAULINO Engel   Future Office Visit:   Next 5 appointments (look out 90 days)    Jul 30, 2019  2:00 PM CDT  Office Visit with Raymond Engel MD  [facility] ([facility]) [address] [id number] [phone]          30 each 11     Sig: Take 3.75 g by mouth daily (with breakfast)       Bile Acid Sequestrant Agents Passed - 7/19/2019  9:46 AM        Passed - Lipid panel on file in past 12 mos     Recent Labs   Lab Test 06/18/19  1319  12/01/14  1222   CHOL 250*   < > 215*   TRIG 220*   < > 323*   HDL 54   < > 36*   *   < > 114   NHDL 196*   < >  --    VLDL  --   --  65*   CHOLHDLRATIO  --   --  6.0*    < > = values in this interval not displayed.               Passed - Recent (12 mo) or future (30 days) visit within the authorizing provider's specialty     Patient had office visit in the last 12 months or has a visit in the next 30 days with authorizing provider or within the authorizing provider's specialty.  See \"Patient Info\" tab in inbasket, or \"Choose Columns\" in Meds & Orders section of the refill encounter.              Passed - Medication is active on med list        Passed - Patient is age 18 years or older        Passed - No active pregnancy on record        Passed - No positive pregnancy test in past 12 months           "

## 2019-07-22 ENCOUNTER — TELEPHONE (OUTPATIENT)
Dept: FAMILY MEDICINE | Facility: CLINIC | Age: 49
End: 2019-07-22

## 2019-07-22 DIAGNOSIS — E78.5 HYPERLIPIDEMIA LDL GOAL <100: Primary | Chronic | ICD-10-CM

## 2019-07-22 DIAGNOSIS — E11.51 CONTROLLED TYPE 2 DIABETES MELLITUS WITH DIABETIC PERIPHERAL ANGIOPATHY WITHOUT GANGRENE, WITH LONG-TERM CURRENT USE OF INSULIN (H): ICD-10-CM

## 2019-07-22 DIAGNOSIS — Z79.4 CONTROLLED TYPE 2 DIABETES MELLITUS WITH DIABETIC PERIPHERAL ANGIOPATHY WITHOUT GANGRENE, WITH LONG-TERM CURRENT USE OF INSULIN (H): ICD-10-CM

## 2019-07-22 NOTE — TELEPHONE ENCOUNTER
Disp Refills Start End AMANUEL   colesevelam (WELCHOL) 3.75 g PACK Packet 30 each 5 7/19/2019  No   Sig - Route: Take 3.75 g by mouth daily (with breakfast) - Oral   Sent to pharmacy as: colesevelam (WELCHOL) 3.75 g PACK Packet   Class: E-Prescribe   Order: [id number]   E-Prescribing Status: Receipt confirmed by pharmacy (7/19/2019  4:58 PM CDT)       DRUG CHANGE REQUEST    Plan does not cover medication prescribed. Per RX benefit, plan alternative medication is CHOLESTYRAMINE. Please fax back with approval along with strength,directions, quantity, and refills.

## 2019-07-24 RX ORDER — CHOLESTYRAMINE 4 G/9G
1 POWDER, FOR SUSPENSION ORAL 2 TIMES DAILY WITH MEALS
Qty: 60 EACH | Refills: 11 | Status: SHIPPED | OUTPATIENT
Start: 2019-07-24 | End: 2019-07-30

## 2019-07-24 NOTE — PATIENT INSTRUCTIONS
(E78.5) Hyperlipidemia LDL goal <100  (primary encounter diagnosis)  Comment:    Plan: cholestyramine (QUESTRAN) 4 g packet             (E11.51,  Z79.4) Controlled type 2 diabetes mellitus with diabetic peripheral angiopathy without gangrene, with long-term current use of insulin (H)  Comment:    Plan: cholestyramine (QUESTRAN) 4 g packet           TWICE DAILY   LINDA ALCANTAR JR., MD

## 2019-07-30 ENCOUNTER — OFFICE VISIT (OUTPATIENT)
Dept: FAMILY MEDICINE | Facility: CLINIC | Age: 49
End: 2019-07-30
Payer: COMMERCIAL

## 2019-07-30 VITALS
DIASTOLIC BLOOD PRESSURE: 58 MMHG | SYSTOLIC BLOOD PRESSURE: 106 MMHG | RESPIRATION RATE: 16 BRPM | WEIGHT: 124 LBS | HEART RATE: 80 BPM | BODY MASS INDEX: 20.63 KG/M2 | OXYGEN SATURATION: 100 % | TEMPERATURE: 98.5 F

## 2019-07-30 DIAGNOSIS — E78.5 HYPERLIPIDEMIA WITH TARGET LDL LESS THAN 100: ICD-10-CM

## 2019-07-30 DIAGNOSIS — G43.719 INTRACTABLE CHRONIC MIGRAINE WITHOUT AURA AND WITHOUT STATUS MIGRAINOSUS: ICD-10-CM

## 2019-07-30 DIAGNOSIS — E55.9 VITAMIN D DEFICIENCY: ICD-10-CM

## 2019-07-30 DIAGNOSIS — S49.91XS INJURY OF RIGHT SHOULDER, SEQUELA: ICD-10-CM

## 2019-07-30 DIAGNOSIS — K21.9 GASTROESOPHAGEAL REFLUX DISEASE WITHOUT ESOPHAGITIS: ICD-10-CM

## 2019-07-30 DIAGNOSIS — I10 HYPERTENSION GOAL BP (BLOOD PRESSURE) < 140/90: Primary | Chronic | ICD-10-CM

## 2019-07-30 PROCEDURE — 99214 OFFICE O/P EST MOD 30 MIN: CPT | Performed by: FAMILY MEDICINE

## 2019-07-30 RX ORDER — INSULIN GLARGINE 100 [IU]/ML
65 INJECTION, SOLUTION SUBCUTANEOUS AT BEDTIME
Qty: 18 ML | Refills: 3 | Status: SHIPPED | OUTPATIENT
Start: 2019-07-30 | End: 2019-09-24

## 2019-07-30 ASSESSMENT — PATIENT HEALTH QUESTIONNAIRE - PHQ9: SUM OF ALL RESPONSES TO PHQ QUESTIONS 1-9: 0

## 2019-07-30 NOTE — PROGRESS NOTES
Subjective     Karen Braxton is a 49 year old female who presents to clinic today for the following health issues:    HPI   Fell in Jan, broke right arm  Currently have lots of shoulder pain      Duration: jan 2019    Description (location/character/radiation): right shoulder    Intensity:  10/10    Accompanying signs and symptoms: unable to lift arm    History (similar episodes/previous evaluation): went to [facility], was told she had a rotator cuff injury, then went to [facility], they gave her an injection, still having pain, wants MRI    Precipitating or alleviating factors: injection    Therapies tried and outcome: still having great pain    Patient still having pain in the right shoulder and arm    Incomplete response to cortisone injection    Pain with abduction of the shoulder    Positive painful arc    Painful internal and external rotation of the shoulder    No obvious swelling    Assessment right rotator cuff injury    This is been evaluated at [facility] and by [facility]    Patient would like me to order an MRI of the right shoulder    Plain x-ray originally was normal at the time of injury    The right wrist is healed up    complication, with long-term current use of insulin (H) on their problem list.  Patient has recurrent dental caries  Diabetes has been under poor control although it seems to be improving  Vitamin D replacement  Patient suffers from premature menopause  Hyperlipidemia well-controlled current regimen although willing to take WelChol or replacement i.e. cholestyramine if covered to lower cholesterol and lower sugars  Patient's gastroesophageal reflux disease is improved she is not drinking as much sugar T as she was  Migraine headaches are somewhat improved  She has a hammertoe involving her right foot          There are no preventive care reminders to display for this patient.          .  Current Outpatient Medications   Medication Sig Dispense Refill      "canagliflozin (INVOKANA) 300 MG tablet Take 1 tablet (300 mg) by mouth every morning (before breakfast) 90 tablet 3     insulin glargine (BASAGLAR KWIKPEN) 100 UNIT/ML pen Inject 65 Units Subcutaneous At Bedtime 18 mL 3     insulin glargine (LANTUS SOLOSTAR PEN) 100 UNIT/ML pen Inject 40 Units Subcutaneous At Bedtime 36 mL 3     insulin lispro (HUMALOG KWIKPEN) 100 UNIT/ML pen 35 units before breakfast, 35 units before lunch, 35 units before dinner 30 mL 1     insulin pen needle (B-D U/F) 31G X 8 MM miscellaneous TEST FOUR TIMES A DAY OR AS DIRECTED 400 each 1     insulin syringe-needle U-100 (BD INSULIN SYRINGE ULTRAFINE) 30G X 1/2\" 1 ML Use one syringe daily or as directed.  3 month supply 100 each prn     lisinopril (PRINIVIL/ZESTRIL) 2.5 MG tablet Take 1 tablet (2.5 mg) by mouth daily 30 tablet 0     metFORMIN (GLUCOPHAGE) 1000 MG tablet TAKE ONE-HALF TABLET BY MOUTH TWICE DAILY WITH MEALS 90 tablet 1     NOVOLOG FLEXPEN 100 UNIT/ML soln 35 units before breakfast, 30 units before lunch, 30 units before dinner 90 mL 3     pioglitazone (ACTOS) 45 MG tablet Take 1 tablet (45 mg) by mouth daily 30 tablet 0     Urea 20 % CREA cream Apply topically as needed 85 g 3     vitamin D2 (ERGOCALCIFEROL) 72503 units (1250 mcg) capsule Take 1 capsule (50,000 Units) by mouth every 7 days 8 capsule 6     ASPIRIN NOT PRESCRIBED (INTENTIONAL) continuous prn for other Antiplatelet medication not prescribed intentionally due to Not indicated based on age/GI distress                No Known Allergies      Immunization History   Administered Date(s) Administered     Influenza (IIV3) PF 11/26/2007, 10/22/2008, 09/18/2009, 09/20/2010, 11/26/2013, 10/28/2014     Influenza Vaccine IM 3yrs+ 4 Valent IIV4 09/29/2015, 10/25/2017     Pneumo Conj 13-V (2010&after) 10/03/2011     Pneumococcal 23 valent 09/25/2018     TD (ADULT, 7+) 01/01/1998     Tdap (Adacel,Boostrix) 02/10/2009               reports that she does not drink alcohol.          " reports that she does not use drugs.        family history includes Asthma in her mother; Diabetes in her father and mother; Heart Disease in her mother; Hypertension in her father and mother; Lipids in her mother.        indicated that the status of her no family hx of is unknown. She indicated that her mother is . She indicated that her father is .             has a past surgical history that includes Cholecystectomy (2007); Facial reconstruction surgery (2 years old); and orthopedic surgery (2001).         reports that she currently engages in sexual activity and has had partners who are Male.    .  Pediatric History   Patient Guardian Status     Not on file     Other Topics Concern     Parent/sibling w/ CABG, MI or angioplasty before 65F 55M? No   Social History Narrative     Not on file               reports that she has been smoking cigarettes.  She has never used smokeless tobacco.        Medical, social, surgical, and family histories reviewed.        Labs reviewed in EPIC  Patient Active Problem List   Diagnosis     Other dental caries     Disorder of bursae and tendons in shoulder region     Insomnia     Vitamin D deficiency     Premature menopause     ASCUS favor benign     Hyperlipidemia LDL goal <100     Comprehensive diabetic foot examination, type 2 DM, encounter for (H)     Hypertension goal BP (blood pressure) < 140/90     DiarrheaX 2 over last 24hrs w one explosive r/o 2ndary to acid gastritis     Uncontrolled type 2 diabetes mellitus without complication, with long-term current use of insulin (H)     Esophageal reflux 2ndary to hi continuous intake of sugared  tea     Hyperlipidemia with target LDL less than 100     Diabetes type 2, controlled (H)     Intractable chronic migraine without aura and without status migrainosus     Needle stick injury, subsequent encounter     Hammer toe of right foot     Type 2 diabetes mellitus without complication, with long-term current use  of insulin (H)       Past Surgical History:   Procedure Laterality Date     CHOLECYSTECTOMY  9/14/2007    laproscopic     FACIAL RECONSTRUCTION SURGERY  2 years old    cleft lip repair     ORTHOPEDIC SURGERY  6/2001    left knee         Social History     Tobacco Use     Smoking status: Current Some Day Smoker     Types: Cigarettes     Smokeless tobacco: Never Used     Tobacco comment: 3 cigarettes daily   Substance Use Topics     Alcohol use: No     Alcohol/week: 0.0 oz       Family History   Problem Relation Age of Onset     Diabetes Mother      Hypertension Mother      Heart Disease Mother      Lipids Mother      Asthma Mother      Diabetes Father      Hypertension Father      Cancer No family hx of         No known family hx of skin cancer     Coronary Artery Disease No family hx of      Hyperlipidemia No family hx of      Cerebrovascular Disease No family hx of      Breast Cancer No family hx of      Colon Cancer No family hx of      Prostate Cancer No family hx of      Other Cancer No family hx of      Depression No family hx of      Anxiety Disorder No family hx of      Mental Illness No family hx of      Substance Abuse No family hx of      Anesthesia Reaction No family hx of      Osteoporosis No family hx of      Genetic Disorder No family hx of      Thyroid Disease No family hx of      Obesity No family hx of      Unknown/Adopted No family hx of              Current Outpatient Medications   Medication Sig Dispense Refill     canagliflozin (INVOKANA) 300 MG tablet Take 1 tablet (300 mg) by mouth every morning (before breakfast) 90 tablet 3     insulin glargine (BASAGLAR KWIKPEN) 100 UNIT/ML pen Inject 65 Units Subcutaneous At Bedtime 18 mL 3     insulin glargine (LANTUS SOLOSTAR PEN) 100 UNIT/ML pen Inject 40 Units Subcutaneous At Bedtime 36 mL 3     insulin lispro (HUMALOG KWIKPEN) 100 UNIT/ML pen 35 units before breakfast, 35 units before lunch, 35 units before dinner 30 mL 1     insulin pen needle (B-D  "U/F) 31G X 8 MM miscellaneous TEST FOUR TIMES A DAY OR AS DIRECTED 400 each 1     insulin syringe-needle U-100 (BD INSULIN SYRINGE ULTRAFINE) 30G X 1/2\" 1 ML Use one syringe daily or as directed.  3 month supply 100 each prn     lisinopril (PRINIVIL/ZESTRIL) 2.5 MG tablet Take 1 tablet (2.5 mg) by mouth daily 30 tablet 0     metFORMIN (GLUCOPHAGE) 1000 MG tablet TAKE ONE-HALF TABLET BY MOUTH TWICE DAILY WITH MEALS 90 tablet 1     NOVOLOG FLEXPEN 100 UNIT/ML soln 35 units before breakfast, 30 units before lunch, 30 units before dinner 90 mL 3     pioglitazone (ACTOS) 45 MG tablet Take 1 tablet (45 mg) by mouth daily 30 tablet 0     Urea 20 % CREA cream Apply topically as needed 85 g 3     vitamin D2 (ERGOCALCIFEROL) 97908 units (1250 mcg) capsule Take 1 capsule (50,000 Units) by mouth every 7 days 8 capsule 6     ASPIRIN NOT PRESCRIBED (INTENTIONAL) continuous prn for other Antiplatelet medication not prescribed intentionally due to Not indicated based on age/GI distress             Recent Labs   Lab Test 06/18/19  1319 09/17/18  1014 07/09/18  0819 03/16/18  1211 03/27/17  1442  03/21/16  1030  06/23/15  1652   A1C 7.7* 8.9* 8.4* 8.5* 8.2*   < > 8.1*   < > 8.1*   *  --   --  149* 160*  --  142*  --   --    HDL 54  --   --  43* 48*  --  44*  --   --    TRIG 220*  --   --  183* 171*  --  223*  --   --    ALT  --   --   --  16  --   --  30  --  21   CR 0.64  --  0.66 0.67  --    < > 0.70  --  0.80   GFRESTIMATED >90  --  >90 >90  --    < > 90  --  77   GFRESTBLACK >90  --  >90 >90  --    < > >90  --  >90   POTASSIUM  --   --  3.6 4.2  --    < > 4.4  --  4.2   TSH  --   --   --  2.12  --   --  1.31  --   --     < > = values in this interval not displayed.            BP Readings from Last 6 Encounters:   07/30/19 106/58   06/18/19 117/75   04/01/19 155/88   02/18/19 128/74   12/18/18 142/84   10/30/18 133/80           Wt Readings from Last 3 Encounters:   07/30/19 56.2 kg (124 lb)   06/18/19 56.2 kg (124 lb) "   04/01/19 57.6 kg (127 lb)                 Positive symptoms or findings indicated by bold designation:         ROS: 10 point ROS neg other than the symptoms noted above in the HPI.except  has Other dental caries; Disorder of bursae and tendons in shoulder region; Insomnia; Vitamin D deficiency; Premature menopause; ASCUS favor benign; Hyperlipidemia LDL goal <100; Comprehensive diabetic foot examination, type 2 DM, encounter for (H); Hypertension goal BP (blood pressure) < 140/90; DiarrheaX 2 over last 24hrs w one explosive r/o 2ndary to acid gastritis; Uncontrolled type 2 diabetes mellitus without complication, with long-term current use of insulin (H); Esophageal reflux 2ndary to hi continuous intake of sugared  tea; Hyperlipidemia with target LDL less than 100; Diabetes type 2, controlled (H); Intractable chronic migraine without aura and without status migrainosus; Needle stick injury, subsequent encounter; Hammer toe of right foot; and Type 2 diabetes mellitus without complication, with long-term current use of insulin (H) on their problem list.  Review Of Systems    Skin: negative    Eyes: negative    Ears/Nose/Throat: negative teeth in poor condition caries    Respiratory: No shortness of breath, dyspnea on exertion, cough, or hemoptysis    Cardiovascular: negative    Gastrointestinal: heartburn    Genitourinary: Early menopause    Musculoskeletal: Right rotator cuff syndrome    And healed right wrist fracture following a fall    Neurologic: negative    Psychiatric: negative    Hematologic/Lymphatic/Immunologic: negative    Endocrine: Poorly controlled diabetes mellitus                PE:  /58   Pulse 80   Temp 98.5  F (36.9  C) (Tympanic)   Resp 16   Wt 56.2 kg (124 lb)   LMP  (LMP Unknown)   SpO2 100%   BMI 20.63 kg/m   Body mass index is 20.63 kg/m .        Constitutional: general appearance, well nourished, well developed, in no acute distress, well developed, appears stated age, normal  body habitus,          Eyes:; The patient has normal eyelids sclerae and conjunctivae :          Ears/Nose/Throat: external ear, overall: normal appearance; external nose, overall: benign appearance, normal moujth gums and lips           Neck: thyroid, overall: normal size, normal consistency, nontender,          Respiratory:  palpation of chest, overall: normal excursion,    Clear to percussion and auscultation     NO Tachypnea    NORMAL  Color          Cardiovascular:  Good color with no peripheral edema    Regular sinus rhythm without murmur.  Physiologic heart sounds   Heart is unelarged    .     Chest/Breast: normal shape           Abdominal exam,  Liver and spleen are  unenlarged        Tenderness    Scars              Urogenital; no renal, flank or bladder  tenderness;          Lymphatic: neck nodes,     Other nodes         Musculoskeletal:  Brief ortho exam normal except:   Positive painful arc pain with internal and external rotation right shoulder    Decreased range of motion right shoulder    Right wrist with good range of motion and healing nontender        Integument: inspection of skin, no rash, lesions; and, palpation, no induration, no tenderness.          Neurologic mental status, overall: alert and oriented; gait, no ataxia, no unsteadiness; coordination, no tremors; cranial nerves, overall: normal motor, overall: normal bulk, tone.          Psychiatric: orientation/consciousness, overall: oriented to person, place and time; behavior/psychomotor activity, no tics, normal psychomotor activity; mood and affect, overall: normal mood and affect; appearance, overall: well-groomed, good eye contact; speech, overall: normal quality, no aphasia and normal quality, quantity, intact.        Diagnostic Test Results:  Results for orders placed or performed in visit on 06/18/19   CBC with platelets differential   Result Value Ref Range    WBC 7.0 4.0 - 11.0 10e9/L    RBC Count 4.97 3.8 - 5.2 10e12/L     Hemoglobin 14.7 11.7 - 15.7 g/dL    Hematocrit 46.1 35.0 - 47.0 %    MCV 93 78 - 100 fl    MCH 29.6 26.5 - 33.0 pg    MCHC 31.9 31.5 - 36.5 g/dL    RDW 13.5 10.0 - 15.0 %    Platelet Count 219 150 - 450 10e9/L    Diff Method Automated Method     % Neutrophils 32.4 %    % Lymphocytes 56.4 %    % Monocytes 7.7 %    % Eosinophils 3.0 %    % Basophils 0.4 %    % Immature Granulocytes 0.1 %    Nucleated RBCs 0 0 /100    Absolute Neutrophil 2.3 1.6 - 8.3 10e9/L    Absolute Lymphocytes 3.9 0.8 - 5.3 10e9/L    Absolute Monocytes 0.5 0.0 - 1.3 10e9/L    Absolute Eosinophils 0.2 0.0 - 0.7 10e9/L    Absolute Basophils 0.0 0.0 - 0.2 10e9/L    Abs Immature Granulocytes 0.0 0 - 0.4 10e9/L    Absolute Nucleated RBC 0.0    Vitamin B12   Result Value Ref Range    Vitamin B12 341 193 - 986 pg/mL   Hemoglobin A1c   Result Value Ref Range    Hemoglobin A1C 7.7 (H) 0 - 5.6 %   Fructosamine   Result Value Ref Range    Fructosamine 300 (H) 170 - 285 umol/L   Urea nitrogen   Result Value Ref Range    Urea Nitrogen 9 7 - 30 mg/dL   Lipid Profile   Result Value Ref Range    Cholesterol 250 (H) <200 mg/dL    Triglycerides 220 (H) <150 mg/dL    HDL Cholesterol 54 >49 mg/dL    LDL Cholesterol Calculated 152 (H) <100 mg/dL    Non HDL Cholesterol 196 (H) <130 mg/dL   Creatinine   Result Value Ref Range    Creatinine 0.64 0.52 - 1.04 mg/dL    GFR Estimate >90 >60 mL/min/[1.73_m2]    GFR Estimate If Black >90 >60 mL/min/[1.73_m2]   Albumin Random Urine Quantitative with Creat Ratio   Result Value Ref Range    Creatinine Urine 62 mg/dL    Albumin Urine mg/L 8 mg/L    Albumin Urine mg/g Cr 12.69 0 - 25 mg/g Cr           ICD-10-CM    1. Hypertension goal BP (blood pressure) < 140/90 I10    2. Uncontrolled type 2 diabetes mellitus without complication, with long-term current use of insulin (H) E11.65 canagliflozin (INVOKANA) 300 MG tablet    Z79.4 insulin glargine (BASAGLAR KWIKPEN) 100 UNIT/ML pen   3. Hyperlipidemia with target LDL less than 100  E78.5    4. Gastroesophageal reflux disease without esophagitis K21.9    5. Vitamin D deficiency E55.9    6. Intractable chronic migraine without aura and without status migrainosus G43.719    7. Injury of right shoulder, sequela S49.91XS MR Shoulder Right w/o Contrast     RADIOLOGY REFERRAL              .    Side effects benefits and risks thoroughly discussed. .she may come in early if unimproved or getting worse          Please drink 2 glasses of water prior to meals and walk 15-30 minutes after meals        I spent *25 minutes   with patient discussing the following issues   The primary encounter diagnosis was Hypertension goal BP (blood pressure) < 140/90. Diagnoses of Uncontrolled type 2 diabetes mellitus without complication, with long-term current use of insulin (H), Hyperlipidemia with target LDL less than 100, Gastroesophageal reflux disease without esophagitis, Vitamin D deficiency, Intractable chronic migraine without aura and without status migrainosus, and Injury of right shoulder, sequela were also pertinent to this visit. over half of which involved counseling and coordination of care.        There are no Patient Instructions on file for this visit.        ALL THE ABOVE PROBLEMS ARE STABLE AND MED CHANGES AS NOTED        Diet: mediterranean diet and Diabetes diet         Exercise:  Right shoulder   Exercises Range of motion, balance, isometric, and strengthening exercises 30 repetitions twice daily of involved joints            .LINDA ALCANTAR MD 7/30/2019 9:16 AM  July 31, 2019

## 2019-08-05 ENCOUNTER — ANCILLARY PROCEDURE (OUTPATIENT)
Dept: MRI IMAGING | Facility: CLINIC | Age: 49
End: 2019-08-05
Attending: FAMILY MEDICINE
Payer: COMMERCIAL

## 2019-08-05 DIAGNOSIS — S49.91XS INJURY OF RIGHT SHOULDER, SEQUELA: ICD-10-CM

## 2019-08-12 ENCOUNTER — TELEPHONE (OUTPATIENT)
Dept: FAMILY MEDICINE | Facility: CLINIC | Age: 49
End: 2019-08-12

## 2019-08-12 NOTE — TELEPHONE ENCOUNTER
Patient walked in complaining of right shoulder pain following a fracture in 01/2019. States she had imaging done and asked wants to know what is the next step.States she was seen with a doctor and that doctor was unable to help her. She was further referred to have PT and states she can't do that. Reports  Persistent shoulder pain keeping her up at night. Future appointment was scheduled to discuss further treatment.

## 2019-08-13 ENCOUNTER — OFFICE VISIT (OUTPATIENT)
Dept: FAMILY MEDICINE | Facility: CLINIC | Age: 49
End: 2019-08-13
Payer: COMMERCIAL

## 2019-08-13 VITALS
RESPIRATION RATE: 16 BRPM | DIASTOLIC BLOOD PRESSURE: 64 MMHG | BODY MASS INDEX: 20.63 KG/M2 | WEIGHT: 124 LBS | HEART RATE: 91 BPM | TEMPERATURE: 98.4 F | SYSTOLIC BLOOD PRESSURE: 116 MMHG | OXYGEN SATURATION: 97 %

## 2019-08-13 DIAGNOSIS — M75.101 ROTATOR CUFF SYNDROME, RIGHT: Primary | ICD-10-CM

## 2019-08-13 PROCEDURE — 99213 OFFICE O/P EST LOW 20 MIN: CPT | Performed by: FAMILY MEDICINE

## 2019-08-13 NOTE — PROGRESS NOTES
"Subjective     Karen Braxton is a 49 year old female who presents to clinic today for the following health issues:    HPI   Musculoskeletal problem/pain      Duration: 1/2019    Description  Location: right shoulder    Intensity:  10/10    Accompanying signs and symptoms: pain lifting    History  Previous similar problem: YES  Previous evaluation:  x-ray    Precipitating or alleviating factors:  Trauma or overuse: YES- fell on ice in [place]  Aggravating factors include: lifting    Therapies tried and outcome: ice    .LINDA ALCANTAR MD .8/13/2019 5:11 PM .August 13, 2019        Karen Braxton is a 49 year old female who is who presents with TORN RIGHT SUPRASPINATUS     PARTIAL TEAR RIGHT   INFRASPINATUS    TENDINITIS  RIGHT SUBSCAPULARIS             Onset : JANUARY 2019      Severity: SEVERE      Home treatments HOME EXERCISES THUMB UP UNABLE      Additional Symptoms:  INTERNAL  AND EXTERNAL ROTATION POSITIVE PAINFUL ARC     NO SUBLUXATION OR DISLOCATION     Course ONGOING               There are no preventive care reminders to display for this patient.          .  Current Outpatient Medications   Medication Sig Dispense Refill     canagliflozin (INVOKANA) 300 MG tablet Take 1 tablet (300 mg) by mouth every morning (before breakfast) 90 tablet 3     insulin glargine (BASAGLAR KWIKPEN) 100 UNIT/ML pen Inject 65 Units Subcutaneous At Bedtime 18 mL 3     insulin glargine (LANTUS SOLOSTAR PEN) 100 UNIT/ML pen Inject 40 Units Subcutaneous At Bedtime 36 mL 3     insulin lispro (HUMALOG KWIKPEN) 100 UNIT/ML pen 35 units before breakfast, 35 units before lunch, 35 units before dinner 30 mL 1     insulin pen needle (B-D U/F) 31G X 8 MM miscellaneous TEST FOUR TIMES A DAY OR AS DIRECTED 400 each 1     insulin syringe-needle U-100 (BD INSULIN SYRINGE ULTRAFINE) 30G X 1/2\" 1 ML Use one syringe daily or as directed.  3 month supply 100 each prn     lisinopril (PRINIVIL/ZESTRIL) 2.5 MG tablet Take 1 tablet (2.5 mg) by mouth daily 30 " tablet 0     metFORMIN (GLUCOPHAGE) 1000 MG tablet TAKE ONE-HALF TABLET BY MOUTH TWICE DAILY WITH MEALS 90 tablet 1     NOVOLOG FLEXPEN 100 UNIT/ML soln 35 units before breakfast, 30 units before lunch, 30 units before dinner 90 mL 3     pioglitazone (ACTOS) 45 MG tablet Take 1 tablet (45 mg) by mouth daily 30 tablet 0     Urea 20 % CREA cream Apply topically as needed 85 g 3     vitamin D2 (ERGOCALCIFEROL) 50726 units (1250 mcg) capsule Take 1 capsule (50,000 Units) by mouth every 7 days 8 capsule 6     ASPIRIN NOT PRESCRIBED (INTENTIONAL) continuous prn for other Antiplatelet medication not prescribed intentionally due to Not indicated based on age/GI distress                No Known Allergies      Immunization History   Administered Date(s) Administered     Influenza (IIV3) PF 2007, 10/22/2008, 2009, 2010, 2013, 10/28/2014     Influenza Vaccine IM 3yrs+ 4 Valent IIV4 2015, 10/25/2017     Pneumo Conj 13-V (2010&after) 10/03/2011     Pneumococcal 23 valent 2018     TD (ADULT, 7+) 1998     Tdap (Adacel,Boostrix) 02/10/2009               reports that she does not drink alcohol.          reports that she does not use drugs.        family history includes Asthma in her mother; Diabetes in her father and mother; Heart Disease in her mother; Hypertension in her father and mother; Lipids in her mother.        indicated that the status of her no family hx of is unknown. She indicated that her mother is . She indicated that her father is .             has a past surgical history that includes Cholecystectomy (2007); Facial reconstruction surgery (2 years old); and orthopedic surgery (2001).         reports that she currently engages in sexual activity and has had partners who are Male.    .  Pediatric History   Patient Guardian Status     Not on file     Other Topics Concern     Parent/sibling w/ CABG, MI or angioplasty before 65F 55M? No   Social History  Narrative     Not on file               reports that she has been smoking cigarettes.  She has never used smokeless tobacco.        Medical, social, surgical, and family histories reviewed.        Labs reviewed in EPIC  Patient Active Problem List   Diagnosis     Other dental caries     Disorder of bursae and tendons in shoulder region     Insomnia     Vitamin D deficiency     Premature menopause     ASCUS favor benign     Hyperlipidemia LDL goal <100     Comprehensive diabetic foot examination, type 2 DM, encounter for (H)     Hypertension goal BP (blood pressure) < 140/90     DiarrheaX 2 over last 24hrs w one explosive r/o 2ndary to acid gastritis     Uncontrolled type 2 diabetes mellitus without complication, with long-term current use of insulin (H)     Esophageal reflux 2ndary to hi continuous intake of sugared  tea     Hyperlipidemia with target LDL less than 100     Diabetes type 2, controlled (H)     Intractable chronic migraine without aura and without status migrainosus     Needle stick injury, subsequent encounter     Hammer toe of right foot     Type 2 diabetes mellitus without complication, with long-term current use of insulin (H)       Past Surgical History:   Procedure Laterality Date     CHOLECYSTECTOMY  9/14/2007    laproscopic     FACIAL RECONSTRUCTION SURGERY  2 years old    cleft lip repair     ORTHOPEDIC SURGERY  6/2001    left knee         Social History     Tobacco Use     Smoking status: Current Some Day Smoker     Types: Cigarettes     Smokeless tobacco: Never Used     Tobacco comment: 3 cigarettes daily   Substance Use Topics     Alcohol use: No     Alcohol/week: 0.0 oz       Family History   Problem Relation Age of Onset     Diabetes Mother      Hypertension Mother      Heart Disease Mother      Lipids Mother      Asthma Mother      Diabetes Father      Hypertension Father      Cancer No family hx of         No known family hx of skin cancer     Coronary Artery Disease No family hx of   "    Hyperlipidemia No family hx of      Cerebrovascular Disease No family hx of      Breast Cancer No family hx of      Colon Cancer No family hx of      Prostate Cancer No family hx of      Other Cancer No family hx of      Depression No family hx of      Anxiety Disorder No family hx of      Mental Illness No family hx of      Substance Abuse No family hx of      Anesthesia Reaction No family hx of      Osteoporosis No family hx of      Genetic Disorder No family hx of      Thyroid Disease No family hx of      Obesity No family hx of      Unknown/Adopted No family hx of              Current Outpatient Medications   Medication Sig Dispense Refill     canagliflozin (INVOKANA) 300 MG tablet Take 1 tablet (300 mg) by mouth every morning (before breakfast) 90 tablet 3     insulin glargine (BASAGLAR KWIKPEN) 100 UNIT/ML pen Inject 65 Units Subcutaneous At Bedtime 18 mL 3     insulin glargine (LANTUS SOLOSTAR PEN) 100 UNIT/ML pen Inject 40 Units Subcutaneous At Bedtime 36 mL 3     insulin lispro (HUMALOG KWIKPEN) 100 UNIT/ML pen 35 units before breakfast, 35 units before lunch, 35 units before dinner 30 mL 1     insulin pen needle (B-D U/F) 31G X 8 MM miscellaneous TEST FOUR TIMES A DAY OR AS DIRECTED 400 each 1     insulin syringe-needle U-100 (BD INSULIN SYRINGE ULTRAFINE) 30G X 1/2\" 1 ML Use one syringe daily or as directed.  3 month supply 100 each prn     lisinopril (PRINIVIL/ZESTRIL) 2.5 MG tablet Take 1 tablet (2.5 mg) by mouth daily 30 tablet 0     metFORMIN (GLUCOPHAGE) 1000 MG tablet TAKE ONE-HALF TABLET BY MOUTH TWICE DAILY WITH MEALS 90 tablet 1     NOVOLOG FLEXPEN 100 UNIT/ML soln 35 units before breakfast, 30 units before lunch, 30 units before dinner 90 mL 3     pioglitazone (ACTOS) 45 MG tablet Take 1 tablet (45 mg) by mouth daily 30 tablet 0     Urea 20 % CREA cream Apply topically as needed 85 g 3     vitamin D2 (ERGOCALCIFEROL) 86947 units (1250 mcg) capsule Take 1 capsule (50,000 Units) by mouth every " 7 days 8 capsule 6     ASPIRIN NOT PRESCRIBED (INTENTIONAL) continuous prn for other Antiplatelet medication not prescribed intentionally due to Not indicated based on age/GI distress             Recent Labs   Lab Test 06/18/19  1319 09/17/18  1014 07/09/18  0819 03/16/18  1211 03/27/17  1442  03/21/16  1030  06/23/15  1652   A1C 7.7* 8.9* 8.4* 8.5* 8.2*   < > 8.1*   < > 8.1*   *  --   --  149* 160*  --  142*  --   --    HDL 54  --   --  43* 48*  --  44*  --   --    TRIG 220*  --   --  183* 171*  --  223*  --   --    ALT  --   --   --  16  --   --  30  --  21   CR 0.64  --  0.66 0.67  --    < > 0.70  --  0.80   GFRESTIMATED >90  --  >90 >90  --    < > 90  --  77   GFRESTBLACK >90  --  >90 >90  --    < > >90  --  >90   POTASSIUM  --   --  3.6 4.2  --    < > 4.4  --  4.2   TSH  --   --   --  2.12  --   --  1.31  --   --     < > = values in this interval not displayed.            BP Readings from Last 6 Encounters:   08/13/19 116/64   07/30/19 106/58   06/18/19 117/75   04/01/19 155/88   02/18/19 128/74   12/18/18 142/84           Wt Readings from Last 3 Encounters:   08/13/19 56.2 kg (124 lb)   07/30/19 56.2 kg (124 lb)   06/18/19 56.2 kg (124 lb)                 Positive symptoms or findings indicated by bold designation:         ROS: 10 point ROS neg other than the symptoms noted above in the HPI.except  has Other dental caries; Disorder of bursae and tendons in shoulder region; Insomnia; Vitamin D deficiency; Premature menopause; ASCUS favor benign; Hyperlipidemia LDL goal <100; Comprehensive diabetic foot examination, type 2 DM, encounter for (H); Hypertension goal BP (blood pressure) < 140/90; DiarrheaX 2 over last 24hrs w one explosive r/o 2ndary to acid gastritis; Uncontrolled type 2 diabetes mellitus without complication, with long-term current use of insulin (H); Esophageal reflux 2ndary to hi continuous intake of sugared  tea; Hyperlipidemia with target LDL less than 100; Diabetes type 2, controlled  "(H); Intractable chronic migraine without aura and without status migrainosus; Needle stick injury, subsequent encounter; Hammer toe of right foot; and Type 2 diabetes mellitus without complication, with long-term current use of insulin (H) on their problem list.  Review Of Systems    Skin: negative    Eyes: negative    Ears/Nose/Throat: DENTAL CARIES     Respiratory: No shortness of breath, dyspnea on exertion, cough, or hemoptysis    Cardiovascular: negative    Gastrointestinal: negative    Genitourinary: negative    Musculoskeletal:  RIGHT SHOULDER PAIN     HAMMER TOE     Neurologic: RECURRENT MIGRAINES     Psychiatric: negative    Hematologic/Lymphatic/Immunologic: negative    Endocrine: diabetes     LDL OR \"BAD\" CHOLESTEROL  GOAL < 100 DIFFICULT TO CONTROL     HYPERTENSION WITH GOAL OF LESS THAN 140/80                 PE:  /64   Pulse 91   Temp 98.4  F (36.9  C) (Tympanic)   Resp 16   Wt 56.2 kg (124 lb)   LMP  (LMP Unknown)   SpO2 97%   BMI 20.63 kg/m   Body mass index is 20.63 kg/m .        Constitutional: general appearance, well nourished, well developed, in no acute distress, well developed, appears stated age, normal body habitus,  NORMAL WEIGHT         Eyes:; The patient has normal eyelids sclerae and conjunctivae :          Ears/Nose/Throat: external ear, overall: normal appearance; external nose, overall: benign appearance, normal moujth gums and lips           Neck: thyroid, overall: normal size, normal consistency, nontender,          Respiratory:  palpation of chest, overall: normal excursion,    Clear to percussion and auscultation     NO Tachypnea    NORMAL  Color          Cardiovascular:  Good color with no peripheral edema     Regular sinus rhythm without murmur.  Physiologic heart sounds   Heart is unelarged    .     Chest/Breast: normal shape           Abdominal exam,  Liver and spleen are  unenlarged        Tenderness    Scars              Urogenital; no renal, flank or bladder  " tenderness;          Lymphatic: neck nodes,     Other nodes         Musculoskeletal:  Brief ortho exam normal except:   HAMMER TOES     PAIN POSITIVE ARC     NO SUBLUXATION     POSITIVE INTERNAL AND EXTERNAL ROTATION         Integument: inspection of skin, no rash, lesions; and, palpation, no induration, no tenderness.           Neurologic mental status, overall: alert and oriented; gait, no ataxia, no unsteadiness; coordination, no tremors; cranial nerves, overall: normal motor, overall: normal bulk, tone.          Psychiatric: orientation/consciousness, overall: oriented to person, place and time; behavior/psychomotor activity, no tics, normal psychomotor activity; mood and affect, overall: normal mood and affect; appearance, overall: well-groomed, good eye contact; speech, overall: normal quality, no aphasia and normal quality, quantity, intact.        Diagnostic Test Results:  Results for orders placed or performed in visit on 08/05/19   MR Shoulder Right w/o Contrast    Narrative    EXAM: MR Right  Shoulder without  contrast    TECHNIQUE: Multiplanar, multisequence imaging of the right  shoulder  were obtained without administration of intravenous or intra-articular  gadolinium contrast using routine protocol.    History: Shoulder pain, rotator cuff tear/impingement suspected. Fall  in January, broken right arm.    Additional Clinical information from EMR: None    Comparison: Radiograph 2/18/2019    Findings:    ROTATOR CUFF and ASSOCIATED STRUCTURES  Rotator cuff: There is a full-thickness complete tear of the  supraspinatus tendon with tendon retracted to the level of the  acromioclavicular joint. The supraspinatus tear measures 2.1 cm on  sagittal imaging. There is a high-grade partial thickness articular  sided and intrasubstance tear of the infraspinatus tendon at its  footprint. There is thickening and tendinosis of the subscapularis  tendon without full-thickness tear. Teres minor tendon is  intact.    Bursa: There is fluid in subacromial/subdeltoid bursa consistent with  bursitis.    Musculature: There is moderate fatty atrophy of the supraspinatus  muscle and mild fatty infiltration of the infraspinatus muscle.    Acromioclavicular joint  There are moderate degenerative changes of the acromioclavicular  joint. Acromion is type 3 in sagittal morphology.  Coracoacromial  ligament is not thickened.    OSSEOUS STRUCTURES  No fracture, marrow contusion or marrow infiltration.    LONG BICIPITAL TENDON  The long head of the biceps tendon is normally situated within the  bicipital groove. No complete or partial biceps tendon tear is  present. There is mild biceps tendinosis.    GLENOHUMERAL JOINT  Joint fluid: There is a small joint effusion. There is fluid in the  subscapular recess.    Cartilage and subarticular bone:  No focal hyaline cartilage defects  are noted. No Hill-Sachs, reverse Hill-Sachs, or bony Bankart lesions  are seen.    Labrum: There is degeneration of the superior labrum.    ANCILLARY FINDINGS:  No axillary lymphadenopathy.      Impression    Impression:  1.  Full-thickness, complete tear of the right shoulder supraspinatus  tendon retracted to the level of the AC joint. There is moderate  supraspinatus atrophy.  2.  High-grade partial-thickness articular and intrasubstance tear of  the infraspinatus tendon at its footprint with mild fatty  infiltration.   3.  Tendinosis of the biceps tendon and subscapularis tendon.  4.  Small right shoulder joint effusion.  5.  Moderate AC joint degenerative changes.    I have personally reviewed the examination and initial interpretation  and I agree with the findings.    TOI STOUT MD           ICD-10-CM    1. Rotator cuff syndrome, right M75.101 ORTHOPEDICS ADULT REFERRAL     [facility], HAND, AND CHIROPRACTIC REFERRAL              .    Side effects benefits and risks thoroughly discussed. .she may come in early if unimproved or getting worse  "         Please drink 2 glasses of water prior to meals and walk 15-30 minutes after meals        I spent  25 MINUTES SPENT  with patient discussing the following issues   The encounter diagnosis was Rotator cuff syndrome, right. over half of which involved counseling and coordination of care.      Patient Instructions   (M75.101) Rotator cuff syndrome, right  (primary encounter diagnosis)  Comment:    Plan: ORTHOPEDICS ADULT REFERRAL, [facility], HAND, AND         CHIROPRACTIC REFERRAL         RIGHT SHOULDER TEAR SUPRSPINATUS COMPLEETE TEAR   INFRASPINATUS PARTIAL TEA  LINDA ALCANTAR JR., MD                  ALL THE ABOVE PROBLEMS ARE STABLE AND MED CHANGES AS NOTED        Diet:  MEDITERRANEAN DIET AND DIABETES 2 GOAL 8% LDL OR \"BAD\" CHOLESTEROL 100         Exercise:  SHOULDER   Exercises Range of motion, balance, isometric, and strengthening exercises 30 repetitions twice daily of involved joints    REFERRAL [facility]             .LINDA ALCANTAR MD 8/13/2019 5:11 PM  August 13, 2019                    "

## 2019-08-13 NOTE — PATIENT INSTRUCTIONS
(M75.101) Rotator cuff syndrome, right  (primary encounter diagnosis)  Comment:    Plan: ORTHOPEDICS ADULT REFERRAL, [facility], HAND, AND         CHIROPRACTIC REFERRAL         RIGHT SHOULDER TEAR SUPRSPINATUS COMPLEETE TEAR   INFRASPINATUS PARTIAL TEA  LINDA ALCANTAR JR., MD

## 2019-08-14 ENCOUNTER — DOCUMENTATION ONLY (OUTPATIENT)
Dept: CARE COORDINATION | Facility: CLINIC | Age: 49
End: 2019-08-14

## 2019-08-19 ENCOUNTER — THERAPY VISIT (OUTPATIENT)
Dept: PHYSICAL THERAPY | Facility: CLINIC | Age: 49
End: 2019-08-19
Payer: COMMERCIAL

## 2019-08-19 DIAGNOSIS — M75.101 ROTATOR CUFF SYNDROME, RIGHT: ICD-10-CM

## 2019-08-19 PROCEDURE — 97110 THERAPEUTIC EXERCISES: CPT | Mod: GP | Performed by: PHYSICAL THERAPIST

## 2019-08-19 PROCEDURE — 97161 PT EVAL LOW COMPLEX 20 MIN: CPT | Mod: GP | Performed by: PHYSICAL THERAPIST

## 2019-08-19 PROCEDURE — 97140 MANUAL THERAPY 1/> REGIONS: CPT | Mod: GP | Performed by: PHYSICAL THERAPIST

## 2019-08-19 NOTE — LETTER
[facility]TH[facility] PHYSICAL THERAPY  [address]  [place] [address]  [phone]    2019    Re: Karen Braxton   :   1970  MRN:  [id number]   REFERRING PHYSICIAN:   Raymond Engel    [facility] PHYSICAL [facility]    Date of Initial Evaluation: 2019  Visits:  Rxs Used: 1  Reason for Referral:  Rotator cuff syndrome, right    EVALUATION SUMMARY    Subjective:  The history is provided by the patient. The history is limited by a language barrier. No  was used.   Type of problem:  Right shoulder  Condition occurred with:  A fall. This is a chronic condition   Problem details: On or about 2019, I feel on the ice and hurt my right shoulder and my wrist.  I had splint on the wrist.  I continued to have pain in the shoulder and was given an shot into the shoulder.   I went back to the MD who did an MRI and found a tear in the RC.  I see the orthopedic tomorrow.  I also have wrist pain due to a possible fracture.  I am right handed .   Patient reports pain:  Anterior, lateral, medial, posterior and upper arm. Radiates to:  Wrist and shoulder (other issues with the wrist). Associated symptoms:  Loss of strength and loss of motion/stiffness. Symptoms are exacerbated by carrying, lifting, using arm behind back, using arm overhead, using arm at shoulder level and lying on extremity Relieved by: IB only temporary.  Karen Braxton being seen for right shoulder.   Problem began 2019 (saw MD). Where injured: street.Problem occurred: fall on the ice  and reported as 10/10 (lowest 5) on pain scale. General health as reported by patient is good. Pertinent medical history includes:  Diabetes and high blood pressure.    Surgeries include:  None.   Other medications details: diabetic.   Primary job tasks include:  Pushing/pulling, repetitive tasks, prolonged standing and lifting/carrying.   Pain is described as aching and is constant. Pain is the same all the time. Since onset symptoms are unchanged. Special tests:  MRI (torn RC, partial torn). Previous treatment includes physical therapy (other reason).    Patient is home care. Restrictions include:  Working in normal job with restrictions (part time and only with left arm ).  Home/work barriers: duplex, downstairs.  Red flags:  None as reported by patient.                  Objective:  Standing Alignment:    Cervical/Thoracic:  Forward head (poor sitting posture)  Shoulder/UE:  Rounded shoulders, scapular abduction L and scapular abduction R  Flexibility/Screens:   Positive screens:  Shoulder  Re: Karen Braxton   :   1970    Upper Extremity:    Decreased right upper extremity flexibility present at:  Pectoralis Major and Pectoralis Minor  Spine:  Decreased right spine flexibility:  Scalenes; Upper Trap and Levator          Shoulder Evaluation:  ROM:  AROM:    Flexion:  Left:  WFL    Right:  90  Abduction:  Left: WFL   Right:  80  Internal Rotation:  Right:  WFL  External Rotation:  Right:  20  Flexion/External Rotation:  Left:  Upper thoracic    Right:  Side of neck and painful  Extension/Internal Rotation:  Left:  Lower thoracic    Right:  Mid gluteal and painful    Pain: reach to opposite shoulder left WFL< right 60% of motion with pain  Strength:    Flexion: Left:5/5   Pain:    Right: 3-/5      Pain:  ++  Abduction:  Left: 5/5  Pain:    Right: 3-/5      Pain:++  Adduction:  Left: 5/5    Pain:    Right: 5-/5     Pain:  Internal Rotation:  Left:5/5     Pain:    Right: 5-/5     Pain:  External Rotation:   Left:5/5     Pain:   Right:3+/5      Pain:++    Elbow Flexion:  Right:4/5      Pain:+  Elbow Extension:  Left:5/5     Pain:    Right:5/5     Pain:  Special Tests:    Right shoulder positive for the following special tests:Rotator cuff tear  Palpation:    Right shoulder tenderness present at: Clavicle; Biceps; Supraspinatus; Infraspinatus;  Teres Minor; Subscapularis; Levator; Upper Trap and Bicipital Groove  Mobility Tests:    Glenohumeral inferior right:  Hypomobile    Scapulothoracic right:  Hypermobile    Assessment/Plan:    Patient is a 49 year old female with right side shoulder complaints.    Patient has the following significant findings with corresponding treatment plan.                Diagnosis 1:  Right shoulder RC tear  Pain -  manual therapy, self management, education and home program  Decreased ROM/flexibility - manual therapy, therapeutic exercise, therapeutic activity and home program  Decreased joint mobility - manual therapy, therapeutic exercise, therapeutic activity and home program  Decreased strength - therapeutic exercise, therapeutic activities and home program  Impaired muscle performance - neuro re-education and home program  Decreased function - therapeutic activities and home program    Therapy Evaluation Codes:   1) History comprised of:   Personal factors that impact the plan of care:      Language, Profession and Time since onset of symptoms.    Comorbidity factors that impact the plan of care are:    Re: Karen Braxton   :   1970      Diabetes, High blood pressure and Osteoarthritis.     Medications impacting care: High blood pressure and diabetic.  2) Examination of Body Systems comprised of:   Body structures and functions that impact the plan of care:      Elbow, Shoulder and Wrist.   Activity limitations that impact the plan of care are:      Bathing, Cooking, Driving, Dressing, Grasping, Lifting, Working, Sleeping and reaching.  3) Clinical presentation characteristics are:   Stable/Uncomplicated.  4) Decision-Making    Low complexity using standardized patient assessment instrument and/or measureable assessment of functional outcome.  Cumulative Therapy Evaluation is: Low complexity.    Previous and current functional limitations:  (See Goal Flow Sheet for this information)    Short term and Long term goals:  (See Goal Flow Sheet for this information)     Communication ability:  Patient appears to be able to clearly communicate and understand verbal and written communication and follow directions correctly.  Treatment Explanation - The following has been discussed with the patient:   RX ordered/plan of care  Possible risks and side effects  This patient would benefit from PT intervention to resume normal activities.   Rehab potential is good.    Frequency:  1 X week, once daily  Duration:  for 6 weeks  Discharge Plan:  Achieve all LTG.  Independent in home treatment program.    Thank you for your referral.    INQUIRIES  Therapist: Patsy Ovalles, PT  [facility] [place]  [address] [address]  Phone: [phone]  Fax: [phone]

## 2019-08-19 NOTE — PROGRESS NOTES
[facility] Initial Evaluation  Subjective:  The history is provided by the patient. The history is limited by a language barrier. No  was used.   Type of problem:  Right shoulder   Condition occurred with:  A fall. This is a chronic condition   Problem details: On or about 1/28/2019, I feel on the ice and hurt my right shoulder and my wrist.  I had splint on the wrist.  I continued to have pain in the shoulder and was given an shot into the shoulder.   I went back to the MD who did an MRI and found a tear in the RC.  I see the orthopedic tomorrow.  I also have wrist pain due to a possible fracture.  I am right handed .   Patient reports pain:  Anterior, lateral, medial, posterior and upper arm. Radiates to:  Wrist and shoulder (other issues with the wrist). Associated symptoms:  Loss of strength and loss of motion/stiffness. Symptoms are exacerbated by carrying, lifting, using arm behind back, using arm overhead, using arm at shoulder level and lying on extremity Relieved by: IB only temporary.    Karen Braxton being seen for right shoulder.   Problem began 8/13/2019 (saw MD). Where injured: street.Problem occurred: fall on the ice  and reported as 10/10 (lowest 5) on pain scale. General health as reported by patient is good. Pertinent medical history includes:  Diabetes and high blood pressure.    Surgeries include:  None.   Other medications details: diabetic.   Primary job tasks include:  Pushing/pulling, repetitive tasks, prolonged standing and lifting/carrying.  Pain is described as aching and is constant. Pain is the same all the time. Since onset symptoms are unchanged. Special tests:  MRI (torn RC, partial torn). Previous treatment includes physical therapy (other reason).    Patient is home care. Restrictions include:  Working in normal job with restrictions (part time and only with left arm ).    Home/work barriers: duplex, downstairs.  Red flags:  None as reported by  patient.                      Objective:  Standing Alignment:    Cervical/Thoracic:  Forward head (poor sitting posture)  Shoulder/UE:  Rounded shoulders, scapular abduction L and scapular abduction R                  Flexibility/Screens:   Positive screens:  Shoulder  Upper Extremity:        Decreased right upper extremity flexibility present at:  Pectoralis Major and Pectoralis Minor    Spine:      Decreased right spine flexibility:  Scalenes; Upper Trap and Levator                       Shoulder Evaluation:  ROM:  AROM:    Flexion:  Left:  WFL    Right:  90    Abduction:  Left: WFL   Right:  80    Internal Rotation:  Right:  WFL  External Rotation:  Right:  20          Flexion/External Rotation:  Left:  Upper thoracic    Right:  Side of neck and painful  Extension/Internal Rotation:  Left:  Lower thoracic    Right:  Mid gluteal and painful      Pain: reach to opposite shoulder left WFL< right 60% of motion with pain    Strength:    Flexion: Left:5/5   Pain:    Right: 3-/5      Pain:  ++    Abduction:  Left: 5/5  Pain:    Right: 3-/5      Pain:++  Adduction:  Left: 5/5    Pain:    Right: 5-/5     Pain:  Internal Rotation:  Left:5/5     Pain:    Right: 5-/5     Pain:  External Rotation:   Left:5/5     Pain:   Right:3+/5      Pain:++        Elbow Flexion:  Right:4/5      Pain:+  Elbow Extension:  Left:5/5     Pain:    Right:5/5     Pain:    Special Tests:      Right shoulder positive for the following special tests:Rotator cuff tear  Palpation:      Right shoulder tenderness present at: Clavicle; Biceps; Supraspinatus; Infraspinatus; Teres Minor; Subscapularis; Levator; Upper Trap and Bicipital Groove  Mobility Tests:        Glenohumeral inferior right:  Hypomobile      Scapulothoracic right:  Hypermobile                                       General     ROS    Assessment/Plan:    Patient is a 49 year old female with right side shoulder complaints.    Patient has the following significant findings with corresponding  treatment plan.                Diagnosis 1:  Right shoulder RC tear  Pain -  manual therapy, self management, education and home program  Decreased ROM/flexibility - manual therapy, therapeutic exercise, therapeutic activity and home program  Decreased joint mobility - manual therapy, therapeutic exercise, therapeutic activity and home program  Decreased strength - therapeutic exercise, therapeutic activities and home program  Impaired muscle performance - neuro re-education and home program  Decreased function - therapeutic activities and home program    Therapy Evaluation Codes:   1) History comprised of:   Personal factors that impact the plan of care:      Language, Profession and Time since onset of symptoms.    Comorbidity factors that impact the plan of care are:      Diabetes, High blood pressure and Osteoarthritis.     Medications impacting care: High blood pressure and diabetic.  2) Examination of Body Systems comprised of:   Body structures and functions that impact the plan of care:      Elbow, Shoulder and Wrist.   Activity limitations that impact the plan of care are:      Bathing, Cooking, Driving, Dressing, Grasping, Lifting, Working, Sleeping and reaching.  3) Clinical presentation characteristics are:   Stable/Uncomplicated.  4) Decision-Making    Low complexity using standardized patient assessment instrument and/or measureable assessment of functional outcome.  Cumulative Therapy Evaluation is: Low complexity.    Previous and current functional limitations:  (See Goal Flow Sheet for this information)    Short term and Long term goals: (See Goal Flow Sheet for this information)     Communication ability:  Patient appears to be able to clearly communicate and understand verbal and written communication and follow directions correctly.  Treatment Explanation - The following has been discussed with the patient:   RX ordered/plan of care  Possible risks and side effects  This patient would benefit  from PT intervention to resume normal activities.   Rehab potential is good.    Frequency:  1 X week, once daily  Duration:  for 6 weeks  Discharge Plan:  Achieve all LTG.  Independent in home treatment program.    Please refer to the daily flowsheet for treatment today, total treatment time and time spent performing 1:1 timed codes.

## 2019-08-20 NOTE — TELEPHONE ENCOUNTER
RECORDS RECEIVED FROM: Right shoulder pain, appt per pt   DATE RECEIVED: 8/20   NOTES STATUS DETAILS   OFFICE NOTE from referring provider N/A    OFFICE NOTE from other specialist Internal    DISCHARGE SUMMARY from hospital N/A    DISCHARGE REPORT from the ER Care Everywhere NM   OPERATIVE REPORT N/A    MRI Internal    CT SCAN N/A    XRAYS (IMAGES & REPORTS) Internal/ recieved NM 2019   Sent request for imaging  8/20

## 2019-08-26 ENCOUNTER — PRE VISIT (OUTPATIENT)
Dept: ORTHOPEDICS | Facility: CLINIC | Age: 49
End: 2019-08-26

## 2019-08-26 ENCOUNTER — OFFICE VISIT (OUTPATIENT)
Dept: ORTHOPEDICS | Facility: CLINIC | Age: 49
End: 2019-08-26
Payer: COMMERCIAL

## 2019-08-26 VITALS — HEIGHT: 65 IN | WEIGHT: 124 LBS | BODY MASS INDEX: 20.66 KG/M2

## 2019-08-26 DIAGNOSIS — S46.011A TRAUMATIC COMPLETE TEAR OF RIGHT ROTATOR CUFF, INITIAL ENCOUNTER: Primary | ICD-10-CM

## 2019-08-26 ASSESSMENT — PAIN SCALES - GENERAL: PAINLEVEL: NO PAIN (0)

## 2019-08-26 ASSESSMENT — MIFFLIN-ST. JEOR: SCORE: 1178.34

## 2019-08-26 NOTE — LETTER
"  8/26/2019      RE: Karen Braxton  [address]  [place] [address]       Sports Medicine Clinic Visit    PCP: Raymond Engel    Karen Braxton is a 49 year old female who is seen  as self referral presenting with right shoulder pain.    Injury:January 2019- fell onto right side     Location of Pain: right shoulder  Duration of Pain: 8 month(s)  Rating of Pain: 10/10 without ibuprofen   Pain is better with: Ibuprofen  Pain is worse with: At night, use of right extremity   Additional Features: Noisy  Treatment so far consists of: Ibuprofen 600-800 mg every 4 hours  Prior History of related problems: None    Ht 1.651 m (5' 5\")   Wt 56.2 kg (124 lb)   LMP  (LMP Unknown)   BMI 20.63 kg/m            PMH:  Past Medical History:   Diagnosis Date     Arthritis      ASCUS favor benign 2012    neg HPV  Plan cotest in 3 yrs.     Diabetes (H)      Hypertension        Active problem list:  Patient Active Problem List   Diagnosis     Other dental caries     Disorder of bursae and tendons in shoulder region     Insomnia     Vitamin D deficiency     Premature menopause     ASCUS favor benign     Hyperlipidemia LDL goal <100     Comprehensive diabetic foot examination, type 2 DM, encounter for (H)     Hypertension goal BP (blood pressure) < 140/90     DiarrheaX 2 over last 24hrs w one explosive r/o 2ndary to acid gastritis     Uncontrolled type 2 diabetes mellitus without complication, with long-term current use of insulin (H)     Esophageal reflux 2ndary to hi continuous intake of sugared  tea     Hyperlipidemia with target LDL less than 100     Diabetes type 2, controlled (H)     Intractable chronic migraine without aura and without status migrainosus     Needle stick injury, subsequent encounter     Hammer toe of right foot     Type 2 diabetes mellitus without complication, with long-term current use of insulin (H)     Rotator cuff syndrome, right       FH:  Family History   Problem Relation Age of Onset     " Diabetes Mother      Hypertension Mother      Heart Disease Mother      Lipids Mother      Asthma Mother      Diabetes Father      Hypertension Father      Cancer No family hx of         No known family hx of skin cancer     Coronary Artery Disease No family hx of      Hyperlipidemia No family hx of      Cerebrovascular Disease No family hx of      Breast Cancer No family hx of      Colon Cancer No family hx of      Prostate Cancer No family hx of      Other Cancer No family hx of      Depression No family hx of      Anxiety Disorder No family hx of      Mental Illness No family hx of      Substance Abuse No family hx of      Anesthesia Reaction No family hx of      Osteoporosis No family hx of      Genetic Disorder No family hx of      Thyroid Disease No family hx of      Obesity No family hx of      Unknown/Adopted No family hx of        SH:  Social History     Socioeconomic History     Marital status:      Spouse name: Not on file     Number of children: Not on file     Years of education: Not on file     Highest education level: Not on file   Occupational History     Not on file   Social Needs     Financial resource strain: Not on file     Food insecurity:     Worry: Not on file     Inability: Not on file     Transportation needs:     Medical: Not on file     Non-medical: Not on file   Tobacco Use     Smoking status: Current Some Day Smoker     Types: Cigarettes     Smokeless tobacco: Never Used     Tobacco comment: 3 cigarettes daily   Substance and Sexual Activity     Alcohol use: No     Alcohol/week: 0.0 oz     Drug use: No     Sexual activity: Yes     Partners: Male   Lifestyle     Physical activity:     Days per week: Not on file     Minutes per session: Not on file     Stress: Not on file   Relationships     Social connections:     Talks on phone: Not on file     Gets together: Not on file     Attends [employer] service: Not on file     Active member of club or organization: Not on file     Attends  meetings of clubs or organizations: Not on file     Relationship status: Not on file     Intimate partner violence:     Fear of current or ex partner: Not on file     Emotionally abused: Not on file     Physically abused: Not on file     Forced sexual activity: Not on file   Other Topics Concern     Parent/sibling w/ CABG, MI or angioplasty before 65F 55M? No   Social History Narrative     Not on file       MEDS:  See EMR, reviewed  ALL:  See EMR, reviewed    REVIEW OF SYSTEMS:  CONSTITUTIONAL:NEGATIVE for fever, chills, change in weight  INTEGUMENTARY/SKIN: NEGATIVE for worrisome rashes, moles or lesions  EYES: NEGATIVE for vision changes or irritation  ENT/MOUTH: NEGATIVE for ear, mouth and throat problems  RESP:NEGATIVE for significant cough or SOB  BREAST: NEGATIVE for masses, tenderness or discharge  CV: NEGATIVE for chest pain, palpitations or peripheral edema  GI: NEGATIVE for nausea, abdominal pain, heartburn, or change in bowel habits  :NEGATIVE for frequency, dysuria, or hematuria  :NEGATIVE for frequency, dysuria, or hematuria  NEURO: NEGATIVE for weakness, dizziness or paresthesias  ENDOCRINE: NEGATIVE for temperature intolerance, skin/hair changes  HEME/ALLERGY/IMMUNE: NEGATIVE for bleeding problems  PSYCHIATRIC: NEGATIVE for changes in mood or affect        EXAM: MR Right  Shoulder without  contrast     TECHNIQUE: Multiplanar, multisequence imaging of the right  shoulder  were obtained without administration of intravenous or intra-articular  gadolinium contrast using routine protocol.     History: Shoulder pain, rotator cuff tear/impingement suspected. Fall  in January, broken right arm.     Additional Clinical information from EMR: None     Comparison: Radiograph 2/18/2019     Findings:     ROTATOR CUFF and ASSOCIATED STRUCTURES  Rotator cuff: There is a full-thickness complete tear of the  supraspinatus tendon with tendon retracted to the level of the  acromioclavicular joint. The supraspinatus  tear measures 2.1 cm on  sagittal imaging. There is a high-grade partial thickness articular  sided and intrasubstance tear of the infraspinatus tendon at its  footprint. There is thickening and tendinosis of the subscapularis  tendon without full-thickness tear. Teres minor tendon is intact.     Bursa: There is fluid in subacromial/subdeltoid bursa consistent with  bursitis.     Musculature: There is moderate fatty atrophy of the supraspinatus  muscle and mild fatty infiltration of the infraspinatus muscle.     Acromioclavicular joint  There are moderate degenerative changes of the acromioclavicular  joint. Acromion is type 3 in sagittal morphology.  Coracoacromial  ligament is not thickened.     OSSEOUS STRUCTURES  No fracture, marrow contusion or marrow infiltration.     LONG BICIPITAL TENDON  The long head of the biceps tendon is normally situated within the  bicipital groove. No complete or partial biceps tendon tear is  present. There is mild biceps tendinosis.     GLENOHUMERAL JOINT  Joint fluid: There is a small joint effusion. There is fluid in the  subscapular recess.     Cartilage and subarticular bone:  No focal hyaline cartilage defects  are noted. No Hill-Sachs, reverse Hill-Sachs, or bony Bankart lesions  are seen.     Labrum: There is degeneration of the superior labrum.     ANCILLARY FINDINGS:  No axillary lymphadenopathy.                                                                      Impression:  1.  Full-thickness, complete tear of the right shoulder supraspinatus  tendon retracted to the level of the AC joint. There is moderate  supraspinatus atrophy.  2.  High-grade partial-thickness articular and intrasubstance tear of  the infraspinatus tendon at its footprint with mild fatty  infiltration.   3.  Tendinosis of the biceps tendon and subscapularis tendon.  4.  Small right shoulder joint effusion.  5.  Moderate AC joint degenerative changes.     I have personally reviewed the examination  and initial interpretation  and I agree with the findings.     TOI STOUT MD            RIGHT SHOULDER THREE VIEWS  2/18/2019 2:36 PM      HISTORY: Shoulder injury, right, initial encounter.     COMPARISON: August 31, 2017     FINDINGS: There is no significant degenerative change. The  acromioclavicular and coracoclavicular distances appear within normal  limits. The subacromial space is maintained. There is no acute  fracture or demonstrated dislocation. There are no worrisome bony  lesions.                                                                      IMPRESSION: No acute osseous abnormality demonstrated.     MEGGAN RIOS MD                Subjective: This 49-year-old right-handed female had an acute injury falling on ice in January 2019.  An x-ray was negative at the time.  She had a subsequent MRI of the shoulder done 3 weeks ago that showed a full-thickness tear of the supraspinatus with significant retraction.  Partial tear of the infraspinatus.  Some mild wear at the AC joint.  She denies previous issues with this shoulder.  She has been trying to see physical therapy but has persistent shoulder pain, it makes it difficult to sleep and she has weakness with any outstretched arm or overhead movement.  She has lived in [place] for 18 years.  She speaks good English.  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  Is been difficult to do her job because it involves lifting and caring for patients and it is hard to do it with her shoulder dysfunctional.  She denies previous injuries to the shoulder.  Diabetes on insulin.    Objective: In the supine position on the table she has full passive range of motion at the right shoulder without signs of a frozen shoulder.  In the upright position she has 4+ out of 5 infraspinatus strength and 4+ out of 5 supraspinatus strength.  Subscapularis is intact and deltoid is intact.  She is mildly tender over the anterior cuff but nontender over the AC joint or biceps  tendon.  There is no biceps deformity.  Full range of motion at the elbow.  Axillary nerve function is intact.  Distal pulses are intact.  Appropriate in conversation and affect.    Assessment full-thickness rotator cuff tear    Plan: She has a significant tear with retraction.  Unfortunately this tear occurred 8 months ago.  She is not improving with physical therapy.  She would like to discuss options with the surgeon and referral was placed.  We went over the MRI results in detail with the help of a diagram.    Ronald Gates MD

## 2019-08-26 NOTE — PROGRESS NOTES
"Sports Medicine Clinic Visit    PCP: Raymond Engel    Karen Braxton is a 49 year old female who is seen  as self referral presenting with right shoulder pain.    Injury:January 2019- fell onto right side     Location of Pain: right shoulder  Duration of Pain: 8 month(s)  Rating of Pain: 10/10 without ibuprofen   Pain is better with: Ibuprofen  Pain is worse with: At night, use of right extremity   Additional Features: Noisy  Treatment so far consists of: Ibuprofen 600-800 mg every 4 hours  Prior History of related problems: None    Ht 1.651 m (5' 5\")   Wt 56.2 kg (124 lb)   LMP  (LMP Unknown)   BMI 20.63 kg/m           PMH:  Past Medical History:   Diagnosis Date     Arthritis      ASCUS favor benign 2012    neg HPV  Plan cotest in 3 yrs.     Diabetes (H)      Hypertension        Active problem list:  Patient Active Problem List   Diagnosis     Other dental caries     Disorder of bursae and tendons in shoulder region     Insomnia     Vitamin D deficiency     Premature menopause     ASCUS favor benign     Hyperlipidemia LDL goal <100     Comprehensive diabetic foot examination, type 2 DM, encounter for (H)     Hypertension goal BP (blood pressure) < 140/90     DiarrheaX 2 over last 24hrs w one explosive r/o 2ndary to acid gastritis     Uncontrolled type 2 diabetes mellitus without complication, with long-term current use of insulin (H)     Esophageal reflux 2ndary to hi continuous intake of sugared  tea     Hyperlipidemia with target LDL less than 100     Diabetes type 2, controlled (H)     Intractable chronic migraine without aura and without status migrainosus     Needle stick injury, subsequent encounter     Hammer toe of right foot     Type 2 diabetes mellitus without complication, with long-term current use of insulin (H)     Rotator cuff syndrome, right       FH:  Family History   Problem Relation Age of Onset     Diabetes Mother      Hypertension Mother      Heart Disease Mother      Lipids Mother  "     Asthma Mother      Diabetes Father      Hypertension Father      Cancer No family hx of         No known family hx of skin cancer     Coronary Artery Disease No family hx of      Hyperlipidemia No family hx of      Cerebrovascular Disease No family hx of      Breast Cancer No family hx of      Colon Cancer No family hx of      Prostate Cancer No family hx of      Other Cancer No family hx of      Depression No family hx of      Anxiety Disorder No family hx of      Mental Illness No family hx of      Substance Abuse No family hx of      Anesthesia Reaction No family hx of      Osteoporosis No family hx of      Genetic Disorder No family hx of      Thyroid Disease No family hx of      Obesity No family hx of      Unknown/Adopted No family hx of        SH:  Social History     Socioeconomic History     Marital status:      Spouse name: Not on file     Number of children: Not on file     Years of education: Not on file     Highest education level: Not on file   Occupational History     Not on file   Social Needs     Financial resource strain: Not on file     Food insecurity:     Worry: Not on file     Inability: Not on file     Transportation needs:     Medical: Not on file     Non-medical: Not on file   Tobacco Use     Smoking status: Current Some Day Smoker     Types: Cigarettes     Smokeless tobacco: Never Used     Tobacco comment: 3 cigarettes daily   Substance and Sexual Activity     Alcohol use: No     Alcohol/week: 0.0 oz     Drug use: No     Sexual activity: Yes     Partners: Male   Lifestyle     Physical activity:     Days per week: Not on file     Minutes per session: Not on file     Stress: Not on file   Relationships     Social connections:     Talks on phone: Not on file     Gets together: Not on file     Attends Taoism service: Not on file     Active member of club or organization: Not on file     Attends meetings of clubs or organizations: Not on file     Relationship status: Not on file      Intimate partner violence:     Fear of current or ex partner: Not on file     Emotionally abused: Not on file     Physically abused: Not on file     Forced sexual activity: Not on file   Other Topics Concern     Parent/sibling w/ CABG, MI or angioplasty before 65F 55M? No   Social History Narrative     Not on file       MEDS:  See EMR, reviewed  ALL:  See EMR, reviewed    REVIEW OF SYSTEMS:  CONSTITUTIONAL:NEGATIVE for fever, chills, change in weight  INTEGUMENTARY/SKIN: NEGATIVE for worrisome rashes, moles or lesions  EYES: NEGATIVE for vision changes or irritation  ENT/MOUTH: NEGATIVE for ear, mouth and throat problems  RESP:NEGATIVE for significant cough or SOB  BREAST: NEGATIVE for masses, tenderness or discharge  CV: NEGATIVE for chest pain, palpitations or peripheral edema  GI: NEGATIVE for nausea, abdominal pain, heartburn, or change in bowel habits  :NEGATIVE for frequency, dysuria, or hematuria  :NEGATIVE for frequency, dysuria, or hematuria  NEURO: NEGATIVE for weakness, dizziness or paresthesias  ENDOCRINE: NEGATIVE for temperature intolerance, skin/hair changes  HEME/ALLERGY/IMMUNE: NEGATIVE for bleeding problems  PSYCHIATRIC: NEGATIVE for changes in mood or affect        EXAM: MR Right  Shoulder without  contrast     TECHNIQUE: Multiplanar, multisequence imaging of the right  shoulder  were obtained without administration of intravenous or intra-articular  gadolinium contrast using routine protocol.     History: Shoulder pain, rotator cuff tear/impingement suspected. Fall  in January, broken right arm.     Additional Clinical information from EMR: None     Comparison: Radiograph 2/18/2019     Findings:     ROTATOR CUFF and ASSOCIATED STRUCTURES  Rotator cuff: There is a full-thickness complete tear of the  supraspinatus tendon with tendon retracted to the level of the  acromioclavicular joint. The supraspinatus tear measures 2.1 cm on  sagittal imaging. There is a high-grade partial thickness  articular  sided and intrasubstance tear of the infraspinatus tendon at its  footprint. There is thickening and tendinosis of the subscapularis  tendon without full-thickness tear. Teres minor tendon is intact.     Bursa: There is fluid in subacromial/subdeltoid bursa consistent with  bursitis.     Musculature: There is moderate fatty atrophy of the supraspinatus  muscle and mild fatty infiltration of the infraspinatus muscle.     Acromioclavicular joint  There are moderate degenerative changes of the acromioclavicular  joint. Acromion is type 3 in sagittal morphology.  Coracoacromial  ligament is not thickened.     OSSEOUS STRUCTURES  No fracture, marrow contusion or marrow infiltration.     LONG BICIPITAL TENDON  The long head of the biceps tendon is normally situated within the  bicipital groove. No complete or partial biceps tendon tear is  present. There is mild biceps tendinosis.     GLENOHUMERAL JOINT  Joint fluid: There is a small joint effusion. There is fluid in the  subscapular recess.     Cartilage and subarticular bone:  No focal hyaline cartilage defects  are noted. No Hill-Sachs, reverse Hill-Sachs, or bony Bankart lesions  are seen.     Labrum: There is degeneration of the superior labrum.     ANCILLARY FINDINGS:  No axillary lymphadenopathy.                                                                      Impression:  1.  Full-thickness, complete tear of the right shoulder supraspinatus  tendon retracted to the level of the AC joint. There is moderate  supraspinatus atrophy.  2.  High-grade partial-thickness articular and intrasubstance tear of  the infraspinatus tendon at its footprint with mild fatty  infiltration.   3.  Tendinosis of the biceps tendon and subscapularis tendon.  4.  Small right shoulder joint effusion.  5.  Moderate AC joint degenerative changes.     I have personally reviewed the examination and initial interpretation  and I agree with the findings.     TOI STOUT,  MD            RIGHT SHOULDER THREE VIEWS  2/18/2019 2:36 PM      HISTORY: Shoulder injury, right, initial encounter.     COMPARISON: August 31, 2017     FINDINGS: There is no significant degenerative change. The  acromioclavicular and coracoclavicular distances appear within normal  limits. The subacromial space is maintained. There is no acute  fracture or demonstrated dislocation. There are no worrisome bony  lesions.                                                                      IMPRESSION: No acute osseous abnormality demonstrated.     MEGGAN RIOS MD                Subjective: This 49-year-old right-handed female had an acute injury falling on ice in January 2019.  An x-ray was negative at the time.  She had a subsequent MRI of the shoulder done 3 weeks ago that showed a full-thickness tear of the supraspinatus with significant retraction.  Partial tear of the infraspinatus.  Some mild wear at the AC joint.  She denies previous issues with this shoulder.  She has been trying to see physical therapy but has persistent shoulder pain, it makes it difficult to sleep and she has weakness with any outstretched arm or overhead movement.  She has lived in [place] for 18 years.  She speaks good English.  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  Is been difficult to do her job because it involves lifting and caring for patients and it is hard to do it with her shoulder dysfunctional.  She denies previous injuries to the shoulder.  Diabetes on insulin.    Objective: In the supine position on the table she has full passive range of motion at the right shoulder without signs of a frozen shoulder.  In the upright position she has 4+ out of 5 infraspinatus strength and 4+ out of 5 supraspinatus strength.  Subscapularis is intact and deltoid is intact.  She is mildly tender over the anterior cuff but nontender over the AC joint or biceps tendon.  There is no biceps deformity.  Full range of motion at the elbow.   Axillary nerve function is intact.  Distal pulses are intact.  Appropriate in conversation and affect.    Assessment full-thickness rotator cuff tear    Plan: She has a significant tear with retraction.  Unfortunately this tear occurred 8 months ago.  She is not improving with physical therapy.  She would like to discuss options with the surgeon and referral was placed.  We went over the MRI results in detail with the help of a diagram.

## 2019-09-03 ENCOUNTER — OFFICE VISIT (OUTPATIENT)
Dept: FAMILY MEDICINE | Facility: CLINIC | Age: 49
End: 2019-09-03
Payer: COMMERCIAL

## 2019-09-03 ENCOUNTER — TRANSFERRED RECORDS (OUTPATIENT)
Dept: HEALTH INFORMATION MANAGEMENT | Facility: CLINIC | Age: 49
End: 2019-09-03

## 2019-09-03 VITALS
TEMPERATURE: 98.5 F | RESPIRATION RATE: 16 BRPM | SYSTOLIC BLOOD PRESSURE: 116 MMHG | HEART RATE: 71 BPM | WEIGHT: 124 LBS | BODY MASS INDEX: 20.63 KG/M2 | OXYGEN SATURATION: 98 % | DIASTOLIC BLOOD PRESSURE: 72 MMHG

## 2019-09-03 DIAGNOSIS — B02.8 HERPES ZOSTER COMPLICATED: Primary | ICD-10-CM

## 2019-09-03 PROCEDURE — 99213 OFFICE O/P EST LOW 20 MIN: CPT | Performed by: FAMILY MEDICINE

## 2019-09-03 RX ORDER — VALACYCLOVIR HYDROCHLORIDE 1 G/1
1000 TABLET, FILM COATED ORAL 3 TIMES DAILY
Qty: 21 TABLET | Refills: 0 | Status: SHIPPED | OUTPATIENT
Start: 2019-09-03 | End: 2019-11-11

## 2019-09-03 NOTE — PROGRESS NOTES
Subjective     Karen Braxton is a 49 year old female who presents to clinic today for the following health issues:    HPI   Rash      Duration: 36 hours    Description  Location: left side of face  Itching: no    Intensity:  moderate    Accompanying signs and symptoms: pimples, rash, lump inside lip    History (similar episodes/previous evaluation): None    Precipitating or alleviating factors:  New exposures:  None  Recent travel: no      Therapies tried and outcome: none      Patient has had gradual onset to 36 hours    Looks to be shingles with some ocular involvement some pain    I would normally do a fluorescein test but since she is going to be seeing Dr. Curiel today    He will take care of the ocular part of this    Objective findings blisters on the inside left mouth and on the left side of the face including the nasal tip there is no injection in the conjunctiva left cheek is involved as well    Assessment acute herpes zoster left side of face involving nasal tip    With conjunctivae and eye at risk    Plan treatment acyclovir thousand milligrams p.o. 3 times daily for 7 days    [facility]EL [facility] today    She is going to go right over there right now and find it extremely important    I had Dr. BRUCE BLAIR MD   look at her as well who agrees with my recommendations    Problem #2    Right shoulder injury    She is scheduled to see an orthopedic surgeon who can possibly handle this tomorrow    Objective findings patient actually has completely torn supraspinatus muscle    But he does have some internal and external rotation positive painful arc on the right    Assessment right rotator cuff syndrome with torn supraspinatus muscle    Plan consultation with surgeon tomorrow    Might have to postpone until the shingles is handled        There are no preventive care reminders to display for this patient.      .  Current Outpatient  "Medications   Medication Sig Dispense Refill     canagliflozin (INVOKANA) 300 MG tablet Take 1 tablet (300 mg) by mouth every morning (before breakfast) 90 tablet 3     insulin glargine (BASAGLAR KWIKPEN) 100 UNIT/ML pen Inject 65 Units Subcutaneous At Bedtime 18 mL 3     insulin glargine (LANTUS SOLOSTAR PEN) 100 UNIT/ML pen Inject 40 Units Subcutaneous At Bedtime 36 mL 3     insulin lispro (HUMALOG KWIKPEN) 100 UNIT/ML pen 35 units before breakfast, 35 units before lunch, 35 units before dinner 30 mL 1     insulin pen needle (B-D U/F) 31G X 8 MM miscellaneous TEST FOUR TIMES A DAY OR AS DIRECTED 400 each 1     insulin syringe-needle U-100 (BD INSULIN SYRINGE ULTRAFINE) 30G X 1/2\" 1 ML Use one syringe daily or as directed.  3 month supply 100 each prn     lisinopril (PRINIVIL/ZESTRIL) 2.5 MG tablet Take 1 tablet (2.5 mg) by mouth daily 30 tablet 0     metFORMIN (GLUCOPHAGE) 1000 MG tablet TAKE ONE-HALF TABLET BY MOUTH TWICE DAILY WITH MEALS 90 tablet 1     NOVOLOG FLEXPEN 100 UNIT/ML soln 35 units before breakfast, 30 units before lunch, 30 units before dinner 90 mL 3     pioglitazone (ACTOS) 45 MG tablet Take 1 tablet (45 mg) by mouth daily 30 tablet 0     Urea 20 % CREA cream Apply topically as needed 85 g 3     valACYclovir (VALTREX) 1000 mg tablet Take 1 tablet (1,000 mg) by mouth 3 times daily for 7 days 21 tablet 0     vitamin D2 (ERGOCALCIFEROL) 78327 units (1250 mcg) capsule Take 1 capsule (50,000 Units) by mouth every 7 days 8 capsule 6     ASPIRIN NOT PRESCRIBED (INTENTIONAL) continuous prn for other Antiplatelet medication not prescribed intentionally due to Not indicated based on age/GI distress          No Known Allergies    Immunization History   Administered Date(s) Administered     Influenza (IIV3) PF 11/26/2007, 10/22/2008, 09/18/2009, 09/20/2010, 11/26/2013, 10/28/2014     Influenza Vaccine IM > 6 months Valent IIV4 09/29/2015, 10/25/2017     Pneumo Conj 13-V (2010&after) 10/03/2011     " Pneumococcal 23 valent 2018     TD (ADULT, 7+) 1998     Tdap (Adacel,Boostrix) 02/10/2009         reports that she does not drink alcohol.      reports that she does not use drugs.    family history includes Asthma in her mother; Diabetes in her father and mother; Heart Disease in her mother; Hypertension in her father and mother; Lipids in her mother.    indicated that the status of her no family hx of is unknown. She indicated that her mother is . She indicated that her father is .       has a past surgical history that includes Cholecystectomy (2007); Facial reconstruction surgery (2 years old); and orthopedic surgery (2001).     reports that she currently engages in sexual activity and has had partners who are Male.  .  Pediatric History   Patient Guardian Status     Not on file     Other Topics Concern     Parent/sibling w/ CABG, MI or angioplasty before 65F 55M? No   Social History Narrative     Not on file         reports that she has been smoking cigarettes.  She has never used smokeless tobacco.    Medical, social, surgical, and family histories reviewed.    Labs reviewed in EPIC  Patient Active Problem List   Diagnosis     Other dental caries     Disorder of bursae and tendons in shoulder region     Insomnia     Vitamin D deficiency     Premature menopause     ASCUS favor benign     Hyperlipidemia LDL goal <100     Comprehensive diabetic foot examination, type 2 DM, encounter for (H)     Hypertension goal BP (blood pressure) < 140/90     DiarrheaX 2 over last 24hrs w one explosive r/o 2ndary to acid gastritis     Uncontrolled type 2 diabetes mellitus without complication, with long-term current use of insulin (H)     Esophageal reflux 2ndary to hi continuous intake of sugared  tea     Hyperlipidemia with target LDL less than 100     Diabetes type 2, controlled (H)     Intractable chronic migraine without aura and without status migrainosus     Needle stick injury,  subsequent encounter     Hammer toe of right foot     Type 2 diabetes mellitus without complication, with long-term current use of insulin (H)     Rotator cuff syndrome, right     Past Surgical History:   Procedure Laterality Date     CHOLECYSTECTOMY  9/14/2007    laproscopic     FACIAL RECONSTRUCTION SURGERY  2 years old    cleft lip repair     ORTHOPEDIC SURGERY  6/2001    left knee       Social History     Tobacco Use     Smoking status: Current Some Day Smoker     Types: Cigarettes     Smokeless tobacco: Never Used     Tobacco comment: 3 cigarettes daily   Substance Use Topics     Alcohol use: No     Alcohol/week: 0.0 oz     Family History   Problem Relation Age of Onset     Diabetes Mother      Hypertension Mother      Heart Disease Mother      Lipids Mother      Asthma Mother      Diabetes Father      Hypertension Father      Cancer No family hx of         No known family hx of skin cancer     Coronary Artery Disease No family hx of      Hyperlipidemia No family hx of      Cerebrovascular Disease No family hx of      Breast Cancer No family hx of      Colon Cancer No family hx of      Prostate Cancer No family hx of      Other Cancer No family hx of      Depression No family hx of      Anxiety Disorder No family hx of      Mental Illness No family hx of      Substance Abuse No family hx of      Anesthesia Reaction No family hx of      Osteoporosis No family hx of      Genetic Disorder No family hx of      Thyroid Disease No family hx of      Obesity No family hx of      Unknown/Adopted No family hx of          Current Outpatient Medications   Medication Sig Dispense Refill     canagliflozin (INVOKANA) 300 MG tablet Take 1 tablet (300 mg) by mouth every morning (before breakfast) 90 tablet 3     insulin glargine (BASAGLAR KWIKPEN) 100 UNIT/ML pen Inject 65 Units Subcutaneous At Bedtime 18 mL 3     insulin glargine (LANTUS SOLOSTAR PEN) 100 UNIT/ML pen Inject 40 Units Subcutaneous At Bedtime 36 mL 3     insulin  "lispro (HUMALOG KWIKPEN) 100 UNIT/ML pen 35 units before breakfast, 35 units before lunch, 35 units before dinner 30 mL 1     insulin pen needle (B-D U/F) 31G X 8 MM miscellaneous TEST FOUR TIMES A DAY OR AS DIRECTED 400 each 1     insulin syringe-needle U-100 (BD INSULIN SYRINGE ULTRAFINE) 30G X 1/2\" 1 ML Use one syringe daily or as directed.  3 month supply 100 each prn     lisinopril (PRINIVIL/ZESTRIL) 2.5 MG tablet Take 1 tablet (2.5 mg) by mouth daily 30 tablet 0     metFORMIN (GLUCOPHAGE) 1000 MG tablet TAKE ONE-HALF TABLET BY MOUTH TWICE DAILY WITH MEALS 90 tablet 1     NOVOLOG FLEXPEN 100 UNIT/ML soln 35 units before breakfast, 30 units before lunch, 30 units before dinner 90 mL 3     pioglitazone (ACTOS) 45 MG tablet Take 1 tablet (45 mg) by mouth daily 30 tablet 0     Urea 20 % CREA cream Apply topically as needed 85 g 3     valACYclovir (VALTREX) 1000 mg tablet Take 1 tablet (1,000 mg) by mouth 3 times daily for 7 days 21 tablet 0     vitamin D2 (ERGOCALCIFEROL) 17126 units (1250 mcg) capsule Take 1 capsule (50,000 Units) by mouth every 7 days 8 capsule 6     ASPIRIN NOT PRESCRIBED (INTENTIONAL) continuous prn for other Antiplatelet medication not prescribed intentionally due to Not indicated based on age/GI distress         Recent Labs   Lab Test 06/18/19  1319 09/17/18  1014 07/09/18  0819 03/16/18  1211 03/27/17  1442  03/21/16  1030  06/23/15  1652   A1C 7.7* 8.9* 8.4* 8.5* 8.2*   < > 8.1*   < > 8.1*   *  --   --  149* 160*  --  142*  --   --    HDL 54  --   --  43* 48*  --  44*  --   --    TRIG 220*  --   --  183* 171*  --  223*  --   --    ALT  --   --   --  16  --   --  30  --  21   CR 0.64  --  0.66 0.67  --    < > 0.70  --  0.80   GFRESTIMATED >90  --  >90 >90  --    < > 90  --  77   GFRESTBLACK >90  --  >90 >90  --    < > >90  --  >90   POTASSIUM  --   --  3.6 4.2  --    < > 4.4  --  4.2   TSH  --   --   --  2.12  --   --  1.31  --   --     < > = values in this interval not displayed.    "     BP Readings from Last 6 Encounters:   09/03/19 116/72   08/13/19 116/64   07/30/19 106/58   06/18/19 117/75   04/01/19 155/88   02/18/19 128/74       Wt Readings from Last 3 Encounters:   09/03/19 56.2 kg (124 lb)   08/26/19 56.2 kg (124 lb)   08/13/19 56.2 kg (124 lb)         Positive symptoms or findings indicated by bold designation:     ROS: 10 point ROS neg other than the symptoms noted above in the HPI.except  has Other dental caries; Disorder of bursae and tendons in shoulder region; Insomnia; Vitamin D deficiency; Premature menopause; ASCUS favor benign; Hyperlipidemia LDL goal <100; Comprehensive diabetic foot examination, type 2 DM, encounter for (H); Hypertension goal BP (blood pressure) < 140/90; DiarrheaX 2 over last 24hrs w one explosive r/o 2ndary to acid gastritis; Uncontrolled type 2 diabetes mellitus without complication, with long-term current use of insulin (H); Esophageal reflux 2ndary to hi continuous intake of sugared  tea; Hyperlipidemia with target LDL less than 100; Diabetes type 2, controlled (H); Intractable chronic migraine without aura and without status migrainosus; Needle stick injury, subsequent encounter; Hammer toe of right foot; Type 2 diabetes mellitus without complication, with long-term current use of insulin (H); and Rotator cuff syndrome, right on their problem list.  Review Of Systems  Skin: See history of present illness  Painful rash left lip left cheek nasal tip and possibly ocular involvement  Eyes left eye pain  Ears/Nose/Throat: Blistering rash left nasal tip and left lip inside and outside with swollen left lip  Blistering on the face nasal tip  Respiratory: No shortness of breath, dyspnea on exertion, cough, or hemoptysis  Cardiovascular: negative  Gastrointestinal: heartburn  Genitourinary: negative  Musculoskeletal: Right shoulder pain  Neurologic: negative  Psychiatric: negative  Hematologic/Lymphatic/Immunologic: negative  Endocrine: diabetes and   difficult  to control diabetes  History of dental caries well-controlled now  Premature menopause  Recurrent migraine headaches  History of hammertoe right foot          PE:  /72   Pulse 71   Temp 98.5  F (36.9  C) (Tympanic)   Resp 16   Wt 56.2 kg (124 lb)   LMP  (LMP Unknown)   SpO2 98%   BMI 20.63 kg/m   Body mass index is 20.63 kg/m .    Constitutional: general appearance, well nourished, well developed, in no acute distress, well developed, appears stated age, normal body habitus,      Eyes:; The patient has normal eyelids sclerae and conjunctivae : Left eye discomfort but not injected I did do fluorescein testing because she can see the eye doctor today    Ears/Nose/Throat: external ear, overall: normal appearance; external nose, overall: benign appearance, normal moujth gums and lips     Left lip left cheek left nasal tip and eye rash blistery type painful    Neck: thyroid, overall: normal size, normal consistency, nontender,      Respiratory:  palpation of chest, overall: normal excursion,    Clear to percussion and auscultation     NO Tachypnea    NORMAL  Color      Cardiovascular:  Good color with no peripheral edema    Regular sinus rhythm without murmur.  Physiologic heart sounds   Heart is unelarged  .   Chest/Breast: normal shape       Abdominal exam,  Liver and spleen are  unenlarged        Tenderness    Scars        Urogenital; no renal, flank or bladder  tenderness;      Lymphatic: neck nodes,     Other nodes     Musculoskeletal:  Brief ortho exam normal except:   Pain with internal/external rotation right shoulder positive painful arc syndrome  No tendency to sublux or dislocate  History of fall with a right supraspinatus injury and tear    Integument: inspection of skin, no rash, lesions; and, palpation, no induration, no tenderness.      Neurologic mental status, overall: alert and oriented; gait, no ataxia, no unsteadiness; coordination, no tremors; cranial nerves, overall: normal motor,  overall: normal bulk, tone.      Psychiatric: orientation/consciousness, overall: oriented to person, place and time; behavior/psychomotor activity, no tics, normal psychomotor activity; mood and affect, overall: normal mood and affect; appearance, overall: well-groomed, good eye contact; speech, overall: normal quality, no aphasia and normal quality, quantity, intact.      Diagnostic Test Results:  Results for orders placed or performed in visit on 08/05/19   MR Shoulder Right w/o Contrast    Narrative    EXAM: MR Right  Shoulder without  contrast    TECHNIQUE: Multiplanar, multisequence imaging of the right  shoulder  were obtained without administration of intravenous or intra-articular  gadolinium contrast using routine protocol.    History: Shoulder pain, rotator cuff tear/impingement suspected. Fall  in January, broken right arm.    Additional Clinical information from EMR: None    Comparison: Radiograph 2/18/2019    Findings:    ROTATOR CUFF and ASSOCIATED STRUCTURES  Rotator cuff: There is a full-thickness complete tear of the  supraspinatus tendon with tendon retracted to the level of the  acromioclavicular joint. The supraspinatus tear measures 2.1 cm on  sagittal imaging. There is a high-grade partial thickness articular  sided and intrasubstance tear of the infraspinatus tendon at its  footprint. There is thickening and tendinosis of the subscapularis  tendon without full-thickness tear. Teres minor tendon is intact.    Bursa: There is fluid in subacromial/subdeltoid bursa consistent with  bursitis.    Musculature: There is moderate fatty atrophy of the supraspinatus  muscle and mild fatty infiltration of the infraspinatus muscle.    Acromioclavicular joint  There are moderate degenerative changes of the acromioclavicular  joint. Acromion is type 3 in sagittal morphology.  Coracoacromial  ligament is not thickened.    OSSEOUS STRUCTURES  No fracture, marrow contusion or marrow infiltration.    LONG  BICIPITAL TENDON  The long head of the biceps tendon is normally situated within the  bicipital groove. No complete or partial biceps tendon tear is  present. There is mild biceps tendinosis.    GLENOHUMERAL JOINT  Joint fluid: There is a small joint effusion. There is fluid in the  subscapular recess.    Cartilage and subarticular bone:  No focal hyaline cartilage defects  are noted. No Hill-Sachs, reverse Hill-Sachs, or bony Bankart lesions  are seen.    Labrum: There is degeneration of the superior labrum.    ANCILLARY FINDINGS:  No axillary lymphadenopathy.      Impression    Impression:  1.  Full-thickness, complete tear of the right shoulder supraspinatus  tendon retracted to the level of the AC joint. There is moderate  supraspinatus atrophy.  2.  High-grade partial-thickness articular and intrasubstance tear of  the infraspinatus tendon at its footprint with mild fatty  infiltration.   3.  Tendinosis of the biceps tendon and subscapularis tendon.  4.  Small right shoulder joint effusion.  5.  Moderate AC joint degenerative changes.    I have personally reviewed the examination and initial interpretation  and I agree with the findings.    TOI STOUT MD         ICD-10-CM    1. Herpes zoster complicated B02.8 valACYclovir (VALTREX) 1000 mg tablet     OPHTHALMOLOGY ADULT REFERRAL        .  Side effects benefits and risks thoroughly discussed. .she may come in early if unimproved or getting worse      Please drink 2 glasses of water prior to meals and walk 15-30 minutes after meals    I spent 25 MINUTES SPENT   with patient discussing the following issues   The encounter diagnosis was Herpes zoster complicated. over half of which involved counseling and coordination of care.    There are no Patient Instructions on file for this visit.    ALL THE ABOVE PROBLEMS ARE STABLE AND MED CHANGES AS NOTED    Diet: MEDITERRANEAN DIET AND DIABETES     Exercise:  RIGHT SHOULDER AS TOLERATED   Exercises Range of motion,  balance, isometric, and strengthening exercises 30 repetitions twice daily of involved joints      .LINDA ALCANTAR MD 9/3/2019 12:06 PM  September 3, 2019

## 2019-09-03 NOTE — PATIENT INSTRUCTIONS
(B02.8) Herpes zoster complicated  (primary encounter diagnosis)  Comment:    Plan: valACYclovir (VALTREX) 1000 mg tablet,         OPHTHALMOLOGY ADULT REFERRAL

## 2019-09-04 ENCOUNTER — PRE VISIT (OUTPATIENT)
Dept: ORTHOPEDICS | Facility: CLINIC | Age: 49
End: 2019-09-04

## 2019-09-04 ENCOUNTER — OFFICE VISIT (OUTPATIENT)
Dept: ORTHOPEDICS | Facility: CLINIC | Age: 49
End: 2019-09-04
Attending: FAMILY MEDICINE
Payer: COMMERCIAL

## 2019-09-04 VITALS — BODY MASS INDEX: 20.64 KG/M2 | HEIGHT: 65 IN | WEIGHT: 123.9 LBS

## 2019-09-04 DIAGNOSIS — S46.011A TRAUMATIC COMPLETE TEAR OF RIGHT ROTATOR CUFF, INITIAL ENCOUNTER: Primary | ICD-10-CM

## 2019-09-04 ASSESSMENT — MIFFLIN-ST. JEOR: SCORE: 1177.88

## 2019-09-04 NOTE — NURSING NOTE
Teaching Flowsheet   Relevant Diagnosis: Right cuff tear  Teaching Topic: Pre-op for right arthroscopic rotator cuff repair, subacromial decompression, biceps tenotomy - will call to schedule after she is cleared by her dentist and opthalmologist     Person(s) involved in teaching:   Patient     Motivation Level:  Asks Questions: Yes  Cooperative: Yes  Receptive (willing/able to accept information): Yes  Any cultural factors/Jainism beliefs that may influence understanding or compliance? No     Patient demonstrates understanding of the following:  Reason for the appointment, diagnosis and treatment plan: Yes  Knowledge of proper use of medications and conditions for which they are ordered (with special attention to potential side effects or drug interactions): Yes  Which situations necessitate calling provider and whom to contact: Yes     Teaching Concerns Addressed:   Pre-op by PMD  Lives alone.  Stated her son may be able to drive her home after surgery.  She is unsure who will stay with her at home for 24 hours.  Aware of post-op limitations    Proper use and care of sling x 6 weeks (medical equip, care aids, etc.): Yes  Nutritional needs and diet plan: Yes  Pain management techniques: Yes  Wound Care: Yes  How and/when to access community resources: Yes     Instructional Materials Used/Given: Pre-op packet, surgical soap, written guidelines for care after shoulder surgery

## 2019-09-04 NOTE — LETTER
"9/4/2019       RE: Karen Braxton  [address] [facility]  [place] [address]     Dear Colleague,    Thank you for referring your patient, Karen Braxton, to the [facility] at [facility]. Please see a copy of my visit note below.    CHIEF CONCERN:  R shoulder pain    HISTORY OF PRESENT ILLNESS:  Karen Braxton is a 50 yo F presenting today with severe right shoulder pain that has been ongoing since January. In January she was getting in her car when she fell in the snow and landed on her shoulder with her arm behind her back. Since that time, she has had \"10/10 pain\" and weakness that has greatly affected her daily function such as dressing and sleeping. 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 She waited several months to see anyone about the shoulder pain, but went to see a doctor in March and was given a shoulder injection which only increased her pain. She waited until August to see another doctor as she had such a bad experience with her previous visit. In August she came back into the hospital where she received an MRI that showed tears in her supraspinatus and infraspinatus with atrophy of her rotator cuff muscles and tendinosis of her biceps. She went to PT once in August but felt like it only increased her pain and never went back. The pain is located on the posterolateral aspect of the shoulder and often radiates down her arm and into the dorsum of her hand. She has been taking ibuprofen q4hrs since injuring her shoulder in January and this is the only thing that has helped her pain. Complicating matters, earlier this week she was diagnosed with shingles with ocular involvement and is seeing a doctor later this week for a tooth infection.    Past Medical History:   Diagnosis Date     Arthritis      ASCUS favor benign 2012    neg HPV  Plan cotest in 3 yrs.     Diabetes (H)      Hypertension        Past Surgical History: " "  Procedure Laterality Date     CHOLECYSTECTOMY  9/14/2007    laproscopic     FACIAL RECONSTRUCTION SURGERY  2 years old    cleft lip repair     ORTHOPEDIC SURGERY  6/2001    left knee       Current Outpatient Medications   Medication Sig Dispense Refill     ASPIRIN NOT PRESCRIBED (INTENTIONAL) continuous prn for other Antiplatelet medication not prescribed intentionally due to Not indicated based on age/GI distress       canagliflozin (INVOKANA) 300 MG tablet Take 1 tablet (300 mg) by mouth every morning (before breakfast) 90 tablet 3     insulin glargine (BASAGLAR KWIKPEN) 100 UNIT/ML pen Inject 65 Units Subcutaneous At Bedtime 18 mL 3     insulin glargine (LANTUS SOLOSTAR PEN) 100 UNIT/ML pen Inject 40 Units Subcutaneous At Bedtime 36 mL 3     insulin lispro (HUMALOG KWIKPEN) 100 UNIT/ML pen 35 units before breakfast, 35 units before lunch, 35 units before dinner 30 mL 1     insulin pen needle (B-D U/F) 31G X 8 MM miscellaneous TEST FOUR TIMES A DAY OR AS DIRECTED 400 each 1     insulin syringe-needle U-100 (BD INSULIN SYRINGE ULTRAFINE) 30G X 1/2\" 1 ML Use one syringe daily or as directed.  3 month supply 100 each prn     lisinopril (PRINIVIL/ZESTRIL) 2.5 MG tablet Take 1 tablet (2.5 mg) by mouth daily 30 tablet 0     metFORMIN (GLUCOPHAGE) 1000 MG tablet TAKE ONE-HALF TABLET BY MOUTH TWICE DAILY WITH MEALS 90 tablet 1     NOVOLOG FLEXPEN 100 UNIT/ML soln 35 units before breakfast, 30 units before lunch, 30 units before dinner 90 mL 3     pioglitazone (ACTOS) 45 MG tablet Take 1 tablet (45 mg) by mouth daily 30 tablet 0     Urea 20 % CREA cream Apply topically as needed 85 g 3     valACYclovir (VALTREX) 1000 mg tablet Take 1 tablet (1,000 mg) by mouth 3 times daily for 7 days 21 tablet 0     vitamin D2 (ERGOCALCIFEROL) 15277 units (1250 mcg) capsule Take 1 capsule (50,000 Units) by mouth every 7 days 8 capsule 6        No Known Allergies    SOCIAL HISTORY:    Social History     Socioeconomic History     Marital " status:      Spouse name: Not on file     Number of children: Not on file     Years of education: Not on file     Highest education level: Not on file   Occupational History     Not on file   Social Needs     Financial resource strain: Not on file     Food insecurity:     Worry: Not on file     Inability: Not on file     Transportation needs:     Medical: Not on file     Non-medical: Not on file   Tobacco Use     Smoking status: Current Some Day Smoker     Types: Cigarettes     Smokeless tobacco: Never Used     Tobacco comment: 3 cigarettes daily   Substance and Sexual Activity     Alcohol use: No     Alcohol/week: 0.0 oz     Drug use: No     Sexual activity: Yes     Partners: Male   Lifestyle     Physical activity:     Days per week: Not on file     Minutes per session: Not on file     Stress: Not on file   Relationships     Social connections:     Talks on phone: Not on file     Gets together: Not on file     Attends Zoroastrian service: Not on file     Active member of club or organization: Not on file     Attends meetings of clubs or organizations: Not on file     Relationship status: Not on file     Intimate partner violence:     Fear of current or ex partner: Not on file     Emotionally abused: Not on file     Physically abused: Not on file     Forced sexual activity: Not on file   Other Topics Concern     Parent/sibling w/ CABG, MI or angioplasty before 65F 55M? No   Social History Narrative     Not on file       FAMILY HISTORY: Reviewed in EMR      REVIEW OF SYSTEMS: Positive for that noted in past medical history and history of present illness and otherwise reviewed in EMR     PHYSICAL EXAM:    Gen: Adult female in no acute distress. Articulates and communicates with normal affect.  Pulm: Respirations even and unlabored  CV: Extr wwp  MSK: Focused upper extremity exam:   Skin intact. No erythema. EPL, FPL, and Intrinsics intact. Sensation intact in axillary, musculocutaneous, radial, ulnar and median  nerve distributions. Radial pulse 2+/4. Right shoulder active motion is FE to 130, ER at side to 30 with strength markedly diminshed, and IR to to L3 but positive lift off. Empty can test demonstrated weakness and exam was limited by pain.      IMAGING:  MR Right Shoulder without contrast 8/5/2019  Impression:  1.  Full-thickness, complete tear of the right shoulder supraspinatus tendon retracted to the level of the AC joint. There is moderate supraspinatus atrophy.  2.  High-grade partial-thickness articular and intrasubstance tear of the infraspinatus tendon at its footprint with mild fatty  infiltration.   3.  Tendinosis of the biceps tendon and subscapularis tendon.  4.  Small right shoulder joint effusion.  5.  Moderate AC joint degenerative changes.       ASSESSMENT:    1. Ongoing shingles infection with ocular involvement   2. Current tooth infection/dental abscess   3. Right shoulder supra and infraspinatus tears with cuff atrophy  4. Right long head biceps tendinosis.    PLAN:  Continue management of shingles infection and tooth infection.   Cannot consider surgery until these other infections are adequately managed.  When infections resolve and patient is cleared medically, can reevaluate possible rotator cuff repair.  In the interim, return to PT to preserve strength and function.    Tripp Perez, MS4    Physician Attestation   I, Gracie Graff, was present with the medical student who participated in the service and in the documentation of the note.  I have verified the history and personally performed the physical exam and medical decision making.  I agree with the assessment and plan of care as documented in the note.      Items personally reviewed: history, exam, imaging and agree with the interpretation documented in the note.    Gracie Graff MD

## 2019-09-04 NOTE — PROGRESS NOTES
"CHIEF CONCERN:  R shoulder pain    HISTORY OF PRESENT ILLNESS:  Karen Braxton is a 48 yo F presenting today with severe right shoulder pain that has been ongoing since January. In January she was getting in her car when she fell in the snow and landed on her shoulder with her arm behind her back. Since that time, she has had \"10/10 pain\" and weakness that has greatly affected her daily function such as dressing and sleeping. 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 She waited several months to see anyone about the shoulder pain, but went to see a doctor in March and was given a shoulder injection which only increased her pain. She waited until August to see another doctor as she had such a bad experience with her previous visit. In August she came back into the hospital where she received an MRI that showed tears in her supraspinatus and infraspinatus with atrophy of her rotator cuff muscles and tendinosis of her biceps. She went to PT once in August but felt like it only increased her pain and never went back. The pain is located on the posterolateral aspect of the shoulder and often radiates down her arm and into the dorsum of her hand. She has been taking ibuprofen q4hrs since injuring her shoulder in January and this is the only thing that has helped her pain. Complicating matters, earlier this week she was diagnosed with shingles with ocular involvement and is seeing a doctor later this week for a tooth infection.    Past Medical History:   Diagnosis Date     Arthritis      ASCUS favor benign 2012    neg HPV  Plan cotest in 3 yrs.     Diabetes (H)      Hypertension        Past Surgical History:   Procedure Laterality Date     CHOLECYSTECTOMY  9/14/2007    laproscopic     FACIAL RECONSTRUCTION SURGERY  2 years old    cleft lip repair     ORTHOPEDIC SURGERY  6/2001    left knee       Current Outpatient Medications   Medication Sig Dispense Refill     ASPIRIN NOT " "PRESCRIBED (INTENTIONAL) continuous prn for other Antiplatelet medication not prescribed intentionally due to Not indicated based on age/GI distress       canagliflozin (INVOKANA) 300 MG tablet Take 1 tablet (300 mg) by mouth every morning (before breakfast) 90 tablet 3     insulin glargine (BASAGLAR KWIKPEN) 100 UNIT/ML pen Inject 65 Units Subcutaneous At Bedtime 18 mL 3     insulin glargine (LANTUS SOLOSTAR PEN) 100 UNIT/ML pen Inject 40 Units Subcutaneous At Bedtime 36 mL 3     insulin lispro (HUMALOG KWIKPEN) 100 UNIT/ML pen 35 units before breakfast, 35 units before lunch, 35 units before dinner 30 mL 1     insulin pen needle (B-D U/F) 31G X 8 MM miscellaneous TEST FOUR TIMES A DAY OR AS DIRECTED 400 each 1     insulin syringe-needle U-100 (BD INSULIN SYRINGE ULTRAFINE) 30G X 1/2\" 1 ML Use one syringe daily or as directed.  3 month supply 100 each prn     lisinopril (PRINIVIL/ZESTRIL) 2.5 MG tablet Take 1 tablet (2.5 mg) by mouth daily 30 tablet 0     metFORMIN (GLUCOPHAGE) 1000 MG tablet TAKE ONE-HALF TABLET BY MOUTH TWICE DAILY WITH MEALS 90 tablet 1     NOVOLOG FLEXPEN 100 UNIT/ML soln 35 units before breakfast, 30 units before lunch, 30 units before dinner 90 mL 3     pioglitazone (ACTOS) 45 MG tablet Take 1 tablet (45 mg) by mouth daily 30 tablet 0     Urea 20 % CREA cream Apply topically as needed 85 g 3     valACYclovir (VALTREX) 1000 mg tablet Take 1 tablet (1,000 mg) by mouth 3 times daily for 7 days 21 tablet 0     vitamin D2 (ERGOCALCIFEROL) 66258 units (1250 mcg) capsule Take 1 capsule (50,000 Units) by mouth every 7 days 8 capsule 6        No Known Allergies    SOCIAL HISTORY:    Social History     Socioeconomic History     Marital status:      Spouse name: Not on file     Number of children: Not on file     Years of education: Not on file     Highest education level: Not on file   Occupational History     Not on file   Social Needs     Financial resource strain: Not on file     Food " insecurity:     Worry: Not on file     Inability: Not on file     Transportation needs:     Medical: Not on file     Non-medical: Not on file   Tobacco Use     Smoking status: Current Some Day Smoker     Types: Cigarettes     Smokeless tobacco: Never Used     Tobacco comment: 3 cigarettes daily   Substance and Sexual Activity     Alcohol use: No     Alcohol/week: 0.0 oz     Drug use: No     Sexual activity: Yes     Partners: Male   Lifestyle     Physical activity:     Days per week: Not on file     Minutes per session: Not on file     Stress: Not on file   Relationships     Social connections:     Talks on phone: Not on file     Gets together: Not on file     Attends Orthodox service: Not on file     Active member of club or organization: Not on file     Attends meetings of clubs or organizations: Not on file     Relationship status: Not on file     Intimate partner violence:     Fear of current or ex partner: Not on file     Emotionally abused: Not on file     Physically abused: Not on file     Forced sexual activity: Not on file   Other Topics Concern     Parent/sibling w/ CABG, MI or angioplasty before 65F 55M? No   Social History Narrative     Not on file       FAMILY HISTORY: Reviewed in EMR      REVIEW OF SYSTEMS: Positive for that noted in past medical history and history of present illness and otherwise reviewed in EMR     PHYSICAL EXAM:    Gen: Adult female in no acute distress. Articulates and communicates with normal affect.  Pulm: Respirations even and unlabored  CV: Extr wwp  MSK: Focused upper extremity exam:   Skin intact. No erythema. EPL, FPL, and Intrinsics intact. Sensation intact in axillary, musculocutaneous, radial, ulnar and median nerve distributions. Radial pulse 2+/4. Right shoulder active motion is FE to 130, ER at side to 30 with strength markedly diminshed, and IR to to L3 but positive lift off. Empty can test demonstrated weakness and exam was limited by pain.      IMAGING:  MR Right  Shoulder without contrast 8/5/2019  Impression:  1.  Full-thickness, complete tear of the right shoulder supraspinatus tendon retracted to the level of the AC joint. There is moderate supraspinatus atrophy.  2.  High-grade partial-thickness articular and intrasubstance tear of the infraspinatus tendon at its footprint with mild fatty  infiltration.   3.  Tendinosis of the biceps tendon and subscapularis tendon.  4.  Small right shoulder joint effusion.  5.  Moderate AC joint degenerative changes.       ASSESSMENT:    1. Ongoing shingles infection with ocular involvement   2. Current tooth infection/dental abscess   3. Right shoulder supra and infraspinatus tears with cuff atrophy  4. Right long head biceps tendinosis.    PLAN:  Continue management of shingles infection and tooth infection.   Cannot consider surgery until these other infections are adequately managed.  When infections resolve and patient is cleared medically, can reevaluate possible rotator cuff repair.  In the interim, return to PT to preserve strength and function.    Tripp Perez, MS4    Physician Attestation   I, Gracie Graff, was present with the medical student who participated in the service and in the documentation of the note.  I have verified the history and personally performed the physical exam and medical decision making.  I agree with the assessment and plan of care as documented in the note.      Items personally reviewed: history, exam, imaging and agree with the interpretation documented in the note.    Gracie Graff MD
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
## 2019-09-09 ENCOUNTER — TELEPHONE (OUTPATIENT)
Dept: ENDOCRINOLOGY | Facility: CLINIC | Age: 49
End: 2019-09-09

## 2019-09-09 NOTE — TELEPHONE ENCOUNTER
[facility] Call Center    Phone Message    May a detailed message be left on voicemail: yes    Reason for Call: Other: Pt called and stated she is not getting enough medication supplies and is wondering if she should be presribed more (14 days). Please call back pt for clarification. Thanks.     Action Taken: Message routed to:  [facility] ([facility]): HOWIE

## 2019-09-12 NOTE — TELEPHONE ENCOUNTER
"Requested Prescriptions   Pending Prescriptions Disp Refills     calcium carbonate 600 mg-vitamin D 400 units (CALTRATE) 600-400 MG-UNIT per tablet  Last Written Prescription Date:  11/30/2017  Last Fill Quantity: 180 tablet,  # refills: 3   Last office visit: 9/25/2018 with prescribing provider:  PAULINO Engel   Future Office Visit:     180 tablet 3     Sig: Take 1 tablet by mouth 2 times daily    Vitamin Supplements (Adult) Protocol Passed    12/7/2018  1:24 PM       Passed - High dose Vitamin D not ordered       Passed - Recent (12 mo) or future (30 days) visit within the authorizing provider's specialty    Patient had office visit in the last 12 months or has a visit in the next 30 days with authorizing provider or within the authorizing provider's specialty.  See \"Patient Info\" tab in inbasket, or \"Choose Columns\" in Meds & Orders section of the refill encounter.                 " DISPLAY PLAN FREE TEXT

## 2019-09-24 ENCOUNTER — OFFICE VISIT (OUTPATIENT)
Dept: ENDOCRINOLOGY | Facility: CLINIC | Age: 49
End: 2019-09-24
Payer: COMMERCIAL

## 2019-09-24 VITALS
BODY MASS INDEX: 20.16 KG/M2 | HEIGHT: 65 IN | DIASTOLIC BLOOD PRESSURE: 77 MMHG | SYSTOLIC BLOOD PRESSURE: 135 MMHG | HEART RATE: 84 BPM | WEIGHT: 121 LBS

## 2019-09-24 DIAGNOSIS — Z79.4 TYPE 2 DIABETES MELLITUS WITHOUT COMPLICATION, WITH LONG-TERM CURRENT USE OF INSULIN (H): Primary | ICD-10-CM

## 2019-09-24 DIAGNOSIS — E11.9 TYPE 2 DIABETES MELLITUS WITHOUT COMPLICATION, WITH LONG-TERM CURRENT USE OF INSULIN (H): Primary | ICD-10-CM

## 2019-09-24 LAB — HBA1C MFR BLD: 8.4 % (ref 4.3–6)

## 2019-09-24 RX ORDER — FLASH GLUCOSE SENSOR
1 KIT MISCELLANEOUS DAILY
Qty: 1 DEVICE | Refills: 0 | Status: SHIPPED | OUTPATIENT
Start: 2019-09-24 | End: 2020-09-23

## 2019-09-24 RX ORDER — FLASH GLUCOSE SENSOR
KIT MISCELLANEOUS
Qty: 9 EACH | Refills: 3 | Status: SHIPPED | OUTPATIENT
Start: 2019-09-24 | End: 2019-09-24

## 2019-09-24 RX ORDER — INSULIN GLARGINE 100 [IU]/ML
INJECTION, SOLUTION SUBCUTANEOUS
Qty: 18 ML | Refills: 3 | COMMUNITY
Start: 2019-09-24 | End: 2020-03-31

## 2019-09-24 ASSESSMENT — PAIN SCALES - GENERAL: PAINLEVEL: NO PAIN (0)

## 2019-09-24 ASSESSMENT — MIFFLIN-ST. JEOR: SCORE: 1164.73

## 2019-09-24 NOTE — LETTER
9/24/2019       RE: Karen Braxton  [address]  [facility] [address]     Dear Colleague,    Thank you for referring your patient, Karen Braxton, to the [facility] ENDOCRINOLOGY at [facility]. Please see a copy of my visit note below.    HPI:  Irais Jones is a 49 year old female with type 2 diabetes here today for a follow up visit.  She was last seen in our clinic in June 2019.  She suffered a right shoulder and right arm injury in January 2019.  She tells me she will be having right shoulder surgery in Oct 2019.  Irais has chronic pain and has not been sleeping well at night or eating much and she has lost weight.  For her diabetes, she is currently taking Basaglar 40 units SQ at hs, Humalog 30 units with breakfast, 35 units with lunch and 30 units with dinner, Invokana 300 mg daily and Metformin 500 mg BID. She is no longer taking Actos.  Her A1C is 8.4 % today.  Her previous A1C was 7.7 % in June 2019.  She has limited blood sugar data today since she has been without her Freestyle Dodie device/sensors.    She denies symptoms of frequent hypoglycemia..  On ROS today,  she has right shoulder/arm pain, not sleeping well and decrease appetite with weight loss.  Intermittent headaches and blurred vision.  She also had shingles left eye involvement and was seen by an outside Oph.  Pt denies n/v, SOB at rest, cough or chest pain.  Pt denies abd pain, diarrhea, dysuria, hematuria, vaginal discharge or rash/pruritis.  She denies numbness or tingling in her feet or hands.  No ulcers.    ROS:   Please see under HPI.    ALLERGIES:   No Known Allergies      Current Outpatient Medications   Medication Sig Dispense Refill     Continuous Blood Gluc  (FREESTYLE DODIE READER) DENNISE 1 Device daily 1 Device 0     continuous blood glucose monitoring (FREESTYLE DODIE) sensor For use with Freestyle Dodie Flash  for continuous monitioring of blood glucose levels. Replace sensor every  "10 days. 9 each 3     insulin glargine (BASAGLAR KWIKPEN) 100 UNIT/ML pen Inject 40 units subcutaneous at bedtime. 18 mL 3     ASPIRIN NOT PRESCRIBED (INTENTIONAL) continuous prn for other Antiplatelet medication not prescribed intentionally due to Not indicated based on age/GI distress       canagliflozin (INVOKANA) 300 MG tablet Take 1 tablet (300 mg) by mouth every morning (before breakfast) 90 tablet 3     insulin pen needle (B-D U/F) 31G X 8 MM miscellaneous TEST FOUR TIMES A DAY OR AS DIRECTED 400 each 1     insulin syringe-needle U-100 (BD INSULIN SYRINGE ULTRAFINE) 30G X 1/2\" 1 ML Use one syringe daily or as directed.  3 month supply 100 each prn     lisinopril (PRINIVIL/ZESTRIL) 2.5 MG tablet Take 1 tablet (2.5 mg) by mouth daily 30 tablet 0     metFORMIN (GLUCOPHAGE) 1000 MG tablet TAKE ONE-HALF TABLET BY MOUTH TWICE DAILY WITH MEALS 90 tablet 1     NOVOLOG FLEXPEN 100 UNIT/ML soln 35 units before breakfast, 30 units before lunch, 30 units before dinner 90 mL 3     Urea 20 % CREA cream Apply topically as needed 85 g 3     valACYclovir (VALTREX) 1000 mg tablet Take 1 tablet (1,000 mg) by mouth 3 times daily for 7 days 21 tablet 0     vitamin D2 (ERGOCALCIFEROL) 22569 units (1250 mcg) capsule Take 1 capsule (50,000 Units) by mouth every 7 days 8 capsule 6     SHX:  Smoke: yes.  ETOH: none.   with 5 children.    FHX:  Several family members with diabetes.    PMHX:   1.  Type 2 DM.  2.  Hyperlipidemia.  3.  Amenorrhea.  4.  GERD.  5.  S/P choley.  6.  Sebaceous cyst.  Past Medical History:   Diagnosis Date     Arthritis      ASCUS favor benign 2012    neg HPV  Plan cotest in 3 yrs.     Diabetes (H)      Hypertension      Past Surgical History:   Procedure Laterality Date     CHOLECYSTECTOMY  9/14/2007    laproscopic     FACIAL RECONSTRUCTION SURGERY  2 years old    cleft lip repair     ORTHOPEDIC SURGERY  6/2001    left knee       EXAM:  Constitutional:   Vitals:    09/24/19 0930   BP: 135/77   Pulse: " "84   Weight: 54.9 kg (121 lb)   Height: 1.651 m (5' 5\")   GENERAL:Weight loss; right shoulder and arm pain.  SKIN: no rash.  HEENT: PERRLA; fundi not examined.  NECK: No thyroid tenderness.  LUNGS: Clear b/l.  CARDIAC: RRR.  ABDOMEN: Nontender.  EXTREMITIES: No pretibial edema.  FEET: No ulcers.    RESULTS:    Creatinine   Date Value Ref Range Status   06/18/2019 0.64 0.52 - 1.04 mg/dL Final     GFR Estimate   Date Value Ref Range Status   06/18/2019 >90 >60 mL/min/[1.73_m2] Final     Comment:     Non  GFR Calc  Starting 12/18/2018, serum creatinine based estimated GFR (eGFR) will be   calculated using the Chronic Kidney Disease Epidemiology Collaboration   (CKD-EPI) equation.       Hemoglobin A1C   Date Value Ref Range Status   06/18/2019 7.7 (H) 0 - 5.6 % Final     Comment:     Normal <5.7% Prediabetes 5.7-6.4%  Diabetes 6.5% or higher - adopted from ADA   consensus guidelines.       Potassium   Date Value Ref Range Status   07/09/2018 3.6 3.4 - 5.3 mmol/L Final     ALT   Date Value Ref Range Status   03/16/2018 16 0 - 50 U/L Final     AST   Date Value Ref Range Status   01/09/2015 17 0 - 45 U/L Final     TSH   Date Value Ref Range Status   03/16/2018 2.12 0.40 - 4.00 mU/L Final     T4 Free   Date Value Ref Range Status   08/04/2011 1.22 0.70 - 1.85 ng/dL Final         Cholesterol   Date Value Ref Range Status   06/18/2019 250 (H) <200 mg/dL Final     Comment:     Desirable:       <200 mg/dl   03/16/2018 229 (H) <200 mg/dL Final     Comment:     Desirable:       <200 mg/dl     HDL Cholesterol   Date Value Ref Range Status   06/18/2019 54 >49 mg/dL Final   03/16/2018 43 (L) >49 mg/dL Final     LDL Cholesterol Calculated   Date Value Ref Range Status   06/18/2019 152 (H) <100 mg/dL Final     Comment:     Above desirable:  100-129 mg/dl  Borderline High:  130-159 mg/dL  High:             160-189 mg/dL  Very high:       >189 mg/dl     03/16/2018 149 (H) <100 mg/dL Final     Comment:     Above " desirable:  100-129 mg/dl  Borderline High:  130-159 mg/dL  High:             160-189 mg/dL  Very high:       >189 mg/dl       Triglycerides   Date Value Ref Range Status   06/18/2019 220 (H) <150 mg/dL Final     Comment:     Borderline high:  150-199 mg/dl  High:             200-499 mg/dl  Very high:       >499 mg/dl     03/16/2018 183 (H) <150 mg/dL Final     Comment:     Borderline high:  150-199 mg/dl  High:             200-499 mg/dl  Very high:       >499 mg/dl  Non Fasting       Cholesterol/HDL Ratio   Date Value Ref Range Status   12/01/2014 6.0 (H) 0.0 - 5.0 Final   07/30/2014 6.1 (H) 0.0 - 5.0 Final     A1C      8.4   9/24/2019    ASSESSMENT/PLAN:    1. TYPE 2 DIABETES MELLITUS: Uncontrolled type 2 diabetes mellitus.  I placed an order for a Freestyle Dodie 14 day sensors/device.   She was instructed to check her fasting blood each each am and check her bs prelunch, predinner and at bedtime daily.  I asked her to stop by our clinic in 1-2 weeks to have her Freestyle device downloaded so I can review her blood sugar data.  No change in medication doses today.  Her BP is stable today.  She was seen by an outside Oph in Aug 2019.  Pt's creat was 0.64 with GFR >90 mL/min in 6/2019.    Pt's urine microalbuminuria was negative in March 2018.   TSH normal in 6/2019.      2.  HYPERLIPIDEMIA:   in June 2019.  She is not taking any RX at this time for her high LDL.    3. HX OF RIGHT SHOULDER TEAR/BICEPS TENDINOSIS: Pt has been seen by Ortho.  She was instructed to management her shingles and tooth infection prior to surgery.  She tells me she will be having surgery next months.    4. Return to Endocrine Clinic to see Dr. Phillips in 2 months.    Again, thank you for allowing me to participate in the care of your patient.      Sincerely,    Cecile Juarez PA-C

## 2019-09-24 NOTE — PROGRESS NOTES
HPI:  Irais Jones is a 49 year old female with type 2 diabetes here today for a follow up visit.  She was last seen in our clinic in June 2019.  She suffered a right shoulder and right arm injury in January 2019.  She tells me she will be having right shoulder surgery in Oct 2019.  Irais has chronic pain and has not been sleeping well at night or eating much and she has lost weight.  For her diabetes, she is currently taking Basaglar 40 units SQ at hs, Humalog 30 units with breakfast, 35 units with lunch and 30 units with dinner, Invokana 300 mg daily and Metformin 500 mg BID. She is no longer taking Actos.  Her A1C is 8.4 % today.  Her previous A1C was 7.7 % in June 2019.  She has limited blood sugar data today since she has been without her Freestyle Dodie device/sensors.    She denies symptoms of frequent hypoglycemia..  On ROS today,  she has right shoulder/arm pain, not sleeping well and decrease appetite with weight loss.  Intermittent headaches and blurred vision.  She also had shingles left eye involvement and was seen by an outside Oph.  Pt denies n/v, SOB at rest, cough or chest pain.  Pt denies abd pain, diarrhea, dysuria, hematuria, vaginal discharge or rash/pruritis.  She denies numbness or tingling in her feet or hands.  No ulcers.    ROS:   Please see under HPI.    ALLERGIES:   No Known Allergies      Current Outpatient Medications   Medication Sig Dispense Refill     Continuous Blood Gluc  (FREESTYLE DODIE READER) DENNISE 1 Device daily 1 Device 0     continuous blood glucose monitoring (FREESTYLE DODIE) sensor For use with Freestyle Dodie Flash  for continuous monitioring of blood glucose levels. Replace sensor every 10 days. 9 each 3     insulin glargine (BASAGLAR KWIKPEN) 100 UNIT/ML pen Inject 40 units subcutaneous at bedtime. 18 mL 3     ASPIRIN NOT PRESCRIBED (INTENTIONAL) continuous prn for other Antiplatelet medication not prescribed intentionally due to Not indicated based on  "age/GI distress       canagliflozin (INVOKANA) 300 MG tablet Take 1 tablet (300 mg) by mouth every morning (before breakfast) 90 tablet 3     insulin pen needle (B-D U/F) 31G X 8 MM miscellaneous TEST FOUR TIMES A DAY OR AS DIRECTED 400 each 1     insulin syringe-needle U-100 (BD INSULIN SYRINGE ULTRAFINE) 30G X 1/2\" 1 ML Use one syringe daily or as directed.  3 month supply 100 each prn     lisinopril (PRINIVIL/ZESTRIL) 2.5 MG tablet Take 1 tablet (2.5 mg) by mouth daily 30 tablet 0     metFORMIN (GLUCOPHAGE) 1000 MG tablet TAKE ONE-HALF TABLET BY MOUTH TWICE DAILY WITH MEALS 90 tablet 1     NOVOLOG FLEXPEN 100 UNIT/ML soln 35 units before breakfast, 30 units before lunch, 30 units before dinner 90 mL 3     Urea 20 % CREA cream Apply topically as needed 85 g 3     valACYclovir (VALTREX) 1000 mg tablet Take 1 tablet (1,000 mg) by mouth 3 times daily for 7 days 21 tablet 0     vitamin D2 (ERGOCALCIFEROL) 89754 units (1250 mcg) capsule Take 1 capsule (50,000 Units) by mouth every 7 days 8 capsule 6     SHX:  Smoke: yes.  ETOH: none.   with 5 children.    FHX:  Several family members with diabetes.    PMHX:   1.  Type 2 DM.  2.  Hyperlipidemia.  3.  Amenorrhea.  4.  GERD.  5.  S/P choley.  6.  Sebaceous cyst.  Past Medical History:   Diagnosis Date     Arthritis      ASCUS favor benign 2012    neg HPV  Plan cotest in 3 yrs.     Diabetes (H)      Hypertension      Past Surgical History:   Procedure Laterality Date     CHOLECYSTECTOMY  9/14/2007    laproscopic     FACIAL RECONSTRUCTION SURGERY  2 years old    cleft lip repair     ORTHOPEDIC SURGERY  6/2001    left knee       EXAM:  Constitutional:   Vitals:    09/24/19 0930   BP: 135/77   Pulse: 84   Weight: 54.9 kg (121 lb)   Height: 1.651 m (5' 5\")   GENERAL:Weight loss; right shoulder and arm pain.  SKIN: no rash.  HEENT: PERRLA; fundi not examined.  NECK: No thyroid tenderness.  LUNGS: Clear b/l.  CARDIAC: RRR.  ABDOMEN: Nontender.  EXTREMITIES: No pretibial " edema.  FEET: No ulcers.    RESULTS:    Creatinine   Date Value Ref Range Status   06/18/2019 0.64 0.52 - 1.04 mg/dL Final     GFR Estimate   Date Value Ref Range Status   06/18/2019 >90 >60 mL/min/[1.73_m2] Final     Comment:     Non  GFR Calc  Starting 12/18/2018, serum creatinine based estimated GFR (eGFR) will be   calculated using the Chronic Kidney Disease Epidemiology Collaboration   (CKD-EPI) equation.       Hemoglobin A1C   Date Value Ref Range Status   06/18/2019 7.7 (H) 0 - 5.6 % Final     Comment:     Normal <5.7% Prediabetes 5.7-6.4%  Diabetes 6.5% or higher - adopted from ADA   consensus guidelines.       Potassium   Date Value Ref Range Status   07/09/2018 3.6 3.4 - 5.3 mmol/L Final     ALT   Date Value Ref Range Status   03/16/2018 16 0 - 50 U/L Final     AST   Date Value Ref Range Status   01/09/2015 17 0 - 45 U/L Final     TSH   Date Value Ref Range Status   03/16/2018 2.12 0.40 - 4.00 mU/L Final     T4 Free   Date Value Ref Range Status   08/04/2011 1.22 0.70 - 1.85 ng/dL Final         Cholesterol   Date Value Ref Range Status   06/18/2019 250 (H) <200 mg/dL Final     Comment:     Desirable:       <200 mg/dl   03/16/2018 229 (H) <200 mg/dL Final     Comment:     Desirable:       <200 mg/dl     HDL Cholesterol   Date Value Ref Range Status   06/18/2019 54 >49 mg/dL Final   03/16/2018 43 (L) >49 mg/dL Final     LDL Cholesterol Calculated   Date Value Ref Range Status   06/18/2019 152 (H) <100 mg/dL Final     Comment:     Above desirable:  100-129 mg/dl  Borderline High:  130-159 mg/dL  High:             160-189 mg/dL  Very high:       >189 mg/dl     03/16/2018 149 (H) <100 mg/dL Final     Comment:     Above desirable:  100-129 mg/dl  Borderline High:  130-159 mg/dL  High:             160-189 mg/dL  Very high:       >189 mg/dl       Triglycerides   Date Value Ref Range Status   06/18/2019 220 (H) <150 mg/dL Final     Comment:     Borderline high:  150-199 mg/dl  High:              200-499 mg/dl  Very high:       >499 mg/dl     03/16/2018 183 (H) <150 mg/dL Final     Comment:     Borderline high:  150-199 mg/dl  High:             200-499 mg/dl  Very high:       >499 mg/dl  Non Fasting       Cholesterol/HDL Ratio   Date Value Ref Range Status   12/01/2014 6.0 (H) 0.0 - 5.0 Final   07/30/2014 6.1 (H) 0.0 - 5.0 Final     A1C      8.4   9/24/2019    ASSESSMENT/PLAN:    1. TYPE 2 DIABETES MELLITUS: Uncontrolled type 2 diabetes mellitus.  I placed an order for a Freestyle Dodie 14 day sensors/device.   She was instructed to check her fasting blood each each am and check her bs prelunch, predinner and at bedtime daily.  I asked her to stop by our clinic in 1-2 weeks to have her Freestyle device downloaded so I can review her blood sugar data.  No change in medication doses today.  Her BP is stable today.  She was seen by an outside Oph in Aug 2019.  Pt's creat was 0.64 with GFR >90 mL/min in 6/2019.    Pt's urine microalbuminuria was negative in March 2018.   TSH normal in 6/2019.      2.  HYPERLIPIDEMIA:   in June 2019.  She is not taking any RX at this time for her high LDL.    3. HX OF RIGHT SHOULDER TEAR/BICEPS TENDINOSIS: Pt has been seen by Ortho.  She was instructed to management her shingles and tooth infection prior to surgery.  She tells me she will be having surgery next months.    4. Return to Endocrine Clinic to see Dr. Phillips in 2 months.

## 2019-09-25 RX ORDER — FLASH GLUCOSE SENSOR
KIT MISCELLANEOUS
Qty: 6 EACH | Refills: 3 | Status: SHIPPED | OUTPATIENT
Start: 2019-09-25 | End: 2020-09-23

## 2019-09-25 NOTE — TELEPHONE ENCOUNTER
Patient was referring to the freestyle shauna explained each sensor is good for 14 days  informed her a refill request was sent to pharmacy as well.

## 2019-10-08 ENCOUNTER — OFFICE VISIT (OUTPATIENT)
Dept: FAMILY MEDICINE | Facility: CLINIC | Age: 49
End: 2019-10-08
Payer: COMMERCIAL

## 2019-10-08 VITALS
HEART RATE: 101 BPM | BODY MASS INDEX: 20.47 KG/M2 | SYSTOLIC BLOOD PRESSURE: 120 MMHG | WEIGHT: 123 LBS | DIASTOLIC BLOOD PRESSURE: 74 MMHG | RESPIRATION RATE: 16 BRPM | OXYGEN SATURATION: 98 %

## 2019-10-08 DIAGNOSIS — S46.011S TRAUMATIC COMPLETE TEAR OF RIGHT ROTATOR CUFF, SEQUELA: Primary | ICD-10-CM

## 2019-10-08 DIAGNOSIS — Z01.818 PREOP GENERAL PHYSICAL EXAM: ICD-10-CM

## 2019-10-08 PROCEDURE — 99213 OFFICE O/P EST LOW 20 MIN: CPT | Performed by: FAMILY MEDICINE

## 2019-10-08 NOTE — PROGRESS NOTES
Subjective     Karen Braxton is a 49 year old female who presents to clinic today for the following health issues:    HPI     Form        Duration:     Description (location/character/radiation): pt has a [place] [employer] diabetes form    Intensity:      Accompanying signs and symptoms:     History (similar episodes/previous evaluation): None    Precipitating or alleviating factors: None    Therapies tried and outcome: None     Adult diabetic driving form    She has not had any hypoglycemic episodes    She has been on insulin for 9 years    Has mostly problems with hyperglycemia not ischemia    Assessment insulin requiring diabetes    Plan continue same treatment program        Problem #2    Chronic right shoulder pain after a fall    Nonresponsive to home and physical therapy    Torn right rotator cuff from the fall    Objective findings  Decreased range of motion right shoulder  Painful internal and external rotation  Positive painful arc  Assessment right rotator cuff tear  Plan  Patient is desirous of care coordination to help her with recovery from shoulder injury and upcoming surgery and November  She is scheduled for a physical examination on November 11  CBC BMP chest x-ray and EKG are ordered she can get these anytime in between  Standing order was given for these            There are no preventive care reminders to display for this patient.          .  Current Outpatient Medications   Medication Sig Dispense Refill     ASPIRIN NOT PRESCRIBED (INTENTIONAL) continuous prn for other Antiplatelet medication not prescribed intentionally due to Not indicated based on age/GI distress       canagliflozin (INVOKANA) 300 MG tablet Take 1 tablet (300 mg) by mouth every morning (before breakfast) 90 tablet 3     Continuous Blood Gluc  (FREESTYLE DODIE READER) DENNISE 1 Device daily 1 Device 0     continuous blood glucose monitoring (FREESTYLE DODIE) sensor For use with Freestyle Dodie  for  "continuous monitioring of blood glucose levels.test 4 times daily  Replace sensor every 14 days. 6 each 3     insulin glargine (BASAGLAR KWIKPEN) 100 UNIT/ML pen Inject 40 units subcutaneous at bedtime. 18 mL 3     insulin pen needle (B-D U/F) 31G X 8 MM miscellaneous TEST FOUR TIMES A DAY OR AS DIRECTED 400 each 1     insulin syringe-needle U-100 (BD INSULIN SYRINGE ULTRAFINE) 30G X 1/2\" 1 ML Use one syringe daily or as directed.  3 month supply 100 each prn     lisinopril (PRINIVIL/ZESTRIL) 2.5 MG tablet Take 1 tablet (2.5 mg) by mouth daily 30 tablet 0     metFORMIN (GLUCOPHAGE) 1000 MG tablet TAKE ONE-HALF TABLET BY MOUTH TWICE DAILY WITH MEALS 90 tablet 1     NOVOLOG FLEXPEN 100 UNIT/ML soln 35 units before breakfast, 30 units before lunch, 30 units before dinner 90 mL 3     Urea 20 % CREA cream Apply topically as needed 85 g 3     vitamin D2 (ERGOCALCIFEROL) 46789 units (1250 mcg) capsule Take 1 capsule (50,000 Units) by mouth every 7 days 8 capsule 6     valACYclovir (VALTREX) 1000 mg tablet Take 1 tablet (1,000 mg) by mouth 3 times daily for 7 days 21 tablet 0              No Known Allergies      Immunization History   Administered Date(s) Administered     Influenza (IIV3) PF 2007, 10/22/2008, 2009, 2010, 2013, 10/28/2014     Influenza Vaccine IM > 6 months Valent IIV4 2015, 10/25/2017     Pneumo Conj 13-V (2010&after) 10/03/2011     Pneumococcal 23 valent 2018     TD (ADULT, 7+) 1998     Tdap (Adacel,Boostrix) 02/10/2009               reports no history of alcohol use.          reports no history of drug use.        family history includes Asthma in her mother; Diabetes in her father and mother; Heart Disease in her mother; Hypertension in her father and mother; Lipids in her mother.        She indicated that the status of her no family hx of is unknown. She indicated that her mother is . She indicated that her father is .             has a past " surgical history that includes Cholecystectomy (9/14/2007); Facial reconstruction surgery (2 years old); and orthopedic surgery (6/2001).         reports being sexually active and has had partner(s) who are Male.    .  Pediatric History   Patient Guardians     Not on file     Patient does not qualify to have social determinant information on file (likely too young).   Other Topics Concern     Parent/sibling w/ CABG, MI or angioplasty before 65F 55M? No   Social History Narrative     Not on file               reports that she has been smoking cigarettes. She has never used smokeless tobacco.        Medical, social, surgical, and family histories reviewed.        Labs reviewed in EPIC  Patient Active Problem List   Diagnosis     Other dental caries     Disorder of bursae and tendons in shoulder region     Insomnia     Vitamin D deficiency     Premature menopause     ASCUS favor benign     Hyperlipidemia LDL goal <100     Comprehensive diabetic foot examination, type 2 DM, encounter for (H)     Hypertension goal BP (blood pressure) < 140/90     DiarrheaX 2 over last 24hrs w one explosive r/o 2ndary to acid gastritis     Uncontrolled type 2 diabetes mellitus without complication, with long-term current use of insulin (H)     Esophageal reflux 2ndary to hi continuous intake of sugared  tea     Hyperlipidemia with target LDL less than 100     Diabetes type 2, controlled (H)     Intractable chronic migraine without aura and without status migrainosus     Needle stick injury, subsequent encounter     Hammer toe of right foot     Type 2 diabetes mellitus without complication, with long-term current use of insulin (H)     Rotator cuff syndrome, right       Past Surgical History:   Procedure Laterality Date     CHOLECYSTECTOMY  9/14/2007    laproscopic     FACIAL RECONSTRUCTION SURGERY  2 years old    cleft lip repair     ORTHOPEDIC SURGERY  6/2001    left knee         Social History     Tobacco Use     Smoking status: Current  Some Day Smoker     Types: Cigarettes     Smokeless tobacco: Never Used     Tobacco comment: 3 cigarettes daily   Substance Use Topics     Alcohol use: No     Alcohol/week: 0.0 standard drinks       Family History   Problem Relation Age of Onset     Diabetes Mother      Hypertension Mother      Heart Disease Mother      Lipids Mother      Asthma Mother      Diabetes Father      Hypertension Father      Cancer No family hx of         No known family hx of skin cancer     Coronary Artery Disease No family hx of      Hyperlipidemia No family hx of      Cerebrovascular Disease No family hx of      Breast Cancer No family hx of      Colon Cancer No family hx of      Prostate Cancer No family hx of      Other Cancer No family hx of      Depression No family hx of      Anxiety Disorder No family hx of      Mental Illness No family hx of      Substance Abuse No family hx of      Anesthesia Reaction No family hx of      Osteoporosis No family hx of      Genetic Disorder No family hx of      Thyroid Disease No family hx of      Obesity No family hx of      Unknown/Adopted No family hx of              Current Outpatient Medications   Medication Sig Dispense Refill     ASPIRIN NOT PRESCRIBED (INTENTIONAL) continuous prn for other Antiplatelet medication not prescribed intentionally due to Not indicated based on age/GI distress       canagliflozin (INVOKANA) 300 MG tablet Take 1 tablet (300 mg) by mouth every morning (before breakfast) 90 tablet 3     Continuous Blood Gluc  (FREESTYLE RAHEEM READER) DENNISE 1 Device daily 1 Device 0     continuous blood glucose monitoring (FREESTYLE RAHEEM) sensor For use with Freestyle Raheem  for continuous monitioring of blood glucose levels.test 4 times daily  Replace sensor every 14 days. 6 each 3     insulin glargine (BASAGLAR KWIKPEN) 100 UNIT/ML pen Inject 40 units subcutaneous at bedtime. 18 mL 3     insulin pen needle (B-D U/F) 31G X 8 MM miscellaneous TEST FOUR TIMES A DAY  "OR AS DIRECTED 400 each 1     insulin syringe-needle U-100 (BD INSULIN SYRINGE ULTRAFINE) 30G X 1/2\" 1 ML Use one syringe daily or as directed.  3 month supply 100 each prn     lisinopril (PRINIVIL/ZESTRIL) 2.5 MG tablet Take 1 tablet (2.5 mg) by mouth daily 30 tablet 0     metFORMIN (GLUCOPHAGE) 1000 MG tablet TAKE ONE-HALF TABLET BY MOUTH TWICE DAILY WITH MEALS 90 tablet 1     NOVOLOG FLEXPEN 100 UNIT/ML soln 35 units before breakfast, 30 units before lunch, 30 units before dinner 90 mL 3     Urea 20 % CREA cream Apply topically as needed 85 g 3     vitamin D2 (ERGOCALCIFEROL) 74644 units (1250 mcg) capsule Take 1 capsule (50,000 Units) by mouth every 7 days 8 capsule 6     valACYclovir (VALTREX) 1000 mg tablet Take 1 tablet (1,000 mg) by mouth 3 times daily for 7 days 21 tablet 0           Recent Labs   Lab Test 06/18/19  1319 09/17/18  1014 07/09/18  0819 03/16/18  1211 03/27/17  1442  03/21/16  1030  06/23/15  1652   A1C 7.7* 8.9* 8.4* 8.5* 8.2*   < > 8.1*   < > 8.1*   *  --   --  149* 160*  --  142*  --   --    HDL 54  --   --  43* 48*  --  44*  --   --    TRIG 220*  --   --  183* 171*  --  223*  --   --    ALT  --   --   --  16  --   --  30  --  21   CR 0.64  --  0.66 0.67  --    < > 0.70  --  0.80   GFRESTIMATED >90  --  >90 >90  --    < > 90  --  77   GFRESTBLACK >90  --  >90 >90  --    < > >90  --  >90   POTASSIUM  --   --  3.6 4.2  --    < > 4.4  --  4.2   TSH  --   --   --  2.12  --   --  1.31  --   --     < > = values in this interval not displayed.            BP Readings from Last 6 Encounters:   10/08/19 120/74   09/24/19 135/77   09/03/19 116/72   08/13/19 116/64   07/30/19 106/58   06/18/19 117/75           Wt Readings from Last 3 Encounters:   10/08/19 55.8 kg (123 lb)   09/24/19 54.9 kg (121 lb)   09/04/19 56.2 kg (123 lb 14.4 oz)                 Positive symptoms or findings indicated by bold designation:         ROS: 10 point ROS neg other than the symptoms noted above in the HPI.except  " has Other dental caries; Disorder of bursae and tendons in shoulder region; Insomnia; Vitamin D deficiency; Premature menopause; ASCUS favor benign; Hyperlipidemia LDL goal <100; Comprehensive diabetic foot examination, type 2 DM, encounter for (H); Hypertension goal BP (blood pressure) < 140/90; DiarrheaX 2 over last 24hrs w one explosive r/o 2ndary to acid gastritis; Uncontrolled type 2 diabetes mellitus without complication, with long-term current use of insulin (H); Esophageal reflux 2ndary to hi continuous intake of sugared  tea; Hyperlipidemia with target LDL less than 100; Diabetes type 2, controlled (H); Intractable chronic migraine without aura and without status migrainosus; Needle stick injury, subsequent encounter; Hammer toe of right foot; Type 2 diabetes mellitus without complication, with long-term current use of insulin (H); and Rotator cuff syndrome, right on their problem list.  Review Of Systems    Skin: negative    Eyes: negative    Ears/Nose/Throat: negative    Respiratory: No shortness of breath, dyspnea on exertion, cough, or hemoptysis    Cardiovascular: negative    Gastrointestinal: negative    Genitourinary: negative    Musculoskeletal:  RIGHT SHOULDER PAIN    Neurologic: negative    Psychiatric: negative    Hematologic/Lymphatic/Immunologic: negative    Endocrine: negative                PE:  /74   Pulse 101   Resp 16   Wt 55.8 kg (123 lb)   LMP  (LMP Unknown)   SpO2 98%   BMI 20.47 kg/m   Body mass index is 20.47 kg/m .        Constitutional: general appearance, well nourished, well developed, in no acute distress, well developed, appears stated age, normal body habitus,          Eyes:; The patient has normal eyelids sclerae and conjunctivae :          Ears/Nose/Throat: external ear, overall: normal appearance; external nose, overall: benign appearance, normal moujth gums and lips           Neck: thyroid, overall: normal size, normal consistency, nontender,           Respiratory:  palpation of chest, overall: normal excursion,    Clear to percussion and auscultation     NO Tachypnea    NORMAL  Color          Cardiovascular:  Good color with no peripheral edema    Regular sinus rhythm without murmur.  Physiologic heart sounds   Heart is unelarged    .     Chest/Breast: normal shape           Abdominal exam,  Liver and spleen are  unenlarged        Tenderness    Scars              Urogenital; no renal, flank or bladder  tenderness;          Lymphatic: neck nodes,     Other nodes         Musculoskeletal:  Brief ortho exam normal except:   RIGHT         Integument: inspection of skin, no rash, lesions; and, palpation, no induration, no tenderness.          Neurologic mental status, overall: alert and oriented; gait, no ataxia, no unsteadiness; coordination, no tremors; cranial nerves, overall: normal motor, overall: normal bulk, tone.          Psychiatric: orientation/consciousness, overall: oriented to person, place and time; behavior/psychomotor activity, no tics, normal psychomotor activity; mood and affect, overall: normal mood and affect; appearance, overall: well-groomed, good eye contact; speech, overall: normal quality, no aphasia and normal quality, quantity, intact.        Help  Diagnostic Test Results:  Results for orders placed or performed in visit on 09/24/19   Hemoglobin A1c POCT   Result Value Ref Range    Hemoglobin A1C 8.4 (A) 4.3 - 6 %           ICD-10-CM    1. Traumatic complete tear of right rotator cuff, sequela S46.011S CARE COORDINATION REFERRAL   2. Preop general physical exam Z01.818 EKG 12-lead complete w/read - Clinics     XR Chest 2 Views     Basic metabolic panel     CBC with platelets differential              .    Side effects benefits and risks thoroughly discussed. .she may come in early if unimproved or getting worse          Please drink 2 glasses of water prior to meals and walk 15-30 minutes after meals        I spent 25 MINUTES SPENT    with patient discussing the following issues      The primary encounter diagnosis was Traumatic complete tear of right rotator cuff, sequela. A diagnosis of Preop general physical exam was also pertinent to this visit. over half of which involved counseling and coordination of care.      Patient Instructions   (S46.011S) Traumatic complete tear of right rotator cuff, sequela  (primary encounter diagnosis)  Comment:    Plan: CARE COORDINATION REFERRAL             (Z01.818) Preop general physical exam  Comment:    Plan: EKG 12-lead complete w/read - Clinics, XR Chest        2 Views, Basic metabolic panel, CBC with         platelets differential                                 ALL THE ABOVE PROBLEMS ARE STABLE AND MED CHANGES AS NOTED        Diet: Mediterranean diabetic diet        Exercise: Shoulder exercises walking exercises Range of motion, balance, isometric, and strengthening exercises 30 repetitions twice daily of involved joints            .LINDA ALCANTAR MD 10/8/2019 3:55 PM  October 8, 2019

## 2019-10-08 NOTE — PATIENT INSTRUCTIONS
(S46.011S) Traumatic complete tear of right rotator cuff, sequela  (primary encounter diagnosis)  Comment:    Plan: CARE COORDINATION REFERRAL             (Z01.818) Preop general physical exam  Comment:    Plan: EKG 12-lead complete w/read - Clinics, XR Chest        2 Views, Basic metabolic panel, CBC with         platelets differential

## 2019-10-09 ENCOUNTER — PATIENT OUTREACH (OUTPATIENT)
Dept: FAMILY MEDICINE | Facility: CLINIC | Age: 49
End: 2019-10-09

## 2019-10-10 NOTE — PROGRESS NOTES
Clinic Care Coordination Contact    Reason for Referral:   Wants help with surgery right shoulder scheduled in November  Will be off work x 3-6 months   Her job is quite difficult with lifting   Could we help her plan and get proper help for when she is off   No help at home         UTC/Voicemail    Clinical Data: Care Coordinator Outreach    Outreach attempted x 1.  Writer left a message on patient's voicemail with call back information and requested return call.    Plan: Await a return call from patient. If no response, Social Work Care Coordinator will try to reach patient again in 3-5 business days.      [facility]    LAYLA Adorno, Social Work Care Coordinator  [facility] [employer]   [address]  [address]  [email or web address]   Office: [phone]  Gender Pronouns: i.e,. she/her  Employed by [employer]      Addendum  10/14/19  Data: Writer received a message that patient had called a colleague and left a message. Writer called patient this afternoon and left a voice message with call back information.      LAYLA Adorno, Social Work Care Coordinator  [facility] [employer]   [address]  [address]  [email or web address]   Office: [phone]  Gender Pronouns: she/her  Employed by [employer]'

## 2019-10-21 ENCOUNTER — PATIENT OUTREACH (OUTPATIENT)
Dept: FAMILY MEDICINE | Facility: CLINIC | Age: 49
End: 2019-10-21

## 2019-10-21 NOTE — PROGRESS NOTES
Clinic Care Coordination Contact  Outreach    Data: Patient's daughter left writer a message asking writer to call patient. Writer called patient yesterday, 10/21/19.    Writer has some difficulty understanding patient (due to language/accent) for part of the conversation. Patient is going to have shoulder surgery in the fairly near future (surgery is not scheduled as yet as writer understands). Patient will not have much use of her right arm/shoulder for three to six months after surgery, per patient.    Patient told writer that her (pt's) daughter lives with patient. However, patient's daughter is a student and works so is not available to help patient much at this time. Patient feels that she (pt) will likely need assistance at home with household tasks in the daytime for a month or so (post surgery) as patient's daughter will have a winter break from school in December and will be more available to assist patient then. Patient also talked about financial concerns about being off work post surgery. Patient is working for a new company and has only been there five months so does not have sick time or disability (as writer understood patient).    Writer provided patient with information--including phone number--for [facility] ([phone]).     Plan: Provided community resource information to patient as above. Patient has writer's contact information. [facility] will plan no further outreach at this time but remains available if patient wishes to review/discuss community resources in the future.    [facility]  LAYLA Adorno, Social Work Care Coordinator  [facility] and [address]  [address]  [email or web address]   Office: [phone]  Gender Pronouns: she/her  Employed by [employer]

## 2019-11-11 ENCOUNTER — ANCILLARY PROCEDURE (OUTPATIENT)
Dept: GENERAL RADIOLOGY | Facility: CLINIC | Age: 49
End: 2019-11-11
Attending: FAMILY MEDICINE
Payer: COMMERCIAL

## 2019-11-11 ENCOUNTER — OFFICE VISIT (OUTPATIENT)
Dept: FAMILY MEDICINE | Facility: CLINIC | Age: 49
End: 2019-11-11
Payer: COMMERCIAL

## 2019-11-11 ENCOUNTER — TELEPHONE (OUTPATIENT)
Dept: ORTHOPEDICS | Facility: CLINIC | Age: 49
End: 2019-11-11

## 2019-11-11 ENCOUNTER — PATIENT OUTREACH (OUTPATIENT)
Dept: CARE COORDINATION | Facility: CLINIC | Age: 49
End: 2019-11-11

## 2019-11-11 VITALS
RESPIRATION RATE: 16 BRPM | DIASTOLIC BLOOD PRESSURE: 76 MMHG | BODY MASS INDEX: 20.72 KG/M2 | SYSTOLIC BLOOD PRESSURE: 132 MMHG | TEMPERATURE: 97.5 F | WEIGHT: 124.5 LBS | OXYGEN SATURATION: 99 % | HEART RATE: 82 BPM

## 2019-11-11 DIAGNOSIS — Z01.818 PREOP GENERAL PHYSICAL EXAM: ICD-10-CM

## 2019-11-11 DIAGNOSIS — R63.4 WEIGHT LOSS: ICD-10-CM

## 2019-11-11 DIAGNOSIS — Z12.11 SPECIAL SCREENING FOR MALIGNANT NEOPLASMS, COLON: ICD-10-CM

## 2019-11-11 DIAGNOSIS — Z23 NEED FOR PROPHYLACTIC VACCINATION AND INOCULATION AGAINST INFLUENZA: Primary | ICD-10-CM

## 2019-11-11 DIAGNOSIS — Z12.31 VISIT FOR SCREENING MAMMOGRAM: ICD-10-CM

## 2019-11-11 LAB
ALT SERPL W P-5'-P-CCNC: 19 U/L (ref 0–50)
ANION GAP SERPL CALCULATED.3IONS-SCNC: 8 MMOL/L (ref 3–14)
BUN SERPL-MCNC: 14 MG/DL (ref 7–30)
CALCIUM SERPL-MCNC: 9 MG/DL (ref 8.5–10.1)
CHLORIDE SERPL-SCNC: 104 MMOL/L (ref 94–109)
CO2 SERPL-SCNC: 25 MMOL/L (ref 20–32)
CREAT SERPL-MCNC: 0.64 MG/DL (ref 0.52–1.04)
CREAT UR-MCNC: 13 MG/DL
ERYTHROCYTE [DISTWIDTH] IN BLOOD BY AUTOMATED COUNT: 13.5 % (ref 10–15)
GFR SERPL CREATININE-BSD FRML MDRD: >90 ML/MIN/{1.73_M2}
GLUCOSE SERPL-MCNC: 304 MG/DL (ref 70–99)
HBA1C MFR BLD: 8.6 % (ref 0–5.6)
HCT VFR BLD AUTO: 46.1 % (ref 35–47)
HGB BLD-MCNC: 15 G/DL (ref 11.7–15.7)
MCH RBC QN AUTO: 29.8 PG (ref 26.5–33)
MCHC RBC AUTO-ENTMCNC: 32.5 G/DL (ref 31.5–36.5)
MCV RBC AUTO: 92 FL (ref 78–100)
MICROALBUMIN UR-MCNC: <5 MG/L
MICROALBUMIN/CREAT UR: NORMAL MG/G CR (ref 0–25)
PLATELET # BLD AUTO: 193 10E9/L (ref 150–450)
POTASSIUM SERPL-SCNC: 4.2 MMOL/L (ref 3.4–5.3)
RBC # BLD AUTO: 5.03 10E12/L (ref 3.8–5.2)
SODIUM SERPL-SCNC: 137 MMOL/L (ref 133–144)
WBC # BLD AUTO: 5.1 10E9/L (ref 4–11)

## 2019-11-11 PROCEDURE — 90472 IMMUNIZATION ADMIN EACH ADD: CPT | Performed by: FAMILY MEDICINE

## 2019-11-11 PROCEDURE — 82043 UR ALBUMIN QUANTITATIVE: CPT | Performed by: FAMILY MEDICINE

## 2019-11-11 PROCEDURE — 71046 X-RAY EXAM CHEST 2 VIEWS: CPT | Mod: FY

## 2019-11-11 PROCEDURE — 84460 ALANINE AMINO (ALT) (SGPT): CPT | Performed by: FAMILY MEDICINE

## 2019-11-11 PROCEDURE — 99214 OFFICE O/P EST MOD 30 MIN: CPT | Mod: 25 | Performed by: FAMILY MEDICINE

## 2019-11-11 PROCEDURE — 90715 TDAP VACCINE 7 YRS/> IM: CPT | Performed by: FAMILY MEDICINE

## 2019-11-11 PROCEDURE — 90471 IMMUNIZATION ADMIN: CPT | Performed by: FAMILY MEDICINE

## 2019-11-11 PROCEDURE — 85027 COMPLETE CBC AUTOMATED: CPT | Performed by: FAMILY MEDICINE

## 2019-11-11 PROCEDURE — 93000 ELECTROCARDIOGRAM COMPLETE: CPT | Performed by: FAMILY MEDICINE

## 2019-11-11 PROCEDURE — 83036 HEMOGLOBIN GLYCOSYLATED A1C: CPT | Performed by: FAMILY MEDICINE

## 2019-11-11 PROCEDURE — 80048 BASIC METABOLIC PNL TOTAL CA: CPT | Performed by: FAMILY MEDICINE

## 2019-11-11 PROCEDURE — 36415 COLL VENOUS BLD VENIPUNCTURE: CPT | Performed by: FAMILY MEDICINE

## 2019-11-11 PROCEDURE — 90686 IIV4 VACC NO PRSV 0.5 ML IM: CPT | Performed by: FAMILY MEDICINE

## 2019-11-11 ASSESSMENT — ANXIETY QUESTIONNAIRES
2. NOT BEING ABLE TO STOP OR CONTROL WORRYING: SEVERAL DAYS
IF YOU CHECKED OFF ANY PROBLEMS ON THIS QUESTIONNAIRE, HOW DIFFICULT HAVE THESE PROBLEMS MADE IT FOR YOU TO DO YOUR WORK, TAKE CARE OF THINGS AT HOME, OR GET ALONG WITH OTHER PEOPLE: SOMEWHAT DIFFICULT
5. BEING SO RESTLESS THAT IT IS HARD TO SIT STILL: NOT AT ALL
1. FEELING NERVOUS, ANXIOUS, OR ON EDGE: SEVERAL DAYS
7. FEELING AFRAID AS IF SOMETHING AWFUL MIGHT HAPPEN: NOT AT ALL
6. BECOMING EASILY ANNOYED OR IRRITABLE: NOT AT ALL
3. WORRYING TOO MUCH ABOUT DIFFERENT THINGS: NOT AT ALL
GAD7 TOTAL SCORE: 2

## 2019-11-11 ASSESSMENT — PATIENT HEALTH QUESTIONNAIRE - PHQ9: 5. POOR APPETITE OR OVEREATING: NOT AT ALL

## 2019-11-11 NOTE — Clinical Note
Please abstract the following data from this visit with this patient into the appropriate field in Epic:Tests that can be patient reported without a hard copy:Eye exam with ophthalmology on this date: no retinopathy please chanceOther Tests found in the patient's chart through Chart Review/Care Everywhere:{Abstract Quality List (Optional):364329}Note to Abstraction: If this section is blank, no results were found via Chart Review/Care Everywhere.

## 2019-11-11 NOTE — TELEPHONE ENCOUNTER
[facility] Call Center    Phone Message    May a detailed message be left on voicemail: no    Reason for Call: Order(s): Other:   Reason for requested: Homecare services for shoulder condition, Pt's insurance is through [facility], needs a letter asking for assistance from Dr. Hawkins  Date needed: Asap  Provider name: Dr. Graff  Comments: Please call Pt back, needs [employer] .      Action Taken: Message routed to:  [facility] ([facility]

## 2019-11-11 NOTE — LETTER
November 12, 2019      Karen Braxton  [address] [place] [address]        Dear ,    We are writing to inform you of your test results.    NORMAL COMPLETE BLOOD PANEL WBCS RBCS AND PLATELETS   NORMAL LIVER FUNCTION TEST   NORMAL DIABETES URINE PROTEIN TEST   HIGH FASTING BLOOD SUGAR   NORMAL BLOOD SALTS AND RENAL FUNCTION   INCREASE IN THREE MONTH GLUCOSE AVERAGE AT 8.6 MG %     Resulted Orders   BASIC METABOLIC PANEL   Result Value Ref Range    Sodium 137 133 - 144 mmol/L    Potassium 4.2 3.4 - 5.3 mmol/L    Chloride 104 94 - 109 mmol/L    Carbon Dioxide 25 20 - 32 mmol/L    Anion Gap 8 3 - 14 mmol/L    Glucose 304 (H) 70 - 99 mg/dL    Urea Nitrogen 14 7 - 30 mg/dL    Creatinine 0.64 0.52 - 1.04 mg/dL    GFR Estimate >90 >60 mL/min/[1.73_m2]      Comment:      Non  GFR Calc  Starting 12/18/2018, serum creatinine based estimated GFR (eGFR) will be   calculated using the Chronic Kidney Disease Epidemiology Collaboration   (CKD-EPI) equation.      GFR Estimate If Black >90 >60 mL/min/[1.73_m2]      Comment:       GFR Calc  Starting 12/18/2018, serum creatinine based estimated GFR (eGFR) will be   calculated using the Chronic Kidney Disease Epidemiology Collaboration   (CKD-EPI) equation.      Calcium 9.0 8.5 - 10.1 mg/dL   Hemoglobin A1c   Result Value Ref Range    Hemoglobin A1C 8.6 (H) 0 - 5.6 %      Comment:      Normal <5.7% Prediabetes 5.7-6.4%  Diabetes 6.5% or higher - adopted from ADA   consensus guidelines.     ALT   Result Value Ref Range    ALT 19 0 - 50 U/L   Albumin Random Urine Quantitative with Creat Ratio   Result Value Ref Range    Creatinine Urine 13 mg/dL    Albumin Urine mg/L <5 mg/L    Albumin Urine mg/g Cr Unable to calculate due to low value 0 - 25 mg/g Cr   CBC with platelets   Result Value Ref Range    WBC 5.1 4.0 - 11.0 10e9/L    RBC Count 5.03 3.8 - 5.2 10e12/L    Hemoglobin 15.0 11.7 - 15.7 g/dL    Hematocrit 46.1 35.0 - 47.0 %    MCV 92  78 - 100 fl    MCH 29.8 26.5 - 33.0 pg    MCHC 32.5 31.5 - 36.5 g/dL    RDW 13.5 10.0 - 15.0 %    Platelet Count 193 150 - 450 10e9/L       If you have any questions or concerns, please call the clinic at the number listed above.       Sincerely,        LINDA ALCANTAR MD

## 2019-11-11 NOTE — LETTER
November 12, 2019      Karen Braxton  [address] [place] [address]        Dear ,    We are writing to inform you of your test results.    Normal CHEST XRAY     Resulted Orders   XR Chest 2 Views    Narrative    CHEST TWO VIEWS November 11, 2019 8:41 AM     HISTORY: 49-year-old woman with preoperative physical exam.       Impression    IMPRESSION: Since July 30, 2014, chest x-ray remains negative.    ROXY PYLE MD       If you have any questions or concerns, please call the clinic at the number listed above.       Sincerely,        Raymond Engel MD

## 2019-11-11 NOTE — PROGRESS NOTES
[address]  [address]  [facility] [address]  [phone]  Dept: [phone]    PRE-OP EVALUATION:  Today's date: 2019    Karen Braxton (: 1970) presents for pre-operative evaluation assessment as requested by Dr. Gracie Graff.  She requires evaluation and anesthesia risk assessment prior to undergoing surgery/procedure for treatment of right shoulder pain.    Proposed Surgery/ Procedure: Right orthoscopic rotator cuff repair, biceps tenotomy  Date of Surgery/ Procedure: 2019  Time of Surgery/ Procedure: 8:00 AM  Hospital/Surgical Facility: [facility]  Fax number for surgical facility: [phone]  Primary Physician: Raymond Engel  Type of Anesthesia Anticipated: General    Patient has a Health Care Directive or Living Will:  NO    1. NO - Do you have a history of heart attack, stroke, stent, bypass or surgery on an artery in the head, neck, heart or legs?  2. NO - Do you ever have any pain or discomfort in your chest?  3. NO - Do you have a history of  Heart Failure?  4. NO - Are you troubled by shortness of breath when: walking on the level, up a slight hill or at night?  5. NO - Do you currently have a cold, bronchitis or other respiratory infection?  6. NO - Do you have a cough, shortness of breath or wheezing?  7. NO - Do you sometimes get pains in the calves of your legs when you walk?  8. NO - Do you or anyone in your family have previous history of blood clots?  9. NO - Do you or does anyone in your family have a serious bleeding problem such as prolonged bleeding following surgeries or cuts?  10. NO - Have you ever had problems with anemia or been told to take iron pills?  11. NO - Have you had any abnormal blood loss such as black, tarry or bloody stools, or abnormal vaginal bleeding?  12. NO - Have you ever had a blood transfusion?  13. NO - Have you or any of your relatives ever  "had problems with anesthesia?  14. NO - Do you have sleep apnea, excessive snoring or daytime drowsiness?  15. NO - Do you have any prosthetic heart valves?  16. NO - Do you have prosthetic joints?  17. NO - Is there any chance that you may be pregnant?      HPI:     HPI related to upcoming procedure: RIGHT SHOULDER REPAIR AFTER LAST JANUARY DID NOT GET ANY BETTER   TORN RIGHT ROTATOR CUFF  REPAIR BICEPS TENDON    WILL BE STOPPING JOB NOVEMBER 15TH     LOOKING FOR PLACE TO STAY     SOME HELP AFTER SURGERY     HISTORY OF RECURRENT DENTAL     POOR SLEEPING FROM PAIN     VITAMIN D REPLACEMENT      LDL OR \"BAD\" CHOLESTEROL  GOAL < 100     HYPERTENSION WITH GOAL OF LESS THAN 140/80 CONTROLLED with CURRENT MEDICATIONS     DIABETES 2 GOAL 8% DIFFICULT TO CONTROL    CHRONIC MIGRAINE HEADACHES      NOT TAKING TRULICITY     INVOKANA    BASAGLAR AND NOVOLOG INSULIN     METFORMIN 1000MG 1.2 TABLET TWICE DAILY     VITAMIN D REPLACEMENT                DIABETES - Patient has a longstanding history of DiabetesType Type II . Patient is being treated with diet, oral agents and insulin injections and denies significant side effects. Control has been good. Complicating factors include but are not limited to: hyperlipidemia.     HYPERLIPIDEMIA - Patient has a long history of significant Hyperlipidemia requiring medication for treatment with recent good control. Patient reports no problems or side effects with the medication.     HYPERTENSION - Patient has longstanding2 history of HTN , currently denies any symptoms referable to elevated blood pressure. Specifically denies chest pain, palpitations, dyspnea, orthopnea, PND or peripheral edema. Blood pressure readings have not been in normal range. Current medication regimen is as listed below. Patient denies any side effects of medication.     SLEEP PROBLEM - Patient has a longstanding history of lack of concentration.. Patient has tried OTC medications with limited success.       MEDICAL " HISTORY:     Patient Active Problem List    Diagnosis Date Noted     Uncontrolled type 2 diabetes mellitus without complication, with long-term current use of insulin (H) 01/09/2014     Priority: High     Hyperlipidemia LDL goal <100 11/23/2012     Priority: High     Rotator cuff syndrome, right 08/19/2019     Priority: Medium     Type 2 diabetes mellitus without complication, with long-term current use of insulin (H) 12/05/2017     Priority: Medium     Hammer toe of right foot 03/27/2017     Priority: Medium     Needle stick injury, subsequent encounter 12/20/2016     Priority: Medium     12/14/2016 needlestick initial workup was negative  Hep B C and HIV 1 and 2  negative        Intractable chronic migraine without aura and without status migrainosus 02/11/2016     Priority: Medium     Diabetes type 2, controlled (H) 02/02/2016     Priority: Medium     Hyperlipidemia with target LDL less than 100 12/01/2014     Priority: Medium     Diagnosis updated by automated process. Provider to review and confirm.       DiarrheaX 2 over last 24hrs w one explosive r/o 2ndary to acid gastritis 01/09/2014     Priority: Medium     Esophageal reflux 2ndary to hi continuous intake of sugared  tea 01/09/2014     Priority: Medium     Hypertension goal BP (blood pressure) < 140/90 01/08/2014     Priority: Medium     Comprehensive diabetic foot examination, type 2 DM, encounter for (H) 07/24/2013     Priority: Medium     Other dental caries 11/01/2012     Priority: Medium     Disorder of bursae and tendons in shoulder region 11/01/2012     Priority: Medium     Problem list name updated by automated process. Provider to review       Insomnia 11/01/2012     Priority: Medium     Problem list name updated by automated process. Provider to review       Vitamin D deficiency 11/01/2012     Priority: Medium     Problem list name updated by automated process. Provider to review       Premature menopause 11/01/2012     Priority: Medium     ASCUS  "favor benign 11/01/2012     Priority: Medium     Neg HPV. Plan cotest pap & HPV in 3 years.  7/2015: NIL pap, neg HPV. Plan cotest pap & HPV in 3 years.              Past Medical History:   Diagnosis Date     Arthritis      ASCUS favor benign 2012    neg HPV  Plan cotest in 3 yrs.     Diabetes (H)      Hypertension      Past Surgical History:   Procedure Laterality Date     CHOLECYSTECTOMY  9/14/2007    laproscopic     FACIAL RECONSTRUCTION SURGERY  2 years old    cleft lip repair     ORTHOPEDIC SURGERY  6/2001    left knee     Current Outpatient Medications   Medication Sig Dispense Refill     ASPIRIN NOT PRESCRIBED (INTENTIONAL) continuous prn for other Antiplatelet medication not prescribed intentionally due to Not indicated based on age/GI distress       canagliflozin (INVOKANA) 300 MG tablet Take 1 tablet (300 mg) by mouth every morning (before breakfast) 90 tablet 3     Continuous Blood Gluc  (FREESTYLE RAHEEM READER) DENNISE 1 Device daily 1 Device 0     continuous blood glucose monitoring (FREESTYLE RAHEEM) sensor For use with Freestyle Raheem  for continuous monitioring of blood glucose levels.test 4 times daily  Replace sensor every 14 days. 6 each 3     insulin glargine (BASAGLAR KWIKPEN) 100 UNIT/ML pen Inject 40 units subcutaneous at bedtime. 18 mL 3     insulin pen needle (B-D U/F) 31G X 8 MM miscellaneous TEST FOUR TIMES A DAY OR AS DIRECTED 400 each 1     insulin syringe-needle U-100 (BD INSULIN SYRINGE ULTRAFINE) 30G X 1/2\" 1 ML Use one syringe daily or as directed.  3 month supply 100 each prn     lisinopril (PRINIVIL/ZESTRIL) 2.5 MG tablet Take 1 tablet (2.5 mg) by mouth daily 30 tablet 0     metFORMIN (GLUCOPHAGE) 1000 MG tablet TAKE ONE-HALF TABLET BY MOUTH TWICE DAILY WITH MEALS 90 tablet 1     NOVOLOG FLEXPEN 100 UNIT/ML soln 35 units before breakfast, 30 units before lunch, 30 units before dinner 90 mL 3     Urea 20 % CREA cream Apply topically as needed 85 g 3     vitamin D2 " (ERGOCALCIFEROL) 36200 units (1250 mcg) capsule Take 1 capsule (50,000 Units) by mouth every 7 days 8 capsule 6     OTC products: None, except as noted above    No Known Allergies   Latex Allergy: NO    Social History     Tobacco Use     Smoking status: Current Some Day Smoker     Types: Cigarettes     Smokeless tobacco: Never Used     Tobacco comment: 3 cigarettes daily   Substance Use Topics     Alcohol use: No     Alcohol/week: 0.0 standard drinks     History   Drug Use No       REVIEW OF SYSTEMS:   Constitutional: The patient denied fatigue, fever, insomnia, night sweats, recent illness and weight loss.  STABLE WEIGHT     Eyes: The patient denied blindness, eye pain, eye tearing, photophobia, vision change and visual disturbance. GLASSES     Ears/Nose/Throat/Neck: The patient denied dizziness, facial pain, hearing loss, nasal discharge, oral pain, otalgia, postnasal drip, sinus congestion, sore throat, tinnitus and voice change.   HEARING AND BALANCE OK     Cardiovascular: The patient denied arrhythmia, chest pain/pressure, claudication, edema, exercise intolerance, fatigue, orthopnea, palpitations and syncope.  NO SHORTNESS OF BREATH OR COUGH     Respiratory: The patient denied asthma, chest congestion, cough, dyspnea on exertion, dyspnea/shortness of breath, hemoptysis, pedal edema, pleuritic pain, productive sputum, snoring and wheezing. NONE     Gastrointestinal: The patient denied abdominal pain, anorexia, constipation, diarrhea, dysphagia, gastroesophageal reflux, hematochezia, hemorrhoids, melena, nausea and vomiting . WITHIN NORMAL LIMITS     Genitourinary/Nephrology: The patient denied breast complaint, dysuria, nocturia sexual dysfunction, testicular pain, urinary frequency, urinary incontinence, urinary urgency  menopausal symptoms, pap smear abnormality, pregnancy, and vaginal discharge.    HISTORY OFASCUS BENIGN    Musculoskeletal: The patient denied arthralgia(s), back pain, joint complaint, muscle  weakness, myalgias, osteoporosis, sciatica, stiffness and swelling.  RIGHT SHOULDER PAIN SEE HISTORY OF PRESENT ILLNESS     Dermatoligic:: The patient denied acne, dermatitis, ecchymosis, itching, mole change, rash, skin cancer, skin lesion and sores.  MILD RASH LEFT NASOLABIAL AREA     Neurologic: The patient denied dizziness, gait abnormality, headache, memory loss, mental status change, paresis, paresthesia, seizure, syncope, tremor and vision change.      Psychiatric: The patient denied anxiety, depression, disturbances of memory, drug abuse, insomnia, mood swings and relationship difficulties.  MILD DEPRESSION     Endocrine: The patient denied diabetes mellitus type 1, diabetes mellitus type 2, goiter, obesity, polyuria and thyroid disease.  POORLY CONTROLLED DIABETES     Hematologic/Lymphatic: The patient denied abnormal bleeding and bruising, abnormal ecchymoses, anemia, lymph node enlargement/mass, petechiae and venous   thrombosis.    Allergy/Immunology: The patient denied food allergy and  Allergic rhinitis or conjunctivitis.          EXAM:   /76 (BP Location: Left arm, Patient Position: Sitting, Cuff Size: Adult Regular)   Pulse 82   Temp 97.5  F (36.4  C) (Tympanic)   Resp 16   Wt 56.5 kg (124 lb 8 oz)   LMP  (LMP Unknown)   SpO2 99%   BMI 20.72 kg/m         Constitutional: general appearance, Constitutional, well developed, in no acute distress, well developed, appears stated age, normal body habitus, well nourished, in no acute distress, good hygiene and normal grooming.     Eyes: ophthalmoscopic exam, overall: benign arterioles, benign arterial-venous crossing, benign fundi and sharp optic disc; conjunctiva/eyelids, overall: conjunctiva clear, cornea clear and eyelids normal; and, pupils and irises, overall: pupils equal, round, reactive to light and accomodation.     Ears/Nose/Throat: external ear, overall: normal appearance; external nose, overall: benign appearance, no masses and  non-tender; otoscopic exam, overall: external auditory canals clear and tympanic membranes clear; hearing assessment, overall: hearing intact bilaterally; internal nose, overall: bilateral nasal cavities clear, septum midline, turbinates benign, no sinus tenderness, no drainage, no masses, nasopharynx benign, turbinates: blue, left nasal cavity: a normal exam, right nasal cavity: a normal exam and nasopharynx: a normal exam; lips/teeth/gingiva, overall: benign lips, normal dentition, benign gingiva and no masses; and, oral cavity/pharynx/larynx, overall: tonsils benign, oropharyngeal mucosa clear and no masses.     Neck: thyroid, overall: normal size, normal consistency, nontender, no mass lesions, size: a normal exam, consistency: a normal exam and palpation: a normal exam; and, inspection of neck, overall: normal appearance and no carotid bruits.     Respiratory: percussion, overall: benign percussion; palpation of chest, overall: normal excursion, no pain and no chest wall pain; auscultation, overall: breath sounds clear bilaterally; and, respiratory effort/rhythm, no retractions and normal rate.     Cardiovascular: auscultation of heart, overall: regular rate, regular rhythm, normal heart sounds and no murmurs; inspection of carotid pulses, overall: strong, bilaterally equal, no bruits; palpation of heart, overall: apical impulse location normal and no heave/lift; inspection of femoral pulses, overall: strong, bilaterally equal pulses, no bruits; inspection of abdominal aorta, overall: normal diameter; inspection of pedal pulses, overall: strong, equal bilaterally; and, extremities, no clubbing and no edema.     Chest/Breast: normal chest shape.     Abdomen: abdominal exam, no tenderness and normal bowel sounds; liver and spleen exam, no hepatosplenomegaly; and, hernia exam, no hernias present.   Genitourinary: renal, flank No CVA tenderness; uterus, position: a normal exam, size: a normal exam and mobility: a  normal exam; cervix, overall: pelvic exam negative, no lesions and normal os; labia and vagina, overall: normal external genitalia and hair distribution: android; adnexa/parametria, left adnexa: normal size, non-tender, right adnexa: normal size, non-tender, soft and mobile; urethra, no masses and inspection: non-tender; and, bladder, no tenderness.     Lymphatic: neck nodes, overall: anterior cervical chain benign and posterior cervical chain benign; other nodes, overall: axillary non-tender, not enlarged; and, inguinal nodes, overall: inguinal non-tender, not enlarged.     Musculoskeletal: head and neck, overall: head atraumatic and cervical spine benign; digits and nails, no clubbing and digits benign; right upper extremity, overall: right shoulder benign, right elbow benign and right wrist benign; left upper extremity, overall: normal left shoulder, normal left elbow and normal left wrist; right lower extremity, overall: right knee benign, right ankle benign and right foot benign; left lower extremity, overall: left knee benign, left ankle benign and left foot benign; spine, ribs and pelvis, overall: good posture, ribs benign and spine benign; and, gait and station, overall: normal gait and normal station.     Integument: inspection of skin, no rash, lesions; and, palpation, no induration, no tenderness.     Neurologic: deep tendon reflexes, overall: deep tendon reflexes intact; sensation, overall: intact to touch; mental status, overall: alert and oriented; gait, no ataxia, no unsteadiness; coordination, no tremors; cranial nerves, overall: cranial nerves 2-12 intact; and, motor, overall: normal bulk, tone.     Psychiatric: orientation/consciousness, overall: oriented to person, place and time; behavior/psychomotor activity, no tics, normal psychomotor activity; mood and affect, overall: normal mood and affect; appearance, overall: well-groomed, good eye contact; speech, overall: normal quality, no aphasia and  normal quality, quantity, rate; attention, overall: normal digit recall and number repeating; thought, overall: normal form and content; cognition/memory, overall: immediate, recent, remote memory intact and normal concentration, intelligence; and, judgment/insight, overall: judgment and insight intact.       Assessment /Plan:       ICD-10-CM    1. Need for prophylactic vaccination and inoculation against influenza Z23      ADMIN VACCINE, ADDL [[id number]]   2. Preop general physical exam Z01.818 BASIC METABOLIC PANEL     CBC with platelets     XR Chest 2 Views     EKG 12-lead complete w/read - Clinics   3. Uncontrolled type 2 diabetes mellitus without complication, with long-term current use of insulin (H) E11.65 Hemoglobin A1c    Z79.4 ALT     Albumin Random Urine Quantitative with Creat Ratio     CANCELED: Lipid panel reflex to direct LDL Fasting   4. Visit for screening mammogram Z12.31 MA SCREENING DIGITAL BILAT - Future  (s+30)   5. Weight loss R63.4 GASTROENTEROLOGY ADULT REF PROCEDURE ONLY [facility] [place] [phone]   6. Special screening for malignant neoplasms, colon Z12.11 CANCELED: Basic metabolic panel          LINDA ALCANTAR MD         DIAGNOSTICS:   EKG: appears normal, NSR, normal axis, normal intervals, no acute ST/T changes c/w ischemia, no LVH by voltage criteria, unchanged from previous tracings    Recent Labs   Lab Test 06/18/19  1319 09/17/18  1014 07/09/18  0819 03/16/18  1211  01/09/15  1656   HGB 14.7  --   --   --   --  13.8     --   --   --   --  226   NA  --   --  138 136   < >  --    POTASSIUM  --   --  3.6 4.2   < >  --    CR 0.64  --  0.66 0.67   < >  --    A1C 7.7* 8.9* 8.4* 8.5*   < >  --     < > = values in this interval not displayed.    NORMAL CHEST XRAY     IMPRESSION:   Reason for surgery/procedure:  RIGHT ROTATOR CUFF INJURY AND BICEPS TENDON INJURY   Diagnosis/reason for consult: RIGHT ROTATOR CUFF INJURY AND BICEPS TENDON INJURY     The proposed surgical procedure  is considered INTERMEDIATE risk.    REVISED CARDIAC RISK INDEX  The patient has the following serious cardiovascular risks for perioperative complications such as (MI, PE, VFib and 3  AV Block):  No serious cardiac risks  INTERPRETATION: 2 risks: Class III (moderate risk - 6.6% complication rate)    The patient has the following additional risks for perioperative complications:  No identified additional risks  The 10-year ASCVD risk score (Yazmin GUZMAN Jr., et al., 2013) is: 22.8%    Values used to calculate the score:      Age: 49 years      Sex: Female      Is Non- : Yes      Diabetic: Yes      Tobacco smoker: Yes      Systolic Blood Pressure: 132 mmHg      Is BP treated: Yes      HDL Cholesterol: 54 mg/dL      Total Cholesterol: 250 mg/dL      ICD-10-CM    1. Need for prophylactic vaccination and inoculation against influenza Z23      ADMIN VACCINE, ADDL [[id number]]   2. Preop general physical exam Z01.818 BASIC METABOLIC PANEL     CBC with platelets     XR Chest 2 Views     EKG 12-lead complete w/read - Clinics   3. Uncontrolled type 2 diabetes mellitus without complication, with long-term current use of insulin (H) E11.65 Hemoglobin A1c    Z79.4 ALT     Albumin Random Urine Quantitative with Creat Ratio     CANCELED: Lipid panel reflex to direct LDL Fasting   4. Visit for screening mammogram Z12.31 MA SCREENING DIGITAL BILAT - Future  (s+30)   5. Weight loss R63.4 GASTROENTEROLOGY ADULT REF PROCEDURE ONLY [facility] [place] [phone]   6. Special screening for malignant neoplasms, colon Z12.11 CANCELED: Basic metabolic panel       RECOMMENDATIONS:     --Consult hospital rounder / IM to assist post-op medical management    --Patient is to take all scheduled medications on the day of surgery EXCEPT for modifications listed below.    APPROVAL GIVEN to proceed with proposed procedure, without further diagnostic evaluation       Signed Electronically by: LINDA ALCANTAR MD    Copy of this  evaluation report is provided to requesting physician.    [place] Preop Guidelines    Revised Cardiac Risk Index

## 2019-11-11 NOTE — PATIENT INSTRUCTIONS
(Z01.818) Preop general physical exam    Comment:      Plan: BASIC METABOLIC PANEL, CBC with platelets, XR           Chest 2 Views                   (E11.65,  Z79.4) Uncontrolled type 2 diabetes mellitus without complication, with long-term current use of insulin (H)    Comment:      Plan: Hemoglobin A1c, ALT, Albumin Random Urine           Quantitative with Creat Ratio, CANCELED: Lipid           panel reflex to direct LDL Fasting                   (Z12.31) Visit for screening mammogram    Comment:      Plan: MA SCREENING DIGITAL BILAT - Future  (s+30)                   (R63.4) Weight loss    Comment:       Plan: GASTROENTEROLOGY ADULT REF PROCEDURE ONLY           [facility] [phone]    COLONOSCOPY                                         RIGHT ROTATOR CUFF INJURY AND BICEPS TENDON INJURY     Before Your Surgery        Call your surgeon if there is any change in your health. This includes signs of a cold or flu (such as a sore throat, runny nose, cough, rash or fever).    Do not smoke, drink alcohol or take over the counter medicine (unless your surgeon or primary care doctor tells you to) for the 24 hours before and after surgery.    If you take prescribed drugs: Follow your doctor s orders about which medicines to take and which to stop until after surgery.    Eating and drinking prior to surgery: follow the instructions from your surgeon    Take a shower or bath the night before surgery. Use the soap your surgeon gave you to gently clean your skin. If you do not have soap from your surgeon, use your regular soap. Do not shave or scrub the surgery site.  Wear clean pajamas and have clean sheets on your bed.

## 2019-11-11 NOTE — PROGRESS NOTES
Clinic Care Coordination Contact    Follow Up Progress Note      Assessment: IRMA MILLER outreached to pt to discuss referral placed by pt's PCP. She explained that she doesn't have any concerns for housing. She shared that she has a surgery on 11/20 and has been told that she cannot move her arm for 3-6 months. She is looking for in-home support for 3-4 hours a day for 3-6 months while she is healing. IRMA MILLER explained that due to medical insurance not covering this service she could speak with [facility] about a [place] Choices Assessment to determine if she could qualify for [facility] paid services. Otherwise this service would cost her money out-of-pocket.     Pt became very upset about this, stating that the [facility] will not help (IRMA MILLER is unsure if she has already spoken with the [facility] as previous  PAUL provided this resource). She stated that the PCP said she needed in-home care and became very disagreeable that medical insurance would not cover this since it is a doctor's order. Additionally, pt stated that she has diabetes and for this reason as well she needs in-home support. Pt was resistant to speaking with or problem solving with IRMA MILLER and she requested  not outreach to her again.     Plan: No further outreaches will be made due to pt's request of no further calls.     BRITNEY Bryant, [facility]  Clinic Care Coordinator  [facility]  [phone]  [email or web address]

## 2019-11-12 ASSESSMENT — ANXIETY QUESTIONNAIRES: GAD7 TOTAL SCORE: 2

## 2019-11-20 ENCOUNTER — OFFICE VISIT (OUTPATIENT)
Dept: ORTHOPEDICS | Facility: CLINIC | Age: 49
End: 2019-11-20
Payer: COMMERCIAL

## 2019-11-20 DIAGNOSIS — S46.011D TRAUMATIC COMPLETE TEAR OF RIGHT ROTATOR CUFF, SUBSEQUENT ENCOUNTER: Primary | ICD-10-CM

## 2019-11-20 NOTE — LETTER
2019     Seen today: yes    Patient:  Karen Braxton  :   1970    Physician: GRACIE GRAFF        Karen Braxton is scheduled for shoulder surgery January 3, 2020.  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          Electronically signed by Gracie Graff MD

## 2019-11-20 NOTE — PROGRESS NOTES
CHIEF CONCERN: Right rotator cuff tear    HISTORY OF PRESENT ILLNESS: Karen Braxton is a 49 year old female with right shoulder pain following a fall in January 2019 where she landed with her arm behind her back.  She had a MRI in August which showed supra and infraspinatus tears and biceps tendinosis.  At the time she presented to clinic in September, she had an active shingles infection with ocular involvement as well as tooth infection.  Therefore surgery could not be considered at that time.  She has now recovered from both infections.      She continues to have pain in her shoulder down her arm to her elbow.  Pain is particularly bothersome at night.  She is limited in range of motion due to pain.  She lives alone, at least when her children are away at school, and has concerns regarding ADLs after surgery.    ROS:  A 10-point review of systems was obtained and is negative except for as noted in the HPI.     PHYSICAL EXAM:  Adult female in no acute distress. Articulates and communicates with normal affect.  Respirations even and unlabored  Focused upper extremity exam:    Skin intact. No erythema. Sensation intact all dermatomes into the hand to light touch. EPL, FPL, and Intrinsics intact. Right shoulder active motion is FE to 165, ER at side to 20, passively to 45, and IR to L2. Negative pain on palpation over the AC joint. Positive pain on palpation over the long head of the biceps.      IMAGING:  No new imaging.    ASSESSMENT:  1. Right shoulder supra and infraspinatus tears with cuff atrophy  2. Right long head biceps tendinosis    PLAN:  1. Discussed option for right arthroscopic rotator cuff repair, subacromial decompression - possible biceps tenotomy.   2. Can proceed with surgical scheduling once proper postoperative care plan in place through [facility].      Scribe Disclosure:  WINNIE, Janine Lozoya, am serving as a scribe to document services personally performed by Gracie Graff MD at  this visit, based upon the provider's statements to me. All documentation has been reviewed by the aforementioned provider prior to being entered into the official medical record.
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
## 2019-11-20 NOTE — LETTER
11/20/2019       RE: Karen Braxton  [address]  [place] [address]     Dear Colleague,    Thank you for referring your patient, Karen Braxton, to the [facility] at [facility]. Please see a copy of my visit note below.    CHIEF CONCERN: Right rotator cuff tear    HISTORY OF PRESENT ILLNESS: Karen Braxton is a 49 year old female with right shoulder pain following a fall in January 2019 where she landed with her arm behind her back.  She had a MRI in August which showed supra and infraspinatus tears and biceps tendinosis.  At the time she presented to clinic in September, she had an active shingles infection with ocular involvement as well as tooth infection.  Therefore surgery could not be considered at that time.  She has now recovered from both infections.      She continues to have pain in her shoulder down her arm to her elbow.  Pain is particularly bothersome at night.  She is limited in range of motion due to pain.  She lives alone, at least when her children are away at school, and has concerns regarding ADLs after surgery.    ROS:  A 10-point review of systems was obtained and is negative except for as noted in the HPI.     PHYSICAL EXAM:  Adult female in no acute distress. Articulates and communicates with normal affect.  Respirations even and unlabored  Focused upper extremity exam:    Skin intact. No erythema. Sensation intact all dermatomes into the hand to light touch. EPL, FPL, and Intrinsics intact. Right shoulder active motion is FE to 165, ER at side to 20, passively to 45, and IR to L2. Negative pain on palpation over the AC joint. Positive pain on palpation over the long head of the biceps.      IMAGING:  No new imaging.    ASSESSMENT:  1. Right shoulder supra and infraspinatus tears with cuff atrophy  2. Right long head biceps tendinosis    PLAN:  1. Discussed option for right arthroscopic rotator cuff repair, subacromial decompression -  possible biceps tenotomy.   2. Can proceed with surgical scheduling once proper postoperative care plan in place through [facility].      Scribe Disclosure:  I, Janine Lozoya, am serving as a scribe to document services personally performed by Gracie Graff MD at this visit, based upon the provider's statements to me. All documentation has been reviewed by the aforementioned provider prior to being entered into the official medical record.     Again, thank you for allowing me to participate in the care of your patient.      Sincerely,    Gracie Graff MD

## 2019-12-11 DIAGNOSIS — S46.011D TRAUMATIC COMPLETE TEAR OF RIGHT ROTATOR CUFF, SUBSEQUENT ENCOUNTER: Primary | ICD-10-CM

## 2019-12-24 ENCOUNTER — TELEPHONE (OUTPATIENT)
Dept: ORTHOPEDICS | Facility: CLINIC | Age: 49
End: 2019-12-24

## 2019-12-24 NOTE — TELEPHONE ENCOUNTER
Received call from [facility] pre-op RN stating the patient has decided to cancel her surgery with Dr Graff. Patient did not provide reason.     Phoned patient's home number and her daughter answered. Asked if she knew why her mom canceled her surgery, if she'd like to reschedule, or if she would like a follow up call from the clinic. Patient's daughter said she was not home, but would ask her mom when she saw her and would call us back.

## 2020-02-07 ENCOUNTER — TELEPHONE (OUTPATIENT)
Dept: ENDOCRINOLOGY | Facility: CLINIC | Age: 50
End: 2020-02-07

## 2020-02-07 NOTE — TELEPHONE ENCOUNTER
Prior Authorization Retail Medication Request    Medication/Dose: Freestyle shauna 14 day sensor     ICD code E11.65     Rationale:  Uncontrolled type 2 diabetic with long term current use of insulin.     Insurance Name:  [facility]   Insurance ID:  [id number]      Pharmacy Information (  Name:    Phone:

## 2020-02-10 NOTE — TELEPHONE ENCOUNTER
PA Initiation    Medication: [employer] -   Insurance Company[facility] - Phone [phone] Fax [phone]  Pharmacy Filling the Rx: [id number] - [place] [address] AT [facility] [address]  Filling Pharmacy Phone: [phone]  Filling Pharmacy Fax: [phone]  Start Date: 2/10/2020

## 2020-02-13 NOTE — TELEPHONE ENCOUNTER
Prior Authorization Approval    Medication: [employer] - APPROVED was approved on 2/10/2020  Effective: 1/11/2020 to 2/9/2021  Reference #: [id number]  Approved Dose/Quantity:   Insurance Company: [employer] - Phone [phone] Fax [phone]  Expected CoPay:    Pharmacy Filling the Rx: [facility] #[id number] [facility], [place] - [address] AT [facility] [address]  Pharmacy Notified: Yes  Patient Notified: Comment:  **Instructed pharmacy to notify patient when script is ready to /ship.**

## 2020-03-25 PROBLEM — M75.101 ROTATOR CUFF SYNDROME, RIGHT: Status: RESOLVED | Noted: 2019-08-19 | Resolved: 2020-03-25

## 2020-03-30 ENCOUNTER — TELEPHONE (OUTPATIENT)
Dept: FAMILY MEDICINE | Facility: CLINIC | Age: 50
End: 2020-03-30

## 2020-03-30 NOTE — TELEPHONE ENCOUNTER
FOLLOW UP  DIABETES 2 GOAL 8% POORLY CONTROLLED A1C NEEDED     Called pt per pcp to schedule phone visit for a follow up on  Diabetes

## 2020-03-31 ENCOUNTER — TELEPHONE (OUTPATIENT)
Dept: ENDOCRINOLOGY | Facility: CLINIC | Age: 50
End: 2020-03-31

## 2020-03-31 ENCOUNTER — VIRTUAL VISIT (OUTPATIENT)
Dept: ENDOCRINOLOGY | Facility: CLINIC | Age: 50
End: 2020-03-31
Payer: COMMERCIAL

## 2020-03-31 RX ORDER — INSULIN ASPART 100 [IU]/ML
INJECTION, SOLUTION INTRAVENOUS; SUBCUTANEOUS
Qty: 90 ML | Refills: 3 | Status: SHIPPED | OUTPATIENT
Start: 2020-03-31 | End: 2020-03-31

## 2020-03-31 RX ORDER — INSULIN ASPART 100 [IU]/ML
INJECTION, SOLUTION INTRAVENOUS; SUBCUTANEOUS
Qty: 90 ML | Refills: 3 | Status: SHIPPED | OUTPATIENT
Start: 2020-03-31 | End: 2020-11-05

## 2020-03-31 RX ORDER — INSULIN GLARGINE 100 [IU]/ML
INJECTION, SOLUTION SUBCUTANEOUS
Qty: 18 ML | Refills: 3 | Status: SHIPPED | OUTPATIENT
Start: 2020-03-31 | End: 2020-08-24

## 2020-03-31 NOTE — TELEPHONE ENCOUNTER
Health Call Center    Phone Message    May a detailed message be left on voicemail: yes     Reason for Call: Medication Question or concern regarding medication   Prescription Clarification  Name of Medication: NOVOLOG FLEXPEN     Prescribing Provider:    Cecile Juarez PA-C         Pharmacy: [id number] [facility] [address] AT [facility] & [place] [address]    What on the order needs clarification?    Be advised:  The Pharmacy called to tell us they were   deleting the original Rx they received which was for approximately 4x the acceptable dosage and would have produce an overdose in the Pt.    They did find a second Rx had been written with more reasonable dosing but they are very concerned that at no time did anyone contact their pharmacy by phone or fax to tell them not to fill the Rx which might have harmed the Pt.          Action Taken: Message routed to:  [facility] endocrine    Travel Screening: Not Applicable

## 2020-03-31 NOTE — NURSING NOTE
"Karen Braxton is a 49 year old female who is being evaluated via a billable telephone visit.      The patient has been notified of following:     \"This telephone visit will be conducted via a call between you and your physician/provider. We have found that certain health care needs can be provided without the need for a physical exam.  This service lets us provide the care you need with a short phone conversation.  If a prescription is necessary we can send it directly to your pharmacy.  If lab work is needed we can place an order for that and you can then stop by our lab to have the test done at a later time.    If during the course of the call the physician/provider feels a telephone visit is not appropriate, you will not be charged for this service.\"     Patient has given verbal consent for Telephone visit?  Yes    Karen Braxton complains of    Chief Complaint   Patient presents with     Diabetes     Type 2 Diabetes       I have reviewed and updated the patient's Past Medical History, Social History, Family History and Medication List.    ALLERGIES  Patient has no known allergies.    Aysha Pierre MA    "

## 2020-03-31 NOTE — PROGRESS NOTES
"Karen Braxton is a 49 year old female who is being evaluated via a billable telephone visit.      The patient has been notified of following:     \"This telephone visit will be conducted via a call between you and your physician/provider. We have found that certain health care needs can be provided without the need for a physical exam.  This service lets us provide the care you need with a short phone conversation.  If a prescription is necessary we can send it directly to your pharmacy.  If lab work is needed we can place an order for that and you can then stop by our lab to have the test done at a later time.    If during the course of the call the physician/provider feels a telephone visit is not appropriate, you will not be charged for this service.\"     Patient has given verbal consent for Telephone visit?  Yes    Karen Braxton complains of    Chief Complaint   Patient presents with     Diabetes     Type 2 Diabetes       I have reviewed and updated the patient's Past Medical History, Social History, Family History and Medication List.    ALLERGIES  Patient has no known allergies.  Aysha Pierre MA    Due to the COVID 19 pandemic this visit was converted to a telephone visit in order to help prevent spread of infection in this patient and the general population.    Time of start: 10:55 am  Time of end: 11:13 am  Total duration of telephone visit: 18 minutes  Provider location:home  Patient location: home    This visit would have been billed as a [id number] visit.      HPI:  Irais Joens is a 49 year old female with type 2 diabetes mellitus.  Telephone visit today for diabetes follow up.  She was dx having type 2 diabetes around 2002.  No known retinopathy, nephropathy or neuropathy.  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  For her diabetes, she is currently taking Basaglar 50 units SQ at hs, Novolog 30 units with breakfast, 35 units with lunch " "and 35 units with dinner and Metformin 500 mg BID. She is no longer taking Actos.  Her insurance will no longer cover Invokana.  Her A1C was 8.6 % in Nov 2019.  Her previous A1C was 7.7 % in June 2019.  She stopped by our clinic today and had her Freestyle Dodie downloaded.  Her blood sugar readings are high.  She denies missing any insulin doses.  Her average glucose was 226 with SD 38 and A1C indicator was 8.7 %.  She had 33 % blood sugars in target, 30 % high above target, 36 % very high above target and 1 % below target.  She has been home more and working less and less active.  On ROS today,  she has right shoulder/arm pain.  No sleeping well at night.  Intermittent headaches and blurred vision.  Pt denies n/v, SOB at rest, cough or chest pain.  No fevers or chills.  Pt denies abd pain, diarrhea, dysuria or hematuria.   She denies numbness or tingling in her feet or hands.  Pt denies foot ulcers or sores at this time.    ROS:   Please see under HPI.    ALLERGIES:   No Known Allergies      Current Outpatient Medications   Medication Sig Dispense Refill     ASPIRIN NOT PRESCRIBED (INTENTIONAL) continuous prn for other Antiplatelet medication not prescribed intentionally due to Not indicated based on age/GI distress       Continuous Blood Gluc  (FREESTYLE DODIE READER) DENNISE 1 Device daily 1 Device 0     continuous blood glucose monitoring (FREESTYLE DDOIE) sensor For use with Freestyle Dodie  for continuous monitioring of blood glucose levels.test 4 times daily  Replace sensor every 14 days. 6 each 3     insulin glargine (BASAGLAR KWIKPEN) 100 UNIT/ML pen Inject 54 units subcutaneous at bedtime. 18 mL 3     insulin pen needle (B-D U/F) 31G X 8 MM miscellaneous TEST FOUR TIMES A DAY OR AS DIRECTED 400 each 1     insulin syringe-needle U-100 (BD INSULIN SYRINGE ULTRAFINE) 30G X 1/2\" 1 ML Use one syringe daily or as directed.  3 month supply 100 each prn     lisinopril (PRINIVIL/ZESTRIL) 2.5 MG tablet " Take 1 tablet (2.5 mg) by mouth daily 30 tablet 0     metFORMIN (GLUCOPHAGE) 1000 MG tablet TAKE ONE-HALF TABLET BY MOUTH TWICE DAILY WITH MEALS 90 tablet 1     NOVOLOG FLEXPEN 100 UNIT/ML soln 32 units before breakfast, 37 units before lunch, 37 units before dinner 90 mL 3     Urea 20 % CREA cream Apply topically as needed 85 g 3     vitamin D2 (ERGOCALCIFEROL) 95781 units (1250 mcg) capsule Take 1 capsule (50,000 Units) by mouth every 7 days 8 capsule 6     SHX:  Smoke: yes.  ETOH: none.   with 5 children.    FHX:  Several family members with diabetes.    PMHX:   1.  Type 2 DM.  2.  Hyperlipidemia.  3.  Amenorrhea.  4.  GERD.  5.  S/P choley.  6.  Sebaceous cyst.  Past Medical History:   Diagnosis Date     Arthritis      ASCUS favor benign 2012    neg HPV  Plan cotest in 3 yrs.     Diabetes (H)      Hypertension      Past Surgical History:   Procedure Laterality Date     CHOLECYSTECTOMY  9/14/2007    laproscopic     FACIAL RECONSTRUCTION SURGERY  2 years old    cleft lip repair     ORTHOPEDIC SURGERY  6/2001    left knee       EXAM:    No exam today- telephone visit.    RESULTS:    Creatinine   Date Value Ref Range Status   11/11/2019 0.64 0.52 - 1.04 mg/dL Final     GFR Estimate   Date Value Ref Range Status   11/11/2019 >90 >60 mL/min/[1.73_m2] Final     Comment:     Non  GFR Calc  Starting 12/18/2018, serum creatinine based estimated GFR (eGFR) will be   calculated using the Chronic Kidney Disease Epidemiology Collaboration   (CKD-EPI) equation.       Hemoglobin A1C   Date Value Ref Range Status   11/11/2019 8.6 (H) 0 - 5.6 % Final     Comment:     Normal <5.7% Prediabetes 5.7-6.4%  Diabetes 6.5% or higher - adopted from ADA   consensus guidelines.       Potassium   Date Value Ref Range Status   11/11/2019 4.2 3.4 - 5.3 mmol/L Final     ALT   Date Value Ref Range Status   11/11/2019 19 0 - 50 U/L Final     AST   Date Value Ref Range Status   01/09/2015 17 0 - 45 U/L Final     TSH   Date  Value Ref Range Status   03/16/2018 2.12 0.40 - 4.00 mU/L Final     T4 Free   Date Value Ref Range Status   08/04/2011 1.22 0.70 - 1.85 ng/dL Final         Cholesterol   Date Value Ref Range Status   06/18/2019 250 (H) <200 mg/dL Final     Comment:     Desirable:       <200 mg/dl   03/16/2018 229 (H) <200 mg/dL Final     Comment:     Desirable:       <200 mg/dl     HDL Cholesterol   Date Value Ref Range Status   06/18/2019 54 >49 mg/dL Final   03/16/2018 43 (L) >49 mg/dL Final     LDL Cholesterol Calculated   Date Value Ref Range Status   06/18/2019 152 (H) <100 mg/dL Final     Comment:     Above desirable:  100-129 mg/dl  Borderline High:  130-159 mg/dL  High:             160-189 mg/dL  Very high:       >189 mg/dl     03/16/2018 149 (H) <100 mg/dL Final     Comment:     Above desirable:  100-129 mg/dl  Borderline High:  130-159 mg/dL  High:             160-189 mg/dL  Very high:       >189 mg/dl       Triglycerides   Date Value Ref Range Status   06/18/2019 220 (H) <150 mg/dL Final     Comment:     Borderline high:  150-199 mg/dl  High:             200-499 mg/dl  Very high:       >499 mg/dl     03/16/2018 183 (H) <150 mg/dL Final     Comment:     Borderline high:  150-199 mg/dl  High:             200-499 mg/dl  Very high:       >499 mg/dl  Non Fasting       Cholesterol/HDL Ratio   Date Value Ref Range Status   12/01/2014 6.0 (H) 0.0 - 5.0 Final   07/30/2014 6.1 (H) 0.0 - 5.0 Final     A1C      8.6   11/11/2019  A1C      8.4   9/24/2019    ASSESSMENT/PLAN:    1. TYPE 2 DIABETES MELLITUS: Uncontrolled type 2 diabetes mellitus with high blood sugar readings.  She is to continue to use her Freestyle Dodie device/sensor to check her blood sugar fasting each am, prelunch, predinner and at bedtime daily.  I had her increase Basaglar 54 units subcutaneous at bedtime and increase Novolog 32 units with breakfast and 37 units with lunch and dinner.  She is to remain on current Metformin dose.  She was seen by an outside [facility] in  Aug 2019.  Pt's creat was 0.64 with GFR >90 mL/min in 11/2019. Pt's urine microalbuminuria was negative in Nov 2019.   She denies any foot ulcers at this time.    2.  HYPERLIPIDEMIA:   in June 2019.  She was not taking a statin in 6/2019.  She tells me she is taking Lipitor now.    3. HX OF RIGHT SHOULDER TEAR/BICEPS TENDINOSIS: Pt has been seen by Ortho.    4. Return to Endocrine Clinic to see me in 3 months.  An A1C was ordered today and to be done prior to next visit.

## 2020-04-07 ENCOUNTER — TELEPHONE (OUTPATIENT)
Dept: ENDOCRINOLOGY | Facility: CLINIC | Age: 50
End: 2020-04-07

## 2020-07-02 ENCOUNTER — TELEPHONE (OUTPATIENT)
Dept: ENDOCRINOLOGY | Facility: CLINIC | Age: 50
End: 2020-07-02

## 2020-07-02 DIAGNOSIS — Z79.4 CONTROLLED TYPE 2 DIABETES MELLITUS WITH HYPERGLYCEMIA, WITH LONG-TERM CURRENT USE OF INSULIN (H): ICD-10-CM

## 2020-07-02 DIAGNOSIS — E11.65 CONTROLLED TYPE 2 DIABETES MELLITUS WITH HYPERGLYCEMIA, WITH LONG-TERM CURRENT USE OF INSULIN (H): ICD-10-CM

## 2020-07-02 RX ORDER — PEN NEEDLE, DIABETIC 31 GX5/16"
NEEDLE, DISPOSABLE MISCELLANEOUS
Qty: 400 EACH | Refills: 1 | Status: SHIPPED | OUTPATIENT
Start: 2020-07-02 | End: 2021-08-27

## 2020-07-02 NOTE — TELEPHONE ENCOUNTER
[facility] Call Center    Phone Message    May a detailed message be left on voicemail: yes     Reason for Call: Medication Refill Request    Has the patient contacted the pharmacy for the refill? Yes   Name of medication being requested: insulin pen needles   Provider who prescribed the medication: Maren   Pharmacy: [facility] #[id number] [place], [place] - [address] AT [facility] OF [address]  Date medication is needed: ASAP, Pt stated that she is completely out of these, and she requested that she be called once the refill has been completed.      Action Taken: Message routed to:  [facility] med refills    Travel Screening: Not Applicable

## 2020-08-22 DIAGNOSIS — E11.65 TYPE 2 DIABETES MELLITUS WITH HYPERGLYCEMIA (H): ICD-10-CM

## 2020-08-24 RX ORDER — INSULIN GLARGINE 100 [IU]/ML
INJECTION, SOLUTION SUBCUTANEOUS
Qty: 18 ML | Refills: 3 | Status: SHIPPED | OUTPATIENT
Start: 2020-08-24 | End: 2020-11-05

## 2020-08-24 NOTE — TELEPHONE ENCOUNTER
insulin glargine (BASAGLAR KWIKPEN) 100 UNIT/ML pen      Last Written Prescription Date:  3-  Last Fill Quantity: 18,   # refills: 3  Last Office Visit : 3-  Future Office visit:  9-    Routing refill request to provider for review/approval because:  Insulin - refilled per clinic        Kathleen M Doege RN

## 2020-09-22 PROBLEM — M20.41 HAMMER TOE OF RIGHT FOOT: Status: RESOLVED | Noted: 2017-03-27 | Resolved: 2020-09-22

## 2020-09-22 PROBLEM — E11.9 TYPE 2 DIABETES MELLITUS WITHOUT COMPLICATION, WITH LONG-TERM CURRENT USE OF INSULIN (H): Status: RESOLVED | Noted: 2017-12-05 | Resolved: 2020-09-22

## 2020-09-22 PROBLEM — Z79.4 TYPE 2 DIABETES MELLITUS WITHOUT COMPLICATION, WITH LONG-TERM CURRENT USE OF INSULIN (H): Status: RESOLVED | Noted: 2017-12-05 | Resolved: 2020-09-22

## 2020-09-22 PROBLEM — S46.011D TRAUMATIC COMPLETE TEAR OF RIGHT ROTATOR CUFF, SUBSEQUENT ENCOUNTER: Status: RESOLVED | Noted: 2019-12-11 | Resolved: 2020-09-22

## 2020-09-23 ENCOUNTER — OFFICE VISIT (OUTPATIENT)
Dept: INTERNAL MEDICINE | Facility: CLINIC | Age: 50
End: 2020-09-23
Payer: COMMERCIAL

## 2020-09-23 VITALS
DIASTOLIC BLOOD PRESSURE: 80 MMHG | WEIGHT: 136.2 LBS | HEART RATE: 91 BPM | HEIGHT: 65 IN | SYSTOLIC BLOOD PRESSURE: 118 MMHG | RESPIRATION RATE: 16 BRPM | OXYGEN SATURATION: 99 % | TEMPERATURE: 97.7 F | BODY MASS INDEX: 22.69 KG/M2

## 2020-09-23 DIAGNOSIS — Z12.4 SCREENING FOR CERVICAL CANCER: ICD-10-CM

## 2020-09-23 DIAGNOSIS — Z00.01 ENCOUNTER FOR ROUTINE ADULT MEDICAL EXAM WITH ABNORMAL FINDINGS: Primary | ICD-10-CM

## 2020-09-23 DIAGNOSIS — Z12.31 ENCOUNTER FOR SCREENING MAMMOGRAM FOR BREAST CANCER: ICD-10-CM

## 2020-09-23 DIAGNOSIS — E55.9 VITAMIN D DEFICIENCY: ICD-10-CM

## 2020-09-23 DIAGNOSIS — E11.9 TYPE 2 DIABETES MELLITUS WITHOUT COMPLICATION, WITH LONG-TERM CURRENT USE OF INSULIN (H): ICD-10-CM

## 2020-09-23 DIAGNOSIS — Z23 NEED FOR PROPHYLACTIC VACCINATION AND INOCULATION AGAINST INFLUENZA: ICD-10-CM

## 2020-09-23 DIAGNOSIS — Z79.4 TYPE 2 DIABETES MELLITUS WITHOUT COMPLICATION, WITH LONG-TERM CURRENT USE OF INSULIN (H): ICD-10-CM

## 2020-09-23 DIAGNOSIS — Z23 NEED FOR VACCINATION: ICD-10-CM

## 2020-09-23 LAB
ALBUMIN SERPL-MCNC: 3 G/DL (ref 3.4–5)
ALP SERPL-CCNC: 147 U/L (ref 40–150)
ALT SERPL W P-5'-P-CCNC: 22 U/L (ref 0–50)
ANION GAP SERPL CALCULATED.3IONS-SCNC: 5 MMOL/L (ref 3–14)
AST SERPL W P-5'-P-CCNC: 12 U/L (ref 0–45)
BILIRUB SERPL-MCNC: 0.3 MG/DL (ref 0.2–1.3)
BUN SERPL-MCNC: 15 MG/DL (ref 7–30)
CALCIUM SERPL-MCNC: 9 MG/DL (ref 8.5–10.1)
CHLORIDE SERPL-SCNC: 105 MMOL/L (ref 94–109)
CHOLEST SERPL-MCNC: 214 MG/DL
CO2 SERPL-SCNC: 25 MMOL/L (ref 20–32)
CREAT SERPL-MCNC: 0.82 MG/DL (ref 0.52–1.04)
ERYTHROCYTE [DISTWIDTH] IN BLOOD BY AUTOMATED COUNT: 13.7 % (ref 10–15)
GFR SERPL CREATININE-BSD FRML MDRD: 82 ML/MIN/{1.73_M2}
GLUCOSE SERPL-MCNC: 383 MG/DL (ref 70–99)
HBA1C MFR BLD: 9.1 % (ref 0–5.6)
HCT VFR BLD AUTO: 41 % (ref 35–47)
HDLC SERPL-MCNC: 47 MG/DL
HGB BLD-MCNC: 13.3 G/DL (ref 11.7–15.7)
LDLC SERPL CALC-MCNC: 135 MG/DL
MCH RBC QN AUTO: 29.3 PG (ref 26.5–33)
MCHC RBC AUTO-ENTMCNC: 32.4 G/DL (ref 31.5–36.5)
MCV RBC AUTO: 90 FL (ref 78–100)
NONHDLC SERPL-MCNC: 167 MG/DL
PLATELET # BLD AUTO: 187 10E9/L (ref 150–450)
POTASSIUM SERPL-SCNC: 4 MMOL/L (ref 3.4–5.3)
PROT SERPL-MCNC: 6.8 G/DL (ref 6.8–8.8)
RBC # BLD AUTO: 4.54 10E12/L (ref 3.8–5.2)
SODIUM SERPL-SCNC: 135 MMOL/L (ref 133–144)
TRIGL SERPL-MCNC: 160 MG/DL
WBC # BLD AUTO: 5.6 10E9/L (ref 4–11)

## 2020-09-23 PROCEDURE — 36415 COLL VENOUS BLD VENIPUNCTURE: CPT | Performed by: INTERNAL MEDICINE

## 2020-09-23 PROCEDURE — 99396 PREV VISIT EST AGE 40-64: CPT | Mod: 25 | Performed by: INTERNAL MEDICINE

## 2020-09-23 PROCEDURE — 90682 RIV4 VACC RECOMBINANT DNA IM: CPT | Performed by: INTERNAL MEDICINE

## 2020-09-23 PROCEDURE — 80053 COMPREHEN METABOLIC PANEL: CPT | Performed by: INTERNAL MEDICINE

## 2020-09-23 PROCEDURE — 83036 HEMOGLOBIN GLYCOSYLATED A1C: CPT | Performed by: INTERNAL MEDICINE

## 2020-09-23 PROCEDURE — 80061 LIPID PANEL: CPT | Performed by: INTERNAL MEDICINE

## 2020-09-23 PROCEDURE — 85027 COMPLETE CBC AUTOMATED: CPT | Performed by: INTERNAL MEDICINE

## 2020-09-23 PROCEDURE — 90750 HZV VACC RECOMBINANT IM: CPT | Performed by: INTERNAL MEDICINE

## 2020-09-23 PROCEDURE — 82306 VITAMIN D 25 HYDROXY: CPT | Performed by: INTERNAL MEDICINE

## 2020-09-23 PROCEDURE — 99207 C FOOT EXAM  NO CHARGE: CPT | Mod: 25 | Performed by: INTERNAL MEDICINE

## 2020-09-23 PROCEDURE — G0476 HPV COMBO ASSAY CA SCREEN: HCPCS | Performed by: INTERNAL MEDICINE

## 2020-09-23 PROCEDURE — G0145 SCR C/V CYTO,THINLAYER,RESCR: HCPCS | Performed by: INTERNAL MEDICINE

## 2020-09-23 PROCEDURE — 90472 IMMUNIZATION ADMIN EACH ADD: CPT | Performed by: INTERNAL MEDICINE

## 2020-09-23 PROCEDURE — 99213 OFFICE O/P EST LOW 20 MIN: CPT | Mod: 25 | Performed by: INTERNAL MEDICINE

## 2020-09-23 PROCEDURE — 90471 IMMUNIZATION ADMIN: CPT | Performed by: INTERNAL MEDICINE

## 2020-09-23 RX ORDER — ATORVASTATIN CALCIUM 40 MG/1
40 TABLET, FILM COATED ORAL DAILY
Qty: 90 TABLET | Refills: 3 | Status: SHIPPED | OUTPATIENT
Start: 2020-09-23 | End: 2021-09-14

## 2020-09-23 ASSESSMENT — MIFFLIN-ST. JEOR: SCORE: 1228.68

## 2020-09-23 NOTE — PROGRESS NOTES
SUBJECTIVE                                                      HPI: Karen Braxton is a very pleasant 50 year old female who presents for a physical and diabetes follow-up.    Patient's current diabetes regimen is Basaglar 54 units nightly. She is adherent to her diabetic regimen and denies episodes of hypoglycemia. She is adherent to her diabetic diet. No known diabetic complications (neuropathy, nephropathy, or retinopathy). Annual diabetic eye exam is DUE. She is checking her feet regularly. Her Pneumovax is up to date. She is on an ACE-I, but not aspirin or statin or ARB. Blood pressure is within normal limits.     ROS:  Constitutional: no unintentional weight loss or gain reported; no fevers, chills, or sweats reported  Cardiovascular: no chest pain, palpitations, or edema reported  Respiratory: no cough, wheezing, shortness of breath, or dyspnea on exertion reported  Gastrointestinal: no nausea, vomiting, constipation, diarrhea, or abdominal pain reported  Genitourinary: no urinary frequency, urgency, dysuria, or hematuria reported  Integumentary: no rash or pruritus reported  Musculoskeletal: no back pain, muscle pain, joint pain, or joint swelling reported  Neurologic: no focal weakness, numbness, or tingling reported  Hematologic: no easy bruising or bleeding reported  Endocrine: no heat or cold intolerance reported; see PMH below  Psychiatric: no anxiety or depression reported    Past Medical History:   Diagnosis Date     Type 2 diabetes mellitus (H)      Past Surgical History:   Procedure Laterality Date     KNEE SURGERY Left 2001     LAPAROSCOPIC CHOLECYSTECTOMY  2007     REPAIR CLEFT PALATE CHILD  1972     Family History   Problem Relation Age of Onset     Hypertension Mother      Hyperlipidemia Mother      Diabetes Type 2  Mother      Hypertension Father      Diabetes Type 2  Father      Social History     Tobacco Use     Smoking status: Current Some Day Smoker     Years: 40.00     Types: Cigarettes  "    Smokeless tobacco: Never Used     Tobacco comment: 3 cigarettes daily   Substance and Sexual Activity     Alcohol use: No     Drug use: No     Sexual activity: Yes     Partners: Male     No Known Allergies     Current Outpatient Medications   Medication Sig     insulin glargine (BASAGLAR KWIKPEN) 100 UNIT/ML pen ADMINISTER 54 UNITS UNDER THE SKIN AT BEDTIME     insulin pen needle (B-D U/F) 31G X 8 MM miscellaneous TEST FOUR TIMES A DAY OR AS DIRECTED     insulin syringe-needle U-100 (BD INSULIN SYRINGE ULTRAFINE) 30G X 1/2\" 1 ML Use one syringe daily or as directed.  3 month supply     lisinopril (PRINIVIL/ZESTRIL) 2.5 MG tablet Take 1 tablet (2.5 mg) by mouth daily     metFORMIN (GLUCOPHAGE) 1000 MG tablet TAKE ONE-HALF TABLET BY MOUTH TWICE DAILY WITH MEALS     NOVOLOG FLEXPEN 100 UNIT/ML soln 32 units before breakfast, 37 units before lunch, 37 units before dinner     vitamin D2 (ERGOCALCIFEROL) 96293 units (1250 mcg) capsule Take 1 capsule (50,000 Units) by mouth every 7 days     Immunization History   Administered Date(s) Administered     Influenza (IIV3) PF 11/26/2007, 10/22/2008, 09/18/2009, 09/20/2010, 11/26/2013, 10/28/2014     Influenza Vaccine IM > 6 months Valent IIV4 09/29/2015, 10/25/2017, 11/11/2019     Pneumo Conj 13-V (2010&after) 10/03/2011     Pneumococcal 23 valent 09/25/2018     TD (ADULT, 7+) 01/01/1998     TDAP Vaccine (Adacel) 11/11/2019     Tdap (Adacel,Boostrix) 02/10/2009     OBJECTIVE                                                      /80   Pulse 91   Temp 97.7  F (36.5  C) (Temporal)   Resp 16   Ht 1.651 m (5' 5\")   Wt 61.8 kg (136 lb 3.2 oz)   LMP  (LMP Unknown)   SpO2 99%   BMI 22.66 kg/m    Constitutional: well-appearing  Eyes: normal conjunctivae and lids  Head, Ears, and Eyes: normocephalic; normal external auditory canal and pinna; tympanic membranes visualized and normal; normal lids and conjunctivae  Neck: supple, symmetric, no thyromegaly or " lymphadenopathy  Respiratory: normal respiratory effort; clear to auscultation bilaterally  Cardiovascular: regular rate and rhythm; no edema  Gastrointestinal: soft, non-tender, and non-distended; no organomegaly or masses  Genitourinary: prolapse noted; cervix visualized and normal in appearance  Musculoskeletal: normal gait and station  Foot exam: feet intact - no lesions or ulcers; sensation intact to monofilament  Psych: normal judgment and insight; normal mood and affect; recent and remote memory intact    PREVENTATIVE HEALTH                                                      BMI: within normal limits   Blood pressure: within normal limits   Breast CA screening: DUE  Cervical CA screening: DUE  Colon CA screening: up to date   Lung CA screening: n/a   Dexa: not medically indicated at this time   Screening HCV: n/a   Screening HIV: completed  STD testing: no risk factors present  Alcohol misuse screening: alcohol use reviewed - no intervention indicated at this time  Immunizations: reviewed; flu shot and Shingrix series DUE    ASSESSMENT/PLAN                                                       (Z00.01) Encounter for routine adult medical exam with abnormal findings (primary encounter diagnosis)  Comment: PMH, PSH, FH, SH, medications, allergies, immunizations, and preventative health measures reviewed and updated as appropriate.  Plan: see below for plans.      (E11.9,  Z79.4) Type 2 diabetes mellitus without complication, with long-term current use of insulin (H)  Plan: fasting diabetic labs today; recommendations to follow; referred for annual diabetic eye exam - patient will be contacted to schedule; START a daily baby aspirin and atorvastatin 40 mg daily.    (E55.9) Vitamin D deficiency  Plan: vitamin D level today.    (Z23) Need for vaccination  Plan: Shingrix #1 given today; #2 in 2 to 6 months.    (Z23) Need for prophylactic vaccination and inoculation against influenza  Plan: flu shot given  today.    (Z12.4) Screening for cervical cancer  Plan: pap smear obtained today.    (Z12.31) Encounter for screening mammogram for breast cancer  Plan: mammogram ordered - patient to schedule.     Yvonne Solomon MD   [address] [address]  T: [phone], F: [phone]  (Note was completed, in part, with Rol[employer] voice-recognition software. Documentation was reviewed, but some grammatical, spelling, and word errors may remain.)

## 2020-09-23 NOTE — PATIENT INSTRUCTIONS
Flu shot and Shingrix#1 today.    Schedule mammogram on the way out today.    ---    Fasting labs today. Recommendations to follow.    ---    START daily baby aspirin. Prescription sent in.     START atorvastatin 40mg daily. Prescription sent in.

## 2020-09-23 NOTE — NURSING NOTE
Prior to immunization administration, verified patients identity using patient s name and date of birth. Please see Immunization Activity for additional information.     Screening Questionnaire for Adult Immunization    Are you sick today?   No   Do you have allergies to medications, food, a vaccine component or latex?   No   Have you ever had a serious reaction after receiving a vaccination?   No   Do you have a long-term health problem with heart, lung, kidney, or metabolic disease (e.g., diabetes), asthma, a blood disorder, no spleen, complement component deficiency, a cochlear implant, or a spinal fluid leak?  Are you on long-term aspirin therapy?   Yes   Do you have cancer, leukemia, HIV/AIDS, or any other immune system problem?   No   Do you have a parent, brother, or sister with an immune system problem?   No   In the past 3 months, have you taken medications that affect  your immune system, such as prednisone, other steroids, or anticancer drugs; drugs for the treatment of rheumatoid arthritis, Crohn s disease, or psoriasis; or have you had radiation treatments?   No   Have you had a seizure, or a brain or other nervous system problem?   No   During the past year, have you received a transfusion of blood or blood    products, or been given immune (gamma) globulin or antiviral drug?   No   For women: Are you pregnant or is there a chance you could become       pregnant during the next month?   No   Have you received any vaccinations in the past 4 weeks?   No       Per orders of Dr. Solomon, injection of Shingrix given by Natasha Kaufman. Patient instructed to remain in clinic for 15 minutes afterwards, and to report any adverse reaction to me immediately.       Screening performed by Natasha Kaufman on 9/23/2020 at 1:41 PM.

## 2020-09-24 DIAGNOSIS — E55.9 VITAMIN D DEFICIENCY: Primary | ICD-10-CM

## 2020-09-24 LAB — DEPRECATED CALCIDIOL+CALCIFEROL SERPL-MC: 13 UG/L (ref 20–75)

## 2020-09-24 RX ORDER — CHOLECALCIFEROL (VITAMIN D3) 50 MCG
1 TABLET ORAL DAILY
Qty: 90 TABLET | Refills: 3 | Status: SHIPPED | OUTPATIENT
Start: 2020-09-24 | End: 2023-11-07

## 2020-09-24 RX ORDER — ERGOCALCIFEROL 1.25 MG/1
50000 CAPSULE, LIQUID FILLED ORAL WEEKLY
Qty: 12 CAPSULE | Refills: 3 | Status: SHIPPED | OUTPATIENT
Start: 2020-09-24 | End: 2022-08-11

## 2020-09-25 ENCOUNTER — APPOINTMENT (OUTPATIENT)
Dept: INTERPRETER SERVICES | Facility: CLINIC | Age: 50
End: 2020-09-25
Payer: COMMERCIAL

## 2020-09-27 LAB
COPATH REPORT: NORMAL
PAP: NORMAL

## 2020-09-28 NOTE — PROGRESS NOTES
"Karen Braxton is a 50 year old female who is being evaluated via a billable telephone visit.      The patient has been notified of following:     \"This telephone visit will be conducted via a call between you and your physician/provider. We have found that certain health care needs can be provided without the need for a physical exam.  This service lets us provide the care you need with a short phone conversation.  If a prescription is necessary we can send it directly to your pharmacy.  If lab work is needed we can place an order for that and you can then stop by our lab to have the test done at a later time.    Telephone visits are billed at different rates depending on your insurance coverage. During this emergency period, for some insurers they may be billed the same as an in-person visit.  Please reach out to your insurance provider with any questions.    If during the course of the call the physician/provider feels a telephone visit is not appropriate, you will not be charged for this service.\"    Patient has given verbal consent for Telephone visit?  Yes    What phone number would you like to be contacted at? [phone]    How would you like to obtain your AVS? Mail a copy    Phone call duration: 18 minutes        This 50 year old woman with type 2 diabetes since ~2002 was called for f/u.She was seen without an  today.  She doesn't think she needs one.  She saw Dr. Engel for PC until his recent detention.  She now sees . Her diabetes is co managed by me with Micaela Juarez.      She now is taking  basaglar 50 units at hs,  Metformin 500 bid  and humalog 30 units at breakfast and 35 units with  lunch and 35 with  her evening meal.  When I last saw her more than a year ago she was also on Invokana.  This drug was stopped it was no longer covered by her insurance.  She was also taking Actos and apparently this drug was stopped because of insurance reasons.  Since she stopped these drugs, her " "blood sugars have gone up.      She eats breakfast at home in the morning and always gets her insulin before that.  This is usually her largest meal of the day.  She will have a cup of milk at about 1 PM.  She does take her insulin before she drinks this.  She will have a small amount of pasta again with some milk before dinner.  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  She wears a shauna CGM and finds it is very helpful. .Over the last 2 weeks she wore it 93% of time.  56% of values are above 250, 31% are between 180-250, 13% are  and 0 % are below 70.   Her average glucose was 263. .  Her calculated A1c is 9.6% she believes that her hyperglycemia is causing fatigue.  She is interested in taking more medication to bring her sugars down.  She saw her primary care doctor late last week.  She has no new concerns to discuss with me today.    Current Outpatient Medications   Medication     aspirin (ASA) 81 MG EC tablet     atorvastatin (LIPITOR) 40 MG tablet     exenatide ER (BYDUREON) 2 MG pen     insulin glargine (BASAGLAR KWIKPEN) 100 UNIT/ML pen     insulin pen needle (B-D U/F) 31G X 8 MM miscellaneous     insulin syringe-needle U-100 (BD INSULIN SYRINGE ULTRAFINE) 30G X 1/2\" 1 ML     lisinopril (PRINIVIL/ZESTRIL) 2.5 MG tablet     metFORMIN (GLUCOPHAGE) 1000 MG tablet     NOVOLOG FLEXPEN 100 UNIT/ML soln     vitamin D2 (ERGOCALCIFEROL) 87107 units (1250 mcg) capsule     vitamin D2 (ERGOCALCIFEROL) 03812 units (1250 mcg) capsule     vitamin D3 (CHOLECALCIFEROL) 50 mcg (2000 units) tablet     No current facility-administered medications for this visit.    On exam she is in no acute distress.  Her mood is neutral.  I could not understand everything she said and I do not believe she understood everything I said but with clarification I think the plan of the visit was understood    RESULTS  Lab Results   Component Value " Date    A1C 9.1 (H) 09/23/2020    A1C 8.6 (H) 11/11/2019    A1C 7.7 (H) 06/18/2019    A1C 8.9 (H) 09/17/2018    A1C 8.4 (H) 07/09/2018    HEMOGLOBINA1 8.4 (A) 09/24/2019    HEMOGLOBINA1 9.1 (A) 04/01/2019    HEMOGLOBINA1 8.8 (A) 12/18/2018    HEMOGLOBINA1 8.6 (A) 06/05/2018    HEMOGLOBINA1 8.5 (A) 03/08/2018       TSH   Date Value Ref Range Status   03/16/2018 2.12 0.40 - 4.00 mU/L Final   03/21/2016 1.31 0.40 - 5.00 mU/L Final   03/18/2014 1.55 0.4 - 5.0 mU/L Final   07/24/2013 1.95 0.4 - 5.0 mU/L Final   08/04/2011 1.48 0.4 - 5.0 mU/L Final     T4 Free   Date Value Ref Range Status   08/04/2011 1.22 0.70 - 1.85 ng/dL Final       ALT   Date Value Ref Range Status   09/23/2020 22 0 - 50 U/L Final   11/11/2019 19 0 - 50 U/L Final   ]    Recent Labs   Lab Test 09/23/20  1359 06/18/19  1319  12/01/14  1222 07/30/14  0829   CHOL 214* 250*   < > 215* 237*   HDL 47* 54   < > 36* 39*   * 152*   < > 114 146*   TRIG 160* 220*   < > 323* 261*   CHOLHDLRATIO  --   --   --  6.0* 6.1*    < > = values in this interval not displayed.       Lab Results   Component Value Date     09/23/2020      Lab Results   Component Value Date    POTASSIUM 4.0 09/23/2020     Lab Results   Component Value Date    CHLORIDE 105 09/23/2020     Lab Results   Component Value Date    MARLEEN 9.0 09/23/2020     Lab Results   Component Value Date    CO2 25 09/23/2020     Lab Results   Component Value Date    BUN 15 09/23/2020     Lab Results   Component Value Date    CR 0.82 09/23/2020       GFR Estimate   Date Value Ref Range Status   09/23/2020 82 >60 mL/min/[1.73_m2] Final     Comment:     Non  GFR Calc  Starting 12/18/2018, serum creatinine based estimated GFR (eGFR) will be   calculated using the Chronic Kidney Disease Epidemiology Collaboration   (CKD-EPI) equation.     11/11/2019 >90 >60 mL/min/[1.73_m2] Final     Comment:     Non  GFR Calc  Starting 12/18/2018, serum creatinine based estimated GFR (eGFR)  will be   calculated using the Chronic Kidney Disease Epidemiology Collaboration   (CKD-EPI) equation.     06/18/2019 >90 >60 mL/min/[1.73_m2] Final     Comment:     Non  GFR Calc  Starting 12/18/2018, serum creatinine based estimated GFR (eGFR) will be   calculated using the Chronic Kidney Disease Epidemiology Collaboration   (CKD-EPI) equation.       GFR Estimate If Black   Date Value Ref Range Status   09/23/2020 >90 >60 mL/min/[1.73_m2] Final     Comment:      GFR Calc  Starting 12/18/2018, serum creatinine based estimated GFR (eGFR) will be   calculated using the Chronic Kidney Disease Epidemiology Collaboration   (CKD-EPI) equation.     11/11/2019 >90 >60 mL/min/[1.73_m2] Final     Comment:      GFR Calc  Starting 12/18/2018, serum creatinine based estimated GFR (eGFR) will be   calculated using the Chronic Kidney Disease Epidemiology Collaboration   (CKD-EPI) equation.     06/18/2019 >90 >60 mL/min/[1.73_m2] Final     Comment:      GFR Calc  Starting 12/18/2018, serum creatinine based estimated GFR (eGFR) will be   calculated using the Chronic Kidney Disease Epidemiology Collaboration   (CKD-EPI) equation.         Lab Results   Component Value Date    MICROL <5 11/11/2019     No results found for: MICROALBUMIN  No results found for: CPEPT, GADAB, ISCAB    Vitamin B12   Date Value Ref Range Status   06/18/2019 341 193 - 986 pg/mL Final   ]    Most recent eye exam date: : Not Found     Assessment and plan:    1.  Diabetes control.  Based on her shauna data and her recent A1c, her diabetes control has once again worsened.  This corresponds with the discontinuation of the Actos and Invokana.  I recommended that we start a GLP-1 analog today.  It does not appear she is ever been on one.  I would like to prescribe her a once a week drug for compliance reasons.  The only one that appears to be covered by her plan is by bydureon.  I warned her of side  effects of nausea and vomiting as well as development of subcutaneous nodules.  I will have her back to see Micaela Juarez in 1 to 2 months to see how she is doing with this new medication.  We may want to consider adding back the Invokana if her sugars are not improved.  Alternatively, we we could try increasing the insulin but I think this would be less effective.    2.  Diabetes complications.  She has normal renal function and no symptoms of neuropathy.  She has not seen her eye doctor in some time.  I reminded her to see them.    3.CVD risk.  Her primary care doctor reminded her to take the statin that has been prescribed for her.  Recent blood pressure has been in target.    Follow-up with Micaela Juarez in 2 months and me in 4-6.    Melisa Phillips MD

## 2020-09-29 ENCOUNTER — VIRTUAL VISIT (OUTPATIENT)
Dept: ENDOCRINOLOGY | Facility: CLINIC | Age: 50
End: 2020-09-29
Payer: COMMERCIAL

## 2020-09-29 DIAGNOSIS — E11.65 TYPE 2 DIABETES MELLITUS WITH HYPERGLYCEMIA, WITHOUT LONG-TERM CURRENT USE OF INSULIN (H): Primary | ICD-10-CM

## 2020-09-29 LAB
FINAL DIAGNOSIS: NORMAL
HPV HR 12 DNA CVX QL NAA+PROBE: NEGATIVE
HPV16 DNA SPEC QL NAA+PROBE: NEGATIVE
HPV18 DNA SPEC QL NAA+PROBE: NEGATIVE
SPECIMEN DESCRIPTION: NORMAL
SPECIMEN SOURCE CVX/VAG CYTO: NORMAL

## 2020-09-29 RX ORDER — EXENATIDE 2 MG/.65ML
2 INJECTION, SUSPENSION, EXTENDED RELEASE SUBCUTANEOUS
Qty: 4 EACH | Refills: 11 | Status: SHIPPED | OUTPATIENT
Start: 2020-09-29 | End: 2021-09-14 | Stop reason: SINTOL

## 2020-09-29 NOTE — PATIENT INSTRUCTIONS
Start bydureon 2 mg by injection once a week.  The prescription was sent to the [facility] in [place].    This drug is different from the one I mentioned on the phone but it appears to be covered by your insurance.  Potential side effects include nausea, vomiting, and abdominal pain.  If you experience this with the first dose, it is likely the symptoms will be reduced on the second dose.  Occasionally people note that they get a nodule at the site of injection.  Please let me know if that is a problem for you.    Don't forget to see you eye doctor to make sure the diabetes has not affected your eyes.

## 2020-09-29 NOTE — LETTER
"9/29/2020       RE: Karen Braxton  [address] [place] [address]     Dear Colleague,    Thank you for referring your patient, Karen Braxton, to the [facility] ENDOCRINOLOGY at [facility]. Please see a copy of my visit note below.    Karen Braxton is a 50 year old female who is being evaluated via a billable telephone visit.      The patient has been notified of following:     \"This telephone visit will be conducted via a call between you and your physician/provider. We have found that certain health care needs can be provided without the need for a physical exam.  This service lets us provide the care you need with a short phone conversation.  If a prescription is necessary we can send it directly to your pharmacy.  If lab work is needed we can place an order for that and you can then stop by our lab to have the test done at a later time.    Telephone visits are billed at different rates depending on your insurance coverage. During this emergency period, for some insurers they may be billed the same as an in-person visit.  Please reach out to your insurance provider with any questions.    If during the course of the call the physician/provider feels a telephone visit is not appropriate, you will not be charged for this service.\"    Patient has given verbal consent for Telephone visit?  Yes    What phone number would you like to be contacted at? [phone]    How would you like to obtain your AVS? Mail a copy    Phone call duration: 18 minutes        This 50 year old woman with type 2 diabetes since ~2002 was called for f/u.She was seen without an  today.  She doesn't think she needs one.  She saw Dr. Engel for PC until his recent [facility].  She now sees . Her diabetes is co managed by me with Micaela Juarez.      She now is taking  basaglar 50 units at hs,  Metformin 500 bid  and humalog 30 units at breakfast and 35 units with  lunch and 35 with  her " "evening meal.  When I last saw her more than a year ago she was also on Invokana.  This drug was stopped it was no longer covered by her insurance.  She was also taking Actos and apparently this drug was stopped because of insurance reasons.  Since she stopped these drugs, her blood sugars have gone up.      She eats breakfast at home in the morning and always gets her insulin before that.  This is usually her largest meal of the day.  She will have a cup of milk at about 1 PM.  She does take her insulin before she drinks this.  She will have a small amount of pasta again with some milk before dinner.  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  She wears a Derceto CGM and finds it is very helpful. .Over the last 2 weeks she wore it 93% of time.  56% of values are above 250, 31% are between 180-250, 13% are  and 0 % are below 70.   Her average glucose was 263. .  Her calculated A1c is 9.6% she believes that her hyperglycemia is causing fatigue.  She is interested in taking more medication to bring her sugars down.  She saw her primary care doctor late last week.  She has no new concerns to discuss with me today.    Current Outpatient Medications   Medication     aspirin (ASA) 81 MG EC tablet     atorvastatin (LIPITOR) 40 MG tablet     exenatide ER (BYDUREON) 2 MG pen     insulin glargine (BASAGLAR KWIKPEN) 100 UNIT/ML pen     insulin pen needle (B-D U/F) 31G X 8 MM miscellaneous     insulin syringe-needle U-100 (BD INSULIN SYRINGE ULTRAFINE) 30G X 1/2\" 1 ML     lisinopril (PRINIVIL/ZESTRIL) 2.5 MG tablet     metFORMIN (GLUCOPHAGE) 1000 MG tablet     NOVOLOG FLEXPEN 100 UNIT/ML soln     vitamin D2 (ERGOCALCIFEROL) 76431 units (1250 mcg) capsule     vitamin D2 (ERGOCALCIFEROL) 98145 units (1250 mcg) capsule     vitamin D3 (CHOLECALCIFEROL) 50 mcg (2000 units) tablet     No current facility-administered medications for this " visit.    On exam she is in no acute distress.  Her mood is neutral.  I could not understand everything she said and I do not believe she understood everything I said but with clarification I think the plan of the visit was understood    RESULTS  Lab Results   Component Value Date    A1C 9.1 (H) 09/23/2020    A1C 8.6 (H) 11/11/2019    A1C 7.7 (H) 06/18/2019    A1C 8.9 (H) 09/17/2018    A1C 8.4 (H) 07/09/2018    HEMOGLOBINA1 8.4 (A) 09/24/2019    HEMOGLOBINA1 9.1 (A) 04/01/2019    HEMOGLOBINA1 8.8 (A) 12/18/2018    HEMOGLOBINA1 8.6 (A) 06/05/2018    HEMOGLOBINA1 8.5 (A) 03/08/2018       TSH   Date Value Ref Range Status   03/16/2018 2.12 0.40 - 4.00 mU/L Final   03/21/2016 1.31 0.40 - 5.00 mU/L Final   03/18/2014 1.55 0.4 - 5.0 mU/L Final   07/24/2013 1.95 0.4 - 5.0 mU/L Final   08/04/2011 1.48 0.4 - 5.0 mU/L Final     T4 Free   Date Value Ref Range Status   08/04/2011 1.22 0.70 - 1.85 ng/dL Final       ALT   Date Value Ref Range Status   09/23/2020 22 0 - 50 U/L Final   11/11/2019 19 0 - 50 U/L Final   ]    Recent Labs   Lab Test 09/23/20  1359 06/18/19  1319  12/01/14  1222 07/30/14  0829   CHOL 214* 250*   < > 215* 237*   HDL 47* 54   < > 36* 39*   * 152*   < > 114 146*   TRIG 160* 220*   < > 323* 261*   CHOLHDLRATIO  --   --   --  6.0* 6.1*    < > = values in this interval not displayed.       Lab Results   Component Value Date     09/23/2020      Lab Results   Component Value Date    POTASSIUM 4.0 09/23/2020     Lab Results   Component Value Date    CHLORIDE 105 09/23/2020     Lab Results   Component Value Date    MARLEEN 9.0 09/23/2020     Lab Results   Component Value Date    CO2 25 09/23/2020     Lab Results   Component Value Date    BUN 15 09/23/2020     Lab Results   Component Value Date    CR 0.82 09/23/2020       GFR Estimate   Date Value Ref Range Status   09/23/2020 82 >60 mL/min/[1.73_m2] Final     Comment:     Non  GFR Calc  Starting 12/18/2018, serum creatinine based  estimated GFR (eGFR) will be   calculated using the Chronic Kidney Disease Epidemiology Collaboration   (CKD-EPI) equation.     11/11/2019 >90 >60 mL/min/[1.73_m2] Final     Comment:     Non  GFR Calc  Starting 12/18/2018, serum creatinine based estimated GFR (eGFR) will be   calculated using the Chronic Kidney Disease Epidemiology Collaboration   (CKD-EPI) equation.     06/18/2019 >90 >60 mL/min/[1.73_m2] Final     Comment:     Non  GFR Calc  Starting 12/18/2018, serum creatinine based estimated GFR (eGFR) will be   calculated using the Chronic Kidney Disease Epidemiology Collaboration   (CKD-EPI) equation.       GFR Estimate If Black   Date Value Ref Range Status   09/23/2020 >90 >60 mL/min/[1.73_m2] Final     Comment:      GFR Calc  Starting 12/18/2018, serum creatinine based estimated GFR (eGFR) will be   calculated using the Chronic Kidney Disease Epidemiology Collaboration   (CKD-EPI) equation.     11/11/2019 >90 >60 mL/min/[1.73_m2] Final     Comment:      GFR Calc  Starting 12/18/2018, serum creatinine based estimated GFR (eGFR) will be   calculated using the Chronic Kidney Disease Epidemiology Collaboration   (CKD-EPI) equation.     06/18/2019 >90 >60 mL/min/[1.73_m2] Final     Comment:      GFR Calc  Starting 12/18/2018, serum creatinine based estimated GFR (eGFR) will be   calculated using the Chronic Kidney Disease Epidemiology Collaboration   (CKD-EPI) equation.         Lab Results   Component Value Date    MICROL <5 11/11/2019     No results found for: MICROALBUMIN  No results found for: CPEPT, GADAB, ISCAB    Vitamin B12   Date Value Ref Range Status   06/18/2019 341 193 - 986 pg/mL Final   ]    Most recent eye exam date: : Not Found     Assessment and plan:    1.  Diabetes control.  Based on her shauna data and her recent A1c, her diabetes control has once again worsened.  This corresponds with the discontinuation of the  Actos and Invokana.  I recommended that we start a GLP-1 analog today.  It does not appear she is ever been on one.  I would like to prescribe her a once a week drug for compliance reasons.  The only one that appears to be covered by her plan is by bydureon.  I warned her of side effects of nausea and vomiting as well as development of subcutaneous nodules.  I will have her back to see Micaela Juarez in 1 to 2 months to see how she is doing with this new medication.  We may want to consider adding back the Invokana if her sugars are not improved.  Alternatively, we we could try increasing the insulin but I think this would be less effective.    2.  Diabetes complications.  She has normal renal function and no symptoms of neuropathy.  She has not seen her eye doctor in some time.  I reminded her to see them.    3.CVD risk.  Her primary care doctor reminded her to take the statin that has been prescribed for her.  Recent blood pressure has been in target.    Follow-up with Micaela Juarez in 2 months and me in 4-6.    Melisa Phillips MD

## 2020-10-10 DIAGNOSIS — E11.65 TYPE 2 DIABETES MELLITUS WITH HYPERGLYCEMIA, WITHOUT LONG-TERM CURRENT USE OF INSULIN (H): Primary | ICD-10-CM

## 2020-10-12 NOTE — TELEPHONE ENCOUNTER
metFORMIN (GLUCOPHAGE) 1000 MG tablet TAKE ONE-HALF TABLET BY MOUTH TWICE DAILY WITH MEALS  Last Written Prescription Date:  3/31/20  Last Fill Quantity: 90,   # refills: 1  Last Office Visit : 9/29/20  Future Office visit:  11/3/20       LVD 9/29/20 [facility]. Passes refill protocol 90 day/ 1 Rf sent to pharmacy

## 2020-10-12 NOTE — PROGRESS NOTES
"Karen Braxton is a 50 year old female who is being evaluated via a billable telephone visit.      The patient has been notified of following:     \"This telephone visit will be conducted via a call between you and your physician/provider. We have found that certain health care needs can be provided without the need for a physical exam.  This service lets us provide the care you need with a short phone conversation.  If a prescription is necessary we can send it directly to your pharmacy.  If lab work is needed we can place an order for that and you can then stop by our lab to have the test done at a later time.    Telephone visits are billed at different rates depending on your insurance coverage. During this emergency period, for some insurers they may be billed the same as an in-person visit.  Please reach out to your insurance provider with any questions.    If during the course of the call the physician/provider feels a telephone visit is not appropriate, you will not be charged for this service.\"    Patient has given verbal consent for Telephone visit?  Yes    What phone number would you like to be contacted at? [phone]    How would you like to obtain your AVS? Mail a copy    Aysha Pierre MA    Outcome for 10/12/20 2:12 PM :Reached patient but they declined to share any data because she doesn't want to discuss blood sugars. Just needs letter for work. Has diabetes f/u with Micaela Juarez 11/3.      Pt has scheduled virtual visit with me on 11/3/2020.  She wanted a letter for her employer today. Letter done today.  No charge today.  Cecile Juarez PA-C     "

## 2020-10-13 ENCOUNTER — VIRTUAL VISIT (OUTPATIENT)
Dept: ENDOCRINOLOGY | Facility: CLINIC | Age: 50
End: 2020-10-13
Payer: COMMERCIAL

## 2020-10-13 DIAGNOSIS — E11.65 TYPE 2 DIABETES MELLITUS WITH HYPERGLYCEMIA, WITHOUT LONG-TERM CURRENT USE OF INSULIN (H): Primary | ICD-10-CM

## 2020-10-13 PROCEDURE — 99207 PR NO BILLABLE SERVICE THIS VISIT: CPT | Mod: TEL | Performed by: PHYSICIAN ASSISTANT

## 2020-10-13 NOTE — LETTER
"10/13/2020       RE: Karen Braxton  [address] [place] [address]     Dear Colleague,    Thank you for referring your patient, Karen Braxton, to the [facility] ENDOCRINOLOGY CLINIC [place] at [facility]. Please see a copy of my visit note below.    Karen Braxton is a 50 year old female who is being evaluated via a billable telephone visit.      The patient has been notified of following:     \"This telephone visit will be conducted via a call between you and your physician/provider. We have found that certain health care needs can be provided without the need for a physical exam.  This service lets us provide the care you need with a short phone conversation.  If a prescription is necessary we can send it directly to your pharmacy.  If lab work is needed we can place an order for that and you can then stop by our lab to have the test done at a later time.    Telephone visits are billed at different rates depending on your insurance coverage. During this emergency period, for some insurers they may be billed the same as an in-person visit.  Please reach out to your insurance provider with any questions.    If during the course of the call the physician/provider feels a telephone visit is not appropriate, you will not be charged for this service.\"    Patient has given verbal consent for Telephone visit?  Yes    What phone number would you like to be contacted at? [phone]    How would you like to obtain your AVS? Mail a copy    Aysha Pierre MA    Outcome for 10/12/20 2:12 PM :Reached patient but they declined to share any data because she doesn't want to discuss blood sugars. Just needs letter for work. Has diabetes f/u with Micaela Juarez 11/3.      Pt has scheduled virtual visit with me on 11/3/2020.  She wanted a letter for her employer today. Letter done today.  No charge today.  Cecile Juarez PA-C         Again, thank you for allowing me to participate in " the care of your patient.      Sincerely,    Cecile Juarez PA-C

## 2020-10-14 ENCOUNTER — TELEPHONE (OUTPATIENT)
Dept: ENDOCRINOLOGY | Facility: CLINIC | Age: 50
End: 2020-10-14

## 2020-10-14 DIAGNOSIS — E11.9 TYPE 2 DIABETES MELLITUS WITHOUT COMPLICATION, WITH LONG-TERM CURRENT USE OF INSULIN (H): Primary | ICD-10-CM

## 2020-10-14 DIAGNOSIS — Z79.4 TYPE 2 DIABETES MELLITUS WITHOUT COMPLICATION, WITH LONG-TERM CURRENT USE OF INSULIN (H): Primary | ICD-10-CM

## 2020-10-14 RX ORDER — FLASH GLUCOSE SENSOR
KIT MISCELLANEOUS
Qty: 2 EACH | Refills: 11 | Status: CANCELLED | OUTPATIENT
Start: 2020-10-14

## 2020-10-14 NOTE — TELEPHONE ENCOUNTER
" [facility]    Phone Message    May a detailed message be left on voicemail: yes     Reason for Call: Other: Pt stated that she only has five days left of her \"diabetes freestyle\", and she needs a refill ASAP because she checks her blood sugars four times a day. Writer verified with Pt that it was a \"freestyle\" she was needing, and she confirmed. Writer is unclear about Pt's requesting. She stated she wants it sent to: [id number] - [place], [place] - [address] AT [facility] [address] Please review, and send refill if appropriate or follow up with Pt to discuss if needed.     Action Taken: Message routed to:  [facility] ([facility] endo    Travel Screening: Not Applicable                                                                        "

## 2020-10-15 DIAGNOSIS — Z79.4 TYPE 2 DIABETES MELLITUS WITHOUT COMPLICATION, WITH LONG-TERM CURRENT USE OF INSULIN (H): Primary | ICD-10-CM

## 2020-10-15 DIAGNOSIS — E11.9 TYPE 2 DIABETES MELLITUS WITHOUT COMPLICATION, WITH LONG-TERM CURRENT USE OF INSULIN (H): Primary | ICD-10-CM

## 2020-10-16 RX ORDER — FLASH GLUCOSE SENSOR
KIT MISCELLANEOUS
Qty: 6 EACH | Refills: 3 | Status: SHIPPED | OUTPATIENT
Start: 2020-10-16 | End: 2021-07-13

## 2020-10-28 ENCOUNTER — ALLIED HEALTH/NURSE VISIT (OUTPATIENT)
Dept: ENDOCRINOLOGY | Facility: CLINIC | Age: 50
End: 2020-10-28
Payer: COMMERCIAL

## 2020-10-28 ENCOUNTER — DOCUMENTATION ONLY (OUTPATIENT)
Dept: CARE COORDINATION | Facility: CLINIC | Age: 50
End: 2020-10-28

## 2020-10-28 DIAGNOSIS — E11.9 TYPE 2 DIABETES MELLITUS (H): Primary | ICD-10-CM

## 2020-10-28 PROCEDURE — 99207 PR NO BILLABLE SERVICE THIS VISIT: CPT

## 2020-11-02 NOTE — PROGRESS NOTES
"Karen Braxton is a 50 year old female who is being evaluated via a billable telephone visit.      The patient has been notified of following:     \"This telephone visit will be conducted via a call between you and your physician/provider. We have found that certain health care needs can be provided without the need for a physical exam.  This service lets us provide the care you need with a short phone conversation.  If a prescription is necessary we can send it directly to your pharmacy.  If lab work is needed we can place an order for that and you can then stop by our lab to have the test done at a later time.    Telephone visits are billed at different rates depending on your insurance coverage. During this emergency period, for some insurers they may be billed the same as an in-person visit.  Please reach out to your insurance provider with any questions.    If during the course of the call the physician/provider feels a telephone visit is not appropriate, you will not be charged for this service.\"    Patient has given verbal consent for Telephone visit?  Yes    What phone number would you like to be contacted at? [phone]    How would you like to obtain your AVS? Mail a copy    Aysha Pierre MA    Outcome for 11/02/20 3:01 PM :Glucose sent via Email      Due to the COVID 19 pandemic this visit was converted to a telephone visit in order to help prevent spread of infection in this patient and the general population.    Time of start: 10:00 am  Time of end: 10:19 am  Total duration of telephone visit: 19 minutes.    HPI:  Irais Jones is a 50 year old female with type 2 diabetes mellitus.  Telephone visit today for diabetes follow up.  She was dx having type 2 diabetes around 2002.  No known retinopathy, nephropathy or neuropathy.  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  Bydureon 2 mg subcutaneous once a week was added last visit and her " blood sugar control has improved and she is feeling better overall.  For her diabetes, she is currently taking Basaglar 50 units SQ at hs, Novolog 30 units with breakfast, 35 units with lunch and 35 units with dinner,Bydureon 2 mg subcutaneous once a week  and Metformin 500 mg BID.   Her insurance will no longer cover Invokana.  Her A1C was 9.1 % in 9/2020 and previous A1C was 8.6 %.  I reviewed her Freestyle Dodie sensor download data today with her during our visit.  Her average glucose was 181 with SD 35 and A1C indicator was 7.6 %.  No frequent hypoglycemia.  She continues to work full time and is active.  On ROS today,  less right shoulder/arm pain.  She reports feeling better and more energy.  Pt denies headaches, blurred vision, n/v, SOB at rest, cough, fever or chills.  No chest pain, abd pain, diarrhea, dysuria or hematuria.  She denies numbness in her feet or hands.  Pt also denies foot ulcers or sores at this time.    DIABETES CARE:  Retinopathy: none; seen by Oph in 8/2019.  Nephropathy: none; urine microalbuminuria was negative in 11/2019.  Neuropathy: none.  Foot exam: no exam today.  Lipids:  in 9/2020. Not fasting. She is taking Lipitor.  Taking ASA:yes.  CAD:no.  Mental health:denies.  Insulin: Basal and meal time insulin. DM meds: Bydureon and Metformin.  Testing: Freestyle Dodie sensor.    ROS:   Please see under HPI.    ALLERGIES:   No Known Allergies      Current Outpatient Medications   Medication Sig Dispense Refill     aspirin (ASA) 81 MG EC tablet Take 1 tablet (81 mg) by mouth daily 90 tablet 3     atorvastatin (LIPITOR) 40 MG tablet Take 1 tablet (40 mg) by mouth daily 90 tablet 3     blood glucose (NO BRAND SPECIFIED) test strip Use to test blood sugar 4 times daily and as needed. Any covered brand that meets Pt need. 400 strip 4     blood glucose monitoring (NO BRAND SPECIFIED) meter device kit Use to test blood sugar 4 times daily and as needed. Any covered brand. 1 kit 1      "continuous blood glucose monitoring (FREESTYLE RAHEEM) sensor For use with Freestyle Raheem  for continuous monitioring of blood glucose levels.test 4 times daily  Replace sensor every 14 days. 6 each 3     exenatide ER (BYDUREON) 2 MG pen Inject 2 mg Subcutaneous every 7 days 4 each 11     insulin glargine (BASAGLAR KWIKPEN) 100 UNIT/ML pen ADMINISTER 54 UNITS UNDER THE SKIN AT BEDTIME 18 mL 3     insulin pen needle (B-D U/F) 31G X 8 MM miscellaneous TEST FOUR TIMES A DAY OR AS DIRECTED 400 each 1     insulin syringe-needle U-100 (BD INSULIN SYRINGE ULTRAFINE) 30G X 1/2\" 1 ML Use one syringe daily or as directed.  3 month supply 100 each prn     lisinopril (PRINIVIL/ZESTRIL) 2.5 MG tablet Take 1 tablet (2.5 mg) by mouth daily 30 tablet 0     metFORMIN (GLUCOPHAGE) 1000 MG tablet TAKE ONE-HALF TABLET BY MOUTH TWICE DAILY WITH MEALS 90 tablet 1     NOVOLOG FLEXPEN 100 UNIT/ML soln 32 units before breakfast, 37 units before lunch, 37 units before dinner 90 mL 3     vitamin D2 (ERGOCALCIFEROL) 78733 units (1250 mcg) capsule Take 1 capsule (50,000 Units) by mouth once a week 12 capsule 3     vitamin D2 (ERGOCALCIFEROL) 06924 units (1250 mcg) capsule Take 1 capsule (50,000 Units) by mouth every 7 days 8 capsule 6     vitamin D3 (CHOLECALCIFEROL) 50 mcg (2000 units) tablet Take 1 tablet (50 mcg) by mouth daily 90 tablet 3     SHX:  Smoke: yes.  ETOH: none.   with 5 children.    FHX:  Several family members with diabetes.    PMHX:   1.  Type 2 DM.  2.  Hyperlipidemia.  3.  Amenorrhea.  4.  GERD.  5.  S/P choley.  6.  Sebaceous cyst.  Past Medical History:   Diagnosis Date     Type 2 diabetes mellitus (H)      Past Surgical History:   Procedure Laterality Date     KNEE SURGERY Left 2001     LAPAROSCOPIC CHOLECYSTECTOMY  2007     REPAIR CLEFT PALATE CHILD  1972       EXAM:    No exam today.    RESULTS:    Creatinine   Date Value Ref Range Status   09/23/2020 0.82 0.52 - 1.04 mg/dL Final     GFR Estimate   Date " Value Ref Range Status   09/23/2020 82 >60 mL/min/[1.73_m2] Final     Comment:     Non  GFR Calc  Starting 12/18/2018, serum creatinine based estimated GFR (eGFR) will be   calculated using the Chronic Kidney Disease Epidemiology Collaboration   (CKD-EPI) equation.       Hemoglobin A1C   Date Value Ref Range Status   09/23/2020 9.1 (H) 0 - 5.6 % Final     Comment:     Results confirmed by repeat test  Normal <5.7% Prediabetes 5.7-6.4%  Diabetes 6.5% or higher - adopted from ADA   consensus guidelines.       Potassium   Date Value Ref Range Status   09/23/2020 4.0 3.4 - 5.3 mmol/L Final     ALT   Date Value Ref Range Status   09/23/2020 22 0 - 50 U/L Final     AST   Date Value Ref Range Status   09/23/2020 12 0 - 45 U/L Final     TSH   Date Value Ref Range Status   03/16/2018 2.12 0.40 - 4.00 mU/L Final     T4 Free   Date Value Ref Range Status   08/04/2011 1.22 0.70 - 1.85 ng/dL Final         Cholesterol   Date Value Ref Range Status   09/23/2020 214 (H) <200 mg/dL Final     Comment:     Desirable:       <200 mg/dl   06/18/2019 250 (H) <200 mg/dL Final     Comment:     Desirable:       <200 mg/dl     HDL Cholesterol   Date Value Ref Range Status   09/23/2020 47 (L) >49 mg/dL Final   06/18/2019 54 >49 mg/dL Final     LDL Cholesterol Calculated   Date Value Ref Range Status   09/23/2020 135 (H) <100 mg/dL Final     Comment:     Above desirable:  100-129 mg/dl  Borderline High:  130-159 mg/dL  High:             160-189 mg/dL  Very high:       >189 mg/dl     06/18/2019 152 (H) <100 mg/dL Final     Comment:     Above desirable:  100-129 mg/dl  Borderline High:  130-159 mg/dL  High:             160-189 mg/dL  Very high:       >189 mg/dl       Triglycerides   Date Value Ref Range Status   09/23/2020 160 (H) <150 mg/dL Final     Comment:     Borderline high:  150-199 mg/dl  High:             200-499 mg/dl  Very high:       >499 mg/dl     06/18/2019 220 (H) <150 mg/dL Final     Comment:     Borderline high:   150-199 mg/dl  High:             200-499 mg/dl  Very high:       >499 mg/dl       Cholesterol/HDL Ratio   Date Value Ref Range Status   12/01/2014 6.0 (H) 0.0 - 5.0 Final   07/30/2014 6.1 (H) 0.0 - 5.0 Final     A1C      9.1   9/23/2020   A1C      8.6   11/11/2019  A1C      8.4   9/24/2019    ASSESSMENT/PLAN:    1. TYPE 2 DIABETES MELLITUS: Uncontrolled type 2 diabetes mellitus.  Karen's blood sugar values have improved since Bydureon 2 mg subcutaneous once a week was added last visit.  Her Freestyle Dodie sensor shows an estimated A1C of 7.6 % at this time.  She is to continue to use her Freestyle Dodie device/sensor to check her blood sugar fasting each am, prelunch, predinner and at bedtime daily.  Pt is to remain on Basaglar 50 units subcutaneous at bedtime, Novolog 30 units with breakfast and 35 units with lunch and dinner, Metformin 500 mg BID and Bydureon 2 mg subcutaneous once a week.  She was seen by an outside [facility] in Aug 2019.  Pt's urine microalbuminuria was negative in Nov 2019. Most recent creat 0.82 with GFR > 90 mL/min in 9/2020.  She denies symptoms of neuropathy or foot ulcers at this time.  She is taking ASA 81 mg daily.  Pt states she had her flu vaccine this season.    2.  HYPERLIPIDEMIA:   in 9/2020. Not sure this was done fasting. Pt is taking Lipitor daily.  Will recheck in 3 months.    3. FOLLOW UP: with me in Dec 2020.  A1C ordered.

## 2020-11-03 ENCOUNTER — VIRTUAL VISIT (OUTPATIENT)
Dept: ENDOCRINOLOGY | Facility: CLINIC | Age: 50
End: 2020-11-03
Payer: COMMERCIAL

## 2020-11-03 DIAGNOSIS — E11.65 TYPE 2 DIABETES MELLITUS WITH HYPERGLYCEMIA, WITHOUT LONG-TERM CURRENT USE OF INSULIN (H): Primary | ICD-10-CM

## 2020-11-03 PROCEDURE — 99213 OFFICE O/P EST LOW 20 MIN: CPT | Mod: 95 | Performed by: PHYSICIAN ASSISTANT

## 2020-11-03 NOTE — LETTER
"11/3/2020       RE: Karen Braxton  [address] [place] [address]     Dear Colleague,    Thank you for referring your patient, Karen Braxton, to the [facility] [place] at [facility]. Please see a copy of my visit note below.    Karen Braxton is a 50 year old female who is being evaluated via a billable telephone visit.      The patient has been notified of following:     \"This telephone visit will be conducted via a call between you and your physician/provider. We have found that certain health care needs can be provided without the need for a physical exam.  This service lets us provide the care you need with a short phone conversation.  If a prescription is necessary we can send it directly to your pharmacy.  If lab work is needed we can place an order for that and you can then stop by our lab to have the test done at a later time.    Telephone visits are billed at different rates depending on your insurance coverage. During this emergency period, for some insurers they may be billed the same as an in-person visit.  Please reach out to your insurance provider with any questions.    If during the course of the call the physician/provider feels a telephone visit is not appropriate, you will not be charged for this service.\"    Patient has given verbal consent for Telephone visit?  Yes    What phone number would you like to be contacted at? [phone]    How would you like to obtain your AVS? Mail a copy    Aysha Pierre MA    Outcome for 11/02/20 3:01 PM :Glucose sent via Email      Due to the COVID 19 pandemic this visit was converted to a telephone visit in order to help prevent spread of infection in this patient and the general population.    Time of start: 10:00 am  Time of end: 10:19 am  Total duration of telephone visit: 19 minutes.    HPI:  Irais Jones is a 50 year old female with type 2 diabetes mellitus.  Telephone visit " today for diabetes follow up.  She was dx having type 2 diabetes around 2002.  No known retinopathy, nephropathy or neuropathy.  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  Bydureon 2 mg subcutaneous once a week was added last visit and her blood sugar control has improved and she is feeling better overall.  For her diabetes, she is currently taking Basaglar 50 units SQ at hs, Novolog 30 units with breakfast, 35 units with lunch and 35 units with dinner,Bydureon 2 mg subcutaneous once a week  and Metformin 500 mg BID.   Her insurance will no longer cover Invokana.  Her A1C was 9.1 % in 9/2020 and previous A1C was 8.6 %.  I reviewed her Freestyle Dodie sensor download data today with her during our visit.  Her average glucose was 181 with SD 35 and A1C indicator was 7.6 %.  No frequent hypoglycemia.  She continues to work full time and is active.  On ROS today,  less right shoulder/arm pain.  She reports feeling better and more energy.  Pt denies headaches, blurred vision, n/v, SOB at rest, cough, fever or chills.  No chest pain, abd pain, diarrhea, dysuria or hematuria.  She denies numbness in her feet or hands.  Pt also denies foot ulcers or sores at this time.    DIABETES CARE:  Retinopathy: none; seen by Oph in 8/2019.  Nephropathy: none; urine microalbuminuria was negative in 11/2019.  Neuropathy: none.  Foot exam: no exam today.  Lipids:  in 9/2020. Not fasting. She is taking Lipitor.  Taking ASA:yes.  CAD:no.  Mental health:denies.  Insulin: Basal and meal time insulin. DM meds: Bydureon and Metformin.  Testing: Freestyle Dodie sensor.    ROS:   Please see under HPI.    ALLERGIES:   No Known Allergies      Current Outpatient Medications   Medication Sig Dispense Refill     aspirin (ASA) 81 MG EC tablet Take 1 tablet (81 mg) by mouth daily 90 tablet 3     atorvastatin (LIPITOR) 40 MG tablet Take 1 tablet (40 mg) by mouth daily 90  "tablet 3     blood glucose (NO BRAND SPECIFIED) test strip Use to test blood sugar 4 times daily and as needed. Any covered brand that meets Pt need. 400 strip 4     blood glucose monitoring (NO BRAND SPECIFIED) meter device kit Use to test blood sugar 4 times daily and as needed. Any covered brand. 1 kit 1     continuous blood glucose monitoring (FREESTYLE RAHEEM) sensor For use with Freestyle Raheem  for continuous monitioring of blood glucose levels.test 4 times daily  Replace sensor every 14 days. 6 each 3     exenatide ER (BYDUREON) 2 MG pen Inject 2 mg Subcutaneous every 7 days 4 each 11     insulin glargine (BASAGLAR KWIKPEN) 100 UNIT/ML pen ADMINISTER 54 UNITS UNDER THE SKIN AT BEDTIME 18 mL 3     insulin pen needle (B-D U/F) 31G X 8 MM miscellaneous TEST FOUR TIMES A DAY OR AS DIRECTED 400 each 1     insulin syringe-needle U-100 (BD INSULIN SYRINGE ULTRAFINE) 30G X 1/2\" 1 ML Use one syringe daily or as directed.  3 month supply 100 each prn     lisinopril (PRINIVIL/ZESTRIL) 2.5 MG tablet Take 1 tablet (2.5 mg) by mouth daily 30 tablet 0     metFORMIN (GLUCOPHAGE) 1000 MG tablet TAKE ONE-HALF TABLET BY MOUTH TWICE DAILY WITH MEALS 90 tablet 1     NOVOLOG FLEXPEN 100 UNIT/ML soln 32 units before breakfast, 37 units before lunch, 37 units before dinner 90 mL 3     vitamin D2 (ERGOCALCIFEROL) 91379 units (1250 mcg) capsule Take 1 capsule (50,000 Units) by mouth once a week 12 capsule 3     vitamin D2 (ERGOCALCIFEROL) 46128 units (1250 mcg) capsule Take 1 capsule (50,000 Units) by mouth every 7 days 8 capsule 6     vitamin D3 (CHOLECALCIFEROL) 50 mcg (2000 units) tablet Take 1 tablet (50 mcg) by mouth daily 90 tablet 3     SHX:  Smoke: yes.  ETOH: none.   with 5 children.    FHX:  Several family members with diabetes.    PMHX:   1.  Type 2 DM.  2.  Hyperlipidemia.  3.  Amenorrhea.  4.  GERD.  5.  S/P choley.  6.  Sebaceous cyst.  Past Medical History:   Diagnosis Date     Type 2 diabetes mellitus " (H)      Past Surgical History:   Procedure Laterality Date     KNEE SURGERY Left 2001     LAPAROSCOPIC CHOLECYSTECTOMY  2007     REPAIR CLEFT PALATE CHILD  1972       EXAM:    No exam today.    RESULTS:    Creatinine   Date Value Ref Range Status   09/23/2020 0.82 0.52 - 1.04 mg/dL Final     GFR Estimate   Date Value Ref Range Status   09/23/2020 82 >60 mL/min/[1.73_m2] Final     Comment:     Non  GFR Calc  Starting 12/18/2018, serum creatinine based estimated GFR (eGFR) will be   calculated using the Chronic Kidney Disease Epidemiology Collaboration   (CKD-EPI) equation.       Hemoglobin A1C   Date Value Ref Range Status   09/23/2020 9.1 (H) 0 - 5.6 % Final     Comment:     Results confirmed by repeat test  Normal <5.7% Prediabetes 5.7-6.4%  Diabetes 6.5% or higher - adopted from ADA   consensus guidelines.       Potassium   Date Value Ref Range Status   09/23/2020 4.0 3.4 - 5.3 mmol/L Final     ALT   Date Value Ref Range Status   09/23/2020 22 0 - 50 U/L Final     AST   Date Value Ref Range Status   09/23/2020 12 0 - 45 U/L Final     TSH   Date Value Ref Range Status   03/16/2018 2.12 0.40 - 4.00 mU/L Final     T4 Free   Date Value Ref Range Status   08/04/2011 1.22 0.70 - 1.85 ng/dL Final         Cholesterol   Date Value Ref Range Status   09/23/2020 214 (H) <200 mg/dL Final     Comment:     Desirable:       <200 mg/dl   06/18/2019 250 (H) <200 mg/dL Final     Comment:     Desirable:       <200 mg/dl     HDL Cholesterol   Date Value Ref Range Status   09/23/2020 47 (L) >49 mg/dL Final   06/18/2019 54 >49 mg/dL Final     LDL Cholesterol Calculated   Date Value Ref Range Status   09/23/2020 135 (H) <100 mg/dL Final     Comment:     Above desirable:  100-129 mg/dl  Borderline High:  130-159 mg/dL  High:             160-189 mg/dL  Very high:       >189 mg/dl     06/18/2019 152 (H) <100 mg/dL Final     Comment:     Above desirable:  100-129 mg/dl  Borderline High:  130-159 mg/dL  High:              160-189 mg/dL  Very high:       >189 mg/dl       Triglycerides   Date Value Ref Range Status   09/23/2020 160 (H) <150 mg/dL Final     Comment:     Borderline high:  150-199 mg/dl  High:             200-499 mg/dl  Very high:       >499 mg/dl     06/18/2019 220 (H) <150 mg/dL Final     Comment:     Borderline high:  150-199 mg/dl  High:             200-499 mg/dl  Very high:       >499 mg/dl       Cholesterol/HDL Ratio   Date Value Ref Range Status   12/01/2014 6.0 (H) 0.0 - 5.0 Final   07/30/2014 6.1 (H) 0.0 - 5.0 Final     A1C      9.1   9/23/2020   A1C      8.6   11/11/2019  A1C      8.4   9/24/2019    ASSESSMENT/PLAN:    1. TYPE 2 DIABETES MELLITUS: Uncontrolled type 2 diabetes mellitus.  Karen's blood sugar values have improved since Bydureon 2 mg subcutaneous once a week was added last visit.  Her Freestyle Dodie sensor shows an estimated A1C of 7.6 % at this time.  She is to continue to use her Freestyle Dodie device/sensor to check her blood sugar fasting each am, prelunch, predinner and at bedtime daily.  Pt is to remain on Basaglar 50 units subcutaneous at bedtime, Novolog 30 units with breakfast and 35 units with lunch and dinner, Metformin 500 mg BID and Bydureon 2 mg subcutaneous once a week.  She was seen by an outside Oph in Aug 2019.  Pt's urine microalbuminuria was negative in Nov 2019. Most recent creat 0.82 with GFR > 90 mL/min in 9/2020.  She denies symptoms of neuropathy or foot ulcers at this time.  She is taking ASA 81 mg daily.  Pt states she had her flu vaccine this season.    2.  HYPERLIPIDEMIA:   in 9/2020. Not sure this was done fasting. Pt is taking Lipitor daily.  Will recheck in 3 months.    3. FOLLOW UP: with me in Dec 2020.  A1C ordered.        Cecile Juarez PA-C

## 2020-11-05 DIAGNOSIS — E11.65 TYPE 2 DIABETES MELLITUS WITH HYPERGLYCEMIA (H): Primary | ICD-10-CM

## 2020-11-05 RX ORDER — INSULIN GLARGINE 100 [IU]/ML
INJECTION, SOLUTION SUBCUTANEOUS
Qty: 18 ML | Refills: 3 | COMMUNITY
Start: 2020-11-05 | End: 2021-03-01

## 2020-11-05 RX ORDER — INSULIN ASPART 100 [IU]/ML
INJECTION, SOLUTION INTRAVENOUS; SUBCUTANEOUS
Qty: 90 ML | Refills: 3 | COMMUNITY
Start: 2020-11-05 | End: 2021-08-22

## 2020-12-21 DIAGNOSIS — E11.65 TYPE 2 DIABETES MELLITUS WITH HYPERGLYCEMIA, WITHOUT LONG-TERM CURRENT USE OF INSULIN (H): ICD-10-CM

## 2020-12-21 LAB — HBA1C MFR BLD: 8.9 % (ref 0–5.6)

## 2020-12-21 PROCEDURE — 83036 HEMOGLOBIN GLYCOSYLATED A1C: CPT | Performed by: PATHOLOGY

## 2020-12-21 PROCEDURE — 36415 COLL VENOUS BLD VENIPUNCTURE: CPT | Performed by: PATHOLOGY

## 2020-12-21 NOTE — PROGRESS NOTES
"Karen Braxton is a 50 year old female who is being evaluated via a billable telephone visit.      The patient has been notified of following:     \"This telephone visit will be conducted via a call between you and your physician/provider. We have found that certain health care needs can be provided without the need for a physical exam.  This service lets us provide the care you need with a short phone conversation.  If a prescription is necessary we can send it directly to your pharmacy.  If lab work is needed we can place an order for that and you can then stop by our lab to have the test done at a later time.    Telephone visits are billed at different rates depending on your insurance coverage. During this emergency period, for some insurers they may be billed the same as an in-person visit.  Please reach out to your insurance provider with any questions.    If during the course of the call the physician/provider feels a telephone visit is not appropriate, you will not be charged for this service.\"    Patient has given verbal consent for Telephone visit?  Yes    What phone number would you like to be contacted at? [phone]    How would you like to obtain your AVS? Mail a copy    Aysha Pierre MA    Outcome for 12/21/20 3:21 PM :Glucose sent via Email      Due to the COVID 19 pandemic this visit was converted to a telephone visit in order to help prevent spread of infection in this patient and the general population.    Time of start: 1:00 pm  Time of end: 1:14 pm  Total duration of telephone visit: 14 minutes.    HPI:  Irais Jones is a 50 year old female with type 2 diabetes mellitus.  Telephone visit today for diabetes follow up.  She was dx having type 2 diabetes around 2002.  No known retinopathy, nephropathy or neuropathy.  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  For her diabetes, she is currently taking Basaglar 50 units SQ at hs, " Novolog 30 units with breakfast, 35 units with lunch and 35 units with dinner,Bydureon 2 mg subcutaneous once a week  and Metformin 500 mg BID.   Her insurance will no longer cover Invokana.  Her A1C was 8.9 % on 12/21/2020 and previous A1C was 9.1 % in 9/2020.  I reviewed her Freestyle Dodie sensor download data today with her during our visit.  Her average glucose was higher at 229 with SD 26 and estimated A1C was 8.8 %.   No frequent hypoglycemia.  She admits to missing some insulin doses.  Reminded her to take her Novolog 10-15 minutes prior to eating.  On ROS today,  less right shoulder/arm pain.  Pt denies headaches, blurred vision, n/v, SOB at rest, cough, fever or chills.  No chest pain, abd pain, diarrhea, dysuria or hematuria.  She denies numbness in her feet or hands.  Pt also denies foot ulcers or sores at this time.    DIABETES CARE:  Retinopathy: none; seen by Oph in 8/2019.  Nephropathy: none; urine microalbuminuria was negative in 11/2019.  Neuropathy: none.  Foot exam: no exam today.  Lipids:  in 9/2020. Not fasting. Reminded her to take Lipitor daily.  Taking ASA:yes.  CAD:no.  Mental health:denies.  Insulin: Basal and meal time insulin. DM meds: Bydureon and Metformin.  Testing: Freestyle Dodie sensor.    ROS:   Please see under HPI.    ALLERGIES:   No Known Allergies      Current Outpatient Medications   Medication Sig Dispense Refill     aspirin (ASA) 81 MG EC tablet Take 1 tablet (81 mg) by mouth daily 90 tablet 3     atorvastatin (LIPITOR) 40 MG tablet Take 1 tablet (40 mg) by mouth daily 90 tablet 3     blood glucose (NO BRAND SPECIFIED) test strip Use to test blood sugar 4 times daily and as needed. Any covered brand that meets Pt need. 400 strip 4     blood glucose monitoring (NO BRAND SPECIFIED) meter device kit Use to test blood sugar 4 times daily and as needed. Any covered brand. 1 kit 1     continuous blood glucose monitoring (FREESTYLE DODIE) sensor For use with Freestyle Dodie  " for continuous monitioring of blood glucose levels.test 4 times daily  Replace sensor every 14 days. 6 each 3     exenatide ER (BYDUREON) 2 MG pen Inject 2 mg Subcutaneous every 7 days 4 each 11     insulin glargine (BASAGLAR KWIKPEN) 100 UNIT/ML pen 50 units subcutaneous at hs. 18 mL 3     insulin pen needle (B-D U/F) 31G X 8 MM miscellaneous TEST FOUR TIMES A DAY OR AS DIRECTED 400 each 1     insulin syringe-needle U-100 (BD INSULIN SYRINGE ULTRAFINE) 30G X 1/2\" 1 ML Use one syringe daily or as directed.  3 month supply 100 each prn     lisinopril (PRINIVIL/ZESTRIL) 2.5 MG tablet Take 1 tablet (2.5 mg) by mouth daily 30 tablet 0     metFORMIN (GLUCOPHAGE) 1000 MG tablet TAKE ONE-HALF TABLET BY MOUTH TWICE DAILY WITH MEALS 90 tablet 1     NOVOLOG FLEXPEN 100 UNIT/ML soln 30 units before breakfast, 35 units before lunch, 35 units before dinner 90 mL 3     vitamin D2 (ERGOCALCIFEROL) 90864 units (1250 mcg) capsule Take 1 capsule (50,000 Units) by mouth once a week 12 capsule 3     vitamin D2 (ERGOCALCIFEROL) 61926 units (1250 mcg) capsule Take 1 capsule (50,000 Units) by mouth every 7 days 8 capsule 6     vitamin D3 (CHOLECALCIFEROL) 50 mcg (2000 units) tablet Take 1 tablet (50 mcg) by mouth daily 90 tablet 3     SHX:  Smoke: yes.  ETOH: none.   with 5 children.    FHX:  Several family members with diabetes.    PMHX:   1.  Type 2 DM.  2.  Hyperlipidemia.  3.  Amenorrhea.  4.  GERD.  5.  S/P choley.  6.  Sebaceous cyst.  Past Medical History:   Diagnosis Date     Type 2 diabetes mellitus (H)      Past Surgical History:   Procedure Laterality Date     KNEE SURGERY Left 2001     LAPAROSCOPIC CHOLECYSTECTOMY  2007     REPAIR CLEFT PALATE CHILD  1972       EXAM:    No exam today.    RESULTS:    Creatinine   Date Value Ref Range Status   09/23/2020 0.82 0.52 - 1.04 mg/dL Final     GFR Estimate   Date Value Ref Range Status   09/23/2020 82 >60 mL/min/[1.73_m2] Final     Comment:     Non  GFR " Calc  Starting 12/18/2018, serum creatinine based estimated GFR (eGFR) will be   calculated using the Chronic Kidney Disease Epidemiology Collaboration   (CKD-EPI) equation.       Hemoglobin A1C   Date Value Ref Range Status   12/21/2020 8.9 (H) 0 - 5.6 % Final     Comment:     Normal <5.7% Prediabetes 5.7-6.4%  Diabetes 6.5% or higher - adopted from ADA   consensus guidelines.       Potassium   Date Value Ref Range Status   09/23/2020 4.0 3.4 - 5.3 mmol/L Final     ALT   Date Value Ref Range Status   09/23/2020 22 0 - 50 U/L Final     AST   Date Value Ref Range Status   09/23/2020 12 0 - 45 U/L Final     TSH   Date Value Ref Range Status   03/16/2018 2.12 0.40 - 4.00 mU/L Final     T4 Free   Date Value Ref Range Status   08/04/2011 1.22 0.70 - 1.85 ng/dL Final         Cholesterol   Date Value Ref Range Status   09/23/2020 214 (H) <200 mg/dL Final     Comment:     Desirable:       <200 mg/dl   06/18/2019 250 (H) <200 mg/dL Final     Comment:     Desirable:       <200 mg/dl     HDL Cholesterol   Date Value Ref Range Status   09/23/2020 47 (L) >49 mg/dL Final   06/18/2019 54 >49 mg/dL Final     LDL Cholesterol Calculated   Date Value Ref Range Status   09/23/2020 135 (H) <100 mg/dL Final     Comment:     Above desirable:  100-129 mg/dl  Borderline High:  130-159 mg/dL  High:             160-189 mg/dL  Very high:       >189 mg/dl     06/18/2019 152 (H) <100 mg/dL Final     Comment:     Above desirable:  100-129 mg/dl  Borderline High:  130-159 mg/dL  High:             160-189 mg/dL  Very high:       >189 mg/dl       Triglycerides   Date Value Ref Range Status   09/23/2020 160 (H) <150 mg/dL Final     Comment:     Borderline high:  150-199 mg/dl  High:             200-499 mg/dl  Very high:       >499 mg/dl     06/18/2019 220 (H) <150 mg/dL Final     Comment:     Borderline high:  150-199 mg/dl  High:             200-499 mg/dl  Very high:       >499 mg/dl       Cholesterol/HDL Ratio   Date Value Ref Range Status    12/01/2014 6.0 (H) 0.0 - 5.0 Final   07/30/2014 6.1 (H) 0.0 - 5.0 Final     A1C      9.1   9/23/2020   A1C      8.6   11/11/2019  A1C      8.4   9/24/2019    ASSESSMENT/PLAN:    1. TYPE 2 DIABETES MELLITUS: Uncontrolled type 2 diabetes mellitus.  Reminded her to take her insulin daily as prescribed and to take Novolog 10-15 minutes before eating.  She is to continue to use her Freestyle Dodie device/sensor to check her blood sugar fasting each am, prelunch, predinner and at bedtime daily.  Pt is to remain on Basaglar 50 units subcutaneous at bedtime, Novolog 30 units with breakfast and 35 units with lunch and dinner, Metformin 500 mg BID and Bydureon 2 mg subcutaneous once a week.  She was seen by an outside Oph in Aug 2019.  Pt's urine microalbuminuria was negative in Nov 2019. Most recent creat 0.82 with GFR > 90 mL/min in 9/2020.  She denies symptoms of neuropathy or foot ulcers at this time.  She is taking ASA 81 mg daily.  Pt states she had her flu vaccine this season.    2.  HYPERLIPIDEMIA:   in 9/2020. Not sure this was done fasting. Pt is now taking Lipitor daily.    3. FOLLOW UP: with me in 8 weeks.

## 2020-12-21 NOTE — PATIENT INSTRUCTIONS
We appreciate your assistance in coordinating your healthcare.     Please upload your insulin pump, blood sugar meter and/or continuous glucose monitor at home 1-2 days before your next diabetes-related appointment.   This will allow your provider to review your  data before your scheduled virtual visit.    To ask a question to your Endocrine care team, please send them a [employer] message, or reach them by phone at [phone]     To expedite your medication refill(s), please contact your pharmacy and have them   fax a refill request to: [phone].  *Please allow 3 business days for routine medication refills.  *Please allow 5 business days for controlled substance medication refills.    For after-hours urgent Endocrine issues, that do not require 851, please dial [phone], and ask to speak with the Endocrinologist On-Call

## 2020-12-22 ENCOUNTER — VIRTUAL VISIT (OUTPATIENT)
Dept: ENDOCRINOLOGY | Facility: CLINIC | Age: 50
End: 2020-12-22
Payer: COMMERCIAL

## 2020-12-22 DIAGNOSIS — E11.65 TYPE 2 DIABETES MELLITUS WITH HYPERGLYCEMIA, WITHOUT LONG-TERM CURRENT USE OF INSULIN (H): Primary | ICD-10-CM

## 2020-12-22 PROCEDURE — 99213 OFFICE O/P EST LOW 20 MIN: CPT | Mod: 95 | Performed by: PHYSICIAN ASSISTANT

## 2020-12-22 NOTE — LETTER
"12/22/2020       RE: Karen Braxton  [address] [place] [address]     Dear Colleague,    Thank you for referring your patient, Karen Braxton, to the [facility] [place] at [facility]. Please see a copy of my visit note below.    Karen Braxton is a 50 year old female who is being evaluated via a billable telephone visit.      The patient has been notified of following:     \"This telephone visit will be conducted via a call between you and your physician/provider. We have found that certain health care needs can be provided without the need for a physical exam.  This service lets us provide the care you need with a short phone conversation.  If a prescription is necessary we can send it directly to your pharmacy.  If lab work is needed we can place an order for that and you can then stop by our lab to have the test done at a later time.    Telephone visits are billed at different rates depending on your insurance coverage. During this emergency period, for some insurers they may be billed the same as an in-person visit.  Please reach out to your insurance provider with any questions.    If during the course of the call the physician/provider feels a telephone visit is not appropriate, you will not be charged for this service.\"    Patient has given verbal consent for Telephone visit?  Yes    What phone number would you like to be contacted at? [phone]    How would you like to obtain your AVS? Mail a copy    Aysha Pierre MA    Outcome for 12/21/20 3:21 PM :Glucose sent via Email      Due to the COVID 19 pandemic this visit was converted to a telephone visit in order to help prevent spread of infection in this patient and the general population.    Time of start: 1:00 pm  Time of end: 1:14 pm  Total duration of telephone visit: 14 minutes.    HPI:  Irais Jones is a 50 year old female with type 2 diabetes mellitus.  Telephone visit today " for diabetes follow up.  She was dx having type 2 diabetes around 2002.  No known retinopathy, nephropathy or neuropathy.  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  For her diabetes, she is currently taking Basaglar 50 units SQ at hs, Novolog 30 units with breakfast, 35 units with lunch and 35 units with dinner,Bydureon 2 mg subcutaneous once a week  and Metformin 500 mg BID.   Her insurance will no longer cover Invokana.  Her A1C was 8.9 % on 12/21/2020 and previous A1C was 9.1 % in 9/2020.  I reviewed her Freestyle Dodie sensor download data today with her during our visit.  Her average glucose was higher at 229 with SD 26 and estimated A1C was 8.8 %.   No frequent hypoglycemia.  She admits to missing some insulin doses.  Reminded her to take her Novolog 10-15 minutes prior to eating.  On ROS today,  less right shoulder/arm pain.  Pt denies headaches, blurred vision, n/v, SOB at rest, cough, fever or chills.  No chest pain, abd pain, diarrhea, dysuria or hematuria.  She denies numbness in her feet or hands.  Pt also denies foot ulcers or sores at this time.    DIABETES CARE:  Retinopathy: none; seen by Oph in 8/2019.  Nephropathy: none; urine microalbuminuria was negative in 11/2019.  Neuropathy: none.  Foot exam: no exam today.  Lipids:  in 9/2020. Not fasting. Reminded her to take Lipitor daily.  Taking ASA:yes.  CAD:no.  Mental health:denies.  Insulin: Basal and meal time insulin. DM meds: Bydureon and Metformin.  Testing: Freestyle Dodie sensor.    ROS:   Please see under HPI.    ALLERGIES:   No Known Allergies      Current Outpatient Medications   Medication Sig Dispense Refill     aspirin (ASA) 81 MG EC tablet Take 1 tablet (81 mg) by mouth daily 90 tablet 3     atorvastatin (LIPITOR) 40 MG tablet Take 1 tablet (40 mg) by mouth daily 90 tablet 3     blood glucose (NO BRAND SPECIFIED) test strip Use to test blood sugar 4 times  "daily and as needed. Any covered brand that meets Pt need. 400 strip 4     blood glucose monitoring (NO BRAND SPECIFIED) meter device kit Use to test blood sugar 4 times daily and as needed. Any covered brand. 1 kit 1     continuous blood glucose monitoring (FREESTYLE RAHEEM) sensor For use with Freestyle Raheem  for continuous monitioring of blood glucose levels.test 4 times daily  Replace sensor every 14 days. 6 each 3     exenatide ER (BYDUREON) 2 MG pen Inject 2 mg Subcutaneous every 7 days 4 each 11     insulin glargine (BASAGLAR KWIKPEN) 100 UNIT/ML pen 50 units subcutaneous at hs. 18 mL 3     insulin pen needle (B-D U/F) 31G X 8 MM miscellaneous TEST FOUR TIMES A DAY OR AS DIRECTED 400 each 1     insulin syringe-needle U-100 (BD INSULIN SYRINGE ULTRAFINE) 30G X 1/2\" 1 ML Use one syringe daily or as directed.  3 month supply 100 each prn     lisinopril (PRINIVIL/ZESTRIL) 2.5 MG tablet Take 1 tablet (2.5 mg) by mouth daily 30 tablet 0     metFORMIN (GLUCOPHAGE) 1000 MG tablet TAKE ONE-HALF TABLET BY MOUTH TWICE DAILY WITH MEALS 90 tablet 1     NOVOLOG FLEXPEN 100 UNIT/ML soln 30 units before breakfast, 35 units before lunch, 35 units before dinner 90 mL 3     vitamin D2 (ERGOCALCIFEROL) 69335 units (1250 mcg) capsule Take 1 capsule (50,000 Units) by mouth once a week 12 capsule 3     vitamin D2 (ERGOCALCIFEROL) 44020 units (1250 mcg) capsule Take 1 capsule (50,000 Units) by mouth every 7 days 8 capsule 6     vitamin D3 (CHOLECALCIFEROL) 50 mcg (2000 units) tablet Take 1 tablet (50 mcg) by mouth daily 90 tablet 3     SHX:  Smoke: yes.  ETOH: none.   with 5 children.    FHX:  Several family members with diabetes.    PMHX:   1.  Type 2 DM.  2.  Hyperlipidemia.  3.  Amenorrhea.  4.  GERD.  5.  S/P choley.  6.  Sebaceous cyst.  Past Medical History:   Diagnosis Date     Type 2 diabetes mellitus (H)      Past Surgical History:   Procedure Laterality Date     KNEE SURGERY Left 2001     LAPAROSCOPIC " CHOLECYSTECTOMY  2007     REPAIR CLEFT PALATE CHILD  1972       EXAM:    No exam today.    RESULTS:    Creatinine   Date Value Ref Range Status   09/23/2020 0.82 0.52 - 1.04 mg/dL Final     GFR Estimate   Date Value Ref Range Status   09/23/2020 82 >60 mL/min/[1.73_m2] Final     Comment:     Non  GFR Calc  Starting 12/18/2018, serum creatinine based estimated GFR (eGFR) will be   calculated using the Chronic Kidney Disease Epidemiology Collaboration   (CKD-EPI) equation.       Hemoglobin A1C   Date Value Ref Range Status   12/21/2020 8.9 (H) 0 - 5.6 % Final     Comment:     Normal <5.7% Prediabetes 5.7-6.4%  Diabetes 6.5% or higher - adopted from ADA   consensus guidelines.       Potassium   Date Value Ref Range Status   09/23/2020 4.0 3.4 - 5.3 mmol/L Final     ALT   Date Value Ref Range Status   09/23/2020 22 0 - 50 U/L Final     AST   Date Value Ref Range Status   09/23/2020 12 0 - 45 U/L Final     TSH   Date Value Ref Range Status   03/16/2018 2.12 0.40 - 4.00 mU/L Final     T4 Free   Date Value Ref Range Status   08/04/2011 1.22 0.70 - 1.85 ng/dL Final         Cholesterol   Date Value Ref Range Status   09/23/2020 214 (H) <200 mg/dL Final     Comment:     Desirable:       <200 mg/dl   06/18/2019 250 (H) <200 mg/dL Final     Comment:     Desirable:       <200 mg/dl     HDL Cholesterol   Date Value Ref Range Status   09/23/2020 47 (L) >49 mg/dL Final   06/18/2019 54 >49 mg/dL Final     LDL Cholesterol Calculated   Date Value Ref Range Status   09/23/2020 135 (H) <100 mg/dL Final     Comment:     Above desirable:  100-129 mg/dl  Borderline High:  130-159 mg/dL  High:             160-189 mg/dL  Very high:       >189 mg/dl     06/18/2019 152 (H) <100 mg/dL Final     Comment:     Above desirable:  100-129 mg/dl  Borderline High:  130-159 mg/dL  High:             160-189 mg/dL  Very high:       >189 mg/dl       Triglycerides   Date Value Ref Range Status   09/23/2020 160 (H) <150 mg/dL Final      Comment:     Borderline high:  150-199 mg/dl  High:             200-499 mg/dl  Very high:       >499 mg/dl     06/18/2019 220 (H) <150 mg/dL Final     Comment:     Borderline high:  150-199 mg/dl  High:             200-499 mg/dl  Very high:       >499 mg/dl       Cholesterol/HDL Ratio   Date Value Ref Range Status   12/01/2014 6.0 (H) 0.0 - 5.0 Final   07/30/2014 6.1 (H) 0.0 - 5.0 Final     A1C      9.1   9/23/2020   A1C      8.6   11/11/2019  A1C      8.4   9/24/2019    ASSESSMENT/PLAN:    1. TYPE 2 DIABETES MELLITUS: Uncontrolled type 2 diabetes mellitus.  Reminded her to take her insulin daily as prescribed and to take Novolog 10-15 minutes before eating.  She is to continue to use her Freestyle Dodie device/sensor to check her blood sugar fasting each am, prelunch, predinner and at bedtime daily.  Pt is to remain on Basaglar 50 units subcutaneous at bedtime, Novolog 30 units with breakfast and 35 units with lunch and dinner, Metformin 500 mg BID and Bydureon 2 mg subcutaneous once a week.  She was seen by an outside [facility] in Aug 2019.  Pt's urine microalbuminuria was negative in Nov 2019. Most recent creat 0.82 with GFR > 90 mL/min in 9/2020.  She denies symptoms of neuropathy or foot ulcers at this time.  She is taking ASA 81 mg daily.  Pt states she had her flu vaccine this season.    2.  HYPERLIPIDEMIA:   in 9/2020. Not sure this was done fasting. Pt is now taking Lipitor daily.    3. FOLLOW UP: with me in 8 weeks.      Sincerely,    Cecile Juarez PA-C

## 2021-02-04 NOTE — PROGRESS NOTES
Karen is a 50 year old who is being evaluated via a billable telephone visit.      What phone number would you like to be contacted at? [phone]    How would you like to obtain your AVS? Mail a copy    Aysha Pierre MA    Outcome for 02/04/21 12:51 PM :Glucose sent via Email       no concerns

## 2021-02-05 ENCOUNTER — VIRTUAL VISIT (OUTPATIENT)
Dept: ENDOCRINOLOGY | Facility: CLINIC | Age: 51
End: 2021-02-05
Payer: COMMERCIAL

## 2021-02-05 DIAGNOSIS — E11.65 TYPE 2 DIABETES MELLITUS WITH HYPERGLYCEMIA, WITHOUT LONG-TERM CURRENT USE OF INSULIN (H): Primary | ICD-10-CM

## 2021-02-05 PROCEDURE — 99215 OFFICE O/P EST HI 40 MIN: CPT | Mod: 95 | Performed by: PHYSICIAN ASSISTANT

## 2021-02-05 NOTE — LETTER
2/5/2021       RE: Karen Braxton  [address]  [place] [address]     Dear Colleague,    Thank you for referring your patient, Karen Braxton, to the [facility] [place] at [facility]. Please see a copy of my visit note below.    Karen is a 50 year old who is being evaluated via a billable telephone visit.      What phone number would you like to be contacted at? [phone]    How would you like to obtain your AVS? Mail a copy    Aysha Pierre MA    Outcome for 02/04/21 12:51 PM :Glucose sent via Email        Due to the COVID 19 pandemic this visit was converted to a telephone visit in order to help prevent spread of infection in this patient and the general population.    Time of start: 2:30 pm  Time of end: 2:47 pm  Total duration of telephone visit: 17 minutes.    HPI:  Irais Jones is a 50 year old female with type 2 diabetes mellitus.  Telephone visit today for diabetes follow up.  She was dx having type 2 diabetes around 2002.  No known retinopathy, nephropathy or neuropathy.  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  For her diabetes, she is currently taking Basaglar 50 units SQ at hs, Novolog 30 units with breakfast, 35 units with lunch and 35 units with dinner,Bydureon 2 mg subcutaneous once a week  and Metformin 500 mg BID.  Her A1C was 8.9 % on 12/21/2020 and previous A1C was 9.1 % in 9/2020.  I reviewed her Freestyle Dodie sensor download data today with her during our visit.  Her average glucose was higher at 205 with SD 38 and estimated A1C was 8.2 %.   No frequent hypoglycemia.  She admits to missing some Novolog doses.  Reminded her to take her Novolog 10-15 minutes prior to eating.  On ROS today,  less right shoulder/arm pain.  Pt denies headaches, blurred vision, n/v, SOB at rest, cough, fever or chills.  No chest pain, abd pain,  "diarrhea, dysuria or hematuria.  She denies numbness in her feet or hands.  Pt also denies foot ulcers or sores at this time.    DIABETES CARE:  Retinopathy: none; seen by Oph in 8/2019.  Nephropathy: none; urine microalbuminuria was negative in 11/2019.  Neuropathy: none.  Foot exam: no exam today.  Lipids:  in 9/2020. Not fasting. Reminded her to take Lipitor daily.  Taking ASA:yes.  CAD:no.  Mental health:denies depression.  Insulin: Basal and meal time insulin. DM meds: Bydureon and Metformin.  Testing: Freestyle Dodie sensor.    ROS:   Please see under HPI.    ALLERGIES:   No Known Allergies      Current Outpatient Medications   Medication Sig Dispense Refill     aspirin (ASA) 81 MG EC tablet Take 1 tablet (81 mg) by mouth daily 90 tablet 3     atorvastatin (LIPITOR) 40 MG tablet Take 1 tablet (40 mg) by mouth daily 90 tablet 3     blood glucose (NO BRAND SPECIFIED) test strip Use to test blood sugar 4 times daily and as needed. Any covered brand that meets Pt need. 400 strip 4     blood glucose monitoring (NO BRAND SPECIFIED) meter device kit Use to test blood sugar 4 times daily and as needed. Any covered brand. 1 kit 1     continuous blood glucose monitoring (FREESTYLE DODIE) sensor For use with Freestyle Dodie  for continuous monitioring of blood glucose levels.test 4 times daily  Replace sensor every 14 days. 6 each 3     empagliflozin (JARDIANCE) 10 MG TABS tablet Take 1 tablet (10 mg) by mouth daily 90 tablet 3     exenatide ER (BYDUREON) 2 MG pen Inject 2 mg Subcutaneous every 7 days 4 each 11     insulin glargine (BASAGLAR KWIKPEN) 100 UNIT/ML pen 50 units subcutaneous at hs. 18 mL 3     insulin pen needle (B-D U/F) 31G X 8 MM miscellaneous TEST FOUR TIMES A DAY OR AS DIRECTED 400 each 1     insulin syringe-needle U-100 (BD INSULIN SYRINGE ULTRAFINE) 30G X 1/2\" 1 ML Use one syringe daily or as directed.  3 month supply 100 each prn     lisinopril (PRINIVIL/ZESTRIL) 2.5 MG tablet Take 1 " tablet (2.5 mg) by mouth daily 30 tablet 0     metFORMIN (GLUCOPHAGE) 1000 MG tablet TAKE ONE-HALF TABLET BY MOUTH TWICE DAILY WITH MEALS 90 tablet 1     NOVOLOG FLEXPEN 100 UNIT/ML soln 30 units before breakfast, 35 units before lunch, 35 units before dinner 90 mL 3     vitamin D2 (ERGOCALCIFEROL) 29656 units (1250 mcg) capsule Take 1 capsule (50,000 Units) by mouth once a week 12 capsule 3     vitamin D2 (ERGOCALCIFEROL) 89360 units (1250 mcg) capsule Take 1 capsule (50,000 Units) by mouth every 7 days 8 capsule 6     vitamin D3 (CHOLECALCIFEROL) 50 mcg (2000 units) tablet Take 1 tablet (50 mcg) by mouth daily 90 tablet 3     SHX:  Smoke: yes.  ETOH: none.   with 5 children.    FHX:  Several family members with diabetes.    PMHX:   1.  Type 2 DM.  2.  Hyperlipidemia.  3.  Amenorrhea.  4.  GERD.  5.  S/P choley.  6.  Sebaceous cyst.  Past Medical History:   Diagnosis Date     Type 2 diabetes mellitus (H)      Past Surgical History:   Procedure Laterality Date     KNEE SURGERY Left 2001     LAPAROSCOPIC CHOLECYSTECTOMY  2007     REPAIR CLEFT PALATE CHILD  1972       EXAM:    No exam today.    RESULTS:    Creatinine   Date Value Ref Range Status   09/23/2020 0.82 0.52 - 1.04 mg/dL Final     GFR Estimate   Date Value Ref Range Status   09/23/2020 82 >60 mL/min/[1.73_m2] Final     Comment:     Non  GFR Calc  Starting 12/18/2018, serum creatinine based estimated GFR (eGFR) will be   calculated using the Chronic Kidney Disease Epidemiology Collaboration   (CKD-EPI) equation.       Hemoglobin A1C   Date Value Ref Range Status   12/21/2020 8.9 (H) 0 - 5.6 % Final     Comment:     Normal <5.7% Prediabetes 5.7-6.4%  Diabetes 6.5% or higher - adopted from ADA   consensus guidelines.       Potassium   Date Value Ref Range Status   09/23/2020 4.0 3.4 - 5.3 mmol/L Final     ALT   Date Value Ref Range Status   09/23/2020 22 0 - 50 U/L Final     AST   Date Value Ref Range Status   09/23/2020 12 0 - 45 U/L  Final     TSH   Date Value Ref Range Status   03/16/2018 2.12 0.40 - 4.00 mU/L Final     T4 Free   Date Value Ref Range Status   08/04/2011 1.22 0.70 - 1.85 ng/dL Final         Cholesterol   Date Value Ref Range Status   09/23/2020 214 (H) <200 mg/dL Final     Comment:     Desirable:       <200 mg/dl   06/18/2019 250 (H) <200 mg/dL Final     Comment:     Desirable:       <200 mg/dl     HDL Cholesterol   Date Value Ref Range Status   09/23/2020 47 (L) >49 mg/dL Final   06/18/2019 54 >49 mg/dL Final     LDL Cholesterol Calculated   Date Value Ref Range Status   09/23/2020 135 (H) <100 mg/dL Final     Comment:     Above desirable:  100-129 mg/dl  Borderline High:  130-159 mg/dL  High:             160-189 mg/dL  Very high:       >189 mg/dl     06/18/2019 152 (H) <100 mg/dL Final     Comment:     Above desirable:  100-129 mg/dl  Borderline High:  130-159 mg/dL  High:             160-189 mg/dL  Very high:       >189 mg/dl       Triglycerides   Date Value Ref Range Status   09/23/2020 160 (H) <150 mg/dL Final     Comment:     Borderline high:  150-199 mg/dl  High:             200-499 mg/dl  Very high:       >499 mg/dl     06/18/2019 220 (H) <150 mg/dL Final     Comment:     Borderline high:  150-199 mg/dl  High:             200-499 mg/dl  Very high:       >499 mg/dl       Cholesterol/HDL Ratio   Date Value Ref Range Status   12/01/2014 6.0 (H) 0.0 - 5.0 Final   07/30/2014 6.1 (H) 0.0 - 5.0 Final     A1C      8.9   12/21/2020  A1C      9.1   9/23/2020   A1C      8.6   11/11/2019  A1C      8.4   9/24/2019    ASSESSMENT/PLAN:    1. TYPE 2 DIABETES MELLITUS: Uncontrolled type 2 diabetes mellitus.  Reminded her to take her insulin daily as prescribed and to take Novolog 10-15 minutes before eating.  Discussed adding Jardiance 10 mg each am today.  I reviewed how Jardiance works and possible side effects of the medication including dehydration, vaginal yeast infection, UTIs, hypoglycemia, etc.  If she has hypoglycemia, she was  instructed to notify me.  She is to continue to use her Freestyle Dodie device/sensor to check her blood sugar fasting each am, prelunch, predinner and at bedtime daily.  Pt is to remain on Basaglar 50 units subcutaneous at bedtime, Novolog 30 units with breakfast and 35 units with lunch and dinner, Metformin 500 mg BID and Bydureon 2 mg subcutaneous once a week.  She was seen by an outside [facility] in Aug 2019. Referral for annual eye exam placed today.  Pt's urine microalbuminuria was negative in Nov 2019. Most recent creat 0.82 with GFR > 90 mL/min in 9/2020.  She denies symptoms of neuropathy or foot ulcers at this time.  She is taking ASA 81 mg daily.  Pt states she had her flu vaccine this season.    2.  HYPERLIPIDEMIA:   in 9/2020. Not sure this was done fasting. Pt is now taking Lipitor daily.    3. FOLLOW UP: with me in early April 2021 and Dr. Phillips in June 2021.    Total time spent reviewing patient chart notes, labs and Freestyle Dodie sensor download today  =  8 minutes.  Total time for telephone visit today= 17 minutes.  Total time for documentation today = 13 minutes.     TOTAL TIME FOR VISIT TODAY = 40 minutes.    Cecile Juarez PA-C

## 2021-02-08 NOTE — PROGRESS NOTES
Due to the COVID 19 pandemic this visit was converted to a telephone visit in order to help prevent spread of infection in this patient and the general population.    Time of start: 2:30 pm  Time of end: 2:47 pm  Total duration of telephone visit: 17 minutes.    HPI:  Irais Jones is a 50 year old female with type 2 diabetes mellitus.  Telephone visit today for diabetes follow up.  She was dx having type 2 diabetes around 2002.  No known retinopathy, nephropathy or neuropathy.  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  For her diabetes, she is currently taking Basaglar 50 units SQ at hs, Novolog 30 units with breakfast, 35 units with lunch and 35 units with dinner,Bydureon 2 mg subcutaneous once a week  and Metformin 500 mg BID.  Her A1C was 8.9 % on 12/21/2020 and previous A1C was 9.1 % in 9/2020.  I reviewed her Freestyle Dodie sensor download data today with her during our visit.  Her average glucose was higher at 205 with SD 38 and estimated A1C was 8.2 %.   No frequent hypoglycemia.  She admits to missing some Novolog doses.  Reminded her to take her Novolog 10-15 minutes prior to eating.  On ROS today,  less right shoulder/arm pain.  Pt denies headaches, blurred vision, n/v, SOB at rest, cough, fever or chills.  No chest pain, abd pain, diarrhea, dysuria or hematuria.  She denies numbness in her feet or hands.  Pt also denies foot ulcers or sores at this time.    DIABETES CARE:  Retinopathy: none; seen by Oph in 8/2019.  Nephropathy: none; urine microalbuminuria was negative in 11/2019.  Neuropathy: none.  Foot exam: no exam today.  Lipids:  in 9/2020. Not fasting. Reminded her to take Lipitor daily.  Taking ASA:yes.  CAD:no.  Mental health:denies depression.  Insulin: Basal and meal time insulin. DM meds: Bydureon and Metformin.  Testing: Freestyle Dodie sensor.    ROS:   Please see under HPI.    ALLERGIES:   No Known  "Allergies      Current Outpatient Medications   Medication Sig Dispense Refill     aspirin (ASA) 81 MG EC tablet Take 1 tablet (81 mg) by mouth daily 90 tablet 3     atorvastatin (LIPITOR) 40 MG tablet Take 1 tablet (40 mg) by mouth daily 90 tablet 3     blood glucose (NO BRAND SPECIFIED) test strip Use to test blood sugar 4 times daily and as needed. Any covered brand that meets Pt need. 400 strip 4     blood glucose monitoring (NO BRAND SPECIFIED) meter device kit Use to test blood sugar 4 times daily and as needed. Any covered brand. 1 kit 1     continuous blood glucose monitoring (FREESTYLE RAHEEM) sensor For use with Freestyle Raheem  for continuous monitioring of blood glucose levels.test 4 times daily  Replace sensor every 14 days. 6 each 3     empagliflozin (JARDIANCE) 10 MG TABS tablet Take 1 tablet (10 mg) by mouth daily 90 tablet 3     exenatide ER (BYDUREON) 2 MG pen Inject 2 mg Subcutaneous every 7 days 4 each 11     insulin glargine (BASAGLAR KWIKPEN) 100 UNIT/ML pen 50 units subcutaneous at hs. 18 mL 3     insulin pen needle (B-D U/F) 31G X 8 MM miscellaneous TEST FOUR TIMES A DAY OR AS DIRECTED 400 each 1     insulin syringe-needle U-100 (BD INSULIN SYRINGE ULTRAFINE) 30G X 1/2\" 1 ML Use one syringe daily or as directed.  3 month supply 100 each prn     lisinopril (PRINIVIL/ZESTRIL) 2.5 MG tablet Take 1 tablet (2.5 mg) by mouth daily 30 tablet 0     metFORMIN (GLUCOPHAGE) 1000 MG tablet TAKE ONE-HALF TABLET BY MOUTH TWICE DAILY WITH MEALS 90 tablet 1     NOVOLOG FLEXPEN 100 UNIT/ML soln 30 units before breakfast, 35 units before lunch, 35 units before dinner 90 mL 3     vitamin D2 (ERGOCALCIFEROL) 62945 units (1250 mcg) capsule Take 1 capsule (50,000 Units) by mouth once a week 12 capsule 3     vitamin D2 (ERGOCALCIFEROL) 51774 units (1250 mcg) capsule Take 1 capsule (50,000 Units) by mouth every 7 days 8 capsule 6     vitamin D3 (CHOLECALCIFEROL) 50 mcg (2000 units) tablet Take 1 tablet (50 " mcg) by mouth daily 90 tablet 3     SHX:  Smoke: yes.  ETOH: none.   with 5 children.    FHX:  Several family members with diabetes.    PMHX:   1.  Type 2 DM.  2.  Hyperlipidemia.  3.  Amenorrhea.  4.  GERD.  5.  S/P choley.  6.  Sebaceous cyst.  Past Medical History:   Diagnosis Date     Type 2 diabetes mellitus (H)      Past Surgical History:   Procedure Laterality Date     KNEE SURGERY Left 2001     LAPAROSCOPIC CHOLECYSTECTOMY  2007     REPAIR CLEFT PALATE CHILD  1972       EXAM:    No exam today.    RESULTS:    Creatinine   Date Value Ref Range Status   09/23/2020 0.82 0.52 - 1.04 mg/dL Final     GFR Estimate   Date Value Ref Range Status   09/23/2020 82 >60 mL/min/[1.73_m2] Final     Comment:     Non  GFR Calc  Starting 12/18/2018, serum creatinine based estimated GFR (eGFR) will be   calculated using the Chronic Kidney Disease Epidemiology Collaboration   (CKD-EPI) equation.       Hemoglobin A1C   Date Value Ref Range Status   12/21/2020 8.9 (H) 0 - 5.6 % Final     Comment:     Normal <5.7% Prediabetes 5.7-6.4%  Diabetes 6.5% or higher - adopted from ADA   consensus guidelines.       Potassium   Date Value Ref Range Status   09/23/2020 4.0 3.4 - 5.3 mmol/L Final     ALT   Date Value Ref Range Status   09/23/2020 22 0 - 50 U/L Final     AST   Date Value Ref Range Status   09/23/2020 12 0 - 45 U/L Final     TSH   Date Value Ref Range Status   03/16/2018 2.12 0.40 - 4.00 mU/L Final     T4 Free   Date Value Ref Range Status   08/04/2011 1.22 0.70 - 1.85 ng/dL Final         Cholesterol   Date Value Ref Range Status   09/23/2020 214 (H) <200 mg/dL Final     Comment:     Desirable:       <200 mg/dl   06/18/2019 250 (H) <200 mg/dL Final     Comment:     Desirable:       <200 mg/dl     HDL Cholesterol   Date Value Ref Range Status   09/23/2020 47 (L) >49 mg/dL Final   06/18/2019 54 >49 mg/dL Final     LDL Cholesterol Calculated   Date Value Ref Range Status   09/23/2020 135 (H) <100 mg/dL Final      Comment:     Above desirable:  100-129 mg/dl  Borderline High:  130-159 mg/dL  High:             160-189 mg/dL  Very high:       >189 mg/dl     06/18/2019 152 (H) <100 mg/dL Final     Comment:     Above desirable:  100-129 mg/dl  Borderline High:  130-159 mg/dL  High:             160-189 mg/dL  Very high:       >189 mg/dl       Triglycerides   Date Value Ref Range Status   09/23/2020 160 (H) <150 mg/dL Final     Comment:     Borderline high:  150-199 mg/dl  High:             200-499 mg/dl  Very high:       >499 mg/dl     06/18/2019 220 (H) <150 mg/dL Final     Comment:     Borderline high:  150-199 mg/dl  High:             200-499 mg/dl  Very high:       >499 mg/dl       Cholesterol/HDL Ratio   Date Value Ref Range Status   12/01/2014 6.0 (H) 0.0 - 5.0 Final   07/30/2014 6.1 (H) 0.0 - 5.0 Final     A1C      8.9   12/21/2020  A1C      9.1   9/23/2020   A1C      8.6   11/11/2019  A1C      8.4   9/24/2019    ASSESSMENT/PLAN:    1. TYPE 2 DIABETES MELLITUS: Uncontrolled type 2 diabetes mellitus.  Reminded her to take her insulin daily as prescribed and to take Novolog 10-15 minutes before eating.  Discussed adding Jardiance 10 mg each am today.  I reviewed how Jardiance works and possible side effects of the medication including dehydration, vaginal yeast infection, UTIs, hypoglycemia, etc.  If she has hypoglycemia, she was instructed to notify me.  She is to continue to use her Freestyle Dodie device/sensor to check her blood sugar fasting each am, prelunch, predinner and at bedtime daily.  Pt is to remain on Basaglar 50 units subcutaneous at bedtime, Novolog 30 units with breakfast and 35 units with lunch and dinner, Metformin 500 mg BID and Bydureon 2 mg subcutaneous once a week.  She was seen by an outside Oph in Aug 2019. Referral for annual eye exam placed today.  Pt's urine microalbuminuria was negative in Nov 2019. Most recent creat 0.82 with GFR > 90 mL/min in 9/2020.  She denies symptoms of neuropathy or  foot ulcers at this time.  She is taking ASA 81 mg daily.  Pt states she had her flu vaccine this season.    2.  HYPERLIPIDEMIA:   in 9/2020. Not sure this was done fasting. Pt is now taking Lipitor daily.    3. FOLLOW UP: with me in early April 2021 and Dr. Phillips in June 2021.    Total time spent reviewing patient chart notes, labs and Freestyle Dodie sensor download today  =  8 minutes.  Total time for telephone visit today= 17 minutes.  Total time for documentation today = 13 minutes.     TOTAL TIME FOR VISIT TODAY = 40 minutes.    Cecile Juarez PA-C

## 2021-02-09 ENCOUNTER — APPOINTMENT (OUTPATIENT)
Dept: INTERPRETER SERVICES | Facility: CLINIC | Age: 51
End: 2021-02-09
Payer: COMMERCIAL

## 2021-03-01 ENCOUNTER — TELEPHONE (OUTPATIENT)
Dept: ENDOCRINOLOGY | Facility: CLINIC | Age: 51
End: 2021-03-01

## 2021-03-01 DIAGNOSIS — E11.65 TYPE 2 DIABETES MELLITUS WITH HYPERGLYCEMIA (H): ICD-10-CM

## 2021-03-01 RX ORDER — INSULIN GLARGINE 100 [IU]/ML
INJECTION, SOLUTION SUBCUTANEOUS
Qty: 18 ML | Refills: 3 | Status: SHIPPED | OUTPATIENT
Start: 2021-03-01 | End: 2022-03-27

## 2021-03-01 NOTE — TELEPHONE ENCOUNTER
Prior Authorization Retail Medication Request    Medication/Dose: insulin glargine (BASAGLAR KWIKPEN) 100 UNIT/ML pen  ICD code (if different than what is on RX):E11.65  Rationale:Patient needs to manage type 2 diabetes     Insurance Name:[facility]   Insurance ID: [id number]       Pharmacy Information (if different than what is on RX)  Name:    Phone:

## 2021-03-01 NOTE — TELEPHONE ENCOUNTER
[facility] Call Center    Phone Message    May a detailed message be left on voicemail: yes     Reason for Call: Medication Refill Request    Has the patient contacted the pharmacy for the refill? Yes   Name of medication being requested: Camilla  Provider who prescribed the medication:   Pharmacy: [id number] [place], [place] - [address] AT [facility] OF [address]  Date medication is needed: asap- pt is Travelling to [place] on 03/15/21      Action Taken: Message routed to:  [facility] Endo    Travel Screening: Not Applicable

## 2021-03-01 NOTE — TELEPHONE ENCOUNTER
insulin glargine (BASAGLAR KWIKPEN) 100 UNIT/ML pen   Last Written Prescription Date: 11/5/20  Last Fill Quantity: 18ml,   # refills: 3  Last Office Visit :2/5/21  Future Office visit: none    Routing refill request to provider for review/approval because: see pt call note. request for lantus.  Endo triage to fill. Is leaving to [place].

## 2021-03-01 NOTE — TELEPHONE ENCOUNTER
PA Initiation    Medication: insulin glargine (BASAGLAR KWIKPEN) 100 UNIT/ML pen -   Insurance Company: [employer] - Phone [phone] Fax [phone]  Pharmacy Filling the Rx: [facility] #[id number] [place], [place] - [address] AT [facility] OF [address]  Filling Pharmacy Phone: [phone]  Filling Pharmacy Fax: [phone]  Start Date: 3/1/2021

## 2021-03-08 NOTE — TELEPHONE ENCOUNTER
Prior Authorization Approval    Medication: insulin glargine (BASAGLAR KWIKPEN) 100 UNIT/ML pen -  was approved on 3/3/2021  Effective: 1/30/2021 to 5/31/2021  Reference #: [id number]  Approved Dose/Quantity:   Insurance Company: [employer] - Phone [phone] Fax [phone]  Expected CoPay:    Pharmacy Filling the Rx: [facility] #[id number] - [facility] [address] AT [facility] OF [address]  Pharmacy Notified: Yes  Patient Notified: Comment:   Pt already picked up lantus this month

## 2021-03-23 DIAGNOSIS — E11.65 TYPE 2 DIABETES MELLITUS WITH HYPERGLYCEMIA, WITHOUT LONG-TERM CURRENT USE OF INSULIN (H): ICD-10-CM

## 2021-03-26 NOTE — TELEPHONE ENCOUNTER
metFORMIN (GLUCOPHAGE) 1000 MG tablet  Last Written Prescription Date:  10/11/2020  Last Fill Quantity: 90,   # refills: 1  Last Office Visit : 2/5/2021  Future Office visit:  None  90 Tabs, 1 Refill sent to pharm  3/26/2021      Alyce Salinas RN  Central Triage Red Flags/Med Refills

## 2021-04-26 DIAGNOSIS — E11.65 TYPE 2 DIABETES MELLITUS WITH HYPERGLYCEMIA, WITHOUT LONG-TERM CURRENT USE OF INSULIN (H): ICD-10-CM

## 2021-04-26 LAB — HBA1C MFR BLD: 10.1 % (ref 0–5.6)

## 2021-04-26 PROCEDURE — 36415 COLL VENOUS BLD VENIPUNCTURE: CPT | Performed by: PATHOLOGY

## 2021-04-26 PROCEDURE — 83036 HEMOGLOBIN GLYCOSYLATED A1C: CPT | Performed by: PATHOLOGY

## 2021-04-26 NOTE — PATIENT INSTRUCTIONS
We appreciate your assistance in coordinating your healthcare.     Please upload your insulin pump, blood sugar meter and/or continuous glucose monitor at home 1-2 days before your next diabetes-related appointment.   This will allow your provider to review your  data before your scheduled virtual visit.    To ask a question to your Endocrine care team, please send them a [employer] message, or reach them by phone at [phone]     To expedite your medication refill(s), please contact your pharmacy and have them   fax a refill request to: [phone].  *Please allow 3 business days for routine medication refills.  *Please allow 5 business days for controlled substance medication refills.    For after-hours urgent Endocrine issues, that do not require 291, please dial [phone], and ask to speak with the Endocrinologist On-Call

## 2021-04-26 NOTE — PROGRESS NOTES
Karen is a 50 year old who is being evaluated via a billable telephone visit.      What phone number would you like to be contacted at? [phone]    How would you like to obtain your AVS? Mail a copy    Aysha PENDLETON MA    Outcome for 04/26/21 10:34 AM :Glucose sent via Email    PATIENT WANTED TO RESCHEDULE TODAY.  RESCHEDULED FOR 6/10/2021 AT 3:30 PM TO BE SEEN BY ME IN CLINIC FACE TO FACE  NO CHARGE TODAY.  RANDEE SCHMIDT PA-C

## 2021-04-27 ENCOUNTER — VIRTUAL VISIT (OUTPATIENT)
Dept: ENDOCRINOLOGY | Facility: CLINIC | Age: 51
End: 2021-04-27
Payer: COMMERCIAL

## 2021-04-27 DIAGNOSIS — E11.65 TYPE 2 DIABETES MELLITUS WITH HYPERGLYCEMIA, WITHOUT LONG-TERM CURRENT USE OF INSULIN (H): Primary | ICD-10-CM

## 2021-04-27 PROCEDURE — 99207 PR NO CHARGE LOS: CPT | Performed by: PHYSICIAN ASSISTANT

## 2021-04-27 NOTE — LETTER
Date:April 29, 2021      Provider requested that no letter be sent. Do not send.       [facility]

## 2021-04-27 NOTE — LETTER
4/27/2021       RE: Karen Braxton  [address] [place] [address]     Dear Colleague,    Thank you for referring your patient, Karen Braxton, to the [facility] ENDOCRINOLOGY CLINIC [place] at [facility]. Please see a copy of my visit note below.    Karen is a 50 year old who is being evaluated via a billable telephone visit.      What phone number would you like to be contacted at? [phone]    How would you like to obtain your AVS? Mail a copy    Aysha PENDLETON MA    Outcome for 04/26/21 10:34 AM :Glucose sent via Email    PATIENT WANTED TO RESCHEDULE TODAY.  RESCHEDULED FOR 6/10/2021 AT 3:30 PM TO BE SEEN BY ME IN CLINIC FACE TO FACE  NO CHARGE TODAY.  CECILE JUAREZ PA-C      Again, thank you for allowing me to participate in the care of your patient.      Sincerely,    Cecile Juarez PA-C

## 2021-05-04 NOTE — PROGRESS NOTES
HPI:  Karen Braxton is a 50 year old female presenting for diabetic eye exam.    Has been seeing intermittent flashes of light in peripheral vision a couple times a week since 2019. No field cuts/deficits, no floaters. Had an eye exam when these started in 2019, doesn't remember what she was told.  No eye pain.  Happy with vision, feels that she sees well.  Blood sugars this morning 177. Has been pretty consistent. Got her COVID vaccine on May 4 and had a low fever and her sugars were higher.   Feeling better now and sugars back to baseline.  Feels she has a pimple growing inside the left eyelid and it is uncomfortable.  Was seen for this in [facility] in 2020, resolved, but now has recurred.    Past Ocular History:  Glasses  Flashes in vision since 2019    PMH:  DM2  Hypercholesterolemia    SH:  Born in [facility]. Current smoker 5 cigarettes/day.  Lives at home with children (5 adult kids, all in or done with college). Works for [employer].    FH:  No known family history of blindness, glaucoma, macular degeneration    ASSESSMENT and PLAN:  1. Diabetes mellitus with background retinopathy (H)  - last A1c 10.1 in April 2021, increased from 9.1 in September 2020  - mildly attenuated vessels without microaneurysms/exudates/NV  - encouraged BS/BP control  - OCT macula 5/7/21  Right Eye: preserved foveal contour, detached posterior hyaloid, no IRF/SRF  Left Eye: preserved foveal contour, partially detached posterior hyaloid, no SRF, slight thickening of middle retina just inferior to fovea without jami IRF  - annual DFE; will repeat prior to cataract surgery with repeat OCT Macula as well    2. Glaucoma suspect of both eyes  - oval nerve right eye and round nerve left eye with 0.8CD ratio each eye; no notching or disc heme  - no FHx glaucoma  - IOP today 13/12  - baseline disc photos 5/7/21; hazy secondary to cataract  - OCT RNFL 5/7/21  Right Eye:  avg thickness 93, ST borderline, no GCC loss  Left Eye: avg thickness 86, ST  thinning, slightly thinner GCC nasally  - given low IOP and plan for cataract surgery soon for vis sig cataracts with likely lowering of IOP postop and anticipated improvement in view and vision after surgery, will plan to repeat OCT RNFL and perform HVF after cataract surgery; will hold off on drops for now; discussed with patient and she understands and agrees    3. Combined forms of age-related cataract of both eyes  - visually significant cataract right>left with PSC component right eye and more cortical spokes left eye  - We discussed the risks, benefits and alternatives of cataract surgery, including risk of bleeding, infection, postoperative changes in intraocular pressure, postoperative inflammation, possibility of retinal detachment or vision loss.  Discussed need for follow up appointments after surgery and the need for postoperative drops.  Informed consent was obtained.  The patient would like to proceed with CE/IOL right eye first, then left eye.  She would like to wait until January 2022 though because of work.    We discussed lens options and refractive target. We discussed that by aiming for distance, will need glasses for near.  Target: -0.5    Dilates to: 6 mm --> may need Malyugin  Alpha blockers/Flomax: None  Trauma/Pseudoxfoliation: None  Fuchs dystrophy/guttae: None    Diabetes: Yes  Anticoagulation: baby aspirin    Surgical plan: Topical/MAC, Malyugin ring, Trypan, postop NSAID    --> plan to return in 6 months for repeat DFE, OCT macula, and biometries and surgical planning     4. Vitreous flashes of both eyes  - flashes intermittently since 2019 have been stable  - Cary negative  - retinas attached  - Reviewed signs/symptoms of retinal tear/detachment including shower of new floaters, flashes, curtain/veil, decreased vision, etc and patient understands to call/come in immediately for these    5. Chalazion left upper eyelid  - expresses with pressure  - warm compresses, lid scrubs  - if not  resolving with conservative measures she will call for sooner appointment      Follow up in 6 months with DFE, OCT macula, biometries or sooner PRN        -----------------------------------------------------------------------------------    Attestation:  Complete documentation of historical and exam elements from today's encounter can be found in the full encounter summary report (not reduplicated in this progress note). I personally obtained the chief complaint(s) and history of present illness.  I confirmed and edited as necessary the review of systems, past medical/surgical history, family history, social history, and examination findings as documented by others; and I examined the patient myself. I personally reviewed the relevant tests, images, and reports as documented above.     I formulated and edited as necessary the assessment and plan and discussed the findings and management plan with the patient and family.      Cony Scruggs MD

## 2021-05-07 ENCOUNTER — OFFICE VISIT (OUTPATIENT)
Dept: OPHTHALMOLOGY | Facility: CLINIC | Age: 51
End: 2021-05-07
Attending: OPHTHALMOLOGY
Payer: COMMERCIAL

## 2021-05-07 DIAGNOSIS — H00.14 CHALAZION LEFT UPPER EYELID: ICD-10-CM

## 2021-05-07 DIAGNOSIS — E11.3299 DIABETES MELLITUS WITH BACKGROUND RETINOPATHY (H): Primary | ICD-10-CM

## 2021-05-07 DIAGNOSIS — H40.003 GLAUCOMA SUSPECT OF BOTH EYES: ICD-10-CM

## 2021-05-07 DIAGNOSIS — H25.813 COMBINED FORMS OF AGE-RELATED CATARACT OF BOTH EYES: ICD-10-CM

## 2021-05-07 DIAGNOSIS — H53.19 VITREOUS FLASHES OF BOTH EYES: ICD-10-CM

## 2021-05-07 PROCEDURE — 99207 OCT OPTIC NERVE RNFL SPECTRALIS OU (BOTH EYES): CPT | Mod: 26 | Performed by: OPHTHALMOLOGY

## 2021-05-07 PROCEDURE — 99207 FUNDUS PHOTOS OU (BOTH EYES): CPT | Mod: 26 | Performed by: OPHTHALMOLOGY

## 2021-05-07 PROCEDURE — 92133 CPTRZD OPH DX IMG PST SGM ON: CPT | Performed by: OPHTHALMOLOGY

## 2021-05-07 PROCEDURE — 99204 OFFICE O/P NEW MOD 45 MIN: CPT | Performed by: OPHTHALMOLOGY

## 2021-05-07 PROCEDURE — 92134 CPTRZ OPH DX IMG PST SGM RTA: CPT | Performed by: OPHTHALMOLOGY

## 2021-05-07 PROCEDURE — G0463 HOSPITAL OUTPT CLINIC VISIT: HCPCS

## 2021-05-07 PROCEDURE — 92250 FUNDUS PHOTOGRAPHY W/I&R: CPT | Performed by: OPHTHALMOLOGY

## 2021-05-07 ASSESSMENT — VISUAL ACUITY
OS_CC: J2
OS_CC+: -2
METHOD: SNELLEN - LINEAR
CORRECTION_TYPE: GLASSES
OD_CC: 20/50
OS_PH_CC+: +2
OD_PH_CC: 20/40
OD_PH_CC+: -2
OD_CC: J2
OS_PH_CC: 20/25
OD_CC+: -1
OS_CC: 20/30

## 2021-05-07 ASSESSMENT — CUP TO DISC RATIO
OS_RATIO: 0.8
OD_RATIO: 0.8

## 2021-05-07 ASSESSMENT — REFRACTION_MANIFEST
OD_CYLINDER: +1.25
OD_ADD: +3.00
OS_AXIS: 171
OD_SPHERE: -2.75
OS_CYLINDER: +1.00
OS_SPHERE: -0.50
OS_ADD: +3.00
OD_AXIS: 056

## 2021-05-07 ASSESSMENT — REFRACTION_WEARINGRX
OS_CYLINDER: +1.00
OS_ADD: +3.00
OS_SPHERE: +0.50
OD_ADD: +3.00
OD_SPHERE: -1.00
OD_AXIS: 003
OD_CYLINDER: +0.75
SPECS_TYPE: BIFOCAL
OS_AXIS: 164

## 2021-05-07 ASSESSMENT — TONOMETRY
OS_IOP_MMHG: 12
OD_IOP_MMHG: 13
IOP_METHOD: ICARE

## 2021-05-07 ASSESSMENT — CONF VISUAL FIELD
OS_NORMAL: 1
OD_NORMAL: 1
METHOD: COUNTING FINGERS

## 2021-05-07 ASSESSMENT — SLIT LAMP EXAM - LIDS: COMMENTS: MGD

## 2021-05-07 ASSESSMENT — EXTERNAL EXAM - LEFT EYE: OS_EXAM: NORMAL

## 2021-05-07 ASSESSMENT — EXTERNAL EXAM - RIGHT EYE: OD_EXAM: NORMAL

## 2021-05-07 NOTE — NURSING NOTE
Chief Complaints and History of Present Illnesses   Patient presents with     Consult For     New Pt.  Annual DM CEE.     Chief Complaint(s) and History of Present Illness(es)     Consult For     Laterality: both eyes    Onset: gradual    Onset: years ago    Course: gradually worsening    Associated symptoms: flashes and floaters.  Negative for eye pain, dryness, tearing, photophobia, glare and haloes    Treatments tried: no treatments    Pain scale: 0/10    Comments: New Pt.  Annual DM CEE.              Comments     Last exam was about 2 years ago.  She feels her VA has gotten worse during the past year.  She has a bump inside her MIGEL with a little bit of pain.  She reports flashes starting last week but also has a Hx of flashes.  Hx of floaters with no change.      DM 2  Lab Results       Component                Value               Date                       A1C                      10.1                04/26/2021                 A1C                      8.9                 12/21/2020                 A1C                      9.1                 09/23/2020                 A1C                      8.6                 11/11/2019                 A1C                      7.7                 06/18/2019            BS were 177 this am.    LEOLA Marvin May 7, 2021 10:03 AM

## 2021-06-15 NOTE — PROGRESS NOTES
Karen is a 51 year old who is being evaluated via a billable telephone visit.      What phone number would you like to be contacted at? [phone]    How would you like to obtain your AVS? Mail a copy    Aysha PENDLETON MA    Outcome for 06/15/21 10:08 AM :Glucose sent via Email    PATIENT WANTED TO RESCHEDULE FOR FACE TO FACE APPT WITH ME IN CLINIC.  SHE WILL SEE ME ON 9/14/2021 AT 7:30 AM IN CLINIC.  NO CHARGE TODAY.  RANDEE SCHMIDT PA-C

## 2021-06-15 NOTE — PATIENT INSTRUCTIONS
We appreciate your assistance in coordinating your healthcare.     Please upload your insulin pump, blood sugar meter and/or continuous glucose monitor at home 1-2 days before your next diabetes-related appointment.   This will allow your provider to review your  data before your scheduled virtual visit.    To ask a question to your Endocrine care team, please send them a [employer] message, or reach them by phone at [phone]     To expedite your medication refill(s), please contact your pharmacy and have them   fax a refill request to: [phone].  *Please allow 3 business days for routine medication refills.  *Please allow 5 business days for controlled substance medication refills.    For after-hours urgent Endocrine issues, that do not require 021, please dial [phone], and ask to speak with the Endocrinologist On-Call

## 2021-06-16 ENCOUNTER — VIRTUAL VISIT (OUTPATIENT)
Dept: ENDOCRINOLOGY | Facility: CLINIC | Age: 51
End: 2021-06-16
Payer: COMMERCIAL

## 2021-06-16 DIAGNOSIS — E11.65 TYPE 2 DIABETES MELLITUS WITH HYPERGLYCEMIA, WITHOUT LONG-TERM CURRENT USE OF INSULIN (H): Primary | ICD-10-CM

## 2021-06-16 PROCEDURE — 99207 PR NO CHARGE LOS: CPT | Performed by: PHYSICIAN ASSISTANT

## 2021-06-16 NOTE — LETTER
6/16/2021       RE: Karen Braxton  [address] [place] [address]     Dear Colleague,    Thank you for referring your patient, Karen Braxton, to the [facility] ENDOCRINOLOGY CLINIC [place] at [facility]. Please see a copy of my visit note below.    Karen is a 51 year old who is being evaluated via a billable telephone visit.      What phone number would you like to be contacted at? [phone]    How would you like to obtain your AVS? Mail a copy    Aysha PENDLETON MA    Outcome for 06/15/21 10:08 AM :Glucose sent via Email    PATIENT WANTED TO RESCHEDULE FOR FACE TO FACE APPT WITH ME IN CLINIC.  SHE WILL SEE ME ON 9/14/2021 AT 7:30 AM IN CLINIC.  NO CHARGE TODAY.  RANDEE SCHMIDT PA-C

## 2021-07-13 DIAGNOSIS — E11.9 TYPE 2 DIABETES MELLITUS WITHOUT COMPLICATION, WITH LONG-TERM CURRENT USE OF INSULIN (H): ICD-10-CM

## 2021-07-13 DIAGNOSIS — Z79.4 TYPE 2 DIABETES MELLITUS WITHOUT COMPLICATION, WITH LONG-TERM CURRENT USE OF INSULIN (H): ICD-10-CM

## 2021-07-13 RX ORDER — FLASH GLUCOSE SENSOR
KIT MISCELLANEOUS
Qty: 2 EACH | Refills: 1 | Status: SHIPPED | OUTPATIENT
Start: 2021-07-13 | End: 2021-10-06

## 2021-08-13 ENCOUNTER — TELEPHONE (OUTPATIENT)
Dept: INTERNAL MEDICINE | Facility: CLINIC | Age: 51
End: 2021-08-13

## 2021-08-13 NOTE — LETTER
[address] [address]  [phone]  August 13, 2021  Karen Braxton  [address]  [place] [address]    Dear Karen,    We care about your health and based on a review of your medical records, recommend the the following, to better manage your health:      You are in particular need of attention regarding:  -Breast Cancer Screening    I am recommending that you:     -schedule a MAMMOGRAM which is due.    1 in 8 women will develop invasive breast cancer during her lifetime and it is the most common non-skin cancer in American women.  EARLY detection, new treatments, and a better understanding of the disease have increased survival rates - the 5 year survival rate in the 1960s was 63% and today it is close to 90%.    If you are under/uninsured, we recommend you contact the [facility]. They offer mammograms at no charge or on a sliding fee charge. You can schedule with them at [phone]. Please have them send us the results.      Please disregard this reminder if you have had this exam elsewhere within the last year.  It would be helpful for us to have a copy of your mammogram report in your file so that we can best coordinate your care - please contact us with when your test was done so we can update your record.               Here is a list of Health Maintenance topics that are due now or due soon:  Health Maintenance Due   Topic Date Due     ANNUAL REVIEW OF HM ORDERS  Never done     Discuss Advance Care Planning  Never done     Colorectal Cancer Screening  Never done     Mammogram  08/15/2012     Kidney Microalbumin Urine Test  11/11/2020     Zoster (Shingles) Vaccine (2 of 2) 11/18/2020       Please call us at [phone] or [phone] (or use [employer]) to address the above recommendations.     Thank you for trusting [facility].  We appreciate the opportunity to serve you and look forward to supporting  your healthcare needs in the future.    If you have (or plan to have) any of these tests done at a facility other than a [facility] or a [facility], please have the results from these tests sent to your primary physician at [facility].    Healthy Regards,    Yvonne Solomon MD/Rhiannon BAEZ MA

## 2021-08-20 DIAGNOSIS — Z79.4 TYPE 2 DIABETES MELLITUS WITHOUT COMPLICATION, WITH LONG-TERM CURRENT USE OF INSULIN (H): Primary | ICD-10-CM

## 2021-08-20 DIAGNOSIS — E11.9 TYPE 2 DIABETES MELLITUS WITHOUT COMPLICATION, WITH LONG-TERM CURRENT USE OF INSULIN (H): Primary | ICD-10-CM

## 2021-08-20 RX ORDER — INSULIN ASPART 100 [IU]/ML
INJECTION, SOLUTION INTRAVENOUS; SUBCUTANEOUS
Qty: 90 ML | Refills: 3 | Status: CANCELLED | OUTPATIENT
Start: 2021-08-20

## 2021-08-21 NOTE — TELEPHONE ENCOUNTER
NOVOLOG FLEXPEN 100 UNIT/ML soln   Last Written Prescription Date:  11/5/20  Last Fill Quantity: 90ml   # refills: 3  Last Office Visit : 6/16/21  Future Office visit: 9/14/21    Routing refill request to provider for review/approval because: endo triage to fill

## 2021-08-22 RX ORDER — INSULIN ASPART 100 [IU]/ML
INJECTION, SOLUTION INTRAVENOUS; SUBCUTANEOUS
Qty: 90 ML | Refills: 3 | Status: SHIPPED | OUTPATIENT
Start: 2021-08-22 | End: 2023-03-07

## 2021-08-26 DIAGNOSIS — E11.65 CONTROLLED TYPE 2 DIABETES MELLITUS WITH HYPERGLYCEMIA, WITH LONG-TERM CURRENT USE OF INSULIN (H): ICD-10-CM

## 2021-08-26 DIAGNOSIS — Z79.4 CONTROLLED TYPE 2 DIABETES MELLITUS WITH HYPERGLYCEMIA, WITH LONG-TERM CURRENT USE OF INSULIN (H): ICD-10-CM

## 2021-08-27 RX ORDER — PEN NEEDLE, DIABETIC 31 GX5/16"
NEEDLE, DISPOSABLE MISCELLANEOUS
Qty: 400 EACH | Refills: 3 | Status: SHIPPED | OUTPATIENT
Start: 2021-08-27 | End: 2022-11-21

## 2021-08-27 NOTE — TELEPHONE ENCOUNTER
Insulin  Pen needle  6/16/2021  [facility] [place]     Cecile Juarez PA-C  Endocrinology, Diabetes, and Metabolism

## 2021-09-14 ENCOUNTER — OFFICE VISIT (OUTPATIENT)
Dept: ENDOCRINOLOGY | Facility: CLINIC | Age: 51
End: 2021-09-14
Payer: COMMERCIAL

## 2021-09-14 ENCOUNTER — LAB (OUTPATIENT)
Dept: LAB | Facility: CLINIC | Age: 51
End: 2021-09-14
Payer: COMMERCIAL

## 2021-09-14 VITALS
BODY MASS INDEX: 20.29 KG/M2 | HEART RATE: 84 BPM | SYSTOLIC BLOOD PRESSURE: 120 MMHG | HEIGHT: 65 IN | WEIGHT: 121.8 LBS | DIASTOLIC BLOOD PRESSURE: 75 MMHG

## 2021-09-14 DIAGNOSIS — Z79.4 TYPE 2 DIABETES MELLITUS WITHOUT COMPLICATION, WITH LONG-TERM CURRENT USE OF INSULIN (H): ICD-10-CM

## 2021-09-14 DIAGNOSIS — E11.9 TYPE 2 DIABETES MELLITUS WITHOUT COMPLICATION, WITH LONG-TERM CURRENT USE OF INSULIN (H): ICD-10-CM

## 2021-09-14 DIAGNOSIS — E10.9 TYPE 1 DIABETES MELLITUS WITHOUT COMPLICATION (H): Primary | ICD-10-CM

## 2021-09-14 LAB
CREAT UR-MCNC: 70 MG/DL
HBA1C MFR BLD: 9.2 % (ref 4.3–?)
MICROALBUMIN UR-MCNC: 6 MG/L
MICROALBUMIN/CREAT UR: 8.57 MG/G CR (ref 0–25)

## 2021-09-14 PROCEDURE — 99215 OFFICE O/P EST HI 40 MIN: CPT | Performed by: PHYSICIAN ASSISTANT

## 2021-09-14 PROCEDURE — 83036 HEMOGLOBIN GLYCOSYLATED A1C: CPT | Performed by: PHYSICIAN ASSISTANT

## 2021-09-14 PROCEDURE — 82043 UR ALBUMIN QUANTITATIVE: CPT | Performed by: PATHOLOGY

## 2021-09-14 RX ORDER — ATORVASTATIN CALCIUM 40 MG/1
40 TABLET, FILM COATED ORAL DAILY
Qty: 90 TABLET | Refills: 3 | Status: SHIPPED | OUTPATIENT
Start: 2021-09-14

## 2021-09-14 ASSESSMENT — MIFFLIN-ST. JEOR: SCORE: 1158.36

## 2021-09-14 ASSESSMENT — PAIN SCALES - GENERAL: PAINLEVEL: NO PAIN (0)

## 2021-09-14 NOTE — PATIENT INSTRUCTIONS
Increase Jardiance 25 mg daily.  Drink plenty of water.  Continue current insulin doses and Metformin.  See me in 3 months and Dr. Phillips in 5-6 months.  Get the flu vaccine this Fall.  Sincerely,  Cecile Juarez PA-C           We appreciate your assistance in coordinating your healthcare.     Please upload your insulin pump, blood sugar meter and/or continuous glucose monitor at home 1-2 days before your next diabetes-related appointment.   This will allow your provider to review your  data before your scheduled virtual visit.    To ask a question to your Endocrine care team, please send them a Munch[employer]way message, or reach them by phone at [phone]     To expedite your medication refill(s), please contact your pharmacy and have them   fax a refill request to: [phone].  *Please allow 3 business days for routine medication refills.  *Please allow 5 business days for controlled substance medication refills.    For after-hours urgent Endocrine issues, that do not require 911, please dial [phone], and ask to speak with the Endocrinologist On-Call

## 2021-09-14 NOTE — PROGRESS NOTES
PATIENT SEEN FACE TO FACE IN CLINIC TODAY.    HPI:  Irais Jones is a 51 year old female with type 2 diabetes mellitus.  Pt being seen today in clinic for diabetes follow up.  Last visit was virtual in Feb 2021.  She was dx having type 2 diabetes around 2002.  Her diabetes is complicated by background retinopathy. No known nephropathy or neuropathy.  Her hx is significant for HTN, hyperlipidemia and Vit D deficiency.  For her diabetes, she is currently taking Basaglar 50 units SQ at hs, Novolog 30 units with breakfast, 35 units with lunch and 35 units with dinner, Jardiance 10 mg each am and Metformin 500 mg BID.  She tells me she discontinued Bydureon due to GI side effects.  Pt's A1C is 9.2 % today.  Previous A1C was 10.1 % in April 2021.  I reviewed her Freestyle Dodie sensor download data today and her blood sugar readings are high.  Pt's average glucose was 226 with SD 30 and estimated A1C 8.7 % for the past 2 weeks.  Her blood sugar was in target 25 % of the time, above target 74 % of the time and below target 1 % of the time.  Her FBS is good in the low 100 range most days.  this am.  Her blood sugars are higher during the day and evening.  She admits to missing a few Novolog doses.  On ROS today, she looks good today.  Karen is working the night shift at [employer] 5 days per week and is happy with her new job.  Less shoulder pain.  Intermittent headaches.  Pt denies blurred vision, n/v, SOB at rest, cough or fevers.  She received the COVID19 vaccines (Moderna).  Pt denies chest pain, abd pain, diarrhea, dysuria, hematuria or sx of neuropathy.  No foot ulcers on exam today.    DIABETES CARE:  Retinopathy: none; seen by Oph in May 2021 with background retinopathy.  Nephropathy: none; urine microalbuminuria was negative in 11/2019. She is taking Lisinopril.  Neuropathy: none.  Foot exam: no ulcers and normal monofilamentous exam today.  Lipids:  in 9/2020. Not fasting. She has not been taking Lipitor  "stating her insurance will not cover the medication. I ordered Lipitor today- if not covered by insurance, will prescribed another statin.  Taking ASA:yes.  CAD:no.  Mental health:denies depression.  Insulin: Basal and meal time insulin. DM meds: Jardiance and Metformin.  Testing: Freestyle Dodie sensor.    ROS:   Please see under HPI.    ALLERGIES:   No Known Allergies      Current Outpatient Medications   Medication Sig Dispense Refill     aspirin (ASA) 81 MG EC tablet Take 1 tablet (81 mg) by mouth daily 90 tablet 3     atorvastatin (LIPITOR) 40 MG tablet Take 1 tablet (40 mg) by mouth daily 90 tablet 3     blood glucose (NO BRAND SPECIFIED) test strip Use to test blood sugar 4 times daily and as needed. Any covered brand that meets Pt need. 400 strip 4     blood glucose monitoring (NO BRAND SPECIFIED) meter device kit Use to test blood sugar 4 times daily and as needed. Any covered brand. 1 kit 1     Continuous Blood Gluc Sensor (FREESTYLE DODIE 14 DAY SENSOR) [facility] FOR CONTINUOUS MONITORING OF BLOOD GLUCOSE LEVELS. CHECK FOUR TIMES DAILY. REPLACE SENSOR AFTER 14 DAYS 2 each 1     empagliflozin (JARDIANCE) 25 MG TABS tablet Take 1 tablet (25 mg) by mouth daily 90 tablet 3     insulin glargine (BASAGLAR KWIKPEN) 100 UNIT/ML pen 50 units subcutaneous at hs. 18 mL 3     insulin pen needle (B-D U/F) 31G X 8 MM miscellaneous USE TO INJECT FOUR TIMES DAILY OR AS DIRECTED 400 each 3     insulin syringe-needle U-100 (BD INSULIN SYRINGE ULTRAFINE) 30G X 1/2\" 1 ML Use one syringe daily or as directed.  3 month supply 100 each prn     lisinopril (PRINIVIL/ZESTRIL) 2.5 MG tablet Take 1 tablet (2.5 mg) by mouth daily 30 tablet 0     metFORMIN (GLUCOPHAGE) 1000 MG tablet Take 0.5 tablets (500 mg) by mouth 2 times daily (with meals) 90 tablet 1     NOVOLOG FLEXPEN 100 UNIT/ML soln INJECT 32 UNITS BEFORE BREAKFAST, 37 UNITS BEFORE LUNCH, 37 UNITS BEFORE DINNER 90 mL 3     vitamin D2 (ERGOCALCIFEROL) 88095 units (1250 mcg) " capsule Take 1 capsule (50,000 Units) by mouth once a week 12 capsule 3     vitamin D2 (ERGOCALCIFEROL) 59662 units (1250 mcg) capsule Take 1 capsule (50,000 Units) by mouth every 7 days 8 capsule 6     vitamin D3 (CHOLECALCIFEROL) 50 mcg (2000 units) tablet Take 1 tablet (50 mcg) by mouth daily 90 tablet 3     SHX:  Smoke: yes.  ETOH: none.   with 5 children.    FHX:  Several family members with diabetes.    PMHX:   1.  Type 2 DM.  2.  Hyperlipidemia.  3.  Amenorrhea.  4.  GERD.  5.  S/P choley.  6.  Sebaceous cyst.  Past Medical History:   Diagnosis Date     Type 2 diabetes mellitus (H)      Past Surgical History:   Procedure Laterality Date     KNEE SURGERY Left 2001     LAPAROSCOPIC CHOLECYSTECTOMY  2007     REPAIR CLEFT PALATE CHILD  1972       EXAM:      RESULTS:    Creatinine   Date Value Ref Range Status   09/23/2020 0.82 0.52 - 1.04 mg/dL Final     GFR Estimate   Date Value Ref Range Status   09/23/2020 82 >60 mL/min/[1.73_m2] Final     Comment:     Non  GFR Calc  Starting 12/18/2018, serum creatinine based estimated GFR (eGFR) will be   calculated using the Chronic Kidney Disease Epidemiology Collaboration   (CKD-EPI) equation.       Hemoglobin A1C   Date Value Ref Range Status   04/26/2021 10.1 (H) 0 - 5.6 % Final     Comment:     Normal <5.7% Prediabetes 5.7-6.4%  Diabetes 6.5% or higher - adopted from ADA   consensus guidelines.       Potassium   Date Value Ref Range Status   09/23/2020 4.0 3.4 - 5.3 mmol/L Final     ALT   Date Value Ref Range Status   09/23/2020 22 0 - 50 U/L Final     AST   Date Value Ref Range Status   09/23/2020 12 0 - 45 U/L Final     TSH   Date Value Ref Range Status   03/16/2018 2.12 0.40 - 4.00 mU/L Final     T4 Free   Date Value Ref Range Status   08/04/2011 1.22 0.70 - 1.85 ng/dL Final         Cholesterol   Date Value Ref Range Status   09/23/2020 214 (H) <200 mg/dL Final     Comment:     Desirable:       <200 mg/dl   06/18/2019 250 (H) <200 mg/dL Final      Comment:     Desirable:       <200 mg/dl     HDL Cholesterol   Date Value Ref Range Status   09/23/2020 47 (L) >49 mg/dL Final   06/18/2019 54 >49 mg/dL Final     LDL Cholesterol Calculated   Date Value Ref Range Status   09/23/2020 135 (H) <100 mg/dL Final     Comment:     Above desirable:  100-129 mg/dl  Borderline High:  130-159 mg/dL  High:             160-189 mg/dL  Very high:       >189 mg/dl     06/18/2019 152 (H) <100 mg/dL Final     Comment:     Above desirable:  100-129 mg/dl  Borderline High:  130-159 mg/dL  High:             160-189 mg/dL  Very high:       >189 mg/dl       Triglycerides   Date Value Ref Range Status   09/23/2020 160 (H) <150 mg/dL Final     Comment:     Borderline high:  150-199 mg/dl  High:             200-499 mg/dl  Very high:       >499 mg/dl     06/18/2019 220 (H) <150 mg/dL Final     Comment:     Borderline high:  150-199 mg/dl  High:             200-499 mg/dl  Very high:       >499 mg/dl       Cholesterol/HDL Ratio   Date Value Ref Range Status   12/01/2014 6.0 (H) 0.0 - 5.0 Final   07/30/2014 6.1 (H) 0.0 - 5.0 Final     A1C      9.2   9/14/2021  A1C     10.1  4/26/2021  A1C      8.9   12/21/2020  A1C      9.1   9/23/2020   A1C      8.6   11/11/2019  A1C      8.4   9/24/2019    ASSESSMENT/PLAN:    1. TYPE 2 DIABETES MELLITUS: Uncontrolled type 2 diabetes mellitus complicated by background retinopathy.  No nephropathy or neuropathy.  Increase Jardiance 25 mg each am today.   Reminded pt to drink plenty of water and if she has any blood sugar values 70 or < she was instructed to notify me and I will plan to decrease her Novolog doses.  Continue current insulin doses at this time and Metformin dose.  Reminded her to take her insulin daily as prescribed and to take Novolog 10-15 minutes before eating.  She is to continue to use her Freestyle Dodie device/sensor to check her blood sugar fasting each am, prelunch, predinner and at bedtime daily.  Pt's urine microalbuminuria was  negative in Nov 2019. Most recent creat 0.66 with GFR > 90 mL/min in May 2021.  She denies symptoms of neuropathy or foot ulcers on exam today and normal monofilamentous exam today.  She is taking ASA 81 mg daily.  Pt received the COVID19 vaccines (Moderna).  Reminded pt to get the flu vaccine this season.    2.  HYPERLIPIDEMIA:   in 9/2020. Not sure this was done fasting. Pt is not taking Lipitor- she states her insurance will not cover Lipitor. New RX for Lipitor submitted today and if not covered, will prescribed another statin.    3. FOLLOW UP: with me in 3 months and Dr. Phillips in 6 months.    Total time spent reviewing patient chart notes, labs and Freestyle Dodie sensor download today  =  8 minutes.  Total time for telephone visit today= 25  minutes.  Total time for documentation today = 15 minutes.     TOTAL TIME FOR VISIT TODAY =   48 minutes.    Cecile Juarez PA-C

## 2021-09-14 NOTE — LETTER
9/14/2021       RE: Karen Braxton  [address]  [place] [address]     Dear Colleague,    Thank you for referring your patient, Karen Braxton, to the [facility] [place] at [facility]. Please see a copy of my visit note below.    Salvador Church CMA      PATIENT SEEN FACE TO FACE IN CLINIC TODAY.    HPI:  Irais Jones is a 51 year old female with type 2 diabetes mellitus.  Pt being seen today in clinic for diabetes follow up.  Last visit was virtual in Feb 2021.  She was dx having type 2 diabetes around 2002.  Her diabetes is complicated by background retinopathy. No known nephropathy or neuropathy.  Her hx is significant for HTN, hyperlipidemia and Vit D deficiency.  For her diabetes, she is currently taking Basaglar 50 units SQ at hs, Novolog 30 units with breakfast, 35 units with lunch and 35 units with dinner, Jardiance 10 mg each am and Metformin 500 mg BID.  She tells me she discontinued Bydureon due to GI side effects.  Pt's A1C is 9.2 % today.  Previous A1C was 10.1 % in April 2021.  I reviewed her Freestyle Dodie sensor download data today and her blood sugar readings are high.  Pt's average glucose was 226 with SD 30 and estimated A1C 8.7 % for the past 2 weeks.  Her blood sugar was in target 25 % of the time, above target 74 % of the time and below target 1 % of the time.  Her FBS is good in the low 100 range most days.  this am.  Her blood sugars are higher during the day and evening.  She admits to missing a few Novolog doses.  On ROS today, she looks good today.  Karen is working the night shift at [employer] 5 days per week and is happy with her new job.  Less shoulder pain.  Intermittent headaches.  Pt denies blurred vision, n/v, SOB at rest, cough or fevers.  She received the COVID19 vaccines (Moderna).  Pt denies chest pain, abd pain, diarrhea, dysuria, hematuria or sx of neuropathy.  No foot ulcers on  "exam today.    DIABETES CARE:  Retinopathy: none; seen by Oph in May 2021 with background retinopathy.  Nephropathy: none; urine microalbuminuria was negative in 11/2019. She is taking Lisinopril.  Neuropathy: none.  Foot exam: no ulcers and normal monofilamentous exam today.  Lipids:  in 9/2020. Not fasting. She has not been taking Lipitor stating her insurance will not cover the medication. I ordered Lipitor today- if not covered by insurance, will prescribed another statin.  Taking ASA:yes.  CAD:no.  Mental health:denies depression.  Insulin: Basal and meal time insulin. DM meds: Jardiance and Metformin.  Testing: Freestyle Dodie sensor.    ROS:   Please see under HPI.    ALLERGIES:   No Known Allergies      Current Outpatient Medications   Medication Sig Dispense Refill     aspirin (ASA) 81 MG EC tablet Take 1 tablet (81 mg) by mouth daily 90 tablet 3     atorvastatin (LIPITOR) 40 MG tablet Take 1 tablet (40 mg) by mouth daily 90 tablet 3     blood glucose (NO BRAND SPECIFIED) test strip Use to test blood sugar 4 times daily and as needed. Any covered brand that meets Pt need. 400 strip 4     blood glucose monitoring (NO BRAND SPECIFIED) meter device kit Use to test blood sugar 4 times daily and as needed. Any covered brand. 1 kit 1     Continuous Blood Gluc Sensor (FREESTYLE DODIE 14 DAY SENSOR) [facility] USE FOR CONTINUOUS MONITORING OF BLOOD GLUCOSE LEVELS. CHECK FOUR TIMES DAILY. REPLACE SENSOR AFTER 14 DAYS 2 each 1     empagliflozin (JARDIANCE) 25 MG TABS tablet Take 1 tablet (25 mg) by mouth daily 90 tablet 3     insulin glargine (BASAGLAR KWIKPEN) 100 UNIT/ML pen 50 units subcutaneous at hs. 18 mL 3     insulin pen needle (B-D U/F) 31G X 8 MM miscellaneous USE TO INJECT FOUR TIMES DAILY OR AS DIRECTED 400 each 3     insulin syringe-needle U-100 (BD INSULIN SYRINGE ULTRAFINE) 30G X 1/2\" 1 ML Use one syringe daily or as directed.  3 month supply 100 each prn     lisinopril (PRINIVIL/ZESTRIL) 2.5 MG " tablet Take 1 tablet (2.5 mg) by mouth daily 30 tablet 0     metFORMIN (GLUCOPHAGE) 1000 MG tablet Take 0.5 tablets (500 mg) by mouth 2 times daily (with meals) 90 tablet 1     NOVOLOG FLEXPEN 100 UNIT/ML soln INJECT 32 UNITS BEFORE BREAKFAST, 37 UNITS BEFORE LUNCH, 37 UNITS BEFORE DINNER 90 mL 3     vitamin D2 (ERGOCALCIFEROL) 42386 units (1250 mcg) capsule Take 1 capsule (50,000 Units) by mouth once a week 12 capsule 3     vitamin D2 (ERGOCALCIFEROL) 43177 units (1250 mcg) capsule Take 1 capsule (50,000 Units) by mouth every 7 days 8 capsule 6     vitamin D3 (CHOLECALCIFEROL) 50 mcg (2000 units) tablet Take 1 tablet (50 mcg) by mouth daily 90 tablet 3     SHX:  Smoke: yes.  ETOH: none.   with 5 children.    FHX:  Several family members with diabetes.    PMHX:   1.  Type 2 DM.  2.  Hyperlipidemia.  3.  Amenorrhea.  4.  GERD.  5.  S/P choley.  6.  Sebaceous cyst.  Past Medical History:   Diagnosis Date     Type 2 diabetes mellitus (H)      Past Surgical History:   Procedure Laterality Date     KNEE SURGERY Left 2001     LAPAROSCOPIC CHOLECYSTECTOMY  2007     REPAIR CLEFT PALATE CHILD  1972       EXAM:      RESULTS:    Creatinine   Date Value Ref Range Status   09/23/2020 0.82 0.52 - 1.04 mg/dL Final     GFR Estimate   Date Value Ref Range Status   09/23/2020 82 >60 mL/min/[1.73_m2] Final     Comment:     Non  GFR Calc  Starting 12/18/2018, serum creatinine based estimated GFR (eGFR) will be   calculated using the Chronic Kidney Disease Epidemiology Collaboration   (CKD-EPI) equation.       Hemoglobin A1C   Date Value Ref Range Status   04/26/2021 10.1 (H) 0 - 5.6 % Final     Comment:     Normal <5.7% Prediabetes 5.7-6.4%  Diabetes 6.5% or higher - adopted from ADA   consensus guidelines.       Potassium   Date Value Ref Range Status   09/23/2020 4.0 3.4 - 5.3 mmol/L Final     ALT   Date Value Ref Range Status   09/23/2020 22 0 - 50 U/L Final     AST   Date Value Ref Range Status   09/23/2020  12 0 - 45 U/L Final     TSH   Date Value Ref Range Status   03/16/2018 2.12 0.40 - 4.00 mU/L Final     T4 Free   Date Value Ref Range Status   08/04/2011 1.22 0.70 - 1.85 ng/dL Final         Cholesterol   Date Value Ref Range Status   09/23/2020 214 (H) <200 mg/dL Final     Comment:     Desirable:       <200 mg/dl   06/18/2019 250 (H) <200 mg/dL Final     Comment:     Desirable:       <200 mg/dl     HDL Cholesterol   Date Value Ref Range Status   09/23/2020 47 (L) >49 mg/dL Final   06/18/2019 54 >49 mg/dL Final     LDL Cholesterol Calculated   Date Value Ref Range Status   09/23/2020 135 (H) <100 mg/dL Final     Comment:     Above desirable:  100-129 mg/dl  Borderline High:  130-159 mg/dL  High:             160-189 mg/dL  Very high:       >189 mg/dl     06/18/2019 152 (H) <100 mg/dL Final     Comment:     Above desirable:  100-129 mg/dl  Borderline High:  130-159 mg/dL  High:             160-189 mg/dL  Very high:       >189 mg/dl       Triglycerides   Date Value Ref Range Status   09/23/2020 160 (H) <150 mg/dL Final     Comment:     Borderline high:  150-199 mg/dl  High:             200-499 mg/dl  Very high:       >499 mg/dl     06/18/2019 220 (H) <150 mg/dL Final     Comment:     Borderline high:  150-199 mg/dl  High:             200-499 mg/dl  Very high:       >499 mg/dl       Cholesterol/HDL Ratio   Date Value Ref Range Status   12/01/2014 6.0 (H) 0.0 - 5.0 Final   07/30/2014 6.1 (H) 0.0 - 5.0 Final     A1C      9.2   9/14/2021  A1C     10.1  4/26/2021  A1C      8.9   12/21/2020  A1C      9.1   9/23/2020   A1C      8.6   11/11/2019  A1C      8.4   9/24/2019    ASSESSMENT/PLAN:    1. TYPE 2 DIABETES MELLITUS: Uncontrolled type 2 diabetes mellitus complicated by background retinopathy.  No nephropathy or neuropathy.  Increase Jardiance 25 mg each am today.   Reminded pt to drink plenty of water and if she has any blood sugar values 70 or < she was instructed to notify me and I will plan to decrease her Novolog  doses.  Continue current insulin doses at this time and Metformin dose.  Reminded her to take her insulin daily as prescribed and to take Novolog 10-15 minutes before eating.  She is to continue to use her Freestyle Dodie device/sensor to check her blood sugar fasting each am, prelunch, predinner and at bedtime daily.  Pt's urine microalbuminuria was negative in Nov 2019. Most recent creat 0.66 with GFR > 90 mL/min in May 2021.  She denies symptoms of neuropathy or foot ulcers on exam today and normal monofilamentous exam today.  She is taking ASA 81 mg daily.  Pt received the COVID19 vaccines (Moderna).  Reminded pt to get the flu vaccine this season.    2.  HYPERLIPIDEMIA:   in 9/2020. Not sure this was done fasting. Pt is not taking Lipitor- she states her insurance will not cover Lipitor. New RX for Lipitor submitted today and if not covered, will prescribed another statin.    3. FOLLOW UP: with me in 3 months and Dr. Phillips in 6 months.    Total time spent reviewing patient chart notes, labs and Freestyle Dodie sensor download today  =  8 minutes.  Total time for telephone visit today= 25  minutes.  Total time for documentation today = 15 minutes.     TOTAL TIME FOR VISIT TODAY =   48 minutes.    Cecile Juarez PA-C

## 2021-09-24 ENCOUNTER — LAB REQUISITION (OUTPATIENT)
Dept: LAB | Facility: CLINIC | Age: 51
End: 2021-09-24
Payer: COMMERCIAL

## 2021-09-24 DIAGNOSIS — R10.13 EPIGASTRIC PAIN: ICD-10-CM

## 2021-09-24 LAB
ALBUMIN SERPL-MCNC: 3.1 G/DL (ref 3.4–5)
ALP SERPL-CCNC: 130 U/L (ref 40–150)
ALT SERPL W P-5'-P-CCNC: 20 U/L (ref 0–50)
ANION GAP SERPL CALCULATED.3IONS-SCNC: 9 MMOL/L (ref 3–14)
AST SERPL W P-5'-P-CCNC: 11 U/L (ref 0–45)
BILIRUB SERPL-MCNC: 0.2 MG/DL (ref 0.2–1.3)
BUN SERPL-MCNC: 10 MG/DL (ref 7–30)
CALCIUM SERPL-MCNC: 9.1 MG/DL (ref 8.5–10.1)
CHLORIDE BLD-SCNC: 104 MMOL/L (ref 94–109)
CHOLEST SERPL-MCNC: 149 MG/DL
CO2 SERPL-SCNC: 24 MMOL/L (ref 20–32)
CREAT SERPL-MCNC: 0.67 MG/DL (ref 0.52–1.04)
FASTING STATUS PATIENT QL REPORTED: NO
GFR SERPL CREATININE-BSD FRML MDRD: >90 ML/MIN/1.73M2
GLUCOSE BLD-MCNC: 291 MG/DL (ref 70–99)
HDLC SERPL-MCNC: 48 MG/DL
LDLC SERPL CALC-MCNC: 55 MG/DL
LIPASE SERPL-CCNC: 130 U/L (ref 73–393)
NONHDLC SERPL-MCNC: 101 MG/DL
POTASSIUM BLD-SCNC: 4 MMOL/L (ref 3.4–5.3)
PROT SERPL-MCNC: 7.2 G/DL (ref 6.8–8.8)
SODIUM SERPL-SCNC: 137 MMOL/L (ref 133–144)
TRIGL SERPL-MCNC: 232 MG/DL

## 2021-09-24 PROCEDURE — 83690 ASSAY OF LIPASE: CPT | Mod: ORL | Performed by: NURSE PRACTITIONER

## 2021-09-24 PROCEDURE — 36415 COLL VENOUS BLD VENIPUNCTURE: CPT | Mod: ORL | Performed by: NURSE PRACTITIONER

## 2021-09-24 PROCEDURE — 82306 VITAMIN D 25 HYDROXY: CPT | Mod: ORL | Performed by: NURSE PRACTITIONER

## 2021-09-24 PROCEDURE — 80053 COMPREHEN METABOLIC PANEL: CPT | Mod: ORL | Performed by: NURSE PRACTITIONER

## 2021-09-24 PROCEDURE — 80061 LIPID PANEL: CPT | Mod: ORL | Performed by: NURSE PRACTITIONER

## 2021-09-27 LAB — DEPRECATED CALCIDIOL+CALCIFEROL SERPL-MC: 44 UG/L (ref 20–75)

## 2021-09-28 ENCOUNTER — LAB REQUISITION (OUTPATIENT)
Dept: LAB | Facility: CLINIC | Age: 51
End: 2021-09-28
Payer: COMMERCIAL

## 2021-09-28 DIAGNOSIS — R10.13 EPIGASTRIC PAIN: ICD-10-CM

## 2021-09-28 PROCEDURE — 87338 HPYLORI STOOL AG IA: CPT | Mod: ORL | Performed by: NURSE PRACTITIONER

## 2021-09-30 LAB — H PYLORI AG STL QL IA: POSITIVE

## 2021-10-06 ENCOUNTER — TELEPHONE (OUTPATIENT)
Dept: ENDOCRINOLOGY | Facility: CLINIC | Age: 51
End: 2021-10-06

## 2021-10-06 DIAGNOSIS — E11.9 TYPE 2 DIABETES MELLITUS WITHOUT COMPLICATION, WITH LONG-TERM CURRENT USE OF INSULIN (H): ICD-10-CM

## 2021-10-06 DIAGNOSIS — Z79.4 TYPE 2 DIABETES MELLITUS WITHOUT COMPLICATION, WITH LONG-TERM CURRENT USE OF INSULIN (H): ICD-10-CM

## 2021-10-06 RX ORDER — FLASH GLUCOSE SENSOR
KIT MISCELLANEOUS
Qty: 6 EACH | Refills: 3 | Status: SHIPPED | OUTPATIENT
Start: 2021-10-06 | End: 2021-11-05

## 2021-10-06 NOTE — TELEPHONE ENCOUNTER
[facility] Call Center    Phone Message    May a detailed message be left on voicemail: yes     Reason for Call: Other: Pt requested a message be sent to provider to let her know she needs a refill of: Continuous Blood Gluc Sensor (FREESTYLE RAHEEM 14 DAY SENSOR) MISC. Writer did inform the Pt that a request for the refill has been received. Per Pt she was calling because it has been 3 hours and the pharmacy hasn't received the refill yet. Writer also informed Pt that it can take up to 3 days for refill requests to be completed. Sending per Pt request.      Action Taken: Message routed to:  [facility] endocrine    Travel Screening: Not Applicable

## 2021-10-06 NOTE — TELEPHONE ENCOUNTER
Call to Karen, advised prescription has been sent to pharmacy, appears to need a prior authorization from insurance, which the prior auth team will begin processing.  Addressed in a different encounter.

## 2021-10-19 DIAGNOSIS — Z79.4 TYPE 2 DIABETES MELLITUS WITHOUT COMPLICATION, WITH LONG-TERM CURRENT USE OF INSULIN (H): ICD-10-CM

## 2021-10-19 DIAGNOSIS — E11.9 TYPE 2 DIABETES MELLITUS WITHOUT COMPLICATION, WITH LONG-TERM CURRENT USE OF INSULIN (H): ICD-10-CM

## 2021-10-19 RX ORDER — ASPIRIN 81 MG/1
TABLET, COATED ORAL
Qty: 90 TABLET | Refills: 0 | Status: SHIPPED | OUTPATIENT
Start: 2021-10-19 | End: 2022-08-11

## 2021-11-02 ENCOUNTER — TRANSCRIBE ORDERS (OUTPATIENT)
Dept: OTHER | Age: 51
End: 2021-11-02

## 2021-11-02 DIAGNOSIS — M25.511 RIGHT SHOULDER PAIN, UNSPECIFIED CHRONICITY: Primary | ICD-10-CM

## 2021-11-03 NOTE — TELEPHONE ENCOUNTER
DIAGNOSIS: Right shoulder pain/no imaging/right shoulder pain by FLORESITA Long, CNP at [facility]   APPOINTMENT DATE: 11.10.21   NOTES STATUS DETAILS   OFFICE NOTE from referring provider N/A    OFFICE NOTE from other specialist Internal 11.20.19 Dr Ignacio Hawkins, [facility]  9.4.19 8.26.19 Dr Ronald Gates, [facility]   DISCHARGE SUMMARY from hospital N/A    DISCHARGE REPORT from the ER N/A    OPERATIVE REPORT N/A    EMG report N/A    MEDICATION LIST Internal    MRI Internal 8.5.19 R shoulder   DEXA (osteoporosis/bone health) N/A    CT SCAN N/A    XRAYS (IMAGES & REPORTS) Internal 2.18.19 R shoulder  8.31.17 R shoulder

## 2021-11-05 ENCOUNTER — TELEPHONE (OUTPATIENT)
Dept: ENDOCRINOLOGY | Facility: CLINIC | Age: 51
End: 2021-11-05

## 2021-11-05 DIAGNOSIS — Z79.4 TYPE 2 DIABETES MELLITUS WITHOUT COMPLICATION, WITH LONG-TERM CURRENT USE OF INSULIN (H): ICD-10-CM

## 2021-11-05 DIAGNOSIS — E11.9 TYPE 2 DIABETES MELLITUS WITHOUT COMPLICATION, WITH LONG-TERM CURRENT USE OF INSULIN (H): ICD-10-CM

## 2021-11-05 RX ORDER — FLASH GLUCOSE SENSOR
KIT MISCELLANEOUS
Qty: 6 EACH | Refills: 3 | Status: SHIPPED | OUTPATIENT
Start: 2021-11-05 | End: 2021-11-19

## 2021-11-05 NOTE — TELEPHONE ENCOUNTER
[facility] Call Center    Phone Message    May a detailed message be left on voicemail: yes     Reason for Call: Medication Question or concern regarding medication   Prescription Clarification    Name of Medication:   * Continuous Blood Gluc Sensor (FREESTYLE RAHEEM 14 DAY SENSOR) [facility]    Prescribing Provider: Larry     Pharmacy: [facility] #[id number] - [place], [place] - [address] AT [facility] OF [address]     What on the order needs clarification? Per Patient states she is needing the prescription ASAP. Patient states the medications Prior Authorization had  and it is needing a new one. Patient states she is needing the prescription to check her blood sugar levels. Patient states she is wanting to get a call back to know what is going on with medication. Please advise.     Action Taken: Message routed to:  [facility] ([facility] Endo    Travel Screening: Not Applicable

## 2021-11-05 NOTE — TELEPHONE ENCOUNTER
----- Message from Arlin Hinojosa sent at 11/5/2021  1:51 PM CDT -----  Regarding: RE: PA NEEDED  Deuce Mays-  Test claim shows paid without PA, filled on 11/05 for zero copay.  Patient can  at pharmacy.     Arlin  ----- Message -----  From: Franko Leblanc  Sent: 11/5/2021  12:39 PM CDT  To: Arlin Hinojosa  Subject: FW: PA NEEDED                                      ----- Message -----  From: Tabatha Delgado RN  Sent: 11/5/2021  12:09 PM CDT  To: Pharmacy Liaison Endocrinology Pool  Subject: PA NEEDED                                        PA needed on freestyle shauna sensor .

## 2021-11-05 NOTE — TELEPHONE ENCOUNTER
New orders for Freestyle shauna were sent  10/6/21 and again today. Message sent to [facility] for help on what is needed Tabatha Delgado RN on 11/5/2021 at 12:11 PM

## 2021-11-07 NOTE — PROGRESS NOTES
[facility]  Sports Medicine Clinic  [facility]           SUBJECTIVE       Karen Braxton is a 51 year old female presenting to clinic today with concerns for right shoulder pain.    Background:   Date of injury: 1/2019  Duration of symptoms: 2 years   Mechanism of Injury: fell on the ice in January of 2019  Intensity: 10/10   Aggravating factors: Rolling on to it at night, lifting her arm above her head   Relieving Factors: ibuprofen    Prior Evaluation: 2019. [facility] imaging XR done. Indicative of OA. ER visit in March, 2019 2/2/ injury in January 2019. Concern for RC injury, they recommended MRI, patient was given pain control. MRI showed full-thickness tear of supraspinatus. Other findings as below.       PMH, Medications and Allergies were reviewed and updated as needed.    ROS:  As noted above otherwise negative.    Patient Active Problem List   Diagnosis     Type 2 diabetes mellitus (H)       Current Outpatient Medications   Medication Sig Dispense Refill     ASPIRIN LOW DOSE 81 MG EC tablet TAKE 1 TABLET(81 MG) BY MOUTH DAILY 90 tablet 0     atorvastatin (LIPITOR) 40 MG tablet Take 1 tablet (40 mg) by mouth daily 90 tablet 3     blood glucose (NO BRAND SPECIFIED) test strip Use to test blood sugar 4 times daily and as needed. Any covered brand that meets Pt need. 400 strip 4     blood glucose monitoring (NO BRAND SPECIFIED) meter device kit Use to test blood sugar 4 times daily and as needed. Any covered brand. 1 kit 1     Continuous Blood Gluc Sensor (FREESTYLE RAHEEM 14 DAY SENSOR) MISC CHECK 4 TIMES DAILY. REPLACE AFTER 14 DAYS 6 each 3     empagliflozin (JARDIANCE) 25 MG TABS tablet Take 1 tablet (25 mg) by mouth daily 90 tablet 3     insulin glargine (BASAGLAR KWIKPEN) 100 UNIT/ML pen 50 units subcutaneous at hs. 18 mL 3     insulin pen needle (B-D U/F) 31G X 8 MM miscellaneous USE TO INJECT FOUR TIMES DAILY OR AS DIRECTED 400 each 3     insulin syringe-needle U-100 (BD INSULIN  "SYRINGE ULTRAFINE) 30G X 1/2\" 1 ML Use one syringe daily or as directed.  3 month supply 100 each prn     lisinopril (PRINIVIL/ZESTRIL) 2.5 MG tablet Take 1 tablet (2.5 mg) by mouth daily 30 tablet 0     metFORMIN (GLUCOPHAGE) 1000 MG tablet Take 0.5 tablets (500 mg) by mouth 2 times daily (with meals) 90 tablet 1     NOVOLOG FLEXPEN 100 UNIT/ML soln INJECT 32 UNITS BEFORE BREAKFAST, 37 UNITS BEFORE LUNCH, 37 UNITS BEFORE DINNER 90 mL 3     vitamin D2 (ERGOCALCIFEROL) 76935 units (1250 mcg) capsule Take 1 capsule (50,000 Units) by mouth once a week 12 capsule 3     vitamin D2 (ERGOCALCIFEROL) 82333 units (1250 mcg) capsule Take 1 capsule (50,000 Units) by mouth every 7 days 8 capsule 6     vitamin D3 (CHOLECALCIFEROL) 50 mcg (2000 units) tablet Take 1 tablet (50 mcg) by mouth daily 90 tablet 3            OBJECTIVE:       Vitals:   Vitals:    11/10/21 1503   Weight: 54.9 kg (121 lb)   Height: 1.651 m (5' 5\")     BMI: Body mass index is 20.14 kg/m .    Gen:  Well nourished and in no acute distress  HEENT: Extraocular movement intact  Neck: Supple  Pulm:  Breathing Comfortably. No increased respiratory effort.  Psych: Euthymic. Appropriately answers questions    MSK: Right shoulder with x-ray without visible asymmetry or edema.  No ecchymosis noted.  Range of motion limited in flexion to roughly 90 degrees before pain and weakness ensues.  Abduction to 90 degrees before pain and weakness ensues.  External rotation to 40 degrees.  Internal rotation not attempted secondary to pain.  Rotator cuff strength diminished and the supraspinatus, infraspinatus, and subscapularis to 4/5.  Biceps, triceps, and deltoid strength diminished to 4/5 secondary to pain.  Patient pan positive and provocative testing including; empty can, impingement, Florence's, speeds/Yergason's, and Hornblower's.    Study Result    Narrative & Impression   RIGHT SHOULDER THREE VIEWS  2/18/2019 2:36 PM      HISTORY: Shoulder injury, right, initial " encounter.     COMPARISON: August 31, 2017     FINDINGS: There is no significant degenerative change. The  acromioclavicular and coracoclavicular distances appear within normal  limits. The subacromial space is maintained. There is no acute  fracture or demonstrated dislocation. There are no worrisome bony  lesions.                                                                      IMPRESSION: No acute osseous abnormality demonstrated     Study Result    Narrative & Impression   EXAM: MR Right  Shoulder without  contrast     TECHNIQUE: Multiplanar, multisequence imaging of the right  shoulder  were obtained without administration of intravenous or intra-articular  gadolinium contrast using routine protocol.     History: Shoulder pain, rotator cuff tear/impingement suspected. Fall  in January, broken right arm.     Additional Clinical information from EMR: None     Comparison: Radiograph 2/18/2019     Findings:     ROTATOR CUFF and ASSOCIATED STRUCTURES  Rotator cuff: There is a full-thickness complete tear of the  supraspinatus tendon with tendon retracted to the level of the  acromioclavicular joint. The supraspinatus tear measures 2.1 cm on  sagittal imaging. There is a high-grade partial thickness articular  sided and intrasubstance tear of the infraspinatus tendon at its  footprint. There is thickening and tendinosis of the subscapularis  tendon without full-thickness tear. Teres minor tendon is intact.     Bursa: There is fluid in subacromial/subdeltoid bursa consistent with  bursitis.     Musculature: There is moderate fatty atrophy of the supraspinatus  muscle and mild fatty infiltration of the infraspinatus muscle.     Acromioclavicular joint  There are moderate degenerative changes of the acromioclavicular  joint. Acromion is type 3 in sagittal morphology.  Coracoacromial  ligament is not thickened.     OSSEOUS STRUCTURES  No fracture, marrow contusion or marrow infiltration.     LONG BICIPITAL  TENDON  The long head of the biceps tendon is normally situated within the  bicipital groove. No complete or partial biceps tendon tear is  present. There is mild biceps tendinosis.     GLENOHUMERAL JOINT  Joint fluid: There is a small joint effusion. There is fluid in the  subscapular recess.     Cartilage and subarticular bone:  No focal hyaline cartilage defects  are noted. No Hill-Sachs, reverse Hill-Sachs, or bony Bankart lesions  are seen.     Labrum: There is degeneration of the superior labrum.     ANCILLARY FINDINGS:  No axillary lymphadenopathy.                                                                      Impression:  1.  Full-thickness, complete tear of the right shoulder supraspinatus  tendon retracted to the level of the AC joint. There is moderate  supraspinatus atrophy.  2.  High-grade partial-thickness articular and intrasubstance tear of  the infraspinatus tendon at its footprint with mild fatty  infiltration.   3.  Tendinosis of the biceps tendon and subscapularis tendon.  4.  Small right shoulder joint effusion.  5.  Moderate AC joint degenerative changes.               ASSESSMENT and PLAN:     Karen was seen today for pain.    Diagnoses and all orders for this visit:    Traumatic complete tear of right rotator cuff, subsequent encounter  -     MR Shoulder Right w/o Contrast; Future    Right shoulder pain, unspecified chronicity  -     Orthopedic  Referral    51-year-old female presenting to clinic today with concerns for right shoulder pain has been ongoing since 2017.  MRI is and x-rays were reviewed from an outside source which showed a full-thickness complete tear of the right shoulder supraspinatus retracted to the level of the AC joint.  Patient is presenting with persistent weakness and an inability to move the shoulder subjectively.    Plan: After further discussion with the patient, she thinks that of surgery may be indicated at this point.  She was extremely resistant  to this for years ago but feels that her function has admittedly declined.  Last MRI was in 2019 so updated imaging would be warranted.  I have placed this.  I would like her to follow-up with me virtually after this MRI is been obtained to discuss further management.  On the next visit, will likely need to refer the patient to one of our shoulder specialist.      Options for treatment and/or follow-up care were reviewed with the patient was actively involved in the decision making process. Patient verbalized understanding and was in agreement with the plan.    Dioni Gruber DO  , Sports Medicine  Department of Family Medicine and [facility]

## 2021-11-09 ENCOUNTER — TELEPHONE (OUTPATIENT)
Dept: ENDOCRINOLOGY | Facility: CLINIC | Age: 51
End: 2021-11-09
Payer: COMMERCIAL

## 2021-11-09 DIAGNOSIS — M25.511 RIGHT SHOULDER PAIN: Primary | ICD-10-CM

## 2021-11-09 NOTE — PROGRESS NOTES
HPI:  Karen Braxton is a 51 year old female presenting for follow up cataracts.    Last visit 5/7/21 for diabetic eye exam. Had background DRMIGEL, intermittent flashes x years, and cataracts. Visually significant cataracts and she is interested in cataract extraction but wanted to wait until January 2022 because of work.    Still seeing intermittent flashes and floaters. Stable compared to prior, nothing new. Gradually increasing difficulty with distance vision, especially at night. Having trouble with nighttime driving. Seeing glare around lights. No eye pain.   Blood sugars have been fluctuating.    Past Ocular History:  Glasses  Flashes in vision since 2019  Chalazia  Cataracts    PMH:  DM2  Hypercholesterolemia    SH:  Born in [place] [facility]. Current smoker 5 cigarettes/day.  Lives at home with children (5 adult kids, all in or done with college). Works for [employer].    FH:  No known family history of blindness, glaucoma, macular degeneration    ASSESSMENT and PLAN:  1. Diabetes mellitus with background retinopathy (H)  - last A1c 9.1 in September 2021, improved from 10.1 in April 2021  - mildly attenuated vessels without microaneurysms/exudates/NV  - OCT macula 5/7/21  Right Eye: preserved foveal contour, detached posterior hyaloid, no IRF/SRF  Left Eye: preserved foveal contour, partially detached posterior hyaloid, no SRF, slight thickening of middle retina just inferior to fovea without jami IRF  - OCT Macula 11/12/21  Right Eye: preserved foveal contour, detached posterior hyaloid, no SRF, one area inferonasal to fovea with small pocket of IRF  Left Eye: preserved foveal contour, partially detached posterior hyaloid, no SRF, slight thickening of middle retina nasal and temporal to fovea without jami IRF  - encouraged BS/BP control  - given extrafoveal swelling can monitor; discussed increased risk of postoperative edema and she understands  - DFE 4-6 months    2. Glaucoma suspect of both eyes  - oval  nerve right eye and round nerve left eye with 0.8CD ratio each eye; no notching or disc heme  - no FHx glaucoma  - IOP today 12/8  - baseline disc photos 5/7/21; hazy secondary to cataract  - OCT RNFL 5/7/21  Right Eye:  avg thickness 93, ST borderline, no GCC loss  Left Eye: avg thickness 86, ST thinning, slightly thinner GCC nasally  - given low IOP and plan for cataract surgery soon for vis sig cataracts with likely lowering of IOP postop and anticipated improvement in view and vision after surgery, will plan to repeat OCT RNFL and perform HVF after cataract surgery; will hold off on drops for now; discussed with patient and she understands and agrees    3. Combined forms of age-related cataract of both eyes  - visually significant cataract right>left with more PSC component right eye and more cortical spokes left eye  - We discussed the risks, benefits and alternatives of cataract surgery, including risk of bleeding, infection, postoperative changes in intraocular pressure, postoperative inflammation, possibility of retinal detachment or vision loss.  Discussed need for follow up appointments after surgery and the need for postoperative drops.   Discussed increased risk for postoperative edema and mild edema pre-operative. Discussed risk of refractive surprise given asymmetric in axial lengths and patient unable to tolerate immersion AL calcs. She understands.   Informed consent was obtained.  The patient would like to proceed with CE/IOL right eye first.     We discussed lens options and refractive target. We discussed that by aiming for distance, will need glasses for near.  Target: -0.5  Cyl: ~1D right eye and 0.6D left eye    Dilates to: 8 mm   Alpha blockers/Flomax: None  Trauma/Pseudoxfoliation: None  Fuchs dystrophy/guttae: None    Diabetes: Yes  Anticoagulation: baby aspirin    Surgical plan: Topical/MAC, Trypan, postop NSAID    --> Schedule CE/IOL right eye     4. Vitreous flashes of both eyes  -  flashes intermittently since 2019 have been stable  - Cary negative  - retinas attached  - Reviewed signs/symptoms of retinal tear/detachment including shower of new floaters, flashes, curtain/veil, decreased vision, etc and patient understands to call/come in immediately for these    5. Chalazion left upper eyelid  - resolved      Follow up for CE/IOL right eye with POD1 appointment or sooner PRN        -----------------------------------------------------------------------------------    Attestation:  Complete documentation of historical and exam elements from today's encounter can be found in the full encounter summary report (not reduplicated in this progress note). I personally obtained the chief complaint(s) and history of present illness.  I confirmed and edited as necessary the review of systems, past medical/surgical history, family history, social history, and examination findings as documented by others; and I examined the patient myself. I personally reviewed the relevant tests, images, and reports as documented above.     I formulated and edited as necessary the assessment and plan and discussed the findings and management plan with the patient and family.      Cony Scruggs MD

## 2021-11-09 NOTE — TELEPHONE ENCOUNTER
[facility] Call Center    Phone Message    May a detailed message be left on voicemail: yes     Reason for Call: Medication Question or concern regarding medication   Prescription Clarification  Name of Medication: Continuous Blood Gluc Sensor (FREESTYLE RAHEEM 14 DAY SENSOR) [facility]  Prescribing Provider: Cecile Juarez   Pharmacy: [facility] #[id number] [place], [place] - [address] AT [facility] OF [address]   What on the order needs clarification?     Pt daughter Venus called and said that insurance will not accept the prescription for 6 and the prescription either needs to be changed to 2 for 28 days or a prior authorization would need to be sent to insurance. Says pt only has one right now that will last for 11 days and needs to get prescription filled. Requesting call back at #[phone] if there are any issues.           Action Taken: Other: Endo    Travel Screening: Not Applicable

## 2021-11-10 ENCOUNTER — OFFICE VISIT (OUTPATIENT)
Dept: ORTHOPEDICS | Facility: CLINIC | Age: 51
End: 2021-11-10
Attending: NURSE PRACTITIONER
Payer: COMMERCIAL

## 2021-11-10 ENCOUNTER — TELEPHONE (OUTPATIENT)
Dept: ENDOCRINOLOGY | Facility: CLINIC | Age: 51
End: 2021-11-10
Payer: COMMERCIAL

## 2021-11-10 ENCOUNTER — ANCILLARY PROCEDURE (OUTPATIENT)
Dept: GENERAL RADIOLOGY | Facility: CLINIC | Age: 51
End: 2021-11-10
Attending: STUDENT IN AN ORGANIZED HEALTH CARE EDUCATION/TRAINING PROGRAM
Payer: COMMERCIAL

## 2021-11-10 ENCOUNTER — PRE VISIT (OUTPATIENT)
Dept: ORTHOPEDICS | Facility: CLINIC | Age: 51
End: 2021-11-10

## 2021-11-10 VITALS — WEIGHT: 121 LBS | BODY MASS INDEX: 20.16 KG/M2 | HEIGHT: 65 IN

## 2021-11-10 DIAGNOSIS — M25.511 RIGHT SHOULDER PAIN, UNSPECIFIED CHRONICITY: ICD-10-CM

## 2021-11-10 DIAGNOSIS — S46.011D TRAUMATIC COMPLETE TEAR OF RIGHT ROTATOR CUFF, SUBSEQUENT ENCOUNTER: Primary | ICD-10-CM

## 2021-11-10 DIAGNOSIS — M25.511 RIGHT SHOULDER PAIN: ICD-10-CM

## 2021-11-10 PROCEDURE — 73030 X-RAY EXAM OF SHOULDER: CPT | Mod: RT | Performed by: RADIOLOGY

## 2021-11-10 PROCEDURE — 99204 OFFICE O/P NEW MOD 45 MIN: CPT | Performed by: STUDENT IN AN ORGANIZED HEALTH CARE EDUCATION/TRAINING PROGRAM

## 2021-11-10 ASSESSMENT — MIFFLIN-ST. JEOR: SCORE: 1154.73

## 2021-11-10 NOTE — TELEPHONE ENCOUNTER
Central Prior Authorization Team   Phone: [phone]      PA Initiation    Medication: Freestyle shauna sensors-PA initiated  Insurance Company: [employer] - Phone [phone] Fax [phone]  Pharmacy Filling the Rx: [facility] #[id number] [place], [place] - [address] AT [facility] OF [address]  Filling Pharmacy Phone: [phone]  Filling Pharmacy Fax:    Start Date: 11/10/2021

## 2021-11-10 NOTE — TELEPHONE ENCOUNTER
Prior Authorization Retail Medication Request    Medication/Dose: Freestyle shauna 14 day sensor     ICD code (if different than what is on RX):  E11.9     Previously Tried and Failed:    Rationale:  Type 2 diabetes mellitus without complication, with long-term current use of insulin     Insurance Name:    Insurance ID:        Pharmacy Information (if different than what is on RX)  Name:    Phone:

## 2021-11-11 NOTE — TELEPHONE ENCOUNTER
Prior Authorization Not Needed per Insurance    Medication: Freestyle shauna sensors-PA Not Needed  Insurance Company: [employer] - Phone [phone] Fax [phone]  Expected CoPay:      Pharmacy Filling the Rx: [facility] #[id number] [place], [place] - [address] AT [facility] [address]  Pharmacy Notified: Yes  Patient Notified: No       Never smoker

## 2021-11-12 ENCOUNTER — OFFICE VISIT (OUTPATIENT)
Dept: OPHTHALMOLOGY | Facility: CLINIC | Age: 51
End: 2021-11-12
Attending: OPHTHALMOLOGY
Payer: COMMERCIAL

## 2021-11-12 DIAGNOSIS — H40.003 GLAUCOMA SUSPECT OF BOTH EYES: ICD-10-CM

## 2021-11-12 DIAGNOSIS — E11.3299 DIABETES MELLITUS WITH BACKGROUND RETINOPATHY (H): ICD-10-CM

## 2021-11-12 DIAGNOSIS — H53.19 VITREOUS FLASHES OF BOTH EYES: ICD-10-CM

## 2021-11-12 DIAGNOSIS — H25.811 COMBINED FORM OF AGE-RELATED CATARACT, RIGHT EYE: Primary | ICD-10-CM

## 2021-11-12 DIAGNOSIS — H25.812 COMBINED FORM OF AGE-RELATED CATARACT, LEFT EYE: ICD-10-CM

## 2021-11-12 PROCEDURE — 92134 CPTRZ OPH DX IMG PST SGM RTA: CPT | Performed by: OPHTHALMOLOGY

## 2021-11-12 PROCEDURE — 99214 OFFICE O/P EST MOD 30 MIN: CPT | Performed by: OPHTHALMOLOGY

## 2021-11-12 PROCEDURE — G0463 HOSPITAL OUTPT CLINIC VISIT: HCPCS

## 2021-11-12 PROCEDURE — 76519 ECHO EXAM OF EYE: CPT | Performed by: OPHTHALMOLOGY

## 2021-11-12 PROCEDURE — 92025 CPTRIZED CORNEAL TOPOGRAPHY: CPT | Performed by: OPHTHALMOLOGY

## 2021-11-12 ASSESSMENT — EXTERNAL EXAM - RIGHT EYE: OD_EXAM: NORMAL

## 2021-11-12 ASSESSMENT — REFRACTION_WEARINGRX
OS_AXIS: 164
OD_AXIS: 003
OD_CYLINDER: +0.75
OS_ADD: +3.00
OD_ADD: +3.00
OS_SPHERE: +0.50
SPECS_TYPE: BIFOCAL
OS_CYLINDER: +1.00
OD_SPHERE: -1.00

## 2021-11-12 ASSESSMENT — SLIT LAMP EXAM - LIDS
COMMENTS: MGD
COMMENTS: MGD

## 2021-11-12 ASSESSMENT — VISUAL ACUITY
OD_CC+: -2
OD_CC: 20/40
OS_PH_CC: 20/25
OS_CC+: -1
OS_PH_CC+: -1
OS_CC: 20/40
CORRECTION_TYPE: GLASSES
METHOD: SNELLEN - LINEAR

## 2021-11-12 ASSESSMENT — CUP TO DISC RATIO
OD_RATIO: 0.8
OS_RATIO: 0.8

## 2021-11-12 ASSESSMENT — TONOMETRY
OD_IOP_MMHG: 12
OS_IOP_MMHG: 8
IOP_METHOD: ICARE

## 2021-11-12 ASSESSMENT — EXTERNAL EXAM - LEFT EYE: OS_EXAM: NORMAL

## 2021-11-12 ASSESSMENT — CONF VISUAL FIELD
OS_NORMAL: 1
METHOD: COUNTING FINGERS
OD_NORMAL: 1

## 2021-11-12 NOTE — NURSING NOTE
Chief Complaints and History of Present Illnesses   Patient presents with     Cataract Follow-Up     Chief Complaint(s) and History of Present Illness(es)     Cataract Follow-Up     Laterality: both eyes    Associated symptoms: blurred vision.  Negative for glare, haloes and starburst    Onset: gradual    Duration: 6 months    Context: distance vision    Course: gradually worsening    Activities affected: night driving    Treatments tried: glasses              Comments     6 month follow up for Cataract each eye with testing today.   Patient notes some intermittent floaters and flashes. Patient notes difficulty seeing road signs, especially when driving at night. Denies eye pain, redness, tearing, dryness, or itching each eye.     Last BS: 100 this morning   Lab Results       Component                Value               Date                       A1C                      10.1                04/26/2021                 A1C                      8.9                 12/21/2020                 A1C                      9.1                 09/23/2020                 A1C                      8.6                 11/11/2019                 A1C                      7.7                 06/18/2019              LEOLA Augustine 8:36 AM 11/12/2021

## 2021-11-19 ENCOUNTER — TELEPHONE (OUTPATIENT)
Dept: ENDOCRINOLOGY | Facility: CLINIC | Age: 51
End: 2021-11-19
Payer: COMMERCIAL

## 2021-11-19 DIAGNOSIS — E11.9 TYPE 2 DIABETES MELLITUS WITHOUT COMPLICATION, WITH LONG-TERM CURRENT USE OF INSULIN (H): ICD-10-CM

## 2021-11-19 DIAGNOSIS — Z79.4 TYPE 2 DIABETES MELLITUS WITHOUT COMPLICATION, WITH LONG-TERM CURRENT USE OF INSULIN (H): ICD-10-CM

## 2021-11-19 RX ORDER — FLASH GLUCOSE SENSOR
KIT MISCELLANEOUS
Qty: 2 EACH | Refills: 11 | Status: SHIPPED | OUTPATIENT
Start: 2021-11-19 | End: 2022-08-11

## 2021-11-19 RX ORDER — FLASH GLUCOSE SENSOR
KIT MISCELLANEOUS
Qty: 6 EACH | Refills: 11 | OUTPATIENT
Start: 2021-11-19 | End: 2021-11-19

## 2021-11-19 NOTE — TELEPHONE ENCOUNTER
PA at [facility]/[employer] - Phone [phone]   Gentry has run a Coverage check  [id number] GEOVANNA SALINAS   Plan covers 2 within a 21 rolling day period  Every 23 days she can get a quantity of 2  Using 2 every 30 days.   Partial fills has cause problems with refills.   On Dec 11, 2021.   [facility] dispensed 1 and picked up Nov 05, 2021.     Spoke w/ Pt: Asked that we contact [facility] to ask for 1 more sensor.           Pharmacy Contact    Telephone Fax   [phone]        Outpatient Medication Detail     Disp Refills Start End AMANUEL   Continuous Blood Gluc Sensor (FREESTYLE RAHEEM 14 DAY SENSOR) MISC 2 each 11 11/19/2021  No   Sig: Change every 14 days.   Class: E-Prescribe   Order: [id number]       Printout Tracking    External Result Report     Medication Administration Instructions    Change every 14 days.     Pharmacy    [facility] #[id number] - [facility] [address] AT [facility] OF [address]

## 2021-12-01 ENCOUNTER — TELEPHONE (OUTPATIENT)
Dept: OPHTHALMOLOGY | Facility: CLINIC | Age: 51
End: 2021-12-01
Payer: COMMERCIAL

## 2021-12-01 NOTE — TELEPHONE ENCOUNTER
Called patient to schedule procedure with Dr. Scruggs, there was no answer.  Left message with my direct line [phone].

## 2021-12-03 NOTE — TELEPHONE ENCOUNTER
Called patient to schedule procedure with Dr. Scruggs, there was no answer.  Left message with my direct line [phone].

## 2021-12-20 DIAGNOSIS — E11.65 TYPE 2 DIABETES MELLITUS WITH HYPERGLYCEMIA, WITHOUT LONG-TERM CURRENT USE OF INSULIN (H): ICD-10-CM

## 2021-12-23 DIAGNOSIS — Z11.59 ENCOUNTER FOR SCREENING FOR OTHER VIRAL DISEASES: ICD-10-CM

## 2021-12-23 PROBLEM — H25.811 COMBINED FORM OF AGE-RELATED CATARACT, RIGHT EYE: Status: ACTIVE | Noted: 2021-12-23

## 2021-12-23 NOTE — TELEPHONE ENCOUNTER
Spoke with patient to schedule surgery with Dr. Scruggs    Surgery was scheduled on 1/13 at [facility]  Patient will have H&P at [facility]     Patient is aware a COVID-19 test is needed before their procedure. The test should be with-in 4 days of their procedure.   Test Details: Date 1/12 Location [facility]    Post-Op visit was scheduled on 1/13 AND 1/25  Patient is aware a / is needed day of surgery.   Surgery packet was mailed 12/23, patient has my direct contact information for any further questions.

## 2021-12-24 NOTE — TELEPHONE ENCOUNTER
FUTURE VISIT INFORMATION      SURGERY INFORMATION:    Date: 22    Location:  or    Surgeon:  Cony Scruggs MD    Anesthesia Type:  Combined MAC with Topical    Procedure: PHACOEMULSIFICATION, CATARACT, WITH STANDARD INTRAOCULAR LENS IMPLANT INSERTION right    Consult: ov     RECORDS REQUESTED FROM:       Primary Care Provider: Yvonne Solomon MD- [facility]    Most recent EKG+ Tracin19    Most recent ECHO: 13    Most recent Cardiac Stress Test: 4/15/13

## 2022-01-07 DIAGNOSIS — H25.811 COMBINED FORM OF AGE-RELATED CATARACT, RIGHT EYE: Primary | ICD-10-CM

## 2022-01-07 RX ORDER — KETOROLAC TROMETHAMINE 5 MG/ML
1 SOLUTION OPHTHALMIC 4 TIMES DAILY
Qty: 10 ML | Refills: 0 | Status: SHIPPED | OUTPATIENT
Start: 2022-01-07 | End: 2022-08-11

## 2022-01-07 RX ORDER — PREDNISOLONE ACETATE 10 MG/ML
1 SUSPENSION/ DROPS OPHTHALMIC 4 TIMES DAILY
Qty: 15 ML | Refills: 1 | Status: SHIPPED | OUTPATIENT
Start: 2022-01-07 | End: 2022-03-25

## 2022-01-07 RX ORDER — OFLOXACIN 3 MG/ML
1 SOLUTION/ DROPS OPHTHALMIC 4 TIMES DAILY
Qty: 5 ML | Refills: 0 | Status: SHIPPED | OUTPATIENT
Start: 2022-01-07 | End: 2022-03-25

## 2022-01-12 ENCOUNTER — LAB (OUTPATIENT)
Dept: LAB | Facility: CLINIC | Age: 52
End: 2022-01-12
Attending: OPHTHALMOLOGY
Payer: COMMERCIAL

## 2022-01-12 ENCOUNTER — OFFICE VISIT (OUTPATIENT)
Dept: SURGERY | Facility: CLINIC | Age: 52
End: 2022-01-12
Payer: COMMERCIAL

## 2022-01-12 ENCOUNTER — PRE VISIT (OUTPATIENT)
Dept: SURGERY | Facility: CLINIC | Age: 52
End: 2022-01-12

## 2022-01-12 ENCOUNTER — LAB REQUISITION (OUTPATIENT)
Dept: LAB | Facility: CLINIC | Age: 52
End: 2022-01-12
Payer: COMMERCIAL

## 2022-01-12 ENCOUNTER — ANESTHESIA EVENT (OUTPATIENT)
Dept: SURGERY | Facility: AMBULATORY SURGERY CENTER | Age: 52
End: 2022-01-12
Payer: COMMERCIAL

## 2022-01-12 VITALS
SYSTOLIC BLOOD PRESSURE: 133 MMHG | OXYGEN SATURATION: 98 % | WEIGHT: 122.3 LBS | DIASTOLIC BLOOD PRESSURE: 85 MMHG | RESPIRATION RATE: 20 BRPM | BODY MASS INDEX: 20.37 KG/M2 | HEIGHT: 65 IN | HEART RATE: 91 BPM | TEMPERATURE: 98.2 F

## 2022-01-12 DIAGNOSIS — Z11.59 ENCOUNTER FOR SCREENING FOR OTHER VIRAL DISEASES: ICD-10-CM

## 2022-01-12 DIAGNOSIS — Z01.818 PRE-OP EXAMINATION: Primary | ICD-10-CM

## 2022-01-12 DIAGNOSIS — R10.13 EPIGASTRIC PAIN: ICD-10-CM

## 2022-01-12 LAB — SARS-COV-2 RNA RESP QL NAA+PROBE: NEGATIVE

## 2022-01-12 PROCEDURE — U0003 INFECTIOUS AGENT DETECTION BY NUCLEIC ACID (DNA OR RNA); SEVERE ACUTE RESPIRATORY SYNDROME CORONAVIRUS 2 (SARS-COV-2) (CORONAVIRUS DISEASE [COVID-19]), AMPLIFIED PROBE TECHNIQUE, MAKING USE OF HIGH THROUGHPUT TECHNOLOGIES AS DESCRIBED BY CMS-2020-01-R: HCPCS | Mod: 90 | Performed by: PATHOLOGY

## 2022-01-12 PROCEDURE — 87338 HPYLORI STOOL AG IA: CPT | Mod: ORL | Performed by: NURSE PRACTITIONER

## 2022-01-12 PROCEDURE — 99204 OFFICE O/P NEW MOD 45 MIN: CPT | Performed by: NURSE PRACTITIONER

## 2022-01-12 PROCEDURE — U0005 INFEC AGEN DETEC AMPLI PROBE: HCPCS | Mod: 90 | Performed by: PATHOLOGY

## 2022-01-12 ASSESSMENT — MIFFLIN-ST. JEOR: SCORE: 1160.63

## 2022-01-12 ASSESSMENT — PAIN SCALES - GENERAL: PAINLEVEL: WORST PAIN (10)

## 2022-01-12 ASSESSMENT — LIFESTYLE VARIABLES: TOBACCO_USE: 1

## 2022-01-12 NOTE — H&P
Pre-Operative H & P     CC:  Preoperative exam to assess for increased cardiopulmonary risk while undergoing surgery and anesthesia.    Date of Encounter: 1/12/2022  Primary Care Physician:  Yvonne Solomon  Karen Braxton is a 51 year old female who presents for pre-operative H & P in preparation for  PHACOEMULSIFICATION, CATARACT, WITH STANDARD INTRAOCULAR LENS IMPLANT INSERTION right - Right with Cony Scruggs MD on 1/13/2022 at [facility] [place] with [place].     Ms. Braxton is followed by Dr. Scruggs in ophthalmology for her diabetic eye exam and cataracts.  She was last seen on 11/12/21 and reported gradually increasing difficulty with distance vision, especially at night.  She reported difficulty with nighttime driving and seeing glare around lights. Dr. Scruggs counseled Ms. Braxton on age related cataracts (present in both eyes) and treatment.  Ms. Braxton presents to the PAC in preparation for the above procedure.        Notable past medical history includes DMII, HLD, tobacco dependent, s/p cholecystectomy, s/p knee arthroscopy, and cleft palate repair as a child.       Diagnosis     Combined form of age-related cataract, right eye          History is obtained from the patient and electronic health record.     Past Medical History  Past Medical History:   Diagnosis Date     Cigarette nicotine dependence without complication      Combined forms of age-related cataract of both eyes      Type 2 diabetes mellitus (H)        Past Surgical History  Past Surgical History:   Procedure Laterality Date     KNEE SURGERY Left 2001     LAPAROSCOPIC CHOLECYSTECTOMY  2007     REPAIR CLEFT PALATE CHILD  1972       Hx of Blood transfusions/reactions: denies      Hx of abnormal bleeding or anti-platelet use: ASA     Menstrual history: No LMP recorded (lmp unknown). Patient is postmenopausal.:    Steroid use in the last year: denies     Personal or FH with difficulty with Anesthesia:  Denies     Prior to Admission  Medications  Current Outpatient Medications   Medication Sig Dispense Refill     atorvastatin (LIPITOR) 40 MG tablet Take 1 tablet (40 mg) by mouth daily (Patient taking differently: Take 40 mg by mouth every morning ) 90 tablet 3     ketorolac (ACULAR) 0.5 % ophthalmic solution Place 1 drop into the right eye 4 times daily 10 mL 0     metFORMIN (GLUCOPHAGE) 1000 MG tablet Take 0.5 tablets (500 mg) by mouth 2 times daily (with meals) 90 tablet 1     NOVOLOG FLEXPEN 100 UNIT/ML soln INJECT 32 UNITS BEFORE BREAKFAST, 37 UNITS BEFORE LUNCH, 37 UNITS BEFORE DINNER 90 mL 3     ofloxacin (OCUFLOX) 0.3 % ophthalmic solution Place 1 drop into the right eye 4 times daily 5 mL 0     prednisoLONE acetate (PRED FORTE) 1 % ophthalmic suspension Place 1 drop into the right eye 4 times daily 15 mL 1     vitamin D2 (ERGOCALCIFEROL) 35247 units (1250 mcg) capsule Take 1 capsule (50,000 Units) by mouth once a week 12 capsule 3     vitamin D2 (ERGOCALCIFEROL) 38648 units (1250 mcg) capsule Take 1 capsule (50,000 Units) by mouth every 7 days 8 capsule 6     vitamin D3 (CHOLECALCIFEROL) 50 mcg (2000 units) tablet Take 1 tablet (50 mcg) by mouth daily 90 tablet 3     ASPIRIN LOW DOSE 81 MG EC tablet TAKE 1 TABLET(81 MG) BY MOUTH DAILY (Patient not taking: Reported on 1/12/2022) 90 tablet 0     blood glucose (NO BRAND SPECIFIED) test strip Use to test blood sugar 4 times daily and as needed. Any covered brand that meets Pt need. 400 strip 4     blood glucose monitoring (NO BRAND SPECIFIED) meter device kit Use to test blood sugar 4 times daily and as needed. Any covered brand. 1 kit 1     Continuous Blood Gluc Sensor (FREESTYLE RAHEEM 14 DAY SENSOR) MISC Change every 14 days. 2 each 11     empagliflozin (JARDIANCE) 25 MG TABS tablet Take 1 tablet (25 mg) by mouth daily (Patient not taking: Reported on 1/12/2022) 90 tablet 3     insulin glargine (BASAGLAR KWIKPEN) 100 UNIT/ML pen 50 units subcutaneous at hs. (Patient not taking: Reported on  "1/12/2022) 18 mL 3     insulin pen needle (B-D U/F) 31G X 8 MM miscellaneous USE TO INJECT FOUR TIMES DAILY OR AS DIRECTED 400 each 3     insulin syringe-needle U-100 (BD INSULIN SYRINGE ULTRAFINE) 30G X 1/2\" 1 ML Use one syringe daily or as directed.  3 month supply 100 each prn     lisinopril (PRINIVIL/ZESTRIL) 2.5 MG tablet Take 1 tablet (2.5 mg) by mouth daily (Patient not taking: Reported on 1/12/2022) 30 tablet 0       Allergies  No Known Allergies    Social History  Social History     Socioeconomic History     Marital status:      Spouse name: Not on file     Number of children: Not on file     Years of education: Not on file     Highest education level: Not on file   Occupational History     Not on file   Tobacco Use     Smoking status: Current Some Day Smoker     Packs/day: 0.50     Years: 40.00     Pack years: 20.00     Types: Cigarettes     Smokeless tobacco: Never Used     Tobacco comment: 3 cigarettes daily   Substance and Sexual Activity     Alcohol use: No     Alcohol/week: 0.0 standard drinks     Drug use: No     Sexual activity: Yes     Partners: Male   Other Topics Concern     Parent/sibling w/ CABG, MI or angioplasty before 65F 55M? No   Social History Narrative     Not on file     Social Determinants of Health     Financial Resource Strain: Not on file   Food Insecurity: Not on file   Transportation Needs: Not on file   Physical Activity: Not on file   Stress: Not on file   Social Connections: Not on file   Intimate Partner Violence: Not on file   Housing Stability: Not on file       Family History  Family History   Problem Relation Age of Onset     Hypertension Mother      Hyperlipidemia Mother      Diabetes Type 2  Mother      Diabetes Mother      Hypertension Father      Diabetes Type 2  Father      Diabetes Father      Diabetes Brother      Diabetes Sister      Glaucoma No family hx of      Macular Degeneration No family hx of          ROS/MED HX  ENT/Pulmonary: Comment: Cataracts, " "b/l  Glaucoma          S/p cleft palate repair as a child.     (+) tobacco use (Smokes ~4 cigarettes per day.  Smoked for last 40 years.), Current use,     Neurologic:  - neg neurologic ROS     Cardiovascular:       METS/Exercise Tolerance: >4 METS Comment: Works overnights at [employer].  On her feet most of the time.    Hematologic:  - neg hematologic  ROS     Musculoskeletal: Comment: Reports right groin pain that radiate down to right leg.  She tells me she will be seen today in [facility] by her doctor.  Taking ibuprofen for pain.  Onset was after New Years.     S/p knee arthroscopic surgery - chart says left but patient reports it was her right knee.       GI/Hepatic:     (+) cholecystitis/cholelithiasis (s/p cholecystectomy),     Renal/Genitourinary:  - neg Renal ROS     Endo:     (+) type II DM, Last HgA1c: 9.1, date: 9/2021, Using insulin, - not using insulin pump. Normal glucose range: 147, not previously admitted for DM/DKA.     Psychiatric/Substance Use:  - neg psychiatric ROS     Infectious Disease: Comment: Completed COVID vaccines and booster.       Malignancy:  - neg malignancy ROS     Other: Comment: Works for [employer] in [employer].      (+) LMP: 2003, ,       VITALS       Temp: 98.2  F (36.8  C) Temp src: Oral BP: 133/85 Pulse: 91   Resp: 20 SpO2: 98 %         122 lbs 4.8 oz  5' 5\"   Body mass index is 20.35 kg/m .       Physical Exam  Constitutional: Awake, alert, cooperative, no apparent distress, and appears stated age.  Eyes: Pupils equal, round and reactive to light, extra ocular muscles intact, sclera clear, conjunctiva normal.  HENT: Normocephalic, oral pharynx with moist mucus membranes, upper partial plate.  Repaired cleft palate with evidence of well-healed surgical scar.  No goiter appreciated.   Respiratory: Clear to auscultation bilaterally, no crackles or wheezing.  Cardiovascular: Regular rate and rhythm, normal S1 and S2, and no murmur noted.  Carotids +2, no bruits. No edema. " Palpable pulses to radial  DP and PT arteries.   GI: Normal bowel sounds, soft, non-distended, non-tender, no masses palpated, no hepatosplenomegaly.    Lymph/Hematologic: No cervical lymphadenopathy and no supraclavicular lymphadenopathy.  Genitourinary:  deferred  Skin: Warm and dry.    Musculoskeletal: Full ROM of neck. Evidence of guarding right leg.     Neurologic: Awake, alert, oriented to name, place and time. Cranial nerves II-XII are grossly intact.   Neuropsychiatric: Calm, cooperative. Normal affect.         LABS:  CBC:   Lab Results   Component Value Date    WBC 5.6 09/23/2020    WBC 5.1 11/11/2019    HGB 13.3 09/23/2020    HGB 15.0 11/11/2019    HCT 41.0 09/23/2020    HCT 46.1 11/11/2019     09/23/2020     11/11/2019     BMP:   Lab Results   Component Value Date     09/24/2021     09/23/2020    POTASSIUM 4.0 09/24/2021    POTASSIUM 4.0 09/23/2020    CHLORIDE 104 09/24/2021    CHLORIDE 105 09/23/2020    CO2 24 09/24/2021    CO2 25 09/23/2020    BUN 10 09/24/2021    BUN 15 09/23/2020    CR 0.67 09/24/2021    CR 0.82 09/23/2020     (H) 09/24/2021     (H) 09/23/2020     COAGS:   Lab Results   Component Value Date    PTT 33 02/14/2006    INR 0.92 02/14/2006     POC:   Lab Results   Component Value Date     (H) 09/14/2007     HEPATIC:   Lab Results   Component Value Date    ALBUMIN 3.1 (L) 09/24/2021    PROTTOTAL 7.2 09/24/2021    ALT 20 09/24/2021    AST 11 09/24/2021    ALKPHOS 130 09/24/2021    BILITOTAL 0.2 09/24/2021     OTHER:   Lab Results   Component Value Date    A1C 10.1 (H) 04/26/2021    MARLEEN 9.1 09/24/2021    PHOS 4.2 03/18/2014    MAG 2.0 03/18/2014    LIPASE 130 09/24/2021    AMYLASE 41 01/08/2014    TSH 2.12 03/16/2018    T4 1.22 08/04/2011    SED 23 (H) 01/09/2015         PROCEDURES     EKG 2019:  SR     Echocardiogram 2013     Interpretation Summary      Left ventricular systolic function is normal. The visual ejection fraction is       estimated  at 65-70%. No regional wall motion abnormalities noted. No       significant valvular abnormalities.      PatientHeight: 65 in      PatientWeight: 140 lbs      SystolicPressure: 112 mmHg      DiastolicPressure: 80 mmHg      HeartRate: 73 bpm      BSA 1.7 m^2     Exercise stress echocardiogram 2013     Interpretation Summary      Target Heart Rate was not achieved due to fatigue. Suboptimal blood pressure       response. Chest pain at rest which did not worsen with exercise. The visual       ejection fraction is estimated at 60-65%. Normal left ventricular wall       motion. Unfortunately a non diagnostic study due to non achievement of target       heart rate.      PatientHeight: 65 in      PatientWeight: 140 lbs      SystolicPressure: 122 mmHg      DiastolicPressure: 86 mmHg      HeartRate: 75 bpm      BSA 1.7 m^2       ~The patient's records and results personally reviewed by this provider.   ~Outside records reviewed from: care everywhere    ASSESSMENT and PLAN    Specific operative considerations:   - SATNAM # of risks 1/8 = low risk  - VTE risk:  0.26%  - Risk of PONV score = 1.  If > 2, anti-emetic intervention recommended.    #  Cardiology   - Denies known coronary artery disease.  Denies cardiac symptoms.  - METS:  >4. RCRI : Diabetes Mellitus (on Insulin).  0.9 % risk of major adverse cardiac event.       - Not takING lisinopril or statin.  BP today 133/85.   - Taking ASA 81 mg    #  Pulmonary   - tobacco dependence, smokes ~4 ciagarettes/day.  She has smoked since 1979.Encouraged smoking cessation.    - Denies pulmonary symptoms  - No inhalers     #  Endocrine   -  Diabetes, insulin dependent with last A1C of 9.1 (9/2021). Notified Dr. Scruggs of elevated A1C. Hold morning oral hypoglycemic medications and short acting insulin DOS. Take 80% of last scheduled dose of long acting insulin prior to procedure.  Recommend close monitoring of the patient's blood glucose levels throughout the perioperative period and  treat per [place] guidelines.    # Renal  - Cr 0.67 and GFR >90.0      #  Orthopedic  - Reports onset of right groin pain with pain radiating down right leg since New Years.  Has been taking ibuprofen for pain control.  Will be seen in [facility] today for further evaluaiotn.  No difficulty with laying flat.  Consideration fore careful positioning        #  HEENT    - Cataracts with above procedure planned with right eye  - Glaucoma, use eye drops as prescribed  - s/p cleft palate repair as a child.  Upper partial plate.  Evidence of some missing molars on the bottom.  All remaining teeth intact.       #  ID  - COVID-19 testing per surgeon's office  - Completed COVID vaccine and booster.     # Anesthesia considerations  - Refer to PAC assessment in anesthesia records  - Final plan per anesthesiologist on the day of surgery.       Arrival time, NPO, shower and medication instructions provided by nursing staff today.    Patient was discussed with Dr Celestin. Patient is an acceptable candidate for the proposed procedure.  No further diagnostic evaluation is needed.       FLORESITA Lin CNP  Preoperative Assessment Center  [facility]  Phone: [phone]  Fax: [phone]

## 2022-01-12 NOTE — H&P (VIEW-ONLY)
Pre-Operative H & P     CC:  Preoperative exam to assess for increased cardiopulmonary risk while undergoing surgery and anesthesia.    Date of Encounter: 1/12/2022  Primary Care Physician:  Yvonne Solomon  Karen Braxton is a 51 year old female who presents for pre-operative H & P in preparation for  PHACOEMULSIFICATION, CATARACT, WITH STANDARD INTRAOCULAR LENS IMPLANT INSERTION right - Right with Cony Scruggs MD on 1/13/2022 at [facility] under MAC with topical.     Ms. Braxton is followed by Dr. Scruggs in ophthalmology for her diabetic eye exam and cataracts.  She was last seen on 11/12/21 and reported gradually increasing difficulty with distance vision, especially at night.  She reported difficulty with nighttime driving and seeing glare around lights. Dr. Scruggs counseled Ms. Braxton on age related cataracts (present in both eyes) and treatment.  Ms. Braxton presents to the PAC in preparation for the above procedure.        Notable past medical history includes DMII, HLD, tobacco dependent, s/p cholecystectomy, s/p knee arthroscopy, and cleft palate repair as a child.       Diagnosis     Combined form of age-related cataract, right eye          History is obtained from the patient and electronic health record.     Past Medical History  Past Medical History:   Diagnosis Date     Cigarette nicotine dependence without complication      Combined forms of age-related cataract of both eyes      Type 2 diabetes mellitus (H)        Past Surgical History  Past Surgical History:   Procedure Laterality Date     KNEE SURGERY Left 2001     LAPAROSCOPIC CHOLECYSTECTOMY  2007     REPAIR CLEFT PALATE CHILD  1972       Hx of Blood transfusions/reactions: denies      Hx of abnormal bleeding or anti-platelet use: ASA     Menstrual history: No LMP recorded (lmp unknown). Patient is postmenopausal.:    Steroid use in the last year: denies     Personal or FH with difficulty with Anesthesia:  Denies     Prior to Admission  Medications  Current Outpatient Medications   Medication Sig Dispense Refill     atorvastatin (LIPITOR) 40 MG tablet Take 1 tablet (40 mg) by mouth daily (Patient taking differently: Take 40 mg by mouth every morning ) 90 tablet 3     ketorolac (ACULAR) 0.5 % ophthalmic solution Place 1 drop into the right eye 4 times daily 10 mL 0     metFORMIN (GLUCOPHAGE) 1000 MG tablet Take 0.5 tablets (500 mg) by mouth 2 times daily (with meals) 90 tablet 1     NOVOLOG FLEXPEN 100 UNIT/ML soln INJECT 32 UNITS BEFORE BREAKFAST, 37 UNITS BEFORE LUNCH, 37 UNITS BEFORE DINNER 90 mL 3     ofloxacin (OCUFLOX) 0.3 % ophthalmic solution Place 1 drop into the right eye 4 times daily 5 mL 0     prednisoLONE acetate (PRED FORTE) 1 % ophthalmic suspension Place 1 drop into the right eye 4 times daily 15 mL 1     vitamin D2 (ERGOCALCIFEROL) 90440 units (1250 mcg) capsule Take 1 capsule (50,000 Units) by mouth once a week 12 capsule 3     vitamin D2 (ERGOCALCIFEROL) 27418 units (1250 mcg) capsule Take 1 capsule (50,000 Units) by mouth every 7 days 8 capsule 6     vitamin D3 (CHOLECALCIFEROL) 50 mcg (2000 units) tablet Take 1 tablet (50 mcg) by mouth daily 90 tablet 3     ASPIRIN LOW DOSE 81 MG EC tablet TAKE 1 TABLET(81 MG) BY MOUTH DAILY (Patient not taking: Reported on 1/12/2022) 90 tablet 0     blood glucose (NO BRAND SPECIFIED) test strip Use to test blood sugar 4 times daily and as needed. Any covered brand that meets Pt need. 400 strip 4     blood glucose monitoring (NO BRAND SPECIFIED) meter device kit Use to test blood sugar 4 times daily and as needed. Any covered brand. 1 kit 1     Continuous Blood Gluc Sensor (FREESTYLE RAHEEM 14 DAY SENSOR) MISC Change every 14 days. 2 each 11     empagliflozin (JARDIANCE) 25 MG TABS tablet Take 1 tablet (25 mg) by mouth daily (Patient not taking: Reported on 1/12/2022) 90 tablet 3     insulin glargine (BASAGLAR KWIKPEN) 100 UNIT/ML pen 50 units subcutaneous at hs. (Patient not taking: Reported on  "1/12/2022) 18 mL 3     insulin pen needle (B-D U/F) 31G X 8 MM miscellaneous USE TO INJECT FOUR TIMES DAILY OR AS DIRECTED 400 each 3     insulin syringe-needle U-100 (BD INSULIN SYRINGE ULTRAFINE) 30G X 1/2\" 1 ML Use one syringe daily or as directed.  3 month supply 100 each prn     lisinopril (PRINIVIL/ZESTRIL) 2.5 MG tablet Take 1 tablet (2.5 mg) by mouth daily (Patient not taking: Reported on 1/12/2022) 30 tablet 0       Allergies  No Known Allergies    Social History  Social History     Socioeconomic History     Marital status:      Spouse name: Not on file     Number of children: Not on file     Years of education: Not on file     Highest education level: Not on file   Occupational History     Not on file   Tobacco Use     Smoking status: Current Some Day Smoker     Packs/day: 0.50     Years: 40.00     Pack years: 20.00     Types: Cigarettes     Smokeless tobacco: Never Used     Tobacco comment: 3 cigarettes daily   Substance and Sexual Activity     Alcohol use: No     Alcohol/week: 0.0 standard drinks     Drug use: No     Sexual activity: Yes     Partners: Male   Other Topics Concern     Parent/sibling w/ CABG, MI or angioplasty before 65F 55M? No   Social History Narrative     Not on file     Social Determinants of Health     Financial Resource Strain: Not on file   Food Insecurity: Not on file   Transportation Needs: Not on file   Physical Activity: Not on file   Stress: Not on file   Social Connections: Not on file   Intimate Partner Violence: Not on file   Housing Stability: Not on file       Family History  Family History   Problem Relation Age of Onset     Hypertension Mother      Hyperlipidemia Mother      Diabetes Type 2  Mother      Diabetes Mother      Hypertension Father      Diabetes Type 2  Father      Diabetes Father      Diabetes Brother      Diabetes Sister      Glaucoma No family hx of      Macular Degeneration No family hx of          ROS/MED HX  ENT/Pulmonary: Comment: Cataracts, " "b/l  Glaucoma          S/p cleft palate repair as a child.     (+) tobacco use (Smokes ~4 cigarettes per day.  Smoked for last 40 years.), Current use,     Neurologic:  - neg neurologic ROS     Cardiovascular:       METS/Exercise Tolerance: >4 METS Comment: Works overnights at [employer].  On her feet most of the time.    Hematologic:  - neg hematologic  ROS     Musculoskeletal: Comment: Reports right groin pain that radiate down to right leg.  She tells me she will be seen today in [facility] by her doctor.  Taking ibuprofen for pain.  Onset was after New Years.     S/p knee arthroscopic surgery - chart says left but patient reports it was her right knee.       GI/Hepatic:     (+) cholecystitis/cholelithiasis (s/p cholecystectomy),     Renal/Genitourinary:  - neg Renal ROS     Endo:     (+) type II DM, Last HgA1c: 9.1, date: 9/2021, Using insulin, - not using insulin pump. Normal glucose range: 147, not previously admitted for DM/DKA.     Psychiatric/Substance Use:  - neg psychiatric ROS     Infectious Disease: Comment: Completed COVID vaccines and booster.       Malignancy:  - neg malignancy ROS     Other: Comment: Works for [employer] in [place].      (+) LMP: 2003, ,       VITALS       Temp: 98.2  F (36.8  C) Temp src: Oral BP: 133/85 Pulse: 91   Resp: 20 SpO2: 98 %         122 lbs 4.8 oz  5' 5\"   Body mass index is 20.35 kg/m .       Physical Exam  Constitutional: Awake, alert, cooperative, no apparent distress, and appears stated age.  Eyes: Pupils equal, round and reactive to light, extra ocular muscles intact, sclera clear, conjunctiva normal.  HENT: Normocephalic, oral pharynx with moist mucus membranes, upper partial plate.  Repaired cleft palate with evidence of well-healed surgical scar.  No goiter appreciated.   Respiratory: Clear to auscultation bilaterally, no crackles or wheezing.  Cardiovascular: Regular rate and rhythm, normal S1 and S2, and no murmur noted.  Carotids +2, no bruits. No edema. " Palpable pulses to radial  DP and PT arteries.   GI: Normal bowel sounds, soft, non-distended, non-tender, no masses palpated, no hepatosplenomegaly.    Lymph/Hematologic: No cervical lymphadenopathy and no supraclavicular lymphadenopathy.  Genitourinary:  deferred  Skin: Warm and dry.    Musculoskeletal: Full ROM of neck. Evidence of guarding right leg.     Neurologic: Awake, alert, oriented to name, place and time. Cranial nerves II-XII are grossly intact.   Neuropsychiatric: Calm, cooperative. Normal affect.         LABS:  CBC:   Lab Results   Component Value Date    WBC 5.6 09/23/2020    WBC 5.1 11/11/2019    HGB 13.3 09/23/2020    HGB 15.0 11/11/2019    HCT 41.0 09/23/2020    HCT 46.1 11/11/2019     09/23/2020     11/11/2019     BMP:   Lab Results   Component Value Date     09/24/2021     09/23/2020    POTASSIUM 4.0 09/24/2021    POTASSIUM 4.0 09/23/2020    CHLORIDE 104 09/24/2021    CHLORIDE 105 09/23/2020    CO2 24 09/24/2021    CO2 25 09/23/2020    BUN 10 09/24/2021    BUN 15 09/23/2020    CR 0.67 09/24/2021    CR 0.82 09/23/2020     (H) 09/24/2021     (H) 09/23/2020     COAGS:   Lab Results   Component Value Date    PTT 33 02/14/2006    INR 0.92 02/14/2006     POC:   Lab Results   Component Value Date     (H) 09/14/2007     HEPATIC:   Lab Results   Component Value Date    ALBUMIN 3.1 (L) 09/24/2021    PROTTOTAL 7.2 09/24/2021    ALT 20 09/24/2021    AST 11 09/24/2021    ALKPHOS 130 09/24/2021    BILITOTAL 0.2 09/24/2021     OTHER:   Lab Results   Component Value Date    A1C 10.1 (H) 04/26/2021    MARLEEN 9.1 09/24/2021    PHOS 4.2 03/18/2014    MAG 2.0 03/18/2014    LIPASE 130 09/24/2021    AMYLASE 41 01/08/2014    TSH 2.12 03/16/2018    T4 1.22 08/04/2011    SED 23 (H) 01/09/2015         PROCEDURES     EKG 2019:  SR     Echocardiogram 2013     Interpretation Summary      Left ventricular systolic function is normal. The visual ejection fraction is       estimated  at 65-70%. No regional wall motion abnormalities noted. No       significant valvular abnormalities.      PatientHeight: 65 in      PatientWeight: 140 lbs      SystolicPressure: 112 mmHg      DiastolicPressure: 80 mmHg      HeartRate: 73 bpm      BSA 1.7 m^2     Exercise stress echocardiogram 2013     Interpretation Summary      Target Heart Rate was not achieved due to fatigue. Suboptimal blood pressure       response. Chest pain at rest which did not worsen with exercise. The visual       ejection fraction is estimated at 60-65%. Normal left ventricular wall       motion. Unfortunately a non diagnostic study due to non achievement of target       heart rate.      PatientHeight: 65 in      PatientWeight: 140 lbs      SystolicPressure: 122 mmHg      DiastolicPressure: 86 mmHg      HeartRate: 75 bpm      BSA 1.7 m^2       ~The patient's records and results personally reviewed by this provider.   ~Outside records reviewed from: care everywhere    ASSESSMENT and PLAN    Specific operative considerations:   - SATNAM # of risks 1/8 = low risk  - VTE risk:  0.26%  - Risk of PONV score = 1.  If > 2, anti-emetic intervention recommended.    #  Cardiology   - Denies known coronary artery disease.  Denies cardiac symptoms.  - METS:  >4. RCRI : Diabetes Mellitus (on Insulin).  0.9 % risk of major adverse cardiac event.       - Not takING lisinopril or statin.  BP today 133/85.   - Taking ASA 81 mg    #  Pulmonary   - tobacco dependence, smokes ~4 ciagarettes/day.  She has smoked since 1979.Encouraged smoking cessation.    - Denies pulmonary symptoms  - No inhalers     #  Endocrine   -  Diabetes, insulin dependent with last A1C of 9.1 (9/2021). Notified Dr. Scruggs of elevated A1C. Hold morning oral hypoglycemic medications and short acting insulin DOS. Take 80% of last scheduled dose of long acting insulin prior to procedure.  Recommend close monitoring of the patient's blood glucose levels throughout the perioperative period and  treat per [place] guidelines.    # Renal  - Cr 0.67 and GFR >90.0      #  Orthopedic  - Reports onset of right groin pain with pain radiating down right leg since New Years.  Has been taking ibuprofen for pain control.  Will be seen in [facility] today for further evaluaiotn.  No difficulty with laying flat.  Consideration fore careful positioning        #  HEENT    - Cataracts with above procedure planned with right eye  - Glaucoma, use eye drops as prescribed  - s/p cleft palate repair as a child.  Upper partial plate.  Evidence of some missing molars on the bottom.  All remaining teeth intact.       #  ID  - COVID-19 testing per surgeon's office  - Completed COVID vaccine and booster.     # Anesthesia considerations  - Refer to PAC assessment in anesthesia records  - Final plan per anesthesiologist on the day of surgery.       Arrival time, NPO, shower and medication instructions provided by nursing staff today.    Patient was discussed with Dr Celestin. Patient is an acceptable candidate for the proposed procedure.  No further diagnostic evaluation is needed.       FLORESITA Lin CNP  Preoperative Assessment Center  [facility]  Phone: [phone]  Fax: [phone]

## 2022-01-12 NOTE — PATIENT INSTRUCTIONS
Preparing for Your Surgery      Name:  Karen Braxton   MRN:  [id number]   :  1970   Today's Date:  2022         Arriving for surgery:  Surgery date:  2022  Arrival time:  6:30 am    Restrictions due to COVID 19:  One consistent visitor is allowed per patient  No ill visitors  All visitors must wear face mask     parking is available for anyone with mobility limitations or disabilities. (Monday- Friday 7 am- 5 pm)    Please come to:    [facility]  [address] [place] [id number]    Please check in on the 5th floor at the Ambulatory Surgery Center       What can I eat or drink?    -  You may eat and drink normally until 8 hours before surgery. (Until midnight)  -  You may have clear liquids up to 4 hours before surgery. (Until 4 am)  Examples of clear liquids:  Water  Clear broth  Juices (apple, white grape, white cranberry  and cider) without pulp  Noncarbonated, powder based beverages  (lemonade and Eric-Aid)  Sodas (Sprite, 7-Up, ginger ale and seltzer)  Coffee or tea (without milk or cream)  Gatorade    --No alcohol for at least 24 hours before surgery    Which medicines can I take?    Hold Aspirin for 7 days before surgery.   Hold Multivitamins for 7 days before surgery.  Hold Supplements for 7 days before surgery.  Hold Ibuprofen (Advil, Motrin) for 1 day before surgery.  Hold Naproxen (Aleve) for 4 days before surgery.    -  DO NOT take the following medications the day of surgery:  Metformin  Morning dose of insulin      -  PLEASE TAKE the following medications the day of surgery   Atorvastatin  Eye drops as directed       How do I prepare myself?  - Please take 2 showers before surgery using Scrubcare or Hibiclens soap.    Use this soap only from the neck to your toes.     Leave the soap on your skin for one minute--then rinse thoroughly.      You may use your own shampoo and conditioner; no other hair products.   - Please remove all jewelry and  body piercings.  - No lotions, deodorants or fragrance.  - No makeup or fingernail polish.   - Bring your ID and insurance card.    -If you have a Deep Brain Stimulator, a Spinal Cord Stimulator or any implanted Neuro Device you must bring the remote to the Surgery Center         - All patients are required to have a Covid-19 test within 4 days of surgery/procedure.      -Patients will be contacted by the [facility] scheduling team within 1 week of surgery to make an appointment.      - Patients may call the Scheduling team at [phone] if they have not been scheduled within 4 days of  surgery.      ALL PATIENTS ARE REQUIRED TO HAVE A RESPONSIBLE ADULT TO DRIVE AND BE IN ATTENDANCE WITH THEM FOR 24 HOURS FOLLOWING SURGERY       Questions or Concerns:    -For questions regarding the day of surgery please contact the Ambulatory Surgery Center at [phone].    -If you have health changes between today and your surgery please contact your surgeon.     For questions after surgery please call your surgeons office.

## 2022-01-12 NOTE — ANESTHESIA PREPROCEDURE EVALUATION
Anesthesia Pre-Procedure Evaluation    Patient: Karen Braxton   MRN: [id number] : 1970        Preoperative Diagnosis: * No surgery found *    Procedure :   PAC EVALUATION       Past Medical History:   Diagnosis Date     Type 2 diabetes mellitus (H)       Past Surgical History:   Procedure Laterality Date     KNEE SURGERY Left      LAPAROSCOPIC CHOLECYSTECTOMY  2007     REPAIR CLEFT PALATE CHILD  1972      No Known Allergies   Social History     Tobacco Use     Smoking status: Current Some Day Smoker     Years: 40.00     Types: Cigarettes     Smokeless tobacco: Never Used     Tobacco comment: 3 cigarettes daily   Substance Use Topics     Alcohol use: No     Alcohol/week: 0.0 standard drinks      Wt Readings from Last 1 Encounters:   11/10/21 54.9 kg (121 lb)        Anesthesia Evaluation   Pt has had prior anesthetic. Type: General.    No history of anesthetic complications       ROS/MED HX  ENT/Pulmonary: Comment: Cataracts, b/l  Glaucoma          S/p cleft palate repair as a child.     (+) tobacco use (Smokes ~4 cigarettes per day.  Smoked for last 40 years.), Current use,     Neurologic:  - neg neurologic ROS     Cardiovascular:       METS/Exercise Tolerance: >4 METS Comment: Works overnights at [employer].  On her feet most of the time.    Hematologic:  - neg hematologic  ROS     Musculoskeletal: Comment: Reports right groin pain that radiate down to right leg.  She tells me she will be seen today in [facility] by her doctor.  Taking ibuprofen for pain.  Onset was after New Years.     S/p knee arthroscopic surgery - chart says left but patient reports it was her right knee.       GI/Hepatic:     (+) cholecystitis/cholelithiasis (s/p cholecystectomy),     Renal/Genitourinary:  - neg Renal ROS     Endo:     (+) type II DM, Last HgA1c: 9.1, date: 2021, Using insulin, - not using insulin pump. Normal glucose range: 147, not previously admitted for DM/DKA.     Psychiatric/Substance Use:  - neg  psychiatric ROS     Infectious Disease: Comment: Completed COVID vaccines and booster.       Malignancy:  - neg malignancy ROS     Other: Comment: Works for [employer] in [place] [employer].      (+) LMP: 2003, ,         Physical Exam    Airway        Mallampati: II   TM distance: > 3 FB   Neck ROM: full   Mouth opening: > 3 cm    Respiratory Devices and Support         Dental     Comment: Upper partial plate.  Repaired cleft palate with evidence of well-healed surgical scar.     (+) partials and missing      Cardiovascular   cardiovascular exam normal          Pulmonary   pulmonary exam normal                OUTSIDE LABS:  CBC:   Lab Results   Component Value Date    WBC 5.6 09/23/2020    WBC 5.1 11/11/2019    HGB 13.3 09/23/2020    HGB 15.0 11/11/2019    HCT 41.0 09/23/2020    HCT 46.1 11/11/2019     09/23/2020     11/11/2019     BMP:   Lab Results   Component Value Date     09/24/2021     09/23/2020    POTASSIUM 4.0 09/24/2021    POTASSIUM 4.0 09/23/2020    CHLORIDE 104 09/24/2021    CHLORIDE 105 09/23/2020    CO2 24 09/24/2021    CO2 25 09/23/2020    BUN 10 09/24/2021    BUN 15 09/23/2020    CR 0.67 09/24/2021    CR 0.82 09/23/2020     (H) 09/24/2021     (H) 09/23/2020     COAGS:   Lab Results   Component Value Date    PTT 33 02/14/2006    INR 0.92 02/14/2006     POC:   Lab Results   Component Value Date     (H) 09/14/2007     HEPATIC:   Lab Results   Component Value Date    ALBUMIN 3.1 (L) 09/24/2021    PROTTOTAL 7.2 09/24/2021    ALT 20 09/24/2021    AST 11 09/24/2021    ALKPHOS 130 09/24/2021    BILITOTAL 0.2 09/24/2021     OTHER:   Lab Results   Component Value Date    A1C 10.1 (H) 04/26/2021    MARLEEN 9.1 09/24/2021    PHOS 4.2 03/18/2014    MAG 2.0 03/18/2014    LIPASE 130 09/24/2021    AMYLASE 41 01/08/2014    TSH 2.12 03/16/2018    T4 1.22 08/04/2011    SED 23 (H) 01/09/2015       Anesthesia Plan    ASA Status:  2   NPO Status:  NPO Appropriate    Anesthesia Type: MAC.      - Reason for MAC: straight local not clinically adequate   Induction: Intravenous.           Consents    Anesthesia Plan(s) and associated risks, benefits, and realistic alternatives discussed. Questions answered and patient/representative(s) expressed understanding.    - Discussed:     - Discussed with:  Patient      - Extended Intubation/Ventilatory Support Discussed: No.      - Patient is DNR/DNI Status: No    Use of blood products discussed: No .     Postoperative Care    Pain management: IV analgesics.        Comments:              PAC Discussion and Assessment    ASA Classification: 2  Case is suitable for: [facility]  Anesthetic techniques and relevant risks discussed: MAC with GA as backup                  PAC Resident/NP Anesthesia Assessment: Karen Braxton is a 51-year-old female scheduled for PHACOEMULSIFICATION, CATARACT, WITH STANDARD INTRAOCULAR LENS IMPLANT INSERTION right - Right with Cony Scruggs MD on 1/13/2022 at [facility] under MAC with topical.     Ms. Braxton is followed by Dr. Scruggs in ophthalmology for her diabetic eye exam and cataracts.  She was last seen on 11/12/21 and reported gradually increasing difficulty with distance vision, especially at night.  She reported difficulty with nighttime driving and seeing glare around lights. Dr. Scruggs counseled Ms. Braxton on age related cataracts (present in both eyes) and treatment.  Ms. Braxton presents to the PAC in preparation for the above procedure.        Notable past medical history includes DMII, HLD, tobacco dependent, s/p cholecystectomy, s/p knee arthroscopy, and cleft palate repair as a child.       Diagnosis     Combined form of age-related cataract, right eye          PROCEDURES     EKG 2019:  SR     Echocardiogram 2013     Interpretation Summary      Left ventricular systolic function is normal. The visual ejection fraction is       estimated at 65-70%. No regional wall motion abnormalities noted. No       significant valvular  abnormalities.      PatientHeight: 65 in      PatientWeight: 140 lbs      SystolicPressure: 112 mmHg      DiastolicPressure: 80 mmHg      HeartRate: 73 bpm      BSA 1.7 m^2     Exercise stress echocardiogram 2013     Interpretation Summary      Target Heart Rate was not achieved due to fatigue. Suboptimal blood pressure       response. Chest pain at rest which did not worsen with exercise. The visual       ejection fraction is estimated at 60-65%. Normal left ventricular wall       motion. Unfortunately a non diagnostic study due to non achievement of target       heart rate.      PatientHeight: 65 in      PatientWeight: 140 lbs      SystolicPressure: 122 mmHg      DiastolicPressure: 86 mmHg      HeartRate: 75 bpm      BSA 1.7 m^2       ~The patient's records and results personally reviewed by this provider.   ~Outside records reviewed from: care everywhere    ASSESSMENT and PLAN    Specific operative considerations:   - SATNAM # of risks 1/8 = low risk  - VTE risk:  0.26%  - Risk of PONV score = 1.  If > 2, anti-emetic intervention recommended.      #  Cardiology   - Denies known coronary artery disease.  Denies cardiac symptoms.  - METS:  >4. RCRI : Diabetes Mellitus (on Insulin).  0.9 % risk of major adverse cardiac event.       - Not take lisinopril or statin.  BP today 133/85.   - Taking ASA 81 mg    #  Pulmonary   - tobacco dependence, smokes ~4 ciagarettes/day.  She has smoked since 1979.Encouraged smoking cessation.    - Denies pulmonary symptoms  - No inhalers     #  Endocrine   -  Diabetes, insulin dependent with last A1C of 9.1 (9/2021). Hold morning oral hypoglycemic medications and short acting insulin DOS. Take 80% of last scheduled dose of long acting insulin prior to procedure.  Recommend close monitoring of the patient's blood glucose levels throughout the perioperative period and treat per [place] guidelines.      #  Orthopedic  - Reports onset of right groin pain with pain radiating down right leg  since New Years.  Has been taking ibuprofen for pain control.  Will be seen in [facility] today for further evaluaiotn.  No difficulty with laying flat.  Consideration fore careful positioning        #  HEENT    - Cataracts with above procedure planned with right eye  - Glaucoma, use eye drops as prescribed  - s/p cleft palate repair as a child.  Upper partial plate.  Evidence of some missing molars on the bottom.  All remaining teeth intact.       #  ID  - COVID-19 testing per surgeon's office  - Completed COVID vaccine and booster.     # Anesthesia considerations  - Refer to PAC assessment in anesthesia records  - Final plan per anesthesiologist on the day of surgery.       Arrival time, NPO, shower and medication instructions provided by nursing staff today.    Patient was discussed with Dr Hamm.Patient is an acceptable candidate for the proposed procedure.  No further diagnostic evaluation is needed.       **For further details of assessment, testing, and physical exam please see H and P completed on same date.    Reviewed and Signed by PAC Mid-Level Provider/Resident  Mid-Level Provider/Resident: Harini CANAS CNP  Date: 1/12/22                                 FLORESITA Lin CNP

## 2022-01-13 ENCOUNTER — HOSPITAL ENCOUNTER (OUTPATIENT)
Facility: AMBULATORY SURGERY CENTER | Age: 52
End: 2022-01-13
Attending: OPHTHALMOLOGY
Payer: COMMERCIAL

## 2022-01-13 ENCOUNTER — OFFICE VISIT (OUTPATIENT)
Dept: OPHTHALMOLOGY | Facility: CLINIC | Age: 52
End: 2022-01-13
Attending: OPHTHALMOLOGY
Payer: COMMERCIAL

## 2022-01-13 ENCOUNTER — ANESTHESIA (OUTPATIENT)
Dept: SURGERY | Facility: AMBULATORY SURGERY CENTER | Age: 52
End: 2022-01-13
Payer: COMMERCIAL

## 2022-01-13 VITALS
DIASTOLIC BLOOD PRESSURE: 74 MMHG | BODY MASS INDEX: 20.83 KG/M2 | WEIGHT: 125 LBS | SYSTOLIC BLOOD PRESSURE: 132 MMHG | HEART RATE: 73 BPM | HEIGHT: 65 IN | OXYGEN SATURATION: 100 % | RESPIRATION RATE: 16 BRPM | TEMPERATURE: 97 F

## 2022-01-13 DIAGNOSIS — H25.811 COMBINED FORM OF AGE-RELATED CATARACT, RIGHT EYE: ICD-10-CM

## 2022-01-13 DIAGNOSIS — Z96.1 PSEUDOPHAKIA, RIGHT EYE: Primary | ICD-10-CM

## 2022-01-13 DIAGNOSIS — H25.812 COMBINED FORM OF AGE-RELATED CATARACT, LEFT EYE: ICD-10-CM

## 2022-01-13 LAB
GLUCOSE BLDC GLUCOMTR-MCNC: 169 MG/DL (ref 70–99)
GLUCOSE BLDC GLUCOMTR-MCNC: 176 MG/DL (ref 70–99)
H PYLORI AG STL QL IA: POSITIVE

## 2022-01-13 PROCEDURE — 99024 POSTOP FOLLOW-UP VISIT: CPT | Performed by: OPHTHALMOLOGY

## 2022-01-13 PROCEDURE — 66982 XCAPSL CTRC RMVL CPLX WO ECP: CPT | Mod: RT | Performed by: OPHTHALMOLOGY

## 2022-01-13 PROCEDURE — G0463 HOSPITAL OUTPT CLINIC VISIT: HCPCS

## 2022-01-13 PROCEDURE — 82962 GLUCOSE BLOOD TEST: CPT | Performed by: PATHOLOGY

## 2022-01-13 PROCEDURE — 66982 XCAPSL CTRC RMVL CPLX WO ECP: CPT | Mod: RT

## 2022-01-13 DEVICE — IMPLANTABLE DEVICE: Type: IMPLANTABLE DEVICE | Site: EYE | Status: FUNCTIONAL

## 2022-01-13 RX ORDER — ONDANSETRON 2 MG/ML
4 INJECTION INTRAMUSCULAR; INTRAVENOUS EVERY 30 MIN PRN
Status: DISCONTINUED | OUTPATIENT
Start: 2022-01-13 | End: 2022-01-14 | Stop reason: HOSPADM

## 2022-01-13 RX ORDER — ACETAMINOPHEN 325 MG/1
650 TABLET ORAL EVERY 6 HOURS PRN
COMMUNITY
End: 2022-08-11

## 2022-01-13 RX ORDER — OFLOXACIN 3 MG/ML
1 SOLUTION/ DROPS OPHTHALMIC
Status: COMPLETED | OUTPATIENT
Start: 2022-01-13 | End: 2022-01-13

## 2022-01-13 RX ORDER — LIDOCAINE 40 MG/G
CREAM TOPICAL
Status: DISCONTINUED | OUTPATIENT
Start: 2022-01-13 | End: 2022-01-14 | Stop reason: HOSPADM

## 2022-01-13 RX ORDER — SODIUM CHLORIDE, SODIUM LACTATE, POTASSIUM CHLORIDE, CALCIUM CHLORIDE 600; 310; 30; 20 MG/100ML; MG/100ML; MG/100ML; MG/100ML
INJECTION, SOLUTION INTRAVENOUS CONTINUOUS
Status: DISCONTINUED | OUTPATIENT
Start: 2022-01-13 | End: 2022-01-14 | Stop reason: HOSPADM

## 2022-01-13 RX ORDER — ONDANSETRON 2 MG/ML
INJECTION INTRAMUSCULAR; INTRAVENOUS PRN
Status: DISCONTINUED | OUTPATIENT
Start: 2022-01-13 | End: 2022-01-13

## 2022-01-13 RX ORDER — PREDNISOLONE ACETATE 1 %
SUSPENSION, DROPS(FINAL DOSAGE FORM)(ML) OPHTHALMIC (EYE) PRN
Status: DISCONTINUED | OUTPATIENT
Start: 2022-01-13 | End: 2022-01-13 | Stop reason: HOSPADM

## 2022-01-13 RX ORDER — PROPARACAINE HYDROCHLORIDE 5 MG/ML
1 SOLUTION/ DROPS OPHTHALMIC ONCE
Status: COMPLETED | OUTPATIENT
Start: 2022-01-13 | End: 2022-01-13

## 2022-01-13 RX ORDER — TETRACAINE HYDROCHLORIDE 5 MG/ML
SOLUTION OPHTHALMIC PRN
Status: DISCONTINUED | OUTPATIENT
Start: 2022-01-13 | End: 2022-01-13 | Stop reason: HOSPADM

## 2022-01-13 RX ORDER — ONDANSETRON 4 MG/1
4 TABLET, ORALLY DISINTEGRATING ORAL EVERY 30 MIN PRN
Status: DISCONTINUED | OUTPATIENT
Start: 2022-01-13 | End: 2022-01-14 | Stop reason: HOSPADM

## 2022-01-13 RX ORDER — FENTANYL CITRATE 50 UG/ML
25 INJECTION, SOLUTION INTRAMUSCULAR; INTRAVENOUS
Status: DISCONTINUED | OUTPATIENT
Start: 2022-01-13 | End: 2022-01-14 | Stop reason: HOSPADM

## 2022-01-13 RX ORDER — FENTANYL CITRATE 50 UG/ML
25 INJECTION, SOLUTION INTRAMUSCULAR; INTRAVENOUS EVERY 5 MIN PRN
Status: DISCONTINUED | OUTPATIENT
Start: 2022-01-13 | End: 2022-01-14 | Stop reason: HOSPADM

## 2022-01-13 RX ORDER — ACETAMINOPHEN 325 MG/1
975 TABLET ORAL ONCE
Status: COMPLETED | OUTPATIENT
Start: 2022-01-13 | End: 2022-01-13

## 2022-01-13 RX ORDER — LIDOCAINE HYDROCHLORIDE 10 MG/ML
INJECTION, SOLUTION EPIDURAL; INFILTRATION; INTRACAUDAL; PERINEURAL PRN
Status: DISCONTINUED | OUTPATIENT
Start: 2022-01-13 | End: 2022-01-13 | Stop reason: HOSPADM

## 2022-01-13 RX ORDER — PROPOFOL 10 MG/ML
INJECTION, EMULSION INTRAVENOUS PRN
Status: DISCONTINUED | OUTPATIENT
Start: 2022-01-13 | End: 2022-01-13

## 2022-01-13 RX ORDER — BALANCED SALT SOLUTION 6.4; .75; .48; .3; 3.9; 1.7 MG/ML; MG/ML; MG/ML; MG/ML; MG/ML; MG/ML
SOLUTION OPHTHALMIC PRN
Status: DISCONTINUED | OUTPATIENT
Start: 2022-01-13 | End: 2022-01-13 | Stop reason: HOSPADM

## 2022-01-13 RX ORDER — SODIUM CHLORIDE, SODIUM LACTATE, POTASSIUM CHLORIDE, CALCIUM CHLORIDE 600; 310; 30; 20 MG/100ML; MG/100ML; MG/100ML; MG/100ML
INJECTION, SOLUTION INTRAVENOUS CONTINUOUS
Status: CANCELLED | OUTPATIENT
Start: 2022-01-13

## 2022-01-13 RX ORDER — CYCLOPENTOLAT/TROPIC/PHENYLEPH 1%-1%-2.5%
1 DROPS (EA) OPHTHALMIC (EYE)
Status: COMPLETED | OUTPATIENT
Start: 2022-01-13 | End: 2022-01-13

## 2022-01-13 RX ADMIN — OFLOXACIN 1 DROP: 3 SOLUTION/ DROPS OPHTHALMIC at 07:30

## 2022-01-13 RX ADMIN — PROPOFOL 10 MG: 10 INJECTION, EMULSION INTRAVENOUS at 08:48

## 2022-01-13 RX ADMIN — PROPARACAINE HYDROCHLORIDE 1 DROP: 5 SOLUTION/ DROPS OPHTHALMIC at 07:15

## 2022-01-13 RX ADMIN — Medication 1 DROP: at 07:30

## 2022-01-13 RX ADMIN — PROPOFOL 20 MG: 10 INJECTION, EMULSION INTRAVENOUS at 08:45

## 2022-01-13 RX ADMIN — ACETAMINOPHEN 975 MG: 325 TABLET ORAL at 07:16

## 2022-01-13 RX ADMIN — ONDANSETRON 4 MG: 2 INJECTION INTRAMUSCULAR; INTRAVENOUS at 08:20

## 2022-01-13 RX ADMIN — Medication 1 DROP: at 07:22

## 2022-01-13 RX ADMIN — OFLOXACIN 1 DROP: 3 SOLUTION/ DROPS OPHTHALMIC at 07:15

## 2022-01-13 RX ADMIN — SODIUM CHLORIDE, SODIUM LACTATE, POTASSIUM CHLORIDE, CALCIUM CHLORIDE: 600; 310; 30; 20 INJECTION, SOLUTION INTRAVENOUS at 07:30

## 2022-01-13 RX ADMIN — PROPOFOL 10 MG: 10 INJECTION, EMULSION INTRAVENOUS at 08:46

## 2022-01-13 RX ADMIN — OFLOXACIN 1 DROP: 3 SOLUTION/ DROPS OPHTHALMIC at 07:22

## 2022-01-13 RX ADMIN — Medication 1 DROP: at 07:15

## 2022-01-13 ASSESSMENT — VISUAL ACUITY
OD_SC: 20/70
OD_SC+: -2
METHOD: SNELLEN - LINEAR

## 2022-01-13 ASSESSMENT — TONOMETRY
IOP_METHOD: ICARE
OD_IOP_MMHG: 10
OS_IOP_MMHG: 12

## 2022-01-13 ASSESSMENT — EXTERNAL EXAM - RIGHT EYE: OD_EXAM: NORMAL

## 2022-01-13 ASSESSMENT — CUP TO DISC RATIO: OD_RATIO: 0.8

## 2022-01-13 ASSESSMENT — EXTERNAL EXAM - LEFT EYE: OS_EXAM: NORMAL

## 2022-01-13 ASSESSMENT — SLIT LAMP EXAM - LIDS
COMMENTS: MGD
COMMENTS: MGD

## 2022-01-13 ASSESSMENT — MIFFLIN-ST. JEOR: SCORE: 1172.88

## 2022-01-13 NOTE — NURSING NOTE
Chief Complaints and History of Present Illnesses   Patient presents with     Post Op (Ophthalmology) Right Eye     PHACOEMULSIFICATION, COMPLEX CATARACT, WITH STANDARD INTRAOCULAR LENS IMPLANT INSERTION right eye 1/13/22     Chief Complaint(s) and History of Present Illness(es)     Post Op (Ophthalmology) Right Eye     Laterality: right eye    Course: stable    Associated symptoms: Negative for eye pain, floaters, flashes and headache    Treatments tried: eye drops    Pain scale: 0/10    Comments: PHACOEMULSIFICATION, COMPLEX CATARACT, WITH STANDARD INTRAOCULAR LENS IMPLANT INSERTION right eye 1/13/22              Comments     Same day PO   Pt doing well  Start drops today    Jenn Christianson COT 11:54 AM January 13, 2022

## 2022-01-13 NOTE — ANESTHESIA POSTPROCEDURE EVALUATION
Patient: Karen Braxton    Procedure: Procedure(s):  PHACOEMULSIFICATION, COMPLEX CATARACT, WITH STANDARD INTRAOCULAR LENS IMPLANT INSERTION right       Diagnosis:Combined form of age-related cataract, right eye [H25.811]  Diagnosis Additional Information: No value filed.    Anesthesia Type:  MAC    Note:  Disposition: Outpatient   Postop Pain Control: Uneventful            Sign Out: Well controlled pain   PONV: No   Neuro/Psych: Uneventful            Sign Out: Acceptable/Baseline neuro status   Airway/Respiratory: Uneventful            Sign Out: Acceptable/Baseline resp. status   CV/Hemodynamics: Uneventful            Sign Out: Acceptable CV status; No obvious hypovolemia; No obvious fluid overload   Other NRE: NONE   DID A NON-ROUTINE EVENT OCCUR? No           Last vitals:  Vitals Value Taken Time   /74 01/13/22 1015   Temp 36.1  C (97  F) 01/13/22 1015   Pulse     Resp 16 01/13/22 1015   SpO2 100 % 01/13/22 1015       Electronically Signed By: Bud Arzate MD  January 13, 2022  10:44 AM

## 2022-01-13 NOTE — DISCHARGE INSTRUCTIONS
[facility]  Home Care Following Anesthesia  For 24 hours after surgery:  1. Get plenty of rest.  A responsible adult must stay with you for at least 24 hours after you leave the surgery center.  2. Do not drive or use heavy equipment.  If you have weakness or tingling, don't drive or use heavy equipment until this feeling goes away.   3. Do not drink alcohol.   4. Avoid strenuous or risky activities.  Ask for help when climbing stairs.  5. You may feel lightheaded.  IF so, sit for a few minutes before standing.  Have someone help you get up.   6. If you have nausea (feel sick to your stomach): Drink only clear liquids such as apple juice, ginger ale, broth or 7-Up.  Rest may also help.  Be sure to drink enough fluids.  Move to a regular diet as you feel able.   7. You may have a slight fever.  Call the doctor if your fever is over 100 F (37.7 C) (taken under the tongue) or lasts longer than 24 hours.  8. You may have a dry mouth, a sore throat, muscle aches or trouble sleeping. These should go away after 24 hours.  9. Do not make important or legal decisions.   10. It is recommended to avoid smoking.               Tips for taking pain medications  To get the best pain relief possible, remember these points:    Take pain medications as directed, before pain becomes severe.    Pain medication can upset your stomach: taking it with food may help.    Constipation is a common side effect of pain medication. Drink plenty of  fluids.    Eat foods high in fiber. Take a stool softener if recommended by your doctor or pharmacist.    Do not drink alcohol, drive or operate machinery while taking pain medications.    Ask about other ways to control pain, such as with heat, ice or relaxation.    Tylenol/Acetaminophen Consumption  To help encourage the safe use of acetaminophen, the makers of TYLENOL  have lowered the maximum daily dose for single-ingredient Extra Strength TYLENOL   (acetaminophen) products sold in the [place] from 8 pills per day (4,000 mg) to 6 pills per day (3,000 mg). The dosing interval has also changed from 2 pills every 4-6 hours to 2 pills every 6 hours.    If you feel your pain relief is insufficient, you may take Tylenol/Acetaminophen in addition to your narcotic pain medication.     Be careful not to exceed 3,000 mg of Tylenol/Acetaminophen in a 24 hour period from all sources.    If you are taking extra strength Tylenol/acetaminophen (500 mg), the maximum dose is 6 tablets in 24 hours.    If you are taking regular strength acetaminophen (325 mg), the maximum dose is 9 tablets in 24 hours.    You last took Tylenol 975mg at 7:15am, next available dose is at 1:15pm, then follow bottle instructions.     Call a doctor for any of the followin. Signs of infection (fever, growing tenderness at the surgery site, a large amount of drainage or bleeding, severe pain, foul-smelling drainage, redness, swelling).  2. It has been over 8 to 10 hours since surgery and you are still not able to urinate (pass water).  3. Headache for over 24 hours.  4. Numbness, tingling or weakness the day after surgery (if you had spinal anesthesia).  5. Signs of Covid-19 infection (temperature over 100 degrees, shortness of breath, cough, loss of taste/smell, generalized body aches, persistent headache, chills, sore throat, nausea/vomiting/diarrhea)  Your doctor is:       Dr. Cony Scruggs, Ophthalmology: [phone]               Or dial [phone] and ask for the resident on call for:  Ophthalmology  For emergency care, call the:  [facility]:  [phone] (TTY for hearing impaired: [phone])

## 2022-01-13 NOTE — ANESTHESIA CARE TRANSFER NOTE
Patient: Karen Braxton    Procedure: Procedure(s):  PHACOEMULSIFICATION, COMPLEX CATARACT, WITH STANDARD INTRAOCULAR LENS IMPLANT INSERTION right       Diagnosis: Combined form of age-related cataract, right eye [H25.811]  Diagnosis Additional Information: No value filed.    Anesthesia Type:   MAC     Note:    Oropharynx: oropharynx clear of all foreign objects and spontaneously breathing  Level of Consciousness: awake  Oxygen Supplementation: room air    Independent Airway: airway patency satisfactory and stable  Dentition: dentition unchanged      Patient transferred to: Phase II  Comments: Uneventful transport to Phase 2; IV patent; Pt responds appropriately to command; Pt comfortable; VSS: Report to RN  Handoff Report: Identifed the Patient, Identified the Reponsible Provider, Reviewed the pertinent medical history, Discussed the surgical course, Reviewed Intra-OP anesthesia mangement and issues during anesthesia, Set expectations for post-procedure period and Allowed opportunity for questions and acknowledgement of understanding      Vitals:  Vitals Value Taken Time   /82    Temp 97.4    Pulse 65    Resp 16    SpO2 100%        Electronically Signed By: FLORESITA SEAMAN CRNA  January 13, 2022  9:01 AM

## 2022-01-13 NOTE — PATIENT INSTRUCTIONS
"Drops to the right eye:  - Prednisolone (pink or white top, SHAKE WELL) - 4 times a day  - ofloxacin (tan top, antibiotic drop) - 4 times a day  - ketorolac (gray top) - 4 times a day    4 times a day = breakfast, lunch, dinner, bedtime  Wait a couple minutes between drops    The drops may sting when you put them in. You can use regular artificial tears/lubricant drops to help with any stinging (nothing that says \"get the red out\")    Aching/irriation/scratchiness is normal. You can take Tylenol or ibuprofen for mild pain. If having severe or sharp pain, or not better with Tylenol/ibuprofen please call.    I expect your vision to be blurry and gradually improving over the next couple weeks. If vision worsens, eye gets redder, new floaters, flashes in your vision, missing pieces of vision, etc. Please call [phone]    Wear your shield at night so you don't rub your eye in your sleep. Do not rub your eye.   It is OK to take a shower. Avoid getting soap/shampoo in your eyes. No baths, no hot tubs, no saunas, no swimming.    "

## 2022-01-13 NOTE — OP NOTE
Pre-operative Diagnosis: Combined forms of age-related cataract right eye    Post-operative Diagnosis: Combined forms of age-related cataract right eye; poor dilation right eye    Procedure: Complex phacoemulsification cataract extraction with intraocular lens insertion right eye    Surgeon: Cony Scruggs MD    Estimated Blood Loss: 0mL    Specimens: None    Implant: [id number] +18.5D  SN [id number]    The patient was brought into the Operating Room where an intravenous line was started and EKG monitor leads were placed.  Local anesthesia was achieved using tetracaine drops in the right eye.  The right eye was prepped and draped in the usual sterile manner for ocular surgery including the use of 5% Betadine irrigation of the conjunctival fornices.  A Bruna speculum was inserted.  A Supersharp blade was used to create a paracentesis.  Next, 0.5 mL of lidocaine was instilled in the anterior chamber. There was noted to be a very dense cataract with poor visualization of the red reflex and poor dilation. The decision was made to stain the anterior capsule with Trypan blue and use a Malyugin ring. An air bubble was instilled in the anterior chamber, followed by Trypan blue to stain the anterior capsule, which was then rinsed with balanced salt solution. The anterior chamber was then filled with Viscoat.  A 2.4 mm keratome was used to fashion a triplanar main incision.  A Malyugin ring was placed without complication. A 360 degree continuous curvilinear capsulorrhexis was fashioned with a cystotome and Utrata forceps.  Hydrodissection was performed using balanced salt solution on a 30 gauge cannula. Phacoemulsification was performed.  Residual cortical material was removed using the irrigation and aspiration handpiece.  The capsular bag was filled with Provisc.  The lens was inspected and found to be without flaw. The [id number] +18.5 implant was  delivered into the capsular bag, and was centered well.  The Malyugin ring  was removed without complication. Viscoelastic was removed with the irrigation/aspiration handpiece. The anterior chamber was reformed with balanced salt solution  The wound and paracentesis were hydrated.  All incisions were tested and found to be watertight.  Ofloxacin, ketorolac, and prednisolone drops were placed on the eye.  The speculum and drapes were removed.  The eye was cleaned and a shield was put in place.  The patient tolerated the procedure well and left the Operating Room in good condition.

## 2022-01-13 NOTE — BRIEF OP NOTE
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

## 2022-01-14 ENCOUNTER — TELEPHONE (OUTPATIENT)
Dept: OPHTHALMOLOGY | Facility: CLINIC | Age: 52
End: 2022-01-14
Payer: COMMERCIAL

## 2022-01-14 PROBLEM — H25.812 COMBINED FORM OF AGE-RELATED CATARACT, LEFT EYE: Status: ACTIVE | Noted: 2022-01-14

## 2022-01-14 NOTE — TELEPHONE ENCOUNTER
Spoke with patient to schedule left eye surgery with Dr. Scruggs    Surgery was scheduled on 2/10 at [facility]  Patient will have H&P at PAC on 1/12     Patient is aware a COVID-19 test is needed before their procedure. The test should be with-in 4 days of their procedure.   Test Details: Date 2/9 Location [facility] lab    Post-Op visit was scheduled on 2/11 and 2/18  Patient was advised a / is needed day of surgery. As well as, for 24 hours after their surgery procedure.  Surgery packet was mailed 1/14, patient has my direct contact information for any further questions [phone].

## 2022-01-18 DIAGNOSIS — E11.9 TYPE 2 DIABETES MELLITUS WITHOUT COMPLICATION, WITH LONG-TERM CURRENT USE OF INSULIN (H): ICD-10-CM

## 2022-01-18 DIAGNOSIS — Z79.4 TYPE 2 DIABETES MELLITUS WITHOUT COMPLICATION, WITH LONG-TERM CURRENT USE OF INSULIN (H): ICD-10-CM

## 2022-01-18 NOTE — TELEPHONE ENCOUNTER
Routing refill request to provider for review/approval because:  Patient needs to be seen because it has been more than 1 year since last office visit.  Shayla Kerr RN

## 2022-01-18 NOTE — LETTER
[facility]  [address] [place] [address]  [phone]      1/19/2022       Karen Braxton  [address] [place] [address]        Dear Karen,    In reviewing your recent refill request, it was noted that you are due for an appointment with your physician for your annual physical visit. Refills will be approved during your follow up visit.    Please visit [facility] or call the clinic to schedule an appointment at [phone] at your convenience. If you have already scheduled an appointment please disregard this message. Please let us know if you need a refill to make it to your scheduled appointment just let us know and Dr. Solomon would be happy to assist with that.      Sincerely,      [facility] Internal Medicine

## 2022-01-24 DIAGNOSIS — Z79.4 TYPE 2 DIABETES MELLITUS WITHOUT COMPLICATION, WITH LONG-TERM CURRENT USE OF INSULIN (H): ICD-10-CM

## 2022-01-24 DIAGNOSIS — E11.9 TYPE 2 DIABETES MELLITUS WITHOUT COMPLICATION, WITH LONG-TERM CURRENT USE OF INSULIN (H): ICD-10-CM

## 2022-01-24 NOTE — PROGRESS NOTES
HPI:  Karen Braxton is a 51 year old female presenting for s/p CE/IOL right eye 1/13/22    Right eye vision is good. Much brighter right eye than left. Left eye vision is yoel and blurry. No eye pain.  Using:  PF QID right eye  Ketorolac QID right eye  Ofloxacin QID right eye     Past Ocular History:  Glasses  Flashes in vision since 2019  Chalazia  CE/IOL right eye 1/13/22    PMH:  DM2  Hypercholesterolemia    SH:  Born in [facility]. Current smoker 5 cigarettes/day.  Lives at home with children (5 adult kids, all in or done with college). Works for [employer].    FH:  No known family history of blindness, glaucoma, macular degeneration    ASSESSMENT and PLAN:  1. Pseudophakia right eye  - s/p CE/IOL 1/13/22 -- very dense cataract, poor dilation, lots of movement during surgery; required Malyugin ring and Trypan blue; may need retrobulbar block or possibly even GA for next surgery  - doing well POW1 with VA 20/30 (refracts to 20/25), IOP 14, incisions sealed, retina attached, 1+ AC pigment and low 0.5+ cell  - reviewed postop and return precautions including no swimming/tub baths/saunas, no eye rubbing, shield at night for 1 more week; he understands to call/come in for decreased vision, increased pain, increased redness, purulent discharge, new flashes/floaters etc  --> stop ofloxacin  --> continue ketorolac QID right eye until bottle runs out  --> taper PF to TID x1 week, then BID x1 week, then daily x1 week, then stop    2. Diabetes mellitus with retinopathy (H)  - last A1c 9.1 in September 2021, improved from 10.1 in April 2021  - mildly attenuated vessels without microaneurysms/exudates/NV  - OCT macula 5/7/21  Right Eye: preserved foveal contour, detached posterior hyaloid, no IRF/SRF  Left Eye: preserved foveal contour, partially detached posterior hyaloid, no SRF, slight thickening of middle retina just inferior to fovea without jami IRF  - OCT Macula 11/12/21  Right Eye: preserved foveal contour, detached  posterior hyaloid, no SRF, one area inferonasal to fovea with small pocket of IRF  Left Eye: preserved foveal contour, partially detached posterior hyaloid, no SRF, slight thickening of middle retina nasal and temporal to fovea without jami IRF  - encouraged BS/BP control  - given extrafoveal swelling can monitor; again discussed increased risk of postoperative edema and potentially limited VA and she understands  --> will repeat OCT Macula s/p CE/IOL each eye; with improved view right eye can see more retinopathy than prior    3. Glaucoma suspect of both eyes  - oval nerve right eye and round nerve left eye with 0.8CD ratio each eye; no notching or disc heme  - no FHx glaucoma  - IOP today 14/13  - baseline disc photos 5/7/21; hazy secondary to cataract  - OCT RNFL 5/7/21  Right Eye:  avg thickness 93, ST borderline, no GCC loss  Left Eye: avg thickness 86, ST thinning, slightly thinner GCC nasally  - given low IOP and likely lowering of IOP postop and anticipated improvement in view and vision after surgery, will plan to repeat OCT RNFL and perform HVF after cataract surgery; will hold off on drops for now; discussed with patient and she understands and agrees    4. Combined forms of age-related cataract of left eye  - visually significant cataract with PSC and cortical spoking, dense NS  - We discussed the risks, benefits and alternatives of cataract surgery, including risk of bleeding, infection, postoperative changes in intraocular pressure, postoperative inflammation, possibility of retinal detachment or vision loss.  Discussed need for follow up appointments after surgery and the need for postoperative drops.   Discussed increased risk for postoperative edema and mild edema pre-operative. Discussed risk of refractive surprise given asymmetric in axial lengths and patient unable to tolerate immersion AL calcs. She understands.   Informed consent was obtained.  The patient would like to proceed with CE/IOL OS.      We discussed lens options and refractive target. We discussed that by aiming for distance, will need glasses for near.  Target: -0.5  Cyl: 0.6D left eye    Poor dilation in right eye day of surgery -- required Malyugin  Alpha blockers/Flomax: None  Trauma/Pseudoxfoliation: None  Fuchs dystrophy/guttae: None    Diabetes: Yes  Anticoagulation: baby aspirin    Surgical plan: Topical/MAC, Trypan, postop NSAID, Malyugin ring, possible RBB     --> Scheduled for 2/10/22    5. Vitreous flashes of both eyes  - flashes intermittently since 2019 have been stable  - Upham negative  - retinas attached  - Reviewed signs/symptoms of retinal tear/detachment including shower of new floaters, flashes, curtain/veil, decreased vision, etc and patient understands to call/come in immediately for these      Follow up for CE/IOL left eye with POD1 appointment or sooner PRN        -----------------------------------------------------------------------------------    Attestation:  Complete documentation of historical and exam elements from today's encounter can be found in the full encounter summary report (not reduplicated in this progress note). I personally obtained the chief complaint(s) and history of present illness.  I confirmed and edited as necessary the review of systems, past medical/surgical history, family history, social history, and examination findings as documented by others; and I examined the patient myself. I personally reviewed the relevant tests, images, and reports as documented above.     I formulated and edited as necessary the assessment and plan and discussed the findings and management plan with the patient and family.      Cony Scruggs MD

## 2022-01-25 ENCOUNTER — OFFICE VISIT (OUTPATIENT)
Dept: OPHTHALMOLOGY | Facility: CLINIC | Age: 52
End: 2022-01-25
Attending: OPHTHALMOLOGY
Payer: COMMERCIAL

## 2022-01-25 DIAGNOSIS — H40.003 GLAUCOMA SUSPECT OF BOTH EYES: ICD-10-CM

## 2022-01-25 DIAGNOSIS — Z96.1 PSEUDOPHAKIA, RIGHT EYE: Primary | ICD-10-CM

## 2022-01-25 DIAGNOSIS — E11.3299 DIABETES MELLITUS WITH BACKGROUND RETINOPATHY (H): ICD-10-CM

## 2022-01-25 DIAGNOSIS — E11.311 TYPE 2 DIABETES MELLITUS WITH RETINOPATHY OF BOTH EYES AND MACULAR EDEMA, UNSPECIFIED RETINOPATHY SEVERITY, UNSPECIFIED WHETHER LONG TERM INSULIN USE (H): ICD-10-CM

## 2022-01-25 DIAGNOSIS — H25.812 COMBINED FORM OF AGE-RELATED CATARACT, LEFT EYE: ICD-10-CM

## 2022-01-25 PROCEDURE — G0463 HOSPITAL OUTPT CLINIC VISIT: HCPCS

## 2022-01-25 PROCEDURE — 99024 POSTOP FOLLOW-UP VISIT: CPT | Performed by: OPHTHALMOLOGY

## 2022-01-25 ASSESSMENT — REFRACTION_MANIFEST
OD_SPHERE: -1.25
OD_ADD: +2.50
OD_CYLINDER: +0.50
OD_AXIS: 028

## 2022-01-25 ASSESSMENT — TONOMETRY
IOP_METHOD: TONOPEN
OD_IOP_MMHG: 14
OS_IOP_MMHG: 13

## 2022-01-25 ASSESSMENT — VISUAL ACUITY
OS_CC+: +1
OS_PH_CC: 20/40
OS_CC: 20/60
METHOD: SNELLEN - LINEAR
OD_SC: 20/30
OD_PH_SC: 20/30
OD_PH_SC+: +2
CORRECTION_TYPE: GLASSES
OD_SC+: -1

## 2022-01-25 ASSESSMENT — SLIT LAMP EXAM - LIDS
COMMENTS: MGD
COMMENTS: MGD

## 2022-01-25 ASSESSMENT — CUP TO DISC RATIO: OD_RATIO: 0.8

## 2022-01-25 ASSESSMENT — REFRACTION_WEARINGRX
OD_AXIS: 003
OD_SPHERE: -1.00
OS_ADD: +3.00
OS_AXIS: 164
OS_CYLINDER: +1.00
SPECS_TYPE: BIFOCAL
OD_CYLINDER: +0.75
OS_SPHERE: +0.50
OD_ADD: +3.00

## 2022-01-25 ASSESSMENT — CONF VISUAL FIELD
OS_NORMAL: 1
METHOD: COUNTING FINGERS
OD_NORMAL: 1

## 2022-01-25 ASSESSMENT — EXTERNAL EXAM - RIGHT EYE: OD_EXAM: NORMAL

## 2022-01-25 ASSESSMENT — EXTERNAL EXAM - LEFT EYE: OS_EXAM: NORMAL

## 2022-01-25 NOTE — PATIENT INSTRUCTIONS
Stop ofloxacin (tan cap) antibiotic drop    Continue ketorolac (gray cap) 4 times a day until the bottle runs out    Taper prednisolone (white cap, shake well) to 3 times a day for 1 week, then 2 times a day for 1 week, then 1 time a day for 1 week, then stop    We will do your left eye cataract surgery on February 10

## 2022-01-25 NOTE — NURSING NOTE
Chief Complaints and History of Present Illnesses   Patient presents with     Post Op (Ophthalmology) Right Eye     12 day follow up s/p CE/IOL RE     Chief Complaint(s) and History of Present Illness(es)     Post Op (Ophthalmology) Right Eye     Comments: 12 day follow up s/p CE/IOL RE              Comments     Pt states vision has been good in RE. No eye pain.   No flashes or floaters. No redness or dryness.    SHANDRA Woodruff January 25, 2022 9:25 AM

## 2022-01-31 DIAGNOSIS — Z11.59 ENCOUNTER FOR SCREENING FOR OTHER VIRAL DISEASES: Primary | ICD-10-CM

## 2022-02-04 ENCOUNTER — TELEPHONE (OUTPATIENT)
Dept: OPHTHALMOLOGY | Facility: CLINIC | Age: 52
End: 2022-02-04
Payer: COMMERCIAL

## 2022-02-04 NOTE — TELEPHONE ENCOUNTER
Spoke with patient to reschedule left eye surgery with Dr. Scruggs    Surgery was rescheduled on 3/17 at [facility]  Patient will have H&P at PAC     Patient is aware a COVID-19 test is needed before their procedure. The test should be with-in 4 days of their procedure.   Test Details: Date 3/14 Location [facility]    Post-Op visit was rescheduled for 3/4 AND 3/14  Patient was advised a / is needed day of surgery. As well as, for 24 hours after their surgery procedure.  Surgery packet was mailed 2/4, patient has my direct contact information for any further questions [phone].

## 2022-02-04 NOTE — TELEPHONE ENCOUNTER
Per Epic in basket message patient request to cancel her eye procedure with Dr. Scruggs.    Patient would like to be rescheduled to some time in March.    This writer will call patient to reschedule.    Mabel Finn  Perioperative   Ophthalmology/Oculoplastics  [phone]

## 2022-02-07 NOTE — TELEPHONE ENCOUNTER
FUTURE VISIT INFORMATION      SURGERY INFORMATION:    Date: 3/17/22    Location:  or    Surgeon:  Cony Scruggs MD    Anesthesia Type:  Combined MAC with Retrobulbar    Procedure: PHACOEMULSIFICATION, CATARACT, WITH STANDARD INTRAOCULAR LENS IMPLANT INSERTION LEFT    Consult: ov     RECORDS REQUESTED FROM:       Primary Care Provider: Mauricio Levy APRN,CNP - [facility]    Most recent EKG+ Tracin19    Most recent ECHO: 13    Most recent Cardiac Stress Test: 4/15/13

## 2022-02-11 DIAGNOSIS — Z79.4 TYPE 2 DIABETES MELLITUS WITHOUT COMPLICATION, WITH LONG-TERM CURRENT USE OF INSULIN (H): ICD-10-CM

## 2022-02-11 DIAGNOSIS — E11.9 TYPE 2 DIABETES MELLITUS WITHOUT COMPLICATION, WITH LONG-TERM CURRENT USE OF INSULIN (H): ICD-10-CM

## 2022-02-11 NOTE — LETTER
[facility]  [address] [place] [address]  [phone]      2/15/2022       Karen Braxton  [address] [place] [address]        Dear Karen,    In reviewing your recent refill request, it was noted that you are due for an appointment with your physician for your annual wellness visit. Refills will be approved during your follow up visit.    Please visit [facility] or call the clinic to schedule an appointment at [phone] at your convenience. If you have already scheduled an appointment please disregard this message. Please let us know if you need a refill to make it to your scheduled appointment just let us know and Dr. Solomon would be happy to assist with that.      Sincerely,      [facility] Internal Medicine

## 2022-02-11 NOTE — TELEPHONE ENCOUNTER
Routing refill request to provider for review/approval because:  Bettina given x1 and patient did not follow up, please advise  Patient needs to be seen because it has been more than 1 year since last office visit.    Liss Warren RN

## 2022-03-01 DIAGNOSIS — H25.812 COMBINED FORM OF AGE-RELATED CATARACT, LEFT EYE: Primary | ICD-10-CM

## 2022-03-01 RX ORDER — KETOROLAC TROMETHAMINE 5 MG/ML
1 SOLUTION OPHTHALMIC 4 TIMES DAILY
Qty: 10 ML | Refills: 0 | Status: SHIPPED | OUTPATIENT
Start: 2022-03-01 | End: 2022-08-11

## 2022-03-01 RX ORDER — PREDNISOLONE ACETATE 10 MG/ML
1 SUSPENSION/ DROPS OPHTHALMIC 4 TIMES DAILY
Qty: 15 ML | Refills: 1 | Status: SHIPPED | OUTPATIENT
Start: 2022-03-01 | End: 2022-03-25

## 2022-03-01 RX ORDER — OFLOXACIN 3 MG/ML
1 SOLUTION/ DROPS OPHTHALMIC 4 TIMES DAILY
Qty: 5 ML | Refills: 0 | Status: SHIPPED | OUTPATIENT
Start: 2022-03-01 | End: 2022-03-25

## 2022-03-14 ENCOUNTER — LAB (OUTPATIENT)
Dept: LAB | Facility: CLINIC | Age: 52
End: 2022-03-14
Attending: OPHTHALMOLOGY
Payer: COMMERCIAL

## 2022-03-14 ENCOUNTER — ANESTHESIA EVENT (OUTPATIENT)
Dept: SURGERY | Facility: AMBULATORY SURGERY CENTER | Age: 52
End: 2022-03-14
Payer: COMMERCIAL

## 2022-03-14 ENCOUNTER — PRE VISIT (OUTPATIENT)
Dept: SURGERY | Facility: CLINIC | Age: 52
End: 2022-03-14

## 2022-03-14 ENCOUNTER — OFFICE VISIT (OUTPATIENT)
Dept: SURGERY | Facility: CLINIC | Age: 52
End: 2022-03-14
Payer: COMMERCIAL

## 2022-03-14 VITALS
DIASTOLIC BLOOD PRESSURE: 80 MMHG | RESPIRATION RATE: 12 BRPM | SYSTOLIC BLOOD PRESSURE: 125 MMHG | BODY MASS INDEX: 19.3 KG/M2 | WEIGHT: 123 LBS | TEMPERATURE: 97.9 F | OXYGEN SATURATION: 100 % | HEIGHT: 67 IN | HEART RATE: 83 BPM

## 2022-03-14 DIAGNOSIS — Z01.818 PRE-OP EVALUATION: Primary | ICD-10-CM

## 2022-03-14 DIAGNOSIS — Z11.59 ENCOUNTER FOR SCREENING FOR OTHER VIRAL DISEASES: ICD-10-CM

## 2022-03-14 LAB — SARS-COV-2 RNA RESP QL NAA+PROBE: NEGATIVE

## 2022-03-14 PROCEDURE — U0005 INFEC AGEN DETEC AMPLI PROBE: HCPCS | Mod: 90 | Performed by: PATHOLOGY

## 2022-03-14 PROCEDURE — 99000 SPECIMEN HANDLING OFFICE-LAB: CPT | Performed by: PATHOLOGY

## 2022-03-14 PROCEDURE — U0003 INFECTIOUS AGENT DETECTION BY NUCLEIC ACID (DNA OR RNA); SEVERE ACUTE RESPIRATORY SYNDROME CORONAVIRUS 2 (SARS-COV-2) (CORONAVIRUS DISEASE [COVID-19]), AMPLIFIED PROBE TECHNIQUE, MAKING USE OF HIGH THROUGHPUT TECHNOLOGIES AS DESCRIBED BY CMS-2020-01-R: HCPCS | Mod: 90 | Performed by: PATHOLOGY

## 2022-03-14 PROCEDURE — 99215 OFFICE O/P EST HI 40 MIN: CPT | Mod: 24 | Performed by: NURSE PRACTITIONER

## 2022-03-14 RX ORDER — OMEGA-3 FATTY ACIDS/FISH OIL 300-1000MG
200 CAPSULE ORAL EVERY 4 HOURS PRN
COMMUNITY
End: 2022-08-11

## 2022-03-14 ASSESSMENT — PAIN SCALES - GENERAL: PAINLEVEL: NO PAIN (0)

## 2022-03-14 ASSESSMENT — LIFESTYLE VARIABLES: TOBACCO_USE: 1

## 2022-03-14 ASSESSMENT — ENCOUNTER SYMPTOMS: ORTHOPNEA: 0

## 2022-03-14 NOTE — H&P
Fax: [phone]  Pre-Operative H & P     CC:  Preoperative exam to assess for increased cardiopulmonary risk while undergoing surgery and anesthesia.    Date of Encounter: 3/14/2022  Primary Care Physician:  Yvonne Solomon     Reason for visit: Pre-operative evaluation    HPI  Karen Braxton is a 51 year old female who presents for pre-operative H & P in preparation for  Procedure Information     Case: [id number] Date/Time: 03/17/22 0715    Procedure: PHACOEMULSIFICATION, CATARACT, WITH STANDARD INTRAOCULAR LENS IMPLANT INSERTION LEFT (Left Eye)    Anesthesia type: Combined MAC with Retrobulbar    Diagnosis: Combined form of age-related cataract, left eye [H25.812]    Pre-op diagnosis: Combined form of age-related cataract, left eye [H25.812]    Location: [place] [address] / [facility]    Providers: Cony Scruggs MD          Ms. Braxton is followed by Dr. Scruggs in ophthalmology for her diabetic eye exam and cataracts.  She underwent cataract surgery on her right eye with Dr. Scruggs on 1/13/2022.  Her right eye vision is improved. Much brighter right eye than left. Patient reports left eye vision is yoel and blurry.  Ms. Braxton presents to the PAC in preparation for the above procedure.      Notable past medical history includes, HTN, DMII poorly controlled, HLD, tobacco dependent, s/p cholecystectomy, s/p knee arthroscopy, and cleft palate repair as a child.          History is obtained from the patient and chart review    Hx of abnormal bleeding or anti-platelet use: no    Menstrual history: No LMP recorded (lmp unknown). Patient is postmenopausal.:      Past Medical History  Past Medical History:   Diagnosis Date     Cigarette nicotine dependence without complication      Combined forms of age-related cataract of both eyes      Type 2 diabetes mellitus (H)        Past Surgical History  Past Surgical History:   Procedure Laterality Date     KNEE SURGERY Left 2001      LAPAROSCOPIC CHOLECYSTECTOMY  2007     PHACOEMULSIFICATION WITH STANDARD INTRAOCULAR LENS IMPLANT Right 1/13/2022    Procedure: PHACOEMULSIFICATION, COMPLEX CATARACT, WITH STANDARD INTRAOCULAR LENS IMPLANT INSERTION right;  Surgeon: Cony Scruggs MD;  Location: [facility] OR     REPAIR CLEFT PALATE CHILD  1972       Prior to Admission Medications  Current Outpatient Medications   Medication Sig Dispense Refill     acetaminophen (TYLENOL) 325 MG tablet Take 650 mg by mouth every 6 hours as needed for mild pain       atorvastatin (LIPITOR) 40 MG tablet Take 1 tablet (40 mg) by mouth daily (Patient taking differently: Take 40 mg by mouth every morning ) 90 tablet 3     ibuprofen (ADVIL/MOTRIN) 200 MG capsule Take 200 mg by mouth every 4 hours as needed for fever       metFORMIN (GLUCOPHAGE) 1000 MG tablet Take 0.5 tablets (500 mg) by mouth 2 times daily (with meals) 90 tablet 1     NOVOLOG FLEXPEN 100 UNIT/ML soln INJECT 32 UNITS BEFORE BREAKFAST, 37 UNITS BEFORE LUNCH, 37 UNITS BEFORE DINNER 90 mL 3     vitamin D3 (CHOLECALCIFEROL) 50 mcg (2000 units) tablet Take 1 tablet (50 mcg) by mouth daily (Patient taking differently: Take 1 tablet by mouth every morning ) 90 tablet 3     ASPIRIN LOW DOSE 81 MG EC tablet TAKE 1 TABLET(81 MG) BY MOUTH DAILY (Patient not taking: Reported on 1/12/2022) 90 tablet 0     blood glucose (NO BRAND SPECIFIED) test strip Use to test blood sugar 4 times daily and as needed. Any covered brand that meets Pt need. 400 strip 4     blood glucose monitoring (NO BRAND SPECIFIED) meter device kit Use to test blood sugar 4 times daily and as needed. Any covered brand. 1 kit 1     Continuous Blood Gluc Sensor (FREESTYLE RAHEEM 14 DAY SENSOR) MISC Change every 14 days. 2 each 11     insulin glargine (BASAGLAR KWIKPEN) 100 UNIT/ML pen 50 units subcutaneous at hs. (Patient not taking: Reported on 1/12/2022) 18 mL 3     insulin pen needle (B-D U/F) 31G X 8 MM miscellaneous USE TO INJECT FOUR TIMES DAILY OR  "AS DIRECTED 400 each 3     insulin syringe-needle U-100 (BD INSULIN SYRINGE ULTRAFINE) 30G X 1/2\" 1 ML Use one syringe daily or as directed.  3 month supply 100 each prn     ketorolac (ACULAR) 0.5 % ophthalmic solution Place 1 drop Into the left eye 4 times daily 10 mL 0     ketorolac (ACULAR) 0.5 % ophthalmic solution Place 1 drop into the right eye 4 times daily 10 mL 0     ofloxacin (OCUFLOX) 0.3 % ophthalmic solution Place 1 drop Into the left eye 4 times daily 5 mL 0     ofloxacin (OCUFLOX) 0.3 % ophthalmic solution Place 1 drop into the right eye 4 times daily 5 mL 0     prednisoLONE acetate (PRED FORTE) 1 % ophthalmic suspension Place 1 drop Into the left eye 4 times daily 15 mL 1     prednisoLONE acetate (PRED FORTE) 1 % ophthalmic suspension Place 1 drop into the right eye 4 times daily 15 mL 1     vitamin D2 (ERGOCALCIFEROL) 67248 units (1250 mcg) capsule Take 1 capsule (50,000 Units) by mouth once a week (Patient not taking: Reported on 3/14/2022) 12 capsule 3     vitamin D2 (ERGOCALCIFEROL) 07732 units (1250 mcg) capsule Take 1 capsule (50,000 Units) by mouth every 7 days (Patient not taking: Reported on 3/14/2022) 8 capsule 6       Allergies  No Known Allergies    Social History  Social History     Socioeconomic History     Marital status:      Spouse name: Not on file     Number of children: Not on file     Years of education: Not on file     Highest education level: Not on file   Occupational History     Not on file   Tobacco Use     Smoking status: Current Some Day Smoker     Packs/day: 0.50     Years: 40.00     Pack years: 20.00     Types: Cigarettes     Smokeless tobacco: Never Used     Tobacco comment: 3 cigarettes daily   Substance and Sexual Activity     Alcohol use: No     Alcohol/week: 0.0 standard drinks     Drug use: No     Sexual activity: Yes     Partners: Male   Other Topics Concern     Parent/sibling w/ CABG, MI or angioplasty before 65F 55M? No   Social History Narrative     Not " "on file     Social Determinants of Health     Financial Resource Strain: Not on file   Food Insecurity: Not on file   Transportation Needs: Not on file   Physical Activity: Not on file   Stress: Not on file   Social Connections: Not on file   Intimate Partner Violence: Not on file   Housing Stability: Not on file       Family History  Family History   Problem Relation Age of Onset     Hypertension Mother      Hyperlipidemia Mother      Diabetes Type 2  Mother      Diabetes Mother      Hypertension Father      Diabetes Type 2  Father      Diabetes Father      Diabetes Brother      Diabetes Sister      Glaucoma No family hx of      Macular Degeneration No family hx of        Review of Systems  The complete review of systems is negative other than noted in the HPI or here.   Anesthesia Evaluation   Pt has had prior anesthetic.     No history of anesthetic complications       ROS/MED HX  ENT/Pulmonary: Comment: Smokes 4-5 cigarettes daily    (+) SATNAM risk factors, hypertension, tobacco use, Current use,  (-) asthma   Neurologic:  - neg neurologic ROS     Cardiovascular:     (+) hypertension----- (-) ROWAN, orthopnea/PND, syncope and irregular heartbeat/palpitations   METS/Exercise Tolerance: 4 - Raking leaves, gardening    Hematologic:  - neg hematologic  ROS     Musculoskeletal:  - neg musculoskeletal ROS     GI/Hepatic:  - neg GI/hepatic ROS     Renal/Genitourinary:  - neg Renal ROS     Endo:     (+) type II DM, Last HgA1c: 9.8, date: 1/19/2022, Using insulin, - using insulin pump. Normal glucose range: 140,     Psychiatric/Substance Use:  - neg psychiatric ROS     Infectious Disease:  - neg infectious disease ROS     Malignancy:  - neg malignancy ROS     Other:            /80 (BP Location: Right arm, Patient Position: Chair, Cuff Size: Adult Regular)   Pulse 83   Temp 97.9  F (36.6  C) (Oral)   Resp 12   Ht 1.702 m (5' 7\")   Wt 55.8 kg (123 lb)   LMP  (LMP Unknown)   SpO2 100%   Breastfeeding No   BMI " 19.26 kg/m      Physical Exam   Constitutional: Awake, alert, cooperative, no apparent distress, and appears stated age.  Eyes: Pupils equal, round and reactive to light, extra ocular muscles intact, sclera clear, conjunctiva normal.  HENT: Normocephalic, oral pharynx with moist mucus membranes, good dentition. No goiter appreciated.   Respiratory: Clear to auscultation bilaterally, no crackles or wheezing.  Cardiovascular: Regular rate and rhythm, normal S1 and S2, and no murmur noted.  Carotids +2, no bruits. No edema. Palpable pulses to radial  DP and PT arteries.   GI: Normal bowel sounds, soft, non-distended, non-tender, no masses palpated, no hepatosplenomegaly.    Lymph/Hematologic: No cervical lymphadenopathy and no supraclavicular lymphadenopathy.  Genitourinary:  deferred  Skin: Warm and dry.  No rashes at anticipated surgical site.   Musculoskeletal: Full ROM of neck. There is no redness, warmth, or swelling of the joints. Gross motor strength is normal.    Neurologic: Awake, alert, oriented to name, place and time. Cranial nerves II-XII are grossly intact. Gait is normal.   Neuropsychiatric: Calm, cooperative. Normal affect.     Prior Labs/Diagnostic Studies   All labs and imaging personally reviewed    Ref Range & Units 5 mo ago   WBC Count 4.0 - 11.0 10e9/L 6.7    RBC Count 3.80 - 5.20 10e12/L 4.70    Hemoglobin 13.3 - 17.7 g/dL 13.1 Low     Hematocrit 40.0 - 53.0 % 41.4    MCV 78 - 100 fL 88    MCH 26.5 - 33.0 pg 27.9    MCHC 31.5 - 36.5 g/dL 31.6    Platelet Count 150 - 450 10e9/L 204    RDW 10.0 - 15.0 % 14.9          Hemoglobin A1c, POC 2.5 - 6.4 % 9.8 High     Comment: Normal: <5.7%   Prediabetes: 5.7-6.4%   Diabetes: 6.5% or higher            Ref Range & Units 5 mo ago    Hemoglobin A1c, POC 0.0 - 6.4 % 9.1 High     Comment: Normal: <5.7%   Prediabetes: 5.7-6.4%   Diabetes: 6.5% or higher        Lab Results   Component Value Date    A1C 10.1 04/26/2021    A1C 8.9 12/21/2020    A1C 9.1 09/23/2020     A1C 8.6 11/11/2019    A1C 7.7 06/18/2019     EKG 2019:  SR      Echocardiogram 2013      Interpretation Summary       Left ventricular systolic function is normal. The visual ejection fraction is        estimated at 65-70%. No regional wall motion abnormalities noted. No        significant valvular abnormalities.       PatientHeight: 65 in       PatientWeight: 140 lbs       SystolicPressure: 112 mmHg       DiastolicPressure: 80 mmHg       HeartRate: 73 bpm       BSA 1.7 m^2      Exercise stress echocardiogram 2013      Interpretation Summary       Target Heart Rate was not achieved due to fatigue. Suboptimal blood pressure        response. Chest pain at rest which did not worsen with exercise. The visual        ejection fraction is estimated at 60-65%. Normal left ventricular wall        motion. Unfortunately a non diagnostic study due to non achievement of target        heart rate.       PatientHeight: 65 in       PatientWeight: 140 lbs       SystolicPressure: 122 mmHg       DiastolicPressure: 86 mmHg       HeartRate: 75 bpm       BSA 1.7 m^2     The patient's records and results personally reviewed by this provider.     Outside records reviewed from: Care Everywhere    LAB/DIAGNOSTIC STUDIES TODAY:    none    Assessment      Karen Braxton is a 51 year old female was seen as a PAC referral for risk assessment and optimization for anesthesia.    Plan/Recommendations  Pt will be optimized for the proposed procedure.  See below for details on the assessment, risk, and preoperative recommendations    NEUROLOGY  - No history of TIA, CVA or seizure    -Post Op delirium risk factors:  No risk identified    HEENT  - No current airway concerns.  Will need to be reassessed day of surgery.  Mallampati: I  TM: > 3     Cataracts with above procedure planned with left eye  - Glaucoma, use eye drops as prescribed  - s/p cleft palate repair as a child.  Upper partial plate.  Evidence of some missing molars on the bottom.  All  "remaining teeth intact    CARDIAC  - No history of CAD  No anginal symptoms, Denies palpitations, PND, dizziness or syncope.     - Hypertension- has previously been on lisinopril. She herself stopped her medication.   Normotensive in clinic today.     - METS (Metabolic Equivalents)  Patient performs 4 or more METS exercise without symptoms  Total Score: 0      RCRI-Low risk: Class 2 0.9% complication rate  Total Score: 1           RCRI: Diabetes        PULMONARY  Denies SOB  SATNAM Low Risk  Total Score: 2           SATNAM: Hypertension    SATNAM: Over 50 ys old      - Denies asthma or inhaler use  - Tobacco History  {Refresh the note if updates are made Link to Tobacco History :[id number]}    History   Smoking Status     Current Some Day Smoker     Packs/day: 0.50     Years: 40.00     Types: Cigarettes   Smokeless Tobacco     Never Used     Comment: 3 cigarettes daily       GI    PONV Low Risk  Total Score: 1           1 AN PONV: Pt is Female        /RENAL  - Baseline Creatinine  0.67    ENDOCRINE    - BMI: Estimated body mass index is 19.26 kg/m  as calculated from the following:    Height as of this encounter: 1.702 m (5' 7\").    Weight as of this encounter: 55.8 kg (123 lb).  Healthy Weight (BMI 18.5-24.9)  - Diabetes  Diabetes Type 2, insulin dependent. Hold morning oral hypoglycemic medications and short acting insulin DOS. Take 80% of last scheduled dose of long-acting insulin prior to procedure.  Recommend close monitoring of the patient's blood glucose levels throughout the perioperative period.    HEME  VTE Low Risk 0.26%  Total Score: 0            MSK  Patient is NOT Frail  Total Score: 0              The patient is optimized for their procedure. AVS with information on surgery time/arrival time, meds and NPO status given by nursing staff. No further diagnostic testing indicated.      On the day of service:     Prep time: 10 minutes  Visit time: 15 minutes  Documentation time: 15 " minutes  ------------------------------------------  Total time: 40 minutes      FLORESITA Cole CNP  Preoperative Assessment Center  [facility]  Clinic and Surgery Center  Phone: [phone]  Fax: [phone]

## 2022-03-14 NOTE — H&P (VIEW-ONLY)
Fax: [phone]  Pre-Operative H & P     CC:  Preoperative exam to assess for increased cardiopulmonary risk while undergoing surgery and anesthesia.    Date of Encounter: 3/14/2022  Primary Care Physician:  Yvonne Solomon     Reason for visit: Pre-operative evaluation    HPI  Karen Braxton is a 51 year old female who presents for pre-operative H & P in preparation for  Procedure Information     Case: [id number] Date/Time: 03/17/22 0715    Procedure: PHACOEMULSIFICATION, CATARACT, WITH STANDARD INTRAOCULAR LENS IMPLANT INSERTION LEFT (Left Eye)    Anesthesia type: Combined MAC with Retrobulbar    Diagnosis: Combined form of age-related cataract, left eye [H25.812]    Pre-op diagnosis: Combined form of age-related cataract, left eye [H25.812]    Location: [place] [address] / [facility]    Providers: Cony Scruggs MD          Ms. Braxton is followed by Dr. Scruggs in ophthalmology for her diabetic eye exam and cataracts.  She underwent cataract surgery on her right eye with Dr. Scruggs on 1/13/2022.  Her right eye vision is improved. Much brighter right eye than left. Patient reports left eye vision is yoel and blurry.  Ms. Braxton presents to the PAC in preparation for the above procedure.      Notable past medical history includes, HTN, DMII poorly controlled, HLD, tobacco dependent, s/p cholecystectomy, s/p knee arthroscopy, and cleft palate repair as a child.          History is obtained from the patient and chart review    Hx of abnormal bleeding or anti-platelet use: no    Menstrual history: No LMP recorded (lmp unknown). Patient is postmenopausal.:      Past Medical History  Past Medical History:   Diagnosis Date     Cigarette nicotine dependence without complication      Combined forms of age-related cataract of both eyes      Type 2 diabetes mellitus (H)        Past Surgical History  Past Surgical History:   Procedure Laterality Date     KNEE SURGERY Left 2001      LAPAROSCOPIC CHOLECYSTECTOMY  2007     PHACOEMULSIFICATION WITH STANDARD INTRAOCULAR LENS IMPLANT Right 1/13/2022    Procedure: PHACOEMULSIFICATION, COMPLEX CATARACT, WITH STANDARD INTRAOCULAR LENS IMPLANT INSERTION right;  Surgeon: Cony Scruggs MD;  Location: [facility] OR     REPAIR CLEFT PALATE CHILD  1972       Prior to Admission Medications  Current Outpatient Medications   Medication Sig Dispense Refill     acetaminophen (TYLENOL) 325 MG tablet Take 650 mg by mouth every 6 hours as needed for mild pain       atorvastatin (LIPITOR) 40 MG tablet Take 1 tablet (40 mg) by mouth daily (Patient taking differently: Take 40 mg by mouth every morning ) 90 tablet 3     ibuprofen (ADVIL/MOTRIN) 200 MG capsule Take 200 mg by mouth every 4 hours as needed for fever       metFORMIN (GLUCOPHAGE) 1000 MG tablet Take 0.5 tablets (500 mg) by mouth 2 times daily (with meals) 90 tablet 1     NOVOLOG FLEXPEN 100 UNIT/ML soln INJECT 32 UNITS BEFORE BREAKFAST, 37 UNITS BEFORE LUNCH, 37 UNITS BEFORE DINNER 90 mL 3     vitamin D3 (CHOLECALCIFEROL) 50 mcg (2000 units) tablet Take 1 tablet (50 mcg) by mouth daily (Patient taking differently: Take 1 tablet by mouth every morning ) 90 tablet 3     ASPIRIN LOW DOSE 81 MG EC tablet TAKE 1 TABLET(81 MG) BY MOUTH DAILY (Patient not taking: Reported on 1/12/2022) 90 tablet 0     blood glucose (NO BRAND SPECIFIED) test strip Use to test blood sugar 4 times daily and as needed. Any covered brand that meets Pt need. 400 strip 4     blood glucose monitoring (NO BRAND SPECIFIED) meter device kit Use to test blood sugar 4 times daily and as needed. Any covered brand. 1 kit 1     Continuous Blood Gluc Sensor (FREESTYLE RAHEEM 14 DAY SENSOR) MISC Change every 14 days. 2 each 11     insulin glargine (BASAGLAR KWIKPEN) 100 UNIT/ML pen 50 units subcutaneous at hs. (Patient not taking: Reported on 1/12/2022) 18 mL 3     insulin pen needle (B-D U/F) 31G X 8 MM miscellaneous USE TO INJECT FOUR TIMES DAILY OR  "AS DIRECTED 400 each 3     insulin syringe-needle U-100 (BD INSULIN SYRINGE ULTRAFINE) 30G X 1/2\" 1 ML Use one syringe daily or as directed.  3 month supply 100 each prn     ketorolac (ACULAR) 0.5 % ophthalmic solution Place 1 drop Into the left eye 4 times daily 10 mL 0     ketorolac (ACULAR) 0.5 % ophthalmic solution Place 1 drop into the right eye 4 times daily 10 mL 0     ofloxacin (OCUFLOX) 0.3 % ophthalmic solution Place 1 drop Into the left eye 4 times daily 5 mL 0     ofloxacin (OCUFLOX) 0.3 % ophthalmic solution Place 1 drop into the right eye 4 times daily 5 mL 0     prednisoLONE acetate (PRED FORTE) 1 % ophthalmic suspension Place 1 drop Into the left eye 4 times daily 15 mL 1     prednisoLONE acetate (PRED FORTE) 1 % ophthalmic suspension Place 1 drop into the right eye 4 times daily 15 mL 1     vitamin D2 (ERGOCALCIFEROL) 25002 units (1250 mcg) capsule Take 1 capsule (50,000 Units) by mouth once a week (Patient not taking: Reported on 3/14/2022) 12 capsule 3     vitamin D2 (ERGOCALCIFEROL) 94956 units (1250 mcg) capsule Take 1 capsule (50,000 Units) by mouth every 7 days (Patient not taking: Reported on 3/14/2022) 8 capsule 6       Allergies  No Known Allergies    Social History  Social History     Socioeconomic History     Marital status:      Spouse name: Not on file     Number of children: Not on file     Years of education: Not on file     Highest education level: Not on file   Occupational History     Not on file   Tobacco Use     Smoking status: Current Some Day Smoker     Packs/day: 0.50     Years: 40.00     Pack years: 20.00     Types: Cigarettes     Smokeless tobacco: Never Used     Tobacco comment: 3 cigarettes daily   Substance and Sexual Activity     Alcohol use: No     Alcohol/week: 0.0 standard drinks     Drug use: No     Sexual activity: Yes     Partners: Male   Other Topics Concern     Parent/sibling w/ CABG, MI or angioplasty before 65F 55M? No   Social History Narrative     Not " "on file     Social Determinants of Health     Financial Resource Strain: Not on file   Food Insecurity: Not on file   Transportation Needs: Not on file   Physical Activity: Not on file   Stress: Not on file   Social Connections: Not on file   Intimate Partner Violence: Not on file   Housing Stability: Not on file       Family History  Family History   Problem Relation Age of Onset     Hypertension Mother      Hyperlipidemia Mother      Diabetes Type 2  Mother      Diabetes Mother      Hypertension Father      Diabetes Type 2  Father      Diabetes Father      Diabetes Brother      Diabetes Sister      Glaucoma No family hx of      Macular Degeneration No family hx of        Review of Systems  The complete review of systems is negative other than noted in the HPI or here.   Anesthesia Evaluation   Pt has had prior anesthetic.     No history of anesthetic complications       ROS/MED HX  ENT/Pulmonary: Comment: Smokes 4-5 cigarettes daily    (+) SATNAM risk factors, hypertension, tobacco use, Current use,  (-) asthma   Neurologic:  - neg neurologic ROS     Cardiovascular:     (+) hypertension----- (-) ROWAN, orthopnea/PND, syncope and irregular heartbeat/palpitations   METS/Exercise Tolerance: 4 - Raking leaves, gardening    Hematologic:  - neg hematologic  ROS     Musculoskeletal:  - neg musculoskeletal ROS     GI/Hepatic:  - neg GI/hepatic ROS     Renal/Genitourinary:  - neg Renal ROS     Endo:     (+) type II DM, Last HgA1c: 9.8, date: 1/19/2022, Using insulin, - using insulin pump. Normal glucose range: 140,     Psychiatric/Substance Use:  - neg psychiatric ROS     Infectious Disease:  - neg infectious disease ROS     Malignancy:  - neg malignancy ROS     Other:            /80 (BP Location: Right arm, Patient Position: Chair, Cuff Size: Adult Regular)   Pulse 83   Temp 97.9  F (36.6  C) (Oral)   Resp 12   Ht 1.702 m (5' 7\")   Wt 55.8 kg (123 lb)   LMP  (LMP Unknown)   SpO2 100%   Breastfeeding No   BMI " 19.26 kg/m      Physical Exam   Constitutional: Awake, alert, cooperative, no apparent distress, and appears stated age.  Eyes: Pupils equal, round and reactive to light, extra ocular muscles intact, sclera clear, conjunctiva normal.  HENT: Normocephalic, oral pharynx with moist mucus membranes, good dentition. No goiter appreciated.   Respiratory: Clear to auscultation bilaterally, no crackles or wheezing.  Cardiovascular: Regular rate and rhythm, normal S1 and S2, and no murmur noted.  Carotids +2, no bruits. No edema. Palpable pulses to radial  DP and PT arteries.   GI: Normal bowel sounds, soft, non-distended, non-tender, no masses palpated, no hepatosplenomegaly.    Lymph/Hematologic: No cervical lymphadenopathy and no supraclavicular lymphadenopathy.  Genitourinary:  deferred  Skin: Warm and dry.  No rashes at anticipated surgical site.   Musculoskeletal: Full ROM of neck. There is no redness, warmth, or swelling of the joints. Gross motor strength is normal.    Neurologic: Awake, alert, oriented to name, place and time. Cranial nerves II-XII are grossly intact. Gait is normal.   Neuropsychiatric: Calm, cooperative. Normal affect.     Prior Labs/Diagnostic Studies   All labs and imaging personally reviewed    Ref Range & Units 5 mo ago   WBC Count 4.0 - 11.0 10e9/L 6.7    RBC Count 3.80 - 5.20 10e12/L 4.70    Hemoglobin 13.3 - 17.7 g/dL 13.1 Low     Hematocrit 40.0 - 53.0 % 41.4    MCV 78 - 100 fL 88    MCH 26.5 - 33.0 pg 27.9    MCHC 31.5 - 36.5 g/dL 31.6    Platelet Count 150 - 450 10e9/L 204    RDW 10.0 - 15.0 % 14.9          Hemoglobin A1c, POC 2.5 - 6.4 % 9.8 High     Comment: Normal: <5.7%   Prediabetes: 5.7-6.4%   Diabetes: 6.5% or higher            Ref Range & Units 5 mo ago    Hemoglobin A1c, POC 0.0 - 6.4 % 9.1 High     Comment: Normal: <5.7%   Prediabetes: 5.7-6.4%   Diabetes: 6.5% or higher        Lab Results   Component Value Date    A1C 10.1 04/26/2021    A1C 8.9 12/21/2020    A1C 9.1 09/23/2020     A1C 8.6 11/11/2019    A1C 7.7 06/18/2019     EKG 2019:  SR      Echocardiogram 2013      Interpretation Summary       Left ventricular systolic function is normal. The visual ejection fraction is        estimated at 65-70%. No regional wall motion abnormalities noted. No        significant valvular abnormalities.       PatientHeight: 65 in       PatientWeight: 140 lbs       SystolicPressure: 112 mmHg       DiastolicPressure: 80 mmHg       HeartRate: 73 bpm       BSA 1.7 m^2      Exercise stress echocardiogram 2013      Interpretation Summary       Target Heart Rate was not achieved due to fatigue. Suboptimal blood pressure        response. Chest pain at rest which did not worsen with exercise. The visual        ejection fraction is estimated at 60-65%. Normal left ventricular wall        motion. Unfortunately a non diagnostic study due to non achievement of target        heart rate.       PatientHeight: 65 in       PatientWeight: 140 lbs       SystolicPressure: 122 mmHg       DiastolicPressure: 86 mmHg       HeartRate: 75 bpm       BSA 1.7 m^2     The patient's records and results personally reviewed by this provider.     Outside records reviewed from: Care Everywhere    LAB/DIAGNOSTIC STUDIES TODAY:    none    Assessment      Karen Braxton is a 51 year old female was seen as a PAC referral for risk assessment and optimization for anesthesia.    Plan/Recommendations  Pt will be optimized for the proposed procedure.  See below for details on the assessment, risk, and preoperative recommendations    NEUROLOGY  - No history of TIA, CVA or seizure    -Post Op delirium risk factors:  No risk identified    HEENT  - No current airway concerns.  Will need to be reassessed day of surgery.  Mallampati: I  TM: > 3     Cataracts with above procedure planned with left eye  - Glaucoma, use eye drops as prescribed  - s/p cleft palate repair as a child.  Upper partial plate.  Evidence of some missing molars on the bottom.  All  "remaining teeth intact    CARDIAC  - No history of CAD  No anginal symptoms, Denies palpitations, PND, dizziness or syncope.     - Hypertension- has previously been on lisinopril. She herself stopped her medication.   Normotensive in clinic today.     - METS (Metabolic Equivalents)  Patient performs 4 or more METS exercise without symptoms  Total Score: 0      RCRI-Low risk: Class 2 0.9% complication rate  Total Score: 1           RCRI: Diabetes        PULMONARY  Denies SOB  SATNAM Low Risk  Total Score: 2           SATNAM: Hypertension    SATNAM: Over 50 ys old      - Denies asthma or inhaler use  - Tobacco History      History   Smoking Status     Current Some Day Smoker     Packs/day: 0.50     Years: 40.00     Types: Cigarettes   Smokeless Tobacco     Never Used     Comment: 3 cigarettes daily       GI    PONV Low Risk  Total Score: 1           1 AN PONV: Pt is Female        /RENAL  - Baseline Creatinine  0.67    ENDOCRINE    - BMI: Estimated body mass index is 19.26 kg/m  as calculated from the following:    Height as of this encounter: 1.702 m (5' 7\").    Weight as of this encounter: 55.8 kg (123 lb).  Healthy Weight (BMI 18.5-24.9)  - Diabetes  Diabetes Type 2, insulin dependent. Hold morning oral hypoglycemic medications and short acting insulin DOS. Take 80% of last scheduled dose of long-acting insulin prior to procedure.  Recommend close monitoring of the patient's blood glucose levels throughout the perioperative period.    HEME  VTE Low Risk 0.26%  Total Score: 0            MSK  Patient is NOT Frail  Total Score: 0              The patient is optimized for their procedure. AVS with information on surgery time/arrival time, meds and NPO status given by nursing staff. No further diagnostic testing indicated.      On the day of service:     Prep time: 10 minutes  Visit time: 15 minutes  Documentation time: 15 minutes  ------------------------------------------  Total time: 40 minutes      FLORESITA Cole " CNP  Preoperative Assessment Center  [facility]  Clinic and Surgery Center  Phone: [phone]  Fax: [phone]

## 2022-03-14 NOTE — PATIENT INSTRUCTIONS
Preparing for Your Surgery      Name:  Karen Braxton   MRN:  [id number]   :  1970   Today's Date:  3/14/2022         Arriving for surgery:  Surgery date:  3/17/22  Arrival time:  5:45am    Restrictions due to COVID 19:    Effective 2022 [facility] has the following visitor restriction guidelines   1 visitor may accompany each patient  That visitor may wait for patient in the Surgery Center Waiting room  All visitors must wear a mask and socially distance        parking is available for anyone with mobility limitations or disabilities. (Monday- Friday 7 am- 5 pm)    Please come to:    [facility]  [address] [place] [id number]    Check in on the 5th floor, Ambulatory Surgery Center.    What can I eat or drink?    -  You may eat and drink normally until 8 hours before surgery. (Until 11:15pm on 3/16/22)  -  You may have clear liquids up to 4 hours before surgery. (Until 3:15am on 3/17/22)    Examples of clear liquids:  Water  Clear broth  Juices (apple, white grape, white cranberry  and cider) without pulp  Noncarbonated, powder based beverages  (lemonade and Eric-Aid)  Sodas (Sprite, 7-Up, ginger ale and seltzer)  Coffee or tea (without milk or cream)  Gatorade    --No alcohol for at least 24 hours before surgery    Which medicines can I take?       Hold Multivitamins for 7 days before surgery.  Hold Supplements for 7 days before surgery.  Hold Ibuprofen (Advil, Motrin) for 1 day before surgery--unless otherwise directed by surgeon.  Hold Naproxen (Aleve) for 4 days before surgery.    -  DO NOT take the following medications the day of surgery:   Lisinopril (prinivil)   Metformin (glucophage)   Novolog flexpen    Vitamin D    -  PLEASE TAKE the following medications the day of surgery    Acetaminophen (tylenol)   Atorvastatin (lipitor)   Ok to take eye drops   Ok to take Aspirin    How do I prepare myself?  - Please take 2 showers before surgery using  Scrubcare or Hibiclens soap.    Use this soap only from the neck to your toes.     Leave the soap on your skin for one minute--then rinse thoroughly.      You may use your own shampoo and conditioner; no other hair products.   - Please remove all jewelry and body piercings.  - No lotions, deodorants or fragrance.  - No makeup or fingernail polish.   - Bring your ID and insurance card.    -If you have a Deep Brain Stimulator, a Spinal Cord Stimulator or any implanted Neuro device you must bring the remote to the Surgery Center        - All patients are required to have a Covid-19 test within 4 days of surgery/procedure.      -Patients will be contacted by the [facility] scheduling team within 1 week of surgery to make an appointment.      - Patients may call the Scheduling team at [phone] if they have not been scheduled within 4 days of  surgery.      ALL PATIENTS ARE REQUIRED TO HAVE A RESPONSIBLE ADULT TO DRIVE AND BE IN ATTENDANCE WITH THEM FOR 24 HOURS FOLLOWING SURGERY       Questions or Concerns:    -For questions regarding the day of surgery please contact the Ambulatory Surgery Center at [phone].    -If you have health changes between today and your surgery please contact your surgeon.     For questions after surgery please call your surgeons office.

## 2022-03-16 ASSESSMENT — LIFESTYLE VARIABLES: TOBACCO_USE: 1

## 2022-03-16 NOTE — ANESTHESIA PREPROCEDURE EVALUATION
Anesthesia Pre-Procedure Evaluation    Patient: Karen Braxton   MRN: [id number] : 1970        Procedure : Procedure(s):  PHACOEMULSIFICATION, CATARACT, WITH STANDARD INTRAOCULAR LENS IMPLANT INSERTION LEFT          Past Medical History:   Diagnosis Date     Cigarette nicotine dependence without complication      Combined forms of age-related cataract of both eyes      Type 2 diabetes mellitus (H)       Past Surgical History:   Procedure Laterality Date     KNEE SURGERY Left      LAPAROSCOPIC CHOLECYSTECTOMY  2007     PHACOEMULSIFICATION WITH STANDARD INTRAOCULAR LENS IMPLANT Right 2022    Procedure: PHACOEMULSIFICATION, COMPLEX CATARACT, WITH STANDARD INTRAOCULAR LENS IMPLANT INSERTION right;  Surgeon: Cony Scruggs MD;  Location: [facility] OR     REPAIR CLEFT PALATE CHILD  1972      No Known Allergies   Social History     Tobacco Use     Smoking status: Current Some Day Smoker     Packs/day: 0.50     Years: 40.00     Pack years: 20.00     Types: Cigarettes     Smokeless tobacco: Never Used     Tobacco comment: 3 cigarettes daily   Substance Use Topics     Alcohol use: No     Alcohol/week: 0.0 standard drinks      Wt Readings from Last 1 Encounters:   22 55.8 kg (123 lb)        Anesthesia Evaluation            ROS/MED HX  ENT/Pulmonary:     (+) tobacco use,     Neurologic:       Cardiovascular:       METS/Exercise Tolerance:     Hematologic:       Musculoskeletal:       GI/Hepatic:       Renal/Genitourinary:       Endo:     (+) type II DM,     Psychiatric/Substance Use:       Infectious Disease:       Malignancy:       Other:            Physical Exam    Airway        Mallampati: I       Respiratory Devices and Support         Dental           Cardiovascular          Rhythm and rate: regular     Pulmonary           breath sounds clear to auscultation           OUTSIDE LABS:  CBC:   Lab Results   Component Value Date    WBC 5.6 2020    WBC 5.1 2019    HGB 13.3 2020    HGB 15.0  11/11/2019    HCT 41.0 09/23/2020    HCT 46.1 11/11/2019     09/23/2020     11/11/2019     BMP:   Lab Results   Component Value Date     09/24/2021     09/23/2020    POTASSIUM 4.0 09/24/2021    POTASSIUM 4.0 09/23/2020    CHLORIDE 104 09/24/2021    CHLORIDE 105 09/23/2020    CO2 24 09/24/2021    CO2 25 09/23/2020    BUN 10 09/24/2021    BUN 15 09/23/2020    CR 0.67 09/24/2021    CR 0.82 09/23/2020     (H) 01/13/2022     (H) 01/13/2022     COAGS:   Lab Results   Component Value Date    PTT 33 02/14/2006    INR 0.92 02/14/2006     POC:   Lab Results   Component Value Date     (H) 09/14/2007     HEPATIC:   Lab Results   Component Value Date    ALBUMIN 3.1 (L) 09/24/2021    PROTTOTAL 7.2 09/24/2021    ALT 20 09/24/2021    AST 11 09/24/2021    ALKPHOS 130 09/24/2021    BILITOTAL 0.2 09/24/2021     OTHER:   Lab Results   Component Value Date    A1C 10.1 (H) 04/26/2021    MARLEEN 9.1 09/24/2021    PHOS 4.2 03/18/2014    MAG 2.0 03/18/2014    LIPASE 130 09/24/2021    AMYLASE 41 01/08/2014    TSH 2.12 03/16/2018    T4 1.22 08/04/2011    SED 23 (H) 01/09/2015       Anesthesia Plan    ASA Status:  2   NPO Status:  NPO Appropriate    Anesthesia Type: MAC.              Consents    Anesthesia Plan(s) and associated risks, benefits, and realistic alternatives discussed. Questions answered and patient/representative(s) expressed understanding.    - Discussed:     - Discussed with:  Patient         Postoperative Care            Comments:                Naren Martines MD

## 2022-03-17 ENCOUNTER — HOSPITAL ENCOUNTER (OUTPATIENT)
Facility: AMBULATORY SURGERY CENTER | Age: 52
Discharge: HOME | End: 2022-03-17
Attending: OPHTHALMOLOGY
Payer: COMMERCIAL

## 2022-03-17 ENCOUNTER — ANESTHESIA (OUTPATIENT)
Dept: SURGERY | Facility: AMBULATORY SURGERY CENTER | Age: 52
End: 2022-03-17
Payer: COMMERCIAL

## 2022-03-17 VITALS
BODY MASS INDEX: 18.83 KG/M2 | SYSTOLIC BLOOD PRESSURE: 125 MMHG | OXYGEN SATURATION: 99 % | DIASTOLIC BLOOD PRESSURE: 75 MMHG | RESPIRATION RATE: 18 BRPM | HEART RATE: 50 BPM | HEIGHT: 67 IN | WEIGHT: 120 LBS | TEMPERATURE: 97.5 F

## 2022-03-17 DIAGNOSIS — H25.812 COMBINED FORM OF AGE-RELATED CATARACT, LEFT EYE: ICD-10-CM

## 2022-03-17 DIAGNOSIS — H25.811 COMBINED FORM OF AGE-RELATED CATARACT, RIGHT EYE: ICD-10-CM

## 2022-03-17 LAB — GLUCOSE BLDC GLUCOMTR-MCNC: 189 MG/DL (ref 70–99)

## 2022-03-17 PROCEDURE — 66982 XCAPSL CTRC RMVL CPLX WO ECP: CPT | Mod: LT

## 2022-03-17 PROCEDURE — 66982 XCAPSL CTRC RMVL CPLX WO ECP: CPT | Mod: 79 | Performed by: OPHTHALMOLOGY

## 2022-03-17 PROCEDURE — 82962 GLUCOSE BLOOD TEST: CPT | Performed by: PATHOLOGY

## 2022-03-17 RX ORDER — SODIUM CHLORIDE, SODIUM LACTATE, POTASSIUM CHLORIDE, CALCIUM CHLORIDE 600; 310; 30; 20 MG/100ML; MG/100ML; MG/100ML; MG/100ML
INJECTION, SOLUTION INTRAVENOUS CONTINUOUS
Status: DISCONTINUED | OUTPATIENT
Start: 2022-03-17 | End: 2022-03-17 | Stop reason: HOSPADM

## 2022-03-17 RX ORDER — BALANCED SALT SOLUTION 6.4; .75; .48; .3; 3.9; 1.7 MG/ML; MG/ML; MG/ML; MG/ML; MG/ML; MG/ML
SOLUTION OPHTHALMIC PRN
Status: DISCONTINUED | OUTPATIENT
Start: 2022-03-17 | End: 2022-03-17 | Stop reason: HOSPADM

## 2022-03-17 RX ORDER — PROPARACAINE HYDROCHLORIDE 5 MG/ML
1 SOLUTION/ DROPS OPHTHALMIC ONCE
Status: COMPLETED | OUTPATIENT
Start: 2022-03-17 | End: 2022-03-17

## 2022-03-17 RX ORDER — PREDNISOLONE ACETATE 1 %
SUSPENSION, DROPS(FINAL DOSAGE FORM)(ML) OPHTHALMIC (EYE) PRN
Status: DISCONTINUED | OUTPATIENT
Start: 2022-03-17 | End: 2022-03-17 | Stop reason: HOSPADM

## 2022-03-17 RX ORDER — OFLOXACIN 3 MG/ML
1 SOLUTION/ DROPS OPHTHALMIC
Status: COMPLETED | OUTPATIENT
Start: 2022-03-17 | End: 2022-03-17

## 2022-03-17 RX ORDER — HYDROMORPHONE HYDROCHLORIDE 1 MG/ML
0.2 INJECTION, SOLUTION INTRAMUSCULAR; INTRAVENOUS; SUBCUTANEOUS EVERY 5 MIN PRN
Status: DISCONTINUED | OUTPATIENT
Start: 2022-03-17 | End: 2022-03-17 | Stop reason: HOSPADM

## 2022-03-17 RX ORDER — SODIUM CHLORIDE, SODIUM LACTATE, POTASSIUM CHLORIDE, CALCIUM CHLORIDE 600; 310; 30; 20 MG/100ML; MG/100ML; MG/100ML; MG/100ML
INJECTION, SOLUTION INTRAVENOUS CONTINUOUS
Status: DISCONTINUED | OUTPATIENT
Start: 2022-03-17 | End: 2022-03-18 | Stop reason: HOSPADM

## 2022-03-17 RX ORDER — LIDOCAINE 40 MG/G
CREAM TOPICAL
Status: DISCONTINUED | OUTPATIENT
Start: 2022-03-17 | End: 2022-03-17 | Stop reason: HOSPADM

## 2022-03-17 RX ORDER — FENTANYL CITRATE 50 UG/ML
INJECTION, SOLUTION INTRAMUSCULAR; INTRAVENOUS PRN
Status: DISCONTINUED | OUTPATIENT
Start: 2022-03-17 | End: 2022-03-17

## 2022-03-17 RX ORDER — ACETAMINOPHEN 325 MG/1
975 TABLET ORAL ONCE
Status: COMPLETED | OUTPATIENT
Start: 2022-03-17 | End: 2022-03-17

## 2022-03-17 RX ORDER — OXYCODONE HYDROCHLORIDE 5 MG/1
5 TABLET ORAL EVERY 4 HOURS PRN
Status: DISCONTINUED | OUTPATIENT
Start: 2022-03-17 | End: 2022-03-18 | Stop reason: HOSPADM

## 2022-03-17 RX ORDER — CYCLOPENTOLAT/TROPIC/PHENYLEPH 1%-1%-2.5%
1 DROPS (EA) OPHTHALMIC (EYE)
Status: COMPLETED | OUTPATIENT
Start: 2022-03-17 | End: 2022-03-17

## 2022-03-17 RX ORDER — ONDANSETRON 4 MG/1
4 TABLET, ORALLY DISINTEGRATING ORAL EVERY 30 MIN PRN
Status: DISCONTINUED | OUTPATIENT
Start: 2022-03-17 | End: 2022-03-18 | Stop reason: HOSPADM

## 2022-03-17 RX ORDER — FENTANYL CITRATE 50 UG/ML
25 INJECTION, SOLUTION INTRAMUSCULAR; INTRAVENOUS
Status: DISCONTINUED | OUTPATIENT
Start: 2022-03-17 | End: 2022-03-18 | Stop reason: HOSPADM

## 2022-03-17 RX ORDER — LIDOCAINE HYDROCHLORIDE 10 MG/ML
INJECTION, SOLUTION EPIDURAL; INFILTRATION; INTRACAUDAL; PERINEURAL PRN
Status: DISCONTINUED | OUTPATIENT
Start: 2022-03-17 | End: 2022-03-17 | Stop reason: HOSPADM

## 2022-03-17 RX ORDER — FENTANYL CITRATE 50 UG/ML
25 INJECTION, SOLUTION INTRAMUSCULAR; INTRAVENOUS EVERY 5 MIN PRN
Status: DISCONTINUED | OUTPATIENT
Start: 2022-03-17 | End: 2022-03-17 | Stop reason: HOSPADM

## 2022-03-17 RX ORDER — TETRACAINE HYDROCHLORIDE 5 MG/ML
SOLUTION OPHTHALMIC PRN
Status: DISCONTINUED | OUTPATIENT
Start: 2022-03-17 | End: 2022-03-17 | Stop reason: HOSPADM

## 2022-03-17 RX ORDER — ONDANSETRON 2 MG/ML
4 INJECTION INTRAMUSCULAR; INTRAVENOUS EVERY 30 MIN PRN
Status: DISCONTINUED | OUTPATIENT
Start: 2022-03-17 | End: 2022-03-18 | Stop reason: HOSPADM

## 2022-03-17 RX ORDER — MEPERIDINE HYDROCHLORIDE 25 MG/ML
12.5 INJECTION INTRAMUSCULAR; INTRAVENOUS; SUBCUTANEOUS
Status: DISCONTINUED | OUTPATIENT
Start: 2022-03-17 | End: 2022-03-18 | Stop reason: HOSPADM

## 2022-03-17 RX ADMIN — OFLOXACIN 1 DROP: 3 SOLUTION/ DROPS OPHTHALMIC at 06:24

## 2022-03-17 RX ADMIN — PROPARACAINE HYDROCHLORIDE 1 DROP: 5 SOLUTION/ DROPS OPHTHALMIC at 06:16

## 2022-03-17 RX ADMIN — Medication 1 DROP: at 06:20

## 2022-03-17 RX ADMIN — SODIUM CHLORIDE, SODIUM LACTATE, POTASSIUM CHLORIDE, CALCIUM CHLORIDE: 600; 310; 30; 20 INJECTION, SOLUTION INTRAVENOUS at 06:41

## 2022-03-17 RX ADMIN — OFLOXACIN 1 DROP: 3 SOLUTION/ DROPS OPHTHALMIC at 06:20

## 2022-03-17 RX ADMIN — FENTANYL CITRATE 25 MCG: 50 INJECTION, SOLUTION INTRAMUSCULAR; INTRAVENOUS at 07:50

## 2022-03-17 RX ADMIN — OFLOXACIN 1 DROP: 3 SOLUTION/ DROPS OPHTHALMIC at 06:16

## 2022-03-17 RX ADMIN — Medication 1 DROP: at 06:16

## 2022-03-17 RX ADMIN — ACETAMINOPHEN 975 MG: 325 TABLET ORAL at 06:25

## 2022-03-17 RX ADMIN — Medication 1 DROP: at 06:24

## 2022-03-17 RX ADMIN — FENTANYL CITRATE 25 MCG: 50 INJECTION, SOLUTION INTRAMUSCULAR; INTRAVENOUS at 07:22

## 2022-03-17 NOTE — INTERVAL H&P NOTE
I have reviewed this H&P and documented any relevant changes in my preoperative note signed today.

## 2022-03-17 NOTE — DISCHARGE INSTRUCTIONS
"[facility]  Home Care Following Anesthesia  For 24 hours after surgery:  1. Get plenty of rest.  A responsible adult must stay with you for at least 24 hours after you leave the surgery center.  2. Do not drive or use heavy equipment.  If you have weakness or tingling, don't drive or use heavy equipment until this feeling goes away.   3. Do not drink alcohol.   4. Avoid strenuous or risky activities.  Ask for help when climbing stairs.  5. You may feel lightheaded.  IF so, sit for a few minutes before standing.  Have someone help you get up.   6. If you have nausea (feel sick to your stomach): Drink only clear liquids such as apple juice, ginger ale, broth or 7-Up.  Rest may also help.  Be sure to drink enough fluids.  Move to a regular diet as you feel able.   7. You may have a slight fever.  Call the doctor if your fever is over 100 F (37.7 C) (taken under the tongue) or lasts longer than 24 hours.  8. You may have a dry mouth, a sore throat, muscle aches or trouble sleeping. These should go away after 24 hours.  9. Do not make important or legal decisions.   10. It is recommended to avoid smoking.        Today you received a Marcaine or bupivacaine block to numb the nerves near your surgery site.  This is a block using local anesthetic or \"numbing\" medication injected around the nerves to anesthetize or \"numb\" the area supplied by those nerves.  This block is injected into the muscle layer near your surgical site.  The medication may numb the location where you had surgery for 6-18 hours, but may last up to 24 hours.  If your surgical site is an arm or leg you should be careful with your affected limb, since it is possible to injure your limb without being aware of it due to the numbing.  Until full feeling returns, you should guard against bumping or hitting your limb, and avoid extreme hot or cold temperatures on the skin.  As the block wears off, the feeling will return as " a tingling or prickly sensation near your surgical site.  You will experience more discomfort from your incision as the feeling returns.  You may want to take a pain pill (a narcotic or Tylenol if this was prescribed by your surgeon) when you start to experience mild pain before the pain beccomes more severe.  If your pain medications do not control your pain you should notifiy your surgeon.    Tips for taking pain medications  To get the best pain relief possible, remember these points:    Take pain medications as directed, before pain becomes severe.    Pain medication can upset your stomach: taking it with food may help.    Constipation is a common side effect of pain medication. Drink plenty of  fluids.    Eat foods high in fiber. Take a stool softener if recommended by your doctor or pharmacist.    Do not drink alcohol, drive or operate machinery while taking pain medications.    Ask about other ways to control pain, such as with heat, ice or relaxation.    Tylenol/Acetaminophen Consumption  To help encourage the safe use of acetaminophen, the makers of TYLENOL  have lowered the maximum daily dose for single-ingredient Extra Strength TYLENOL  (acetaminophen) products sold in the [place] from 8 pills per day (4,000 mg) to 6 pills per day (3,000 mg). The dosing interval has also changed from 2 pills every 4-6 hours to 2 pills every 6 hours.    If you feel your pain relief is insufficient, you may take Tylenol/Acetaminophen in addition to your narcotic pain medication.     Be careful not to exceed 3,000 mg of Tylenol/Acetaminophen in a 24 hour period from all sources.    If you are taking extra strength Tylenol/acetaminophen (500 mg), the maximum dose is 6 tablets in 24 hours.    If you are taking regular strength acetaminophen (325 mg), the maximum dose is 9 tablets in 24 hours.    Call a doctor for any of the followin. Signs of infection (fever, growing tenderness at the surgery site, a large amount of  drainage or bleeding, severe pain, foul-smelling drainage, redness, swelling).  2. It has been over 8 to 10 hours since surgery and you are still not able to urinate (pass water).  3. Headache for over 24 hours.  4. Numbness, tingling or weakness the day after surgery (if you had spinal anesthesia).  5. Signs of Covid-19 infection (temperature over 100 degrees, shortness of breath, cough, loss of taste/smell, generalized body aches, persistent headache, chills, sore throat, nausea/vomiting/diarrhea)  Your doctor is:       Dr. Cony Scruggs, Ophthalmology: [phone]               Or dial [phone] and ask for the resident on call for:  Ophthalmology  For emergency care, call the:  [facility]:  [phone] (TTY for hearing impaired: [phone])

## 2022-03-17 NOTE — ANESTHESIA POSTPROCEDURE EVALUATION
Patient: Karen Braxton    Procedure: Procedure(s):  COMPLEX PHACOEMULSIFICATION, CATARACT, WITH STANDARD INTRAOCULAR LENS IMPLANT INSERTION LEFT       Anesthesia Type:  MAC    Note:  Disposition: Outpatient   Postop Pain Control: Uneventful            Sign Out: Well controlled pain   PONV: No   Neuro/Psych: Uneventful            Sign Out: Acceptable/Baseline neuro status   Airway/Respiratory: Uneventful            Sign Out: Acceptable/Baseline resp. status   CV/Hemodynamics: Uneventful            Sign Out: Acceptable CV status; No obvious hypovolemia; No obvious fluid overload   Other NRE: NONE   DID A NON-ROUTINE EVENT OCCUR? No           Last vitals:  Vitals Value Taken Time   /75 03/17/22 0812   Temp 36.4  C (97.5  F) 03/17/22 0812   Pulse 50 03/17/22 0812   Resp 18 03/17/22 0812   SpO2 99 % 03/17/22 0812       Electronically Signed By: Naren Martines MD  March 17, 2022  1:36 PM

## 2022-03-17 NOTE — ANESTHESIA CARE TRANSFER NOTE
Patient: Karen Braxton    Procedure: Procedure(s):  COMPLEX PHACOEMULSIFICATION, CATARACT, WITH STANDARD INTRAOCULAR LENS IMPLANT INSERTION LEFT       Diagnosis: Combined form of age-related cataract, left eye [H25.812]  Diagnosis Additional Information: No value filed.    Anesthesia Type:   MAC     Note:    Oropharynx: oropharynx clear of all foreign objects and spontaneously breathing  Level of Consciousness: awake and drowsy  Oxygen Supplementation: room air    Independent Airway: airway patency satisfactory and stable  Dentition: dentition unchanged  Vital Signs Stable: post-procedure vital signs reviewed and stable  Report to RN Given: handoff report given  Patient transferred to: Phase II    Handoff Report: Identifed the Patient, Identified the Reponsible Provider, Reviewed the pertinent medical history, Discussed the surgical course, Reviewed Intra-OP anesthesia mangement and issues during anesthesia, Set expectations for post-procedure period and Allowed opportunity for questions and acknowledgement of understanding      Vitals:  Vitals Value Taken Time   /73 03/17/22 0800   Temp 36.3  C (97.3  F) 03/17/22 0800   Pulse 58 03/17/22 0800   Resp 16 03/17/22 0800   SpO2 99 % 03/17/22 0800       Electronically Signed By: FLORESITA Silva CRNA  March 17, 2022  8:01 AM

## 2022-03-17 NOTE — OP NOTE
Pre-operative Diagnosis: Combined forms of age-related cataract left eye    Post-operative Diagnosis: Combined forms of age-related cataract left eye    Procedure: Complex phacoemulsification cataract extraction with intraocular lens insertion left eye    Surgeon: Cony Scruggs MD    Estimated Blood Loss: 0mL    Specimens: None    Implant: [id number] +19.0D    [id number]    The patient was brought into the Operating Room where an intravenous line was started and EKG monitor leads were placed.  Local anesthesia was achieved using tetracaine drops in the left eye.  The left eye was prepped and draped in the usual sterile manner for ocular surgery including the use of 5% Betadine irrigation of the conjunctival fornices.  A Bruna speculum was inserted.  A Supersharp blade was used to create a paracentesis.  Next, 0.5 mL of lidocaine was instilled in the anterior chamber. There was noted to be a very dense cataract with irregular red reflex so the decision was made to stain the anterior capsule with Trypan blue. An air bubble was instilled in the eye, followed by Trypan blue to stain the anterior capsule, which was then rinsed with balanced salt solution. The anterior chamber was filled with Viscoat.  A 2.6 mm keratome was used to fashion a triplanar main incision.  The pupil was noted to be small and the iris floppy, so a Malyugin ring was placed without complication. A 360 degree continuous curvilinear capsulorrhexis was fashioned with a cystotome and Utrata forceps.  Hydrodissection was performed using balanced salt solution on a 30 gauge cannula. Phacoemulsification was performed.  Residual cortical material was removed using the irrigation and aspiration handpiece.  The capsular bag was filled with Provisc.  The lens was inspected and found to be without flaw. The [id number] +19.0D implant was  delivered into the capsular bag, and was centered well. The Malyugin ring was removed without complication. Viscoelastic  was removed with the irrigation/aspiration handpiece. The anterior chamber was reformed with balanced salt solution  The wound and paracentesis were hydrated.  All incisions were tested and found to be watertight.  Ofloxacin, ketorolac, and prednisolone drops were placed on the eye.  The speculum and drapes were removed.  The eye was cleaned and a shield was put in place.  The patient tolerated the procedure well and left the Operating Room in good condition.

## 2022-03-17 NOTE — BRIEF OP NOTE
[facility] Brief Operative Note    Pre-operative diagnosis: Combined form of age-related cataract, left eye [H25.812]   Post-operative diagnosis Combined form of age-related cataract, left eye   Procedure: Procedure(s):  COMPLEX PHACOEMULSIFICATION, CATARACT, WITH STANDARD INTRAOCULAR LENS IMPLANT INSERTION LEFT   Surgeon(s): Surgeon(s) and Role:     * Cony Scruggs MD - Primary   Estimated blood loss: 0 mL    Specimens: None   Findings: See full operative note

## 2022-03-17 NOTE — PROGRESS NOTES
HPI:  Karen Braxton is a 51 year old female presenting for s/p CE/IOL left eye 3/17/22    Eyes are comfortable. Very happy with vision.Notes she sees clearly without glasses now but still needs to read. Would like updated glasses Rx. No flashes/floaters.    Using:  PF QID left eye  Ketorolac QID left eye  Ofloxacin QID left eye     Past Ocular History:  Glasses  Flashes in vision since 2019  Chalazia  CE/IOL right eye 1/13/22  CE/IOL left eye 3/17/22    PMH:  DM2  Hypercholesterolemia    SH:  Born in [facility]. Current smoker 5 cigarettes/day.  Lives at home with children (5 adult kids, all in or done with college). Works for [employer].    FH:  No known family history of blindness, glaucoma, macular degeneration    ASSESSMENT and PLAN:  1. Pseudophakia right eye  - s/p CE/IOL 1/13/22 -- very dense cataract, poor dilation, lots of movement during surgery; required Malyugin ring and Trypan blue  - doing well  --> will check OCT next visit as well    2. Pseudophakia left eye  - s/p CE/IOL 3/17/22 -- very dense cataract, poor dilation; required Malyugin ring and Trypan blue  - doing well POD1  - reviewed postop and return precautions including no swimming/tub baths/saunas, no eye rubbing, shield at night; patient understands to call/come in for decreased vision, increased pain, increased redness, purulent discharge, new flashes/floaters etc    --> PF QID left eye  --> ofloxacin QID left eye  --> ketorolac QID left eye    Follow up in 1 week with dilation    3. Diabetes mellitus with retinopathy (H)  - last A1c 9.1 in September 2021, improved from 10.1 in April 2021  - mildly attenuated vessels without microaneurysms/exudates/NV  - OCT macula 5/7/21  Right Eye: preserved foveal contour, detached posterior hyaloid, no IRF/SRF  Left Eye: preserved foveal contour, partially detached posterior hyaloid, no SRF, slight thickening of middle retina just inferior to fovea without jami IRF  - OCT Macula 11/12/21  Right Eye: preserved  foveal contour, detached posterior hyaloid, no SRF, one area inferonasal to fovea with small pocket of IRF  Left Eye: preserved foveal contour, partially detached posterior hyaloid, no SRF, slight thickening of middle retina nasal and temporal to fovea without jami IRF  - encouraged BS/BP control  - given extrafoveal swelling can monitor; again discussed increased risk of postoperative edema and potentially limited VA and she understands  --> will repeat OCT Macula next visit; with improved view right eye can see more retinopathy than prior    3. Glaucoma suspect of both eyes  - oval nerve right eye and round nerve left eye with 0.8CD ratio each eye; no notching or disc heme  - no FHx glaucoma  - IOP today 12/16  - baseline disc photos 5/7/21; hazy secondary to cataract  - OCT RNFL 5/7/21  Right Eye:  avg thickness 93, ST borderline, no GCC loss  Left Eye: avg thickness 86, ST thinning, slightly thinner GCC nasally  - given low IOP and likely lowering of IOP postop and anticipated improvement in view and vision after surgery, will plan to repeat OCT RNFL and perform HVF after cataract surgery; will hold off on drops for now; discussed with patient and she understands and agrees    5. Vitreous flashes of both eyes  - flashes intermittently since 2019 have been stable  - Cary negative  - retinas attached  - Reviewed signs/symptoms of retinal tear/detachment including shower of new floaters, flashes, curtain/veil, decreased vision, etc and patient understands to call/come in immediately for these      Follow up in 1 week with dilation each eye, OCT Macula and OCT RNFL or sooner PRN        -----------------------------------------------------------------------------------    Attestation:  Complete documentation of historical and exam elements from today's encounter can be found in the full encounter summary report (not reduplicated in this progress note). I personally obtained the chief complaint(s) and history of  present illness.  I confirmed and edited as necessary the review of systems, past medical/surgical history, family history, social history, and examination findings as documented by others; and I examined the patient myself. I personally reviewed the relevant tests, images, and reports as documented above.     I formulated and edited as necessary the assessment and plan and discussed the findings and management plan with the patient and family.      Cony Scruggs MD

## 2022-03-18 ENCOUNTER — OFFICE VISIT (OUTPATIENT)
Dept: OPHTHALMOLOGY | Facility: CLINIC | Age: 52
End: 2022-03-18
Attending: OPHTHALMOLOGY
Payer: COMMERCIAL

## 2022-03-18 DIAGNOSIS — E11.3299 DIABETES MELLITUS WITH BACKGROUND RETINOPATHY (H): ICD-10-CM

## 2022-03-18 DIAGNOSIS — Z96.1 PSEUDOPHAKIA OF BOTH EYES: Primary | ICD-10-CM

## 2022-03-18 DIAGNOSIS — H40.003 GLAUCOMA SUSPECT OF BOTH EYES: ICD-10-CM

## 2022-03-18 PROCEDURE — G0463 HOSPITAL OUTPT CLINIC VISIT: HCPCS

## 2022-03-18 PROCEDURE — 99024 POSTOP FOLLOW-UP VISIT: CPT | Performed by: OPHTHALMOLOGY

## 2022-03-18 ASSESSMENT — TONOMETRY
IOP_METHOD: APPLANATION
OS_IOP_MMHG: 16
OD_IOP_MMHG: 12
IOP_METHOD: ICARE
OS_IOP_MMHG: 16

## 2022-03-18 ASSESSMENT — SLIT LAMP EXAM - LIDS
COMMENTS: MGD
COMMENTS: MGD

## 2022-03-18 ASSESSMENT — EXTERNAL EXAM - RIGHT EYE: OD_EXAM: NORMAL

## 2022-03-18 ASSESSMENT — VISUAL ACUITY
OS_SC: 20/30
OD_SC: 20/40+
CORRECTION_TYPE: GLASSES
METHOD: SNELLEN - LINEAR
OS_PH_SC: 20/30+2

## 2022-03-18 ASSESSMENT — EXTERNAL EXAM - LEFT EYE: OS_EXAM: NORMAL

## 2022-03-18 NOTE — PATIENT INSTRUCTIONS
"Drops to the LEFT eye:  - Prednisolone (pink or white top, SHAKE WELL) - 4 times a day  - ofloxacin (tan top, antibiotic drop) - 4 times a day  - ketorolac (gray top) - 4 times a day    4 times a day = breakfast, lunch, dinner, bedtime  Wait a couple minutes between drops    The drops may sting when you put them in. You can use regular artificial tears/lubricant drops to help with any stinging (nothing that says \"get the red out\")    Aching/irriation/scratchiness is normal. You can take Tylenol or ibuprofen for mild pain. If having severe or sharp pain, or not better with Tylenol/ibuprofen please call.    I expect your vision to be blurry and gradually improving over the next couple weeks. If vision worsens, eye gets redder, new floaters, flashes in your vision, missing pieces of vision, etc. Please call [phone]    Wear your shield at night so you don't rub your eye in your sleep. Do not rub your eye.   It is OK to take a shower. Avoid getting soap/shampoo in your eyes. No baths, no hot tubs, no saunas, no swimming.     Take your glasses off to see far away. You will get new glasses in about a month. You can use your old glasses just for reading for now.  "

## 2022-03-22 ENCOUNTER — OFFICE VISIT (OUTPATIENT)
Dept: ENDOCRINOLOGY | Facility: CLINIC | Age: 52
End: 2022-03-22
Payer: COMMERCIAL

## 2022-03-22 VITALS
SYSTOLIC BLOOD PRESSURE: 166 MMHG | HEIGHT: 66 IN | WEIGHT: 126.6 LBS | BODY MASS INDEX: 20.34 KG/M2 | HEART RATE: 86 BPM | DIASTOLIC BLOOD PRESSURE: 83 MMHG

## 2022-03-22 DIAGNOSIS — E10.9 TYPE 1 DIABETES MELLITUS WITHOUT COMPLICATION (H): Primary | ICD-10-CM

## 2022-03-22 DIAGNOSIS — Z79.4 TYPE 2 DIABETES MELLITUS WITH HYPERGLYCEMIA, WITH LONG-TERM CURRENT USE OF INSULIN (H): ICD-10-CM

## 2022-03-22 DIAGNOSIS — E11.65 TYPE 2 DIABETES MELLITUS WITH HYPERGLYCEMIA, WITH LONG-TERM CURRENT USE OF INSULIN (H): ICD-10-CM

## 2022-03-22 LAB — HBA1C MFR BLD: 8.9 % (ref 4.3–?)

## 2022-03-22 PROCEDURE — 83036 HEMOGLOBIN GLYCOSYLATED A1C: CPT | Performed by: INTERNAL MEDICINE

## 2022-03-22 PROCEDURE — 99214 OFFICE O/P EST MOD 30 MIN: CPT | Performed by: INTERNAL MEDICINE

## 2022-03-22 ASSESSMENT — PAIN SCALES - GENERAL: PAINLEVEL: NO PAIN (0)

## 2022-03-22 NOTE — LETTER
3/22/2022     RE: Karen Braxton  [address]  [place] [address]     Dear Colleague,    Thank you for referring your patient, Karen Braxton, to the [facility] [place] at [facility]. Please see a copy of my visit note below.    Salvador Church, HANSEL    This 51 year old woman with type 2 diabetes since ~2002 was seen for f/u.She was seen without an  today.  She doesn't think she needs one.  She has transferred her primary care from [facility] to [facility].. Her diabetes is co managed by me with Micaela Juarez.      Karen is now employed by [employer].  She works from about 8 PM till 4:30 in the morning several days a week.  The schedule has an impact on her diabetes management.  On days she does not work, she takes Lantus 35 units in the morning and 35 units in the evening.  However when she goes to work she only takes 20 units of Lantus in the evening.  She also takes NovoLog before her meals.  She tells me she takes 10 units before breakfast, 20 units before lunch, and 35 units before dinner.  However when I look at her daily CGM data and ask her what she did at different times, this seems highly inconsistent.  Reviewing her CGM data shows she wore her shauna 81% of the time in the last 2 weeks.  Her average glucose was 228.  She was in target 30% of the time, between 181-250 31% of the time, and above 250 39% of the time.  She was never below 70.  She usually will eat a breakfast of coffee and maybe a slice of toast when she gets home from work at 5 in the morning.  On nights she does not work she does not get up that early.  On days she works she then goes to bed and gets up again around noon.  She will eat something around noon and then something else again around 6.  The pattern of her glycemic control shows that she usually comes to target between 10 AM and 2 but is usually quite high at other times.  She occasionally  "will do a correction when she is high and then have subsequent hypoglycemia.  She corrects this by drinking a glass of 1% milk.  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  She continues to take Metformin.  She took Actos in the past and apparently that was stopped because of insurance reasons.  She also previously took an SGLT2 that was stopped, probably because of insurance.    She has seen the eye doctor recently.  She had her cataracts removed and now sees much more clearly.  She denies having any problems with her feet.  She has no ulcers on the feet.  She has no paresthesias.  She denies chest pain or shortness of breath.    Current Outpatient Medications   Medication     acetaminophen (TYLENOL) 325 MG tablet     atorvastatin (LIPITOR) 40 MG tablet     blood glucose (NO BRAND SPECIFIED) test strip     blood glucose monitoring (NO BRAND SPECIFIED) meter device kit     Continuous Blood Gluc Sensor (FREESTYLE RAHEEM 14 DAY SENSOR) MISC     ibuprofen (ADVIL/MOTRIN) 200 MG capsule     insulin glargine (BASAGLAR KWIKPEN) 100 UNIT/ML pen     insulin pen needle (B-D U/F) 31G X 8 MM miscellaneous     insulin syringe-needle U-100 (BD INSULIN SYRINGE ULTRAFINE) 30G X 1/2\" 1 ML     ketorolac (ACULAR) 0.5 % ophthalmic solution     ketorolac (ACULAR) 0.5 % ophthalmic solution     metFORMIN (GLUCOPHAGE) 1000 MG tablet     NOVOLOG FLEXPEN 100 UNIT/ML soln     ofloxacin (OCUFLOX) 0.3 % ophthalmic solution     ofloxacin (OCUFLOX) 0.3 % ophthalmic solution     prednisoLONE acetate (PRED FORTE) 1 % ophthalmic suspension     prednisoLONE acetate (PRED FORTE) 1 % ophthalmic suspension     vitamin D3 (CHOLECALCIFEROL) 50 mcg (2000 units) tablet     ASPIRIN LOW DOSE 81 MG EC tablet     vitamin D2 (ERGOCALCIFEROL) 86781 units (1250 mcg) capsule     vitamin D2 (ERGOCALCIFEROL) 58741 units (1250 mcg) capsule     No current facility-administered medications for this visit.     BP (!) " "166/83 (BP Location: Left arm, Patient Position: Sitting, Cuff Size: Adult Regular)   Pulse 86   Ht 1.664 m (5' 5.5\")   Wt 57.4 kg (126 lb 9.6 oz)   LMP  (LMP Unknown)   BMI 20.75 kg/m    On exam she is in no acute distress.  Cranial nerves are grossly intact.  She is without periorbital edema.  She has no edema in her feet.  Mood is upbeat.  Recent Labs   Lab Test 03/22/22  1420 09/24/21  1523 09/24/21  1522 09/14/21  0815 09/14/21  0724 04/26/21  0730 12/21/20  0653 09/23/20  1359 11/11/19  0817 03/16/18  1212 03/16/18  1211 03/21/16  1039 03/21/16  1030   A1C  --   --   --   --   --  10.1* 8.9* 9.1* 8.6*   < > 8.5*   < > 8.1*   HEMOGLOBINA1 8.9  --   --   --  9.2  --   --   --   --    < >  --    < >  --    TSH  --   --   --   --   --   --   --   --   --   --  2.12  --  1.31   LDL  --  55  --   --   --   --   --  135*  --    < > 149*   < > 142*   HDL  --  48*  --   --   --   --   --  47*  --    < > 43*   < > 44*   TRIG  --  232*  --   --   --   --   --  160*  --    < > 183*   < > 223*   CR  --   --  0.67  --   --   --   --  0.82 0.64   < > 0.67   < > 0.70   MICROL  --   --   --  6  --   --   --   --  <5   < >  --    < >  --     < > = values in this interval not displayed.     Assessment and plan:    1.  Diabetes control.  Karen's blood sugars continue to be poorly controlled.  Her A1c is down from last time it was measured but continues to be too high.  Her major problem is getting insulin when she needs it.  I think she would be an outstanding candidate for a hybrid closed-loop pump, if she can learn the technology to use it.  I will discuss this with our nurse educators and send a referral.  We will continue her her on the Metformin and insulin today.  Her vitamin D B12 was checked a couple of years ago and was normal.  We will plan to check it next time.    2.  Diabetes complications.  Her renal function is normal.  She has no retinopathy.  She has no neuropathy.    3.CVD risk.  She is on a statin and her " LDL is well controlled.  Her blood pressure is elevated today but has been in target on other recent checks.  I made no changes in her medication today.    Follow-up with yonas Juarez in 3 months.  And me in 6 months.    Melisa Phillips MD

## 2022-03-22 NOTE — PATIENT INSTRUCTIONS
We appreciate your assistance in coordinating your healthcare.     Please upload your insulin pump, blood sugar meter and/or continuous glucose monitor at home 1-2 days before your next diabetes-related appointment.   This will allow your provider to review your  data before your scheduled virtual visit.    To ask a question to your Endocrine care team, please send them a [employer] message, or reach them by phone at [phone]     To expedite your medication refill(s), please contact your pharmacy and have them   fax a refill request to: [phone].  *Please allow 3 business days for routine medication refills.  *Please allow 5 business days for controlled substance medication refills.    For after-hours urgent Endocrine issues, that do not require 021, please dial [phone], and ask to speak with the Endocrinologist On-Call

## 2022-03-22 NOTE — Clinical Note
Any chance you think Karen could manage a hybrid closed-loop pump?  She just cannot consistently get her insulin when she needs it.  I think the pump might be better.  I put a referral in to the nurse educators.  Hopefully insurance will cover it.

## 2022-03-22 NOTE — PROGRESS NOTES
Salvador Church CMA        This 51 year old woman with type 2 diabetes since ~2002 was seen for f/u.She was seen without an  today.  She doesn't think she needs one.  She has transferred her primary care from [facility] to [facility].. Her diabetes is co managed by me with Micaela Juarez.      Karen is now employed by [employer].  She works from about 8 PM till 4:30 in the morning several days a week.  The schedule has an impact on her diabetes management.  On days she does not work, she takes Lantus 35 units in the morning and 35 units in the evening.  However when she goes to work she only takes 20 units of Lantus in the evening.  She also takes NovoLog before her meals.  She tells me she takes 10 units before breakfast, 20 units before lunch, and 35 units before dinner.  However when I look at her daily CGM data and ask her what she did at different times, this seems highly inconsistent.  Reviewing her CGM data shows she wore her shauna 81% of the time in the last 2 weeks.  Her average glucose was 228.  She was in target 30% of the time, between 181-250 31% of the time, and above 250 39% of the time.  She was never below 70.  She usually will eat a breakfast of coffee and maybe a slice of toast when she gets home from work at 5 in the morning.  On nights she does not work she does not get up that early.  On days she works she then goes to bed and gets up again around noon.  She will eat something around noon and then something else again around 6.  The pattern of her glycemic control shows that she usually comes to target between 10 AM and 2 but is usually quite high at other times.  She occasionally will do a correction when she is high and then have subsequent hypoglycemia.  She corrects this by drinking a glass of 1% milk.  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  She continues to take Metformin.  She took Actos in the past and apparently that was  "stopped because of insurance reasons.  She also previously took an SGLT2 that was stopped, probably because of insurance.    She has seen the eye doctor recently.  She had her cataracts removed and now sees much more clearly.  She denies having any problems with her feet.  She has no ulcers on the feet.  She has no paresthesias.  She denies chest pain or shortness of breath.    Current Outpatient Medications   Medication     acetaminophen (TYLENOL) 325 MG tablet     atorvastatin (LIPITOR) 40 MG tablet     blood glucose (NO BRAND SPECIFIED) test strip     blood glucose monitoring (NO BRAND SPECIFIED) meter device kit     Continuous Blood Gluc Sensor (FREESTYLE RAHEEM 14 DAY SENSOR) MISC     ibuprofen (ADVIL/MOTRIN) 200 MG capsule     insulin glargine (BASAGLAR KWIKPEN) 100 UNIT/ML pen     insulin pen needle (B-D U/F) 31G X 8 MM miscellaneous     insulin syringe-needle U-100 (BD INSULIN SYRINGE ULTRAFINE) 30G X 1/2\" 1 ML     ketorolac (ACULAR) 0.5 % ophthalmic solution     ketorolac (ACULAR) 0.5 % ophthalmic solution     metFORMIN (GLUCOPHAGE) 1000 MG tablet     NOVOLOG FLEXPEN 100 UNIT/ML soln     ofloxacin (OCUFLOX) 0.3 % ophthalmic solution     ofloxacin (OCUFLOX) 0.3 % ophthalmic solution     prednisoLONE acetate (PRED FORTE) 1 % ophthalmic suspension     prednisoLONE acetate (PRED FORTE) 1 % ophthalmic suspension     vitamin D3 (CHOLECALCIFEROL) 50 mcg (2000 units) tablet     ASPIRIN LOW DOSE 81 MG EC tablet     vitamin D2 (ERGOCALCIFEROL) 33609 units (1250 mcg) capsule     vitamin D2 (ERGOCALCIFEROL) 70781 units (1250 mcg) capsule     No current facility-administered medications for this visit.     BP (!) 166/83 (BP Location: Left arm, Patient Position: Sitting, Cuff Size: Adult Regular)   Pulse 86   Ht 1.664 m (5' 5.5\")   Wt 57.4 kg (126 lb 9.6 oz)   LMP  (LMP Unknown)   BMI 20.75 kg/m    On exam she is in no acute distress.  Cranial nerves are grossly intact.  She is without periorbital edema.  She has no " edema in her feet.  Mood is upbeat.  Recent Labs   Lab Test 03/22/22  1420 09/24/21  1523 09/24/21  1522 09/14/21  0815 09/14/21  0724 04/26/21  0730 12/21/20  0653 09/23/20  1359 11/11/19  0817 03/16/18  1212 03/16/18  1211 03/21/16  1039 03/21/16  1030   A1C  --   --   --   --   --  10.1* 8.9* 9.1* 8.6*   < > 8.5*   < > 8.1*   HEMOGLOBINA1 8.9  --   --   --  9.2  --   --   --   --    < >  --    < >  --    TSH  --   --   --   --   --   --   --   --   --   --  2.12  --  1.31   LDL  --  55  --   --   --   --   --  135*  --    < > 149*   < > 142*   HDL  --  48*  --   --   --   --   --  47*  --    < > 43*   < > 44*   TRIG  --  232*  --   --   --   --   --  160*  --    < > 183*   < > 223*   CR  --   --  0.67  --   --   --   --  0.82 0.64   < > 0.67   < > 0.70   MICROL  --   --   --  6  --   --   --   --  <5   < >  --    < >  --     < > = values in this interval not displayed.       Assessment and plan:    1.  Diabetes control.  Karen's blood sugars continue to be poorly controlled.  Her A1c is down from last time it was measured but continues to be too high.  Her major problem is getting insulin when she needs it.  I think she would be an outstanding candidate for a hybrid closed-loop pump, if she can learn the technology to use it.  I will discuss this with our nurse educators and send a referral.  We will continue her her on the Metformin and insulin today.  Her vitamin D B12 was checked a couple of years ago and was normal.  We will plan to check it next time.    2.  Diabetes complications.  Her renal function is normal.  She has no retinopathy.  She has no neuropathy.    3.CVD risk.  She is on a statin and her LDL is well controlled.  Her blood pressure is elevated today but has been in target on other recent checks.  I made no changes in her medication today.    Follow-up with yonas Juarez in 3 months.  And me in 6 months.    Melisa Phillips MD

## 2022-03-24 NOTE — PROGRESS NOTES
HPI:  Karen Braxton is a 51 year old female presenting for s/p CE/IOL left eye 3/17/22    Eyes are comfortable. Happy with vision. No concerns.    Using:  PF QID left eye  Ketorolac QID left eye  Ofloxacin QID left eye     Past Ocular History:  Glasses  Flashes in vision since 2019  Chalazia  CE/IOL right eye 1/13/22  CE/IOL left eye 3/17/22    PMH:  DM2  Hypercholesterolemia    SH:  [facility] in [facility]. Current smoker 5 cigarettes/day.  Lives at home with children (5 adult kids, all in or done with college). Works for [employer].    FH:  No known family history of blindness, glaucoma, macular degeneration    ASSESSMENT and PLAN:  1. Pseudophakia right eye  - s/p CE/IOL 1/13/22 -- very dense cataract, poor dilation, lots of movement during surgery; required Malyugin ring and Trypan blue  - doing well    2. Pseudophakia left eye  - s/p CE/IOL 3/17/22 -- very dense cataract, poor dilation; required Malyugin ring and Trypan blue  - doing well    --> taper PF to TID x1 week, then BID x1 week, then daily x1 week, then stop  --> stop ofloxacin  --> ketorolac QID left eye x1 more week or until bottle runs out    3. Diabetes mellitus with retinopathy (H)  - last A1c 8.9 in March 2022, improved from 9.1 in September 2021, improved from 10.1 in April 2021  - mildly attenuated vessels with Mas and DBH  - OCT macula 5/7/21  Right Eye: preserved foveal contour, detached posterior hyaloid, no IRF/SRF  Left Eye: preserved foveal contour, partially detached posterior hyaloid, no SRF, slight thickening of middle retina just inferior to fovea without jami IRF  - OCT Macula 11/12/21  Right Eye: preserved foveal contour, detached posterior hyaloid, no SRF, one area inferonasal to fovea with small pocket of IRF  Left Eye: preserved foveal contour, partially detached posterior hyaloid, no SRF, slight thickening of middle retina nasal and temporal to fovea without jami IRF  - OCT Macula 3/25/22  Right Eye: preserved foveal contour, no SRF,  small IR cysts inferior with inferonasal pocket improved compared to prior  Left Eye: preserved foveal contour, no IRF/SRF    - encouraged BS/BP control  - given extrafoveal swelling can monitor; again discussed increased risk of postoperative edema and potentially limited VA and she understands    4. BRVO right eye  - new vit heme/pigmented cells and wedge-shaped area of dense blot hemorrhages with ?small tuft of NV IT in right eye noted on exam 3/25/22  - discussed with patient concern for possible BRVO and recommended FA; she is unable to stay for FA today and will plan to obtain next visit  --> check Optos FA with right eye transit next visit    5. Glaucoma suspect of both eyes  - oval nerve right eye and round nerve left eye with 0.8CD ratio each eye; no notching or disc heme  - no FHx glaucoma  - IOP today 10/10  - baseline disc photos 5/7/21; hazy secondary to cataract  - OCT RNFL 3/25/22  Right Eye:  avg thickness 97, ST borderline, stable from prior  Left Eye: avg thickness 88, ST thinning, stable from prior  - given overall stability and low IOP will continue to monitor and plan to check VF once healed s/p cataract surgery    6. Vitreous flashes of both eyes  - flashes intermittently since 2019 have been stable  - retinas attached  - Reviewed signs/symptoms of retinal tear/detachment including shower of new floaters, flashes, curtain/veil, decreased vision, etc and patient understands to call/come in immediately for these      Follow up in 3-4 weeks with MRx, dilation each eye, OCT Macula, Optos FA transit right eye or sooner PRN        -----------------------------------------------------------------------------------    Attestation:  Complete documentation of historical and exam elements from today's encounter can be found in the full encounter summary report (not reduplicated in this progress note). I personally obtained the chief complaint(s) and history of present illness.  I confirmed and edited as  necessary the review of systems, past medical/surgical history, family history, social history, and examination findings as documented by others; and I examined the patient myself. I personally reviewed the relevant tests, images, and reports as documented above.     I formulated and edited as necessary the assessment and plan and discussed the findings and management plan with the patient and family.      Cony Scruggs MD

## 2022-03-25 ENCOUNTER — OFFICE VISIT (OUTPATIENT)
Dept: OPHTHALMOLOGY | Facility: CLINIC | Age: 52
End: 2022-03-25
Attending: OPHTHALMOLOGY
Payer: COMMERCIAL

## 2022-03-25 DIAGNOSIS — Z96.1 PSEUDOPHAKIA, LEFT EYE: ICD-10-CM

## 2022-03-25 DIAGNOSIS — E11.311 TYPE 2 DIABETES MELLITUS WITH RETINOPATHY OF BOTH EYES AND MACULAR EDEMA, UNSPECIFIED RETINOPATHY SEVERITY, UNSPECIFIED WHETHER LONG TERM INSULIN USE (H): Primary | ICD-10-CM

## 2022-03-25 DIAGNOSIS — E11.65 TYPE 2 DIABETES MELLITUS WITH HYPERGLYCEMIA (H): ICD-10-CM

## 2022-03-25 DIAGNOSIS — H34.8312 HEMISPHERIC BRANCH RETINAL VEIN OCCLUSION (BRVO) OF RIGHT EYE (H): ICD-10-CM

## 2022-03-25 DIAGNOSIS — Z96.1 PSEUDOPHAKIA, RIGHT EYE: ICD-10-CM

## 2022-03-25 DIAGNOSIS — H40.003 GLAUCOMA SUSPECT OF BOTH EYES: ICD-10-CM

## 2022-03-25 PROCEDURE — 92134 CPTRZ OPH DX IMG PST SGM RTA: CPT | Performed by: OPHTHALMOLOGY

## 2022-03-25 PROCEDURE — 92133 CPTRZD OPH DX IMG PST SGM ON: CPT | Performed by: OPHTHALMOLOGY

## 2022-03-25 PROCEDURE — G0463 HOSPITAL OUTPT CLINIC VISIT: HCPCS

## 2022-03-25 PROCEDURE — 99024 POSTOP FOLLOW-UP VISIT: CPT | Mod: 25 | Performed by: OPHTHALMOLOGY

## 2022-03-25 RX ORDER — PREDNISOLONE ACETATE 10 MG/ML
1 SUSPENSION/ DROPS OPHTHALMIC 3 TIMES DAILY
Qty: 15 ML | Refills: 0 | Status: SHIPPED | OUTPATIENT
Start: 2022-03-25

## 2022-03-25 ASSESSMENT — VISUAL ACUITY
OS_SC: 20/25
METHOD: SNELLEN - LINEAR
OD_CC: 20/25

## 2022-03-25 ASSESSMENT — EXTERNAL EXAM - LEFT EYE: OS_EXAM: NORMAL

## 2022-03-25 ASSESSMENT — TONOMETRY
IOP_METHOD: TONOPEN
OD_IOP_MMHG: 10
OS_IOP_MMHG: 10

## 2022-03-25 ASSESSMENT — EXTERNAL EXAM - RIGHT EYE: OD_EXAM: NORMAL

## 2022-03-25 ASSESSMENT — SLIT LAMP EXAM - LIDS
COMMENTS: MGD
COMMENTS: MGD

## 2022-03-25 ASSESSMENT — CUP TO DISC RATIO
OS_RATIO: 0.8
OD_RATIO: 0.8

## 2022-03-25 NOTE — NURSING NOTE
Chief Complaints and History of Present Illnesses   Patient presents with     Post Op (Ophthalmology) Left Eye     Chief Complaint(s) and History of Present Illness(es)     Post Op (Ophthalmology) Left Eye     Laterality: left eye    Associated symptoms: Negative for flashes, eye pain, pain with eye movement and floaters              Comments      1 w post op s/p CE/IOL 3/17/22 LE  Doing good   Prednisolone qid LE  Blood sugar 130 this am   Fern Matias COA 10:43 AM March 25, 2022

## 2022-03-25 NOTE — PATIENT INSTRUCTIONS
LEFT EYE DROPS:  - stop ofloxacin (tan cap)  - continue ketorolac (gray cap) 4 times a day for 1 more week, then stop  - taper prednisolone (white cap, shake well) to 3 times a day for 1 week, then 2 times a day for 1 week, then 1 time a day for 1 week, then stop    The bleeding in your right eye is in one particular area and I would like to do a dye test and take pictures to make sure the blood flow is OK. We will plan to do this next time as discussed    Follow up in 3-4 weeks or sooner if any changes in vision, eye pain, or worsening

## 2022-03-26 NOTE — TELEPHONE ENCOUNTER
insulin glargine (BASAGLAR KWIKPEN) 100 UNIT/ML pen   Last Written Prescription Date:  3/1/21  Last Fill Quantity: 18ml,   # refills: 3  Last Office Visit :3/22/22  Future Office visit: 8/11/22    Routing refill request to provider for review/approval because:   endo triage to fill

## 2022-03-27 RX ORDER — INSULIN GLARGINE 100 [IU]/ML
INJECTION, SOLUTION SUBCUTANEOUS
Qty: 45 ML | Refills: 3 | Status: SHIPPED | OUTPATIENT
Start: 2022-03-27 | End: 2022-11-29

## 2022-04-05 DIAGNOSIS — E11.311 TYPE 2 DIABETES MELLITUS WITH RETINOPATHY OF BOTH EYES AND MACULAR EDEMA, UNSPECIFIED RETINOPATHY SEVERITY, UNSPECIFIED WHETHER LONG TERM INSULIN USE (H): Primary | ICD-10-CM

## 2022-04-05 DIAGNOSIS — H34.8312 HEMISPHERIC BRANCH RETINAL VEIN OCCLUSION (BRVO) OF RIGHT EYE (H): ICD-10-CM

## 2022-04-12 ENCOUNTER — OFFICE VISIT (OUTPATIENT)
Dept: OPHTHALMOLOGY | Facility: CLINIC | Age: 52
End: 2022-04-12
Attending: OPHTHALMOLOGY
Payer: COMMERCIAL

## 2022-04-12 DIAGNOSIS — E11.311 TYPE 2 DIABETES MELLITUS WITH RETINOPATHY OF BOTH EYES AND MACULAR EDEMA, UNSPECIFIED RETINOPATHY SEVERITY, UNSPECIFIED WHETHER LONG TERM INSULIN USE (H): ICD-10-CM

## 2022-04-12 DIAGNOSIS — H34.8312 HEMISPHERIC BRANCH RETINAL VEIN OCCLUSION (BRVO) OF RIGHT EYE (H): Primary | ICD-10-CM

## 2022-04-12 DIAGNOSIS — Z96.1 PSEUDOPHAKIA OF BOTH EYES: ICD-10-CM

## 2022-04-12 DIAGNOSIS — H40.003 GLAUCOMA SUSPECT OF BOTH EYES: ICD-10-CM

## 2022-04-12 PROCEDURE — 92015 DETERMINE REFRACTIVE STATE: CPT

## 2022-04-12 PROCEDURE — 99024 POSTOP FOLLOW-UP VISIT: CPT | Performed by: OPHTHALMOLOGY

## 2022-04-12 PROCEDURE — 92134 CPTRZ OPH DX IMG PST SGM RTA: CPT | Performed by: OPHTHALMOLOGY

## 2022-04-12 PROCEDURE — 92235 FLUORESCEIN ANGRPH MLTIFRAME: CPT | Performed by: OPHTHALMOLOGY

## 2022-04-12 PROCEDURE — G0463 HOSPITAL OUTPT CLINIC VISIT: HCPCS | Mod: 25

## 2022-04-12 ASSESSMENT — EXTERNAL EXAM - LEFT EYE: OS_EXAM: NORMAL

## 2022-04-12 ASSESSMENT — VISUAL ACUITY
OD_SC: 20/40
OS_SC: 20/30
METHOD: SNELLEN - LINEAR
OS_PH_SC+: -3
OS_SC+: -2
OD_PH_SC+: -2
OS_PH_SC: 20/25
OD_PH_SC: 20/25

## 2022-04-12 ASSESSMENT — REFRACTION_MANIFEST
OS_ADD: +2.50
OD_AXIS: 035
OD_SPHERE: -1.25
OS_CYLINDER: +0.50
OD_ADD: +2.50
OD_CYLINDER: +1.00
OS_SPHERE: -0.50
OS_AXIS: 130

## 2022-04-12 ASSESSMENT — TONOMETRY
OD_IOP_MMHG: 12
OS_IOP_MMHG: 12
IOP_METHOD: TONOPEN

## 2022-04-12 ASSESSMENT — EXTERNAL EXAM - RIGHT EYE: OD_EXAM: NORMAL

## 2022-04-12 ASSESSMENT — SLIT LAMP EXAM - LIDS
COMMENTS: MGD
COMMENTS: MGD

## 2022-04-12 ASSESSMENT — CUP TO DISC RATIO
OS_RATIO: 0.8
OD_RATIO: 0.8

## 2022-04-12 NOTE — NURSING NOTE
Chief Complaints and History of Present Illnesses   Patient presents with     Post Op (Ophthalmology) Left Eye     Cataract extraction with IOL in the left eye on 03/17/2022        Chief Complaint(s) and History of Present Illness(es)     Post Op (Ophthalmology) Left Eye     Laterality: left eye    Course: stable    Associated symptoms: Negative for dryness, eye pain, redness and tearing    Treatments tried: eye drops    Comments: Cataract extraction with IOL in the left eye on 03/17/2022                 Comments     She states that her vision has seemed stable in both eyes since her last eye exam.  Patient denies having any eye discomfort.  She continues to use ketorolac and prednisolone acetate in her left eye.    Cecile Marques, COT 2:05 PM  April 12, 2022

## 2022-04-12 NOTE — PROGRESS NOTES
HPI:  Karen Braxton is a 51 year old female presenting for s/p CE/IOL left eye 3/17/22, s/p CE/IOl right eye 1/13/22, follow up diabetic retinopathy and BRVO right eye.    Happy with vision. No eye pain. No changes.    Using:  PF daily left eye -- using BID OS    Past Ocular History:  Glasses  Flashes in vision since 2019  Chalazia  CE/IOL right eye 1/13/22  CE/IOL left eye 3/17/22    PMH:  DM2  Hypercholesterolemia    SH:  Born in [facility]. Current smoker 5 cigarettes/day.  Lives at home with children (5 adult kids, all in or done with college). Works for [employer].    FH:  No known family history of blindness, glaucoma, macular degeneration    ASSESSMENT and PLAN:  1. Pseudophakia right eye  - s/p CE/IOL 1/13/22 -- very dense cataract, poor dilation, lots of movement during surgery; required Malyugin ring and Trypan blue  - doing well    2. Pseudophakia left eye  - s/p CE/IOL 3/17/22 -- very dense cataract, poor dilation; required Malyugin ring and Trypan blue  - doing well    --> taper PF to daily x1 week, then stop  --> updated glasses Rx    3. Diabetes mellitus with retinopathy (H)  4. HRVO right eye  - last A1c 8.9 in March 2022, improved from 9.1 in September 2021, improved from 10.1 in April 2021  - mildly attenuated vessels with Mas both eyes, mild DBH left eye; extensive inferior half of retina hemorrhages right eye which has progressed compared to last visit    FA 4/12/22  Right Eye: transit eye; delayed venous filling inferior half of retina, leakage from inferior arcade vein, diffuse small vessel leakage inferior retina and inferior macula with concern for inferior peripheral ischemia  Left Eye: scattered Mas, late macular leakage, IT staining    - OCT Macula 4/12/22  Right Eye: preserved foveal contour, detached posterior hyaloid, no SRF, mild inferior IRF slightly improved compared to prior, one area of heme/exudate in middle retina inf to fovea  Left Eye: preserved foveal contour, partially detached  posterior hyaloid, no IRF/SRF    - encouraged BS/BP control -- patient states she is to start insulin injections soon  - will monitor extrafoveal swelling but concern for worsening hemorrhages right inferior hemiretina and possible inferior peripheral ischemia (vs lashes blocking on optos imaging)    --> refer to retina within next few weeks    5. Glaucoma suspect of both eyes  - oval nerve right eye and round nerve left eye with 0.8CD ratio each eye; no notching or disc heme  - no FHx glaucoma  - IOP today 10/10  - baseline disc photos 5/7/21; hazy secondary to cataract  - OCT RNFL 3/25/22  Right Eye:  avg thickness 97, ST borderline, stable from prior  Left Eye: avg thickness 88, ST thinning, stable from prior  - given overall stability and low IOP will continue to monitor and plan to check VF once healed s/p cataract surgery and after addressing HRVO    6. Vitreous flashes of both eyes  - flashes intermittently since 2019 have been stable  - retinas attached  - Reviewed signs/symptoms of retinal tear/detachment including shower of new floaters, flashes, curtain/veil, decreased vision, etc and patient understands to call/come in immediately for these      Follow up within 4 weeks with retina or sooner PRN        -----------------------------------------------------------------------------------    Attestation:  Complete documentation of historical and exam elements from today's encounter can be found in the full encounter summary report (not reduplicated in this progress note). I personally obtained the chief complaint(s) and history of present illness.  I confirmed and edited as necessary the review of systems, past medical/surgical history, family history, social history, and examination findings as documented by others; and I examined the patient myself. I personally reviewed the relevant tests, images, and reports as documented above.     I formulated and edited as necessary the assessment and plan and  discussed the findings and management plan with the patient and family.      Cony Scruggs MD

## 2022-05-04 ENCOUNTER — VIRTUAL VISIT (OUTPATIENT)
Dept: EDUCATION SERVICES | Facility: CLINIC | Age: 52
End: 2022-05-04
Payer: COMMERCIAL

## 2022-05-04 VITALS — WEIGHT: 125.9 LBS | BODY MASS INDEX: 20.63 KG/M2

## 2022-05-04 DIAGNOSIS — Z79.4 TYPE 2 DIABETES MELLITUS WITH HYPERGLYCEMIA, WITH LONG-TERM CURRENT USE OF INSULIN (H): ICD-10-CM

## 2022-05-04 DIAGNOSIS — E11.311 TYPE 2 DIABETES MELLITUS WITH RETINOPATHY OF BOTH EYES AND MACULAR EDEMA, UNSPECIFIED RETINOPATHY SEVERITY, UNSPECIFIED WHETHER LONG TERM INSULIN USE (H): Primary | ICD-10-CM

## 2022-05-04 DIAGNOSIS — E11.65 TYPE 2 DIABETES MELLITUS WITH HYPERGLYCEMIA, WITH LONG-TERM CURRENT USE OF INSULIN (H): ICD-10-CM

## 2022-05-04 PROCEDURE — G0108 DIAB MANAGE TRN  PER INDIV: HCPCS | Performed by: REGISTERED NURSE

## 2022-05-04 NOTE — PATIENT INSTRUCTIONS
The pump you decided to get is the Omnipod.     We need to train you before you can start using it.   Your training appointments:     05/23/22:  11:15AM with Janine Pa dietitiryan to learn carb counting.   06/07/22:   2:15PM with Janine for follow up.     06/15/22:  2:45 PM with WILLI Hein for pre-pump training  06/22/22:  2:45PM with WILLI Hein for more pre-pump training.     We will pick a day to start the pump when we get all your pre-pump training completed.      Please let me know if you have questions.      Melvina Gustafson, MOJGANN, RN, [facility] [place][note 2]  Certified Diabetes Care and   [facility] Endocrinology and Diabetes  [facility]  [facility] [id number]  Phone [phone]

## 2022-05-05 ENCOUNTER — OFFICE VISIT (OUTPATIENT)
Dept: OPHTHALMOLOGY | Facility: CLINIC | Age: 52
End: 2022-05-05
Attending: OPHTHALMOLOGY
Payer: COMMERCIAL

## 2022-05-05 DIAGNOSIS — E11.311 TYPE 2 DIABETES MELLITUS WITH RETINOPATHY OF BOTH EYES AND MACULAR EDEMA, UNSPECIFIED RETINOPATHY SEVERITY, UNSPECIFIED WHETHER LONG TERM INSULIN USE (H): ICD-10-CM

## 2022-05-05 PROCEDURE — 99213 OFFICE O/P EST LOW 20 MIN: CPT | Mod: 24 | Performed by: OPHTHALMOLOGY

## 2022-05-05 PROCEDURE — G0463 HOSPITAL OUTPT CLINIC VISIT: HCPCS | Mod: 25

## 2022-05-05 ASSESSMENT — VISUAL ACUITY
OD_CC+: -2
METHOD: SNELLEN - LINEAR
CORRECTION_TYPE: GLASSES
OS_CC+: -2
OS_CC: 20/30
OD_CC: 20/25

## 2022-05-05 ASSESSMENT — CUP TO DISC RATIO
OD_RATIO: 0.8
OS_RATIO: 0.8

## 2022-05-05 ASSESSMENT — TONOMETRY
IOP_METHOD: TONOPEN
OS_IOP_MMHG: 11
OD_IOP_MMHG: 12

## 2022-05-05 ASSESSMENT — CONF VISUAL FIELD
OS_NORMAL: 1
OD_NORMAL: 1

## 2022-05-05 ASSESSMENT — SLIT LAMP EXAM - LIDS
COMMENTS: MGD
COMMENTS: MGD

## 2022-05-05 ASSESSMENT — EXTERNAL EXAM - RIGHT EYE: OD_EXAM: NORMAL

## 2022-05-05 ASSESSMENT — EXTERNAL EXAM - LEFT EYE: OS_EXAM: NORMAL

## 2022-05-05 NOTE — PROGRESS NOTES
I have confirmed the patient's and reviewed Past Medical History, Past Surgical History, Social History, Family History, Problem List, Medication List and agree with Tech note.    CC: consult for Branch retinal vein occlusion right eye     HPI: patient was diagnosed last month and was started on aspirin 81 mg daily     Assessment/plan:   1.  Branch retinal vein occlusion right eye    - Increase aspirin to 81 mg twice a day    - OCT shows improvement      2.  Diabetes mellitus mild nonproliferative diabetic retinopathy     Blood glucose control   Last HbA1C is 8.9   Monitor       RTC 3 months for repeat OCT     Shaista Bean MD PhD.  Professor & Chair

## 2022-05-05 NOTE — Clinical Note
The patient does havea right rvo which already improved with daily aspirin 81 mg.  I have increased the does to 81 mg twice a day.  She also has mild nonproliferative diabetic retinopathy both eyes, and no ischemia. There is interference of the inferior eye lashes in the optos fluorescein angiography.  I will see here again in 3 months

## 2022-05-05 NOTE — PROGRESS NOTES
Diabetes Self-Management Education & Support    Karen Braxton presents today for education related to Type 2 diabetes    Patient is being treated with:  insulin  She is accompanied by self  She is Somalian however today appears without an .      Year of diagnosis: Around 2002  Referring provider:  Melisa Phillips MD  Living Situation: Lives with .  Has a daughter who is in the health field.   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   Complications from diabetes:      PATIENT CONCERNS RELATED TO DIABETES SELF MANAGEMENT:     Karen had a visit recently with Dr. Phillips.  Insulin pumps were discussed.  Karen has wanted an insulin pump for some time but insurance coverage has been an issue until recently when Omnipod has become available for people with Type 2 diabetes.  She has been doing multiple daily injections for some many years and has never had good control.  Tried a V-GO device in 2016 but this didn't last very long and her control didn't improve with it.      ASSESSMENT:    Current Diabetes Management per Patient:  Taking diabetes medications?   yes:     Diabetes Medication(s)     Biguanides       metFORMIN (GLUCOPHAGE) 1000 MG tablet    Take 0.5 tablets (500 mg) by mouth 2 times daily (with meals)    Insulin       insulin glargine (LANTUS SOLOSTAR) 100 UNIT/ML pen    INJECT 50 UNITS AT BEDTIME     NOVOLOG FLEXPEN 100 UNIT/ML soln    INJECT 32 UNITS BEFORE BREAKFAST, 37 UNITS BEFORE LUNCH, 37 UNITS BEFORE DINNER        Her medication list doesn't really reflect how she takes her insulin.  Dr. Phillips outlined this in her last note.  It's difficult to understand exactly how she is taking her insulin.  She told me today that on days she works, her glucose drops significantly just because of the level of activity she has at work.  She states that on work days she takes 30 units of Lantus on work days and on days she is off, her glucose  climbs and she takes 40 units of Lantus.  She gives varying amounts of Novolog throughout the day, depending on what she eats.  I was able to determine from her that on average she uses about  30 units of Novolog per day.      Monitoring  She uses a Dodie 2 sensor to monitor her glucose.  We did not analyze her glucose pattern as this was not the purpose of this visit.     Patient's most recent   Lab Results   Component Value Date    A1C 10.1 04/26/2021      Patient's A1C goal: <7.0    Activity: sporadic or irregular exercise    Healthy Eating:   See Dr. Phillips's note of March 22.  She outlines in general Karen's eating habits.  Karen has not met with a dietitian and has never learned to count carbohydrates.  She knows enough to vary the amount of insulin she takes based on the size of the meal.         Problem Solving:      Patient is at risk of hypoglycemia?: YES, Frequency is one to two times per week and Usual treatment is 1% milk or juice.  Hospitalizations for hyper or hypoglycemia: No    EDUCATION and INSTRUCTION PROVIDED AT THIS VISIT:      Karen came today without an , however on query she agrees to have one at future appointments, especially for pump training.   When reviewing how she manages her diabetes, she understands very basic concepts of diabetes management (varies the amount of insulin she takes based on the size of the meal, and gives herself correction insulin when glucose is too high) but she is absolutely guessing at how much Novolog to take for either.  Dr. Phillips feels that she would be an excellent candidate for a hybrid closed loop (HCL) system, and I agree, as long as she is able to learn the concepts of insulin pumping.  I explained to her that she needs to learn how to count carbohydrates in order to operate the pump correctly, and we will need to change the sensor she is wearing.      The Omnipod pump was appealing to her, mainly because she doesn't like the idea of  tubing.  I think she would do very well with the Omnipod 5 HCL, which is now out, but not readily available.  I had her actually set up a pod with sterile water and apply it.  She was able to do this with coaching.  She agreed to attend all the appointments we set up.  I have her meeting with Janine Pa to review her diet and learn how to count carbohydrates.  I made a follow up with Janine as well, as well as two pre-pump educations classes with myself prior to the pump start.  We will order all components when we get a further down her training, as most of the components are not currently available in pharmacies.     Explained how the pump delivers insulin, showed her how the bolus calculator works.  She was able to see why she needs to learn carb-counting.  Asked her to keep track of her food intake prior to her visit with Janine Pa.    Patient-stated goal written and given to Karen Braxton.  Verbalized and demonstrated understanding of instructions.     PLAN:  See patient instructions  AVS printed and given to patient    FOLLOW-UP:       05/23/22:  11:15AM with Janine Pa dietitian to learn carb counting.   06/07/22:   2:15PM with Janine for follow up.      06/15/22:  2:45 PM with WILLI Hein for pre-pump training  06/22/22:  2:45PM with WILLI Hein for more pre-pump training      Time spent with patient at today's visit was 60 minutes.      Any diabetes medication dose changes were made via the [facility]DE Protocol and Collaborative Practice Agreement [facility].  A copy of this encounter was provided to patient's referring provider.

## 2022-05-23 ENCOUNTER — ALLIED HEALTH/NURSE VISIT (OUTPATIENT)
Dept: EDUCATION SERVICES | Facility: CLINIC | Age: 52
End: 2022-05-23
Payer: COMMERCIAL

## 2022-05-23 DIAGNOSIS — E11.9 TYPE 2 DIABETES MELLITUS WITHOUT COMPLICATION, WITH LONG-TERM CURRENT USE OF INSULIN (H): Primary | ICD-10-CM

## 2022-05-23 DIAGNOSIS — Z79.4 TYPE 2 DIABETES MELLITUS WITHOUT COMPLICATION, WITH LONG-TERM CURRENT USE OF INSULIN (H): Primary | ICD-10-CM

## 2022-05-23 PROCEDURE — G0108 DIAB MANAGE TRN  PER INDIV: HCPCS | Performed by: NUTRITIONIST

## 2022-05-23 NOTE — PATIENT INSTRUCTIONS
1) Practice your carb counting. Use the resources I gave you today and also labels.  StudyTube is a good free stephanie you can also use.    2) Keep food records as I showed you today. Please keep track of how many units of Novolog insulin you are taking with your meals. This is very important as it will help us to know what your insulin to carb ratio should be. We will use this to program your pump.    3) Bring your reader to the follow up appointment so I can upload your glucose data.    4) Follow up at 2:15pm on June 7th.     Thank you!  Janine

## 2022-05-23 NOTE — PROGRESS NOTES
"Diabetes Self-Management Education & Support    Presents for:  Pre-pump instruction for carb counting     SUBJECTIVE/OBJECTIVE:      Cultural Influences/Ethnic Background:  Not  or    was used via phone for this visit.     Diabetes Symptoms & Complications:          Patient Problem List and Family Medical History reviewed for relevant medical history, current medical status, and diabetes risk factors.    Vitals:  LMP  (LMP Unknown)   Estimated body mass index is 20.63 kg/m  as calculated from the following:    Height as of 3/22/22: 1.664 m (5' 5.5\").    Weight as of 5/4/22: 57.1 kg (125 lb 14.4 oz).   Last 3 BP:   BP Readings from Last 3 Encounters:   03/22/22 (!) 166/83   03/17/22 125/75   03/14/22 125/80       History   Smoking Status     Current Some Day Smoker     Packs/day: 0.50     Years: 40.00     Types: Cigarettes   Smokeless Tobacco     Never Used     Comment: 3 cigarettes daily       Labs:  Lab Results   Component Value Date    A1C 10.1 04/26/2021     Lab Results   Component Value Date     03/17/2022     09/24/2021     09/23/2020     Lab Results   Component Value Date    LDL 55 09/24/2021     09/23/2020     HDL Cholesterol   Date Value Ref Range Status   09/23/2020 47 (L) >49 mg/dL Final     Direct Measure HDL   Date Value Ref Range Status   09/24/2021 48 (L) >=50 mg/dL Final   ]  GFR Estimate   Date Value Ref Range Status   09/24/2021 >90 >60 mL/min/1.73m2 Final     Comment:     As of July 11, 2021, eGFR is calculated by the CKD-EPI creatinine equation, without race adjustment. eGFR can be influenced by muscle mass, exercise, and diet. The reported eGFR is an estimation only and is only applicable if the renal function is stable.   09/23/2020 82 >60 mL/min/[1.73_m2] Final     Comment:     Non  GFR Calc  Starting 12/18/2018, serum creatinine based estimated GFR (eGFR) will be   calculated using the Chronic Kidney Disease Epidemiology " Collaboration   (CKD-EPI) equation.       GFR Estimate If Black   Date Value Ref Range Status   09/23/2020 >90 >60 mL/min/[1.73_m2] Final     Comment:      GFR Calc  Starting 12/18/2018, serum creatinine based estimated GFR (eGFR) will be   calculated using the Chronic Kidney Disease Epidemiology Collaboration   (CKD-EPI) equation.       Lab Results   Component Value Date    CR 0.67 09/24/2021    CR 0.82 09/23/2020     No results found for: MICROALBUMIN    Healthy Eating:  Breakfast - baguette and coffee with milk  5pm - Pasta and bread, meat, vegetables  Dinner - porridge    Beverages - water, orange juice small portions  Fruit - grapes, banana, papaya, amaya  Injera occasionally, rice, salads, milk   Beans sometimes    Monitoring:  Patient does not have her [employer] reader with her today so unable to review reports    Taking Medications:  Diabetes Medication(s)     Biguanides       metFORMIN (GLUCOPHAGE) 1000 MG tablet    Take 0.5 tablets (500 mg) by mouth 2 times daily (with meals)    Insulin       insulin glargine (LANTUS SOLOSTAR) 100 UNIT/ML pen    INJECT 50 UNITS AT BEDTIME     NOVOLOG FLEXPEN 100 UNIT/ML soln    INJECT 32 UNITS BEFORE BREAKFAST, 37 UNITS BEFORE LUNCH, 37 UNITS BEFORE DINNER          From what I could gather, patient is taking 30 units of novolog once daily ad 25 units lantus once daily.  I would like her to keep track of her insulin doses for follow up. She agreed.     Healthy Coping:     Patient Activation Measure Survey Score:  MISTI Score (Last Two) 12/17/2014   MISTI Raw Score 41   Activation Score 63.2   MISTI Level 3       Diabetes knowledge and skills assessment:     Patient needs further education on the following diabetes management concepts: Healthy Eating and carb counting    Based on learning assessment above, most appropriate setting for further diabetes education would be: Individual setting.      INTERVENTIONS:    Education provided today on:  AADE Self-Care  Behaviors:  Healthy Eating: carbohydrate counting, label reading and The relationship between carbohydrate and glucose, identifying carbohydrate containing foods, how to use a nutrition facts label, reviewed written and online or stephanie based resources, how to estimate carbs and how to count carbs when eating out, measuring foods and estimating portions, what is an insulin to carbohydrate ratio and how is it used.     Opportunities for ongoing education and support in diabetes-self management were discussed.    Pt verbalized understanding of concepts discussed and recommendations provided today.       Education Materials Provided:  Carbohydrate Counting and [place] carb counting guide book      ASSESSMENT:    Patient's most recent   Lab Results   Component Value Date    A1C 10.1 04/26/2021    is not meeting goal of <7.0    PLAN  See Patient Instructions for co-developed, patient-stated behavior change goals.  AVS printed and provided to patient today. See Follow-Up section for recommended follow-up.      Time Spent: 60 minutes  Encounter Type: Individual    Any diabetes medication dose changes were made via the CDE Protocol and Collaborative Practice Agreement with the patient's referring provider. A copy of this encounter was shared with the provider.

## 2022-06-07 ENCOUNTER — ALLIED HEALTH/NURSE VISIT (OUTPATIENT)
Dept: EDUCATION SERVICES | Facility: CLINIC | Age: 52
End: 2022-06-07
Payer: COMMERCIAL

## 2022-06-07 DIAGNOSIS — E11.9 TYPE 2 DIABETES MELLITUS WITHOUT COMPLICATION, WITH LONG-TERM CURRENT USE OF INSULIN (H): Primary | ICD-10-CM

## 2022-06-07 DIAGNOSIS — Z79.4 TYPE 2 DIABETES MELLITUS WITHOUT COMPLICATION, WITH LONG-TERM CURRENT USE OF INSULIN (H): Primary | ICD-10-CM

## 2022-06-07 PROCEDURE — G0108 DIAB MANAGE TRN  PER INDIV: HCPCS | Performed by: NUTRITIONIST

## 2022-06-07 NOTE — PROGRESS NOTES
"  Diabetes Self-Management Education & Support    Presents for:  Type 2 diabetes, pre pump education  Second visit    SUBJECTIVE/OBJECTIVE:     Cultural Influences/Ethnic Background:  Not  or     Diabetes Symptoms & Complications:          Patient Problem List and Family Medical History reviewed for relevant medical history, current medical status, and diabetes risk factors.    Vitals:  LMP  (LMP Unknown)   Estimated body mass index is 20.63 kg/m  as calculated from the following:    Height as of 3/22/22: 1.664 m (5' 5.5\").    Weight as of 5/4/22: 57.1 kg (125 lb 14.4 oz).   Last 3 BP:   BP Readings from Last 3 Encounters:   03/22/22 (!) 166/83   03/17/22 125/75   03/14/22 125/80       History   Smoking Status     Current Some Day Smoker     Packs/day: 0.50     Years: 40.00     Types: Cigarettes   Smokeless Tobacco     Never Used     Comment: 3 cigarettes daily       Labs:  Lab Results   Component Value Date    A1C 10.1 04/26/2021     Lab Results   Component Value Date     03/17/2022     09/24/2021     09/23/2020     Lab Results   Component Value Date    LDL 55 09/24/2021     09/23/2020     HDL Cholesterol   Date Value Ref Range Status   09/23/2020 47 (L) >49 mg/dL Final     Direct Measure HDL   Date Value Ref Range Status   09/24/2021 48 (L) >=50 mg/dL Final   ]  GFR Estimate   Date Value Ref Range Status   09/24/2021 >90 >60 mL/min/1.73m2 Final     Comment:     As of July 11, 2021, eGFR is calculated by the CKD-EPI creatinine equation, without race adjustment. eGFR can be influenced by muscle mass, exercise, and diet. The reported eGFR is an estimation only and is only applicable if the renal function is stable.   09/23/2020 82 >60 mL/min/[1.73_m2] Final     Comment:     Non  GFR Calc  Starting 12/18/2018, serum creatinine based estimated GFR (eGFR) will be   calculated using the Chronic Kidney Disease Epidemiology Collaboration   (CKD-EPI) equation.   "     GFR Estimate If Black   Date Value Ref Range Status   09/23/2020 >90 >60 mL/min/[1.73_m2] Final     Comment:      GFR Calc  Starting 12/18/2018, serum creatinine based estimated GFR (eGFR) will be   calculated using the Chronic Kidney Disease Epidemiology Collaboration   (CKD-EPI) equation.       Lab Results   Component Value Date    CR 0.67 09/24/2021    CR 0.82 09/23/2020     No results found for: MICROALBUMIN    Healthy Eating:  Patient did not practice carb counting.   She brings in one food record with portions and what she ate, but did not record the carbs.  She tells me she eats the same thing essentially from day to day, so that this day is representative of her daily intake.    Monitoring:  See shauna report above. Patient is concerned about how her glucose drops but it appears that hyperglycemia is more of an issue than hypo.     Patient tells me different  Info today about her insulin dosing, from last time.  She tells me she is taking 30 units lantus on work nights and 40 units non work nights - she takes this at 1030pm  She is telling me now that she takes novolog three times a day. 20 units with breakfast, 30 with lunch and 30 with dinner    We had to go over this multiple times as it was unclear what she was actually doing.     Taking Medications:  Diabetes Medication(s)     Biguanides       metFORMIN (GLUCOPHAGE) 1000 MG tablet    Take 0.5 tablets (500 mg) by mouth 2 times daily (with meals)    Insulin       insulin glargine (LANTUS SOLOSTAR) 100 UNIT/ML pen    INJECT 50 UNITS AT BEDTIME     NOVOLOG FLEXPEN 100 UNIT/ML soln    INJECT 32 UNITS BEFORE BREAKFAST, 37 UNITS BEFORE LUNCH, 37 UNITS BEFORE DINNER          Healthy Coping:     Patient Activation Measure Survey Score:  MISTI Score (Last Two) 12/17/2014   MISTI Raw Score 41   Activation Score 63.2   MISTI Level 3       Diabetes knowledge and skills assessment:   Patient needs further education on the following diabetes management  concepts: Patient will likely need multiple future sessions on carb counting prior to starting a pump    Based on learning assessment above, most appropriate setting for further diabetes education would be: Individual setting.      INTERVENTIONS:    Education provided today on:  AADE Self-Care Behaviors:  We went over patients shauna report in detail today.  We reviewed carb counting again. Had patient use her food log to practice counting carbs and she did a great job but she needs more practice at home. We talked about how she will be entering her carb intake in grams info her new pump and why it is so important to count carbs correctly. She is willing to practice more and to follow up. We did not talk today about insulin to carb ratio as the session was spent on shauna reports and carb counting review.     Opportunities for ongoing education and support in diabetes-self management were discussed.    Pt verbalized understanding of concepts discussed and recommendations provided today.       Education Materials Provided:  Patient has both carb counting books    ASSESSMENT:  Patient's most recent   Lab Results   Component Value Date    A1C 10.1 04/26/2021    is not meeting goal of <7.0    PLAN  Patient will continue to practice carb counting and will keep additional records in which she will also write grams of carbs and insulin doses. Patient verbalized understanding.  Follow up planned with both myself and RN CDE  Time Spent: 60 minutes  Encounter Type: Individual    Any diabetes medication dose changes were made via the CDE Protocol and Collaborative Practice Agreement with the patient's referring provider. A copy of this encounter was shared with the provider.

## 2022-06-15 ENCOUNTER — ALLIED HEALTH/NURSE VISIT (OUTPATIENT)
Dept: EDUCATION SERVICES | Facility: CLINIC | Age: 52
End: 2022-06-15
Payer: COMMERCIAL

## 2022-06-15 DIAGNOSIS — Z79.4 TYPE 2 DIABETES MELLITUS WITHOUT COMPLICATION, WITH LONG-TERM CURRENT USE OF INSULIN (H): Primary | ICD-10-CM

## 2022-06-15 DIAGNOSIS — E11.9 TYPE 2 DIABETES MELLITUS WITHOUT COMPLICATION, WITH LONG-TERM CURRENT USE OF INSULIN (H): Primary | ICD-10-CM

## 2022-06-15 PROCEDURE — G0108 DIAB MANAGE TRN  PER INDIV: HCPCS | Performed by: REGISTERED NURSE

## 2022-06-15 NOTE — PATIENT INSTRUCTIONS
Diabetes Instructions:     You seem to need more background insulin, but it looks as if your blood glucose really drops overnight, so we want you to start taking your Lantus insulin in the morning.  To make this switch:     Take 20 units of Lantus tonight at your usual time. (Thursday evening)  Take 20 units of Lantus tomorrow morning when you get home (Friday morning)  NO LANTUS tomorrow night   Take 40 units of Lantus Saturday morning and every day thereafter.      Your insulin to carbohydrate ratio appears to be about 1 unit for every 2 grams of carbohydrate.      Good job of record keeping!      Appointment with Janine Pa on Monday, June 20 at 2:30pm  Appointment with Melvina Gustafson on Tuesday June 28 at 2:45pm.     Melvina Gustafson, BSN, RN, [facility]  Certified Diabetes Care and   [facility]   [facility]  Clinic [phone]  Phone [phone]

## 2022-06-16 NOTE — PROGRESS NOTES
Diabetes Self-Management Education & Support  Karen Braxton presents today for education related to Type 2 diabetes    Patient is being treated with:  insulin  She is accompanied by self  She is Somalian however today appears without an .       Year of diagnosis: Around 2002  Referring provider:  Melisa Phillips MD  Living Situation: Lives with .  Has a daughter who is in the health field.   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   Complications from diabetes:      PATIENT CONCERNS RELATED TO DIABETES SELF MANAGEMENT:   Karen would like to get an insulin pump.   The diabetes eduction team has been working with her to try to get her ready for this.    Janine Pa has been working with her to learn how to count carbohydrates.    Karen has a lot of variability in her glucoses.     ASSESSMENT:    Current Diabetes Management per Patient:  Taking diabetes medications?   yes:     Diabetes Medication(s)     Biguanides       metFORMIN (GLUCOPHAGE) 1000 MG tablet    Take 0.5 tablets (500 mg) by mouth 2 times daily (with meals)    Insulin       insulin glargine (LANTUS SOLOSTAR) 100 UNIT/ML pen    INJECT 50 UNITS AT BEDTIME     NOVOLOG FLEXPEN 100 UNIT/ML soln    INJECT 32 UNITS BEFORE BREAKFAST, 37 UNITS BEFORE LUNCH, 37 UNITS BEFORE DINNER          Monitoring    Patient glucose self monitoring as follows: continuously using a continuous glucose monitor (CGM)  BG meter: Freestyle Dodie 2  meter  BG results:          Patient's most recent   Lab Results   Component Value Date    A1C 10.1 04/26/2021      Patient's A1C goal: <7.0    Activity:   Works overnight (2015 to 0445) Wednesday through Sunday in the [employer].  She is quite active in her job.      Healthy Eating:   She eats a fairly typical [place] diet  05:00 AM before she goes to bed.  Breakfast:  1/2 cup cereal with 1/2 cup milk + 4 ounces orange juice              Or   2  Enjera bread + coffee with 1/2 tsp sugar, and fruit (orange or small clump grapes)  01-02 PM Lunch:  Burrito with 1 flour tortilla, 1/2 c rice,  4 ounces juice               Or   [place]axa (a type of flat bread) with meat and water+ juice.   0645  PM Dinner:  Pasta 1/2 cup, Salad + juice                        Or:   1/2 cup beans, 1 pc Enjera and juice      Most of these meals are somewhere between 33 and 60 grams of CHO.  Karen has not been taught how to use a correction scale and at this point is not using an insulin to carbohydrate ratio, however her dosing scheme appears to be around 1 unit for every 2 grams CHO, however difficult to ascertain because she is dosing up or down based on her pre-meal glucoses.      EDUCATION and INSTRUCTION PROVIDED AT THIS VISIT:      We spent a fair amount of time just trying to understand Karen's daily schedule and how she is making decisions around how much insulin to take.    It sounds like she is making decisions around how to dose her Novolog using her own experience based on where her pre-meal glucose is, and how much she is eating.  Her glucose control on her days off is better than on the days she works, because she refuses to take any Novolog while she is working out of fear of hypoglycemia and being sent home.      She eats before she goes to work, and states she takes Novolog for this meal, but she eats again when she is at work (has a break at 12:00AM) and for this, she doesn't take any Novolog and this is reflected in very high overnight glucose. She adamantly refuses to take any Novolog while at work.  This is understandable because she is dropping overnight anyway, and is not infrequently low when she gets off her shift.  I think part of this is the timing of her Lantus insulin, which is in the evening.  I think she needs her long acting insulin increased, but suggested that rather than taking it right before she goes to work, that she change the timing to take it  in the morning, before she goes to bed.  It's likely that she will then experience the waning effect when she is most active which is between 8pm and 5am.  Spent quite a bit of time explaining the difference between the action of the long acting insulin and short acting insulin and reassured her that she can take both these insulins at the same time.      The biggest barrier to getting her on an insulin pump is making sure that she understands how to count carbs and is able to use an insulin to carbohydrate ratio.  In addition, using an  for her visits would be quite helpful however Karen has consistently refused to use an , even though I have asked in the past and she has agreed to use one for these visits.         She has another appointment with Janine Pa on Monday, June 20.    She has a follow up appointment with me on Tuesday, June 28.    Note to Dr. Phillips.        Patient-stated goal written and given to Karen WESLEY Irais.  Verbalized and demonstrated understanding of instructions.     PLAN:  See patient instructions  AVS printed and given to patient    FOLLOW-UP:        Time spent with patient at today's visit was 60 minutes.      Any diabetes medication dose changes were made via the CDE Protocol and Collaborative Practice Agreement [facility] [place] [facility]  A copy of this encounter was provided to patient's referring provider.'

## 2022-06-22 ENCOUNTER — ALLIED HEALTH/NURSE VISIT (OUTPATIENT)
Dept: EDUCATION SERVICES | Facility: CLINIC | Age: 52
End: 2022-06-22
Payer: COMMERCIAL

## 2022-06-22 DIAGNOSIS — Z79.4 TYPE 2 DIABETES MELLITUS WITHOUT COMPLICATION, WITH LONG-TERM CURRENT USE OF INSULIN (H): Primary | ICD-10-CM

## 2022-06-22 DIAGNOSIS — E11.9 TYPE 2 DIABETES MELLITUS WITHOUT COMPLICATION, WITH LONG-TERM CURRENT USE OF INSULIN (H): Primary | ICD-10-CM

## 2022-06-22 PROCEDURE — G0108 DIAB MANAGE TRN  PER INDIV: HCPCS | Performed by: REGISTERED NURSE

## 2022-06-22 NOTE — PROGRESS NOTES
Diabetes Self-Management Education & Support    Karen Braxton presents today for education related to Type 2 diabetes    Patient is being treated with:  insulin  She is accompanied by self    Year of diagnosis: Around 2002  Referring provider:  Melisa Phillips MD  Living Situation: Lives with .  Has a daughter who is in the health field.   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   Complications from diabetes:  None    PATIENT CONCERNS RELATED TO DIABETES SELF MANAGEMENT:     We are working on getting Karen ready for an insulin pump.  At our last visit, we had her change the timing of her long acting insulin from right before she goes to work to taking it before she goes to bed instead, as she had been dropping significantly when she went to work because of the amount of running around she does there.    This actually seems to be working a little better for her.         ASSESSMENT:    Taking Medication:     Current Diabetes Management per Patient:  Taking diabetes medications?   yes:     Diabetes Medication(s)     Biguanides       metFORMIN (GLUCOPHAGE) 1000 MG tablet    Take 0.5 tablets (500 mg) by mouth 2 times daily (with meals)    Insulin       insulin glargine (LANTUS SOLOSTAR) 100 UNIT/ML pen    INJECT 50 UNITS AT BEDTIME     NOVOLOG FLEXPEN 100 UNIT/ML soln    INJECT 32 UNITS BEFORE BREAKFAST, 37 UNITS BEFORE LUNCH, 37 UNITS BEFORE DINNER          Monitoring  Uses the [employer] 14 day sensor:   Report indicates significant improvement in her time in range, which improved from 30% to 41% since her last visit with me.                Patient's most recent   Lab Results   Component Value Date    A1C 10.1 04/26/2021      Patient's A1C goal: <7.0    Activity: no regular exercise program    Healthy Eating:   See RD assessment from previous visit in May 2022.  She generally eats a meal when she gets home from work at about 5am.  She makes  decisions about how much insulin to take based on experience.  She eats again when she wakes up, around 1-2pm and takes Novolog for this.  Before she goes to work she will eat again and takes Novolog before eating.  When she has a break at work, she eats a packet of instant oatmeal for which she gives herself no Novolog, as she is quite concerned about going low when she is at work.      EDUCATION and INSTRUCTION PROVIDED AT THIS VISIT:       In an effort to move the ball forward on getting her ready for an insulin pump,  I put a correction scale together that corrects 1 unit for every 12 mg/dL over a target of 125.  In addition, it appears that her insulin to carbohydrate ratio is around 1 unit for every 2 grams of CHO, but I was a little reluctant to start her with this aggressive an insulin to carb ratio, so I asked her to start using an ICR of 1 unit per 5 grams CHO.  Explained how to use these two tools together to cover meals and correct pre-meal glucoses.  Unfortunately she doesn't have another appointment with Janine Pa until August 1.      I feel that once we are confident that she understands how to use a correction scale and is accurate in carb counting, we can go ahead and order the insulin pump.  I explained this to her and asked her to keep food records for 3-4 days prior to her appointment with Janine Pa, and to record what she is eating, how many grams of CHO her meals were and how much insulin she gave.      Patient-stated goal written and given to Karen Braxton.  Verbalized and demonstrated understanding of instructions.     PLAN:  See patient instructions  AVS printed and given to patient    FOLLOW-UP:        Time spent with patient at today's visit was 30 minutes.      Any diabetes medication dose changes were made via the CDE Protocol and Collaborative Practice Agreement with [place] and [facility].  A copy of this encounter was provided to patient's referring provider.

## 2022-06-22 NOTE — PATIENT INSTRUCTIONS
Diabetes Instructions:     We are going to start with an insulin to carbohydrate ratio of 1 unit for every 5 grams of CHO.    You just total up all the grams of carbohydrate in your meal and divide by 5.  This will give you your meal dose.      In addition to this, you are going to correct your blood glucose if it is over 150 before you start eating.      THIS IS YOUR CORRECTION SCALE.  ADD THE CORRESPONDING AMOUNT OF INSULIN TO YOUR MEAL COVERAGE, BASED ON WHAT YOUR BG IS BEFORE EATING.  DO NOT CORRECT AFTER-MEAL BG'S OR CORRECT YOUR BLOOD GLUCOSE MORE OFTEN THAN EVERY FOUR HOURS.    If blood glucose is: Add extra Humalog/Novolo-150 2 unit   151-175 4 units   176-200 6 units   201-225 8 units   226-250 10 units   251-275 12 units   276-300 14 units   301-325 16 units   326-350 18 units   351-375 20 units   376-400 22 units   401-425 24 units   426-450 26 units   451-475 28 units   476-500 30 units

## 2022-06-29 DIAGNOSIS — Z79.4 TYPE 2 DIABETES MELLITUS WITHOUT COMPLICATION, WITH LONG-TERM CURRENT USE OF INSULIN (H): ICD-10-CM

## 2022-06-29 DIAGNOSIS — E11.9 TYPE 2 DIABETES MELLITUS WITHOUT COMPLICATION, WITH LONG-TERM CURRENT USE OF INSULIN (H): ICD-10-CM

## 2022-07-01 RX ORDER — ASPIRIN 81 MG/1
TABLET, COATED ORAL
Qty: 30 TABLET | Refills: 1 | OUTPATIENT
Start: 2022-07-01

## 2022-07-01 NOTE — TELEPHONE ENCOUNTER
Last OV 9/23/2020     Looks like pt may be seeing providers outside of [facility] clinic     Called patient - has a new doctor now and ok to send refusal to pharmacy    Tawana ABREU Triage RN  [facility]       ASPIRIN LOW DOSE 81 MG EC tablet 90 tablet 0 10/19/2021  No   Sig: TAKE 1 TABLET(81 MG) BY MOUTH DAILY   Sent to pharmacy as: Aspirin Low Dose 81 MG Oral Tablet Delayed Release (aspirin)   Class: E-Prescribe   Notes to Pharmacy: Medication filled 1 time as pt is due for a follow-up in clinic . Please have patient call [phone] to schedule.   Order: [id number]   E-Prescribing Status: Receipt confirmed by pharmacy (10/19/2021 11:46 AM CDT)

## 2022-08-01 ENCOUNTER — ALLIED HEALTH/NURSE VISIT (OUTPATIENT)
Dept: EDUCATION SERVICES | Facility: CLINIC | Age: 52
End: 2022-08-01
Payer: COMMERCIAL

## 2022-08-01 DIAGNOSIS — E11.9 TYPE 2 DIABETES MELLITUS WITHOUT COMPLICATION, WITH LONG-TERM CURRENT USE OF INSULIN (H): Primary | ICD-10-CM

## 2022-08-01 DIAGNOSIS — Z79.4 TYPE 2 DIABETES MELLITUS WITHOUT COMPLICATION, WITH LONG-TERM CURRENT USE OF INSULIN (H): Primary | ICD-10-CM

## 2022-08-01 PROCEDURE — 97803 MED NUTRITION INDIV SUBSEQ: CPT | Performed by: NUTRITIONIST

## 2022-08-01 NOTE — PATIENT INSTRUCTIONS
Karen,    It looks like you are doing well with counting carbohydrate.    Start using the insulin to carbohydrate ratio and correction scale that karissa gave you last time. We printed you a new copy of that and you did well practicing.    Continue to keep food records. Document what you ate, the amount of carbohydrate and how much insulin you took.    Thank you,  Janine

## 2022-08-01 NOTE — PROGRESS NOTES
"Diabetes Self-Management Education & Support    Presents for:  Type 2 diabetes    SUBJECTIVE/OBJECTIVE:     Cultural Influences/Ethnic Background:  Not  or   Pre Pump Education    Diabetes Symptoms & Complications:          Patient Problem List and Family Medical History reviewed for relevant medical history, current medical status, and diabetes risk factors.    Vitals:  LMP  (LMP Unknown)   Estimated body mass index is 20.63 kg/m  as calculated from the following:    Height as of 3/22/22: 1.664 m (5' 5.5\").    Weight as of 5/4/22: 57.1 kg (125 lb 14.4 oz).   Last 3 BP:   BP Readings from Last 3 Encounters:   03/22/22 (!) 166/83   03/17/22 125/75   03/14/22 125/80       History   Smoking Status     Current Some Day Smoker     Packs/day: 0.50     Years: 40.00     Types: Cigarettes   Smokeless Tobacco     Never Used     Comment: 3 cigarettes daily       Labs:  Lab Results   Component Value Date    A1C 10.1 04/26/2021     Lab Results   Component Value Date     03/17/2022     09/24/2021     09/23/2020     Lab Results   Component Value Date    LDL 55 09/24/2021     09/23/2020     HDL Cholesterol   Date Value Ref Range Status   09/23/2020 47 (L) >49 mg/dL Final     Direct Measure HDL   Date Value Ref Range Status   09/24/2021 48 (L) >=50 mg/dL Final   ]  GFR Estimate   Date Value Ref Range Status   09/24/2021 >90 >60 mL/min/1.73m2 Final     Comment:     As of July 11, 2021, eGFR is calculated by the CKD-EPI creatinine equation, without race adjustment. eGFR can be influenced by muscle mass, exercise, and diet. The reported eGFR is an estimation only and is only applicable if the renal function is stable.   09/23/2020 82 >60 mL/min/[1.73_m2] Final     Comment:     Non  GFR Calc  Starting 12/18/2018, serum creatinine based estimated GFR (eGFR) will be   calculated using the Chronic Kidney Disease Epidemiology Collaboration   (CKD-EPI) equation.       GFR Estimate If " Black   Date Value Ref Range Status   09/23/2020 >90 >60 mL/min/[1.73_m2] Final     Comment:      GFR Calc  Starting 12/18/2018, serum creatinine based estimated GFR (eGFR) will be   calculated using the Chronic Kidney Disease Epidemiology Collaboration   (CKD-EPI) equation.       Lab Results   Component Value Date    CR 0.67 09/24/2021    CR 0.82 09/23/2020     No results found for: MICROALBUMIN    Healthy Eating:  Karen brought food records with her today. She didn't note the carbohydrate content but as we went through the records she was able to verbalize the carb amount estimates in grams per memory. I think she will do well with carb counting and her diet is pretty consistent.   She hasn't started using the insulin to carb ratio or correction scale that Melvina Gustafson RN CDE gave her at their last visit.   So after reviewing carb counting we spent the remainder of the time practicing calculating insulin doses using the ratio and correction from Melvina. Patient struggled a little bit with this and needed to use a calculator for some of it but she was able to verbalize accurate doses after some practice.     Monitoring:           Taking Medications:  Diabetes Medication(s)     Biguanides       metFORMIN (GLUCOPHAGE) 1000 MG tablet    Take 0.5 tablets (500 mg) by mouth 2 times daily (with meals)    Insulin       insulin glargine (LANTUS SOLOSTAR) 100 UNIT/ML pen    INJECT 50 UNITS AT BEDTIME     NOVOLOG FLEXPEN 100 UNIT/ML soln    INJECT 32 UNITS BEFORE BREAKFAST, 37 UNITS BEFORE LUNCH, 37 UNITS BEFORE DINNER          Healthy Coping:     Patient Activation Measure Survey Score:  MISTI Score (Last Two) 12/17/2014   MISTI Raw Score 41   Activation Score 63.2   MISTI Level 3       Diabetes knowledge and skills assessment:   Patient is knowledgeable in diabetes management concepts related to: Healthy Eating    Patient needs further education on the following diabetes management concepts: Healthy  Eating    Based on learning assessment above, most appropriate setting for further diabetes education would be: Individual setting.      INTERVENTIONS:    Education provided today on:  AADE Self-Care Behaviors:  Healthy Eating: carbohydrate counting, Insulin to Carb ratio, Correction    Opportunities for ongoing education and support in diabetes-self management were discussed.    Pt verbalized understanding of concepts discussed and recommendations provided today.         ASSESSMENT:    Patient's most recent   Lab Results   Component Value Date    A1C 10.1 04/26/2021    is not meeting goal of <7.0    PLAN  See Patient Instructions for co-developed, patient-stated behavior change goals.  AVS printed and provided to patient today. See Follow-Up section for recommended follow-up.    Time Spent: 50 minutes  Encounter Type: Individual    Any diabetes medication dose changes were made via the CDE Protocol and Collaborative Practice Agreement with the patient's referring provider. A copy of this encounter was shared with the provider.

## 2022-08-04 DIAGNOSIS — E11.311 TYPE 2 DIABETES MELLITUS WITH RETINOPATHY OF BOTH EYES AND MACULAR EDEMA, UNSPECIFIED RETINOPATHY SEVERITY, UNSPECIFIED WHETHER LONG TERM INSULIN USE (H): Primary | ICD-10-CM

## 2022-08-11 ENCOUNTER — OFFICE VISIT (OUTPATIENT)
Dept: ENDOCRINOLOGY | Facility: CLINIC | Age: 52
End: 2022-08-11
Payer: COMMERCIAL

## 2022-08-11 VITALS
SYSTOLIC BLOOD PRESSURE: 129 MMHG | HEART RATE: 87 BPM | DIASTOLIC BLOOD PRESSURE: 80 MMHG | BODY MASS INDEX: 20.6 KG/M2 | WEIGHT: 125.7 LBS

## 2022-08-11 DIAGNOSIS — E11.65 TYPE 2 DIABETES MELLITUS WITH HYPERGLYCEMIA, WITH LONG-TERM CURRENT USE OF INSULIN (H): Primary | ICD-10-CM

## 2022-08-11 DIAGNOSIS — Z79.4 TYPE 2 DIABETES MELLITUS WITH HYPERGLYCEMIA, WITH LONG-TERM CURRENT USE OF INSULIN (H): Primary | ICD-10-CM

## 2022-08-11 LAB — HBA1C MFR BLD: 7.9 % (ref 4.3–?)

## 2022-08-11 PROCEDURE — 99215 OFFICE O/P EST HI 40 MIN: CPT | Performed by: PHYSICIAN ASSISTANT

## 2022-08-11 PROCEDURE — 83036 HEMOGLOBIN GLYCOSYLATED A1C: CPT | Performed by: PHYSICIAN ASSISTANT

## 2022-08-11 RX ORDER — LOSARTAN POTASSIUM 25 MG/1
25 TABLET ORAL DAILY
COMMUNITY
Start: 2022-06-07

## 2022-08-11 ASSESSMENT — PAIN SCALES - GENERAL: PAINLEVEL: NO PAIN (0)

## 2022-08-11 NOTE — LETTER
8/11/2022       RE: Karen Braxton  [address]  [place] [address]     Dear Colleague,    Thank you for referring your patient, Karen Braxton, to the [facility] [place] [facility]. Please see a copy of my visit note below.    Outcome for 08/10/22 10:16 AM :Glucose sent via Email     Mellisa Howell        HPI:  Irais Jones is a 52 year old female with type 2 diabetes mellitus.  Pt being seen today in clinic for diabetes follow up.  Pt had a visit with Dr. Phillips in March 2022. She was referred to CDE and RD to consider use of an insulin pump.  It appears that she is capable of carb counting and pt would like to move forward with getting an insulin pump.  She was dx having type 2 diabetes around 2002.  Her diabetes is complicated by background retinopathy. No known nephropathy or neuropathy.  Her hx is significant for HTN, hyperlipidemia and Vit D deficiency.  For her diabetes, she is currently taking Lantus 50 units SQ at hs, Novolog 30 units with breakfast, 35 units with lunch and 35 units with dinner, Jardiance 10 mg each am and Metformin 500 mg BID.  She tells me she discontinued Bydureon due to GI side effects.  Pt's A1C is 7.9 % today.  I reviewed her Freestyle Dodie sensor download data today and her blood sugar readings are high.  Pt's average glucose was 190 with SD 35 and estimated A1C 7.9 % over the past 2 weeks.   On ROS today, she looks good today.  Karen is working the night shift at [employer] 5 days per week.  Pt denies blurred vision, n/v, SOB at rest, cough or fevers.  Pt denies chest pain, abd pain, diarrhea, dysuria, hematuria or sx of neuropathy.  No foot ulcers on exam today.    DIABETES CARE:  Retinopathy: none; seen by Oph in May 2022 with background retinopathy.  Nephropathy: none; urine microalbuminuria was negative in 9/2021. She is taking Cozaar.  Neuropathy: none.  Foot exam: no ulcers and normal  "monofilamentous exam today.  Lipids: LDL 55 in 9/2021. Taking Lipitor.  Taking ASA:yes.  CAD:no.  Mental health:denies depression.  Insulin: Basal and meal time insulin. DM meds: Jardiance and Metformin.  Testing: Freestyle Libre2 sensor.    ROS:   Please see under HPI.    ALLERGIES:   No Known Allergies      Current Outpatient Medications   Medication Sig Dispense Refill     atorvastatin (LIPITOR) 40 MG tablet Take 1 tablet (40 mg) by mouth daily (Patient taking differently: Take 20 mg by mouth every morning) 90 tablet 3     blood glucose (NO BRAND SPECIFIED) test strip Use to test blood sugar 4 times daily and as needed. Any covered brand that meets Pt need. 400 strip 4     blood glucose monitoring (NO BRAND SPECIFIED) meter device kit Use to test blood sugar 4 times daily and as needed. Any covered brand. 1 kit 1     Continuous Blood Gluc Sensor (FREESTYLE RAHEEM 2 SENSOR) MISC 1 each every 14 days Use 1 sensor every 14 days. Use to read blood sugars per 's instructions. 2 each 5     insulin glargine (LANTUS SOLOSTAR) 100 UNIT/ML pen INJECT 50 UNITS AT BEDTIME 45 mL 3     insulin pen needle (B-D U/F) 31G X 8 MM miscellaneous USE TO INJECT FOUR TIMES DAILY OR AS DIRECTED 400 each 3     insulin syringe-needle U-100 (BD INSULIN SYRINGE ULTRAFINE) 30G X 1/2\" 1 ML Use one syringe daily or as directed.  3 month supply 100 each prn     losartan (COZAAR) 25 MG tablet Take 25 mg by mouth daily       NOVOLOG FLEXPEN 100 UNIT/ML soln INJECT 32 UNITS BEFORE BREAKFAST, 37 UNITS BEFORE LUNCH, 37 UNITS BEFORE DINNER 90 mL 3     vitamin D3 (CHOLECALCIFEROL) 50 mcg (2000 units) tablet Take 1 tablet (50 mcg) by mouth daily 90 tablet 3     metFORMIN (GLUCOPHAGE) 1000 MG tablet Take 0.5 tablets (500 mg) by mouth 2 times daily (with meals) 90 tablet 1     prednisoLONE acetate (PRED FORTE) 1 % ophthalmic suspension Place 1 drop Into the left eye 3 times daily 15 mL 0     vitamin D2 (ERGOCALCIFEROL) 86163 units (1250 mcg) " capsule Take 1 capsule (50,000 Units) by mouth every 7 days 8 capsule 6     SHX:  Smoke: yes.  ETOH: none.   with 5 children.    FHX:  Several family members with diabetes.    PMHX:   1.  Type 2 DM.  2.  Hyperlipidemia.  3.  Amenorrhea.  4.  GERD.  5.  S/P choley.  6.  Sebaceous cyst.  Past Medical History:   Diagnosis Date     Cigarette nicotine dependence without complication      Combined forms of age-related cataract of both eyes      Type 2 diabetes mellitus (H)      Past Surgical History:   Procedure Laterality Date     CATARACT IOL, RT/LT       KNEE SURGERY Left 2001     LAPAROSCOPIC CHOLECYSTECTOMY  2007     PHACOEMULSIFICATION WITH STANDARD INTRAOCULAR LENS IMPLANT Right 01/13/2022    Procedure: PHACOEMULSIFICATION, COMPLEX CATARACT, WITH STANDARD INTRAOCULAR LENS IMPLANT INSERTION right;  Surgeon: Cony Scruggs MD;  [place] [facility] [place]     PHACOEMULSIFICATION WITH STANDARD INTRAOCULAR LENS IMPLANT Left 03/17/2022    Procedure: COMPLEX PHACOEMULSIFICATION, CATARACT, WITH STANDARD INTRAOCULAR LENS IMPLANT INSERTION LEFT;  Surgeon: Coyn Scruggs MD;  [place] [facility] [place]     REPAIR CLEFT PALATE CHILD  1972       EXAM:    /80   Pulse 87   Wt 57 kg (125 lb 11.2 oz)   LMP  (LMP Unknown)   BMI 20.60 kg/m      FEET: No ulcers; normal monofilamentous exam.      RESULTS:    Creatinine   Date Value Ref Range Status   09/24/2021 0.67 0.52 - 1.04 mg/dL Final   09/23/2020 0.82 0.52 - 1.04 mg/dL Final     GFR Estimate   Date Value Ref Range Status   09/24/2021 >90 >60 mL/min/1.73m2 Final     Comment:     As of July 11, 2021, eGFR is calculated by the CKD-EPI creatinine equation, without race adjustment. eGFR can be influenced by muscle mass, exercise, and diet. The reported eGFR is an estimation only and is only applicable if the renal function is stable.   09/23/2020 82 >60 mL/min/[1.73_m2] Final     Comment:     Non  GFR Calc  Starting 12/18/2018, serum creatinine based estimated  GFR (eGFR) will be   calculated using the Chronic Kidney Disease Epidemiology Collaboration   (CKD-EPI) equation.       Hemoglobin A1C   Date Value Ref Range Status   04/26/2021 10.1 (H) 0 - 5.6 % Final     Comment:     Normal <5.7% Prediabetes 5.7-6.4%  Diabetes 6.5% or higher - adopted from ADA   consensus guidelines.       Potassium   Date Value Ref Range Status   09/24/2021 4.0 3.4 - 5.3 mmol/L Final   09/23/2020 4.0 3.4 - 5.3 mmol/L Final     ALT   Date Value Ref Range Status   09/24/2021 20 0 - 50 U/L Final   09/23/2020 22 0 - 50 U/L Final     AST   Date Value Ref Range Status   09/24/2021 11 0 - 45 U/L Final   09/23/2020 12 0 - 45 U/L Final     TSH   Date Value Ref Range Status   03/16/2018 2.12 0.40 - 4.00 mU/L Final     T4 Free   Date Value Ref Range Status   08/04/2011 1.22 0.70 - 1.85 ng/dL Final         Cholesterol   Date Value Ref Range Status   09/24/2021 149 <200 mg/dL Final   09/23/2020 214 (H) <200 mg/dL Final     Comment:     Desirable:       <200 mg/dl   06/18/2019 250 (H) <200 mg/dL Final     Comment:     Desirable:       <200 mg/dl     HDL Cholesterol   Date Value Ref Range Status   09/23/2020 47 (L) >49 mg/dL Final   06/18/2019 54 >49 mg/dL Final     Direct Measure HDL   Date Value Ref Range Status   09/24/2021 48 (L) >=50 mg/dL Final     LDL Cholesterol Calculated   Date Value Ref Range Status   09/24/2021 55 <=100 mg/dL Final   09/23/2020 135 (H) <100 mg/dL Final     Comment:     Above desirable:  100-129 mg/dl  Borderline High:  130-159 mg/dL  High:             160-189 mg/dL  Very high:       >189 mg/dl     06/18/2019 152 (H) <100 mg/dL Final     Comment:     Above desirable:  100-129 mg/dl  Borderline High:  130-159 mg/dL  High:             160-189 mg/dL  Very high:       >189 mg/dl       Triglycerides   Date Value Ref Range Status   09/24/2021 232 (H) <150 mg/dL Final   09/23/2020 160 (H) <150 mg/dL Final     Comment:     Borderline high:  150-199 mg/dl  High:             200-499  mg/dl  Very high:       >499 mg/dl     06/18/2019 220 (H) <150 mg/dL Final     Comment:     Borderline high:  150-199 mg/dl  High:             200-499 mg/dl  Very high:       >499 mg/dl       Cholesterol/HDL Ratio   Date Value Ref Range Status   12/01/2014 6.0 (H) 0.0 - 5.0 Final   07/30/2014 6.1 (H) 0.0 - 5.0 Final       ASSESSMENT/PLAN:    1. TYPE 2 DIABETES MELLITUS: Type 2 diabetes mellitus complicated by background retinopathy.  No nephropathy or neuropathy.  Pt's A1C has improved and is 7.9 %   She has been meeting with the CDE and RD and would like to start using an insulin pump.  Continue current insulin doses at this time, Metformin and Jardiance.  Reminded her to take her insulin daily as prescribed and to take Novolog 10-15 minutes before eating.  She is to continue to use her Freestyle Libre2 sensor to check her blood sugar fasting each am, prelunch, predinner and at bedtime daily.  Pt's urine microalbuminuria was negative in 9/2021.  Most recent creat 0.67 with GFR > 90 mL/min in Sept 2021.  She denies symptoms of neuropathy and no foot ulcers on exam today.  She is taking ASA 81 mg daily.    2.  HYPERLIPIDEMIA:  LDL 55 in 9/2021.  Pt taking Lipitor.    3. FOLLOW UP: with Dr. Phillips in Dec 2022.  Pt has follow up with Janine Pa RD on 8/29/2022.    Total time spent reviewing patient chart notes, labs and Freestyle Dodie sensor download today  =  8 minutes.  Total time for clinic visit today= 25  minutes.  Total time for documentation today = 15 minutes.     TOTAL TIME FOR VISIT TODAY =   48  minutes.    Cecile Juarez PA-C

## 2022-08-15 ENCOUNTER — OFFICE VISIT (OUTPATIENT)
Dept: OPHTHALMOLOGY | Facility: CLINIC | Age: 52
End: 2022-08-15
Attending: OPHTHALMOLOGY
Payer: COMMERCIAL

## 2022-08-15 DIAGNOSIS — E11.311 TYPE 2 DIABETES MELLITUS WITH RETINOPATHY OF BOTH EYES AND MACULAR EDEMA, UNSPECIFIED RETINOPATHY SEVERITY, UNSPECIFIED WHETHER LONG TERM INSULIN USE (H): ICD-10-CM

## 2022-08-15 PROCEDURE — 99213 OFFICE O/P EST LOW 20 MIN: CPT | Performed by: OPHTHALMOLOGY

## 2022-08-15 PROCEDURE — G0463 HOSPITAL OUTPT CLINIC VISIT: HCPCS | Mod: 25

## 2022-08-15 PROCEDURE — 92134 CPTRZ OPH DX IMG PST SGM RTA: CPT | Performed by: OPHTHALMOLOGY

## 2022-08-15 ASSESSMENT — EXTERNAL EXAM - LEFT EYE: OS_EXAM: NORMAL

## 2022-08-15 ASSESSMENT — SLIT LAMP EXAM - LIDS
COMMENTS: MGD
COMMENTS: MGD

## 2022-08-15 ASSESSMENT — VISUAL ACUITY
OS_CC+: -2
OS_PH_CC+: -3
OD_CC: 20/25
OS_CC: 20/40
CORRECTION_TYPE: GLASSES
METHOD: SNELLEN - LINEAR
OD_CC+: -3
OS_PH_CC: 20/20

## 2022-08-15 ASSESSMENT — CONF VISUAL FIELD
OD_NORMAL: 1
OS_NORMAL: 1
METHOD: COUNTING FINGERS

## 2022-08-15 ASSESSMENT — TONOMETRY
OD_IOP_MMHG: 13
OS_IOP_MMHG: 11
IOP_METHOD: ICARE

## 2022-08-15 ASSESSMENT — EXTERNAL EXAM - RIGHT EYE: OD_EXAM: NORMAL

## 2022-08-15 ASSESSMENT — CUP TO DISC RATIO
OD_RATIO: 0.8
OS_RATIO: 0.8

## 2022-08-15 NOTE — NURSING NOTE
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              Comments     Pt states vision is stable since last visit.  No ocular medications.    DM 2  Lab Results       Component                Value               Date                       A1C                      10.1                04/26/2021                 A1C                      8.9                 12/21/2020                 A1C                      9.1                 09/23/2020                 A1C                      8.6                 11/11/2019                 A1C                      7.7                 06/18/2019            BS were 130 this am.    LEOLA Marvin August 15, 2022 2:12 PM

## 2022-08-16 NOTE — PROGRESS NOTES
HPI:  Irais Jones is a 52 year old female with type 2 diabetes mellitus.  Pt being seen today in clinic for diabetes follow up.  Pt had a visit with Dr. Phillips in March 2022. She was referred to CDE and RD to consider use of an insulin pump.  It appears that she is capable of carb counting and pt would like to move forward with getting an insulin pump.  She was dx having type 2 diabetes around 2002.  Her diabetes is complicated by background retinopathy. No known nephropathy or neuropathy.  Her hx is significant for HTN, hyperlipidemia and Vit D deficiency.  For her diabetes, she is currently taking Lantus 50 units SQ at hs, Novolog 30 units with breakfast, 35 units with lunch and 35 units with dinner, Jardiance 10 mg each am and Metformin 500 mg BID.  She tells me she discontinued Bydureon due to GI side effects.  Pt's A1C is 7.9 % today.  I reviewed her Freestyle Dodie sensor download data today and her blood sugar readings are high.  Pt's average glucose was 190 with SD 35 and estimated A1C 7.9 % over the past 2 weeks.   On ROS today, she looks good today.  Karen is working the night shift at [employer] 5 days per week.  Pt denies blurred vision, n/v, SOB at rest, cough or fevers.  Pt denies chest pain, abd pain, diarrhea, dysuria, hematuria or sx of neuropathy.  No foot ulcers on exam today.    DIABETES CARE:  Retinopathy: none; seen by Oph in May 2022 with background retinopathy.  Nephropathy: none; urine microalbuminuria was negative in 9/2021. She is taking Cozaar.  Neuropathy: none.  Foot exam: no ulcers and normal monofilamentous exam today.  Lipids: LDL 55 in 9/2021. Taking Lipitor.  Taking ASA:yes.  CAD:no.  Mental health:denies depression.  Insulin: Basal and meal time insulin. DM meds: Jardiance and Metformin.  Testing: Freestyle Libre2 sensor.    ROS:   Please see under HPI.    ALLERGIES:   No Known Allergies      Current Outpatient Medications   Medication Sig Dispense Refill     atorvastatin (LIPITOR)  "40 MG tablet Take 1 tablet (40 mg) by mouth daily (Patient taking differently: Take 20 mg by mouth every morning) 90 tablet 3     blood glucose (NO BRAND SPECIFIED) test strip Use to test blood sugar 4 times daily and as needed. Any covered brand that meets Pt need. 400 strip 4     blood glucose monitoring (NO BRAND SPECIFIED) meter device kit Use to test blood sugar 4 times daily and as needed. Any covered brand. 1 kit 1     Continuous Blood Gluc Sensor (FREESTYLE RAHEEM 2 SENSOR) MISC 1 each every 14 days Use 1 sensor every 14 days. Use to read blood sugars per 's instructions. 2 each 5     insulin glargine (LANTUS SOLOSTAR) 100 UNIT/ML pen INJECT 50 UNITS AT BEDTIME 45 mL 3     insulin pen needle (B-D U/F) 31G X 8 MM miscellaneous USE TO INJECT FOUR TIMES DAILY OR AS DIRECTED 400 each 3     insulin syringe-needle U-100 (BD INSULIN SYRINGE ULTRAFINE) 30G X 1/2\" 1 ML Use one syringe daily or as directed.  3 month supply 100 each prn     losartan (COZAAR) 25 MG tablet Take 25 mg by mouth daily       NOVOLOG FLEXPEN 100 UNIT/ML soln INJECT 32 UNITS BEFORE BREAKFAST, 37 UNITS BEFORE LUNCH, 37 UNITS BEFORE DINNER 90 mL 3     vitamin D3 (CHOLECALCIFEROL) 50 mcg (2000 units) tablet Take 1 tablet (50 mcg) by mouth daily 90 tablet 3     metFORMIN (GLUCOPHAGE) 1000 MG tablet Take 0.5 tablets (500 mg) by mouth 2 times daily (with meals) 90 tablet 1     prednisoLONE acetate (PRED FORTE) 1 % ophthalmic suspension Place 1 drop Into the left eye 3 times daily 15 mL 0     vitamin D2 (ERGOCALCIFEROL) 49735 units (1250 mcg) capsule Take 1 capsule (50,000 Units) by mouth every 7 days 8 capsule 6     SHX:  Smoke: yes.  ETOH: none.   with 5 children.    FHX:  Several family members with diabetes.    PMHX:   1.  Type 2 DM.  2.  Hyperlipidemia.  3.  Amenorrhea.  4.  GERD.  5.  S/P choley.  6.  Sebaceous cyst.  Past Medical History:   Diagnosis Date     Cigarette nicotine dependence without complication      Combined " forms of age-related cataract of both eyes      Type 2 diabetes mellitus (H)      Past Surgical History:   Procedure Laterality Date     CATARACT IOL, RT/LT       KNEE SURGERY Left 2001     LAPAROSCOPIC CHOLECYSTECTOMY  2007     PHACOEMULSIFICATION WITH STANDARD INTRAOCULAR LENS IMPLANT Right 01/13/2022    Procedure: PHACOEMULSIFICATION, COMPLEX CATARACT, WITH STANDARD INTRAOCULAR LENS IMPLANT INSERTION right;  Surgeon: Cony Scruggs MD;  Location: [facility] OR     PHACOEMULSIFICATION WITH STANDARD INTRAOCULAR LENS IMPLANT Left 03/17/2022    Procedure: COMPLEX PHACOEMULSIFICATION, CATARACT, WITH STANDARD INTRAOCULAR LENS IMPLANT INSERTION LEFT;  Surgeon: Cony Scruggs MD;  Location: [facility] [place] OR     REPAIR CLEFT PALATE CHILD  1972       EXAM:    /80   Pulse 87   Wt 57 kg (125 lb 11.2 oz)   LMP  (LMP Unknown)   BMI 20.60 kg/m      FEET: No ulcers; normal monofilamentous exam.      RESULTS:    Creatinine   Date Value Ref Range Status   09/24/2021 0.67 0.52 - 1.04 mg/dL Final   09/23/2020 0.82 0.52 - 1.04 mg/dL Final     GFR Estimate   Date Value Ref Range Status   09/24/2021 >90 >60 mL/min/1.73m2 Final     Comment:     As of July 11, 2021, eGFR is calculated by the CKD-EPI creatinine equation, without race adjustment. eGFR can be influenced by muscle mass, exercise, and diet. The reported eGFR is an estimation only and is only applicable if the renal function is stable.   09/23/2020 82 >60 mL/min/[1.73_m2] Final     Comment:     Non  GFR Calc  Starting 12/18/2018, serum creatinine based estimated GFR (eGFR) will be   calculated using the Chronic Kidney Disease Epidemiology Collaboration   (CKD-EPI) equation.       Hemoglobin A1C   Date Value Ref Range Status   04/26/2021 10.1 (H) 0 - 5.6 % Final     Comment:     Normal <5.7% Prediabetes 5.7-6.4%  Diabetes 6.5% or higher - adopted from ADA   consensus guidelines.       Potassium   Date Value Ref Range Status   09/24/2021 4.0 3.4 - 5.3 mmol/L  Final   09/23/2020 4.0 3.4 - 5.3 mmol/L Final     ALT   Date Value Ref Range Status   09/24/2021 20 0 - 50 U/L Final   09/23/2020 22 0 - 50 U/L Final     AST   Date Value Ref Range Status   09/24/2021 11 0 - 45 U/L Final   09/23/2020 12 0 - 45 U/L Final     TSH   Date Value Ref Range Status   03/16/2018 2.12 0.40 - 4.00 mU/L Final     T4 Free   Date Value Ref Range Status   08/04/2011 1.22 0.70 - 1.85 ng/dL Final         Cholesterol   Date Value Ref Range Status   09/24/2021 149 <200 mg/dL Final   09/23/2020 214 (H) <200 mg/dL Final     Comment:     Desirable:       <200 mg/dl   06/18/2019 250 (H) <200 mg/dL Final     Comment:     Desirable:       <200 mg/dl     HDL Cholesterol   Date Value Ref Range Status   09/23/2020 47 (L) >49 mg/dL Final   06/18/2019 54 >49 mg/dL Final     Direct Measure HDL   Date Value Ref Range Status   09/24/2021 48 (L) >=50 mg/dL Final     LDL Cholesterol Calculated   Date Value Ref Range Status   09/24/2021 55 <=100 mg/dL Final   09/23/2020 135 (H) <100 mg/dL Final     Comment:     Above desirable:  100-129 mg/dl  Borderline High:  130-159 mg/dL  High:             160-189 mg/dL  Very high:       >189 mg/dl     06/18/2019 152 (H) <100 mg/dL Final     Comment:     Above desirable:  100-129 mg/dl  Borderline High:  130-159 mg/dL  High:             160-189 mg/dL  Very high:       >189 mg/dl       Triglycerides   Date Value Ref Range Status   09/24/2021 232 (H) <150 mg/dL Final   09/23/2020 160 (H) <150 mg/dL Final     Comment:     Borderline high:  150-199 mg/dl  High:             200-499 mg/dl  Very high:       >499 mg/dl     06/18/2019 220 (H) <150 mg/dL Final     Comment:     Borderline high:  150-199 mg/dl  High:             200-499 mg/dl  Very high:       >499 mg/dl       Cholesterol/HDL Ratio   Date Value Ref Range Status   12/01/2014 6.0 (H) 0.0 - 5.0 Final   07/30/2014 6.1 (H) 0.0 - 5.0 Final       ASSESSMENT/PLAN:    1. TYPE 2 DIABETES MELLITUS: Type 2 diabetes mellitus complicated  by background retinopathy.  No nephropathy or neuropathy.  Pt's A1C has improved and is 7.9 %   She has been meeting with the CDE and ARABELLA and would like to start using an insulin pump.  Continue current insulin doses at this time, Metformin and Jardiance.  Reminded her to take her insulin daily as prescribed and to take Novolog 10-15 minutes before eating.  She is to continue to use her Freestyle Libre2 sensor to check her blood sugar fasting each am, prelunch, predinner and at bedtime daily.  Pt's urine microalbuminuria was negative in 9/2021.  Most recent creat 0.67 with GFR > 90 mL/min in Sept 2021.  She denies symptoms of neuropathy and no foot ulcers on exam today.  She is taking ASA 81 mg daily.    2.  HYPERLIPIDEMIA:  LDL 55 in 9/2021.  Pt taking Lipitor.    3. FOLLOW UP: with Dr. Phillips in Dec 2022.  Pt has follow up with Janine Pa RD on 8/29/2022.    Total time spent reviewing patient chart notes, labs and Freestyle Dodie sensor download today  =  8 minutes.  Total time for clinic visit today= 25  minutes.  Total time for documentation today = 15 minutes.     TOTAL TIME FOR VISIT TODAY =   48  minutes.    Cecile Juarez PA-C

## 2022-08-17 ENCOUNTER — TELEPHONE (OUTPATIENT)
Dept: EDUCATION SERVICES | Facility: CLINIC | Age: 52
End: 2022-08-17

## 2022-08-17 NOTE — TELEPHONE ENCOUNTER
----- Message from Cecile Juarez PA-C sent at 8/16/2022  9:40 AM CDT -----  Hi:  Pt would like to move forward with getting an insulin pump.  She has follow up with greer Mehta on 8/29/2022.  Thanks adrian

## 2022-10-04 ENCOUNTER — ALLIED HEALTH/NURSE VISIT (OUTPATIENT)
Dept: EDUCATION SERVICES | Facility: CLINIC | Age: 52
End: 2022-10-04
Payer: COMMERCIAL

## 2022-10-04 DIAGNOSIS — Z79.4 TYPE 2 DIABETES MELLITUS WITHOUT COMPLICATION, WITH LONG-TERM CURRENT USE OF INSULIN (H): Primary | ICD-10-CM

## 2022-10-04 DIAGNOSIS — E11.9 TYPE 2 DIABETES MELLITUS WITHOUT COMPLICATION, WITH LONG-TERM CURRENT USE OF INSULIN (H): Primary | ICD-10-CM

## 2022-10-04 PROCEDURE — 97803 MED NUTRITION INDIV SUBSEQ: CPT | Performed by: NUTRITIONIST

## 2022-10-04 NOTE — PROGRESS NOTES
Diabetes Self-Management Education & Support    Karen Braxton presents today for education related to Type 2 diabetes    Patient is being treated with:  oral agents and insulin  She is accompanied by self    Referring provider:  Cecile Juarez   Living Situation: Lives with two children ages 26 and 24    PATIENT CONCERNS RELATED TO DIABETES SELF MANAGEMENT: None  Wants to move forward with pump      ASSESSMENT:    Taking Medication:     Current Diabetes Management per Patient:  Taking diabetes medications?   yes:     Diabetes Medication(s)     Biguanides       metFORMIN (GLUCOPHAGE) 1000 MG tablet    Take 0.5 tablets (500 mg) by mouth 2 times daily (with meals)    Insulin       insulin glargine (LANTUS SOLOSTAR) 100 UNIT/ML pen    INJECT 50 UNITS AT BEDTIME     NOVOLOG FLEXPEN 100 UNIT/ML soln    INJECT 32 UNITS BEFORE BREAKFAST, 37 UNITS BEFORE LUNCH, 37 UNITS BEFORE DINNER        She tells me she is taking 20 units of lantus and Novolog 30 with meals twice daily.    Monitoring      Reviewed doide report in detail today with patient.     Patient's most recent   Lab Results   Component Value Date    A1C 10.1 04/26/2021      Patient's A1C goal: <7.0    Healthy Eating:   Food recall obtained from patient today.  She is able to verbalize correct carb amounts    Problem Solving:      Patient is at risk of hypoglycemia?: YES  Hospitalizations for hyper or hypoglycemia: Unknown    EDUCATION and INSTRUCTION PROVIDED AT THIS VISIT:    Carbohydrate counting  Dodie report - time in target range, understanding glucose graphs    Patient-stated goal written and given to Karen Braxton.  Verbalized and demonstrated understanding of instructions.     FOLLOW-UP:    With Melvina guo on 10/18    Time spent with patient at today's visit was 40 minutes.      Any diabetes medication dose changes were made via the CDE Protocol and Collaborative Practice Agreement [facility] [place] [facility].  A copy of this encounter was provided to  patient's referring provider.

## 2022-10-18 ENCOUNTER — ALLIED HEALTH/NURSE VISIT (OUTPATIENT)
Dept: EDUCATION SERVICES | Facility: CLINIC | Age: 52
End: 2022-10-18
Payer: COMMERCIAL

## 2022-10-18 ENCOUNTER — LAB (OUTPATIENT)
Dept: LAB | Facility: CLINIC | Age: 52
End: 2022-10-18
Payer: COMMERCIAL

## 2022-10-18 VITALS — BODY MASS INDEX: 21.29 KG/M2 | WEIGHT: 129.9 LBS

## 2022-10-18 DIAGNOSIS — E11.65 TYPE 2 DIABETES MELLITUS WITH HYPERGLYCEMIA, WITH LONG-TERM CURRENT USE OF INSULIN (H): ICD-10-CM

## 2022-10-18 DIAGNOSIS — Z79.4 TYPE 2 DIABETES MELLITUS WITHOUT COMPLICATION, WITH LONG-TERM CURRENT USE OF INSULIN (H): Primary | ICD-10-CM

## 2022-10-18 DIAGNOSIS — E11.9 TYPE 2 DIABETES MELLITUS WITHOUT COMPLICATION, WITH LONG-TERM CURRENT USE OF INSULIN (H): ICD-10-CM

## 2022-10-18 DIAGNOSIS — Z79.4 TYPE 2 DIABETES MELLITUS WITH HYPERGLYCEMIA, WITH LONG-TERM CURRENT USE OF INSULIN (H): ICD-10-CM

## 2022-10-18 DIAGNOSIS — E11.9 TYPE 2 DIABETES MELLITUS WITHOUT COMPLICATION, WITH LONG-TERM CURRENT USE OF INSULIN (H): Primary | ICD-10-CM

## 2022-10-18 DIAGNOSIS — Z79.4 TYPE 2 DIABETES MELLITUS WITHOUT COMPLICATION, WITH LONG-TERM CURRENT USE OF INSULIN (H): ICD-10-CM

## 2022-10-18 LAB — VIT B12 SERPL-MCNC: 281 PG/ML (ref 232–1245)

## 2022-10-18 PROCEDURE — 82607 VITAMIN B-12: CPT | Performed by: INTERNAL MEDICINE

## 2022-10-18 PROCEDURE — G0108 DIAB MANAGE TRN  PER INDIV: HCPCS | Performed by: REGISTERED NURSE

## 2022-10-18 PROCEDURE — 99000 SPECIMEN HANDLING OFFICE-LAB: CPT | Performed by: PATHOLOGY

## 2022-10-18 PROCEDURE — 36415 COLL VENOUS BLD VENIPUNCTURE: CPT | Performed by: PATHOLOGY

## 2022-10-18 PROCEDURE — 84681 ASSAY OF C-PEPTIDE: CPT | Performed by: INTERNAL MEDICINE

## 2022-10-18 PROCEDURE — 86341 ISLET CELL ANTIBODY: CPT | Mod: 90 | Performed by: PATHOLOGY

## 2022-10-18 RX ORDER — PROCHLORPERAZINE 25 MG/1
SUPPOSITORY RECTAL
Qty: 9 EACH | Refills: 1 | Status: SHIPPED | OUTPATIENT
Start: 2022-10-18 | End: 2023-04-12

## 2022-10-18 RX ORDER — PROCHLORPERAZINE 25 MG/1
SUPPOSITORY RECTAL
Qty: 1 EACH | Refills: 3 | Status: SHIPPED | OUTPATIENT
Start: 2022-10-18 | End: 2023-11-07

## 2022-10-18 NOTE — PATIENT INSTRUCTIONS
Start covering your 12:30 snack with 1 or 2 units of Novolog.      The order for the Dexcom continuous glucose monitor has been sent to [facility].  Their number is [phone] if you have any questions.   They should be contacting you in the next week or so to let you know what your co-pay is and to arrange delivery.     Your appointment to get the Dexcom started is November 15 at 12:30pm for one hour.  Bring the Dexcom with you.    We will order the Omnipod after that visit.     Melvina Gustafson, MOJGANN, RN, [facility]  Certified Diabetes Care and   [facility] Endocrinology and Diabetes   [facility]  Clinic [id number]  Phone [phone]

## 2022-10-18 NOTE — PROGRESS NOTES
Diabetes Self-Management Education & Support    Karen Braxton presents today for education related to Type 1 diabetes    Patient is being treated with:  insulin  She is accompanied by self    Year of diagnosis: Around 2002  Referring provider:  Melisa Phillips MD  Living Situation: Lives with .  Has a daughter who is in the health field.   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   Complications from diabetes:  None    PATIENT CONCERNS RELATED TO DIABETES SELF MANAGEMENT:         ASSESSMENT:    Taking Medication:     Current Diabetes Management per Patient:  Taking diabetes medications?   yes:     Diabetes Medication(s)     Biguanides       metFORMIN (GLUCOPHAGE) 1000 MG tablet    Take 0.5 tablets (500 mg) by mouth 2 times daily (with meals)    Insulin       insulin glargine (LANTUS SOLOSTAR) 100 UNIT/ML pen    INJECT 50 UNITS AT BEDTIME     NOVOLOG FLEXPEN 100 UNIT/ML soln    INJECT 32 UNITS BEFORE BREAKFAST, 37 UNITS BEFORE LUNCH, 37 UNITS BEFORE DINNER          Monitoring    Patient glucose self monitoring as follows: continuously using a continuous glucose monitor (CGM)  BG meter: Freestyle Dodie original [facility]                   Patient's most recent   Lab Results   Component Value Date    A1C 7.9% 08/11/2022      Patient's A1C goal: Not stated in Problem List    Activity:   She is on her feet walking and lifting for 8 hours a day, 5 days a week on her job.     Healthy Eating:   She isn't eating much, and states she doesn't have much of an appetite.  Her weight is basically unchanged over the past year.        Problem Solving:      Patient is at risk of hypoglycemia?: YES and Frequency is less than once a month  Hospitalizations for hyper or hypoglycemia: No    Healthy Coping and Stress Management:   Sources of stress identified by patient My health--she has some form of arthritis, she says, and feels pain in her knees, hips, shoulders and  elbows much of the time.      EDUCATION and INSTRUCTION PROVIDED AT THIS VISIT:       Reviewed her daily routine:     Works at the [employer] Wednesday through Sunday from 8:15pm to 4:45am.   She has a 15 minute break about 10 or 10:30pm.  She does not eat anything at this time, just drinks water.   She has a 30 minute break about 12:30am.  She has a handful of grapes and maybe an apple or orange at this time.  She does not cover with any insulin and it's noticeable that her glucose rises significantly at that time.  It takes about 4 hours for her glucose to return to baseline.    She has a 15 minute break again around 2:00am.  She doesn't eat at this time.    She leaves work at 4:45am and returns home.  She has a slice of toast before going to bed, and takes no Novolog but does take Lantus 20 units at this time.     She wakes up at about 12:30pm to 1pm and has some tea.  States that she takes 10-15 units for this and returns to bed.    She sleeps until about 6pm, then gets up to get ready for work.  States she has 2 pieces of toast, and some tea or coffee and takes 20 units of Novolog for this.      I verified these insulin doses with her to make sure I had it right.       She is quite anxious to start using an insulin pump, and we have discussed that it is important that she count carbs accurately.  When questioned today, she was able to correctly state the amount of carbohydrate she is eating, as near as I can tell.  She has had several sessions with Janine Pa.      Explained to Karen that we need to order the Dexcom CGM, as the Dodie will not work with the Omnipod 5.  Also discussed that we may need to have alternative bolus calculator settings for her working shifts than during the day.  She verbalized understanding of this.    We set up an appointment to get the Dexcom G6 started, and orders were sent to [facility].  Since she has never had a RAAD-chris test or a C-Peptide, I ordered  these as well today.  She has lower insulin requirements than I would expect in someone with Type 2 diabetes, as well as a low BMI.      Patient-stated goal written and given to Karen Braxton.  Verbalized and demonstrated understanding of instructions.     PLAN:  See patient instructions  AVS printed and given to patient    FOLLOW-UP:        Time spent with patient at today's visit was 60 minutes.      Any diabetes medication dose changes were made via the CDE Protocol and Collaborative Practice Agreement [facility] [place] [facility].  A copy of this encounter was provided to patient's referring provider.

## 2022-10-19 LAB — C PEPTIDE SERPL-MCNC: 4.8 NG/ML (ref 0.9–6.9)

## 2022-10-20 ENCOUNTER — TELEPHONE (OUTPATIENT)
Dept: ENDOCRINOLOGY | Facility: CLINIC | Age: 52
End: 2022-10-20

## 2022-10-20 DIAGNOSIS — Z79.4 TYPE 2 DIABETES MELLITUS WITHOUT COMPLICATION, WITH LONG-TERM CURRENT USE OF INSULIN (H): Primary | ICD-10-CM

## 2022-10-20 DIAGNOSIS — E11.9 TYPE 2 DIABETES MELLITUS WITHOUT COMPLICATION, WITH LONG-TERM CURRENT USE OF INSULIN (H): Primary | ICD-10-CM

## 2022-10-20 RX ORDER — PROCHLORPERAZINE 25 MG/1
SUPPOSITORY RECTAL
Qty: 1 EACH | Refills: 0 | Status: SHIPPED | OUTPATIENT
Start: 2022-10-20 | End: 2023-11-07

## 2022-10-20 NOTE — TELEPHONE ENCOUNTER
Pt is requesting new medicare part b compliant rx for    dexcom g6   Qty 1  Refill 0  Sig USE TO READ BLOOD GLUCOSE PER MANUFACTURERS INSTRUCTIONS.    Did not see on active med list please verify and send new rx    [place] [facility]  [phone]

## 2022-10-22 LAB — GAD65 AB SER IA-ACNC: <5 IU/ML

## 2022-11-15 ENCOUNTER — ALLIED HEALTH/NURSE VISIT (OUTPATIENT)
Dept: EDUCATION SERVICES | Facility: CLINIC | Age: 52
End: 2022-11-15
Payer: COMMERCIAL

## 2022-11-15 DIAGNOSIS — E11.9 TYPE 2 DIABETES MELLITUS WITHOUT COMPLICATION, WITH LONG-TERM CURRENT USE OF INSULIN (H): Primary | ICD-10-CM

## 2022-11-15 DIAGNOSIS — E11.311 TYPE 2 DIABETES MELLITUS WITH RETINOPATHY OF BOTH EYES AND MACULAR EDEMA, UNSPECIFIED RETINOPATHY SEVERITY, UNSPECIFIED WHETHER LONG TERM INSULIN USE (H): Primary | ICD-10-CM

## 2022-11-15 DIAGNOSIS — Z79.4 TYPE 2 DIABETES MELLITUS WITHOUT COMPLICATION, WITH LONG-TERM CURRENT USE OF INSULIN (H): Primary | ICD-10-CM

## 2022-11-15 PROCEDURE — G0108 DIAB MANAGE TRN  PER INDIV: HCPCS | Performed by: REGISTERED NURSE

## 2022-11-15 NOTE — PATIENT INSTRUCTIONS
TIPS and REMINDERS for using the DEXCOM G6 CONTINUOUS GLUCOSE MONITOR (CGM)    Parts of the System:      SENSOR:  the tiny hollow wire that you are inserting into your subcutaneous tissue.  Lasts for about 10 days.   TRANSMITTER:  The small gray piece that you attach to the sensor.  Lasts for 3 months.  :  The device on which you receive your readings.  This can be your phone, the medical  that came with the device, or your insulin pump.     Every time you insert a new sensor, you will be attaching the same transmitter you used on the last sensor up until the time (3 months) that the transmitter needs to be changed.  When your sensor session ends, you will take the transmitter off the old sensor, clean the contact points with alcohol, and reattach it to your new sensor.      The sensor calibration code is located on the sensor itself.  Remember that you have to put the code into your  when prompted BEFORE you start the sensor session.  If you forget, you will need to check a blood glucose and enter that number into the calibration screen in your  every 12 hours for the remainder of the sensor session (10 days) in order to get readings, as you can't enter the calibration code after the sensor session has started.      The transmitter code is located on the box it came in, and also on the transmitter itself.  This number needs to be entered into the  when you get a new transmitter, every three months.      When the sensor is getting close to the time it needs to be changed, you will be alerted on your  that the sensor will need to be changed soon.  Once the time has exceeded 10 days, the sensor will stop working and you will need to insert a new one in order to keep getting readings.  If your sensor is not lasting for 10 days, call Dexcom Technical Support at [phone] for a replacement sensor.  They may ask for the LOT number of the sensor that failed.  The LOT  number should be on the box the sensors came in.     If you get a TRANSMITTER failure alert, call Dexcom for a replacement as well.     You are receiving your supplies from [facility], phone number [phone].    Make sure that you give them enough lead time (about 7-10 days) to get your sensor/transmitter order put together.  A good rule of thumb is to call your supplier when you open the last sensor in your box or when you get the first alert on your  that your transmitter needs to be changed.      Once your sensor warms up (about 2 hours after you start a new sensor session), you will start getting trend arrows along with a glucose reading.  Remember that the sensor is not measuring blood glucose, instead it is measuring the amount of glucose in your interstitial fluid, so it's not realistic to expect your blood glucose and the sensor glucose readings to be the same.  The expected difference between your blood glucose and your sensor glucose is about 10%.  The readings will be further apart when your glucose is changing rapidly (think after eating or after exercise).  You CAN calibrate to bring your blood glucose and sensor glucose closer together if you discern that there is a large difference WHEN your glucose is fairly stable.  To do this you just check your blood glucose using your meter, and enter this number into the menu option CALIBRATION.  Wait to calibrate until you don't have any straight up or down arrows showing on your sensor trend graph.  It's best to calibrate when your glucose isn't changing much.      WHAT THE TREND ARROWS MEAN:    The trend arrows are helpful in predicting where your sensor glucose will be in the next 30 minutes or so.  The system doesn't have the ability to predict further ahead than about 30 minutes.      Straight across arrow:  Means your sensor glucose is not changing much (less than 1 mg/dL/mn)   Angled arrows up or down:  Means that if nothing  "else changes,  your sensor glucose may rise or fall about 50 mg/dL over the next 30 minutes.   One arrow straight UP or DOWN:  Means that if nothing else changes, your sensor glucose may rise or fall about 75 mg/dL over the next 30 minutes.  Two arrows straight UP or DOWN:  Means that if nothing else changes, your sensor glucose may rise or fall about 100 mg/dL over the next 30 minutes.      The FDA has approved the system for clinical decision making (meaning you can base your insulin correction doses on the Dexcom number without checking your blood glucose with your meter).   Remember that it takes about 24 hours for the sensor to reach peak accuracy, so you should probably be checking your meter glucose for decision making in the first 24 hours, to be safe.  You shouldn't use these numbers for clinical decision making unless you are reasonably sure that the sensor is reading accurately.  It takes a while to trust the system.  If you see a number and no trend arrow, it usually means that your blood glucose is changing very rapidly.    If you see the word \"HI\", it means the sensor glucose is >400 and you need to check your meter glucose in order to correct.  If you see the word \"LOW\" it means that your sensor glucose is under 40.      The Low and High alerts are generally set to prevent a lot of alerts from occurring when you first start using the system.  As you get more used to your patterns, you may want to lower the high alert, which would make you more acutely aware of when your blood glucose is rising or has gotten out of target range.      If you have questions about any messages that you are getting on the  screen, or just need help with something that isn't making sense to you, call the Technical Support Line at [employer]:  [phone].  They are available 24/7 to assist you.      Please let me know if you have any questions or concerns about what you are seeing on your Dexcom.      Melvina Gustafson, " BSN, RN, [facility]  Certified Diabetes Care and   [facility] Endocrinology and Diabetes  [facility]  Clinic 3-661  Phone [phone]

## 2022-11-15 NOTE — PROGRESS NOTES
Diabetes Self-Management Education & Support    Karen Braxton presents today for education related to Type 2 diabetes    Patient is being treated with:  insulin  She is accompanied by self    Year of diagnosis: Around 2002  Referring provider:  Melisa Phillips MD  Living Situation: Lives with .  Has a daughter who is in the health field.   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   Complications from diabetes:  None    PATIENT CONCERNS RELATED TO DIABETES SELF MANAGEMENT:     She has received her Dexcom G6 CGM and is here to receive instruction.       ASSESSMENT:    Taking Medication:     Current Diabetes Management per Patient:  Taking diabetes medications?   yes:     Diabetes Medication(s)     Biguanides       metFORMIN (GLUCOPHAGE) 1000 MG tablet    Take 0.5 tablets (500 mg) by mouth 2 times daily (with meals)    Insulin       insulin glargine (LANTUS SOLOSTAR) 100 UNIT/ML pen    INJECT 50 UNITS AT BEDTIME     NOVOLOG FLEXPEN 100 UNIT/ML soln    INJECT 32 UNITS BEFORE BREAKFAST, 37 UNITS BEFORE LUNCH, 37 UNITS BEFORE DINNER          Monitoring    Patient glucose self monitoring as follows: continuously using a continuous glucose monitor (CGM)  BG meter: Freestyle Dodie 2    Patient's most recent   Lab Results   Component Value Date    A1C 10.1 04/26/2021      Patient's A1C goal: <7.0    EDUCATION and INSTRUCTION PROVIDED AT THIS VISIT:       Patient was instructed in the following skills:    Explanation of difference between blood glucose and sensor glucose.  Sensor glucose will tend to trail behind the blood glucose by a few minutes.    Reviewed the meaning of the rate of change arrows.    Discussed simply using the G6 as a monitoring tool for now, until used to recognizing patterns and cautioned against reacting to higher numbers by administering short acting insulin until there is a better understanding of patterns.   Insertion of  "sensor, technique, angle, site rotation, skin prep use, frequency of change.  Cautioned that no decisions about treatment of hypo- or hyperglycemia can be based on a sensor reading, but must be double checked with a blood glucose until reasonable certain of the sensors accuracy.    Programming settings:  Hi and low alerts, rate alerts, predictive alerts,  out-of-range alerts and fixed low alert of 55 mg/mL.  Patient was guided in putting settings into , i-Phone, or both.   Calibration requirements:  No calibration is required, however if he is starting to notice \"drifting,\" especially when blood glucose is relatively stable, then MAY calibrate with blood glucose.  Instructed not to do this if BG is changing rapidly, or during periods of hypoglycemia.   Basic Care of transmitter and :   Addressed ordering of supplies.  Currently ordering from [facility].   Also given [employer] customer support number for technical problems or to replace bad sensors.     CGM SETTINGS:    Settings:                 Hi Glucose alert:       mg/mL           Low Glucose alert:  ON 80  mg/mL             She felt a little unsure of doing the first sensor change by herself, so we set up an appointment in 8 days to change the sensor.  She requests an  for this visit.  At this visit we will also discuss preparing for the insulin pump.       Patient-stated goal written and given to Karen Braxton.  Verbalized and demonstrated understanding of instructions.     PLAN:  See patient instructions  AVS printed and given to patient    FOLLOW-UP:        Time spent with patient at today's visit was 60 minutes.      Any diabetes medication dose changes were made via the CDE Protocol and Collaborative Practice Agreement with [facility] and  [facility].  A copy of this encounter was provided to patient's referring provider.    "

## 2022-11-18 DIAGNOSIS — Z79.4 CONTROLLED TYPE 2 DIABETES MELLITUS WITH HYPERGLYCEMIA, WITH LONG-TERM CURRENT USE OF INSULIN (H): ICD-10-CM

## 2022-11-18 DIAGNOSIS — E11.65 CONTROLLED TYPE 2 DIABETES MELLITUS WITH HYPERGLYCEMIA, WITH LONG-TERM CURRENT USE OF INSULIN (H): ICD-10-CM

## 2022-11-21 RX ORDER — PEN NEEDLE, DIABETIC 31 GX5/16"
NEEDLE, DISPOSABLE MISCELLANEOUS
Qty: 400 EACH | Refills: 2 | Status: SHIPPED | OUTPATIENT
Start: 2022-11-21

## 2022-11-23 ENCOUNTER — ALLIED HEALTH/NURSE VISIT (OUTPATIENT)
Dept: EDUCATION SERVICES | Facility: CLINIC | Age: 52
End: 2022-11-23
Payer: COMMERCIAL

## 2022-11-23 DIAGNOSIS — E11.65 TYPE 2 DIABETES MELLITUS WITH HYPERGLYCEMIA (H): ICD-10-CM

## 2022-11-23 DIAGNOSIS — E11.9 TYPE 2 DIABETES MELLITUS WITHOUT COMPLICATION, WITH LONG-TERM CURRENT USE OF INSULIN (H): Primary | ICD-10-CM

## 2022-11-23 DIAGNOSIS — Z79.4 TYPE 2 DIABETES MELLITUS WITHOUT COMPLICATION, WITH LONG-TERM CURRENT USE OF INSULIN (H): Primary | ICD-10-CM

## 2022-11-23 PROCEDURE — G0108 DIAB MANAGE TRN  PER INDIV: HCPCS | Performed by: REGISTERED NURSE

## 2022-11-28 ENCOUNTER — OFFICE VISIT (OUTPATIENT)
Dept: OPHTHALMOLOGY | Facility: CLINIC | Age: 52
End: 2022-11-28
Attending: OPHTHALMOLOGY
Payer: COMMERCIAL

## 2022-11-28 DIAGNOSIS — E11.311 TYPE 2 DIABETES MELLITUS WITH RETINOPATHY OF BOTH EYES AND MACULAR EDEMA, UNSPECIFIED RETINOPATHY SEVERITY, UNSPECIFIED WHETHER LONG TERM INSULIN USE (H): ICD-10-CM

## 2022-11-28 DIAGNOSIS — H34.8312 HEMISPHERIC BRANCH RETINAL VEIN OCCLUSION (BRVO) OF RIGHT EYE (H): Primary | ICD-10-CM

## 2022-11-28 PROCEDURE — G0463 HOSPITAL OUTPT CLINIC VISIT: HCPCS | Mod: 25

## 2022-11-28 PROCEDURE — 99213 OFFICE O/P EST LOW 20 MIN: CPT | Performed by: OPHTHALMOLOGY

## 2022-11-28 PROCEDURE — 92134 CPTRZ OPH DX IMG PST SGM RTA: CPT | Performed by: OPHTHALMOLOGY

## 2022-11-28 ASSESSMENT — CUP TO DISC RATIO
OS_RATIO: 0.8
OD_RATIO: 0.8

## 2022-11-28 ASSESSMENT — CONF VISUAL FIELD
OS_INFERIOR_NASAL_RESTRICTION: 0
OS_SUPERIOR_TEMPORAL_RESTRICTION: 0
OD_SUPERIOR_NASAL_RESTRICTION: 0
OS_INFERIOR_TEMPORAL_RESTRICTION: 0
METHOD: COUNTING FINGERS
OS_SUPERIOR_NASAL_RESTRICTION: 0
OD_SUPERIOR_TEMPORAL_RESTRICTION: 0
OD_NORMAL: 1
OD_INFERIOR_TEMPORAL_RESTRICTION: 0
OD_INFERIOR_NASAL_RESTRICTION: 0
OS_NORMAL: 1

## 2022-11-28 ASSESSMENT — VISUAL ACUITY
OD_CC+: -2
OD_PH_CC: 20/30
OD_CC: 20/40
METHOD: SNELLEN - LINEAR
OS_CC: 20/50
CORRECTION_TYPE: GLASSES
OS_CC+: -2
OD_PH_CC+: +2
OS_PH_CC: 20/30

## 2022-11-28 ASSESSMENT — SLIT LAMP EXAM - LIDS
COMMENTS: MGD
COMMENTS: MGD

## 2022-11-28 ASSESSMENT — TONOMETRY
OD_IOP_MMHG: 10
OS_IOP_MMHG: 11
IOP_METHOD: TONOPEN

## 2022-11-28 ASSESSMENT — EXTERNAL EXAM - LEFT EYE: OS_EXAM: NORMAL

## 2022-11-28 ASSESSMENT — EXTERNAL EXAM - RIGHT EYE: OD_EXAM: NORMAL

## 2022-11-28 NOTE — NURSING NOTE
Chief Complaints and History of Present Illnesses   Patient presents with     Follow Up     Chief Complaint(s) and History of Present Illness(es)     Follow Up           Comments    Patient states that her vision is well in both eyes. No pain and irritation. No flashes of light. No floaters.     Ocular Meds:none   BS:100  Lab Results       Component                Value               Date                       A1C                      10.1                04/26/2021                 A1C                      8.9                 12/21/2020                 A1C                      9.1                 09/23/2020                 A1C                      8.6                 11/11/2019                 A1C                      7.7                 06/18/2019              Artemio Mariee COT, November 28, 2022 2:45 PM

## 2022-11-28 NOTE — PROGRESS NOTES
I have confirmed the patient's and reviewed Past Medical History, Past Surgical History, Social History, Family History, Problem List, Medication List and agree with Tech note.    CC: consult for Branch retinal vein occlusion right eye     HPI: patient was diagnosed last month and was started on aspirin 81 mg daily    Imaging:  OCT 11/28/22  -OD with worsening IRF; ->408  -OS with normal contour an no fluid        Assessment/plan:   1.  Branch retinal vein occlusion right eye    - On aspirin to 81 mg twice a day    - OCT shows worsening fluid; VA stable   - Avastin OD #1 today with repeat eval in 4 weeks      2.  Diabetes mellitus mild nonproliferative diabetic retinopathy     Blood glucose control   Last HbA1C is 8.9   Monitor       RTC 4 weeks for repeat OCT OU and dilated exam OD only and potential Avastin #2 OD    Shaista Bean MD PhD.  Professor & Chair

## 2022-11-29 RX ORDER — INSULIN GLARGINE 100 [IU]/ML
INJECTION, SOLUTION SUBCUTANEOUS
Qty: 45 ML | Refills: 3 | Status: SHIPPED | OUTPATIENT
Start: 2022-11-29 | End: 2023-11-07

## 2022-11-29 NOTE — PROGRESS NOTES
Diabetes Self-Management Education & Support    Karen Braxton presents today for education related to Type 2 diabetes    Patient is being treated with:  insulin  She is accompanied by self    Year of diagnosis: Around 2002  Referring provider:  Melisa Phillips MD  Living Situation: Lives with .  Has a daughter who is in the health field.   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   Complications from diabetes:  None    PATIENT CONCERNS RELATED TO DIABETES SELF MANAGEMENT: Started the Dexcom sensor about 10 days ago, and is here today to connect her sensor with her phone stephanie, and get assistance with replacing the sensor.        ASSESSMENT:    Taking Medication:     Current Diabetes Management per Patient:  Taking diabetes medications?   yes:     Diabetes Medication(s)     Biguanides       metFORMIN (GLUCOPHAGE) 1000 MG tablet    Take 0.5 tablets (500 mg) by mouth 2 times daily (with meals)    Insulin       insulin glargine (LANTUS SOLOSTAR) 100 UNIT/ML pen    INJECT 50 UNITS AT BEDTIME     NOVOLOG FLEXPEN 100 UNIT/ML soln    INJECT 32 UNITS BEFORE BREAKFAST, 37 UNITS BEFORE LUNCH, 37 UNITS BEFORE DINNER          Monitoring    Patient glucose self monitoring as follows: continuously using a continuous glucose monitor (CGM)  BG meter: Dexcom       Patient's most recent   Lab Results   Component Value Date    A1C 10.1 04/26/2021      Patient's A1C goal: <7.0            EDUCATION and INSTRUCTION PROVIDED AT THIS VISIT:       Karen is hoping to get started on an Omnipod 5 insulin pump soon.  It was necessary to get her set up with the Dexcom CGM prior to introducing an insulin pump.      We set her up with the phone stephanie for Dexcom G6 and Dexcom Clarity.  Walked her through the set up using the tutorials in the stephanie, and also showed her how to access these videos when she is ready to change out the sensor in 10 days.  She was under the mistaken  impression that the sensor was somehow also delivering insulin.  Reinforced that she needs to continue to give herself insulin as prescribed until we are actually ready to start the insulin pump.  I explained that another visit with an  prior to starting the insulin pump will be required.  I want to make sure she has a clear understanding of how wearing an insulin is going to work.      Pre-pump appointment TBD.  Meanwhile she needs a prescription for glucose meter testing supplies.  Prescription sent.      Patient-stated goal written and given to Karen Braxton.  Verbalized and demonstrated understanding of instructions.     PLAN:  See patient instructions  AVS printed and given to patient    FOLLOW-UP:      Follow up appointment in 2-3 weeks.    Time spent with patient at today's visit was 30 minutes.      Any diabetes medication dose changes were made via the CDE Protocol and Collaborative Practice Agreement with [facility].  A copy of this encounter was provided to patient's referring provider.

## 2022-12-08 NOTE — PROGRESS NOTES
Outcome for 12/08/22 10:02 AM :BG data will be sent to provider on 12/12.  Mellisa Howell  Outcome for 12/12/22 10:58 AM :Glucose sent via Email   Mellisa Howell    This 51 year old woman with type 2 diabetes since ~2002 was seen for f/u.She was seen without an  today.  She doesn't think she needs one.  She has transferred her primary care from [facility] to [facility].. Her diabetes is co managed by me with Micaela Juarez and Clotilde Gustafson.  Karen is now using a dexcom and is planning to start to use a omnipod 5.  She says she has been off lantus for the last month and is only taking novolog 7-8 units with the meal she eats at home before she goes to work at 1 pm and the same dose for the meal she eats when she comes home at 5 AM. She says a doctor told her to stop lantus but she isn't sure who that was.  She was taking 50 units a day. She works at [employer] and is very busy at work.  She looks at her CGM mid way thru her shift and drinks juice if she is low. She then has a sandwich.  She doesn't take insulin for this.  She checks her CGM again before she drives home and drinks juice if she is low.  She hasn't had anyone else help her with a low BG.  She has lost a lot of weight with effort over the last many months and estimates she has lost 30 lbs.  With the weight loss she stopped pioglitazone.  She is on metformin still.    She has a branched vein occlusion in her eye and sees Dr. Linares for this.  She will see him again soon. She doesn't have retinopathy. She denies paresthesias, CP SOB.  She does have joint pain in knees, hips, arms.                         Her med list is likely not accurate. She is n't taking losartan for sure and maybe not the atorvastatin,    Current Outpatient Medications   Medication     atorvastatin (LIPITOR) 40 MG tablet     blood glucose (NO BRAND SPECIFIED) lancets standard     blood glucose (NO BRAND SPECIFIED) test strip     blood glucose monitoring (NO BRAND SPECIFIED) meter  "device kit     Continuous Blood Gluc  (DEXCOM G6 ) DENNISE     Continuous Blood Gluc Sensor (DEXCOM G6 SENSOR) MISC     Continuous Blood Gluc Transmit (DEXCOM G6 TRANSMITTER) MISC     glucosamine-chondroitin 500-400 MG CAPS per capsule     insulin glargine (LANTUS SOLOSTAR) 100 UNIT/ML pen     insulin pen needle (B-D U/F) 31G X 8 MM miscellaneous     insulin syringe-needle U-100 (BD INSULIN SYRINGE ULTRAFINE) 30G X 1/2\" 1 ML     losartan (COZAAR) 25 MG tablet     metFORMIN (GLUCOPHAGE) 1000 MG tablet     NOVOLOG FLEXPEN 100 UNIT/ML soln     prednisoLONE acetate (PRED FORTE) 1 % ophthalmic suspension     vitamin D2 (ERGOCALCIFEROL) 42456 units (1250 mcg) capsule     vitamin D3 (CHOLECALCIFEROL) 50 mcg (2000 units) tablet     Current Facility-Administered Medications   Medication     bevacizumab (AVASTIN) intravitreal inj 1.25 mg         /76 (BP Location: Right arm, Patient Position: Sitting, Cuff Size: Adult Regular)   Pulse 80   Wt 58.1 kg (128 lb)   LMP  (LMP Unknown)   BMI 20.98 kg/m       VS'S  NAD  Eyes - no periorbital edema, conjunctival injection, scleral icterus  CV - RR.  Normal pulses in feet.  No edema  Neuro - sensation intact to monofilament on soles of feet.  DTR 2/4 biceps  Skin - normal texture   Feet - no ulcers       Recent Labs   Lab Test 12/13/22  1442 08/11/22  1524 03/22/22  1420 09/24/21  1523 09/24/21  1522 09/14/21  0815 09/14/21  0724 04/26/21  0730 12/21/20  0653 09/23/20  1359 11/11/19  0817 03/16/18  1212 03/16/18  1211 03/21/16  1039 03/21/16  1030   A1C  --   --   --   --   --   --   --  10.1* 8.9* 9.1* 8.6*   < > 8.5*   < > 8.1*   HEMOGLOBINA1 7.9* 7.9   < >  --   --   --    < >  --   --   --   --    < >  --    < >  --    TSH  --   --   --   --   --   --   --   --   --   --   --   --  2.12  --  1.31   LDL  --   --   --  55  --   --   --   --   --  135*  --    < > 149*   < > 142*   HDL  --   --   --  48*  --   --   --   --   --  47*  --    < > 43*   < > 44* "   TRIG  --   --   --  232*  --   --   --   --   --  160*  --    < > 183*   < > 223*   CR  --   --   --   --  0.67  --   --   --   --  0.82 0.64   < > 0.67   < > 0.70   MICROL  --   --   --   --   --  6  --   --   --   --  <5   < >  --    < >  --     < > = values in this interval not displayed.     Assessment and plan:    1.  Diabetes control.  She is doing much better with a CGM and her AC is the closest to target it has been in a long time.  Her BG look very erratic with lows 2 hours after she takes novolog.  I think she needs to go back on lantus (recommended 20 units) and to reduce novolog to 2-4 units with meals.  She is on metformin but no there med but insulin.      2.  Diabetes complications.  She has no retinopathy and is aw/O paresthesias. Will check renal function today.    3. Hyperlipidemia.  On statin her LDL is well controlled.  I urged her to take in.    4.  Hypertension. BP is OK today apparently off losartan.  Will follow.       F/u with Micaela Juarez in 3 mo and f/u with me in 6 mo    I spent 20 min with the patient at the visit today.  On day of visit I spent an additional 20 min reviewing CGM, ordering labs, doing documentation.    Melisa Phillips MD

## 2022-12-12 ENCOUNTER — TELEPHONE (OUTPATIENT)
Dept: ENDOCRINOLOGY | Facility: CLINIC | Age: 52
End: 2022-12-12

## 2022-12-13 ENCOUNTER — OFFICE VISIT (OUTPATIENT)
Dept: ENDOCRINOLOGY | Facility: CLINIC | Age: 52
End: 2022-12-13
Payer: COMMERCIAL

## 2022-12-13 VITALS
BODY MASS INDEX: 20.98 KG/M2 | WEIGHT: 128 LBS | HEART RATE: 80 BPM | DIASTOLIC BLOOD PRESSURE: 76 MMHG | SYSTOLIC BLOOD PRESSURE: 132 MMHG

## 2022-12-13 DIAGNOSIS — Z79.4 CONTROLLED TYPE 2 DIABETES MELLITUS WITH HYPERGLYCEMIA, WITH LONG-TERM CURRENT USE OF INSULIN (H): Primary | ICD-10-CM

## 2022-12-13 DIAGNOSIS — E11.65 CONTROLLED TYPE 2 DIABETES MELLITUS WITH HYPERGLYCEMIA, WITH LONG-TERM CURRENT USE OF INSULIN (H): Primary | ICD-10-CM

## 2022-12-13 LAB — HBA1C MFR BLD: 7.9 % (ref 4.3–?)

## 2022-12-13 PROCEDURE — 83036 HEMOGLOBIN GLYCOSYLATED A1C: CPT | Performed by: INTERNAL MEDICINE

## 2022-12-13 PROCEDURE — 99215 OFFICE O/P EST HI 40 MIN: CPT | Performed by: INTERNAL MEDICINE

## 2022-12-13 RX ORDER — SODIUM PHOSPHATE,MONO-DIBASIC 19G-7G/118
1 ENEMA (ML) RECTAL DAILY
COMMUNITY
End: 2023-11-07

## 2022-12-13 NOTE — LETTER
Date:December 14, 2022      Patient was self referred, no letter generated. Do not send.        [facility] Health Information

## 2022-12-13 NOTE — LETTER
12/13/2022       RE: Karen Braxton  [address]  [place] [address]     Dear Colleague,    Thank you for referring your patient, Karen Braxton, to the [facility] [place] at [facility]. Please see a copy of my visit note below.    Outcome for 12/08/22 10:02 AM :BG data will be sent to provider on 12/12.  Mellisa Howell  Outcome for 12/12/22 10:58 AM :Glucose sent via Email   Mellisa Howell    This 51 year old woman with type 2 diabetes since ~2002 was seen for f/u.She was seen without an  today.  She doesn't think she needs one.  She has transferred her primary care from [facility] to [facility].. Her diabetes is co managed by me with Micaela Juarez and Clotilde Gustafson.  Karen is now using a dexcom and is planning to start to use a omnipod 5.  She says she has been off lantus for the last month and is only taking novolog 7-8 units with the meal she eats at home before she goes to work at 1 pm and the same dose for the meal she eats when she comes home at 5 AM. She says a doctor told her to stop lantus but she isn't sure who that was.  She was taking 50 units a day. She works at [employer] and is very busy at work.  She looks at her CGM mid way thru her shift and drinks juice if she is low. She then has a sandwich.  She doesn't take insulin for this.  She checks her CGM again before she drives home and drinks juice if she is low.  She hasn't had anyone else help her with a low BG.  She has lost a lot of weight with effort over the last many months and estimates she has lost 30 lbs.  With the weight loss she stopped pioglitazone.  She is on metformin still.    She has a branched vein occlusion in her eye and sees Dr. Linares for this.  She will see him again soon. She doesn't have retinopathy. She denies paresthesias, CP SOB.  She does have joint pain in knees, hips, arms.                         Her med list is likely not accurate.  "She is n't taking losartan for sure and maybe not the atorvastatin,    Current Outpatient Medications   Medication     atorvastatin (LIPITOR) 40 MG tablet     blood glucose (NO BRAND SPECIFIED) lancets standard     blood glucose (NO BRAND SPECIFIED) test strip     blood glucose monitoring (NO BRAND SPECIFIED) meter device kit     Continuous Blood Gluc  (DEXCOM G6 ) DENNISE     Continuous Blood Gluc Sensor (DEXCOM G6 SENSOR) MISC     Continuous Blood Gluc Transmit (DEXCOM G6 TRANSMITTER) MISC     glucosamine-chondroitin 500-400 MG CAPS per capsule     insulin glargine (LANTUS SOLOSTAR) 100 UNIT/ML pen     insulin pen needle (B-D U/F) 31G X 8 MM miscellaneous     insulin syringe-needle U-100 (BD INSULIN SYRINGE ULTRAFINE) 30G X 1/2\" 1 ML     losartan (COZAAR) 25 MG tablet     metFORMIN (GLUCOPHAGE) 1000 MG tablet     NOVOLOG FLEXPEN 100 UNIT/ML soln     prednisoLONE acetate (PRED FORTE) 1 % ophthalmic suspension     vitamin D2 (ERGOCALCIFEROL) 60744 units (1250 mcg) capsule     vitamin D3 (CHOLECALCIFEROL) 50 mcg (2000 units) tablet     Current Facility-Administered Medications   Medication     bevacizumab (AVASTIN) intravitreal inj 1.25 mg         /76 (BP Location: Right arm, Patient Position: Sitting, Cuff Size: Adult Regular)   Pulse 80   Wt 58.1 kg (128 lb)   LMP  (LMP Unknown)   BMI 20.98 kg/m       VS'S  NAD  Eyes - no periorbital edema, conjunctival injection, scleral icterus  CV - RR.  Normal pulses in feet.  No edema  Neuro - sensation intact to monofilament on soles of feet.  DTR 2/4 biceps  Skin - normal texture   Feet - no ulcers       Recent Labs   Lab Test 12/13/22  1442 08/11/22  1524 03/22/22  1420 09/24/21  1523 09/24/21  1522 09/14/21  0815 09/14/21  0724 04/26/21  0730 12/21/20  0653 09/23/20  1359 11/11/19  0817 03/16/18  1212 03/16/18  1211 03/21/16  1039 03/21/16  1030   A1C  --   --   --   --   --   --   --  10.1* 8.9* 9.1* 8.6*   < > 8.5*   < > 8.1*   HEMOGLOBINA1 7.9* " 7.9   < >  --   --   --    < >  --   --   --   --    < >  --    < >  --    TSH  --   --   --   --   --   --   --   --   --   --   --   --  2.12  --  1.31   LDL  --   --   --  55  --   --   --   --   --  135*  --    < > 149*   < > 142*   HDL  --   --   --  48*  --   --   --   --   --  47*  --    < > 43*   < > 44*   TRIG  --   --   --  232*  --   --   --   --   --  160*  --    < > 183*   < > 223*   CR  --   --   --   --  0.67  --   --   --   --  0.82 0.64   < > 0.67   < > 0.70   MICROL  --   --   --   --   --  6  --   --   --   --  <5   < >  --    < >  --     < > = values in this interval not displayed.     Assessment and plan:    1.  Diabetes control.  She is doing much better with a CGM and her AC is the closest to target it has been in a long time.  Her BG look very erratic with lows 2 hours after she takes novolog.  I think she needs to go back on lantus (recommended 20 units) and to reduce novolog to 2-4 units with meals.  She is on metformin but no there med but insulin.      2.  Diabetes complications.  She has no retinopathy and is aw/O paresthesias. Will check renal function today.    3. Hyperlipidemia.  On statin her LDL is well controlled.  I urged her to take in.    4.  Hypertension. BP is OK today apparently off losartan.  Will follow.       F/u with Micaela Juarez in 3 mo and f/u with me in 6 mo    I spent 20 min with the patient at the visit today.  On day of visit I spent an additional 20 min reviewing CGM, ordering labs, doing documentation.    Melisa Phillips MD              Again, thank you for allowing me to participate in the care of your patient.      Sincerely,    Melisa Phillips MD

## 2022-12-13 NOTE — PATIENT INSTRUCTIONS
Take lantus 20 units once a day    Take novolog 2-4 units with your meals, depending on how many carbs you plan to eat.    Have your labs done today.

## 2022-12-13 NOTE — NURSING NOTE
Chief Complaint   Patient presents with     Diabetes     Arthritis in joints     Blood pressure 132/76, pulse 80, weight 58.1 kg (128 lb), not currently breastfeeding.    Mellisa Howell

## 2022-12-16 ENCOUNTER — ALLIED HEALTH/NURSE VISIT (OUTPATIENT)
Dept: EDUCATION SERVICES | Facility: CLINIC | Age: 52
End: 2022-12-16
Payer: COMMERCIAL

## 2022-12-16 DIAGNOSIS — Z79.4 TYPE 2 DIABETES MELLITUS WITHOUT COMPLICATION, WITH LONG-TERM CURRENT USE OF INSULIN (H): Primary | ICD-10-CM

## 2022-12-16 DIAGNOSIS — E11.9 TYPE 2 DIABETES MELLITUS WITHOUT COMPLICATION, WITH LONG-TERM CURRENT USE OF INSULIN (H): Primary | ICD-10-CM

## 2022-12-16 PROCEDURE — G0108 DIAB MANAGE TRN  PER INDIV: HCPCS | Performed by: REGISTERED NURSE

## 2022-12-16 NOTE — PROGRESS NOTES
Diabetes Self-Management Education & Support    Karen Braxton presents today for education related to Type 2 diabetes    Patient is being treated with:  insulin  She is accompanied by self    Year of diagnosis: Around 2002  Referring provider:  Melisa Phillips MD  Living Situation: Lives with .  Has a daughter who is in the health field.   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   Complications from diabetes:  None    PATIENT CONCERNS RELATED TO DIABETES SELF MANAGEMENT:      Here for assistance with getting her Dexcom sensor replaced.    She has been getting some help from her daughter, but wants to go through it again with me.    ASSESSMENT:    Taking Medication:     Current Diabetes Management per Patient:  Taking diabetes medications?   yes:     Diabetes Medication(s)     Biguanides       metFORMIN (GLUCOPHAGE) 1000 MG tablet    Take 0.5 tablets (500 mg) by mouth 2 times daily (with meals)    Insulin       insulin glargine (LANTUS SOLOSTAR) 100 UNIT/ML pen    INJECT 20 UNITS AT BEDTIME     NOVOLOG FLEXPEN 100 UNIT/ML soln    INJECT 32 UNITS BEFORE BREAKFAST, 37 UNITS BEFORE LUNCH, 37 UNITS BEFORE DINNER          Monitoring    Patient glucose self monitoring as follows: continuously using a continuous glucose monitor (CGM)  BG meter: Dexcom     Patient's most recent   Lab Results   Component Value Date    A1C 10.1 04/26/2021      Patient's A1C goal: <7.0    Activity: 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       EDUCATION and INSTRUCTION PROVIDED AT THIS VISIT:       Assisted with the process of terminating her current sensor session and starting another.   Reviewed how to put the sensor code into her phone stephanie, and assisted with the process of inserting a sensor, attaching sensor and starting a new sensor.    I wrote the instructions out step by step for her and her daughter.      Karen was concerned about  traveling.  She states that she is not going to be traveling soon, but would like a letter to carry with her.  Written and given to her.      She indicates that after her most recent visit with Dr. Phillips, she is no longer interested in getting on an insulin pump.  She doesn't feel that she needs it.  She likes her Dexcom CGM very much.  Back on Lantus insulin and that is about all she wants to do.      Patient-stated goal written and given to Karen Braxton.  Verbalized and demonstrated understanding of instructions.     PLAN:  See patient instructions  AVS printed and given to patient    FOLLOW-UP:        Time spent with patient at today's visit was 45 minutes.      Any diabetes medication dose changes were made via the CDE Protocol and Collaborative Practice Agreement with [facility].  A copy of this encounter was provided to patient's referring provider.

## 2022-12-16 NOTE — PATIENT INSTRUCTIONS
"INSTRUCTIONS TO START A NEW SENSOR:    The sensor should last for 10 days.    If you change the sensor out before the stephanie tells you it is , you need to go into the menu and at the very bottom, select Stop Sensor.  If you try to start a new sensor before you stop the old sensor, it will give you an error message and your new sensor will not work.  If Stop Sensor is not highlighted, it means that the sensor session has already ended and you can go ahead and start your new sensor session.     On the Home Screen, select New Sensor  Then you need to go through the screens until you see a screen that asks you to put in the Sensor code.  You have to select Enter Code first--this will take you to the next screen that has either Take Photo or Enter manually.  If you select Take Photo, you need to have the camera focused on the little square QR code.  It will take the photo automatically.  If you are having trouble with this, then select Enter Manually and put in the four digit code on your sensor.      The next screen takes you to the video about inserting your sensor.  If you have forgotten the steps in doing this, please watch the video.  It will walk you through it.      Once the sensor is in and the transmitter is attached, you should select \"Start Sensor\"  This will start the 10 day sensor session.  You will always have a 2 hour warm up before the sensor starts reading again.      At the end of 3 months (around Feb 10-15) your transmitter will start to die.  (The transmitter is the little gray piece).  When you get your new transmitter you will need to enter the 6 digit transmitter ID (starts with an 8 and should be on the box the transmitter came in, and also on the back of the new transmitter).  Go to Settings on your stephanie, then select Transmitter, then select Pair New.  That will bring you to a screen to enter the new transmitter ID.  Do this before you put in a new sensor.     You can wear the sensor on the " "back of your arm, or on your abdomen, wherever you have some fat.  Make sure you don't give insulin injections too closely to where the sensor is.  You should keep at least 2-3 inches between any injection and your sensor.  Likewise, avoid any areas where you have scarring, like from a surgery or your belly button.  Avoid areas where you are going to lay on it, or it would get compressed from a seat belt or a waistband.      If something is pushing on the sensor it can cause a \"compression low\" meaning the sensor will read lower than what your actual glucose is, because it is being compressed.  Keep this in mind if you are getting low readings.      Traveling:      When traveling by plane, have all of your diabetes supplies in one bag--your pens, needles, glucose meter, and extra Dexcom sensors in one bag.  This should not go through the Xray machine as it can damage the sensors.  Makes sure you have removed any needles from an insulin pen, as these could injure someone if they opened up the pen to look at it.  If you are wearing a Dexcom sensor, you should not go through the body scanner as it can damage the transmitter or the sensor, making it read inaccurately.      Take your letter with you and show to TSA agents.            "

## 2023-03-07 ENCOUNTER — TELEPHONE (OUTPATIENT)
Dept: ENDOCRINOLOGY | Facility: CLINIC | Age: 53
End: 2023-03-07
Payer: COMMERCIAL

## 2023-03-07 DIAGNOSIS — Z79.4 TYPE 2 DIABETES MELLITUS WITHOUT COMPLICATION, WITH LONG-TERM CURRENT USE OF INSULIN (H): ICD-10-CM

## 2023-03-07 DIAGNOSIS — E11.9 TYPE 2 DIABETES MELLITUS WITHOUT COMPLICATION, WITH LONG-TERM CURRENT USE OF INSULIN (H): ICD-10-CM

## 2023-03-07 RX ORDER — INSULIN ASPART 100 [IU]/ML
INJECTION, SOLUTION INTRAVENOUS; SUBCUTANEOUS
Qty: 90 ML | Refills: 2 | Status: SHIPPED | OUTPATIENT
Start: 2023-03-07 | End: 2023-03-14

## 2023-03-07 NOTE — TELEPHONE ENCOUNTER
Patient has an appointment with Dr Graff 11/20/19.  Message left on patient's voicemail (through an ) stating post-op concerns would be discussed at that appointment.   Routed to Dr. Mosqueda- please advise  Patient received sodium iodide I-131 (THERAPEUTIC) capsule(s) 58.7 millicurie 3/2     7 day post scan scheduled 3/9

## 2023-03-07 NOTE — TELEPHONE ENCOUNTER
Refills for Novolog flexpen sent . Last saw Dr Phillips 12/2022. Tabatha Delgado RN on 3/7/2023 at 12:46 PM

## 2023-03-07 NOTE — TELEPHONE ENCOUNTER
[facility] Call Center    Phone Message    May a detailed message be left on voicemail: yes     Reason for Call: Medication Refill Request    Has the patient contacted the pharmacy for the refill? Yes     Name of medication being requested:   * NOVOLOG FLEXPEN 100 UNIT/ML soln    Provider who prescribed the medication: Prabhjot    Pharmacy: [id number] - [place], [place] - [address]     Date medication is needed: 3/7/2023     Out of Medication, Needing ASAP     Action Taken: Message routed to:  [facility] ([facility] Endo    Travel Screening: Not Applicable

## 2023-03-10 NOTE — PROGRESS NOTES
Patient is showing 4/5 MNCM met. Current tobacco use   Mellisa Howell, VF   Outcome for 03/10/23 3:55 PM :Dexcom data will be put in Notes on 3/13.  Mellisa Howell  BG data obtained via CGM website.

## 2023-03-13 ENCOUNTER — TELEPHONE (OUTPATIENT)
Dept: ENDOCRINOLOGY | Facility: CLINIC | Age: 53
End: 2023-03-13
Payer: COMMERCIAL

## 2023-03-14 ENCOUNTER — OFFICE VISIT (OUTPATIENT)
Dept: ENDOCRINOLOGY | Facility: CLINIC | Age: 53
End: 2023-03-14
Payer: COMMERCIAL

## 2023-03-14 VITALS
DIASTOLIC BLOOD PRESSURE: 77 MMHG | SYSTOLIC BLOOD PRESSURE: 130 MMHG | WEIGHT: 128 LBS | OXYGEN SATURATION: 97 % | BODY MASS INDEX: 20.98 KG/M2 | HEART RATE: 85 BPM

## 2023-03-14 DIAGNOSIS — E11.65 TYPE 2 DIABETES MELLITUS WITH HYPERGLYCEMIA, WITHOUT LONG-TERM CURRENT USE OF INSULIN (H): ICD-10-CM

## 2023-03-14 DIAGNOSIS — Z79.4 TYPE 2 DIABETES MELLITUS WITHOUT COMPLICATION, WITH LONG-TERM CURRENT USE OF INSULIN (H): Primary | ICD-10-CM

## 2023-03-14 DIAGNOSIS — E11.9 TYPE 2 DIABETES MELLITUS WITHOUT COMPLICATION, WITH LONG-TERM CURRENT USE OF INSULIN (H): Primary | ICD-10-CM

## 2023-03-14 PROCEDURE — 99215 OFFICE O/P EST HI 40 MIN: CPT | Performed by: PHYSICIAN ASSISTANT

## 2023-03-14 RX ORDER — INSULIN ASPART 100 [IU]/ML
INJECTION, SOLUTION INTRAVENOUS; SUBCUTANEOUS
Qty: 90 ML | Refills: 3 | Status: SHIPPED | OUTPATIENT
Start: 2023-03-14 | End: 2023-11-07

## 2023-03-14 ASSESSMENT — PAIN SCALES - GENERAL: PAINLEVEL: NO PAIN (0)

## 2023-03-14 NOTE — PROGRESS NOTES
HPI:  Irais Jones is a 52 year old female with type 2 diabetes mellitus.  Pt being seen today in clinic for diabetes follow up.  She was dx having type 2 diabetes around 2002.  Her diabetes is complicated by background retinopathy. No known nephropathy or neuropathy.  Her hx is significant for HTN, hyperlipidemia and Vit D deficiency.  For her diabetes, she is prescribed to take Lantus 20 units SQ in am and Novolog 4 units with meals.  She stopped taking Jardiance stating it made her stomach hurt.  She has been without Metformin for > 1 month. Needs a refill which was done today.  Pt did not tolerate Bydureon in the past - GI side effects.  Pt's A1C is 8.5 % today. Her previous A1C was 7.9 %.  I reviewed her DexcomG6 sensor download data today.  Her blood sugars are higher. She has missed some Novolog doses and has been without Metformin for > 1 month.  On ROS today, she looks good today.  Karen is working the night shift at [employer] 5 days per week.  Pt denies blurred vision, n/v, SOB at rest, cough or fevers.  Pt denies chest pain, abd pain, diarrhea, dysuria, hematuria or sx of neuropathy.  No foot ulcers on exam today.    DIABETES CARE:  Retinopathy: none; seen by Oph in May 2022 with background retinopathy.  Nephropathy: none; urine microalbuminuria was negative in 9/2021. She is taking Cozaar.  Neuropathy: none.  Foot exam: no ulcers and normal monofilamentous exam today.  Lipids: LDL 55 in 9/2021. Taking Lipitor.  Taking ASA:yes.  CAD:no.  Mental health:denies depression.  Insulin: Basal and meal time insulin. DM meds:  Metformin- needs refills- done today.  Testing: DexcomG6 sensor.      ROS:   Please see under HPI.    ALLERGIES:   No Known Allergies      Current Outpatient Medications   Medication Sig Dispense Refill     atorvastatin (LIPITOR) 40 MG tablet Take 1 tablet (40 mg) by mouth daily (Patient taking differently: Take 20 mg by mouth every morning) 90 tablet 3     blood glucose (NO BRAND SPECIFIED)  "lancets standard Use to test blood sugar 4 times daily or as directed. 100 each 2     blood glucose (NO BRAND SPECIFIED) test strip Use to test blood sugar 4 times daily or as directed. 100 strip 3     blood glucose monitoring (NO BRAND SPECIFIED) meter device kit Use to test blood sugar 4 times daily. 1 kit 0     Continuous Blood Gluc  (DEXCOM G6 ) DENNISE Use to read blood sugars as per 's instructions. 1 each 0     Continuous Blood Gluc Sensor (DEXCOM G6 SENSOR) MISC Change every 10 days. 9 each 1     Continuous Blood Gluc Transmit (DEXCOM G6 TRANSMITTER) MISC Change every 3 months. 1 each 3     glucosamine-chondroitin 500-400 MG CAPS per capsule Take 1 capsule by mouth daily       insulin glargine (LANTUS SOLOSTAR) 100 UNIT/ML pen INJECT 20 UNITS AT BEDTIME 45 mL 3     insulin pen needle (B-D U/F) 31G X 8 MM miscellaneous USE TO INJECT 4 TIMES A DAY OR AS DIRECTED 400 each 2     insulin syringe-needle U-100 (BD INSULIN SYRINGE ULTRAFINE) 30G X 1/2\" 1 ML Use one syringe daily or as directed.  3 month supply 100 each prn     losartan (COZAAR) 25 MG tablet Take 25 mg by mouth daily       metFORMIN (GLUCOPHAGE) 1000 MG tablet Take 0.5 tablets (500 mg) by mouth 2 times daily (with meals) 90 tablet 1     NOVOLOG FLEXPEN 100 UNIT/ML soln Inject 4 units with meals and correction. Pt uses approx 15 units daily. 90 mL 3     prednisoLONE acetate (PRED FORTE) 1 % ophthalmic suspension Place 1 drop Into the left eye 3 times daily 15 mL 0     vitamin D2 (ERGOCALCIFEROL) 64395 units (1250 mcg) capsule Take 1 capsule (50,000 Units) by mouth every 7 days 8 capsule 6     vitamin D3 (CHOLECALCIFEROL) 50 mcg (2000 units) tablet Take 1 tablet (50 mcg) by mouth daily (Patient taking differently: Take 1 tablet by mouth daily 50,000 Units) 90 tablet 3     SHX:  Smoke: yes.  ETOH: none.   with 5 children.    FHX:  Several family members with diabetes.    PMHX:   1.  Type 2 DM.  2.  Hyperlipidemia.  3.  " Amenorrhea.  4.  GERD.  5.  S/P choley.  6.  Sebaceous cyst.  Past Medical History:   Diagnosis Date     Cigarette nicotine dependence without complication      Combined forms of age-related cataract of both eyes      Type 2 diabetes mellitus (H)      Past Surgical History:   Procedure Laterality Date     CATARACT IOL, RT/LT       KNEE SURGERY Left 2001     LAPAROSCOPIC CHOLECYSTECTOMY  2007     PHACOEMULSIFICATION WITH STANDARD INTRAOCULAR LENS IMPLANT Right 01/13/2022    Procedure: PHACOEMULSIFICATION, COMPLEX CATARACT, WITH STANDARD INTRAOCULAR LENS IMPLANT INSERTION right;  Surgeon: Cony Scruggs MD;  Location: [facility]     PHACOEMULSIFICATION WITH STANDARD INTRAOCULAR LENS IMPLANT Left 03/17/2022    Procedure: COMPLEX PHACOEMULSIFICATION, CATARACT, WITH STANDARD INTRAOCULAR LENS IMPLANT INSERTION LEFT;  Surgeon: Cony Scruggs MD;  Location: [facility] OR     REPAIR CLEFT PALATE CHILD  1972       EXAM:    /77 (BP Location: Right arm, Patient Position: Sitting, Cuff Size: Adult Regular)   Pulse 85   Wt 58.1 kg (128 lb)   LMP  (LMP Unknown)   SpO2 97%   BMI 20.98 kg/m      FEET: No ulcers; normal monofilamentous exam.      RESULTS:    Creatinine   Date Value Ref Range Status   09/24/2021 0.67 0.52 - 1.04 mg/dL Final   09/23/2020 0.82 0.52 - 1.04 mg/dL Final     GFR Estimate   Date Value Ref Range Status   09/24/2021 >90 >60 mL/min/1.73m2 Final     Comment:     As of July 11, 2021, eGFR is calculated by the CKD-EPI creatinine equation, without race adjustment. eGFR can be influenced by muscle mass, exercise, and diet. The reported eGFR is an estimation only and is only applicable if the renal function is stable.   09/23/2020 82 >60 mL/min/[1.73_m2] Final     Comment:     Non  GFR Calc  Starting 12/18/2018, serum creatinine based estimated GFR (eGFR) will be   calculated using the Chronic Kidney Disease Epidemiology Collaboration   (CKD-EPI) equation.       Hemoglobin A1C   Date Value Ref  Range Status   04/26/2021 10.1 (H) 0 - 5.6 % Final     Comment:     Normal <5.7% Prediabetes 5.7-6.4%  Diabetes 6.5% or higher - adopted from ADA   consensus guidelines.       Potassium   Date Value Ref Range Status   09/24/2021 4.0 3.4 - 5.3 mmol/L Final   09/23/2020 4.0 3.4 - 5.3 mmol/L Final     ALT   Date Value Ref Range Status   09/24/2021 20 0 - 50 U/L Final   09/23/2020 22 0 - 50 U/L Final     AST   Date Value Ref Range Status   09/24/2021 11 0 - 45 U/L Final   09/23/2020 12 0 - 45 U/L Final     TSH   Date Value Ref Range Status   03/16/2018 2.12 0.40 - 4.00 mU/L Final     T4 Free   Date Value Ref Range Status   08/04/2011 1.22 0.70 - 1.85 ng/dL Final         Cholesterol   Date Value Ref Range Status   09/24/2021 149 <200 mg/dL Final   09/23/2020 214 (H) <200 mg/dL Final     Comment:     Desirable:       <200 mg/dl   06/18/2019 250 (H) <200 mg/dL Final     Comment:     Desirable:       <200 mg/dl     HDL Cholesterol   Date Value Ref Range Status   09/23/2020 47 (L) >49 mg/dL Final   06/18/2019 54 >49 mg/dL Final     Direct Measure HDL   Date Value Ref Range Status   09/24/2021 48 (L) >=50 mg/dL Final     LDL Cholesterol Calculated   Date Value Ref Range Status   09/24/2021 55 <=100 mg/dL Final   09/23/2020 135 (H) <100 mg/dL Final     Comment:     Above desirable:  100-129 mg/dl  Borderline High:  130-159 mg/dL  High:             160-189 mg/dL  Very high:       >189 mg/dl     06/18/2019 152 (H) <100 mg/dL Final     Comment:     Above desirable:  100-129 mg/dl  Borderline High:  130-159 mg/dL  High:             160-189 mg/dL  Very high:       >189 mg/dl       Triglycerides   Date Value Ref Range Status   09/24/2021 232 (H) <150 mg/dL Final   09/23/2020 160 (H) <150 mg/dL Final     Comment:     Borderline high:  150-199 mg/dl  High:             200-499 mg/dl  Very high:       >499 mg/dl     06/18/2019 220 (H) <150 mg/dL Final     Comment:     Borderline high:  150-199 mg/dl  High:             200-499  mg/dl  Very high:       >499 mg/dl       Cholesterol/HDL Ratio   Date Value Ref Range Status   12/01/2014 6.0 (H) 0.0 - 5.0 Final   07/30/2014 6.1 (H) 0.0 - 5.0 Final       ASSESSMENT/PLAN:    1. TYPE 2 DIABETES MELLITUS: Type 2 diabetes mellitus complicated by background retinopathy.  No nephropathy or neuropathy.  Metformin refilled today and pt instructed to take Novolog with meals.  Continue current insulin doses.  Reminded her to take Novolog 10-15 minutes before eating.  Pt did not tolerate Jardiance or Bydureon in the past.  Karen is happy with her DexcomG6 sensor.  /77 today.    2.  HYPERLIPIDEMIA:  LDL 55 in 9/2021.  Pt taking Lipitor.    3. FOLLOW UP: with Dr. Phillips in July 2023.    Time spent reviewing patient chart notes, labs and DexcomG6 sensor download today  =  6 minutes.  Time for clinic visit today= 25  minutes.  Tme for documentation today = 15 minutes.     TOTAL TIME FOR VISIT TODAY = 46  minutes.    Cecile Juarez PA-C

## 2023-03-14 NOTE — LETTER
Date:March 15, 2023      Provider requested that no letter be sent. Do not send.       [facility]

## 2023-03-14 NOTE — LETTER
3/14/2023       RE: Karen Braxton  [address]  [place] [address]     Dear Colleague,    Thank you for referring your patient, Karen Braxton, to the [facility] [place] at [facility]. Please see a copy of my visit note below.    Patient is showing 4/5 MNCM met. Current tobacco use   Mellisa Howell, VF   Outcome for 03/10/23 3:55 PM :Dexcom data will be put in Notes on 3/13.  Mellisa Howell  BG data obtained via CGM website.            HPI:  Irais Jones is a 52 year old female with type 2 diabetes mellitus.  Pt being seen today in clinic for diabetes follow up.  She was dx having type 2 diabetes around 2002.  Her diabetes is complicated by background retinopathy. No known nephropathy or neuropathy.  Her hx is significant for HTN, hyperlipidemia and Vit D deficiency.  For her diabetes, she is prescribed to take Lantus 20 units SQ in am and Novolog 4 units with meals.  She stopped taking Jardiance stating it made her stomach hurt.  She has been without Metformin for > 1 month. Needs a refill which was done today.  Pt did not tolerate Bydureon in the past - GI side effects.  Pt's A1C is 8.5 % today. Her previous A1C was 7.9 %.  I reviewed her DexcomG6 sensor download data today.  Her blood sugars are higher. She has missed some Novolog doses and has been without Metformin for > 1 month.  On ROS today, she looks good today.  Karen is working the night shift at [employer] 5 days per week.  Pt denies blurred vision, n/v, SOB at rest, cough or fevers.  Pt denies chest pain, abd pain, diarrhea, dysuria, hematuria or sx of neuropathy.  No foot ulcers on exam today.    DIABETES CARE:  Retinopathy: none; seen by Oph in May 2022 with background retinopathy.  Nephropathy: none; urine microalbuminuria was negative in 9/2021. She is taking Cozaar.  Neuropathy: none.  Foot exam: no ulcers and normal monofilamentous exam today.  Lipids: LDL 55 in  "9/2021. Taking Lipitor.  Taking ASA:yes.  CAD:no.  Mental health:denies depression.  Insulin: Basal and meal time insulin. DM meds:  Metformin- needs refills- done today.  Testing: DexcomG6 sensor.      ROS:   Please see under HPI.    ALLERGIES:   No Known Allergies      Current Outpatient Medications   Medication Sig Dispense Refill     atorvastatin (LIPITOR) 40 MG tablet Take 1 tablet (40 mg) by mouth daily (Patient taking differently: Take 20 mg by mouth every morning) 90 tablet 3     blood glucose (NO BRAND SPECIFIED) lancets standard Use to test blood sugar 4 times daily or as directed. 100 each 2     blood glucose (NO BRAND SPECIFIED) test strip Use to test blood sugar 4 times daily or as directed. 100 strip 3     blood glucose monitoring (NO BRAND SPECIFIED) meter device kit Use to test blood sugar 4 times daily. 1 kit 0     Continuous Blood Gluc  (DEXCOM G6 ) DENNISE Use to read blood sugars as per 's instructions. 1 each 0     Continuous Blood Gluc Sensor (DEXCOM G6 SENSOR) MISC Change every 10 days. 9 each 1     Continuous Blood Gluc Transmit (DEXCOM G6 TRANSMITTER) MISC Change every 3 months. 1 each 3     glucosamine-chondroitin 500-400 MG CAPS per capsule Take 1 capsule by mouth daily       insulin glargine (LANTUS SOLOSTAR) 100 UNIT/ML pen INJECT 20 UNITS AT BEDTIME 45 mL 3     insulin pen needle (B-D U/F) 31G X 8 MM miscellaneous USE TO INJECT 4 TIMES A DAY OR AS DIRECTED 400 each 2     insulin syringe-needle U-100 (BD INSULIN SYRINGE ULTRAFINE) 30G X 1/2\" 1 ML Use one syringe daily or as directed.  3 month supply 100 each prn     losartan (COZAAR) 25 MG tablet Take 25 mg by mouth daily       metFORMIN (GLUCOPHAGE) 1000 MG tablet Take 0.5 tablets (500 mg) by mouth 2 times daily (with meals) 90 tablet 1     NOVOLOG FLEXPEN 100 UNIT/ML soln Inject 4 units with meals and correction. Pt uses approx 15 units daily. 90 mL 3     prednisoLONE acetate (PRED FORTE) 1 % ophthalmic " suspension Place 1 drop Into the left eye 3 times daily 15 mL 0     vitamin D2 (ERGOCALCIFEROL) 37545 units (1250 mcg) capsule Take 1 capsule (50,000 Units) by mouth every 7 days 8 capsule 6     vitamin D3 (CHOLECALCIFEROL) 50 mcg (2000 units) tablet Take 1 tablet (50 mcg) by mouth daily (Patient taking differently: Take 1 tablet by mouth daily 50,000 Units) 90 tablet 3     SHX:  Smoke: yes.  ETOH: none.   with 5 children.    FHX:  Several family members with diabetes.    PMHX:   1.  Type 2 DM.  2.  Hyperlipidemia.  3.  Amenorrhea.  4.  GERD.  5.  S/P choley.  6.  Sebaceous cyst.  Past Medical History:   Diagnosis Date     Cigarette nicotine dependence without complication      Combined forms of age-related cataract of both eyes      Type 2 diabetes mellitus (H)      Past Surgical History:   Procedure Laterality Date     CATARACT IOL, RT/LT       KNEE SURGERY Left 2001     LAPAROSCOPIC CHOLECYSTECTOMY  2007     PHACOEMULSIFICATION WITH STANDARD INTRAOCULAR LENS IMPLANT Right 01/13/2022    Procedure: PHACOEMULSIFICATION, COMPLEX CATARACT, WITH STANDARD INTRAOCULAR LENS IMPLANT INSERTION right;  Surgeon: Cony Scruggs MD;  [place] [facility] [place]     PHACOEMULSIFICATION WITH STANDARD INTRAOCULAR LENS IMPLANT Left 03/17/2022    Procedure: COMPLEX PHACOEMULSIFICATION, CATARACT, WITH STANDARD INTRAOCULAR LENS IMPLANT INSERTION LEFT;  Surgeon: Cony Scruggs MD;  [place] [facility] [place]     REPAIR CLEFT PALATE CHILD  1972       EXAM:    /77 (BP Location: Right arm, Patient Position: Sitting, Cuff Size: Adult Regular)   Pulse 85   Wt 58.1 kg (128 lb)   LMP  (LMP Unknown)   SpO2 97%   BMI 20.98 kg/m      FEET: No ulcers; normal monofilamentous exam.      RESULTS:    Creatinine   Date Value Ref Range Status   09/24/2021 0.67 0.52 - 1.04 mg/dL Final   09/23/2020 0.82 0.52 - 1.04 mg/dL Final     GFR Estimate   Date Value Ref Range Status   09/24/2021 >90 >60 mL/min/1.73m2 Final     Comment:     As of July 11,  2021, eGFR is calculated by the CKD-EPI creatinine equation, without race adjustment. eGFR can be influenced by muscle mass, exercise, and diet. The reported eGFR is an estimation only and is only applicable if the renal function is stable.   09/23/2020 82 >60 mL/min/[1.73_m2] Final     Comment:     Non  GFR Calc  Starting 12/18/2018, serum creatinine based estimated GFR (eGFR) will be   calculated using the Chronic Kidney Disease Epidemiology Collaboration   (CKD-EPI) equation.       Hemoglobin A1C   Date Value Ref Range Status   04/26/2021 10.1 (H) 0 - 5.6 % Final     Comment:     Normal <5.7% Prediabetes 5.7-6.4%  Diabetes 6.5% or higher - adopted from ADA   consensus guidelines.       Potassium   Date Value Ref Range Status   09/24/2021 4.0 3.4 - 5.3 mmol/L Final   09/23/2020 4.0 3.4 - 5.3 mmol/L Final     ALT   Date Value Ref Range Status   09/24/2021 20 0 - 50 U/L Final   09/23/2020 22 0 - 50 U/L Final     AST   Date Value Ref Range Status   09/24/2021 11 0 - 45 U/L Final   09/23/2020 12 0 - 45 U/L Final     TSH   Date Value Ref Range Status   03/16/2018 2.12 0.40 - 4.00 mU/L Final     T4 Free   Date Value Ref Range Status   08/04/2011 1.22 0.70 - 1.85 ng/dL Final         Cholesterol   Date Value Ref Range Status   09/24/2021 149 <200 mg/dL Final   09/23/2020 214 (H) <200 mg/dL Final     Comment:     Desirable:       <200 mg/dl   06/18/2019 250 (H) <200 mg/dL Final     Comment:     Desirable:       <200 mg/dl     HDL Cholesterol   Date Value Ref Range Status   09/23/2020 47 (L) >49 mg/dL Final   06/18/2019 54 >49 mg/dL Final     Direct Measure HDL   Date Value Ref Range Status   09/24/2021 48 (L) >=50 mg/dL Final     LDL Cholesterol Calculated   Date Value Ref Range Status   09/24/2021 55 <=100 mg/dL Final   09/23/2020 135 (H) <100 mg/dL Final     Comment:     Above desirable:  100-129 mg/dl  Borderline High:  130-159 mg/dL  High:             160-189 mg/dL  Very high:       >189 mg/dl      06/18/2019 152 (H) <100 mg/dL Final     Comment:     Above desirable:  100-129 mg/dl  Borderline High:  130-159 mg/dL  High:             160-189 mg/dL  Very high:       >189 mg/dl       Triglycerides   Date Value Ref Range Status   09/24/2021 232 (H) <150 mg/dL Final   09/23/2020 160 (H) <150 mg/dL Final     Comment:     Borderline high:  150-199 mg/dl  High:             200-499 mg/dl  Very high:       >499 mg/dl     06/18/2019 220 (H) <150 mg/dL Final     Comment:     Borderline high:  150-199 mg/dl  High:             200-499 mg/dl  Very high:       >499 mg/dl       Cholesterol/HDL Ratio   Date Value Ref Range Status   12/01/2014 6.0 (H) 0.0 - 5.0 Final   07/30/2014 6.1 (H) 0.0 - 5.0 Final       ASSESSMENT/PLAN:    1. TYPE 2 DIABETES MELLITUS: Type 2 diabetes mellitus complicated by background retinopathy.  No nephropathy or neuropathy.  Metformin refilled today and pt instructed to take Novolog with meals.  Continue current insulin doses.  Reminded her to take Novolog 10-15 minutes before eating.  Pt did not tolerate Jardiance or Bydureon in the past.  Karen is happy with her DexcomG6 sensor.  /77 today.    2.  HYPERLIPIDEMIA:  LDL 55 in 9/2021.  Pt taking Lipitor.    3. FOLLOW UP: with Dr. Phillips in July 2023.    Time spent reviewing patient chart notes, labs and DexcomG6 sensor download today  =  6 minutes.  Time for clinic visit today= 25  minutes.  Tme for documentation today = 15 minutes.     TOTAL TIME FOR VISIT TODAY = 46  minutes.    Ceicle Juarez PA-C                            Again, thank you for allowing me to participate in the care of your patient.      Sincerely,    Cecile Juarez PA-C

## 2023-03-31 ENCOUNTER — TELEPHONE (OUTPATIENT)
Dept: EDUCATION SERVICES | Facility: CLINIC | Age: 53
End: 2023-03-31
Payer: COMMERCIAL

## 2023-03-31 ENCOUNTER — ALLIED HEALTH/NURSE VISIT (OUTPATIENT)
Dept: EDUCATION SERVICES | Facility: CLINIC | Age: 53
End: 2023-03-31
Payer: COMMERCIAL

## 2023-03-31 ENCOUNTER — TELEPHONE (OUTPATIENT)
Dept: ENDOCRINOLOGY | Facility: CLINIC | Age: 53
End: 2023-03-31
Payer: COMMERCIAL

## 2023-03-31 DIAGNOSIS — Z79.4 TYPE 2 DIABETES MELLITUS WITHOUT COMPLICATION, WITH LONG-TERM CURRENT USE OF INSULIN (H): Primary | ICD-10-CM

## 2023-03-31 DIAGNOSIS — E11.9 TYPE 2 DIABETES MELLITUS WITHOUT COMPLICATION, WITH LONG-TERM CURRENT USE OF INSULIN (H): Primary | ICD-10-CM

## 2023-03-31 PROCEDURE — G0108 DIAB MANAGE TRN  PER INDIV: HCPCS

## 2023-03-31 NOTE — PROGRESS NOTES
Diabetes Self-Management Education & Support    Karen Braxton presents today for education related to Type 2 diabetes.    Patient is being treated with:  oral agents, diet, exercise and insulin  She is accompanied by self    PATIENT CONCERNS RELATED TO DIABETES SELF MANAGEMENT:   Karen called in with sensor problems today, it keeps cutting out    ASSESSMENT:    Taking Medication:     Current Diabetes Management per Patient:  Taking diabetes medications?   yes:     Diabetes Medication(s)     Biguanides       metFORMIN (GLUCOPHAGE) 1000 MG tablet    Take 0.5 tablets (500 mg) by mouth 2 times daily (with meals)    Insulin       insulin glargine (LANTUS SOLOSTAR) 100 UNIT/ML pen    INJECT 20 UNITS AT BEDTIME     NOVOLOG FLEXPEN 100 UNIT/ML soln    Inject 4 units with meals and correction. Pt uses approx 15 units daily.          Monitoring    Patient glucose self monitoring as follows: per Dexcom  BG results:        Patient's most recent   Lab Results   Component Value Date    A1C 10.1 04/26/2021      Patient's A1C goal: <7.0%      EDUCATION and INSTRUCTION PROVIDED AT THIS VISIT:    Her sensor and transmitter were changed out today as a first step although it seems unlikely that it will fix the problems as it has been ongoing.  She says she is keeping her within 20 feet of her most of the time.  She is leaving the stephanie open in the background of the phone.    We could look into G7 and see if that is an option for her.  We did load the stephanie on her phone and it is compatible.  Will have pharmacy run it and see if it is covered.  She needs an appointment to get started on it.    Patient-stated goal written and given to Karen Braxton.  Verbalized and demonstrated understanding of instructions.     PLAN:  See patient instructions  AVS printed and given to patient    FOLLOW-UP:    Checked on Dexcom G7 after she left and it is not covered.  She was called and notified.  Time spent with patient at today's visit was 30 minutes.       Any diabetes medication dose changes were made via the CDE Protocol and Collaborative Practice Agreement with [place] [facility]  [place].  A copy of this encounter was provided to patient's referring provider.

## 2023-03-31 NOTE — PATIENT INSTRUCTIONS
Start using your Dexcom G 6 with the new transmitter and sensor.  This should communicate better with your phone.  I will check into the Dexcom g7 for you.  You will need to be trained on that system but it is available on your phone--we checked today.  We will let you know if the G7 is covered and we will schedule follow up for you if it is.    Tabitha Dickerson RN,[address] [address]  Phone: [phone]  [email or web address]

## 2023-03-31 NOTE — TELEPHONE ENCOUNTER
[facility] Call Center    Phone Message    May a detailed message be left on voicemail: yes     Reason for Call: Other: .      Per Patient is wanting to get a call back. Patient states the Diabetic Machine she uses to check her Blood Sugar levels is not working. 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 Patient stated she contacted the pharmacy and they told her the machine may not be working. Patient states she is needing an appt to be seen to get fixed. Please advise.     Action Taken: Message routed to:  [facility]): Diabetes Education    Travel Screening: Not Applicable

## 2023-03-31 NOTE — TELEPHONE ENCOUNTER
Phone call to Karen to see what the issue is with her Dexcom.  It sounds like she last changed her sensor on 3/28 and it is currently not working.  It is unclear why she thinks it is not working.  She will come in at 2:30 today to get help trouble-shooting. Tabitha Dickerson RN,[facility]

## 2023-04-07 DIAGNOSIS — E11.9 TYPE 2 DIABETES MELLITUS WITHOUT COMPLICATION, WITH LONG-TERM CURRENT USE OF INSULIN (H): ICD-10-CM

## 2023-04-07 DIAGNOSIS — Z79.4 TYPE 2 DIABETES MELLITUS WITHOUT COMPLICATION, WITH LONG-TERM CURRENT USE OF INSULIN (H): ICD-10-CM

## 2023-04-12 RX ORDER — PROCHLORPERAZINE 25 MG/1
SUPPOSITORY RECTAL
Qty: 9 EACH | Refills: 1 | Status: SHIPPED | OUTPATIENT
Start: 2023-04-12 | End: 2023-11-07

## 2023-04-12 NOTE — TELEPHONE ENCOUNTER
DEXCOM G6 SENSOR  MISC  Last Written Prescription Date:   10/18/2022  Last Fill Quantity: 9,   # refills: 1  Last Office Visit :  3/14/2023  Future Office visit:   7/25/2023  9 Each, 1 Refill sent to pharm       Alyce Salinas RN  Central Triage Red Flags/Med Refills

## 2023-07-21 NOTE — PROGRESS NOTES
Patient is showing 3/5 MNCM met. Current tobacco use   TORO Navarrete         This 53  year old woman with type 2 diabetes since ~2002 was seen for follow up. She is comanaged with Cecile Juarez.  She uses a dexcom sensor and the data are below.  She is currently taking 30 units of Lantus at night.  She takes 25 units of mealtime insulin before the meal she has around 8 in the morning when she comes home from work and before the meal in the late afternoon.  She sometimes will take an additional 5 units if she is having a small snack.  She works overnight at [employer].  She sleeps between 8 in the morning and 5 PM.  She really likes the Dexcom but recognizes that the data are not consistently collected.  She says she gets information but says she has lost signal.  She cannot really tell me what signal is lost.  She sometimes will check her blood sugars when she does not have the Dexcom working but we have no data to review from that check today.  She does have blood sugars that go above target.  She has values that go below target and are associated with symptoms.  She has never needed anyone else to manage her hypoglycemia.  She is quite active when she is at work.  She tells me she is no longer taking metformin and is only taking the insulin.    She says she is seeing her eye doctor and has no retinopathy.  She has no concerns about her feet.  She denies chest pain and shortness of breath.    She stopped taking the Cozaar and has not had her blood pressure checked away from clinic.    She is concerned about some GYN symptoms.  It sounds as if she has pelvic pressure with perhaps prolapse.  She wants to see GYN.                Current Outpatient Medications   Medication    atorvastatin (LIPITOR) 40 MG tablet    blood glucose (NO BRAND SPECIFIED) lancets standard    blood glucose (NO BRAND SPECIFIED) test strip    blood glucose monitoring (NO BRAND SPECIFIED) meter device kit    Continuous Blood Gluc  (DEXCOM  "G6 ) DENNISE    Continuous Blood Gluc Sensor (DEXCOM G6 SENSOR) MISC    Continuous Blood Gluc Transmit (DEXCOM G6 TRANSMITTER) MISC    insulin glargine (LANTUS SOLOSTAR) 100 UNIT/ML pen    insulin pen needle (B-D U/F) 31G X 8 MM miscellaneous    insulin syringe-needle U-100 (BD INSULIN SYRINGE ULTRAFINE) 30G X 1/2\" 1 ML    NOVOLOG FLEXPEN 100 UNIT/ML soln    vitamin D2 (ERGOCALCIFEROL) 13626 units (1250 mcg) capsule    vitamin D3 (CHOLECALCIFEROL) 50 mcg (2000 units) tablet    glucosamine-chondroitin 500-400 MG CAPS per capsule    losartan (COZAAR) 25 MG tablet    metFORMIN (GLUCOPHAGE) 1000 MG tablet    prednisoLONE acetate (PRED FORTE) 1 % ophthalmic suspension     Current Facility-Administered Medications   Medication    bevacizumab (AVASTIN) intravitreal inj 1.25 mg         /76   Pulse 82   Wt 54.1 kg (119 lb 3.2 oz)   LMP  (LMP Unknown)   SpO2 96%   BMI 19.53 kg/m        VSS  NAD  Eyes - no periorbital edema, conjunctival injection, scleral icterus  CV - RRR.  Normal pulses in feet.  No edema  Neuro - sensation intact to monofilament on soles of feet.  DTR 2/4 biceps  Skin - normal texture   Feet - no ulcers     Recent Labs   Lab Test 12/13/22  1442 08/11/22  1524 03/22/22  1420 09/24/21  1523 09/24/21  1522 09/14/21  0815 09/14/21  0724 04/26/21  0730 12/21/20  0653 09/23/20  1359 11/11/19  0817 03/16/18  1212 03/16/18  1211 03/21/16  1039 03/21/16  1030   A1C  --   --   --   --   --   --   --  10.1* 8.9* 9.1* 8.6*   < > 8.5*   < > 8.1*   HEMOGLOBINA1 7.9* 7.9   < >  --   --   --    < >  --   --   --   --    < >  --    < >  --    TSH  --   --   --   --   --   --   --   --   --   --   --   --  2.12  --  1.31   LDL  --   --   --  55  --   --   --   --   --  135*  --    < > 149*   < > 142*   HDL  --   --   --  48*  --   --   --   --   --  47*  --    < > 43*   < > 44*   TRIG  --   --   --  232*  --   --   --   --   --  160*  --    < > 183*   < > 223*   CR  --   --   --   --  0.67  --   --   --   -- "  0.82 0.64   < > 0.67   < > 0.70   MICROL  --   --   --   --   --  6  --   --   --   --  <5   < >  --    < >  --     < > = values in this interval not displayed.     Assessment and plan:    1.  Diabetes control.  Her A1c has gone up and it sounds as if she is not matching her mealtime insulin dose with the food that she eats very effectively.  It would help her greatly if the Dexcom more consistently working.  I will send her to the nurse educator to figure out why it is not.  I will make no changes in dosing today.    2.  Diabetes complications.  I will check her renal function.  She is up-to-date with her eye visits.  She has no foot concerns.    3.CVD risk.  Her blood pressure is well controlled today off the Cozaar.  She is taking her statin.    4.GYN concern.  I will refer her to GYN.    Follow-up with Cecile Juarez in about 3 months and me in about 6 months.    Melisa Phillips MD

## 2023-07-25 ENCOUNTER — LAB (OUTPATIENT)
Dept: LAB | Facility: CLINIC | Age: 53
End: 2023-07-25
Payer: COMMERCIAL

## 2023-07-25 ENCOUNTER — OFFICE VISIT (OUTPATIENT)
Dept: ENDOCRINOLOGY | Facility: CLINIC | Age: 53
End: 2023-07-25
Payer: COMMERCIAL

## 2023-07-25 VITALS
WEIGHT: 119.2 LBS | HEART RATE: 82 BPM | OXYGEN SATURATION: 96 % | SYSTOLIC BLOOD PRESSURE: 111 MMHG | DIASTOLIC BLOOD PRESSURE: 76 MMHG | BODY MASS INDEX: 19.53 KG/M2

## 2023-07-25 DIAGNOSIS — Z79.4 TYPE 2 DIABETES MELLITUS WITHOUT COMPLICATION, WITH LONG-TERM CURRENT USE OF INSULIN (H): Primary | ICD-10-CM

## 2023-07-25 DIAGNOSIS — Z79.4 TYPE 2 DIABETES MELLITUS WITHOUT COMPLICATION, WITH LONG-TERM CURRENT USE OF INSULIN (H): ICD-10-CM

## 2023-07-25 DIAGNOSIS — E11.9 TYPE 2 DIABETES MELLITUS WITHOUT COMPLICATION, WITH LONG-TERM CURRENT USE OF INSULIN (H): ICD-10-CM

## 2023-07-25 DIAGNOSIS — E11.9 TYPE 2 DIABETES MELLITUS WITHOUT COMPLICATION, WITH LONG-TERM CURRENT USE OF INSULIN (H): Primary | ICD-10-CM

## 2023-07-25 DIAGNOSIS — Z79.4 CONTROLLED TYPE 2 DIABETES MELLITUS WITH HYPERGLYCEMIA, WITH LONG-TERM CURRENT USE OF INSULIN (H): ICD-10-CM

## 2023-07-25 DIAGNOSIS — E11.65 CONTROLLED TYPE 2 DIABETES MELLITUS WITH HYPERGLYCEMIA, WITH LONG-TERM CURRENT USE OF INSULIN (H): ICD-10-CM

## 2023-07-25 LAB
ANION GAP SERPL CALCULATED.3IONS-SCNC: 8 MMOL/L (ref 7–15)
CHLORIDE SERPL-SCNC: 104 MMOL/L (ref 98–107)
CHOLEST SERPL-MCNC: 158 MG/DL
CREAT SERPL-MCNC: 0.71 MG/DL (ref 0.51–0.95)
DEPRECATED HCO3 PLAS-SCNC: 28 MMOL/L (ref 22–29)
GFR SERPL CREATININE-BSD FRML MDRD: >90 ML/MIN/1.73M2
HBA1C MFR BLD: 9.3 % (ref 4.3–?)
HDLC SERPL-MCNC: 57 MG/DL
HOLD SPECIMEN: NORMAL
LDLC SERPL CALC-MCNC: 72 MG/DL
NONHDLC SERPL-MCNC: 101 MG/DL
POTASSIUM SERPL-SCNC: 4.9 MMOL/L (ref 3.4–5.3)
SODIUM SERPL-SCNC: 140 MMOL/L (ref 136–145)
TRIGL SERPL-MCNC: 143 MG/DL
VIT B12 SERPL-MCNC: 328 PG/ML (ref 232–1245)

## 2023-07-25 PROCEDURE — 80051 ELECTROLYTE PANEL: CPT | Performed by: PATHOLOGY

## 2023-07-25 PROCEDURE — 82607 VITAMIN B-12: CPT | Performed by: INTERNAL MEDICINE

## 2023-07-25 PROCEDURE — 80061 LIPID PANEL: CPT | Performed by: PATHOLOGY

## 2023-07-25 PROCEDURE — 82565 ASSAY OF CREATININE: CPT | Performed by: PATHOLOGY

## 2023-07-25 PROCEDURE — 83036 HEMOGLOBIN GLYCOSYLATED A1C: CPT | Performed by: INTERNAL MEDICINE

## 2023-07-25 PROCEDURE — 36415 COLL VENOUS BLD VENIPUNCTURE: CPT | Performed by: PATHOLOGY

## 2023-07-25 PROCEDURE — 99214 OFFICE O/P EST MOD 30 MIN: CPT | Performed by: INTERNAL MEDICINE

## 2023-07-25 PROCEDURE — 99000 SPECIMEN HANDLING OFFICE-LAB: CPT | Performed by: PATHOLOGY

## 2023-07-25 ASSESSMENT — PAIN SCALES - GENERAL: PAINLEVEL: NO PAIN (0)

## 2023-07-25 NOTE — LETTER
7/25/2023       RE: Karen Braxton  [address]  [place] [address]     Dear Colleague,    Thank you for referring your patient, Karen Braxton, to the [facility] [place] [facility]. Please see a copy of my visit note below.    Patient is showing 3/5 MNCM met. Current tobacco use   TORO Navarrete         This 53  year old woman with type 2 diabetes since ~2002 was seen for follow up. She is comanaged with Cecile Juarez.  She uses a dexcom sensor and the data are below.  She is currently taking 30 units of Lantus at night.  She takes 25 units of mealtime insulin before the meal she has around 8 in the morning when she comes home from work and before the meal in the late afternoon.  She sometimes will take an additional 5 units if she is having a small snack.  She works overnight at [employer].  She sleeps between 8 in the morning and 5 PM.  She really likes the Dexcom but recognizes that the data are not consistently collected.  She says she gets information but says she has lost signal.  She cannot really tell me what signal is lost.  She sometimes will check her blood sugars when she does not have the Dexcom working but we have no data to review from that check today.  She does have blood sugars that go above target.  She has values that go below target and are associated with symptoms.  She has never needed anyone else to manage her hypoglycemia.  She is quite active when she is at work.  She tells me she is no longer taking metformin and is only taking the insulin.    She says she is seeing her eye doctor and has no retinopathy.  She has no concerns about her feet.  She denies chest pain and shortness of breath.    She stopped taking the Cozaar and has not had her blood pressure checked away from clinic.    She is concerned about some GYN symptoms.  It sounds as if she has pelvic pressure with perhaps prolapse.  She wants to  "see GYN.                Current Outpatient Medications   Medication    atorvastatin (LIPITOR) 40 MG tablet    blood glucose (NO BRAND SPECIFIED) lancets standard    blood glucose (NO BRAND SPECIFIED) test strip    blood glucose monitoring (NO BRAND SPECIFIED) meter device kit    Continuous Blood Gluc  (DEXCOM G6 ) DENNISE    Continuous Blood Gluc Sensor (DEXCOM G6 SENSOR) MISC    Continuous Blood Gluc Transmit (DEXCOM G6 TRANSMITTER) MISC    insulin glargine (LANTUS SOLOSTAR) 100 UNIT/ML pen    insulin pen needle (B-D U/F) 31G X 8 MM miscellaneous    insulin syringe-needle U-100 (BD INSULIN SYRINGE ULTRAFINE) 30G X 1/2\" 1 ML    NOVOLOG FLEXPEN 100 UNIT/ML soln    vitamin D2 (ERGOCALCIFEROL) 73180 units (1250 mcg) capsule    vitamin D3 (CHOLECALCIFEROL) 50 mcg (2000 units) tablet    glucosamine-chondroitin 500-400 MG CAPS per capsule    losartan (COZAAR) 25 MG tablet    metFORMIN (GLUCOPHAGE) 1000 MG tablet    prednisoLONE acetate (PRED FORTE) 1 % ophthalmic suspension     Current Facility-Administered Medications   Medication    bevacizumab (AVASTIN) intravitreal inj 1.25 mg         /76   Pulse 82   Wt 54.1 kg (119 lb 3.2 oz)   LMP  (LMP Unknown)   SpO2 96%   BMI 19.53 kg/m        VSS  NAD  Eyes - no periorbital edema, conjunctival injection, scleral icterus  CV - RRR.  Normal pulses in feet.  No edema  Neuro - sensation intact to monofilament on soles of feet.  DTR 2/4 biceps  Skin - normal texture   Feet - no ulcers     Recent Labs   Lab Test 12/13/22  1442 08/11/22  1524 03/22/22  1420 09/24/21  1523 09/24/21  1522 09/14/21  0815 09/14/21  0724 04/26/21  0730 12/21/20  0653 09/23/20  1359 11/11/19  0817 03/16/18  1212 03/16/18  1211 03/21/16  1039 03/21/16  1030   A1C  --   --   --   --   --   --   --  10.1* 8.9* 9.1* 8.6*   < > 8.5*   < > 8.1*   HEMOGLOBINA1 7.9* 7.9   < >  --   --   --    < >  --   --   --   --    < >  --    < >  --    TSH  --   --   --   --   --   --   --   --   --   -- "   --   --  2.12  --  1.31   LDL  --   --   --  55  --   --   --   --   --  135*  --    < > 149*   < > 142*   HDL  --   --   --  48*  --   --   --   --   --  47*  --    < > 43*   < > 44*   TRIG  --   --   --  232*  --   --   --   --   --  160*  --    < > 183*   < > 223*   CR  --   --   --   --  0.67  --   --   --   --  0.82 0.64   < > 0.67   < > 0.70   MICROL  --   --   --   --   --  6  --   --   --   --  <5   < >  --    < >  --     < > = values in this interval not displayed.     Assessment and plan:    1.  Diabetes control.  Her A1c has gone up and it sounds as if she is not matching her mealtime insulin dose with the food that she eats very effectively.  It would help her greatly if the Dexcom more consistently working.  I will send her to the nurse educator to figure out why it is not.  I will make no changes in dosing today.    2.  Diabetes complications.  I will check her renal function.  She is up-to-date with her eye visits.  She has no foot concerns.    3.CVD risk.  Her blood pressure is well controlled today off the Cozaar.  She is taking her statin.    4.GYN concern.  I will refer her to GYN.    Follow-up with Cecile Juarez in about 3 months and me in about 6 months.    Melisa Phillips MD

## 2023-07-26 LAB
CREAT UR-MCNC: 151 MG/DL
MICROALBUMIN UR-MCNC: <12 MG/L
MICROALBUMIN/CREAT UR: NORMAL MG/G{CREAT}

## 2023-07-26 PROCEDURE — 82570 ASSAY OF URINE CREATININE: CPT | Performed by: INTERNAL MEDICINE

## 2023-07-26 PROCEDURE — 99000 SPECIMEN HANDLING OFFICE-LAB: CPT | Performed by: PATHOLOGY

## 2023-08-22 ENCOUNTER — TELEPHONE (OUTPATIENT)
Dept: ENDOCRINOLOGY | Facility: CLINIC | Age: 53
End: 2023-08-22
Payer: COMMERCIAL

## 2023-08-22 NOTE — TELEPHONE ENCOUNTER
[facility] with number Medical records number to request transfer of PHI.   Delfina Cano, RN on 8/22/2023 at 10:36 AM           [facility] Call [facility]     Phone Message     May a detailed message be left on voicemail: yes      Reason for Call: Other: clinic calling to coordinate care, asking about what they have already had done at our clinic, please advise as seen fit      Action Taken: Other: endo     Travel Screening: Not Applicable                                                                                                                                                                                                                                                                                                                                                                                                                                                                                                                                                                                                                                                                                     [facility] [phone]       [facility] Call [facility]    Phone Message    May a detailed message be left on voicemail: yes     Reason for Call: Other: clinic calling to coordinate care, asking about what they have already had done at our clinic, please advise as seen fit     Action Taken: Other: endo    Travel Screening: Not Applicable

## 2023-09-12 ENCOUNTER — ALLIED HEALTH/NURSE VISIT (OUTPATIENT)
Dept: EDUCATION SERVICES | Facility: CLINIC | Age: 53
End: 2023-09-12
Payer: COMMERCIAL

## 2023-09-12 DIAGNOSIS — Z79.4 TYPE 2 DIABETES MELLITUS WITHOUT COMPLICATION, WITH LONG-TERM CURRENT USE OF INSULIN (H): ICD-10-CM

## 2023-09-12 DIAGNOSIS — E11.9 TYPE 2 DIABETES MELLITUS WITHOUT COMPLICATION, WITH LONG-TERM CURRENT USE OF INSULIN (H): ICD-10-CM

## 2023-09-12 PROCEDURE — G0108 DIAB MANAGE TRN  PER INDIV: HCPCS | Performed by: NUTRITIONIST

## 2023-09-12 NOTE — PROGRESS NOTES
Diabetes Self-Management Education & Support    Karen Braxton presents today for education related to Type 2 diabetes    Patient is being treated with:  insulin  She is accompanied by self    Year of diagnosis: 2003  Referring provider:  Alan  Living Situation: daughters    PATIENT CONCERNS RELATED TO DIABETES SELF MANAGEMENT: concerned that glucose data is not available consistently in dexcom G6 stephanie.  Data is also missing on the clarity report.   Patient is getting a signal loss alert. This happens even when the phone is on her body. Her most recent transmitter was started on 7/7/23.    ASSESSMENT:    Taking Medication:     Current Diabetes Management per Patient:  Taking diabetes medications? yes:     Diabetes Medication(s)       Biguanides       metFORMIN (GLUCOPHAGE) 1000 MG tablet    Take 0.5 tablets (500 mg) by mouth 2 times daily (with meals)     Patient not taking: Reported on 7/25/2023      Insulin       insulin glargine (LANTUS SOLOSTAR) 100 UNIT/ML pen    INJECT 20 UNITS AT BEDTIME     NOVOLOG FLEXPEN 100 UNIT/ML soln    Inject 4 units with meals and correction. Pt uses approx 15 units daily.          Monitoring              Patient's most recent   Lab Results   Component Value Date    A1C 10.1 04/26/2021      Patient's A1C goal: <7.0    Activity: patient is active at work     Healthy Eating:   Patient takes 25 units of insulin with breakfast and evening meal.      Problem Solving:      Patient is at risk of hypoglycemia?: YES  Hospitalizations for hyper or hypoglycemia: No    EDUCATION and INSTRUCTION PROVIDED AT THIS VISIT:       Karen and I spoke with dexcom tech customer service. They were unfortunately not able to resolve the situation. Karen states that she keeps her battery charged on her phone and does not turn off her bluetooth.  Suspect a bad transmitter given she thinks this issue may have started in July. We started a new sensor and transmitter today in clinic.  Patient will follow up  with me and let me know if that resolves the issue. She has a  as well as meter and glucose test strips to use as a backup if needed.     Patient-stated goal written and given to Karen Braxton.  Verbalized and demonstrated understanding of instructions.     PLAN:  Started new sensor and transmitter today.   Patient will be in touch to let me know whether or not that resolves the connection issue.     FOLLOW-UP:      11/7 guillermo moran  3/26 Alan     Time spent with patient at today's visit was 60 minutes.      Any diabetes medication dose changes were made via the CDE Protocol and Collaborative Practice Agreement with [place] [facility].  A copy of this encounter was provided to patient's referring provider.

## 2023-09-19 ENCOUNTER — TELEPHONE (OUTPATIENT)
Dept: EDUCATION SERVICES | Facility: CLINIC | Age: 53
End: 2023-09-19
Payer: COMMERCIAL

## 2023-09-19 NOTE — TELEPHONE ENCOUNTER
[facility] Call [facility]    Phone Message    May a detailed message be left on voicemail: yes     Reason for Call: Other: Per daughter, patient is having issues navigating through what is required with her Dexcom and would like a call back to discuss and for assistance.  She would also like to discuss alternative, including what she used previously,    Daughter would also like to let it known that he mother will be traveling out of country starting on 9/26/23.    Patietn will require a [place] interpretor.  Patient can be reached at [phone].    Action Taken: Other: endo    Travel Screening: Not Applicable

## 2023-09-19 NOTE — TELEPHONE ENCOUNTER
Called and spoke with patient. She would like to wait to schedule with CDE until after she returns from [place]. Provided patient with number to schedule at that time.  Janine Pa RD, LD, CDE  9/19/2023   2:35 PM

## 2023-09-19 NOTE — TELEPHONE ENCOUNTER
Contacted patient via [email or web address] .    Dexcom not working since seen Janine Pa on 9/12.      Previously on Dodie 2 with reader. Patient is not interested in pump therapy.    Patient would like to resume Dodie. She is scheduled to see Micaela Juarez 11/7 at 12:30.    Will forward to Janine Pa to coordinate visits to restart Dodie when returns home.    Will continue with fingersticks for now.    Palak Guillaume RN, Diabetes Educator  Diabetes Education Department  [facility], [place]  [phone]

## 2023-10-31 ENCOUNTER — TELEPHONE (OUTPATIENT)
Dept: ENDOCRINOLOGY | Facility: CLINIC | Age: 53
End: 2023-10-31
Payer: COMMERCIAL

## 2023-10-31 DIAGNOSIS — Z79.4 TYPE 2 DIABETES MELLITUS WITHOUT COMPLICATION, WITH LONG-TERM CURRENT USE OF INSULIN (H): ICD-10-CM

## 2023-10-31 DIAGNOSIS — E11.9 TYPE 2 DIABETES MELLITUS WITHOUT COMPLICATION, WITH LONG-TERM CURRENT USE OF INSULIN (H): ICD-10-CM

## 2023-10-31 NOTE — TELEPHONE ENCOUNTER
Test strips      100 strip 3 11/29/2022 7/25/2023  [facility]      Melisa Phillips MD  Endocrinology, Diabetes, and Metabolism       Nv: 11/7/23

## 2023-10-31 NOTE — TELEPHONE ENCOUNTER
[facility] Call [facility]    Phone Message    May a detailed message be left on voicemail: yes     Reason for Call: Medication Refill Request    Has the patient contacted the pharmacy for the refill? Yes   Name of medication being requested: blood glucose (NO BRAND SPECIFIED) test strip [02203]   Provider who prescribed the medication: Melisa Phillips  Pharmacy: [id number] - [place], [place] - [address]  Date medication is needed: asap       Action Taken: Other: endo    Travel Screening: Not Applicable

## 2023-11-01 RX ORDER — ASPIRIN 81 MG/1
81 TABLET, CHEWABLE ORAL
COMMUNITY
End: 2023-11-07

## 2023-11-03 NOTE — PROGRESS NOTES
Patient is showing 3/5 MNCM met. A1c not in range   Outcome for 11/03/23 10:27 AM: Data obtained via Dexcom website  Codi Contreras CMA  .

## 2023-11-07 ENCOUNTER — OFFICE VISIT (OUTPATIENT)
Dept: ENDOCRINOLOGY | Facility: CLINIC | Age: 53
End: 2023-11-07
Payer: COMMERCIAL

## 2023-11-07 VITALS
BODY MASS INDEX: 19.61 KG/M2 | WEIGHT: 122 LBS | DIASTOLIC BLOOD PRESSURE: 67 MMHG | HEART RATE: 74 BPM | OXYGEN SATURATION: 98 % | HEIGHT: 66 IN | SYSTOLIC BLOOD PRESSURE: 106 MMHG

## 2023-11-07 DIAGNOSIS — E11.65 TYPE 2 DIABETES MELLITUS WITH HYPERGLYCEMIA, WITHOUT LONG-TERM CURRENT USE OF INSULIN (H): Primary | ICD-10-CM

## 2023-11-07 DIAGNOSIS — E11.9 TYPE 2 DIABETES MELLITUS WITHOUT COMPLICATION, WITH LONG-TERM CURRENT USE OF INSULIN (H): ICD-10-CM

## 2023-11-07 DIAGNOSIS — Z79.4 TYPE 2 DIABETES MELLITUS WITHOUT COMPLICATION, WITH LONG-TERM CURRENT USE OF INSULIN (H): ICD-10-CM

## 2023-11-07 LAB — HBA1C MFR BLD: 11.1 %

## 2023-11-07 PROCEDURE — 83036 HEMOGLOBIN GLYCOSYLATED A1C: CPT | Performed by: PATHOLOGY

## 2023-11-07 PROCEDURE — 99215 OFFICE O/P EST HI 40 MIN: CPT | Performed by: PHYSICIAN ASSISTANT

## 2023-11-07 RX ORDER — INSULIN ASPART 100 [IU]/ML
INJECTION, SOLUTION INTRAVENOUS; SUBCUTANEOUS
Qty: 90 ML | Refills: 3 | Status: SHIPPED | OUTPATIENT
Start: 2023-11-07

## 2023-11-07 RX ORDER — INSULIN GLARGINE 100 [IU]/ML
INJECTION, SOLUTION SUBCUTANEOUS
Qty: 45 ML | Refills: 3 | Status: SHIPPED | OUTPATIENT
Start: 2023-11-07

## 2023-11-07 ASSESSMENT — PAIN SCALES - GENERAL: PAINLEVEL: NO PAIN (0)

## 2023-11-07 NOTE — PROGRESS NOTES
HPI:  Irais Jones is a 53 year old female with type 2 diabetes mellitus.  Pt being seen today in clinic for diabetes follow up.  She was dx having type 2 diabetes around 2002.  Her diabetes is complicated by  retinopathy. No known nephropathy or neuropathy.  Her hx is significant for HTN, hyperlipidemia and Vit D deficiency.  For her diabetes, she is prescribed to take Lantus 20 units SQ at hs and Novolog 4 units with meals.  She denies missing insulin doses.  She stopped taking Jardiance stating it made her stomach hurt.  She did not tolerate Metformin- GI side effects.  Pt did not tolerate Bydureon in the past - GI side effects.  Pt's A1C is 11.1 % today.  She stopped taking Metformin and has not been using her DexcomG6 sensor since it is not working per patient. Will have her see CDE today.   Irais is using a glucose meter to monitor her blood sugar.  Her glucose meter shows average glucose 251. Only 35 % of her blood sugars are in target.  On ROS today, Karen is working the night shift at [employer] 5 days per week.  Pt denies blurred vision, n/v, SOB at rest, cough or fevers.  Pt denies chest pain, abd pain, diarrhea, dysuria, hematuria or sx of neuropathy.  No foot ulcers on exam today.    DIABETES CARE:  Retinopathy: none; seen by Oph in 11/2022 with mild nonproliferative diabetic retinopathy. She tells me she was seen by Oph in 2023.  Nephropathy: none; urine microalbuminuria was negative in 72023.  She is taking Cozaar.  Neuropathy: none.  Foot exam: no ulcers and normal monofilamentous exam today.  Lipids: LDL 72 in 7/2023. Taking Lipitor.  Taking ASA:yes.  CAD:no.  Mental health:denies depression.  Insulin: Basal and meal time insulin.   DM meds:  Pt did not tolerate Metformin- GI side effects. Pt did not tolerate Jardiance- yeast infection and Bydureon - GI side effects.  Testing: glucose meter.  She has a DexcomG6 which she is not using at this time stating it does not work. Referred to  "CDE.                ROS:   Please see under HPI.    ALLERGIES:   No Known Allergies      Current Outpatient Medications   Medication Sig Dispense Refill    atorvastatin (LIPITOR) 40 MG tablet Take 1 tablet (40 mg) by mouth daily (Patient taking differently: Take 20 mg by mouth every morning) 90 tablet 3    blood glucose (NO BRAND SPECIFIED) lancets standard Use to test blood sugar 4 times daily or as directed. 100 each 2    blood glucose (NO BRAND SPECIFIED) test strip Use to test blood sugar 4 times daily or as directed. 350 strip 3    blood glucose monitoring (NO BRAND SPECIFIED) meter device kit Use to test blood sugar 4 times daily. 1 kit 0    insulin glargine (LANTUS SOLOSTAR) 100 UNIT/ML pen INJECT 20 UNITS AT BEDTIME 45 mL 3    insulin pen needle (B-D U/F) 31G X 8 MM miscellaneous USE TO INJECT 4 TIMES A DAY OR AS DIRECTED 400 each 2    insulin syringe-needle U-100 (BD INSULIN SYRINGE ULTRAFINE) 30G X 1/2\" 1 ML Use one syringe daily or as directed.  3 month supply 100 each prn    losartan (COZAAR) 25 MG tablet Take 25 mg by mouth daily      NOVOLOG FLEXPEN 100 UNIT/ML soln Inject 4 units with meals and correction. Pt uses approx 15 units daily. 90 mL 3    prednisoLONE acetate (PRED FORTE) 1 % ophthalmic suspension Place 1 drop Into the left eye 3 times daily 15 mL 0    vitamin D2 (ERGOCALCIFEROL) 26682 units (1250 mcg) capsule Take 1 capsule (50,000 Units) by mouth every 7 days 8 capsule 6    metFORMIN (GLUCOPHAGE) 1000 MG tablet Take 0.5 tablets (500 mg) by mouth 2 times daily (with meals) (Patient not taking: Reported on 11/7/2023) 90 tablet 1     SHX:  Smoke: yes.  ETOH: none.   with 5 children.    FHX:  Several family members with diabetes.    PMHX:   1.  Type 2 DM.  2.  Hyperlipidemia.  3.  Amenorrhea.  4.  GERD.  5.  S/P choley.  6.  Sebaceous cyst.  Past Medical History:   Diagnosis Date    Cigarette nicotine dependence without complication     Combined forms of age-related cataract of both " "eyes     Type 2 diabetes mellitus (H)      Past Surgical History:   Procedure Laterality Date    CATARACT IOL, RT/LT      KNEE SURGERY Left 2001    LAPAROSCOPIC CHOLECYSTECTOMY  2007    PHACOEMULSIFICATION WITH STANDARD INTRAOCULAR LENS IMPLANT Right 01/13/2022    Procedure: PHACOEMULSIFICATION, COMPLEX CATARACT, WITH STANDARD INTRAOCULAR LENS IMPLANT INSERTION right;  Surgeon: Cony Scruggs MD;  Location: [facility] [place]    PHACOEMULSIFICATION WITH STANDARD INTRAOCULAR LENS IMPLANT Left 03/17/2022    Procedure: COMPLEX PHACOEMULSIFICATION, CATARACT, WITH STANDARD INTRAOCULAR LENS IMPLANT INSERTION LEFT;  Surgeon: Cony Scruggs MD;  Location: [facility] [place] OR    REPAIR CLEFT PALATE CHILD  1972       EXAM:    /67   Pulse 74   Ht 1.676 m (5' 6\")   Wt 55.3 kg (122 lb)   LMP  (LMP Unknown)   SpO2 98%   BMI 19.69 kg/m      FEET: No ulcers; normal monofilamentous exam.      RESULTS:    Creatinine   Date Value Ref Range Status   07/25/2023 0.71 0.51 - 0.95 mg/dL Final   09/23/2020 0.82 0.52 - 1.04 mg/dL Final     GFR Estimate   Date Value Ref Range Status   07/25/2023 >90 >60 mL/min/1.73m2 Final   09/23/2020 82 >60 mL/min/[1.73_m2] Final     Comment:     Non  GFR Calc  Starting 12/18/2018, serum creatinine based estimated GFR (eGFR) will be   calculated using the Chronic Kidney Disease Epidemiology Collaboration   (CKD-EPI) equation.       Hemoglobin A1C   Date Value Ref Range Status   04/26/2021 10.1 (H) 0 - 5.6 % Final     Comment:     Normal <5.7% Prediabetes 5.7-6.4%  Diabetes 6.5% or higher - adopted from ADA   consensus guidelines.       Potassium   Date Value Ref Range Status   07/25/2023 4.9 3.4 - 5.3 mmol/L Final   09/24/2021 4.0 3.4 - 5.3 mmol/L Final   09/23/2020 4.0 3.4 - 5.3 mmol/L Final     ALT   Date Value Ref Range Status   09/24/2021 20 0 - 50 U/L Final   09/23/2020 22 0 - 50 U/L Final     AST   Date Value Ref Range Status   09/24/2021 11 0 - 45 U/L Final   09/23/2020 12 0 - 45 U/L Final "     TSH   Date Value Ref Range Status   03/16/2018 2.12 0.40 - 4.00 mU/L Final     T4 Free   Date Value Ref Range Status   08/04/2011 1.22 0.70 - 1.85 ng/dL Final         Cholesterol   Date Value Ref Range Status   07/25/2023 158 <200 mg/dL Final   09/24/2021 149 <200 mg/dL Final   09/23/2020 214 (H) <200 mg/dL Final     Comment:     Desirable:       <200 mg/dl   06/18/2019 250 (H) <200 mg/dL Final     Comment:     Desirable:       <200 mg/dl     HDL Cholesterol   Date Value Ref Range Status   09/23/2020 47 (L) >49 mg/dL Final   06/18/2019 54 >49 mg/dL Final     Direct Measure HDL   Date Value Ref Range Status   07/25/2023 57 >=50 mg/dL Final   09/24/2021 48 (L) >=50 mg/dL Final     LDL Cholesterol Calculated   Date Value Ref Range Status   07/25/2023 72 <=100 mg/dL Final   09/24/2021 55 <=100 mg/dL Final   09/23/2020 135 (H) <100 mg/dL Final     Comment:     Above desirable:  100-129 mg/dl  Borderline High:  130-159 mg/dL  High:             160-189 mg/dL  Very high:       >189 mg/dl     06/18/2019 152 (H) <100 mg/dL Final     Comment:     Above desirable:  100-129 mg/dl  Borderline High:  130-159 mg/dL  High:             160-189 mg/dL  Very high:       >189 mg/dl       Triglycerides   Date Value Ref Range Status   07/25/2023 143 <150 mg/dL Final   09/24/2021 232 (H) <150 mg/dL Final   09/23/2020 160 (H) <150 mg/dL Final     Comment:     Borderline high:  150-199 mg/dl  High:             200-499 mg/dl  Very high:       >499 mg/dl     06/18/2019 220 (H) <150 mg/dL Final     Comment:     Borderline high:  150-199 mg/dl  High:             200-499 mg/dl  Very high:       >499 mg/dl       Cholesterol/HDL Ratio   Date Value Ref Range Status   12/01/2014 6.0 (H) 0.0 - 5.0 Final   07/30/2014 6.1 (H) 0.0 - 5.0 Final       ASSESSMENT/PLAN:    1. TYPE 2 DIABETES MELLITUS: Type 2 diabetes mellitus complicated by background retinopathy.  No nephropathy or neuropathy.  Pt's A1C is too high at 11.1 % today.  She stopped taking  Metformin due to side effects and has not been wearing her DexcomG6 sensor since it has not been working per patient. Will have pt see CDE today.  Increase Levemir 22 units subcutaneous at hs and increase Novolog 6 units with meals with correction.  Reminded her to take Novolog 10-15 minutes before eating.  Pt did not tolerate Jardiance or Bydureon in the past.  /67 today.     2.  HYPERLIPIDEMIA:  LDL 72 in July 2023.  Pt taking Lipitor.    3. FOLLOW UP: with me in Jan 2024 and Dr. Phillips in 3/2024.   CDE referral placed today.    Time spent reviewing patient chart notes, labs and glucose meter download today  =  6 minutes.  Time for clinic visit today= 25  minutes.  Tme for documentation today = 15 minutes.   i  TOTAL TIME FOR VISIT TODAY = 46  minutes.    Cecile Juarez PA-C

## 2023-11-07 NOTE — LETTER
11/7/2023       RE: Karen Braxton  [address]  [place] [address]     Dear Colleague,    Thank you for referring your patient, Karen Braxton, to the [facility] [place] at [facility]. Please see a copy of my visit note below.    Patient is showing 3/5 MNCM met. A1c not in range   Outcome for 11/03/23 10:27 AM: Data obtained via Dexcom website  Codi Contreras CMA  .                HPI:  Irais Jones is a 53 year old female with type 2 diabetes mellitus.  Pt being seen today in clinic for diabetes follow up.  She was dx having type 2 diabetes around 2002.  Her diabetes is complicated by  retinopathy. No known nephropathy or neuropathy.  Her hx is significant for HTN, hyperlipidemia and Vit D deficiency.  For her diabetes, she is prescribed to take Lantus 20 units SQ at hs and Novolog 4 units with meals.  She denies missing insulin doses.  She stopped taking Jardiance stating it made her stomach hurt.  She did not tolerate Metformin- GI side effects.  Pt did not tolerate Bydureon in the past - GI side effects.  Pt's A1C is 11.1 % today.  She stopped taking Metformin and has not been using her DexcomG6 sensor since it is not working per patient. Will have her see CDE today.   Irais is using a glucose meter to monitor her blood sugar.  Her glucose meter shows average glucose 251. Only 35 % of her blood sugars are in target.  On ROS today, Karen is working the night shift at [employer] 5 days per week.  Pt denies blurred vision, n/v, SOB at rest, cough or fevers.  Pt denies chest pain, abd pain, diarrhea, dysuria, hematuria or sx of neuropathy.  No foot ulcers on exam today.    DIABETES CARE:  Retinopathy: none; seen by Oph in 11/2022 with mild nonproliferative diabetic retinopathy. She tells me she was seen by Oph in 2023.  Nephropathy: none; urine microalbuminuria was negative in 72023.  She is taking Cozaar.  Neuropathy: none.  Foot  "exam: no ulcers and normal monofilamentous exam today.  Lipids: LDL 72 in 7/2023. Taking Lipitor.  Taking ASA:yes.  CAD:no.  Mental health:denies depression.  Insulin: Basal and meal time insulin.   DM meds:  Pt did not tolerate Metformin- GI side effects. Pt did not tolerate Jardiance- yeast infection and Bydureon - GI side effects.  Testing: glucose meter.  She has a DexcomG6 which she is not using at this time stating it does not work. Referred to CDE.                ROS:   Please see under HPI.    ALLERGIES:   No Known Allergies      Current Outpatient Medications   Medication Sig Dispense Refill    atorvastatin (LIPITOR) 40 MG tablet Take 1 tablet (40 mg) by mouth daily (Patient taking differently: Take 20 mg by mouth every morning) 90 tablet 3    blood glucose (NO BRAND SPECIFIED) lancets standard Use to test blood sugar 4 times daily or as directed. 100 each 2    blood glucose (NO BRAND SPECIFIED) test strip Use to test blood sugar 4 times daily or as directed. 350 strip 3    blood glucose monitoring (NO BRAND SPECIFIED) meter device kit Use to test blood sugar 4 times daily. 1 kit 0    insulin glargine (LANTUS SOLOSTAR) 100 UNIT/ML pen INJECT 20 UNITS AT BEDTIME 45 mL 3    insulin pen needle (B-D U/F) 31G X 8 MM miscellaneous USE TO INJECT 4 TIMES A DAY OR AS DIRECTED 400 each 2    insulin syringe-needle U-100 (BD INSULIN SYRINGE ULTRAFINE) 30G X 1/2\" 1 ML Use one syringe daily or as directed.  3 month supply 100 each prn    losartan (COZAAR) 25 MG tablet Take 25 mg by mouth daily      NOVOLOG FLEXPEN 100 UNIT/ML soln Inject 4 units with meals and correction. Pt uses approx 15 units daily. 90 mL 3    prednisoLONE acetate (PRED FORTE) 1 % ophthalmic suspension Place 1 drop Into the left eye 3 times daily 15 mL 0    vitamin D2 (ERGOCALCIFEROL) 03793 units (1250 mcg) capsule Take 1 capsule (50,000 Units) by mouth every 7 days 8 capsule 6    metFORMIN (GLUCOPHAGE) 1000 MG tablet Take 0.5 tablets (500 mg) by " "mouth 2 times daily (with meals) (Patient not taking: Reported on 11/7/2023) 90 tablet 1     SHX:  Smoke: yes.  ETOH: none.   with 5 children.    FHX:  Several family members with diabetes.    PMHX:   1.  Type 2 DM.  2.  Hyperlipidemia.  3.  Amenorrhea.  4.  GERD.  5.  S/P choley.  6.  Sebaceous cyst.  Past Medical History:   Diagnosis Date    Cigarette nicotine dependence without complication     Combined forms of age-related cataract of both eyes     Type 2 diabetes mellitus (H)      Past Surgical History:   Procedure Laterality Date    CATARACT IOL, RT/LT      KNEE SURGERY Left 2001    LAPAROSCOPIC CHOLECYSTECTOMY  2007    PHACOEMULSIFICATION WITH STANDARD INTRAOCULAR LENS IMPLANT Right 01/13/2022    Procedure: PHACOEMULSIFICATION, COMPLEX CATARACT, WITH STANDARD INTRAOCULAR LENS IMPLANT INSERTION right;  Surgeon: Cony Scruggs MD;  [place] [facility] [place]    PHACOEMULSIFICATION WITH STANDARD INTRAOCULAR LENS IMPLANT Left 03/17/2022    Procedure: COMPLEX PHACOEMULSIFICATION, CATARACT, WITH STANDARD INTRAOCULAR LENS IMPLANT INSERTION LEFT;  Surgeon: Cony Scruggs MD;  [place]: [facility] [place]    REPAIR CLEFT PALATE CHILD  1972       EXAM:    /67   Pulse 74   Ht 1.676 m (5' 6\")   Wt 55.3 kg (122 lb)   LMP  (LMP Unknown)   SpO2 98%   BMI 19.69 kg/m      FEET: No ulcers; normal monofilamentous exam.      RESULTS:    Creatinine   Date Value Ref Range Status   07/25/2023 0.71 0.51 - 0.95 mg/dL Final   09/23/2020 0.82 0.52 - 1.04 mg/dL Final     GFR Estimate   Date Value Ref Range Status   07/25/2023 >90 >60 mL/min/1.73m2 Final   09/23/2020 82 >60 mL/min/[1.73_m2] Final     Comment:     Non  GFR Calc  Starting 12/18/2018, serum creatinine based estimated GFR (eGFR) will be   calculated using the Chronic Kidney Disease Epidemiology Collaboration   (CKD-EPI) equation.       Hemoglobin A1C   Date Value Ref Range Status   04/26/2021 10.1 (H) 0 - 5.6 % Final     Comment:     Normal <5.7% " Prediabetes 5.7-6.4%  Diabetes 6.5% or higher - adopted from ADA   consensus guidelines.       Potassium   Date Value Ref Range Status   07/25/2023 4.9 3.4 - 5.3 mmol/L Final   09/24/2021 4.0 3.4 - 5.3 mmol/L Final   09/23/2020 4.0 3.4 - 5.3 mmol/L Final     ALT   Date Value Ref Range Status   09/24/2021 20 0 - 50 U/L Final   09/23/2020 22 0 - 50 U/L Final     AST   Date Value Ref Range Status   09/24/2021 11 0 - 45 U/L Final   09/23/2020 12 0 - 45 U/L Final     TSH   Date Value Ref Range Status   03/16/2018 2.12 0.40 - 4.00 mU/L Final     T4 Free   Date Value Ref Range Status   08/04/2011 1.22 0.70 - 1.85 ng/dL Final         Cholesterol   Date Value Ref Range Status   07/25/2023 158 <200 mg/dL Final   09/24/2021 149 <200 mg/dL Final   09/23/2020 214 (H) <200 mg/dL Final     Comment:     Desirable:       <200 mg/dl   06/18/2019 250 (H) <200 mg/dL Final     Comment:     Desirable:       <200 mg/dl     HDL Cholesterol   Date Value Ref Range Status   09/23/2020 47 (L) >49 mg/dL Final   06/18/2019 54 >49 mg/dL Final     Direct Measure HDL   Date Value Ref Range Status   07/25/2023 57 >=50 mg/dL Final   09/24/2021 48 (L) >=50 mg/dL Final     LDL Cholesterol Calculated   Date Value Ref Range Status   07/25/2023 72 <=100 mg/dL Final   09/24/2021 55 <=100 mg/dL Final   09/23/2020 135 (H) <100 mg/dL Final     Comment:     Above desirable:  100-129 mg/dl  Borderline High:  130-159 mg/dL  High:             160-189 mg/dL  Very high:       >189 mg/dl     06/18/2019 152 (H) <100 mg/dL Final     Comment:     Above desirable:  100-129 mg/dl  Borderline High:  130-159 mg/dL  High:             160-189 mg/dL  Very high:       >189 mg/dl       Triglycerides   Date Value Ref Range Status   07/25/2023 143 <150 mg/dL Final   09/24/2021 232 (H) <150 mg/dL Final   09/23/2020 160 (H) <150 mg/dL Final     Comment:     Borderline high:  150-199 mg/dl  High:             200-499 mg/dl  Very high:       >499 mg/dl     06/18/2019 220 (H) <150  mg/dL Final     Comment:     Borderline high:  150-199 mg/dl  High:             200-499 mg/dl  Very high:       >499 mg/dl       Cholesterol/HDL Ratio   Date Value Ref Range Status   12/01/2014 6.0 (H) 0.0 - 5.0 Final   07/30/2014 6.1 (H) 0.0 - 5.0 Final       ASSESSMENT/PLAN:    1. TYPE 2 DIABETES MELLITUS: Type 2 diabetes mellitus complicated by background retinopathy.  No nephropathy or neuropathy.  Pt's A1C is too high at 11.1 % today.  She stopped taking Metformin due to side effects and has not been wearing her DexcomG6 sensor since it has not been working per patient. Will have pt see CDE today.  Increase Levemir 22 units subcutaneous at hs and increase Novolog 6 units with meals with correction.  Reminded her to take Novolog 10-15 minutes before eating.  Pt did not tolerate Jardiance or Bydureon in the past.  /67 today.     2.  HYPERLIPIDEMIA:  LDL 72 in July 2023.  Pt taking Lipitor.    3. FOLLOW UP: with me in Jan 2024 and Dr. Phillips in 3/2024.   CDE referral placed today.    Time spent reviewing patient chart notes, labs and glucose meter download today  =  6 minutes.  Time for clinic visit today= 25  minutes.  Tme for documentation today = 15 minutes.   i  TOTAL TIME FOR VISIT TODAY = 46  minutes.  Cecile Juarez PA-C

## 2023-11-14 ENCOUNTER — ALLIED HEALTH/NURSE VISIT (OUTPATIENT)
Dept: EDUCATION SERVICES | Facility: CLINIC | Age: 53
End: 2023-11-14
Payer: COMMERCIAL

## 2023-11-14 DIAGNOSIS — E11.65 TYPE 2 DIABETES MELLITUS WITH HYPERGLYCEMIA, WITHOUT LONG-TERM CURRENT USE OF INSULIN (H): ICD-10-CM

## 2023-11-14 PROCEDURE — 95250 CONT GLUC MNTR PHYS/QHP EQP: CPT | Performed by: DIETITIAN, REGISTERED

## 2023-11-14 RX ORDER — BLOOD-GLUCOSE SENSOR
1 EACH MISCELLANEOUS
Qty: 2 EACH | Refills: 5 | Status: SHIPPED | OUTPATIENT
Start: 2023-11-14 | End: 2024-06-01

## 2023-11-14 NOTE — PATIENT INSTRUCTIONS
INSTRUCTIONS FOR Perpetuall 3 GLUCOSE MONITORING SYSTEM:    It's important to remember that a continuous glucose monitor (CGM) is not measuring your blood glucose.  It is measuring interstitial glucose, which is in the fluid in the tissues of your body, so the sensor will always lag behind by 10-15 minutes what your blood glucose is reading.  Don't expect the numbers to be identical.  To be safe, you should avoid basing any treatment decisions (correcting a high glucose or treating a low glucose) on the sensor reading in the first 24 hours of placing a new sensor as it will take about 24 hours for it to reach peak accuracy.  If the sensor is telling you one thing and you are feeling another, always check with your glucose meter and use the finger-stick reading to guide your decisions.        1.  Sensor is FDA approved for placement in the upper posterior arm, although people have successfully worn it in other areas, such as the abdomen, upper outer thighs and buttocks.     2.  Sensor will need to be replaced every 14 days.  [employer] is warranteed for 3 years.    3.  Sensor is water-proof.  You can shower with it in and swim up to 3 feet for up to 30 minutes.        4.  Each time a new sensor is started, it requires a 1 hour warm up period.  No information will be available for the 1 hour period.  After that, the sensor will update every 5 minutes.  Keep your phone within 20 feet of the glucose sensor.  It's OK to keep your phone in your pocket.    5.  There will be [id number] arrows with each reading that will indicate the approximate speed with which your glucose is changing and the direction that it is heading.     6.  If the sensor is unsure of it's own accuracy a symbol will appear in the reader window cueing you to check a blood glucose.  It is highly recommended to check a blood glucose before correcting a high reading or treating a low reading.      FOLLOW UP:  Nov 28th @ 2:00 pm.          Thank you!    Mali PICHARDO  EULALIA Stanley, ISAIAH, [facility]     To ask a question to your Endocrine care team, please send them a [employer] message, or reach them by phone at [phone]      To expedite your medication refill(s), please contact your pharmacy and have them   fax a refill request to: [phone].  *Please allow 3 business days for routine medication refills.  *Please allow 5 business days for controlled substance medication refills.     For after-hours urgent Endocrine issues, that do not require 911, please dial [phone], and ask to speak with the Endocrinologist On-Call

## 2023-11-14 NOTE — PROGRESS NOTES
Diabetes Self-Management Education & Support    Presents for: Personal CGM Start    Type of Service: In Person Visit      Sensor started:: Started today in clinic  CGM Initial Settings: Freestyle Dodie  Freestyle Dodie Target Glucose Range (mg/dL): Low Glucose Alarm:  70 mg/dl; High Glucose Alert:  300 mg/dl.    Sensor was inserted with no resistance or bleeding at insertion site.    CGM-specific education:   Freestyle Dodie sensor: insertion technique, sensor site location and rotation, insulin administration in relation to sensor placement, sensor wear, reasons to remove sensor (MRI, CT, diathermy), Vitamin C & Aspirin effects on sensor and Dodie [place]: frequency of scanning sensor, length of time data is visible, use of built in glucose meter, Precision X-tra test strips         ASSESSMENT:  Has used Libreview glucose sensor in the past.  Switched to Dexcom, but was getting frequent signal loss alerts.  Would like to return to the [employer].  Brings in her old reader, however she has the [employer] 3 stephanie on her phone, so we set up her [employer] account, linked it to the healthcare account and started a Dodie 3 sensor today.      Will benefit from follow up in 2 weeks to review sensor data and make lifestyle and medication changes accordingly.    Pt verbalized understanding of concepts discussed and recommendations provided today.    Continue education with the following diabetes management concepts: Monitoring, Taking Medication, and Problem Solving    PLAN    See Wrap Up for Plan.  Will wear Dodie 3 sensor continuously.      Follow-up: In 2 weeks    See Care Plan for co-developed, patient-state behavior change goals.  AVS provided for patient today.      SUBJECTIVE/OBJECTIVE:  Presents for: Personal CGM Start  Accompanied by: Self  Diabetes education in the past 24mo: Yes  Focus of Visit: CGM  Type of CGM visit: Personal CGM Start  Diabetes type: Type 2  Other concerns:: English as a second language  Cultural  "Influences/Ethnic Background:  Not  or     Works 5 days a week at [employer].      Patient Problem List and Family Medical History reviewed for relevant medical history, current medical status, and diabetes risk factors.    Vitals:  LMP  (LMP Unknown)   Estimated body mass index is 19.69 kg/m  as calculated from the following:    Height as of 11/7/23: 1.676 m (5' 6\").    Weight as of 11/7/23: 55.3 kg (122 lb).   Last 3 BP:   BP Readings from Last 3 Encounters:   11/07/23 106/67   07/25/23 111/76   03/14/23 130/77       History   Smoking Status    Some Days    Packs/day: 0.50    Years: 40.00    Types: Cigarettes   Smokeless Tobacco    Never       Labs:  Lab Results   Component Value Date    A1C 10.1 04/26/2021    HEMOGLOBINA1 9.3 07/25/2023     Lab Results   Component Value Date     03/17/2022     09/24/2021     09/23/2020     Lab Results   Component Value Date    LDL 72 07/25/2023     09/23/2020     HDL Cholesterol   Date Value Ref Range Status   09/23/2020 47 (L) >49 mg/dL Final     Direct Measure HDL   Date Value Ref Range Status   07/25/2023 57 >=50 mg/dL Final   ]  GFR Estimate   Date Value Ref Range Status   07/25/2023 >90 >60 mL/min/1.73m2 Final   09/23/2020 82 >60 mL/min/[1.73_m2] Final     Comment:     Non  GFR Calc  Starting 12/18/2018, serum creatinine based estimated GFR (eGFR) will be   calculated using the Chronic Kidney Disease Epidemiology Collaboration   (CKD-EPI) equation.       GFR Estimate If Black   Date Value Ref Range Status   09/23/2020 >90 >60 mL/min/[1.73_m2] Final     Comment:      GFR Calc  Starting 12/18/2018, serum creatinine based estimated GFR (eGFR) will be   calculated using the Chronic Kidney Disease Epidemiology Collaboration   (CKD-EPI) equation.       Lab Results   Component Value Date    CR 0.71 07/25/2023    CR 0.82 09/23/2020     No results found for: \"MICROALBUMIN\"    Assess the following topics at " follow-up visit.  Healthy Eating:  Has patient met with a dietitian in the past?: Yes    Being Active:       Monitoring:  Checking sugars with a BG meter now.      Taking Medications:  Diabetes Medication(s)       Insulin       insulin glargine (LANTUS SOLOSTAR) 100 UNIT/ML pen    INJECT 22 UNITS AT BEDTIME.     NOVOLOG FLEXPEN 100 UNIT/ML soln    Inject 6 units with meals and correction. Pt uses approx 30 units daily.            Problem Solving:  Is the patient at risk for hypoglycemia?: Yes  Hypoglycemia Frequency: Weekly  Hypoglycemia Treatment: Candy    Hypoglycemia symptoms  Dizziness or Light-Headedness: Yes    Healthy Coping:  Emotional response to diabetes: Ready to learn  Stage of change: ACTION (Actively working towards change)  Patient Activation Measure Survey Score:      12/17/2014    11:00 AM   MISTI Score (Last Two)   MISTI Raw Score 41   Activation Score 63.2   MISTI Level 3         Care Plan and Education Provided:  Care Plan: Diabetes   Updates made by Mali Stanley since 11/14/2023 12:00 AM        Problem: HbA1C Not In Goal         Goal: Establish Regular Follow-Ups with PCP    Start Date: 11/14/2023   This Visit's Progress: 0%        Task: Discuss with PCP the recommended timing for patient's next follow up visit(s)    Responsible User: Mali Stanley        Task: Discuss schedule for PCP visits with patient    Responsible User: Mali Stanley        Long-Range Goal: Get HbA1C Level in Goal    Start Date: 11/14/2023   This Visit's Progress: 0%        Task: Educate patient on diabetes education self-management topics    Responsible User: Mali Stanley        Task: Educate patient on benefits of regular glucose monitoring    Responsible User: Mali Stanley        Task: Refer patient to appropriate extended care team member, as needed (Medication Therapy Management, Behavioral Health, Physical Therapy, etc.)    Responsible User: Mali Stanley        Task: Discuss diabetes treatment plan with  patient    Responsible User: Mali Stanley        Problem: Diabetes Self-Management Education Needed to Optimize Self-Care Behaviors         Goal: Understand diabetes pathophysiology and disease progression         Task: Provide education on diabetes pathophysiology and disease progression specfic to patient's diabetes type    Responsible User: Mali Stanley        Goal: Healthy Eating - follow a healthy eating pattern for diabetes         Task: Provide education on portion control and consistency in amount, composition and timing of food intake    Responsible User: Mali Stanley        Task: Provide education on managing carbohydrate intake (carbohydrate counting, plate planning method, etc.)    Responsible User: Mali Stanley        Task: Provide education on weight management    Responsible User: Mali Stanley        Task: Provide education on heart healthy eating    Responsible User: Mali Stanley        Task: Provide education on eating out    Responsible User: Mali Stanley        Task: Develop individualized healthy eating plan with patient    Responsible User: Mali Stanley        Goal: Being Active - get regular physical activity, working up to at least 150 minutes per week         Task: Provide education on relationship of activity to glucose and precautions to take if at risk for low glucose    Responsible User: Mali Stanley        Task: Discuss barriers to physical activity with patient    Responsible User: Mali Stanley        Task: Develop physical activity plan with patient    Responsible User: Mali Stanley        Task: Explore community resources including walking groups, assistance programs, and home videos    Responsible User: Mali Stanley        Goal: Monitoring - monitor glucose and ketones as directed    Start Date: 11/14/2023   This Visit's Progress: 0%   Note:    Wear CGM continuously.       Task: Provide education on blood glucose monitoring (purpose, proper  technique, frequency, glucose targets, interpreting results, when to use glucose control solution, sharps disposal)    Responsible User: Mali Stanley        Task: Provide education on continuous glucose monitoring (sensor placement, use of stephanie or /reader, understanding glucose trends, alerts and alarms, differences between sensor glucose and blood glucose)    Responsible User: Mali Stanley        Task: Provide education on ketone monitoring (when to monitor, frequency, etc.)    Responsible User: Mali Stanley        Goal: Taking Medication - patient is consistently taking medications as directed         Task: Provide education on action of prescribed medication, including when to take and possible side effects    Responsible User: Mali Stanley        Task: Provide education on insulin and injectable diabetes medications, including administration, storage, site selection and rotation for injection sites    Responsible User: Mali Stanley        Task: Discuss barriers to medication adherence with patient and provide management technique ideas as appropriate    Responsible User: Mali Stanley        Task: Provide education on frequency and refill details of medications    Responsible User: Mali Stanley        Goal: Problem Solving - know how to prevent and manage short-term diabetes complications         Task: Provide education on high blood glucose - causes, signs/symptoms, prevention and treatment    Responsible User: Mlai Stanley        Task: Provide education on low blood glucose - causes, signs/symptoms, prevention, treatment, carrying a carbohydrate source at all times, and medical identification    Responsible User: Mali Stanley        Task: Provide education on safe travel with diabetes    Responsible User: Mali Stanley        Task: Provide education on how to care for diabetes on sick days    Responsible User: Mali Stanley        Task: Provide education on when to call a  health care provider    Responsible User: Mali Stanley        Goal: Reducing Risks - know how to prevent and treat long-term diabetes complications         Task: Provide education on major complications of diabetes, prevention, early diagnostic measures and treatment of complications    Responsible User: Mali Stanley        Task: Provide education on recommended care for dental, eye and foot health    Responsible User: Mali Stanley        Task: Provide education on Hemoglobin A1c - goals and relationship to blood glucose levels    Responsible User: Mali Stanley        Task: Provide education on recommendations for heart health - lipid levels and goals, blood pressure and goals, and aspirin therapy, if indicated    Responsible User: Mali Stanley        Task: Provide education on tobacco cessation    Responsible User: Mali Stanley        Goal: Healthy Coping - use available resources to cope with the challenges of managing diabetes         Task: Discuss recognizing feelings about having diabetes    Responsible User: Mali Stanley        Task: Provide education on the benefits of making appropriate lifestyle changes    Responsible User: Mali Stanley        Task: Provide education on benefits of utilizing support systems    Responsible User: Mali Stanley        Task: Discuss methods for coping with stress    Responsible User: Mali Stanley        Task: Provide education on when to seek professional counseling    Responsible User: Mali Stanley RDN, LD, [facility]  Dietitian and Diabetes  - Endocrinology  [facility]        Time Spent: 30 minutes  Encounter Type: Individual    Any diabetes medication dose changes were made via the CDE Protocol per the patient's referring provider. A copy of this encounter was shared with the provider.

## 2023-11-28 ENCOUNTER — ALLIED HEALTH/NURSE VISIT (OUTPATIENT)
Dept: EDUCATION SERVICES | Facility: CLINIC | Age: 53
End: 2023-11-28
Payer: COMMERCIAL

## 2023-11-28 DIAGNOSIS — E11.65 TYPE 2 DIABETES MELLITUS WITH HYPERGLYCEMIA, WITHOUT LONG-TERM CURRENT USE OF INSULIN (H): Primary | ICD-10-CM

## 2023-11-28 PROCEDURE — G0108 DIAB MANAGE TRN  PER INDIV: HCPCS | Performed by: DIETITIAN, REGISTERED

## 2023-11-28 NOTE — PATIENT INSTRUCTIONS
It was nice meeting with you today.    Here is a summary of the visit.    Take 20 units of Lantus at 8pm.  Always take the same dose.  In otherwords, do not change this dose even if your blood sugar is high or low.    For Novolog, take 6 units, instead of 4 units, in the morning.  Take 8-10 units in the afternoon.  Take 3 units for night before work and 4 units for your snack at work.    Follow Up:  with Endocrinology in Feb and March.  With Diabetes Education in 1 month, Jan 2nd at 2 pm.          Thank you!    Mali Stanley, EULALIA, LD, [facility]     To ask a question to your Endocrine care team, please send them a [employer] message, or reach them by phone at [phone]      To expedite your medication refill(s), please contact your pharmacy and have them   fax a refill request to: [phone].  *Please allow 3 business days for routine medication refills.  *Please allow 5 business days for controlled substance medication refills.     For after-hours urgent Endocrine issues, that do not require 911, please dial [phone], and ask to speak with the Endocrinologist On-Call

## 2023-11-28 NOTE — PROGRESS NOTES
Diabetes Self-Management Education & Support    Presents for:  CGM review for Type 2 Diabetes on insulin    Type of Service: In Person Visit      ASSESSMENT:    Glucose Sensor Reports Reveal:    Karen Braxton's overall average is 209 mg/dL (CV 33%), 35% TIR ( mg/dl), 0% low, 0% very low, 37% high, 28% very high over the past two weeks.      Sugars show a pattern of rising while at work and rising after her meal when she gets home from work (5am).          Fears low sugars when working.  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    Takes variable insulin doses for Novolog and Lantus - depends on her sugar and whether or not she is working.  Feels 22 units of Lantus is too much and makes her go low at work.    Successful with wearing glucose sensor continuously.    Language barrier, making it difficult to know if she understands insulin dosing recommendations.    Patient's most recent   Lab Results   Component Value Date    A1C 10.1 04/26/2021    HEMOGLOBINA1 9.3 07/25/2023     is not meeting goal of <7.0    Diabetes knowledge and skills assessment:   Patient is knowledgeable in diabetes management concepts related to: Monitoring    Continue education with the following diabetes management concepts: Taking Medication and Problem Solving    Based on learning assessment above, most appropriate setting for further diabetes education would be: Individual setting.      PLAN    Take 20 units of Lantus at 8pm.  Always take the same dose.  In otherwords, do not change this dose even if your blood sugar is high or low.    For Novolog, take 6 units, instead of 4 units, in the morning.  Continue to take 8-10 units in the afternoon.  Take 3 units for night before work and 4 units for your snack at work.    Follow Up:  with Endocrinology in Feb and March.  With Diabetes Education in 1 month, Jan 2nd at 2 pm.  Topics to cover at upcoming visits: Taking Medication and Problem Solving    See Care Plan for co-developed,  "patient-state behavior change goals.  AVS provided for patient today.    SUBJECTIVE/OBJECTIVE:     Cultural Influences/Ethnic Background:  Not  or     Feels Dodie Sensor works better.  Brings a new sensor today so she can change it while she's in the clinic.      Recently had an injection in her eye that caused a headache and made her sugars go hig.    Patient Problem List and Family Medical History reviewed for relevant medical history, current medical status, and diabetes risk factors.    Vitals:  LMP  (LMP Unknown)   Estimated body mass index is 19.69 kg/m  as calculated from the following:    Height as of 11/7/23: 1.676 m (5' 6\").    Weight as of 11/7/23: 55.3 kg (122 lb).   Last 3 BP:   BP Readings from Last 3 Encounters:   11/07/23 106/67   07/25/23 111/76   03/14/23 130/77       History   Smoking Status    Some Days    Packs/day: 0.50    Years: 40.00    Types: Cigarettes   Smokeless Tobacco    Never       Labs:  Lab Results   Component Value Date    A1C 10.1 04/26/2021    HEMOGLOBINA1 9.3 07/25/2023     Lab Results   Component Value Date     03/17/2022     09/24/2021     09/23/2020     Lab Results   Component Value Date    LDL 72 07/25/2023     09/23/2020     HDL Cholesterol   Date Value Ref Range Status   09/23/2020 47 (L) >49 mg/dL Final     Direct Measure HDL   Date Value Ref Range Status   07/25/2023 57 >=50 mg/dL Final   ]  GFR Estimate   Date Value Ref Range Status   07/25/2023 >90 >60 mL/min/1.73m2 Final   09/23/2020 82 >60 mL/min/[1.73_m2] Final     Comment:     Non  GFR Calc  Starting 12/18/2018, serum creatinine based estimated GFR (eGFR) will be   calculated using the Chronic Kidney Disease Epidemiology Collaboration   (CKD-EPI) equation.       GFR Estimate If Black   Date Value Ref Range Status   09/23/2020 >90 >60 mL/min/[1.73_m2] Final     Comment:      GFR Calc  Starting 12/18/2018, serum creatinine based estimated GFR " "(eGFR) will be   calculated using the Chronic Kidney Disease Epidemiology Collaboration   (CKD-EPI) equation.       Lab Results   Component Value Date    CR 0.71 2023    CR 0.82 2020     No results found for: \"MICROALBUMIN\"    Healthy Eating:  Meal planning/habits:  (Works 8:15 pm - 4:45 am.  Sleeps 6am - 1 pm, then back to bed till 5-6 pm.)  Breakfast: 2pm:  1 slice bread, egg, fruit (orange).  Water.  Lunch: Before work, 1 cup milk or tea with cinnamon.  Dinner: 1 slice bread.  Sometimes chicken/fish.  Green vegetables.  Snacks: 12:30 am:  yogurt from home.  Has patient met with a dietitian in the past?: Yes    Being Active:     Active at work, Wed - .    Monitoring:  Dodie 3 with the phone Radah        Taking Medications:  Diabetes Medication(s)       Insulin       insulin glargine (LANTUS SOLOSTAR) 100 UNIT/ML pen    INJECT 22 UNITS AT BEDTIME.     NOVOLOG FLEXPEN 100 UNIT/ML soln    Inject 6 units with meals and correction. Pt uses approx 30 units daily.            Current Treatments: Insulin Injections (Lantus 15-20 units before she goes to work at night.)  Dose schedule:  (Novolopm (8-10u) - midnight 3u - 5-6am 4 u)    Problem Solving:  Is the patient at risk for hypoglycemia?: Yes  Hypoglycemia Frequency: Weekly (couple times a week while at work - she is active at her job)  Hypoglycemia Treatment: Candy (juice at awork.)    Hypoglycemia symptoms  Dizziness or Light-Headedness: Yes  Feeling shaky: Yes    Healthy Coping:  Emotional response to diabetes: Ready to learn  Stage of change: ACTION (Actively working towards change)  Patient Activation Measure Survey Score:      2014    11:00 AM   MISTI Score (Last Two)   MISTI Raw Score 41   Activation Score 63.2   MISTI Level 3         Care Plan and Education Provided:  Care Plan: Diabetes   Updates made by Mali Stanley since 2023 12:00 AM        Problem: HbA1C Not In Goal         Goal: Establish Regular Follow-Ups with PCP    Start " Date: 11/14/2023   This Visit's Progress: 100%   Recent Progress: 0%        Task: Discuss schedule for PCP visits with patient Completed 11/28/2023   Responsible User: Mali Stanley        Long-Range Goal: Get HbA1C Level in Goal    Start Date: 11/14/2023   This Visit's Progress: 10%   Recent Progress: 0%        Task: Discuss diabetes treatment plan with patient Completed 11/28/2023   Responsible User: Mali Stanley        Problem: Diabetes Self-Management Education Needed to Optimize Self-Care Behaviors         Goal: Monitoring - monitor glucose and ketones as directed    Start Date: 11/14/2023   This Visit's Progress: 100%   Recent Progress: 0%   Note:    Wear CGM continuously.       Goal: Taking Medication - patient is consistently taking medications as directed    Start Date: 11/28/2023   This Visit's Progress: 0%   Note:    Take consistent dose of Lantus, 20 units daily.         Mali Stanley RDN, ISAIAH, [facility]  Dietitian and Diabetes  - Endocrinology  [facility]        Time Spent: 30 minutes  Encounter Type: Individual    Any diabetes medication dose changes were made via the CDE Protocol per the patient's endocrinology provider. A copy of this encounter was shared with the provider.

## 2023-12-18 NOTE — ED AVS SNAPSHOT
[facility], [place], Emergency Department    [address] [place] [id number]    Phone:  [phone]                                       Karen Braxton   MRN: [id number]    Department:  [facility], Emergency Department   Date of Visit:  8/26/2018           After Visit Summary Signature Page     I have received my discharge instructions, and my questions have been answered. I have discussed any challenges I see with this plan with the nurse or doctor.    ..........................................................................................................................................  Patient/Patient Representative Signature      ..........................................................................................................................................  Patient Representative Print Name and Relationship to Patient    ..................................................               ................................................  Date                                            Time    ..........................................................................................................................................  Reviewed by Signature/Title    ...................................................              ..............................................  Date                                                            Time          22EPIC Rev 08/18         Yes

## 2024-01-02 ENCOUNTER — ALLIED HEALTH/NURSE VISIT (OUTPATIENT)
Dept: EDUCATION SERVICES | Facility: CLINIC | Age: 54
End: 2024-01-02
Payer: COMMERCIAL

## 2024-01-02 DIAGNOSIS — E11.65 TYPE 2 DIABETES MELLITUS WITH HYPERGLYCEMIA, WITHOUT LONG-TERM CURRENT USE OF INSULIN (H): Primary | ICD-10-CM

## 2024-01-02 PROCEDURE — G0108 DIAB MANAGE TRN  PER INDIV: HCPCS | Performed by: DIETITIAN, REGISTERED

## 2024-01-02 NOTE — PROGRESS NOTES
Diabetes Self-Management Education & Support    Presents for:  Type 2 Diabetes Follow Up Visit    Type of Service: In Person Visit     This is my 3rd visit with Karen.  She reports high sugars because she is in a lot of pain from her arthritis.  The cold makes her pain worse.      Karen works nights and is active at work.  Therefore, she takes less Lantus, 20 units, on work nights (Wed - Sun) and 25 units on non-work days.    Karen takes anywhere from 10-16 units of insulin before meals.  She understands what foods has carbohydrates in them.  She takes more insulin if she is eating more carbohydrates at a meal OR if her sugars are high.  She takes insulin before she eats.  She is not interested in learning to do a carb ratio or correction factor for high sugars.    REPORTS:      Pt verbalized understanding of concepts discussed and recommendations provided today.         ASSESSMENT:  Sugars extremely variable.  Highs appear to be related to less activity (non work days) and Karen's arthritis pain.  Lows appear to be related to more activity.  Karen does not like to have a low sugar at work because she doesn't always have access to sugar or juice.  This is our 3rd visit and sugars appear to be the same.  Karen is not interested in a carb ratio or correction scale.      To help improve her sugars, Karen may benefit from an injectable GLP-1.  She has been on Metformin in the past and it hurt her stomach.  She is following up with Cecile Juarez PA-C in 1 month and can discuss a GLP-1 medication at that visit.    PLAN  For days you do not work, insulin plan:  Lantus, 30 units at night  Novolog, 10 - 20 units before you eat, depending on how much carbohydrate you eat.  For days you do work, insulin plan:  Lantus, 25 units at night  Novolog, 10 - 20 units before you eat.    Talk to your doctor about medication for arthritis.    Follow-up: in 1 month with endocrinology.    See Care Plan for co-developed,  "patient-state behavior change goals.  AVS provided for patient today.      SUBJECTIVE/OBJECTIVE:     Cultural Influences/Ethnic Background:  Not  or   Works overnights at [employer].    Diabetes Symptoms & Complications:          Patient Problem List and Family Medical History reviewed for relevant medical history, current medical status, and diabetes risk factors.    Vitals:  LMP  (LMP Unknown)   Estimated body mass index is 19.69 kg/m  as calculated from the following:    Height as of 11/7/23: 1.676 m (5' 6\").    Weight as of 11/7/23: 55.3 kg (122 lb).   Last 3 BP:   BP Readings from Last 3 Encounters:   11/07/23 106/67   07/25/23 111/76   03/14/23 130/77       History   Smoking Status    Some Days    Packs/day: 0.50    Years: 40.00    Types: Cigarettes   Smokeless Tobacco    Never       Labs:  Lab Results   Component Value Date    A1C 10.1 04/26/2021    HEMOGLOBINA1 9.3 07/25/2023     Lab Results   Component Value Date     03/17/2022     09/24/2021     09/23/2020     Lab Results   Component Value Date    LDL 72 07/25/2023     09/23/2020     HDL Cholesterol   Date Value Ref Range Status   09/23/2020 47 (L) >49 mg/dL Final     Direct Measure HDL   Date Value Ref Range Status   07/25/2023 57 >=50 mg/dL Final   ]  GFR Estimate   Date Value Ref Range Status   07/25/2023 >90 >60 mL/min/1.73m2 Final   09/23/2020 82 >60 mL/min/[1.73_m2] Final     Comment:     Non  GFR Calc  Starting 12/18/2018, serum creatinine based estimated GFR (eGFR) will be   calculated using the Chronic Kidney Disease Epidemiology Collaboration   (CKD-EPI) equation.       GFR Estimate If Black   Date Value Ref Range Status   09/23/2020 >90 >60 mL/min/[1.73_m2] Final     Comment:      GFR Calc  Starting 12/18/2018, serum creatinine based estimated GFR (eGFR) will be   calculated using the Chronic Kidney Disease Epidemiology Collaboration   (CKD-EPI) equation.       Lab Results " "  Component Value Date    CR 0.71 07/25/2023    CR 0.82 09/23/2020     No results found for: \"MICROALBUMIN\"    Healthy Eating:  Meal planning/habits:  (Works 8:15 pm - 4:45 am.  Sleeps 6am - 1 pm, then back to bed till 5-6 pm.)  Breakfast: 2pm:  1 slice bread, egg, fruit (orange).  Water.  Lunch: Before work, 1 cup milk or tea with cinnamon.  Dinner: 1 slice bread.  Sometimes chicken/fish.  Green vegetables.  Snacks: 12:30 am:  yogurt from home.  Has patient met with a dietitian in the past?: Yes     Being Active:     Active at work, Wed - Sunday.     Monitoring:  Dodie 3 with the phone Radha    Taking Medications:  Diabetes Medication(s)       Insulin       insulin glargine (LANTUS SOLOSTAR) 100 UNIT/ML pen INJECT 22 UNITS AT BEDTIME.     Patient taking differently: 15-20 Units at bedtime INJECT 22 UNITS AT BEDTIME.     NOVOLOG FLEXPEN 100 UNIT/ML soln Inject 6 units with meals and correction. Pt uses approx 30 units daily.               Problem Solving:  Is the patient at risk for hypoglycemia?: Yes  Hypoglycemia Frequency: Weekly (couple times a week while at work - she is active at her job)  Hypoglycemia Treatment: Candy (juice at awork.)     Hypoglycemia symptoms  Dizziness or Light-Headedness: Yes  Feeling shaky: Yes      Healthy Coping:     Patient Activation Measure Survey Score:      12/17/2014    11:00 AM   MISTI Score (Last Two)   MISTI Raw Score 41   Activation Score 63.2   MISTI Level 3         Care Plan and Education Provided:  Care Plan: Diabetes   Updates made by Mali Stanley since 1/2/2024 12:00 AM        Problem: Diabetes Self-Management Education Needed to Optimize Self-Care Behaviors         Goal: Monitoring - monitor glucose and ketones as directed    Start Date: 11/14/2023   This Visit's Progress: 100%   Recent Progress: 100%   Note:    Wear CGM continuously.       Goal: Taking Medication - patient is consistently taking medications as directed    Start Date: 11/28/2023   This Visit's Progress: 0% "   Recent Progress: 0%   Note:    Take consistent dose of Lantus, 20 units daily.         Mali Stanley RDN, ISAIAH, [facility]  Dietitian and Diabetes  - Endocrinology  [facility]        Time Spent: 30 minutes  Encounter Type: Individual    Any diabetes medication dose changes were made via the CDE Protocol per the patient's endocrinology provider. A copy of this encounter was shared with the provider.

## 2024-01-02 NOTE — PATIENT INSTRUCTIONS
Here is a summary of our visit today:    For days you do not work, insulin plan:  Lantus, 30 units at night  Novolog, 10 - 20 units before you eat, depending on how much carbohydrate you eat.  For days you do work, insulin plan:  Lantus, 25 units at night  Novolog, 10 - 20 units before you eat.    Talk to your doctor about medication for arthritis.        Thank you!    Mali Stanley RDN, LD, [facility]     To ask a question to your Endocrine care team, please send them a [employer] message, or reach them by phone at [phone]    To schedule a Diabetes Education Visit, please contact our specialty schedulers at [phone]  To expedite your medication refill(s), please contact your pharmacy and have them   fax a refill request to: [phone].  *Please allow 3 business days for routine medication refills.  *Please allow 5 business days for controlled substance medication refills.     For after-hours urgent Endocrine issues, that do not require 911, please dial [phone], and ask to speak with the Endocrinologist On-Call

## 2024-05-03 DIAGNOSIS — Z79.4 TYPE 2 DIABETES MELLITUS WITHOUT COMPLICATION, WITH LONG-TERM CURRENT USE OF INSULIN (H): ICD-10-CM

## 2024-05-03 DIAGNOSIS — E11.9 TYPE 2 DIABETES MELLITUS WITHOUT COMPLICATION, WITH LONG-TERM CURRENT USE OF INSULIN (H): ICD-10-CM

## 2024-05-03 NOTE — TELEPHONE ENCOUNTER
[facility] Call Center    Phone Message    May a detailed message be left on voicemail: yes     Reason for Call: Medication Question or concern regarding medication   Prescription Clarification  Name of Medication:    insulin glargine (LANTUS SOLOSTAR) 100 UNIT/ML pen       NOVOLOG FLEXPEN 100 UNIT/ML soln     Prescribing Provider: Cecile Juarez PA-C    Pharmacy: [facility]/pharmacy #[id number] - [place], [place] - [address]    What on the order needs clarification? Novolog and Lantis is not covered under insurance, patient needing insulin prescribed to something insurance will cover.      Action Taken: Message routed to:  [facility] Endo    Travel Screening: Not Applicable

## 2024-05-05 NOTE — TELEPHONE ENCOUNTER
Alternative insulin requested    11/7/2023  [facility] [place]    Cecile Juarez PA-C  Endocrinology, Diabetes, and Metabolism    Nv: 6/4/24

## 2024-05-05 NOTE — TELEPHONE ENCOUNTER
Prescription Clarification  Name of Medication:    insulin glargine (LANTUS SOLOSTAR) 100 UNIT/ML pen        NOVOLOG FLEXPEN 100 UNIT/ML soln      Prescribing Provider: Cecile Juarez PA-C               Pharmacy: [facility]/pharmacy #[id number] - [place], [place] - [address]               What on the order needs clarification? Novolog and Lantis is not covered under insurance, patient needing insulin prescribed to something insurance will cover.

## 2024-05-06 DIAGNOSIS — Z79.4 TYPE 2 DIABETES MELLITUS WITHOUT COMPLICATION, WITH LONG-TERM CURRENT USE OF INSULIN (H): Primary | ICD-10-CM

## 2024-05-06 DIAGNOSIS — E11.9 TYPE 2 DIABETES MELLITUS WITHOUT COMPLICATION, WITH LONG-TERM CURRENT USE OF INSULIN (H): Primary | ICD-10-CM

## 2024-05-06 RX ORDER — INSULIN ASPART 100 [IU]/ML
INJECTION, SOLUTION INTRAVENOUS; SUBCUTANEOUS
OUTPATIENT
Start: 2024-05-06

## 2024-05-06 RX ORDER — INSULIN GLARGINE 100 [IU]/ML
INJECTION, SOLUTION SUBCUTANEOUS
Refills: 3 | OUTPATIENT
Start: 2024-05-06

## 2024-05-06 RX ORDER — INSULIN GLARGINE-YFGN 100 [IU]/ML
22 INJECTION, SOLUTION SUBCUTANEOUS AT BEDTIME
Qty: 15 ML | Refills: 3 | Status: SHIPPED | OUTPATIENT
Start: 2024-05-06

## 2024-05-21 ENCOUNTER — OFFICE VISIT (OUTPATIENT)
Dept: FAMILY MEDICINE | Facility: CLINIC | Age: 54
End: 2024-05-21
Payer: COMMERCIAL

## 2024-05-21 VITALS
SYSTOLIC BLOOD PRESSURE: 127 MMHG | HEART RATE: 66 BPM | BODY MASS INDEX: 19.72 KG/M2 | OXYGEN SATURATION: 99 % | TEMPERATURE: 99 F | WEIGHT: 122.7 LBS | DIASTOLIC BLOOD PRESSURE: 82 MMHG | HEIGHT: 66 IN | RESPIRATION RATE: 17 BRPM

## 2024-05-21 DIAGNOSIS — Z79.4 TYPE 2 DIABETES MELLITUS WITH HYPERGLYCEMIA, WITH LONG-TERM CURRENT USE OF INSULIN (H): ICD-10-CM

## 2024-05-21 DIAGNOSIS — N81.4 UTERINE PROLAPSE: ICD-10-CM

## 2024-05-21 DIAGNOSIS — Z76.89 ENCOUNTER TO ESTABLISH CARE: Primary | ICD-10-CM

## 2024-05-21 DIAGNOSIS — E11.65 TYPE 2 DIABETES MELLITUS WITH HYPERGLYCEMIA, WITH LONG-TERM CURRENT USE OF INSULIN (H): ICD-10-CM

## 2024-05-21 DIAGNOSIS — Z12.31 VISIT FOR SCREENING MAMMOGRAM: ICD-10-CM

## 2024-05-21 DIAGNOSIS — Z12.11 SCREEN FOR COLON CANCER: ICD-10-CM

## 2024-05-21 PROCEDURE — 82274 ASSAY TEST FOR BLOOD FECAL: CPT | Performed by: FAMILY MEDICINE

## 2024-05-21 PROCEDURE — 99204 OFFICE O/P NEW MOD 45 MIN: CPT | Performed by: FAMILY MEDICINE

## 2024-05-21 RX ORDER — ACETAMINOPHEN 325 MG/1
325 TABLET ORAL ONCE
COMMUNITY
Start: 2023-12-13

## 2024-05-21 RX ORDER — CHOLECALCIFEROL (VITAMIN D3) 50 MCG
1 TABLET ORAL
COMMUNITY
Start: 2024-04-05

## 2024-05-21 NOTE — PATIENT INSTRUCTIONS
Patient Education   Here is the plan from today's visit    1. Encounter to establish care    2. Visit for screening mammogram  - MA SCREENING DIGITAL BILAT - Future  (s+30); Future    3. Screen for colon cancer  - Fecal colorectal cancer screen FIT - Future (S+30); Future    4. Type 2 diabetes mellitus (H)    5. Uterine prolapse  - Adult Urology  Referral; Future          Please call or return to clinic if your symptoms don't go away.    Follow up plan  No follow-ups on file.    Thank you for coming to [facility] today.  Lab Testing:  **If you had lab testing today and your results are reassuring or normal they will be mailed to you or sent through Percello within 7 days.   **If the lab tests need quick action we will call you with the results.  **If you are having labs done on a different day, please call [phone] to schedule at [facility] or [phone] for other [facility] Outpatient Lab locations. Labs do not offer walk-in appointments.  The phone number we will call with results is # [phone] (home) . If this is not the best number please call our clinic and change the number.  Medication Refills:  If you need any refills please call your pharmacy and they will contact us.   If you need to  your refill at a new pharmacy, please contact the new pharmacy directly. The new pharmacy will help you get your medications transferred faster.   Scheduling:  If you have any concerns about today's visit or wish to schedule another appointment please call our office during normal business hours [phone] (8-5:00 M-F). If you can no longer make a scheduled visit, please cancel via Percello or call us to cancel.   If a referral was made to an [facility] specialty provider and you do not get a call from central scheduling, please refer to directions on your visit summary or call our office during normal business hours for assistance.   If a Mammogram was ordered for you at the  Breast Center call [phone] to schedule or change your appointment.  If you had an XRay/CT/Ultrasound/MRI ordered the number is [phone] to schedule or change your radiology appointment.   [facility] has limited ultrasound appointments available on Wednesdays, if you would like your ultrasound at [facility], please call [phone] to schedule.   Medical Concerns:  If you have urgent medical concerns please call [phone] at any time of the day.    Leena Cyr, DO

## 2024-05-21 NOTE — COMMUNITY RESOURCES LIST (ENGLISH)
May 21, 2024           YOUR PERSONALIZED LIST OF SERVICES & PROGRAMS           NAVIGATION    Eligibility Screening      [facility] application assistance [facility]  [address] [place] [address] (Distance: 3.3 miles)  Language: English  Fee: Free      [facility] Family Investment Program (MFIP)  [address] [place] [address] (Distance: 3.3 miles)  Language: English  Fee: Free      [employer] [place] - SNAP (formerly food stamps) Screening and Application help  Phone: (269) 587-7058  Website: https://www.Lemur IMS.org/programs/[place]-food-helpline/  Language: English  Hours: Mon 10:00 AM - 5:00 PM Tue 10:00 AM - 5:00 PM Wed 10:00 AM - 5:00 PM Thu 10:00 AM - 5:00 PM Fri 10:00 AM - 5:00 PM  Fee: Free  Accessibility: Ada accessible, Blind accommodation, Deaf or hard of hearing, Translation services        ASSISTANCE    Nutrition Benefits      [facility] application assistance [facility]  [address] [place] [address] (Distance: 3.3 miles)  Language: English  Fee: Free      [facility] application assistance [facility]  [address] [place] [address] (Distance: 3.4 miles)  Language: English  Fee: Free      [employer] [place] - SNAP (formerly food stamps) Screening and Application help  Phone: (239) 335-2850  Website: https://www.[employer] IMS.org/programs/[place]-food-helpline/  Language: English  Hours: Mon 10:00 AM - 5:00 PM Tue 10:00 AM - 5:00 PM Wed 10:00 AM - 5:00 PM Thu 10:00 AM - 5:00 PM Fri 10:00 AM - 5:00 PM  Fee: Free  Accessibility: Ada accessible, Blind accommodation, Deaf or hard of hearing, Translation services    Pantry      [employer] - Food pantry  [address] [place] [address] (Distance: 4.4 miles)  Phone: (986) 998-0394  Website: http://www.sacafoodshelf.org/   Language: English, Emirati  Fee: Free  Accessibility: Ada accessible      [employer] - Senior Food Shelf  [address] [place] [address] (Distance: 4.9 miles)  Phone: (605) 892-9496  Language: Danish, English, Emirati  Fee: Free      [employer] [place] [employer]  Phone: (657) 816-6728  Website: www.bountifulbasketDecohunt.Simbionix  Language: English, Emirati  Hours: Mon 9:00 AM - 3:30 PM Tue 9:00 AM - 6:30 PM Wed 9:00 AM - 3:30 PM Thu 9:00 AM - 12:30 PM Fri 9:00 AM - 12:30 PM Sat 9:00 AM - 12:00 PM  Fee: Free               IMPORTANT NUMBERS & WEBSITES        Emergency Services  911  .   [facility]  211 http://211unitedway.org  .   Poison Control  (778) 173-3448 http://[place]poison.org http://[place]poison.org  .     Suicide and Crisis Lifeline  988 http://988lifeline.org  .   Childhelp [place] Child Abuse Hotline  141.906.6780 http://Childhelphotline.org   .   National Sexual Assault Hotline  (978) 322-2559 (HOPE) http://Graft Conceptsn.org   .     National Runaway Safeline  (485) 712-9674 (RUNAWAY) http://netFactorruNumberFour.org  .   Pregnancy & Postpartum Support  Call/text 845-586-9084  [place]: http://ppsupportmn.org  [place]: http://Revee.com/wi  .   Substance Abuse National Helpline ([facility])  345-068-HELP (3148) http://Findtreatment.gov   .                DISCLAIMER: These resources have been generated via the [employer] Platform. [employer] does not endorse any service providers mentioned in this resource list. [employer] does not guarantee that the services mentioned in this resource list will be available to you or will improve your health or wellness.    [facility]

## 2024-05-21 NOTE — PROGRESS NOTES
"  Assessment & Plan     Encounter to establish care  Introduced to self and practice.     Uterine prolapse  Stage 3-4 with cervix visible at vaginal opening. Discussed diagnosis and potential mangement (pessary, surgery). Referral placed to urogyn for additional management.   - Adult Urology  Referral    Type 2 diabetes mellitus (H)  Not at goal. She has a endocrinology appt in June and declined labs today. Encouraged her to keep appointment.     Visit for screening mammogram  Agreeable to breast cancer screening.   - MA SCREENING DIGITAL BILAT - Future  (s+30)    Screen for colon cancer  Agreeable to colon cancer screening. Has done FIT in the past and opted for this again.   - Fecal colorectal cancer screen FIT - Future (S+30)  - Fecal colorectal cancer screen FIT - Future (S+30)            Nicotine/Tobacco Cessation  She reports that she has been smoking cigarettes. She started smoking about 37 years ago. She has a 57.4 pack-year smoking history. She has never used smokeless tobacco.      Return in about 3 months (around 8/21/2024) for Annual Physical.    Dolores Jones is a 53 year old, presenting for the following health issues:  Establish Care (Not sure )        5/21/2024     1:10 PM   Additional Questions   Roomed by Roberta   Accompanied by self         5/21/2024    Information    services provided? No     HPI                 1) Frequent urination - worried about infection  Vaginal dryness  Thinks uterus is coming out of vagina.   Has never been pregnant    2) Diabetes  Doing well - this AM blood sugar was 124  Eye doctor - last seen April 30th;   [facility] [place] - seeing them every 3 months.             Objective    /82   Pulse 66   Temp 99  F (37.2  C) (Oral)   Resp 17   Ht 1.676 m (5' 6\")   Wt 55.7 kg (122 lb 11.2 oz)   LMP  (LMP Unknown)   SpO2 99%   BMI 19.80 kg/m    Body mass index is 19.8 kg/m .  Physical Exam  Constitutional:       Appearance: " She is well-developed.   HENT:      Head: Normocephalic and atraumatic.   Eyes:      Conjunctiva/sclera: Conjunctivae normal.   Pulmonary:      Effort: Pulmonary effort is normal.   Genitourinary:     Comments: Cervix is protruding from vaginal os, easily able to reduce.   Musculoskeletal:      Cervical back: Normal range of motion and neck supple.   Skin:     General: Skin is warm and dry.   Neurological:      Mental Status: She is alert and oriented to person, place, and time.      Deep Tendon Reflexes: Reflexes are normal and symmetric.   Psychiatric:         Behavior: Behavior normal.         Thought Content: Thought content normal.         Judgment: Judgment normal.                Signed Electronically by: Leena Cyr DO

## 2024-05-21 NOTE — LETTER
May 28, 2024      Karen Braxton  [address]  [place] [address]        Dear ,    We are writing to inform you of your test results.  Your results are reassuring.If you have questions please contact the clinic to make an appointment with  me or your primary doctor to discuss the results. Mauritian translation:tad dunham kuu gu xaqiijinay jawaabtaadii hadaad suaal qabto colten angelita wac dhaqtajaswinderka sedau qaaska ah oo ka qabso camila si uu vikash sharxo jawaabtaada.      Resulted Orders   Fecal colorectal cancer screen FIT - Future (S+30)   Result Value Ref Range    Occult Blood Screen FIT Negative Negative       If you have any questions or concerns, please call the clinic at the number listed above.       Sincerely,      Leena Cyr, DO

## 2024-05-28 LAB — HEMOCCULT STL QL IA: NEGATIVE

## 2024-05-31 DIAGNOSIS — E11.65 TYPE 2 DIABETES MELLITUS WITH HYPERGLYCEMIA, WITHOUT LONG-TERM CURRENT USE OF INSULIN (H): ICD-10-CM

## 2024-06-01 RX ORDER — BLOOD-GLUCOSE SENSOR
1 EACH MISCELLANEOUS
Qty: 6 EACH | Refills: 3 | Status: SHIPPED | OUTPATIENT
Start: 2024-06-01

## 2024-06-01 NOTE — TELEPHONE ENCOUNTER
Continuous Blood Gluc Sensor (FREESTYLE RAHEEM 3 SENSOR) [facility]   Disp-2 each, R-5,   Start: 11/14/2023 11/7/2023  [facility] Cecile Martin PA-C  Endocrinology, Diabetes, and Metabolism

## 2024-06-03 NOTE — PROGRESS NOTES
Outcome for 06/03/24 9:55 AM: Left Voicemail  with Dexcom sharing instructions.  Appointment reminder phone call made to patient.   CHINO Navarrete  Patient is showing 2/5 [place] met. A1c not in range   CHINO Navarrete

## 2024-06-04 ENCOUNTER — OFFICE VISIT (OUTPATIENT)
Dept: ENDOCRINOLOGY | Facility: CLINIC | Age: 54
End: 2024-06-04
Payer: COMMERCIAL

## 2024-06-04 VITALS
SYSTOLIC BLOOD PRESSURE: 106 MMHG | WEIGHT: 128 LBS | OXYGEN SATURATION: 99 % | HEART RATE: 78 BPM | BODY MASS INDEX: 20.66 KG/M2 | DIASTOLIC BLOOD PRESSURE: 72 MMHG

## 2024-06-04 DIAGNOSIS — E11.9 TYPE 2 DIABETES MELLITUS WITHOUT COMPLICATION, WITH LONG-TERM CURRENT USE OF INSULIN (H): Primary | ICD-10-CM

## 2024-06-04 DIAGNOSIS — Z79.4 TYPE 2 DIABETES MELLITUS WITHOUT COMPLICATION, WITH LONG-TERM CURRENT USE OF INSULIN (H): Primary | ICD-10-CM

## 2024-06-04 LAB — HBA1C MFR BLD: 8.2 %

## 2024-06-04 PROCEDURE — 83036 HEMOGLOBIN GLYCOSYLATED A1C: CPT | Performed by: PATHOLOGY

## 2024-06-04 PROCEDURE — 99214 OFFICE O/P EST MOD 30 MIN: CPT | Performed by: PHYSICIAN ASSISTANT

## 2024-06-04 RX ORDER — INSULIN LISPRO 100 [IU]/ML
INJECTION, SOLUTION INTRAVENOUS; SUBCUTANEOUS
Qty: 15 ML | Status: CANCELLED | OUTPATIENT
Start: 2024-06-04

## 2024-06-04 RX ORDER — INSULIN LISPRO 100 [IU]/ML
INJECTION, SOLUTION INTRAVENOUS; SUBCUTANEOUS
COMMUNITY
Start: 2024-05-06

## 2024-06-04 ASSESSMENT — PAIN SCALES - GENERAL: PAINLEVEL: WORST PAIN (10)

## 2024-06-04 NOTE — LETTER
6/4/2024       RE: Karen Braxton  [address]  [place] [address]     Dear Colleague,    Thank you for referring your patient, Karen Braxton, to the [facility] ENDOCRINOLOG[facility]IC [place] at [facility]. Please see a copy of my visit note below.    Outcome for 06/03/24 9:55 AM: Left Voicemail  with Dexcom sharing instructions.  Appointment reminder phone call made to patient.   CHINO Navarrete  Patient is showing 2/5 MNCM met. A1c not in range   CHINO Navarrete              HPI:  Irais Jones is a 54 year old female with type 2 diabetes mellitus.    Pt being seen today in clinic for diabetes follow up.  Pt last seen in Nov 2023.  She was dx having type 2 diabetes around 2002.  Her diabetes is complicated by retinopathy. No known nephropathy or neuropathy.  Her hx is significant for HTN, hyperlipidemia and Vit D deficiency.  For her diabetes, she is prescribed to take Lantus 20 units SQ at hs and Novolog 4 units with meals.  She denies missing insulin doses.  She stopped taking Jardiance stating it made her stomach hurt.  She did not tolerate Metformin- GI side effects.  Pt did not tolerate Bydureon in the past - GI side effects.  Pt's A1C is 8.2 % today.   Patient's A1C has improved since she started wearing a freestyle shauna 3 sensor.  Previous A1C was 11.1 % in Nov 2023.  I reviewed and scanned her Freestyle Libre3 sensor download data in her note below.  Her blood sugar is in target 39 % of the time.  No frequent hypoglycemia.  On ROS today, Karen is working the night shift at [employer] 5 days per week.  Right leg and knee pain.  Pt denies blurred vision, n/v, SOB at rest, cough or fevers.  Pt denies chest pain, abd pain, diarrhea, dysuria, hematuria or sx of neuropathy.  No foot ulcers.    DIABETES CARE:  Retinopathy: yes; seen by outside Oph/retinal staff.  Nephropathy: none; urine microalbuminuria was negative in 7/2023.  She is taking  "Cozaar.  Neuropathy: none.  Foot exam: no ulcers and normal monofilamentous exam today.  Lipids: LDL 72 in 7/2023. Taking Lipitor.  Taking ASA:yes.  CAD:no.  Mental health:denies depression.  Insulin: Basal and meal time insulin.   DM meds:  Pt did not tolerate Metformin- GI side effects. Pt did not tolerate Jardiance- yeast infection and Bydureon - GI side effects.  Testing: Freestyle Libre3 sensor.            ROS:   Please see under HPI.    ALLERGIES:   No Known Allergies      Current Outpatient Medications   Medication Sig Dispense Refill    acetaminophen (TYLENOL) 325 MG tablet Take 325 mg by mouth once      atorvastatin (LIPITOR) 40 MG tablet Take 1 tablet (40 mg) by mouth daily (Patient taking differently: Take 20 mg by mouth every morning) 90 tablet 3    blood glucose (NO BRAND SPECIFIED) lancets standard Use to test blood sugar 4 times daily or as directed. 100 each 2    blood glucose (NO BRAND SPECIFIED) test strip Use to test blood sugar 4 times daily or as directed. 350 strip 3    blood glucose monitoring (NO BRAND SPECIFIED) meter device kit Use to test blood sugar 4 times daily. 1 kit 0    Continuous Glucose Sensor (FREESTYLE RAHEEM 3 SENSOR) MISC 1 EACH EVERY 14 DAYS USE 1 SENSOR EVERY 14 DAYS. 6 each 3    insulin glargine (LANTUS SOLOSTAR) 100 UNIT/ML pen INJECT 22 UNITS AT BEDTIME. (Patient taking differently: 15-20 Units at bedtime INJECT 22 UNITS AT BEDTIME.) 45 mL 3    Insulin Glargine-yfgn 100 UNIT/ML SOPN Inject 22 Units Subcutaneous at bedtime 15 mL 3    insulin lispro (HUMALOG KWIKPEN) 100 UNIT/ML (1 unit dial) KWIKPEN INJECT 6 UNITS WITH MEALS AND CORRECTION UNDER THE SKIN. APPROX 30 UNITS DAILY      insulin pen needle (B-D U/F) 31G X 8 MM miscellaneous USE TO INJECT 4 TIMES A DAY OR AS DIRECTED 400 each 2    insulin syringe-needle U-100 (BD INSULIN SYRINGE ULTRAFINE) 30G X 1/2\" 1 ML Use one syringe daily or as directed.  3 month supply 100 each prn    losartan (COZAAR) 25 MG tablet Take 25 mg " by mouth daily      omeprazole (PRILOSEC) 20 MG DR capsule Take 20 mg by mouth daily      prednisoLONE acetate (PRED FORTE) 1 % ophthalmic suspension Place 1 drop Into the left eye 3 times daily 15 mL 0    vitamin D2 (ERGOCALCIFEROL) 21024 units (1250 mcg) capsule Take 1 capsule (50,000 Units) by mouth every 7 days 8 capsule 6    VITAMIN D3 50 MCG (2000 UT) tablet Take 1 tablet by mouth daily at 2 pm      insulin aspart (NOVOLOG PEN) 100 UNIT/ML pen Inject 6 units with meals and correction. Pt uses approx 30 units daily. (Patient not taking: Reported on 6/4/2024) 30 mL 2    NOVOLOG FLEXPEN 100 UNIT/ML soln Inject 6 units with meals and correction. Pt uses approx 30 units daily. (Patient not taking: Reported on 6/4/2024) 90 mL 3     SHX:  Smoke: yes.  ETOH: none.   with 5 children.    FHX:  Several family members with diabetes.    PMHX:   1.  Type 2 DM.  2.  Hyperlipidemia.  3.  Amenorrhea.  4.  GERD.  5.  S/P choley.  6.  Sebaceous cyst.  Past Medical History:   Diagnosis Date    Cigarette nicotine dependence without complication     Combined forms of age-related cataract of both eyes     Type 2 diabetes mellitus (H)      Past Surgical History:   Procedure Laterality Date    CATARACT IOL, RT/LT      KNEE SURGERY Left 2001    LAPAROSCOPIC CHOLECYSTECTOMY  2007    PHACOEMULSIFICATION WITH STANDARD INTRAOCULAR LENS IMPLANT Right 01/13/2022    Procedure: PHACOEMULSIFICATION, COMPLEX CATARACT, WITH STANDARD INTRAOCULAR LENS IMPLANT INSERTION right;  Surgeon: Cony Scruggs MD;  Location: [facility]    PHACOEMULSIFICATION WITH STANDARD INTRAOCULAR LENS IMPLANT Left 03/17/2022    Procedure: COMPLEX PHACOEMULSIFICATION, CATARACT, WITH STANDARD INTRAOCULAR LENS IMPLANT INSERTION LEFT;  Surgeon: Cony Scruggs MD;  Location: [facility]    REPAIR CLEFT PALATE CHILD  1972       EXAM:    /72 (BP Location: Right arm)   Pulse 78   Wt 58.1 kg (128 lb)   LMP  (LMP Unknown)   SpO2 99%   BMI 20.66 kg/m      FEET: No  ulcers; normal monofilamentous exam.      RESULTS:    Creatinine   Date Value Ref Range Status   07/25/2023 0.71 0.51 - 0.95 mg/dL Final   09/23/2020 0.82 0.52 - 1.04 mg/dL Final     GFR Estimate   Date Value Ref Range Status   07/25/2023 >90 >60 mL/min/1.73m2 Final   09/23/2020 82 >60 mL/min/[1.73_m2] Final     Comment:     Non  GFR Calc  Starting 12/18/2018, serum creatinine based estimated GFR (eGFR) will be   calculated using the Chronic Kidney Disease Epidemiology Collaboration   (CKD-EPI) equation.       Hemoglobin A1C   Date Value Ref Range Status   04/26/2021 10.1 (H) 0 - 5.6 % Final     Comment:     Normal <5.7% Prediabetes 5.7-6.4%  Diabetes 6.5% or higher - adopted from ADA   consensus guidelines.       Afinion Hemoglobin A1c POCT   Date Value Ref Range Status   06/04/2024 8.2 (H) <=5.7 % Final     Comment:     Normal <5.7%   Prediabetes 5.7-6.4%     Diabetes 6.5% or higher       Note: Adopted from ADA consensus guidelines.     Potassium   Date Value Ref Range Status   07/25/2023 4.9 3.4 - 5.3 mmol/L Final   09/24/2021 4.0 3.4 - 5.3 mmol/L Final   09/23/2020 4.0 3.4 - 5.3 mmol/L Final     ALT   Date Value Ref Range Status   09/24/2021 20 0 - 50 U/L Final   09/23/2020 22 0 - 50 U/L Final     AST   Date Value Ref Range Status   09/24/2021 11 0 - 45 U/L Final   09/23/2020 12 0 - 45 U/L Final     TSH   Date Value Ref Range Status   03/16/2018 2.12 0.40 - 4.00 mU/L Final     T4 Free   Date Value Ref Range Status   08/04/2011 1.22 0.70 - 1.85 ng/dL Final         Cholesterol   Date Value Ref Range Status   07/25/2023 158 <200 mg/dL Final   09/24/2021 149 <200 mg/dL Final   09/23/2020 214 (H) <200 mg/dL Final     Comment:     Desirable:       <200 mg/dl   06/18/2019 250 (H) <200 mg/dL Final     Comment:     Desirable:       <200 mg/dl     HDL Cholesterol   Date Value Ref Range Status   09/23/2020 47 (L) >49 mg/dL Final   06/18/2019 54 >49 mg/dL Final     Direct Measure HDL   Date Value Ref Range  Status   07/25/2023 57 >=50 mg/dL Final   09/24/2021 48 (L) >=50 mg/dL Final     LDL Cholesterol Calculated   Date Value Ref Range Status   07/25/2023 72 <=100 mg/dL Final   09/24/2021 55 <=100 mg/dL Final   09/23/2020 135 (H) <100 mg/dL Final     Comment:     Above desirable:  100-129 mg/dl  Borderline High:  130-159 mg/dL  High:             160-189 mg/dL  Very high:       >189 mg/dl     06/18/2019 152 (H) <100 mg/dL Final     Comment:     Above desirable:  100-129 mg/dl  Borderline High:  130-159 mg/dL  High:             160-189 mg/dL  Very high:       >189 mg/dl       Triglycerides   Date Value Ref Range Status   07/25/2023 143 <150 mg/dL Final   09/24/2021 232 (H) <150 mg/dL Final   09/23/2020 160 (H) <150 mg/dL Final     Comment:     Borderline high:  150-199 mg/dl  High:             200-499 mg/dl  Very high:       >499 mg/dl     06/18/2019 220 (H) <150 mg/dL Final     Comment:     Borderline high:  150-199 mg/dl  High:             200-499 mg/dl  Very high:       >499 mg/dl       Cholesterol/HDL Ratio   Date Value Ref Range Status   12/01/2014 6.0 (H) 0.0 - 5.0 Final   07/30/2014 6.1 (H) 0.0 - 5.0 Final       ASSESSMENT/PLAN:    1. TYPE 2 DIABETES MELLITUS: Type 2 diabetes mellitus complicated by background retinopathy.  No nephropathy or neuropathy.  Pt's A1C has improved since she started wearing a freestyle libre3 sensor.   Reminded her to use correction insulin if blood sugar is high.  Also reminded her to take NovoLog before eating.  Continue current insulin doses.  She stopped taking Metformin due to side effects.   Pt did not tolerate Jardiance or Bydureon in the past.  /72 today.     2.  HYPERLIPIDEMIA:  LDL 72 in July 2023.  Pt taking Lipitor.    3. FOLLOW UP: with me in Oct 2024.    Time spent reviewing patient chart notes, labs and glucose meter download today  =  5 minutes.  Time for clinic visit today= 20  minutes.  Time for documentation today =10 minutes.     TOTAL TIME FOR VISIT TODAY = 35  minutes.    Cecile Juarez PA-C

## 2024-06-04 NOTE — PROGRESS NOTES
HPI:  Irais Jones is a 54 year old female with type 2 diabetes mellitus.    Pt being seen today in clinic for diabetes follow up.  Pt last seen in Nov 2023.  She was dx having type 2 diabetes around 2002.  Her diabetes is complicated by retinopathy. No known nephropathy or neuropathy.  Her hx is significant for HTN, hyperlipidemia and Vit D deficiency.  For her diabetes, she is prescribed to take Lantus 20 units SQ at hs and Novolog 4 units with meals.  She denies missing insulin doses.  She stopped taking Jardiance stating it made her stomach hurt.  She did not tolerate Metformin- GI side effects.  Pt did not tolerate Bydureon in the past - GI side effects.  Pt's A1C is 8.2 % today.   Patient's A1C has improved since she started wearing a freestyle shauna 3 sensor.  Previous A1C was 11.1 % in Nov 2023.  I reviewed and scanned her Freestyle Libre3 sensor download data in her note below.  Her blood sugar is in target 39 % of the time.  No frequent hypoglycemia.  On ROS today, Karen is working the night shift at [employer] 5 days per week.  Right leg and knee pain.  Pt denies blurred vision, n/v, SOB at rest, cough or fevers.  Pt denies chest pain, abd pain, diarrhea, dysuria, hematuria or sx of neuropathy.  No foot ulcers.    DIABETES CARE:  Retinopathy: yes; seen by outside Oph/retinal staff.  Nephropathy: none; urine microalbuminuria was negative in 7/2023.  She is taking Cozaar.  Neuropathy: none.  Foot exam: no ulcers and normal monofilamentous exam today.  Lipids: LDL 72 in 7/2023. Taking Lipitor.  Taking ASA:yes.  CAD:no.  Mental health:denies depression.  Insulin: Basal and meal time insulin.   DM meds:  Pt did not tolerate Metformin- GI side effects. Pt did not tolerate Jardiance- yeast infection and Bydureon - GI side effects.  Testing: Freestyle Libre3 sensor.            ROS:   Please see under HPI.    ALLERGIES:   No Known Allergies      Current Outpatient Medications   Medication Sig Dispense Refill     "acetaminophen (TYLENOL) 325 MG tablet Take 325 mg by mouth once      atorvastatin (LIPITOR) 40 MG tablet Take 1 tablet (40 mg) by mouth daily (Patient taking differently: Take 20 mg by mouth every morning) 90 tablet 3    blood glucose (NO BRAND SPECIFIED) lancets standard Use to test blood sugar 4 times daily or as directed. 100 each 2    blood glucose (NO BRAND SPECIFIED) test strip Use to test blood sugar 4 times daily or as directed. 350 strip 3    blood glucose monitoring (NO BRAND SPECIFIED) meter device kit Use to test blood sugar 4 times daily. 1 kit 0    Continuous Glucose Sensor (FREESTYLE RAHEEM 3 SENSOR) MISC 1 EACH EVERY 14 DAYS USE 1 SENSOR EVERY 14 DAYS. 6 each 3    insulin glargine (LANTUS SOLOSTAR) 100 UNIT/ML pen INJECT 22 UNITS AT BEDTIME. (Patient taking differently: 15-20 Units at bedtime INJECT 22 UNITS AT BEDTIME.) 45 mL 3    Insulin Glargine-yfgn 100 UNIT/ML SOPN Inject 22 Units Subcutaneous at bedtime 15 mL 3    insulin lispro (HUMALOG KWIKPEN) 100 UNIT/ML (1 unit dial) KWIKPEN INJECT 6 UNITS WITH MEALS AND CORRECTION UNDER THE SKIN. APPROX 30 UNITS DAILY      insulin pen needle (B-D U/F) 31G X 8 MM miscellaneous USE TO INJECT 4 TIMES A DAY OR AS DIRECTED 400 each 2    insulin syringe-needle U-100 (BD INSULIN SYRINGE ULTRAFINE) 30G X 1/2\" 1 ML Use one syringe daily or as directed.  3 month supply 100 each prn    losartan (COZAAR) 25 MG tablet Take 25 mg by mouth daily      omeprazole (PRILOSEC) 20 MG DR capsule Take 20 mg by mouth daily      prednisoLONE acetate (PRED FORTE) 1 % ophthalmic suspension Place 1 drop Into the left eye 3 times daily 15 mL 0    vitamin D2 (ERGOCALCIFEROL) 98015 units (1250 mcg) capsule Take 1 capsule (50,000 Units) by mouth every 7 days 8 capsule 6    VITAMIN D3 50 MCG (2000 UT) tablet Take 1 tablet by mouth daily at 2 pm      insulin aspart (NOVOLOG PEN) 100 UNIT/ML pen Inject 6 units with meals and correction. Pt uses approx 30 units daily. (Patient not taking: " Reported on 6/4/2024) 30 mL 2    NOVOLOG FLEXPEN 100 UNIT/ML soln Inject 6 units with meals and correction. Pt uses approx 30 units daily. (Patient not taking: Reported on 6/4/2024) 90 mL 3     SHX:  Smoke: yes.  ETOH: none.   with 5 children.    FHX:  Several family members with diabetes.    PMHX:   1.  Type 2 DM.  2.  Hyperlipidemia.  3.  Amenorrhea.  4.  GERD.  5.  S/P choley.  6.  Sebaceous cyst.  Past Medical History:   Diagnosis Date    Cigarette nicotine dependence without complication     Combined forms of age-related cataract of both eyes     Type 2 diabetes mellitus (H)      Past Surgical History:   Procedure Laterality Date    CATARACT IOL, RT/LT      KNEE SURGERY Left 2001    LAPAROSCOPIC CHOLECYSTECTOMY  2007    PHACOEMULSIFICATION WITH STANDARD INTRAOCULAR LENS IMPLANT Right 01/13/2022    Procedure: PHACOEMULSIFICATION, COMPLEX CATARACT, WITH STANDARD INTRAOCULAR LENS IMPLANT INSERTION right;  Surgeon: Cony Scruggs MD;  Location: [facility]    PHACOEMULSIFICATION WITH STANDARD INTRAOCULAR LENS IMPLANT Left 03/17/2022    Procedure: COMPLEX PHACOEMULSIFICATION, CATARACT, WITH STANDARD INTRAOCULAR LENS IMPLANT INSERTION LEFT;  Surgeon: Cony Scruggs MD;  Location: [facility] OR    REPAIR CLEFT PALATE CHILD  1972       EXAM:    /72 (BP Location: Right arm)   Pulse 78   Wt 58.1 kg (128 lb)   LMP  (LMP Unknown)   SpO2 99%   BMI 20.66 kg/m      FEET: No ulcers; normal monofilamentous exam.      RESULTS:    Creatinine   Date Value Ref Range Status   07/25/2023 0.71 0.51 - 0.95 mg/dL Final   09/23/2020 0.82 0.52 - 1.04 mg/dL Final     GFR Estimate   Date Value Ref Range Status   07/25/2023 >90 >60 mL/min/1.73m2 Final   09/23/2020 82 >60 mL/min/[1.73_m2] Final     Comment:     Non  GFR Calc  Starting 12/18/2018, serum creatinine based estimated GFR (eGFR) will be   calculated using the Chronic Kidney Disease Epidemiology Collaboration   (CKD-EPI) equation.       Hemoglobin A1C    Date Value Ref Range Status   04/26/2021 10.1 (H) 0 - 5.6 % Final     Comment:     Normal <5.7% Prediabetes 5.7-6.4%  Diabetes 6.5% or higher - adopted from ADA   consensus guidelines.       Afinion Hemoglobin A1c POCT   Date Value Ref Range Status   06/04/2024 8.2 (H) <=5.7 % Final     Comment:     Normal <5.7%   Prediabetes 5.7-6.4%     Diabetes 6.5% or higher       Note: Adopted from ADA consensus guidelines.     Potassium   Date Value Ref Range Status   07/25/2023 4.9 3.4 - 5.3 mmol/L Final   09/24/2021 4.0 3.4 - 5.3 mmol/L Final   09/23/2020 4.0 3.4 - 5.3 mmol/L Final     ALT   Date Value Ref Range Status   09/24/2021 20 0 - 50 U/L Final   09/23/2020 22 0 - 50 U/L Final     AST   Date Value Ref Range Status   09/24/2021 11 0 - 45 U/L Final   09/23/2020 12 0 - 45 U/L Final     TSH   Date Value Ref Range Status   03/16/2018 2.12 0.40 - 4.00 mU/L Final     T4 Free   Date Value Ref Range Status   08/04/2011 1.22 0.70 - 1.85 ng/dL Final         Cholesterol   Date Value Ref Range Status   07/25/2023 158 <200 mg/dL Final   09/24/2021 149 <200 mg/dL Final   09/23/2020 214 (H) <200 mg/dL Final     Comment:     Desirable:       <200 mg/dl   06/18/2019 250 (H) <200 mg/dL Final     Comment:     Desirable:       <200 mg/dl     HDL Cholesterol   Date Value Ref Range Status   09/23/2020 47 (L) >49 mg/dL Final   06/18/2019 54 >49 mg/dL Final     Direct Measure HDL   Date Value Ref Range Status   07/25/2023 57 >=50 mg/dL Final   09/24/2021 48 (L) >=50 mg/dL Final     LDL Cholesterol Calculated   Date Value Ref Range Status   07/25/2023 72 <=100 mg/dL Final   09/24/2021 55 <=100 mg/dL Final   09/23/2020 135 (H) <100 mg/dL Final     Comment:     Above desirable:  100-129 mg/dl  Borderline High:  130-159 mg/dL  High:             160-189 mg/dL  Very high:       >189 mg/dl     06/18/2019 152 (H) <100 mg/dL Final     Comment:     Above desirable:  100-129 mg/dl  Borderline High:  130-159 mg/dL  High:             160-189 mg/dL  Very  high:       >189 mg/dl       Triglycerides   Date Value Ref Range Status   07/25/2023 143 <150 mg/dL Final   09/24/2021 232 (H) <150 mg/dL Final   09/23/2020 160 (H) <150 mg/dL Final     Comment:     Borderline high:  150-199 mg/dl  High:             200-499 mg/dl  Very high:       >499 mg/dl     06/18/2019 220 (H) <150 mg/dL Final     Comment:     Borderline high:  150-199 mg/dl  High:             200-499 mg/dl  Very high:       >499 mg/dl       Cholesterol/HDL Ratio   Date Value Ref Range Status   12/01/2014 6.0 (H) 0.0 - 5.0 Final   07/30/2014 6.1 (H) 0.0 - 5.0 Final       ASSESSMENT/PLAN:    1. TYPE 2 DIABETES MELLITUS: Type 2 diabetes mellitus complicated by background retinopathy.  No nephropathy or neuropathy.  Pt's A1C has improved since she started wearing a freestyle libre3 sensor.   Reminded her to use correction insulin if blood sugar is high.  Also reminded her to take NovoLog before eating.  Continue current insulin doses.  She stopped taking Metformin due to side effects.   Pt did not tolerate Jardiance or Bydureon in the past.  /72 today.     2.  HYPERLIPIDEMIA:  LDL 72 in July 2023.  Pt taking Lipitor.    3. FOLLOW UP: with me in Oct 2024.    Time spent reviewing patient chart notes, labs and glucose meter download today  =  5 minutes.  Time for clinic visit today= 20  minutes.  Time for documentation today =10 minutes.     TOTAL TIME FOR VISIT TODAY = 35 minutes.    Cecile Juarez PA-C

## 2024-06-04 NOTE — NURSING NOTE
Chief Complaint   Patient presents with    Diabetes     Painful arthritis in the groin and knee. Pain is radiating down the right leg.     Blood pressure 106/72, pulse 78, weight 58.1 kg (128 lb), SpO2 99%, not currently breastfeeding.    Mellisa Howell

## 2024-06-18 NOTE — TELEPHONE ENCOUNTER
MEDICAL RECORDS REQUEST   [facility]  Urology Clinic  [address] [place] [address]  PHONE: [phone]  Fax: [phone]        FUTURE VISIT INFORMATION                                                   Karen Braxton, : 1970 scheduled for future visit at [facility]    APPOINTMENT INFORMATION:  Date: 07/10/2024  Provider:  Lan Lindquist MD  Reason for Visit/Diagnosis: Uterine prolapse    REFERRAL INFORMATION:  Referring provider:  Leena Cyr DO in [facility]      RECORDS REQUESTED FOR VISIT                                                     NOTES  STATUS/DETAILS   OFFICE NOTE from referring provider  yes, 2024 -- Leena Cyr DO in [facility]   MEDICATION LIST  yes     PRE-VISIT CHECKLIST      Joint diagnostic appointment coordinated correctly          (ensure right order & amount of time) Yes   RECORD COLLECTION COMPLETE Yes

## 2024-07-01 ENCOUNTER — PRE VISIT (OUTPATIENT)
Dept: UROLOGY | Facility: CLINIC | Age: 54
End: 2024-07-01
Payer: COMMERCIAL

## 2024-07-01 NOTE — TELEPHONE ENCOUNTER
Reason for visit: consult     Relevant information: uterine prolapse    Records/imaging/labs/orders: in epic    Pt called: No need for a call    At Rooming: urine + PVR    Charlie Ramírez  7/1/2024  2:31 AM

## 2024-07-10 ENCOUNTER — PRE VISIT (OUTPATIENT)
Dept: UROLOGY | Facility: CLINIC | Age: 54
End: 2024-07-10

## 2024-07-10 ENCOUNTER — OFFICE VISIT (OUTPATIENT)
Dept: UROLOGY | Facility: CLINIC | Age: 54
End: 2024-07-10
Attending: FAMILY MEDICINE
Payer: COMMERCIAL

## 2024-07-10 ENCOUNTER — APPOINTMENT (OUTPATIENT)
Dept: UROLOGY | Facility: CLINIC | Age: 54
End: 2024-07-10
Payer: COMMERCIAL

## 2024-07-10 VITALS
HEIGHT: 66 IN | HEART RATE: 78 BPM | SYSTOLIC BLOOD PRESSURE: 126 MMHG | BODY MASS INDEX: 20.66 KG/M2 | DIASTOLIC BLOOD PRESSURE: 80 MMHG

## 2024-07-10 DIAGNOSIS — N81.4 UTERINE PROLAPSE: ICD-10-CM

## 2024-07-10 DIAGNOSIS — N81.11 MIDLINE CYSTOCELE: Primary | ICD-10-CM

## 2024-07-10 DIAGNOSIS — N81.6 RECTOCELE: ICD-10-CM

## 2024-07-10 PROCEDURE — 99204 OFFICE O/P NEW MOD 45 MIN: CPT | Performed by: UROLOGY

## 2024-07-10 RX ORDER — CEFAZOLIN SODIUM 2 G/50ML
2 SOLUTION INTRAVENOUS SEE ADMIN INSTRUCTIONS
OUTPATIENT
Start: 2024-07-10

## 2024-07-10 RX ORDER — CEFAZOLIN SODIUM 2 G/50ML
2 SOLUTION INTRAVENOUS
OUTPATIENT
Start: 2024-07-10

## 2024-07-10 ASSESSMENT — PAIN SCALES - GENERAL: PAINLEVEL: NO PAIN (0)

## 2024-07-10 NOTE — LETTER
"7/10/2024       RE: Karen Braxton  [address]  [place] [address]     Dear Colleague,    Thank you for referring your patient, Karen Braxton, to the [facility]OLOG[facility] [place] at [facility]. Please see a copy of my visit note below.    HPI:  Karen Braxton is a 54 year old female with pelvic organ prolapse.  She doesn't normally have difficulty voiding, no recurrent uti's, but bothered by bulge.        Reviewed previous notes from Dr. Cyr on 24    Exam:  /80   Pulse 78   Ht 1.676 m (5' 6\")   LMP  (LMP Unknown)   BMI 20.66 kg/m    GENERAL: alert and no distress  EYES: Eyes grossly normal to inspection.  No discharge or erythema, or obvious scleral/conjunctival abnormalities.  RESP: No audible wheeze, cough, or visible cyanosis.    SKIN: Visible skin clear. No significant rash, abnormal pigmentation or lesions.  NEURO: Cranial nerves grossly intact.  Mentation and speech appropriate for age.  PSYCH: Appropriate affect, tone, and pace of words  Pelvic exam:  total procidentia     Review of Labs:  The following labs were reviewed by me and discussed with the patient:  Lab Results   Component Value Date    WBC 5.6 2020     Lab Results   Component Value Date    RBC 4.54 2020     Lab Results   Component Value Date    HGB 13.3 2020     Lab Results   Component Value Date    HCT 41.0 2020     Lab Results   Component Value Date    MCV 90 2020     Lab Results   Component Value Date    MCH 29.3 2020     Lab Results   Component Value Date    MCHC 32.4 2020     Lab Results   Component Value Date    RDW 13.7 2020     Lab Results   Component Value Date     2020        Last Comprehensive Metabolic Panel:  Sodium   Date Value Ref Range Status   2023 140 136 - 145 mmol/L Final   2020 135 133 - 144 mmol/L Final     Potassium   Date Value Ref Range Status   2023 " 4.9 3.4 - 5.3 mmol/L Final   09/24/2021 4.0 3.4 - 5.3 mmol/L Final   09/23/2020 4.0 3.4 - 5.3 mmol/L Final     Chloride   Date Value Ref Range Status   07/25/2023 104 98 - 107 mmol/L Final   09/24/2021 104 94 - 109 mmol/L Final   09/23/2020 105 94 - 109 mmol/L Final     Carbon Dioxide   Date Value Ref Range Status   09/23/2020 25 20 - 32 mmol/L Final     Carbon Dioxide (CO2)   Date Value Ref Range Status   07/25/2023 28 22 - 29 mmol/L Final   09/24/2021 24 20 - 32 mmol/L Final     Anion Gap   Date Value Ref Range Status   07/25/2023 8 7 - 15 mmol/L Final   09/24/2021 9 3 - 14 mmol/L Final   09/23/2020 5 3 - 14 mmol/L Final     Glucose   Date Value Ref Range Status   09/24/2021 291 (H) 70 - 99 mg/dL Final   09/23/2020 383 (H) 70 - 99 mg/dL Final     GLUCOSE BY METER POCT   Date Value Ref Range Status   03/17/2022 189 (H) 70 - 99 mg/dL Final     Urea Nitrogen   Date Value Ref Range Status   09/24/2021 10 7 - 30 mg/dL Final   09/23/2020 15 7 - 30 mg/dL Final     Creatinine   Date Value Ref Range Status   07/25/2023 0.71 0.51 - 0.95 mg/dL Final   09/23/2020 0.82 0.52 - 1.04 mg/dL Final     GFR Estimate   Date Value Ref Range Status   07/25/2023 >90 >60 mL/min/1.73m2 Final   09/23/2020 82 >60 mL/min/[1.73_m2] Final     Comment:     Non  GFR Calc  Starting 12/18/2018, serum creatinine based estimated GFR (eGFR) will be   calculated using the Chronic Kidney Disease Epidemiology Collaboration   (CKD-EPI) equation.       Calcium   Date Value Ref Range Status   09/24/2021 9.1 8.5 - 10.1 mg/dL Final   09/23/2020 9.0 8.5 - 10.1 mg/dL Final     Bilirubin Total   Date Value Ref Range Status   09/24/2021 0.2 0.2 - 1.3 mg/dL Final   09/23/2020 0.3 0.2 - 1.3 mg/dL Final     Alkaline Phosphatase   Date Value Ref Range Status   09/24/2021 130 40 - 150 U/L Final   09/23/2020 147 40 - 150 U/L Final     ALT   Date Value Ref Range Status   09/24/2021 20 0 - 50 U/L Final   09/23/2020 22 0 - 50 U/L Final     AST   Date Value  Ref Range Status   09/24/2021 11 0 - 45 U/L Final   09/23/2020 12 0 - 45 U/L Final                Color Urine (no units)   Date Value   11/23/2012 Yellow     Appearance Urine (no units)   Date Value   11/23/2012 Clear     Glucose Urine (mg/dL)   Date Value   11/23/2012 100 (A)     Bilirubin Urine (no units)   Date Value   11/23/2012 Negative     Ketones Urine (mg/dL)   Date Value   11/23/2012 Negative     Specific Gravity Urine (no units)   Date Value   11/23/2012 1.015     pH Urine (pH)   Date Value   11/23/2012 5.5     Protein Albumin Urine (mg/dL)   Date Value   11/23/2012 Negative     Urobilinogen Urine (EU/dL)   Date Value   11/23/2012 0.2     Nitrite Urine (no units)   Date Value   11/23/2012 Negative     Leukocyte Esterase Urine (no units)   Date Value   11/23/2012 Negative      Hgb A1C 7/25/23 is 9.3    Assessment & Plan  53 y/o female with grade IV pelvic organ prolapse including uterine.  We discussed different options including observation, pessary, and both transvaginal/tranabdominal approaches for surgical correction.  Pt would like to proceed with robotic scp along with a hysterectomy by Dr. Humphries.  -schedule apt. With Dr. Humphries to discuss hysterectomy  -schedule robotic SCP    Lan Lindquist MD  [facility] [place]

## 2024-07-12 ENCOUNTER — TELEPHONE (OUTPATIENT)
Dept: OBGYN | Facility: CLINIC | Age: 54
End: 2024-07-12
Payer: COMMERCIAL

## 2024-07-12 DIAGNOSIS — N81.4 CYSTOCELE WITH UTERINE PROLAPSE: Primary | ICD-10-CM

## 2024-07-12 RX ORDER — METRONIDAZOLE 500 MG/100ML
500 INJECTION, SOLUTION INTRAVENOUS
OUTPATIENT
Start: 2024-07-12

## 2024-07-12 RX ORDER — ACETAMINOPHEN 325 MG/1
975 TABLET ORAL ONCE
OUTPATIENT
Start: 2024-07-12 | End: 2024-07-12

## 2024-07-12 RX ORDER — PHENAZOPYRIDINE HYDROCHLORIDE 100 MG/1
200 TABLET, FILM COATED ORAL ONCE
OUTPATIENT
Start: 2024-07-12 | End: 2024-07-12

## 2024-07-18 ENCOUNTER — OFFICE VISIT (OUTPATIENT)
Dept: OBGYN | Facility: CLINIC | Age: 54
End: 2024-07-18
Attending: OBSTETRICS & GYNECOLOGY
Payer: COMMERCIAL

## 2024-07-18 ENCOUNTER — ANCILLARY PROCEDURE (OUTPATIENT)
Dept: ULTRASOUND IMAGING | Facility: CLINIC | Age: 54
End: 2024-07-18
Attending: OBSTETRICS & GYNECOLOGY
Payer: COMMERCIAL

## 2024-07-18 VITALS
WEIGHT: 123.5 LBS | HEIGHT: 66 IN | BODY MASS INDEX: 19.85 KG/M2 | DIASTOLIC BLOOD PRESSURE: 80 MMHG | HEART RATE: 83 BPM | SYSTOLIC BLOOD PRESSURE: 129 MMHG

## 2024-07-18 DIAGNOSIS — N81.3 PROCIDENTIA OF UTERUS: Primary | ICD-10-CM

## 2024-07-18 DIAGNOSIS — N81.4 CYSTOCELE WITH UTERINE PROLAPSE: ICD-10-CM

## 2024-07-18 PROCEDURE — 87624 HPV HI-RISK TYP POOLED RSLT: CPT | Performed by: OBSTETRICS & GYNECOLOGY

## 2024-07-18 PROCEDURE — 99213 OFFICE O/P EST LOW 20 MIN: CPT | Mod: 25 | Performed by: OBSTETRICS & GYNECOLOGY

## 2024-07-18 PROCEDURE — 99204 OFFICE O/P NEW MOD 45 MIN: CPT | Mod: 25 | Performed by: OBSTETRICS & GYNECOLOGY

## 2024-07-18 PROCEDURE — 76830 TRANSVAGINAL US NON-OB: CPT

## 2024-07-18 PROCEDURE — G0145 SCR C/V CYTO,THINLAYER,RESCR: HCPCS | Performed by: OBSTETRICS & GYNECOLOGY

## 2024-07-18 PROCEDURE — 76830 TRANSVAGINAL US NON-OB: CPT | Mod: 26 | Performed by: OBSTETRICS & GYNECOLOGY

## 2024-07-18 ASSESSMENT — ANXIETY QUESTIONNAIRES
IF YOU CHECKED OFF ANY PROBLEMS ON THIS QUESTIONNAIRE, HOW DIFFICULT HAVE THESE PROBLEMS MADE IT FOR YOU TO DO YOUR WORK, TAKE CARE OF THINGS AT HOME, OR GET ALONG WITH OTHER PEOPLE: NOT DIFFICULT AT ALL
1. FEELING NERVOUS, ANXIOUS, OR ON EDGE: NOT AT ALL
7. FEELING AFRAID AS IF SOMETHING AWFUL MIGHT HAPPEN: NOT AT ALL
GAD7 TOTAL SCORE: 0
7. FEELING AFRAID AS IF SOMETHING AWFUL MIGHT HAPPEN: NOT AT ALL
2. NOT BEING ABLE TO STOP OR CONTROL WORRYING: NOT AT ALL
6. BECOMING EASILY ANNOYED OR IRRITABLE: NOT AT ALL
GAD7 TOTAL SCORE: 0
4. TROUBLE RELAXING: NOT AT ALL
8. IF YOU CHECKED OFF ANY PROBLEMS, HOW DIFFICULT HAVE THESE MADE IT FOR YOU TO DO YOUR WORK, TAKE CARE OF THINGS AT HOME, OR GET ALONG WITH OTHER PEOPLE?: NOT DIFFICULT AT ALL
GAD7 TOTAL SCORE: 0
3. WORRYING TOO MUCH ABOUT DIFFERENT THINGS: NOT AT ALL
5. BEING SO RESTLESS THAT IT IS HARD TO SIT STILL: NOT AT ALL

## 2024-07-18 NOTE — LETTER
7/18/2024       RE: Karen Braxton  [address]  [place] [address]     Dear Colleague,    Thank you for referring your patient, Karen Braxton, to the [facility] [place] at [facility]. Please see a copy of my visit note below.    CC:  Consult from Dr. Lindquist for hysterectomy at the time of sacral colpopexy  HPI:  Karen  is a 55 yo  .  Patient is postmenopausal, since 2009 and has no episodes of PMB.  Patient has had pelvic prolapse for many years but has recently become more bothered by the bulge.  She denies difficulty voiding or incontinence.  She is hoping to schedule surgery in January when she is able to take some time off.    Patient works at [employer] and must do lifting.  She is wondering  how much time she would require off after surgery.    Patient states that her diabetes is in good control, but she isn't sure what her hgb A1c is .  Her last in our record was 9.3 one year ago.    Patients records are available and reviewed at today's visit.    Past GYN history:  no history of STIs,   Past OB history: 5 vaginal deliveries, unsure of weight of infants.  All in [place] except the youngest.  Last PAP smear:  Normal, last in September, 2020 NIL/neg HPV.  One other result in our records.  Unsure if she has had other pap smears.    Past Medical History:   Diagnosis Date     Cigarette nicotine dependence without complication      Combined forms of age-related cataract of both eyes      Type 2 diabetes mellitus (H)        Past Surgical History:   Procedure Laterality Date     CATARACT IOL, RT/LT       KNEE SURGERY Left 2001     LAPAROSCOPIC CHOLECYSTECTOMY  2007     PHACOEMULSIFICATION WITH STANDARD INTRAOCULAR LENS IMPLANT Right 01/13/2022    Procedure: PHACOEMULSIFICATION, COMPLEX CATARACT, WITH STANDARD INTRAOCULAR LENS IMPLANT INSERTION right;  Surgeon: Cony Scruggs MD;  Location: [facility] OR     PHACOEMULSIFICATION WITH  STANDARD INTRAOCULAR LENS IMPLANT Left 03/17/2022    Procedure: COMPLEX PHACOEMULSIFICATION, CATARACT, WITH STANDARD INTRAOCULAR LENS IMPLANT INSERTION LEFT;  Surgeon: Cony Scruggs MD;  Location: [facility] OR     REPAIR CLEFT PALATE CHILD  1972       Family History   Problem Relation Age of Onset     Hypertension Mother      Hyperlipidemia Mother      Diabetes Type 2  Mother      Diabetes Mother      Hypertension Father      Diabetes Type 2  Father      Diabetes Father      Diabetes Brother      Diabetes Sister      Glaucoma No family hx of      Macular Degeneration No family hx of        Allergies: Patient has no known allergies.    Current Outpatient Medications   Medication Sig Dispense Refill     acetaminophen (TYLENOL) 325 MG tablet Take 325 mg by mouth once       atorvastatin (LIPITOR) 40 MG tablet Take 1 tablet (40 mg) by mouth daily (Patient taking differently: Take 20 mg by mouth every morning) 90 tablet 3     blood glucose (NO BRAND SPECIFIED) lancets standard Use to test blood sugar 4 times daily or as directed. 100 each 2     blood glucose (NO BRAND SPECIFIED) test strip Use to test blood sugar 4 times daily or as directed. 350 strip 3     blood glucose monitoring (NO BRAND SPECIFIED) meter device kit Use to test blood sugar 4 times daily. 1 kit 0     Continuous Glucose Sensor (FREESTYLE RAHEEM 3 SENSOR) MISC 1 EACH EVERY 14 DAYS USE 1 SENSOR EVERY 14 DAYS. 6 each 3     insulin aspart (NOVOLOG PEN) 100 UNIT/ML pen Inject 6 units with meals and correction. Pt uses approx 30 units daily. 30 mL 2     insulin glargine (LANTUS SOLOSTAR) 100 UNIT/ML pen INJECT 22 UNITS AT BEDTIME. (Patient taking differently: 15-20 Units at bedtime INJECT 22 UNITS AT BEDTIME.) 45 mL 3     Insulin Glargine-yfgn 100 UNIT/ML SOPN Inject 22 Units Subcutaneous at bedtime 15 mL 3     insulin lispro (HUMALOG KWIKPEN) 100 UNIT/ML (1 unit dial) KWIKPEN INJECT 6 UNITS WITH MEALS AND CORRECTION UNDER THE SKIN. APPROX 30 UNITS DAILY        "insulin pen needle (B-D U/F) 31G X 8 MM miscellaneous USE TO INJECT 4 TIMES A DAY OR AS DIRECTED 400 each 2     insulin syringe-needle U-100 (BD INSULIN SYRINGE ULTRAFINE) 30G X 1/2\" 1 ML Use one syringe daily or as directed.  3 month supply 100 each prn     losartan (COZAAR) 25 MG tablet Take 25 mg by mouth daily       NOVOLOG FLEXPEN 100 UNIT/ML soln Inject 6 units with meals and correction. Pt uses approx 30 units daily. 90 mL 3     omeprazole (PRILOSEC) 20 MG DR capsule Take 20 mg by mouth daily       prednisoLONE acetate (PRED FORTE) 1 % ophthalmic suspension Place 1 drop Into the left eye 3 times daily (Patient not taking: Reported on 7/10/2024) 15 mL 0     vitamin D2 (ERGOCALCIFEROL) 57806 units (1250 mcg) capsule Take 1 capsule (50,000 Units) by mouth every 7 days 8 capsule 6     VITAMIN D3 50 MCG (2000 UT) tablet Take 1 tablet by mouth daily at 2 pm         EXAM:  Blood pressure 129/80, pulse 83, height 1.676 m (5' 6\"), weight 56 kg (123 lb 8 oz), not currently breastfeeding.   BMI= Body mass index is 19.93 kg/m .  General - pleasant female in no acute distress.  Neck - supple without lymphadenopathy or thyromegaly.  Lungs - non-labored respirations.  Heart - regular rate and rhythm, well perfused.  Abdomen - soft, nontender, nondistended, no hepatosplenomegaly. Well healed laparoscopic incisional scars.    Pelvic - EG: estrogen loss atrophy of the vulva, no lesions on vulva, cervix visible beyond the introitus, BUS: within normal limits, Vagina: smooth, dry, no discharge, Cervix: no lesions or CMT, prolapsed outside the vagina. No ulcerations or abnormalities. Pap obtained.  Uterus: firm, normal sized and nontender, Adnexae: no masses or tenderness.  Rectovaginal - deferred.  Musculoskeletal - no gross deformities.  Neurological - normal strength, sensation, and mental status.      ASSESSMENT/PLAN:  Procidentia of uterus  (primary encounter diagnosis)  Comment: Has reviewed management options with " Ameena and plan to proceed with robot assisted sacral colpopexy.    Plan:     Discussed need for hysterectomy at time of sacral colpopexy.  Recommend supracervical hysterectomy given no history of abnormal pap/HPV.  Discussion regarding removal of ovaries. Patient has been menopausal for over 14 years, discussed options and patient agrees with plan for BSO.    Given incomplete prior pap screening, recommend repeat today prior to surgery.  If abnormal would plan total hysterectomy.  If normal patient will continue to need routine pap screening.    Reviewed surgery and technique to repair prolapse using drawings.  Patient has had laparoscopic surgery before.  Reviewed risks and benefits, including risk of bleeding, infection and damage to nearby structures.  Patient desires to proceed, but plans to wait to until January/February when it is easier to get time off of work.  Will need pre-op physical with PCP.  Discussed control of diabetes prior to surgery, if poorly controlled could lead to surgery being delayed.            Janine Humphries MD           Again, thank you for allowing me to participate in the care of your patient.      Sincerely,    Janine Humphries MD

## 2024-07-18 NOTE — PROGRESS NOTES
CC:  Consult from Dr. Lindquist for hysterectomy at the time of sacral colpopexy  HPI:  Karen  is a 55 yo  .  Patient is postmenopausal, since 2009 and has no episodes of PMB.  Patient has had pelvic prolapse for many years but has recently become more bothered by the bulge.  She denies difficulty voiding or incontinence.  She is hoping to schedule surgery in January when she is able to take some time off.    Patient works at [employer] and must do lifting.  She is wondering  how much time she would require off after surgery.    Patient states that her diabetes is in good control, but she isn't sure what her hgb A1c is .  Her last in our record was 9.3 one year ago.    Patients records are available and reviewed at today's visit.    Past GYN history:  no history of STIs,   Past OB history: 5 vaginal deliveries, unsure of weight of infants.  All in [place] except the youngest.  Last PAP smear:  Normal, last in September, 2020 NIL/neg HPV.  One other result in our records.  Unsure if she has had other pap smears.    Past Medical History:   Diagnosis Date    Cigarette nicotine dependence without complication     Combined forms of age-related cataract of both eyes     Type 2 diabetes mellitus (H)        Past Surgical History:   Procedure Laterality Date    CATARACT IOL, RT/LT      KNEE SURGERY Left 2001    LAPAROSCOPIC CHOLECYSTECTOMY  2007    PHACOEMULSIFICATION WITH STANDARD INTRAOCULAR LENS IMPLANT Right 01/13/2022    Procedure: PHACOEMULSIFICATION, COMPLEX CATARACT, WITH STANDARD INTRAOCULAR LENS IMPLANT INSERTION right;  Surgeon: Cony Scruggs MD;  [place] [facility]    PHACOEMULSIFICATION WITH STANDARD INTRAOCULAR LENS IMPLANT Left 03/17/2022    Procedure: COMPLEX PHACOEMULSIFICATION, CATARACT, WITH STANDARD INTRAOCULAR LENS IMPLANT INSERTION LEFT;  Surgeon: Cony Scruggs MD;  Location: [facility]    REPAIR CLEFT PALATE CHILD  1972       Family History   Problem Relation Age of Onset    Hypertension Mother      "Hyperlipidemia Mother     Diabetes Type 2  Mother     Diabetes Mother     Hypertension Father     Diabetes Type 2  Father     Diabetes Father     Diabetes Brother     Diabetes Sister     Glaucoma No family hx of     Macular Degeneration No family hx of        Allergies: Patient has no known allergies.    Current Outpatient Medications   Medication Sig Dispense Refill    acetaminophen (TYLENOL) 325 MG tablet Take 325 mg by mouth once      atorvastatin (LIPITOR) 40 MG tablet Take 1 tablet (40 mg) by mouth daily (Patient taking differently: Take 20 mg by mouth every morning) 90 tablet 3    blood glucose (NO BRAND SPECIFIED) lancets standard Use to test blood sugar 4 times daily or as directed. 100 each 2    blood glucose (NO BRAND SPECIFIED) test strip Use to test blood sugar 4 times daily or as directed. 350 strip 3    blood glucose monitoring (NO BRAND SPECIFIED) meter device kit Use to test blood sugar 4 times daily. 1 kit 0    Continuous Glucose Sensor (FREESTYLE RAHEEM 3 SENSOR) MISC 1 EACH EVERY 14 DAYS USE 1 SENSOR EVERY 14 DAYS. 6 each 3    insulin aspart (NOVOLOG PEN) 100 UNIT/ML pen Inject 6 units with meals and correction. Pt uses approx 30 units daily. 30 mL 2    insulin glargine (LANTUS SOLOSTAR) 100 UNIT/ML pen INJECT 22 UNITS AT BEDTIME. (Patient taking differently: 15-20 Units at bedtime INJECT 22 UNITS AT BEDTIME.) 45 mL 3    Insulin Glargine-yfgn 100 UNIT/ML SOPN Inject 22 Units Subcutaneous at bedtime 15 mL 3    insulin lispro (HUMALOG KWIKPEN) 100 UNIT/ML (1 unit dial) KWIKPEN INJECT 6 UNITS WITH MEALS AND CORRECTION UNDER THE SKIN. APPROX 30 UNITS DAILY      insulin pen needle (B-D U/F) 31G X 8 MM miscellaneous USE TO INJECT 4 TIMES A DAY OR AS DIRECTED 400 each 2    insulin syringe-needle U-100 (BD INSULIN SYRINGE ULTRAFINE) 30G X 1/2\" 1 ML Use one syringe daily or as directed.  3 month supply 100 each prn    losartan (COZAAR) 25 MG tablet Take 25 mg by mouth daily      NOVOLOG FLEXPEN 100 UNIT/ML " "soln Inject 6 units with meals and correction. Pt uses approx 30 units daily. 90 mL 3    omeprazole (PRILOSEC) 20 MG DR capsule Take 20 mg by mouth daily      prednisoLONE acetate (PRED FORTE) 1 % ophthalmic suspension Place 1 drop Into the left eye 3 times daily (Patient not taking: Reported on 7/10/2024) 15 mL 0    vitamin D2 (ERGOCALCIFEROL) 80773 units (1250 mcg) capsule Take 1 capsule (50,000 Units) by mouth every 7 days 8 capsule 6    VITAMIN D3 50 MCG (2000 UT) tablet Take 1 tablet by mouth daily at 2 pm         EXAM:  Blood pressure 129/80, pulse 83, height 1.676 m (5' 6\"), weight 56 kg (123 lb 8 oz), not currently breastfeeding.   BMI= Body mass index is 19.93 kg/m .  General - pleasant female in no acute distress.  Neck - supple without lymphadenopathy or thyromegaly.  Lungs - non-labored respirations.  Heart - regular rate and rhythm, well perfused.  Abdomen - soft, nontender, nondistended, no hepatosplenomegaly. Well healed laparoscopic incisional scars.    Pelvic - EG: estrogen loss atrophy of the vulva, no lesions on vulva, cervix visible beyond the introitus, BUS: within normal limits, Vagina: smooth, dry, no discharge, Cervix: no lesions or CMT, prolapsed outside the vagina. No ulcerations or abnormalities. Pap obtained.  Uterus: firm, normal sized and nontender, Adnexae: no masses or tenderness.  Rectovaginal - deferred.  Musculoskeletal - no gross deformities.  Neurological - normal strength, sensation, and mental status.      ASSESSMENT/PLAN:  Procidentia of uterus  (primary encounter diagnosis)  Comment: Has reviewed management options with Dr. Lindquist and plan to proceed with robot assisted sacral colpopexy.    Plan:     Discussed need for hysterectomy at time of sacral colpopexy.  Recommend supracervical hysterectomy given no history of abnormal pap/HPV.  Discussion regarding removal of ovaries. Patient has been menopausal for over 14 years, discussed options and patient agrees with plan for BSO. "    Given incomplete prior pap screening, recommend repeat today prior to surgery.  If abnormal would plan total hysterectomy.  If normal patient will continue to need routine pap screening.    Reviewed surgery and technique to repair prolapse using drawings.  Patient has had laparoscopic surgery before.  Reviewed risks and benefits, including risk of bleeding, infection and damage to nearby structures.  Patient desires to proceed, but plans to wait to until January/February when it is easier to get time off of work.  Will need pre-op physical with PCP.  Discussed control of diabetes prior to surgery, if poorly controlled could lead to surgery being delayed.            Janine Humphries MD

## 2024-07-18 NOTE — PATIENT INSTRUCTIONS
Thank you for trusting us with your care!     If you need to contact us for questions about:  Symptoms, Scheduling & Medical Questions; Non-urgent (2-3 day response) Howard message, Urgent (needing response today) [phone] (if after 3:30pm next day response)   Prescriptions: Please call your Pharmacy   Billing: [place] [phone] or [facility]:[phone]

## 2024-07-19 LAB
HPV HR 12 DNA CVX QL NAA+PROBE: NEGATIVE
HPV16 DNA CVX QL NAA+PROBE: NEGATIVE
HPV18 DNA CVX QL NAA+PROBE: NEGATIVE
HUMAN PAPILLOMA VIRUS FINAL DIAGNOSIS: NORMAL

## 2024-07-24 LAB
BKR LAB AP GYN ADEQUACY: NORMAL
BKR LAB AP GYN INTERPRETATION: NORMAL
BKR LAB AP PREVIOUS ABNORMAL: NORMAL
PATH REPORT.COMMENTS IMP SPEC: NORMAL
PATH REPORT.COMMENTS IMP SPEC: NORMAL
PATH REPORT.RELEVANT HX SPEC: NORMAL

## 2024-10-02 NOTE — NURSING NOTE
Chief Complaints and History of Present Illnesses   Patient presents with     Follow Up     Chief Complaint(s) and History of Present Illness(es)     Follow Up               Comments     Pt states that her vision has been stable since she was last seen, and that there are no changes or complaints.    DM2  BGL: 150, today  Lab Results       Component                Value               Date                       A1C                      10.1                04/26/2021                 A1C                      8.9                 12/21/2020                 A1C                      9.1                 09/23/2020                 A1C                      8.6                 11/11/2019                 A1C                      7.7                 06/18/2019              Mark Devine OT 2:39 PM May 5, 2022                
20

## 2024-10-22 NOTE — PROGRESS NOTES
10/22/24 12:08 PM  PATIENT LAB/IMAGING STATUS : No pending lab orders   Patient is showing 2/5 MNCM met. A1c not in range   Outcome for 10/28/24 10:15 AM: Data obtained via [employer] website  10/28/24 10:35 AM : Appointment reminder phone call made to patient.No Answer. LVM Advising pt to arrive 15 mins early  Codi Contreras CMA

## 2024-10-29 ENCOUNTER — OFFICE VISIT (OUTPATIENT)
Dept: ENDOCRINOLOGY | Facility: CLINIC | Age: 54
End: 2024-10-29
Payer: COMMERCIAL

## 2024-10-29 VITALS
WEIGHT: 122 LBS | BODY MASS INDEX: 19.61 KG/M2 | DIASTOLIC BLOOD PRESSURE: 72 MMHG | HEIGHT: 66 IN | SYSTOLIC BLOOD PRESSURE: 107 MMHG

## 2024-10-29 DIAGNOSIS — E11.9 TYPE 2 DIABETES MELLITUS WITHOUT COMPLICATION, WITH LONG-TERM CURRENT USE OF INSULIN (H): Primary | ICD-10-CM

## 2024-10-29 DIAGNOSIS — Z79.4 TYPE 2 DIABETES MELLITUS WITHOUT COMPLICATION, WITH LONG-TERM CURRENT USE OF INSULIN (H): Primary | ICD-10-CM

## 2024-10-29 LAB
EST. AVERAGE GLUCOSE BLD GHB EST-MCNC: 223 MG/DL
HBA1C MFR BLD: 9.4 %

## 2024-10-29 PROCEDURE — 99214 OFFICE O/P EST MOD 30 MIN: CPT | Performed by: PHYSICIAN ASSISTANT

## 2024-10-29 PROCEDURE — 83036 HEMOGLOBIN GLYCOSYLATED A1C: CPT | Performed by: PATHOLOGY

## 2024-10-29 ASSESSMENT — PAIN SCALES - GENERAL: PAINLEVEL_OUTOF10: NO PAIN (0)

## 2024-10-29 NOTE — LETTER
10/29/2024       RE: Karen Braxton  [address]  [place] [address]     Dear Colleague,    Thank you for referring your patient, Karen Braxton, to the [facility] ENDOCRINOLOG[facility] [place] at [facility]. Please see a copy of my visit note below.    10/22/24 12:08 PM  PATIENT LAB/IMAGING STATUS : No pending lab orders   Patient is showing 2/5 MNCM met. A1c not in range   Outcome for 10/28/24 10:15 AM: Data obtained via [employer] website  10/28/24 10:35 AM : Appointment reminder phone call made to patient.No Answer. LVM Advising pt to arrive 15 mins early  Codi Contreras CMA                          HPI:  Irais Jones is a 54 year old female with type 2 diabetes mellitus.    Pt being seen today in clinic for diabetes follow up.  Pt last seen in June 2024.  She was dx having type 2 diabetes around 2002.  Her diabetes is complicated by retinopathy. No known nephropathy or neuropathy.  Her hx is significant for HTN, hyperlipidemia and Vit D deficiency.  For her diabetes, she tells me she is taking Lantus 15-20 units SQ at hs and Novolog 5-8 units with meals.  She denies missing insulin doses.  She stopped taking Jardiance stating it made her stomach hurt.  She did not tolerate Metformin- GI side effects.  Pt did not tolerate Bydureon in the past - GI side effects.  Pt's A1C is 9.4 % today.     Patient's previous A1C was 8.2 % and A1C was 11.1 % in Nov 2023.  I reviewed and scanned her Freestyle Libre3 sensor download data in her note below.  Her blood sugar values are too high.  Most of her high blood sugars are postprandial.  Average glucose 234.  Glucose is in target range only 23% of the time with no  hypoglycemia.  Karen is working Saturday - Wednesday at [employer] 8:15 PM to 4:30 AM.  On ROS today, some blurred vision.  Pt denies n/v, SOB at rest, cough or fevers.  Pt denies chest pain, abd pain, diarrhea, dysuria or hematuria.  Pt denies sx of  "neuropathy.  No foot ulcers.    DIABETES CARE:  Retinopathy: yes; seen by outside Oph/retinal staff.  Nephropathy: none; urine microalbuminuria was negative in 7/2023.  She is taking Cozaar.  Neuropathy: none.  Foot exam: no ulcers and normal monofilamentous exam today.  Lipids: LDL 72 in 7/2023. Taking Lipitor.  Taking ASA:yes.  CAD:no.  Mental health:denies depression.  Insulin: Basal and meal time insulin.   DM meds:  Pt did not tolerate Metformin- GI side effects. Pt did not tolerate Jardiance- yeast infection and Bydureon - GI side effects.  Testing: Freestyle Libre3 sensor.                ROS:   Please see under HPI.    ALLERGIES:   No Known Allergies      Current Outpatient Medications   Medication Sig Dispense Refill     acetaminophen (TYLENOL) 325 MG tablet Take 325 mg by mouth once       atorvastatin (LIPITOR) 40 MG tablet Take 1 tablet (40 mg) by mouth daily (Patient taking differently: Take 20 mg by mouth every morning.) 90 tablet 3     blood glucose (NO BRAND SPECIFIED) lancets standard Use to test blood sugar 4 times daily or as directed. 100 each 2     blood glucose (NO BRAND SPECIFIED) test strip Use to test blood sugar 4 times daily or as directed. 350 strip 3     blood glucose monitoring (NO BRAND SPECIFIED) meter device kit Use to test blood sugar 4 times daily. 1 kit 0     Continuous Glucose Sensor (FREESTYLE RAHEEM 3 SENSOR) MISC 1 EACH EVERY 14 DAYS USE 1 SENSOR EVERY 14 DAYS. 6 each 3     insulin aspart (NOVOLOG PEN) 100 UNIT/ML pen Inject 6 units with meals and correction. Pt uses approx 30 units daily. 30 mL 2     insulin glargine (LANTUS SOLOSTAR) 100 UNIT/ML pen INJECT 22 UNITS AT BEDTIME. (Patient taking differently: 15-20 Units at bedtime. INJECT 22 UNITS AT BEDTIME.) 45 mL 3     insulin pen needle (B-D U/F) 31G X 8 MM miscellaneous USE TO INJECT 4 TIMES A DAY OR AS DIRECTED 400 each 2     insulin syringe-needle U-100 (BD INSULIN SYRINGE ULTRAFINE) 30G X 1/2\" 1 ML Use one syringe daily or " as directed.  3 month supply 100 each prn     losartan (COZAAR) 25 MG tablet Take 25 mg by mouth daily       NOVOLOG FLEXPEN 100 UNIT/ML soln Inject 6 units with meals and correction. Pt uses approx 30 units daily. 90 mL 3     omeprazole (PRILOSEC) 20 MG DR capsule Take 20 mg by mouth daily       vitamin D2 (ERGOCALCIFEROL) 90874 units (1250 mcg) capsule Take 1 capsule (50,000 Units) by mouth every 7 days 8 capsule 6     VITAMIN D3 50 MCG (2000 UT) tablet Take 1 tablet by mouth daily at 2 pm       insulin lispro (HUMALOG KWIKPEN) 100 UNIT/ML (1 unit dial) KWIKPEN INJECT 6 UNITS WITH MEALS AND CORRECTION UNDER THE SKIN. APPROX 30 UNITS DAILY (Patient not taking: Reported on 10/29/2024)       prednisoLONE acetate (PRED FORTE) 1 % ophthalmic suspension Place 1 drop Into the left eye 3 times daily (Patient not taking: Reported on 10/29/2024) 15 mL 0     SHX:  Smoke: yes.  ETOH: none.   with 5 children.    FHX:  Several family members with diabetes.    PMHX:   1.  Type 2 DM.  2.  Hyperlipidemia.  3.  Amenorrhea.  4.  GERD.  5.  S/P choley.  6.  Sebaceous cyst.  Past Medical History:   Diagnosis Date     Cigarette nicotine dependence without complication      Combined forms of age-related cataract of both eyes      Type 2 diabetes mellitus (H)      Past Surgical History:   Procedure Laterality Date     CATARACT IOL, RT/LT       KNEE SURGERY Left 2001     LAPAROSCOPIC CHOLECYSTECTOMY  2007     PHACOEMULSIFICATION WITH STANDARD INTRAOCULAR LENS IMPLANT Right 01/13/2022    Procedure: PHACOEMULSIFICATION, COMPLEX CATARACT, WITH STANDARD INTRAOCULAR LENS IMPLANT INSERTION right;  Surgeon: Cony Scruggs MD;  [place] [facility] [place]     PHACOEMULSIFICATION WITH STANDARD INTRAOCULAR LENS IMPLANT Left 03/17/2022    Procedure: COMPLEX PHACOEMULSIFICATION, CATARACT, WITH STANDARD INTRAOCULAR LENS IMPLANT INSERTION LEFT;  Surgeon: Cony Scruggs MD;  [place] [facility] [place]     REPAIR CLEFT PALATE CHILD  1972       EXAM:    BP  "107/72   Ht 1.676 m (5' 6\")   Wt 55.3 kg (122 lb)   LMP  (LMP Unknown)   BMI 19.69 kg/m      FEET: No ulcers; normal monofilamentous exam.      RESULTS:    Creatinine   Date Value Ref Range Status   07/25/2023 0.71 0.51 - 0.95 mg/dL Final   09/23/2020 0.82 0.52 - 1.04 mg/dL Final     GFR Estimate   Date Value Ref Range Status   07/25/2023 >90 >60 mL/min/1.73m2 Final   09/23/2020 82 >60 mL/min/[1.73_m2] Final     Comment:     Non  GFR Calc  Starting 12/18/2018, serum creatinine based estimated GFR (eGFR) will be   calculated using the Chronic Kidney Disease Epidemiology Collaboration   (CKD-EPI) equation.       Hemoglobin A1C   Date Value Ref Range Status   04/26/2021 10.1 (H) 0 - 5.6 % Final     Comment:     Normal <5.7% Prediabetes 5.7-6.4%  Diabetes 6.5% or higher - adopted from ADA   consensus guidelines.       Afinion Hemoglobin A1c POCT   Date Value Ref Range Status   06/04/2024 8.2 (H) <=5.7 % Final     Comment:     Normal <5.7%   Prediabetes 5.7-6.4%     Diabetes 6.5% or higher       Note: Adopted from ADA consensus guidelines.     Potassium   Date Value Ref Range Status   07/25/2023 4.9 3.4 - 5.3 mmol/L Final   09/24/2021 4.0 3.4 - 5.3 mmol/L Final   09/23/2020 4.0 3.4 - 5.3 mmol/L Final     ALT   Date Value Ref Range Status   09/24/2021 20 0 - 50 U/L Final   09/23/2020 22 0 - 50 U/L Final     AST   Date Value Ref Range Status   09/24/2021 11 0 - 45 U/L Final   09/23/2020 12 0 - 45 U/L Final     TSH   Date Value Ref Range Status   03/16/2018 2.12 0.40 - 4.00 mU/L Final     T4 Free   Date Value Ref Range Status   08/04/2011 1.22 0.70 - 1.85 ng/dL Final         Cholesterol   Date Value Ref Range Status   07/25/2023 158 <200 mg/dL Final   09/24/2021 149 <200 mg/dL Final   09/23/2020 214 (H) <200 mg/dL Final     Comment:     Desirable:       <200 mg/dl   06/18/2019 250 (H) <200 mg/dL Final     Comment:     Desirable:       <200 mg/dl     HDL Cholesterol   Date Value Ref Range Status "   09/23/2020 47 (L) >49 mg/dL Final   06/18/2019 54 >49 mg/dL Final     Direct Measure HDL   Date Value Ref Range Status   07/25/2023 57 >=50 mg/dL Final   09/24/2021 48 (L) >=50 mg/dL Final     LDL Cholesterol Calculated   Date Value Ref Range Status   07/25/2023 72 <=100 mg/dL Final   09/24/2021 55 <=100 mg/dL Final   09/23/2020 135 (H) <100 mg/dL Final     Comment:     Above desirable:  100-129 mg/dl  Borderline High:  130-159 mg/dL  High:             160-189 mg/dL  Very high:       >189 mg/dl     06/18/2019 152 (H) <100 mg/dL Final     Comment:     Above desirable:  100-129 mg/dl  Borderline High:  130-159 mg/dL  High:             160-189 mg/dL  Very high:       >189 mg/dl       Triglycerides   Date Value Ref Range Status   07/25/2023 143 <150 mg/dL Final   09/24/2021 232 (H) <150 mg/dL Final   09/23/2020 160 (H) <150 mg/dL Final     Comment:     Borderline high:  150-199 mg/dl  High:             200-499 mg/dl  Very high:       >499 mg/dl     06/18/2019 220 (H) <150 mg/dL Final     Comment:     Borderline high:  150-199 mg/dl  High:             200-499 mg/dl  Very high:       >499 mg/dl       Cholesterol/HDL Ratio   Date Value Ref Range Status   12/01/2014 6.0 (H) 0.0 - 5.0 Final   07/30/2014 6.1 (H) 0.0 - 5.0 Final       ASSESSMENT/PLAN:    1. TYPE 2 DIABETES MELLITUS: Type 2 diabetes mellitus complicated by background retinopathy.  No nephropathy or neuropathy.  Blood sugars are too high.  Most of her high blood sugars are postprandial.  Reminded her to take NovoLog for all food intake and to take NovoLog 10 minutes prior to eating.  No change in insulin doses today.  She stopped taking Metformin due to side effects.   Pt did not tolerate Jardiance or Bydureon in the past.  /72 today.     2.  HYPERLIPIDEMIA:  LDL 72 in July 2023.  Pt taking Lipitor.    3. HEALTH MAINTENANCE: Pt to see PCP for health maintenance.    4. FOLLOW UP: with me in 3-4 months.    Time spent reviewing patient chart notes, labs and  glucose meter download today  =  5 minutes.  Time for clinic visit today= 20  minutes.  Time for documentation today =10 minutes.     TOTAL TIME FOR VISIT TODAY =35 minutes.    Cecile Juarez PA-C                          Again, thank you for allowing me to participate in the care of your patient.      Sincerely,    Cecile Juarez PA-C

## 2024-10-29 NOTE — PROGRESS NOTES
HPI:  Irais Jones is a 54 year old female with type 2 diabetes mellitus.    Pt being seen today in clinic for diabetes follow up.  Pt last seen in June 2024.  She was dx having type 2 diabetes around 2002.  Her diabetes is complicated by retinopathy. No known nephropathy or neuropathy.  Her hx is significant for HTN, hyperlipidemia and Vit D deficiency.  For her diabetes, she tells me she is taking Lantus 15-20 units SQ at hs and Novolog 5-8 units with meals.  She denies missing insulin doses.  She stopped taking Jardiance stating it made her stomach hurt.  She did not tolerate Metformin- GI side effects.  Pt did not tolerate Bydureon in the past - GI side effects.  Pt's A1C is 9.4 % today.     Patient's previous A1C was 8.2 % and A1C was 11.1 % in Nov 2023.  I reviewed and scanned her Freestyle Libre3 sensor download data in her note below.  Her blood sugar values are too high.  Most of her high blood sugars are postprandial.  Average glucose 234.  Glucose is in target range only 23% of the time with no  hypoglycemia.  Karen is working Saturday - Wednesday at [employer] 8:15 PM to 4:30 AM.  On ROS today, some blurred vision.  Pt denies n/v, SOB at rest, cough or fevers.  Pt denies chest pain, abd pain, diarrhea, dysuria or hematuria.  Pt denies sx of neuropathy.  No foot ulcers.    DIABETES CARE:  Retinopathy: yes; seen by outside Oph/retinal staff.  Nephropathy: none; urine microalbuminuria was negative in 7/2023.  She is taking Cozaar.  Neuropathy: none.  Foot exam: no ulcers and normal monofilamentous exam today.  Lipids: LDL 72 in 7/2023. Taking Lipitor.  Taking ASA:yes.  CAD:no.  Mental health:denies depression.  Insulin: Basal and meal time insulin.   DM meds:  Pt did not tolerate Metformin- GI side effects. Pt did not tolerate Jardiance- yeast infection and Bydureon - GI side effects.  Testing: Freestyle Libre3 sensor.                ROS:   Please see under HPI.    ALLERGIES:   No Known Allergies      Current  "Outpatient Medications   Medication Sig Dispense Refill    acetaminophen (TYLENOL) 325 MG tablet Take 325 mg by mouth once      atorvastatin (LIPITOR) 40 MG tablet Take 1 tablet (40 mg) by mouth daily (Patient taking differently: Take 20 mg by mouth every morning.) 90 tablet 3    blood glucose (NO BRAND SPECIFIED) lancets standard Use to test blood sugar 4 times daily or as directed. 100 each 2    blood glucose (NO BRAND SPECIFIED) test strip Use to test blood sugar 4 times daily or as directed. 350 strip 3    blood glucose monitoring (NO BRAND SPECIFIED) meter device kit Use to test blood sugar 4 times daily. 1 kit 0    Continuous Glucose Sensor (FREESTYLE RAHEEM 3 SENSOR) MISC 1 EACH EVERY 14 DAYS USE 1 SENSOR EVERY 14 DAYS. 6 each 3    insulin aspart (NOVOLOG PEN) 100 UNIT/ML pen Inject 6 units with meals and correction. Pt uses approx 30 units daily. 30 mL 2    insulin glargine (LANTUS SOLOSTAR) 100 UNIT/ML pen INJECT 22 UNITS AT BEDTIME. (Patient taking differently: 15-20 Units at bedtime. INJECT 22 UNITS AT BEDTIME.) 45 mL 3    insulin pen needle (B-D U/F) 31G X 8 MM miscellaneous USE TO INJECT 4 TIMES A DAY OR AS DIRECTED 400 each 2    insulin syringe-needle U-100 (BD INSULIN SYRINGE ULTRAFINE) 30G X 1/2\" 1 ML Use one syringe daily or as directed.  3 month supply 100 each prn    losartan (COZAAR) 25 MG tablet Take 25 mg by mouth daily      NOVOLOG FLEXPEN 100 UNIT/ML soln Inject 6 units with meals and correction. Pt uses approx 30 units daily. 90 mL 3    omeprazole (PRILOSEC) 20 MG DR capsule Take 20 mg by mouth daily      vitamin D2 (ERGOCALCIFEROL) 60293 units (1250 mcg) capsule Take 1 capsule (50,000 Units) by mouth every 7 days 8 capsule 6    VITAMIN D3 50 MCG (2000 UT) tablet Take 1 tablet by mouth daily at 2 pm      insulin lispro (HUMALOG KWIKPEN) 100 UNIT/ML (1 unit dial) KWIKPEN INJECT 6 UNITS WITH MEALS AND CORRECTION UNDER THE SKIN. APPROX 30 UNITS DAILY (Patient not taking: Reported on 10/29/2024)  " "    prednisoLONE acetate (PRED FORTE) 1 % ophthalmic suspension Place 1 drop Into the left eye 3 times daily (Patient not taking: Reported on 10/29/2024) 15 mL 0     SHX:  Smoke: yes.  ETOH: none.   with 5 children.    FHX:  Several family members with diabetes.    PMHX:   1.  Type 2 DM.  2.  Hyperlipidemia.  3.  Amenorrhea.  4.  GERD.  5.  S/P choley.  6.  Sebaceous cyst.  Past Medical History:   Diagnosis Date    Cigarette nicotine dependence without complication     Combined forms of age-related cataract of both eyes     Type 2 diabetes mellitus (H)      Past Surgical History:   Procedure Laterality Date    CATARACT IOL, RT/LT      KNEE SURGERY Left 2001    LAPAROSCOPIC CHOLECYSTECTOMY  2007    PHACOEMULSIFICATION WITH STANDARD INTRAOCULAR LENS IMPLANT Right 01/13/2022    Procedure: PHACOEMULSIFICATION, COMPLEX CATARACT, WITH STANDARD INTRAOCULAR LENS IMPLANT INSERTION right;  Surgeon: Cony Scruggs MD;  [place]: [facility] [place]    PHACOEMULSIFICATION WITH STANDARD INTRAOCULAR LENS IMPLANT Left 03/17/2022    Procedure: COMPLEX PHACOEMULSIFICATION, CATARACT, WITH STANDARD INTRAOCULAR LENS IMPLANT INSERTION LEFT;  Surgeon: Cony Scruggs MD;  Location: [facility] [place]    REPAIR CLEFT PALATE CHILD  1972       EXAM:    /72   Ht 1.676 m (5' 6\")   Wt 55.3 kg (122 lb)   LMP  (LMP Unknown)   BMI 19.69 kg/m      FEET: No ulcers; normal monofilamentous exam.      RESULTS:    Creatinine   Date Value Ref Range Status   07/25/2023 0.71 0.51 - 0.95 mg/dL Final   09/23/2020 0.82 0.52 - 1.04 mg/dL Final     GFR Estimate   Date Value Ref Range Status   07/25/2023 >90 >60 mL/min/1.73m2 Final   09/23/2020 82 >60 mL/min/[1.73_m2] Final     Comment:     Non  GFR Calc  Starting 12/18/2018, serum creatinine based estimated GFR (eGFR) will be   calculated using the Chronic Kidney Disease Epidemiology Collaboration   (CKD-EPI) equation.       Hemoglobin A1C   Date Value Ref Range Status   04/26/2021 10.1 (H) 0 - " 5.6 % Final     Comment:     Normal <5.7% Prediabetes 5.7-6.4%  Diabetes 6.5% or higher - adopted from ADA   consensus guidelines.       Afinion Hemoglobin A1c POCT   Date Value Ref Range Status   06/04/2024 8.2 (H) <=5.7 % Final     Comment:     Normal <5.7%   Prediabetes 5.7-6.4%     Diabetes 6.5% or higher       Note: Adopted from ADA consensus guidelines.     Potassium   Date Value Ref Range Status   07/25/2023 4.9 3.4 - 5.3 mmol/L Final   09/24/2021 4.0 3.4 - 5.3 mmol/L Final   09/23/2020 4.0 3.4 - 5.3 mmol/L Final     ALT   Date Value Ref Range Status   09/24/2021 20 0 - 50 U/L Final   09/23/2020 22 0 - 50 U/L Final     AST   Date Value Ref Range Status   09/24/2021 11 0 - 45 U/L Final   09/23/2020 12 0 - 45 U/L Final     TSH   Date Value Ref Range Status   03/16/2018 2.12 0.40 - 4.00 mU/L Final     T4 Free   Date Value Ref Range Status   08/04/2011 1.22 0.70 - 1.85 ng/dL Final         Cholesterol   Date Value Ref Range Status   07/25/2023 158 <200 mg/dL Final   09/24/2021 149 <200 mg/dL Final   09/23/2020 214 (H) <200 mg/dL Final     Comment:     Desirable:       <200 mg/dl   06/18/2019 250 (H) <200 mg/dL Final     Comment:     Desirable:       <200 mg/dl     HDL Cholesterol   Date Value Ref Range Status   09/23/2020 47 (L) >49 mg/dL Final   06/18/2019 54 >49 mg/dL Final     Direct Measure HDL   Date Value Ref Range Status   07/25/2023 57 >=50 mg/dL Final   09/24/2021 48 (L) >=50 mg/dL Final     LDL Cholesterol Calculated   Date Value Ref Range Status   07/25/2023 72 <=100 mg/dL Final   09/24/2021 55 <=100 mg/dL Final   09/23/2020 135 (H) <100 mg/dL Final     Comment:     Above desirable:  100-129 mg/dl  Borderline High:  130-159 mg/dL  High:             160-189 mg/dL  Very high:       >189 mg/dl     06/18/2019 152 (H) <100 mg/dL Final     Comment:     Above desirable:  100-129 mg/dl  Borderline High:  130-159 mg/dL  High:             160-189 mg/dL  Very high:       >189 mg/dl       Triglycerides   Date Value  Ref Range Status   07/25/2023 143 <150 mg/dL Final   09/24/2021 232 (H) <150 mg/dL Final   09/23/2020 160 (H) <150 mg/dL Final     Comment:     Borderline high:  150-199 mg/dl  High:             200-499 mg/dl  Very high:       >499 mg/dl     06/18/2019 220 (H) <150 mg/dL Final     Comment:     Borderline high:  150-199 mg/dl  High:             200-499 mg/dl  Very high:       >499 mg/dl       Cholesterol/HDL Ratio   Date Value Ref Range Status   12/01/2014 6.0 (H) 0.0 - 5.0 Final   07/30/2014 6.1 (H) 0.0 - 5.0 Final       ASSESSMENT/PLAN:    1. TYPE 2 DIABETES MELLITUS: Type 2 diabetes mellitus complicated by background retinopathy.  No nephropathy or neuropathy.  Blood sugars are too high.  Most of her high blood sugars are postprandial.  Reminded her to take NovoLog for all food intake and to take NovoLog 10 minutes prior to eating.  No change in insulin doses today.  She stopped taking Metformin due to side effects.   Pt did not tolerate Jardiance or Bydureon in the past.  /72 today.     2.  HYPERLIPIDEMIA:  LDL 72 in July 2023.  Pt taking Lipitor.    3. HEALTH MAINTENANCE: Pt to see PCP for health maintenance.    4. FOLLOW UP: with me in 3-4 months.    Time spent reviewing patient chart notes, labs and glucose meter download today  =  5 minutes.  Time for clinic visit today= 20  minutes.  Time for documentation today =10 minutes.     TOTAL TIME FOR VISIT TODAY =35 minutes.    Cecile Juarez PA-C

## 2024-11-25 ENCOUNTER — TELEPHONE (OUTPATIENT)
Dept: OBGYN | Facility: CLINIC | Age: 54
End: 2024-11-25
Payer: COMMERCIAL

## 2024-11-25 NOTE — TELEPHONE ENCOUNTER
Talked with patient to see if they were ready to get their surgery scheduled with Dr. Humphries and Dr. Lindquist. Patient stated they will not be moving forward with surgery and did not want to schedule at this time. Writer will send a message to Dr. Humphries and Dr. Rosario schedule just as an [facility]  Surgery Scheduler

## 2024-12-06 DIAGNOSIS — E11.9 TYPE 2 DIABETES MELLITUS WITHOUT COMPLICATION, WITH LONG-TERM CURRENT USE OF INSULIN (H): Primary | ICD-10-CM

## 2024-12-06 DIAGNOSIS — Z79.4 TYPE 2 DIABETES MELLITUS WITHOUT COMPLICATION, WITH LONG-TERM CURRENT USE OF INSULIN (H): Primary | ICD-10-CM

## 2024-12-08 NOTE — TELEPHONE ENCOUNTER
insulin lispro (HUMALOG KWIKPEN) 100 UNIT/ML (1 unit dial) KWIKPEN   Historical  Start: 05/06/2024       10/29/2024  [facility] Cecile Martin PA-C  Endocrinology, Diabetes, and Metabolism    Routed because:  endo triage to fill  historical

## 2024-12-09 RX ORDER — INSULIN LISPRO 100 [IU]/ML
INJECTION, SOLUTION INTRAVENOUS; SUBCUTANEOUS
Qty: 30 ML | Refills: 3 | Status: SHIPPED | OUTPATIENT
Start: 2024-12-09

## 2025-01-20 ENCOUNTER — OFFICE VISIT (OUTPATIENT)
Dept: FAMILY MEDICINE | Facility: CLINIC | Age: 55
End: 2025-01-20
Payer: COMMERCIAL

## 2025-01-20 ENCOUNTER — ANCILLARY PROCEDURE (OUTPATIENT)
Dept: GENERAL RADIOLOGY | Facility: CLINIC | Age: 55
End: 2025-01-20
Attending: FAMILY MEDICINE
Payer: COMMERCIAL

## 2025-01-20 VITALS
RESPIRATION RATE: 14 BRPM | HEIGHT: 66 IN | OXYGEN SATURATION: 97 % | DIASTOLIC BLOOD PRESSURE: 77 MMHG | SYSTOLIC BLOOD PRESSURE: 139 MMHG | BODY MASS INDEX: 19.19 KG/M2 | TEMPERATURE: 97.9 F | WEIGHT: 119.4 LBS | HEART RATE: 72 BPM

## 2025-01-20 DIAGNOSIS — M25.551 HIP PAIN, RIGHT: ICD-10-CM

## 2025-01-20 DIAGNOSIS — M25.561 CHRONIC PAIN OF RIGHT KNEE: ICD-10-CM

## 2025-01-20 DIAGNOSIS — Z79.4 TYPE 2 DIABETES MELLITUS WITH HYPERGLYCEMIA, WITH LONG-TERM CURRENT USE OF INSULIN (H): Primary | ICD-10-CM

## 2025-01-20 DIAGNOSIS — G89.29 CHRONIC PAIN OF RIGHT KNEE: ICD-10-CM

## 2025-01-20 DIAGNOSIS — E11.65 TYPE 2 DIABETES MELLITUS WITH HYPERGLYCEMIA, WITH LONG-TERM CURRENT USE OF INSULIN (H): Primary | ICD-10-CM

## 2025-01-20 PROCEDURE — 73502 X-RAY EXAM HIP UNI 2-3 VIEWS: CPT | Mod: FY | Performed by: RADIOLOGY

## 2025-01-20 PROCEDURE — 73562 X-RAY EXAM OF KNEE 3: CPT | Mod: RT | Performed by: RADIOLOGY

## 2025-01-20 RX ORDER — ACETAMINOPHEN 500 MG
500-1000 TABLET ORAL 3 TIMES DAILY PRN
Qty: 90 TABLET | Refills: 3 | Status: SHIPPED | OUTPATIENT
Start: 2025-01-20

## 2025-01-20 NOTE — PATIENT INSTRUCTIONS
"MRI right knee 2016:  1. Horizontal tear in the body and posterior anterior horn of the lateral meniscus.  2. Extrameniscal tissue superior to body and anterior horn of the medial meniscus, presumably  normal variant/artifactual.  Please correlate clinically for medial meniscal pathology.  If high suspicion, 3T magnet evaluation may be helpful.  3. Small popliteal cyst.  4. Mild distal quadriceps tendinosis.  5. Small to moderate knee joint effusion with synovitis.  6. 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    Feb 2016 sports med note: \"This is painful enough for her that she may consider surgery at some point. She would like to try a triamcinolone injection in the meantime to see if her symptoms might be improved in the short term and possibly prevent a surgery. I think this is reasonable but discussed with her the likelihood that this is only a short term solution, which she is comfortable with.\"  "

## 2025-01-20 NOTE — Clinical Note
Lucia please choose a LOS and also make the DM diagnosis more specific see the yellow triangle in the Diagnosis list. Thanks

## 2025-01-20 NOTE — PROGRESS NOTES
Assessment & Plan     Chronic pain of right knee  Type 2 diabetes mellitus (H)  Known right meniscus tear, previously helped by steroid injections in 2016. Would like to try injections once more before considering surgery or PT. Discussed how good diabetes control is needed prior to injection, although her pain makes this difficult. This current episode of pain may also represent referred pain from hip, as below, or osteoarthritis. Will x-ray and refer to sports med for discussion of injections.  - XR Knee Right 3 Views  - XR Hip Right 2-3 Views  - acetaminophen (TYLENOL) 500 MG tablet  Dispense: 90 tablet; Refill: 3  - Sports Medicine Clinic - [facility] Internal Referral    Hip pain, right  Pain in right hip, exam consistent with intra-articular pathology. Will x-ray today  - XR Hip Right 2-3 Views  - acetaminophen (TYLENOL) 500 MG tablet  Dispense: 90 tablet; Refill: 3  - Sports Medicine Clinic - [facility] Internal Referral    Return in about 4 weeks (around 2/17/2025) for Routine preventive.    Dolores Jones is a 54 year old with a history of T2DM on insulin, losartan, retinal vein occlusion presenting for the following health issues:  Pain (Right knee pain radiating up to hip )        5/21/2024     1:10 PM   Additional Questions   Roomed by Roberta   Accompanied by self     HPI     Right knee is 10/10, started jan 5. Unprovoked. Hurts laterally, radiates up to hip/groin - same as in 2016. Would rather try injection first before surgery. Felt good after last injection. Ok to move, but worse with sleeping or sitting. Not interested in PT unless knee injection doesn't help    Chart lists left knee surgery in 2001 - she says she doesn't know what this was,  maybe removing fluid.    Right knee: saw sports med in 2016 for steroid injections for meniscus tear:    MRI right knee 2016:  1. Horizontal tear in the body and posterior anterior horn of the lateral meniscus.  2. Extrameniscal tissue superior to body and  "anterior horn of the medial meniscus, presumably  normal variant/artifactual.  Please correlate clinically for medial meniscal pathology.  If high suspicion, 3T magnet evaluation may be helpful.  3. Small popliteal cyst.  4. Mild distal quadriceps tendinosis.  5. Small to moderate knee joint effusion with synovitis.  6. 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    Feb 2016 sports med note: \"This is painful enough for her that she may consider surgery at some point. She would like to try a triamcinolone injection in the meantime to see if her symptoms might be improved in the short term and possibly prevent a surgery. I think this is reasonable but discussed with her the likelihood that this is only a short term solution, which she is comfortable with.\"         Objective    /77   Pulse 72   Temp 97.9  F (36.6  C) (Temporal)   Resp 14   Ht 1.676 m (5' 6\")   Wt 54.2 kg (119 lb 6.4 oz)   LMP  (LMP Unknown)   SpO2 97%   BMI 19.27 kg/m    Body mass index is 19.27 kg/m .    GENERAL: alert and no distress  ORTHO:   Hip Exam: Palpation: Tender:   right greater trochanter, right gluteus medius  Non-tender:  left greater trochanter, left gluteus medius  Range of Motion:  Full ROM, both hips  Strength:  full strength  Special tests:  no crepitation/snapping over central inguinal region, no crepitation/snapping over greater trochanter, logrolling POSITIVE, RENU POSITIVE, FADER POSITIVE    Knee exam: Palpation: mildly tender right patellar border, latearl  Nontender: insertion of patellar tendon, quad tendon, IT band insertion, pes insertion  Strength: full  Special: mild crepitus on knee extension. + patellar grind. Negative thepedro's and xavier's           Sil Munoz, MS3  [employer] [facility] [employer] [facility] [employer]    Resident/Fellow Attestation   I, Lucia Elmore MD, was present with the medical/ROSELIA student who participated in the service and in the documentation of the note.  I have verified the history and personally " performed the physical exam and medical decision making.  I agree with the assessment and plan of care as documented in the note.      Lucia Elmore MD  PGY3

## 2025-01-20 NOTE — PROGRESS NOTES
Preceptor Attestation:   Patient seen, evaluated and discussed with the resident. I have verified the content of the note, which accurately reflects my assessment of the patient and the plan of care.   Supervising Physician:  Asif Green MD

## 2025-01-21 ENCOUNTER — OFFICE VISIT (OUTPATIENT)
Dept: FAMILY MEDICINE | Facility: CLINIC | Age: 55
End: 2025-01-21
Payer: COMMERCIAL

## 2025-01-21 VITALS
HEIGHT: 66 IN | HEART RATE: 77 BPM | SYSTOLIC BLOOD PRESSURE: 127 MMHG | OXYGEN SATURATION: 98 % | BODY MASS INDEX: 18.68 KG/M2 | DIASTOLIC BLOOD PRESSURE: 76 MMHG | RESPIRATION RATE: 16 BRPM | WEIGHT: 116.2 LBS | TEMPERATURE: 98 F

## 2025-01-21 DIAGNOSIS — Z79.4 TYPE 2 DIABETES MELLITUS WITH HYPERGLYCEMIA, WITH LONG-TERM CURRENT USE OF INSULIN (H): ICD-10-CM

## 2025-01-21 DIAGNOSIS — M16.11 PRIMARY OSTEOARTHRITIS OF RIGHT HIP: Primary | ICD-10-CM

## 2025-01-21 DIAGNOSIS — M17.11 PRIMARY OSTEOARTHRITIS OF RIGHT KNEE: ICD-10-CM

## 2025-01-21 DIAGNOSIS — E11.65 TYPE 2 DIABETES MELLITUS WITH HYPERGLYCEMIA, WITH LONG-TERM CURRENT USE OF INSULIN (H): ICD-10-CM

## 2025-01-21 NOTE — PROGRESS NOTES
Assessment & Plan     Type 2 diabetes mellitus (H)  - Seeing Endocrinology, most recent A1C in October 9.4  - Pulled data from patient's [facility]- 40% >250, 29% 180-250, 30% , Patient's sugars are above 180 70 percent of the time.  - Concern that a steroid injection may increase patient's blood sugars further  - pt reports she has been taking insulin but stress and pain will increase her blood glucose  Primary osteoarthritis of right hip- moderate OA  Primary osteoarthritis of right knee- mild OA  Right knee pain  - Pain that radiates from groin and gluteal area down the lateral side of the leg to the foot, that is worse without movement may indicate a lumbar spinal etiology, Lumbar imaging last obtained in 2014 (x-ray) non concerning, has never had a lumbar MRI. Straight leg raise does not induce pain, sciatica less likely.   - Hip and knee x-rays indicate mild/moderate and mild degenerative changes respectively  - Patient reports pain relief with steroid injection in the past and very fixated on receiving a steroid injection today, uninterested in PT (states she tried it and it was not helpful)  Pt reports taking Tylenol daily and using gel and uses a knee brace.    Ultimately, the patient reports her elevated blood sugars are due to her knee pain and stress.  Attempted to explain that the Blood Glucose elevation should be addressed and had pharmacy look at her most recent numbers.  Pt is convinced that her knee pain will go down and blood glucose improve when she gets the shot.  Informed the patient that the knee injection has cortisone and that may increase blood sugars. I offered just lidocaine and she declines. Offered PT and she declines.  She had an injection in [place] in 2016.  She reports she'll go there.  Injections will be decided by the provider seeing her.  Diabetic labs can be drawn and I'm happy to do the injections when we have improved blood glucose.    See Patient Instructions    No  "follow-ups on file.    Subjective   Karen is a 54 year old, presenting for the following health issues:  Knee Pain (Right knee pain)        1/21/2025    11:14 AM   Additional Questions   Roomed by Doua   Accompanied by Self         1/21/2025    11:14 AM   Patient Reported Additional Medications   Patient reports taking the following new medications n/a         1/21/2025    Information    services provided? No     HPI     - Right knee  - Pain started worsening on January 5th, sharp pain  - Had pain like this in 2016, had an MRI and got a shot that alleviated the pain for several years  - Pain radiates up and down leg into foot, pain in groin as well  - No pain with walking, sitting and laying down hurt, later states that sitting is better  - Tylenol makes it a 7/10 instead of a 10/10  - Pain in hip as well, but not in back  - No problems with left knee, sometimes pain in left hip that refers from right hip   - Feels like she has a cold due to the cold weather, denies fever  - Says diabetes management has been good, however A1C of 9.4 (Seen by endocrinology)  - Wearing a soft brace helps  - No pain to palpitation, pain mostly on lateral and upper part of knee  - Went to PT in 2016, says it didn't help  - Works at [employer], doesn't lift heavy things as part of her job                Review of Systems  CONSTITUTIONAL: NEGATIVE for fever, chills, change in weight  ENT/MOUTH: NEGATIVE for ear, mouth and throat problems  RESP: NEGATIVE for significant cough or SOB  CV: NEGATIVE for chest pain, palpitations or peripheral edema      Objective    /76   Pulse 77   Temp 98  F (36.7  C) (Oral)   Resp 16   Ht 1.676 m (5' 6\")   Wt 52.7 kg (116 lb 3.2 oz)   LMP  (LMP Unknown)   SpO2 98%   BMI 18.76 kg/m    Body mass index is 18.76 kg/m .  Physical Exam   GENERAL: alert and no distress  MS: Lower extremities 5/5 strength, no pain with strength testing, patient is very thin with low muscle " bulk  ORTHO: pain in groin when laying down, gluteal pain with foot over knee stretch, knee pain when knee is twisted and groin pain when leg is moved laterally  NEURO: Normal strength and tone, mentation intact and speech normal, straight leg lift negative    Xray - Reviewed and interpreted by me.  Mild knee OA, mild to mod hip OA      Halima Castellano- MS4  [facility] [employer]  Signed Electronically by: Rhiannon Ruth MD         Patient seen, evaluated and discussed with the student. I have verified the content of the note, which accurately reflects my assessment of the patient and the plan of care.   Supervising Physician:  Rhiannon Ruth MD

## 2025-01-27 ENCOUNTER — OFFICE VISIT (OUTPATIENT)
Dept: ORTHOPEDICS | Facility: CLINIC | Age: 55
End: 2025-01-27
Payer: COMMERCIAL

## 2025-01-27 DIAGNOSIS — M23.241 DERANGEMENT OF ANTERIOR HORN OF LATERAL MENISCUS DUE TO OLD TEAR OR INJURY, RIGHT KNEE: Primary | ICD-10-CM

## 2025-01-27 PROCEDURE — 99213 OFFICE O/P EST LOW 20 MIN: CPT | Mod: 25 | Performed by: FAMILY MEDICINE

## 2025-01-27 PROCEDURE — 20610 DRAIN/INJ JOINT/BURSA W/O US: CPT | Mod: RT | Performed by: FAMILY MEDICINE

## 2025-01-27 RX ADMIN — LIDOCAINE HYDROCHLORIDE 3 ML: 10 INJECTION, SOLUTION INFILTRATION; PERINEURAL at 16:45

## 2025-01-27 RX ADMIN — BUPIVACAINE HYDROCHLORIDE 3 ML: 5 INJECTION, SOLUTION PERINEURAL at 16:45

## 2025-01-27 ASSESSMENT — PAIN SCALES - GENERAL: PAINLEVEL_OUTOF10: SEVERE PAIN (10)

## 2025-01-27 NOTE — NURSING NOTE
Chief Complaint   Patient presents with    Right Knee - Pain, New Patient     Right knee pain       There were no vitals filed for this visit.    There is no height or weight on file to calculate BMI.      JUAN M Barth NREMT

## 2025-01-27 NOTE — NURSING NOTE
[facility]   ORTHOPEDICS & SPORTS MEDICINE  [address] [place] [address]  Dept: [phone]  ______________________________________________________________________________    Patient: Karen Braxton   : 1970   MRN: [id number]   2025    INVASIVE PROCEDURE SAFETY CHECKLIST    Date: 2025   Procedure: Right knee injection  Patient Name: Karen Braxton  MRN: [id number]  YOB: 1970    Action: Complete sections as appropriate. Any discrepancy results in a HARD COPY until resolved.     PRE PROCEDURE:  Patient ID verified with 2 identifiers (name and  or MRN): Yes  Procedure and site verified with patient/designee (when able): Yes  Accurate consent documentation in medical record: Yes  H&P (or appropriate assessment) documented in medical record: Yes  H&P must be up to 20 days prior to procedure and updates within 24 hours of procedure as applicable: NA  Relevant diagnostic and radiology test results appropriately labeled and displayed as applicable: NA  Procedure site(s) marked with provider initials: NA    TIMEOUT:  Time-Out performed immediately prior to starting procedure, including verbal and active participation of all team members addressing the following:Yes  * Correct patient identify  * Confirmed that the correct side and site are marked  * An accurate procedure consent form  * Agreement on the procedure to be done  * Correct patient position  * Relevant images and results are properly labeled and appropriately displayed  * The need to administer antibiotics or fluids for irrigation purposes during the procedure as applicable   * Safety precautions based on patient history or medication use    DURING PROCEDURE: Verification of correct person, site, and procedures any time the responsibility for care of the patient is transferred to another member of the care team.       Prior to injection, verified patient identity using patient's name and date of  birth.  Due to injection administration, patient instructed to remain in clinic for 15 minutes  afterwards, and to report any adverse reaction to me immediately.    Joint injection was performed.      Drug Amount Wasted:  None.  Vial/Syringe: Single dose vial  Expiration Date:  12/31/2026      Tab Solomon, EMT  January 27, 2025

## 2025-01-27 NOTE — PROGRESS NOTES
CHIEF COMPLAINT:  Pain and New Patient of the Right Knee (Right knee pain)       HISTORY OF PRESENT ILLNESS  Ms. Braxton is a 54 year old female who presents to clinic today with right knee pain.  Karen has been experiencing ongoing right knee and right leg pain, this is severe and affects her daily life.  She was recently seen by Dr. Ruth, they had discussed these issues at some length.  She is here today to discuss a possible injection and treatment moving forward.        Additional history: as documented    MEDICAL HISTORY  Patient Active Problem List   Diagnosis    Type 2 diabetes mellitus (H)    Combined form of age-related cataract, right eye    Combined form of age-related cataract, left eye       Current Outpatient Medications   Medication Sig Dispense Refill    acetaminophen (TYLENOL) 325 MG tablet Take 325 mg by mouth once      acetaminophen (TYLENOL) 500 MG tablet Take 1-2 tablets (500-1,000 mg) by mouth 3 times daily as needed for mild pain. 90 tablet 3    atorvastatin (LIPITOR) 40 MG tablet Take 1 tablet (40 mg) by mouth daily (Patient taking differently: Take 20 mg by mouth every morning.) 90 tablet 3    blood glucose (NO BRAND SPECIFIED) lancets standard Use to test blood sugar 4 times daily or as directed. 100 each 2    blood glucose (NO BRAND SPECIFIED) test strip Use to test blood sugar 4 times daily or as directed. 350 strip 3    blood glucose monitoring (NO BRAND SPECIFIED) meter device kit Use to test blood sugar 4 times daily. 1 kit 0    Continuous Glucose Sensor (FREESTYLE RAHEEM 3 SENSOR) [facility] 1 EACH EVERY 14 DAYS USE 1 SENSOR EVERY 14 DAYS. 6 each 3    insulin aspart (NOVOLOG PEN) 100 UNIT/ML pen Inject 6 units with meals and correction. Pt uses approx 30 units daily. 30 mL 2    insulin glargine (LANTUS SOLOSTAR) 100 UNIT/ML pen INJECT 22 UNITS AT BEDTIME. (Patient taking differently: 15-20 Units at bedtime. INJECT 22 UNITS AT BEDTIME.) 45 mL 3    insulin lispro (HUMALOG KWIKPEN) 100  "UNIT/ML (1 unit dial) KWIKPEN INJECT 6 UNITS WITH MEALS AND CORRECTION UNDER THE SKIN. APPROX 30 UNITS DAILY 30 mL 3    insulin pen needle (B-D U/F) 31G X 8 MM miscellaneous USE TO INJECT 4 TIMES A DAY OR AS DIRECTED 400 each 2    insulin syringe-needle U-100 (BD INSULIN SYRINGE ULTRAFINE) 30G X 1/2\" 1 ML Use one syringe daily or as directed.  3 month supply 100 each prn    losartan (COZAAR) 25 MG tablet Take 25 mg by mouth daily      NOVOLOG FLEXPEN 100 UNIT/ML soln Inject 6 units with meals and correction. Pt uses approx 30 units daily. 90 mL 3    omeprazole (PRILOSEC) 20 MG DR capsule Take 20 mg by mouth daily      prednisoLONE acetate (PRED FORTE) 1 % ophthalmic suspension Place 1 drop Into the left eye 3 times daily 15 mL 0    vitamin D2 (ERGOCALCIFEROL) 72950 units (1250 mcg) capsule Take 1 capsule (50,000 Units) by mouth every 7 days 8 capsule 6    VITAMIN D3 50 MCG (2000 UT) tablet Take 1 tablet by mouth daily at 2 pm         No Known Allergies    Family History   Problem Relation Age of Onset    Hypertension Mother     Hyperlipidemia Mother     Diabetes Type 2  Mother     Diabetes Mother     Hypertension Father     Diabetes Type 2  Father     Diabetes Father     Diabetes Brother     Diabetes Sister     Glaucoma No family hx of     Macular Degeneration No family hx of        Additional medical/Social/Surgical histories reviewed in [facility] and updated as appropriate.        PHYSICAL EXAM  General  - normal appearance, in no obvious distress  Musculoskeletal - right knee  - stance: normal gait without limp  - palpation: latearl joint line tenderness  - ROM: 135 degrees flexion, -5 degrees extension, pain at terminal extension passively  - special tests:  (-) Lachman  (+) Serena  (-) varus at 0 and 30 degrees flexion  (-) valgus at 0 and 30 degrees flexion  Neuro  - no sensory or motor deficit, grossly normal coordination, normal muscle tone             ASSESSMENT & PLAN  Ms. Braxton is a 54 year old female who " presents to clinic today with right knee pain.    I had a good discussion with Karen centering around her symptoms.  I also reviewed her previous MRI in the room with her, this is from 2016 and does show a lateral meniscus derangement.  We discussed this in some depth.    She is most interested in an injection today, although is interested in a MRI moving forward.  I did perform an injection today, however, with lidocaine only as we did discuss that with her current hemoglobin A1c at 9.4% it would be incredibly risky to add steroids and may be harmful.  She is understanding of this.    Through shared decision making we did decide to inject her knee with anesthetic only.    I am ordering an MRI.  I will get in touch with her with the MRI results.    It was a pleasure seeing Karen today.    Chris Gruber DO, [facility]  Primary Care Sports Medicine      This note was constructed using Dragon dictation software, please excuse any minor errors in spelling, grammar, or syntax.    Large Joint Injection/Arthocentesis: R knee joint    Date/Time: 1/27/2025 4:45 PM    Performed by: Chris Gruber DO  Authorized by: Chris Gruber DO    Indications:  Pain  Needle Size:  22 G  Guidance: landmark guided    Approach:  Lateral  Location:  Knee      Medications:  3 mL lidocaine 1 %; 3 mL BUPivacaine 0.5 %  Outcome:  Tolerated well, no immediate complications  Procedure discussed: discussed risks, benefits, and alternatives    Consent Given by:  Patient  Timeout: timeout called immediately prior to procedure    Prep: patient was prepped and draped in usual sterile fashion       PROCEDURE    Knee Injection - Intraarticular  The patient was informed of the risks and the benefits of the procedure and a written consent was signed.  The patient's right knee was prepped with chlorhexidine in sterile fashion.   3 mL of 1% lidoacine without epinephrine and 3 mL of 0.5% Marcaine were drawn up into a 6 mL syringe.  Injection was performed  using substerile technique.  A 1.5-inch 22-gauge needle was used to enter the lateral aspect of the knee.  Injection performed successfully without difficulty.  There were no complications. The patient tolerated the procedure well. There was negligible bleeding.

## 2025-01-27 NOTE — LETTER
1/27/2025      Karen Braxton  [address]  [place] [address]      Dear Colleague,    Thank you for referring your patient, Karen Braxton, to the [facility] [place]. Please see a copy of my visit note below.    CHIEF COMPLAINT:  Pain and New Patient of the Right Knee (Right knee pain)       HISTORY OF PRESENT ILLNESS  Ms. Braxton is a 54 year old female who presents to clinic today with right knee pain.  Karen has been experiencing ongoing right knee and right leg pain, this is severe and affects her daily life.  She was recently seen by Dr. Ruth, they had discussed these issues at some length.  She is here today to discuss a possible injection and treatment moving forward.      Additional history: as documented    MEDICAL HISTORY  Patient Active Problem List   Diagnosis    Type 2 diabetes mellitus (H)    Combined form of age-related cataract, right eye    Combined form of age-related cataract, left eye       Current Outpatient Medications   Medication Sig Dispense Refill    acetaminophen (TYLENOL) 325 MG tablet Take 325 mg by mouth once      acetaminophen (TYLENOL) 500 MG tablet Take 1-2 tablets (500-1,000 mg) by mouth 3 times daily as needed for mild pain. 90 tablet 3    atorvastatin (LIPITOR) 40 MG tablet Take 1 tablet (40 mg) by mouth daily (Patient taking differently: Take 20 mg by mouth every morning.) 90 tablet 3    blood glucose (NO BRAND SPECIFIED) lancets standard Use to test blood sugar 4 times daily or as directed. 100 each 2    blood glucose (NO BRAND SPECIFIED) test strip Use to test blood sugar 4 times daily or as directed. 350 strip 3    blood glucose monitoring (NO BRAND SPECIFIED) meter device kit Use to test blood sugar 4 times daily. 1 kit 0    Continuous Glucose Sensor (FREESTYLE RAHEEM 3 SENSOR) [facility] EACH EVERY 14 DAYS USE 1 SENSOR EVERY 14 DAYS. 6 each 3    insulin aspart (NOVOLOG PEN) 100 UNIT/ML pen Inject 6 units with meals and correction. Pt  "uses approx 30 units daily. 30 mL 2    insulin glargine (LANTUS SOLOSTAR) 100 UNIT/ML pen INJECT 22 UNITS AT BEDTIME. (Patient taking differently: 15-20 Units at bedtime. INJECT 22 UNITS AT BEDTIME.) 45 mL 3    insulin lispro (HUMALOG KWIKPEN) 100 UNIT/ML (1 unit dial) KWIKPEN INJECT 6 UNITS WITH MEALS AND CORRECTION UNDER THE SKIN. APPROX 30 UNITS DAILY 30 mL 3    insulin pen needle (B-D U/F) 31G X 8 MM miscellaneous USE TO INJECT 4 TIMES A DAY OR AS DIRECTED 400 each 2    insulin syringe-needle U-100 (BD INSULIN SYRINGE ULTRAFINE) 30G X 1/2\" 1 ML Use one syringe daily or as directed.  3 month supply 100 each prn    losartan (COZAAR) 25 MG tablet Take 25 mg by mouth daily      NOVOLOG FLEXPEN 100 UNIT/ML soln Inject 6 units with meals and correction. Pt uses approx 30 units daily. 90 mL 3    omeprazole (PRILOSEC) 20 MG DR capsule Take 20 mg by mouth daily      prednisoLONE acetate (PRED FORTE) 1 % ophthalmic suspension Place 1 drop Into the left eye 3 times daily 15 mL 0    vitamin D2 (ERGOCALCIFEROL) 36584 units (1250 mcg) capsule Take 1 capsule (50,000 Units) by mouth every 7 days 8 capsule 6    VITAMIN D3 50 MCG (2000 UT) tablet Take 1 tablet by mouth daily at 2 pm       No Known Allergies    Family History   Problem Relation Age of Onset    Hypertension Mother     Hyperlipidemia Mother     Diabetes Type 2  Mother     Diabetes Mother     Hypertension Father     Diabetes Type 2  Father     Diabetes Father     Diabetes Brother     Diabetes Sister     Glaucoma No family hx of     Macular Degeneration No family hx of        Additional medical/Social/Surgical histories reviewed in [facility] and updated as appropriate.      PHYSICAL EXAM  General  - normal appearance, in no obvious distress  Musculoskeletal - right knee  - stance: normal gait without limp  - palpation: latearl joint line tenderness  - ROM: 135 degrees flexion, -5 degrees extension, pain at terminal extension passively  - special tests:  (-) Lachman  (+) " Serena  (-) varus at 0 and 30 degrees flexion  (-) valgus at 0 and 30 degrees flexion  Neuro  - no sensory or motor deficit, grossly normal coordination, normal muscle tone         ASSESSMENT & PLAN  Ms. Braxton is a 54 year old female who presents to clinic today with right knee pain.    I had a good discussion with Karen centering around her symptoms.  I also reviewed her previous MRI in the room with her, this is from 2016 and does show a lateral meniscus derangement.  We discussed this in some depth.    She is most interested in an injection today, although is interested in a MRI moving forward.  I did perform an injection today, however, with lidocaine only as we did discuss that with her current hemoglobin A1c at 9.4% it would be incredibly risky to add steroids and may be harmful.  She is understanding of this.    Through shared decision making we did decide to inject her knee with anesthetic only.    I am ordering an MRI.  I will get in touch with her with the MRI results.    It was a pleasure seeing Karen today.    Chris Gruber DO, [facility]  Primary Care Sports Medicine      This note was constructed using Dragon dictation software, please excuse any minor errors in spelling, grammar, or syntax.    Large Joint Injection/Arthocentesis: R knee joint    Date/Time: 1/27/2025 4:45 PM    Performed by: Chris Gruber DO  Authorized by: Chris Gruber DO    Indications:  Pain  Needle Size:  22 G  Guidance: landmark guided    Approach:  Lateral  Location:  Knee      Medications:  3 mL lidocaine 1 %; 3 mL BUPivacaine 0.5 %  Outcome:  Tolerated well, no immediate complications  Procedure discussed: discussed risks, benefits, and alternatives    Consent Given by:  Patient  Timeout: timeout called immediately prior to procedure    Prep: patient was prepped and draped in usual sterile fashion       PROCEDURE    Knee Injection - Intraarticular  The patient was informed of the risks and the benefits of the procedure  and a written consent was signed.  The patient's right knee was prepped with chlorhexidine in sterile fashion.   3 mL of 1% lidoacine without epinephrine and 3 mL of 0.5% Marcaine were drawn up into a 6 mL syringe.  Injection was performed using substerile technique.  A 1.5-inch 22-gauge needle was used to enter the lateral aspect of the knee.  Injection performed successfully without difficulty.  There were no complications. The patient tolerated the procedure well. There was negligible bleeding.         Again, thank you for allowing me to participate in the care of your patient.      Sincerely,    Chris Gruber DO  Electronically signed

## 2025-01-28 RX ORDER — BUPIVACAINE HYDROCHLORIDE 5 MG/ML
3 INJECTION, SOLUTION PERINEURAL
Status: COMPLETED | OUTPATIENT
Start: 2025-01-27 | End: 2025-01-27

## 2025-01-28 RX ORDER — LIDOCAINE HYDROCHLORIDE 10 MG/ML
3 INJECTION, SOLUTION INFILTRATION; PERINEURAL
Status: COMPLETED | OUTPATIENT
Start: 2025-01-27 | End: 2025-01-27

## 2025-02-17 ENCOUNTER — ANCILLARY PROCEDURE (OUTPATIENT)
Dept: MRI IMAGING | Facility: CLINIC | Age: 55
End: 2025-02-17
Attending: FAMILY MEDICINE
Payer: COMMERCIAL

## 2025-02-17 ENCOUNTER — TELEPHONE (OUTPATIENT)
Dept: ENDOCRINOLOGY | Facility: CLINIC | Age: 55
End: 2025-02-17

## 2025-02-17 DIAGNOSIS — M23.241 DERANGEMENT OF ANTERIOR HORN OF LATERAL MENISCUS DUE TO OLD TEAR OR INJURY, RIGHT KNEE: ICD-10-CM

## 2025-02-17 PROCEDURE — 73721 MRI JNT OF LWR EXTRE W/O DYE: CPT | Mod: RT | Performed by: RADIOLOGY

## 2025-02-17 NOTE — TELEPHONE ENCOUNTER
Attepted to contact pt to let them know Micaela in on medical leave and will unable to make their scheduled appt. No answer. LVM advising pt to call back and discuss a new appt date/time.     If pt calls back, schedule w previously seen MD/PA or ALEJANDRA Hinojosa

## 2025-02-19 NOTE — TELEPHONE ENCOUNTER
Contacted pt about Micaela Juarez being out during her 2/25 appointment. Pt agreed to 4/29 @ 12:30 in person appt.

## 2025-02-20 ENCOUNTER — OFFICE VISIT (OUTPATIENT)
Dept: ORTHOPEDICS | Facility: CLINIC | Age: 55
End: 2025-02-20
Payer: COMMERCIAL

## 2025-02-20 DIAGNOSIS — M23.241 DERANGEMENT OF ANTERIOR HORN OF LATERAL MENISCUS DUE TO OLD TEAR OR INJURY, RIGHT KNEE: Primary | ICD-10-CM

## 2025-02-20 ASSESSMENT — PAIN SCALES - GENERAL: PAINLEVEL_OUTOF10: NO PAIN (0)

## 2025-02-20 NOTE — LETTER
2/20/2025      Karen Braxton  [address]  [place] [address]      Dear Colleague,    Thank you for referring your patient, Karen Braxton, to the [facility] [place]. Please see a copy of my visit note below.    HISTORY OF PRESENT ILLNESS  Ms. Braxton is a pleasant 54 year old female following up with knee pain.  Karen is here to review her MRI.  The injection that we had performed at our last visit has helped quite a bit (this was performed with anesthetic only).  Her pain is gone today and has been since after the injection.     PHYSICAL EXAM  General  - normal appearance, in no obvious distress  Musculoskeletal - right knee  - stance: normal gait without limp  - palpation: no tenderness  - ROM: 135 degrees flexion, -5 degrees extension  Neuro  - no sensory or motor deficit      ASSESSMENT & PLAN  Ms. Braxton is a 54 year old female following up with right knee pain.    I reviewed her MR in the room with her, this reads:  1. Similar chronic horizontal tear through the anterior horn of the  lateral meniscus.  2. 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  3. Distal quadriceps tendinosis, unchanged since 2016.    Given her improvement I do think it would be most reasonable to employ watchful waiting, hopefully she can continue to be pain-free for some time.  We did discuss that we could repeat the same injection in the future, no sooner than 3 months from now, if need be.  If her hemoglobin A1c is under 8.0% we could consider doing this with steroid.    It was a pleasure seeing Karen.        Chris Gruber DO, CA[facility]      This note was constructed using Dragon dictation software, please excuse any minor errors in spelling, grammar, or syntax.      Again, thank you for allowing me to participate in the care of your patient.        Sincerely,        Chris Gruber DO    Electronically signed

## 2025-02-20 NOTE — NURSING NOTE
Chief Complaint   Patient presents with    Right Knee - Follow Up     Right knee follow up       There were no vitals filed for this visit.    There is no height or weight on file to calculate BMI.      JUAN M Barth NREMT

## 2025-02-20 NOTE — PROGRESS NOTES
HISTORY OF PRESENT ILLNESS  Ms. Braxton is a pleasant 54 year old female following up with knee pain.  Karen is here to review her MRI.  The injection that we had performed at our last visit has helped quite a bit (this was performed with anesthetic only).  Her pain is gone today and has been since after the injection.     PHYSICAL EXAM  General  - normal appearance, in no obvious distress  Musculoskeletal - right knee  - stance: normal gait without limp  - palpation: no tenderness  - ROM: 135 degrees flexion, -5 degrees extension  Neuro  - no sensory or motor deficit      ASSESSMENT & PLAN  Ms. Braxton is a 54 year old female following up with right knee pain.    I reviewed her MR in the room with her, this reads:  1. Similar chronic horizontal tear through the anterior horn of the  lateral meniscus.  2. 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  3. Distal quadriceps tendinosis, unchanged since 2016.    Given her improvement I do think it would be most reasonable to employ watchful waiting, hopefully she can continue to be pain-free for some time.  We did discuss that we could repeat the same injection in the future, no sooner than 3 months from now, if need be.  If her hemoglobin A1c is under 8.0% we could consider doing this with steroid.    It was a pleasure seeing Karen.        Chris Gruber DO, CAQSM      This note was constructed using Dragon dictation software, please excuse any minor errors in spelling, grammar, or syntax.

## 2025-03-24 ENCOUNTER — TELEPHONE (OUTPATIENT)
Dept: ENDOCRINOLOGY | Facility: CLINIC | Age: 55
End: 2025-03-24
Payer: COMMERCIAL

## 2025-03-24 NOTE — TELEPHONE ENCOUNTER
Patient confirmed scheduled appointment:  Date: 7/22/25  Time: 2:00 pm  Visit type: RETURN DIABETES  Provider: Cecile Juarez PA-C  Location: [facility] DIABETES  Testing/imaging: N/A  Additional notes:  Spoke to pt to reschedule appointment from 4/29 due to changes in the providers schedule.    Jerry Jurado on 3/24/2025 at 3:47 PM

## 2025-04-01 ENCOUNTER — TELEPHONE (OUTPATIENT)
Dept: ENDOCRINOLOGY | Facility: CLINIC | Age: 55
End: 2025-04-01
Payer: COMMERCIAL

## 2025-04-01 NOTE — TELEPHONE ENCOUNTER
Left Voicemail (1st Attempt) for the patient to call back and schedule the following:    Appointment type: RETURN DIABETES  Provider: Melisa Phillips MD  Return date: Adrianne 6/24/25  Specialty phone number: [phone]  Additional appointment(s) needed: N/A  Additonal Notes:  LVM, No MyChart, Due to changes in the providers schedule appt on 6/24 needs to get rescheduled to first available slot or option listed below.    Example:  6/16 Alan  In Person  (Hold Slots)    Jerry Jurado on 4/1/2025 at 12:55 PM

## 2025-04-07 DIAGNOSIS — E11.9 TYPE 2 DIABETES MELLITUS WITHOUT COMPLICATION, WITH LONG-TERM CURRENT USE OF INSULIN (H): Primary | ICD-10-CM

## 2025-04-07 DIAGNOSIS — Z79.4 TYPE 2 DIABETES MELLITUS WITHOUT COMPLICATION, WITH LONG-TERM CURRENT USE OF INSULIN (H): Primary | ICD-10-CM

## 2025-04-11 NOTE — TELEPHONE ENCOUNTER
Per pt would like if this medication gets fill today. Per pt is out of these medications. Please and thank you.

## 2025-04-12 RX ORDER — ATORVASTATIN CALCIUM 20 MG/1
20 TABLET, FILM COATED ORAL
Qty: 90 TABLET | Refills: 3 | Status: SHIPPED | OUTPATIENT
Start: 2025-04-12

## 2025-04-12 RX ORDER — LOSARTAN POTASSIUM 25 MG/1
25 TABLET ORAL
Qty: 90 TABLET | Refills: 3 | OUTPATIENT
Start: 2025-04-12

## 2025-04-17 NOTE — PATIENT INSTRUCTIONS
Name: Nette Fox      Relationship: Self      HUB PROVIDED THE RELAY MESSAGE FROM THE OFFICE      PATIENT: HAS FURTHER QUESTIONS AND WOULD LIKE A CALL BACK AT THE FOLLOWING PHONE [phone]    ADDITIONAL INFORMATION: PATIENT DOES HAVE A SMART PHONE ( ANDROID)   Thanks for coming today.  Ortho/Sports Medicine Clinic  [address] [place] [address]    To schedule future appointments in Ortho Clinic, you may call [phone].    To schedule ordered imaging by your Provider: Call [facility] at [phone]    Jet available online at:   [email or web address]    Please call if any further questions or concerns [phone] and ask for the Orthopedic Department. Clinic hours 8 am to 5 pm.    Return to clinic if symptoms worsen.

## 2025-05-21 ENCOUNTER — OFFICE VISIT (OUTPATIENT)
Dept: FAMILY MEDICINE | Facility: CLINIC | Age: 55
End: 2025-05-21
Payer: COMMERCIAL

## 2025-05-21 VITALS
SYSTOLIC BLOOD PRESSURE: 127 MMHG | DIASTOLIC BLOOD PRESSURE: 70 MMHG | HEIGHT: 65 IN | HEART RATE: 67 BPM | TEMPERATURE: 97.8 F | OXYGEN SATURATION: 99 % | WEIGHT: 113.38 LBS | BODY MASS INDEX: 18.89 KG/M2 | RESPIRATION RATE: 16 BRPM

## 2025-05-21 DIAGNOSIS — E78.2 MIXED HYPERLIPIDEMIA: ICD-10-CM

## 2025-05-21 DIAGNOSIS — E11.311 TYPE 2 DIABETES MELLITUS WITH RETINOPATHY OF BOTH EYES AND MACULAR EDEMA, UNSPECIFIED RETINOPATHY SEVERITY, UNSPECIFIED WHETHER LONG TERM INSULIN USE (H): ICD-10-CM

## 2025-05-21 DIAGNOSIS — F17.200 NICOTINE DEPENDENCE, UNCOMPLICATED, UNSPECIFIED NICOTINE PRODUCT TYPE: ICD-10-CM

## 2025-05-21 DIAGNOSIS — E55.9 VITAMIN D DEFICIENCY: ICD-10-CM

## 2025-05-21 DIAGNOSIS — Z23 NEED FOR SHINGLES VACCINE: ICD-10-CM

## 2025-05-21 DIAGNOSIS — H34.8312 HEMISPHERIC BRANCH RETINAL VEIN OCCLUSION (BRVO) OF RIGHT EYE (H): ICD-10-CM

## 2025-05-21 DIAGNOSIS — Z12.11 SCREEN FOR COLON CANCER: ICD-10-CM

## 2025-05-21 DIAGNOSIS — Z23 NEED FOR PNEUMOCOCCAL VACCINATION: ICD-10-CM

## 2025-05-21 DIAGNOSIS — Z00.00 ROUTINE GENERAL MEDICAL EXAMINATION AT A HEALTH CARE FACILITY: Primary | ICD-10-CM

## 2025-05-21 LAB
EST. AVERAGE GLUCOSE BLD GHB EST-MCNC: 220 MG/DL
HBA1C MFR BLD: 9.3 % (ref 0–5.6)

## 2025-05-21 PROCEDURE — 90677 PCV20 VACCINE IM: CPT | Performed by: NURSE PRACTITIONER

## 2025-05-21 PROCEDURE — 82306 VITAMIN D 25 HYDROXY: CPT | Performed by: NURSE PRACTITIONER

## 2025-05-21 PROCEDURE — 82043 UR ALBUMIN QUANTITATIVE: CPT | Performed by: NURSE PRACTITIONER

## 2025-05-21 PROCEDURE — 83036 HEMOGLOBIN GLYCOSYLATED A1C: CPT | Performed by: NURSE PRACTITIONER

## 2025-05-21 PROCEDURE — 90471 IMMUNIZATION ADMIN: CPT | Performed by: NURSE PRACTITIONER

## 2025-05-21 PROCEDURE — 90472 IMMUNIZATION ADMIN EACH ADD: CPT | Performed by: NURSE PRACTITIONER

## 2025-05-21 PROCEDURE — 90750 HZV VACC RECOMBINANT IM: CPT | Performed by: NURSE PRACTITIONER

## 2025-05-21 PROCEDURE — 80048 BASIC METABOLIC PNL TOTAL CA: CPT | Performed by: NURSE PRACTITIONER

## 2025-05-21 PROCEDURE — 80061 LIPID PANEL: CPT | Performed by: NURSE PRACTITIONER

## 2025-05-21 PROCEDURE — 3074F SYST BP LT 130 MM HG: CPT | Performed by: NURSE PRACTITIONER

## 2025-05-21 PROCEDURE — 3048F LDL-C <100 MG/DL: CPT | Performed by: NURSE PRACTITIONER

## 2025-05-21 PROCEDURE — 99396 PREV VISIT EST AGE 40-64: CPT | Mod: 25 | Performed by: NURSE PRACTITIONER

## 2025-05-21 PROCEDURE — 3046F HEMOGLOBIN A1C LEVEL >9.0%: CPT | Performed by: NURSE PRACTITIONER

## 2025-05-21 PROCEDURE — 36415 COLL VENOUS BLD VENIPUNCTURE: CPT | Performed by: NURSE PRACTITIONER

## 2025-05-21 PROCEDURE — 82570 ASSAY OF URINE CREATININE: CPT | Performed by: NURSE PRACTITIONER

## 2025-05-21 PROCEDURE — 99214 OFFICE O/P EST MOD 30 MIN: CPT | Mod: 25 | Performed by: NURSE PRACTITIONER

## 2025-05-21 PROCEDURE — 3078F DIAST BP <80 MM HG: CPT | Performed by: NURSE PRACTITIONER

## 2025-05-21 PROCEDURE — 1126F AMNT PAIN NOTED NONE PRSNT: CPT | Performed by: NURSE PRACTITIONER

## 2025-05-21 RX ORDER — CHOLECALCIFEROL (VITAMIN D3) 50 MCG
1 TABLET ORAL
Qty: 90 TABLET | Refills: 3 | Status: SHIPPED | OUTPATIENT
Start: 2025-05-21

## 2025-05-21 RX ORDER — INSULIN LISPRO 100 [IU]/ML
6 INJECTION, SOLUTION INTRAVENOUS; SUBCUTANEOUS
Qty: 30 ML | Refills: 3 | Status: SHIPPED | OUTPATIENT
Start: 2025-05-21

## 2025-05-21 RX ORDER — LOSARTAN POTASSIUM 25 MG/1
25 TABLET ORAL DAILY
Qty: 90 TABLET | Refills: 3 | Status: SHIPPED | OUTPATIENT
Start: 2025-05-21

## 2025-05-21 RX ORDER — INSULIN GLARGINE 100 [IU]/ML
INJECTION, SOLUTION SUBCUTANEOUS
Qty: 45 ML | Refills: 3 | Status: SHIPPED | OUTPATIENT
Start: 2025-05-21

## 2025-05-21 RX ORDER — ACYCLOVIR 800 MG/1
1 TABLET ORAL
Qty: 6 EACH | Refills: 3 | Status: SHIPPED | OUTPATIENT
Start: 2025-05-21

## 2025-05-21 RX ORDER — ATORVASTATIN CALCIUM 20 MG/1
20 TABLET, FILM COATED ORAL
Qty: 90 TABLET | Refills: 3 | Status: SHIPPED | OUTPATIENT
Start: 2025-05-21

## 2025-05-21 SDOH — HEALTH STABILITY: PHYSICAL HEALTH: ON AVERAGE, HOW MANY DAYS PER WEEK DO YOU ENGAGE IN MODERATE TO STRENUOUS EXERCISE (LIKE A BRISK WALK)?: 3 DAYS

## 2025-05-21 SDOH — HEALTH STABILITY: PHYSICAL HEALTH: ON AVERAGE, HOW MANY MINUTES DO YOU ENGAGE IN EXERCISE AT THIS LEVEL?: 20 MIN

## 2025-05-21 ASSESSMENT — SOCIAL DETERMINANTS OF HEALTH (SDOH): HOW OFTEN DO YOU GET TOGETHER WITH FRIENDS OR RELATIVES?: THREE TIMES A WEEK

## 2025-05-21 ASSESSMENT — PAIN SCALES - GENERAL: PAINLEVEL_OUTOF10: NO PAIN (0)

## 2025-05-21 NOTE — PROGRESS NOTES
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  Agrees to restart.  Foot exam,  2+ pulses. No ulcers.. No complaints of foot pain or paresthesias  Increase dietary efforts and physical activity.   - Hemoglobin A1c; Future  - Basic metabolic panel  (Ca, Cl, CO2, Creat, Gluc, K, Na, BUN); Future  - Albumin Random Urine Quantitative with Creat Ratio; Future  - Continuous Glucose Sensor (FREESTYLE RAHEEM 3 SENSOR) MISC; 1 each every 14 days. Use 1 sensor every 14 days.  - insulin glargine (LANTUS SOLOSTAR) 100 UNIT/ML pen; INJECT 22 UNITS AT BEDTIME.  - insulin lispro (HUMALOG KWIKPEN) 100 UNIT/ML (1 unit dial) KWIKPEN; Inject 6 Units subcutaneously 3 times daily (before meals).  - insulin pen needle (B-D U/F) 31G X 8 MM miscellaneous; USE TO INJECT 4 TIMES A DAY OR AS DIRECTED  - losartan (COZAAR) 25 MG tablet; Take 1 tablet (25 mg) by mouth daily.    Hemispheric branch retinal vein occlusion (BRVO) of right eye (H)  Follows with opthalmology and denies any current concerns    Mixed hyperlipidemia  - atorvastatin (LIPITOR) 20 MG tablet; Take 1 tablet (20 mg) by mouth daily at 2 pm.  - Lipid panel reflex to direct LDL Non-fasting    Need for pneumococcal vaccination  - Pneumococcal 20 Valent Conjugate (PCV20)    Need for shingles vaccine  - ZOSTER RECOMBINANT ADJUVANTED (SHINGRIX)    Vitamin D deficiency  - VITAMIN D3 50 MCG (2000 UT) tablet; Take 1 tablet (50 mcg) by mouth daily at 2 pm.  - Vitamin D  Deficiency    Nicotine dependence, uncomplicated, unspecified nicotine product type  Counseled, reports she only smokes 3 cigarettes a day and is not ready to quit    Screen for colon cancer  - Fecal colorectal cancer screen FIT - Future (S+30)        Nicotine/Tobacco Cessation  She reports that she has been smoking cigarettes. She started smoking about 38 years ago. She has a 48.4 pack-year smoking history. She has never used smokeless tobacco.  Nicotine/Tobacco Cessation Plan  Information offered: Patient not interested at this time      Counseling  Appropriate preventive services were addressed with this patient via screening, questionnaire, or discussion as appropriate for fall prevention, nutrition, physical activity, Tobacco-use cessation, social engagement, weight loss and cognition.  Checklist reviewing preventive services available has been given to the patient.  Reviewed patient's diet, addressing concerns and/or questions.   She is at risk for lack of exercise and has been provided with information to increase physical activity for the benefit of her well-being.           Dolores Jones is a 54 year old, presenting for the following:  Physical and Medication Refill        5/21/2025     2:45 PM   Additional Questions   Roomed by Traci          Medication Refill      Annual physical, med refills, DM II         Advance Care Planning    Discussed advance care planning with patient; informed AVS has link to [facility] [employer].        5/21/2025   General Health   How would you rate your overall physical health? Excellent   Feel stress (tense, anxious, or unable to sleep) Not at all         5/21/2025   Nutrition   Three or more servings of calcium each day? Yes   Diet: Diabetic   How many servings of fruit and vegetables per day? (!) 2-3   How many sweetened beverages each day? 0-1         5/21/2025   Exercise   Days per week of moderate/strenous exercise 3 days   Average minutes spent exercising at this  level 20 min         5/21/2025   Social Factors   Frequency of gathering with friends or relatives Three times a week   Worry food won't last until get money to buy more No   Food not last or not have enough money for food? No   Do you have housing? (Housing is defined as stable permanent housing and does not include staying outside in a car, in a tent, in an abandoned building, in an overnight shelter, or couch-surfing.) Yes   Are you worried about losing your housing? No   Lack of transportation? No   Unable to get utilities (heat,electricity)? No         5/21/2025   Fall Risk   Fallen 2 or more times in the past year? No    Trouble with walking or balance? No        Proxy-reported          5/21/2025   Dental   Dentist two times every year? Yes         Today's PHQ-2 Score:       5/21/2025     2:37 PM   PHQ-2 ( 1999 Pf[employer])   Q1: Little interest or pleasure in doing things 0   Q2: Feeling down, depressed or hopeless 0   PHQ-2 Score 0    Q1: Little interest or pleasure in doing things Not at all   Q2: Feeling down, depressed or hopeless Not at all   PHQ-2 Score 0       Patient-reported           5/21/2025   Substance Use   Alcohol more than 3/day or more than 7/wk No   Do you use any other substances recreationally? No     Social History     Tobacco Use    Smoking status: Some Days     Current packs/day: 1.00     Average packs/day: 0.6 packs/day for 78.4 years (48.4 ttl pk-yrs)     Types: Cigarettes     Start date: 1987    Smokeless tobacco: Never    Tobacco comments:     3 cigarettes daily   Substance Use Topics    Alcohol use: No     Alcohol/week: 0.0 standard drinks of alcohol    Drug use: No                  5/21/2025   STI Screening   New sexual partner(s) since last STI/HIV test? No     History of abnormal Pap smear: No - age 30- 64 PAP with HPV every 5 years recommended        Latest Ref Rng & Units 7/18/2024     1:47 PM 9/23/2020     1:15 PM 9/23/2020     1:13 PM   PAP / HPV   PAP  Negative for  "Intraepithelial Lesion or Malignancy (NILM)      PAP (Historical)    NIL    HPV 16 DNA Negative Negative  Negative     HPV 18 DNA Negative Negative  Negative     Other HR HPV Negative Negative  Negative       ASCVD Risk   The 10-year ASCVD risk score (Harinder REYEZ, et al., 2019) is: 12.3%    Values used to calculate the score:      Age: 54 years      Sex: Female      Is Non- : Yes      Diabetic: Yes      Tobacco smoker: Yes      Systolic Blood Pressure: 127 mmHg      Is BP treated: No      HDL Cholesterol: 57 mg/dL      Total Cholesterol: 158 mg/dL           Reviewed and updated as needed this visit by Provider                          Review of Systems  Constitutional, HEENT, cardiovascular, pulmonary, GI, , musculoskeletal, neuro, skin, endocrine and psych systems are negative, except as otherwise noted.     Objective    Exam  /70 (BP Location: Left arm, Patient Position: Sitting, Cuff Size: Adult Regular)   Pulse 67   Temp 97.8  F (36.6  C) (Temporal)   Resp 16   Ht 1.65 m (5' 4.96\")   Wt 51.4 kg (113 lb 6 oz)   LMP  (LMP Unknown)   SpO2 99%   BMI 18.89 kg/m     Estimated body mass index is 18.89 kg/m  as calculated from the following:    Height as of this encounter: 1.65 m (5' 4.96\").    Weight as of this encounter: 51.4 kg (113 lb 6 oz).    Physical Exam  GENERAL: alert and no distress  EYES: Eyes grossly normal to inspection, PERRL and conjunctivae and sclerae normal  HENT: ear canals and TM's normal, nose and mouth without ulcers or lesions  NECK: no adenopathy, no asymmetry, masses, or scars  RESP: lungs clear to auscultation - no rales, rhonchi or wheezes  CV: regular rate and rhythm, normal S1 S2, no S3 or S4, no murmur, click or rub, no peripheral edema  ABDOMEN: soft, nontender, no hepatosplenomegaly, no masses and bowel sounds normal  MS: no gross musculoskeletal defects noted, no edema  SKIN: no suspicious lesions or rashes  NEURO: Normal strength and tone, " mentation intact and speech normal  PSYCH: mentation appears normal, affect normal/bright    Signed Electronically by: FLORESITA Gu CNP

## 2025-05-21 NOTE — NURSING NOTE
Prior to immunization administration, verified patients identity using patient s name and date of birth. Please see Immunization Activity for additional information.     Screening Questionnaire for Adult Immunization    Are you sick today?   No   Do you have allergies to medications, food, a vaccine component or latex?   No   Have you ever had a serious reaction after receiving a vaccination?   No   Do you have a long-term health problem with heart, lung, kidney, or metabolic disease (e.g., diabetes), asthma, a blood disorder, no spleen, complement component deficiency, a cochlear implant, or a spinal fluid leak?  Are you on long-term aspirin therapy?   Yes   Do you have cancer, leukemia, HIV/AIDS, or any other immune system problem?   No   Do you have a parent, brother, or sister with an immune system problem?   No   In the past 3 months, have you taken medications that affect  your immune system, such as prednisone, other steroids, or anticancer drugs; drugs for the treatment of rheumatoid arthritis, Crohn s disease, or psoriasis; or have you had radiation treatments?   No   Have you had a seizure, or a brain or other nervous system problem?   No   During the past year, have you received a transfusion of blood or blood    products, or been given immune (gamma) globulin or antiviral drug?   No   For women: Are you pregnant or is there a chance you could become       pregnant during the next month?   No   Have you received any vaccinations in the past 4 weeks?   No     Patient instructed to remain in clinic for 15 minutes afterwards, and to report any adverse reactions.     Screening performed by Lotus Hunt MA on 5/21/2025 at 3:34 PM.

## 2025-05-21 NOTE — PATIENT INSTRUCTIONS
At [facility], we strive to deliver an exceptional experience to you, every time we see you. If you receive a survey, please let us know what we are doing well and/or what we could improve upon, as we do value your feedback.  If you have MyChart, you can expect to receive results automatically within 24 hours of their completion.  Your provider will send a note interpreting your results as well.   If you do not have MyChart, you should receive your results in about a week by mail.    Your care team:                            Family Medicine Internal Medicine   MD Nelson Arroyo, MD Gladys Valdes, MD Fausto Schaefer, MD Cheryl Galvez, PA-C    Viktor Neal, MD Pediatrics   Mireya Guerrero, MD Stacey Johnson, MD Elisa Jasso, APRN CNP Stephanie CANAS CNP   Wade Henry, MD Sheila Flannery, MD Elaine Koenig, CNP     Sagrario Maurice, PA-C Same-Day Provider (No follow-up visits)   FLORESITA Gu, HARSH Cummins, PA-C    Cara Beauchamp PA-C     Clinic hours: Monday - Thursday 7 am-6 pm; Fridays 7 am-5 pm.   Urgent care: Monday - Friday 10 am- 8 pm; Saturday and Sunday 9 am-5 pm.    Clinic: [phone]       [facility]: Monday - Thursday 8 am - 7 pm; Friday 8 am - 6 pm  [facility]: [phone]     Patient Education   Preventive Care Advice   This is general advice given by our system to help you stay healthy. However, your care team may have specific advice just for you. Please talk to your care team about your preventive care needs.  Nutrition  Eat 5 or more servings of fruits and vegetables each day.  Try wheat bread, brown rice and whole grain pasta (instead of white bread, rice, and pasta).  Get enough calcium and vitamin D. Check the label on foods and aim for 100% of the RDA (recommended daily allowance).  Lifestyle  Exercise at least 150 minutes each week  (30  minutes a day, 5 days a week).  Do muscle strengthening activities 2 days a week. These help control your weight and prevent disease.  No smoking.  Wear sunscreen to prevent skin cancer.  Have a dental exam and cleaning every 6 months.  Yearly exams  See your health care team every year to talk about:  Any changes in your health.  Any medicines your care team has prescribed.  Preventive care, family planning, and ways to prevent chronic diseases.  Shots (vaccines)   HPV shots (up to age 26), if you've never had them before.  Hepatitis B shots (up to age 59), if you've never had them before.  COVID-19 shot: Get this shot when it's due.  Flu shot: Get a flu shot every year.  Tetanus shot: Get a tetanus shot every 10 years.  Pneumococcal, hepatitis A, and RSV shots: Ask your care team if you need these based on your risk.  Shingles shot (for age 50 and up)  General health tests  Diabetes screening:  Starting at age 35, Get screened for diabetes at least every 3 years.  If you are younger than age 35, ask your care team if you should be screened for diabetes.  Cholesterol test: At age 39, start having a cholesterol test every 5 years, or more often if advised.  Bone density scan (DEXA): At age 50, ask your care team if you should have this scan for osteoporosis (brittle bones).  Hepatitis C: Get tested at least once in your life.  STIs (sexually transmitted infections)  Before age 24: Ask your care team if you should be screened for STIs.  After age 24: Get screened for STIs if you're at risk. You are at risk for STIs (including HIV) if:  You are sexually active with more than one person.  You don't use condoms every time.  You or a partner was diagnosed with a sexually transmitted infection.  If you are at risk for HIV, ask about PrEP medicine to prevent HIV.  Get tested for HIV at least once in your life, whether you are at risk for HIV or not.  Cancer screening tests  Cervical cancer screening: If you have a cervix,  begin getting regular cervical cancer screening tests starting at age 21.  Breast cancer scan (mammogram): If you've ever had breasts, begin having regular mammograms starting at age 40. This is a scan to check for breast cancer.  Colon cancer screening: It is important to start screening for colon cancer at age 45.  Have a colonoscopy test every 10 years (or more often if you're at risk) Or, ask your provider about stool tests like a FIT test every year or Cologuard test every 3 years.  To learn more about your testing options, visit:   .  For help making a decision, visit:   https://bit.ly/bj18688.  Prostate cancer screening test: If you have a prostate, ask your care team if a prostate cancer screening test (PSA) at age 55 is right for you.  Lung cancer screening: If you are a current or former smoker ages 50 to 80, ask your care team if ongoing lung cancer screenings are right for you.  For informational purposes only. Not to replace the advice of your health care provider. Copyright   2023 [employer]. All rights reserved. Clinically reviewed by the [facility] Transitions Program. Diffbot 707009 - REV 01/24.

## 2025-05-22 ENCOUNTER — RESULTS FOLLOW-UP (OUTPATIENT)
Dept: FAMILY MEDICINE | Facility: CLINIC | Age: 55
End: 2025-05-22

## 2025-05-22 LAB
ANION GAP SERPL CALCULATED.3IONS-SCNC: 9 MMOL/L (ref 7–15)
BUN SERPL-MCNC: 11.1 MG/DL (ref 6–20)
CALCIUM SERPL-MCNC: 9.3 MG/DL (ref 8.8–10.4)
CHLORIDE SERPL-SCNC: 102 MMOL/L (ref 98–107)
CHOLEST SERPL-MCNC: 122 MG/DL
CREAT SERPL-MCNC: 0.56 MG/DL (ref 0.51–0.95)
CREAT UR-MCNC: 79.1 MG/DL
EGFRCR SERPLBLD CKD-EPI 2021: >90 ML/MIN/1.73M2
FASTING STATUS PATIENT QL REPORTED: YES
FASTING STATUS PATIENT QL REPORTED: YES
GLUCOSE SERPL-MCNC: 261 MG/DL (ref 70–99)
HCO3 SERPL-SCNC: 25 MMOL/L (ref 22–29)
HDLC SERPL-MCNC: 49 MG/DL
LDLC SERPL CALC-MCNC: 55 MG/DL
MICROALBUMIN UR-MCNC: <12 MG/L
MICROALBUMIN/CREAT UR: NORMAL MG/G{CREAT}
NONHDLC SERPL-MCNC: 73 MG/DL
POTASSIUM SERPL-SCNC: 4 MMOL/L (ref 3.4–5.3)
SODIUM SERPL-SCNC: 136 MMOL/L (ref 135–145)
TRIGL SERPL-MCNC: 88 MG/DL
VIT D+METAB SERPL-MCNC: 30 NG/ML (ref 20–50)

## 2025-05-28 LAB — HEMOCCULT STL QL IA: NEGATIVE

## 2025-07-21 NOTE — PROGRESS NOTES
07/21/25 12:49 PM : Appointment reminder phone call made to patient. Pt verbalized understanding

## 2025-07-22 ENCOUNTER — OFFICE VISIT (OUTPATIENT)
Dept: ENDOCRINOLOGY | Facility: CLINIC | Age: 55
End: 2025-07-22
Payer: COMMERCIAL

## 2025-07-22 VITALS
HEART RATE: 76 BPM | OXYGEN SATURATION: 97 % | WEIGHT: 112 LBS | DIASTOLIC BLOOD PRESSURE: 69 MMHG | BODY MASS INDEX: 18.66 KG/M2 | SYSTOLIC BLOOD PRESSURE: 112 MMHG

## 2025-07-22 DIAGNOSIS — E11.65 TYPE 2 DIABETES MELLITUS WITH HYPERGLYCEMIA, WITHOUT LONG-TERM CURRENT USE OF INSULIN (H): Primary | ICD-10-CM

## 2025-07-22 DIAGNOSIS — E11.311 TYPE 2 DIABETES MELLITUS WITH RETINOPATHY OF BOTH EYES AND MACULAR EDEMA, UNSPECIFIED RETINOPATHY SEVERITY, UNSPECIFIED WHETHER LONG TERM INSULIN USE (H): ICD-10-CM

## 2025-07-22 PROCEDURE — 3074F SYST BP LT 130 MM HG: CPT | Performed by: PHYSICIAN ASSISTANT

## 2025-07-22 PROCEDURE — 1126F AMNT PAIN NOTED NONE PRSNT: CPT | Performed by: PHYSICIAN ASSISTANT

## 2025-07-22 PROCEDURE — 3078F DIAST BP <80 MM HG: CPT | Performed by: PHYSICIAN ASSISTANT

## 2025-07-22 PROCEDURE — 99214 OFFICE O/P EST MOD 30 MIN: CPT | Performed by: PHYSICIAN ASSISTANT

## 2025-07-22 RX ORDER — INSULIN LISPRO 100 [IU]/ML
INJECTION, SOLUTION INTRAVENOUS; SUBCUTANEOUS
Qty: 30 ML | Refills: 3 | Status: SHIPPED | OUTPATIENT
Start: 2025-07-22

## 2025-07-22 ASSESSMENT — PAIN SCALES - GENERAL: PAINLEVEL_OUTOF10: NO PAIN (0)

## 2025-07-22 NOTE — CONFIDENTIAL NOTE
HPI:  Irais Jones is a 55 year old female with type 2 diabetes mellitus.    Pt being seen today in clinic for diabetes follow up.    Pt last seen by me in Oct 2024.  She was dx having type 2 diabetes around 2002.  Her diabetes is complicated by retinopathy. No known nephropathy or neuropathy.  Her hx is significant for HTN, hyperlipidemia and Vit D deficiency.  For her diabetes, she tells me she is taking glargine insulin 20 units SQ at hs and Humalog Kwik pen 5-8 units with meals.  She denies missing insulin doses.  She stopped taking Jardiance stating it made her stomach hurt.  She did not tolerate Metformin- GI side effects.  Pt did not tolerate Bydureon in the past - GI side effects.  Pt's A1C was 9.3 % on 5/21/2025.    I reviewed and scanned her Freestyle Libre3 sensor download data in her note below.  Average glucose 192 with estimated A1C 7.9 % for the past 2 weeks.  Karen is working Saturday - Wednesday at [employer] 8:15 PM to 4:30 AM.  On ROS today, pt denies blurred vision, n/v,  SOB at rest, cough or fevers.  Pt denies chest pain, abd pain, diarrhea, dysuria or hematuria.  Pt denies sx of neuropathy.  No foot ulcers.    DIABETES CARE:  Retinopathy: Yes. Pt seen by outside Oph/retinal staff. Pt states she will be seeing retinal specialist in Aug 2025.  Nephropathy: None. Pt's urine microalbuminuria was negative in May 2025.  She is taking Cozaar.  Neuropathy: None.  Foot exam: No ulcers and normal monofilamentous exam today.  Lipids: LDL 55 in 5/2025. Taking Lipitor.  Taking ASA: No.  CAD:no.  Mental health:denies depression.  Insulin: Basal and meal time insulin.   DM meds:  Pt did not tolerate Metformin- GI side effects. Pt did not tolerate Jardiance- yeast infection and Bydureon - GI side effects.  Testing: Freestyle Libre3 sensor.                ROS:   Please see under HPI.    ALLERGIES:   No Known Allergies      Current Outpatient Medications   Medication Sig Dispense Refill    acetaminophen (TYLENOL)  "500 MG tablet Take 1-2 tablets (500-1,000 mg) by mouth 3 times daily as needed for mild pain. 90 tablet 3    atorvastatin (LIPITOR) 20 MG tablet Take 1 tablet (20 mg) by mouth daily at 2 pm. 90 tablet 3    blood glucose (NO BRAND SPECIFIED) lancets standard Use to test blood sugar 4 times daily or as directed. 100 each 2    blood glucose (NO BRAND SPECIFIED) test strip Use to test blood sugar 4 times daily or as directed. 350 strip 3    blood glucose monitoring (NO BRAND SPECIFIED) meter device kit Use to test blood sugar 4 times daily. 1 kit 0    Continuous Glucose Sensor (FREESTYLE RAHEEM 3 SENSOR) MISC 1 each every 14 days. Use 1 sensor every 14 days. 6 each 3    insulin glargine (LANTUS SOLOSTAR) 100 UNIT/ML pen INJECT 22 UNITS AT BEDTIME. 45 mL 3    insulin lispro (HUMALOG KWIKPEN) 100 UNIT/ML (1 unit dial) KWIKPEN Inject 6 Units subcutaneously 3 times daily (before meals). 30 mL 3    insulin pen needle (B-D U/F) 31G X 8 MM miscellaneous USE TO INJECT 4 TIMES A DAY OR AS DIRECTED 400 each 2    losartan (COZAAR) 25 MG tablet Take 1 tablet (25 mg) by mouth daily. 90 tablet 3    VITAMIN D3 50 MCG (2000 UT) tablet Take 1 tablet (50 mcg) by mouth daily at 2 pm. 90 tablet 3    insulin syringe-needle U-100 (BD INSULIN SYRINGE ULTRAFINE) 30G X 1/2\" 1 ML Use one syringe daily or as directed.  3 month supply (Patient not taking: Reported on 7/22/2025) 100 each prn     SHX:  Smoke: yes.  ETOH: none.   with 5 children.    FHX:  Several family members with diabetes.    PMHX:   1.  Type 2 DM.  2.  Hyperlipidemia.  3.  Amenorrhea.  4.  GERD.  5.  S/P choley.  6.  Sebaceous cyst.  Past Medical History:   Diagnosis Date    Cigarette nicotine dependence without complication     Combined forms of age-related cataract of both eyes     Type 2 diabetes mellitus (H)      Past Surgical History:   Procedure Laterality Date    CATARACT IOL, RT/LT      KNEE SURGERY Left 2001    LAPAROSCOPIC CHOLECYSTECTOMY  2007    " PHACOEMULSIFICATION WITH STANDARD INTRAOCULAR LENS IMPLANT Right 01/13/2022    Procedure: PHACOEMULSIFICATION, COMPLEX CATARACT, WITH STANDARD INTRAOCULAR LENS IMPLANT INSERTION right;  Surgeon: Cony Scruggs MD;  [place] [facility] [place]    PHACOEMULSIFICATION WITH STANDARD INTRAOCULAR LENS IMPLANT Left 03/17/2022    Procedure: COMPLEX PHACOEMULSIFICATION, CATARACT, WITH STANDARD INTRAOCULAR LENS IMPLANT INSERTION LEFT;  Surgeon: Cony Scruggs MD;  [place] [facility] [place]    REPAIR CLEFT PALATE CHILD  1972       EXAM:    /69   Pulse 76   Wt 50.8 kg (112 lb)   LMP  (LMP Unknown)   SpO2 97%   BMI 18.66 kg/m      FEET: No ulcers; normal monofilamentous exam.      RESULTS:    Creatinine   Date Value Ref Range Status   05/21/2025 0.56 0.51 - 0.95 mg/dL Final   09/23/2020 0.82 0.52 - 1.04 mg/dL Final     GFR Estimate   Date Value Ref Range Status   05/21/2025 >90 >60 mL/min/1.73m2 Final     Comment:     eGFR calculated using 2021 CKD-EPI equation.   09/23/2020 82 >60 mL/min/[1.73_m2] Final     Comment:     Non  GFR Calc  Starting 12/18/2018, serum creatinine based estimated GFR (eGFR) will be   calculated using the Chronic Kidney Disease Epidemiology Collaboration   (CKD-EPI) equation.       Hemoglobin A1C   Date Value Ref Range Status   05/21/2025 9.3 (H) 0.0 - 5.6 % Final     Comment:     Normal <5.7%   Prediabetes 5.7-6.4%    Diabetes 6.5% or higher     Note: Adopted from ADA consensus guidelines.   04/26/2021 10.1 (H) 0 - 5.6 % Final     Comment:     Normal <5.7% Prediabetes 5.7-6.4%  Diabetes 6.5% or higher - adopted from ADA   consensus guidelines.       Potassium   Date Value Ref Range Status   05/21/2025 4.0 3.4 - 5.3 mmol/L Final   09/24/2021 4.0 3.4 - 5.3 mmol/L Final   09/23/2020 4.0 3.4 - 5.3 mmol/L Final     ALT   Date Value Ref Range Status   09/24/2021 20 0 - 50 U/L Final   09/23/2020 22 0 - 50 U/L Final     AST   Date Value Ref Range Status   09/24/2021 11 0 - 45 U/L Final    09/23/2020 12 0 - 45 U/L Final     TSH   Date Value Ref Range Status   03/16/2018 2.12 0.40 - 4.00 mU/L Final     T4 Free   Date Value Ref Range Status   08/04/2011 1.22 0.70 - 1.85 ng/dL Final         Cholesterol   Date Value Ref Range Status   05/21/2025 122 <200 mg/dL Final   07/25/2023 158 <200 mg/dL Final   09/23/2020 214 (H) <200 mg/dL Final     Comment:     Desirable:       <200 mg/dl   06/18/2019 250 (H) <200 mg/dL Final     Comment:     Desirable:       <200 mg/dl     HDL Cholesterol   Date Value Ref Range Status   09/23/2020 47 (L) >49 mg/dL Final   06/18/2019 54 >49 mg/dL Final     Direct Measure HDL   Date Value Ref Range Status   05/21/2025 49 (L) >=50 mg/dL Final   07/25/2023 57 >=50 mg/dL Final     LDL Cholesterol Calculated   Date Value Ref Range Status   05/21/2025 55 <100 mg/dL Final   07/25/2023 72 <=100 mg/dL Final   09/23/2020 135 (H) <100 mg/dL Final     Comment:     Above desirable:  100-129 mg/dl  Borderline High:  130-159 mg/dL  High:             160-189 mg/dL  Very high:       >189 mg/dl     06/18/2019 152 (H) <100 mg/dL Final     Comment:     Above desirable:  100-129 mg/dl  Borderline High:  130-159 mg/dL  High:             160-189 mg/dL  Very high:       >189 mg/dl       Triglycerides   Date Value Ref Range Status   05/21/2025 88 <150 mg/dL Final   07/25/2023 143 <150 mg/dL Final   09/23/2020 160 (H) <150 mg/dL Final     Comment:     Borderline high:  150-199 mg/dl  High:             200-499 mg/dl  Very high:       >499 mg/dl     06/18/2019 220 (H) <150 mg/dL Final     Comment:     Borderline high:  150-199 mg/dl  High:             200-499 mg/dl  Very high:       >499 mg/dl       Cholesterol/HDL Ratio   Date Value Ref Range Status   12/01/2014 6.0 (H) 0.0 - 5.0 Final   07/30/2014 6.1 (H) 0.0 - 5.0 Final       ASSESSMENT/PLAN:    1. TYPE 2 DIABETES MELLITUS: Type 2 diabetes mellitus complicated by background retinopathy.  No nephropathy or neuropathy.  Blood sugars are too high.  Most  of her high blood sugars are postprandial.  Reminded her to take NovoLog for all food intake and to take NovoLog 10 minutes prior to eating.  She will increase her Novolog by 2 units with each meal. She does not want to carb count.  She stopped taking Metformin due to side effects.   Pt did not tolerate Jardiance or Bydureon in the past.  /69 today.     2.  HYPERLIPIDEMIA:  LDL 55 in May 2025.  Pt taking Lipitor.    3. HEALTH MAINTENANCE: Pt to see PCP for health maintenance.    4. FOLLOW UP: with me in 6 months.    Time spent reviewing patient chart notes, labs and Freestyle Libre2 sensor download today  =  5 minutes.  Time for clinic visit today= 20  minutes.  Time for documentation today =10 minutes.     TOTAL TIME FOR VISIT TODAY =35 minutes.    Cecile Juarez PA-C

## 2025-07-22 NOTE — NURSING NOTE
Chief Complaint   Patient presents with    Diabetes     /69   Pulse 76   Wt 50.8 kg (112 lb)   LMP  (LMP Unknown)   SpO2 97%   BMI 18.66 kg/m

## 2025-08-19 ENCOUNTER — TRANSFERRED RECORDS (OUTPATIENT)
Dept: HEALTH INFORMATION MANAGEMENT | Facility: CLINIC | Age: 55
End: 2025-08-19
Payer: COMMERCIAL

## 2025-08-19 LAB — RETINOPATHY: POSITIVE

## (undated) DEVICE — EYE TIP IRRIGATION & ASPIRATION POLYMER CVD 0.3MM 8065751512

## (undated) DEVICE — SOL WATER IRRIG 500ML BOTTLE 2F7113

## (undated) DEVICE — GLOVE PROTEXIS MICRO 6.0  2D73PM60

## (undated) DEVICE — EYE RING MALYUGIN PUPIL EXPANDER 7.0MM MAL-000-2

## (undated) DEVICE — PACK CATARACT CUSTOM ASC SEY15CPUMC

## (undated) DEVICE — EYE SHIELD PLASTIC

## (undated) DEVICE — EYE CANN IRR 25GA CYSTOTOME 581610

## (undated) DEVICE — LINEN TOWEL PACK X5 5464

## (undated) DEVICE — EYE KNIFE STILETTO VISITEC 1.1MM ANG 45DEG SIDEPORT 376620

## (undated) DEVICE — EYE PACK CUSTOM ANTERIOR 30DEG TIP CENTURION PPK6682-04

## (undated) DEVICE — EYE CANN IRR 27GA ANTERIOR CHAMBER 581280

## (undated) DEVICE — EYE KNIFE SLIT XSTAR VISITEC 2.6MM 45DEG 373726

## (undated) RX ORDER — ONDANSETRON 2 MG/ML
INJECTION INTRAMUSCULAR; INTRAVENOUS
Status: DISPENSED
Start: 2022-01-13

## (undated) RX ORDER — ACETAMINOPHEN 325 MG/1
TABLET ORAL
Status: DISPENSED
Start: 2022-01-13

## (undated) RX ORDER — FENTANYL CITRATE 50 UG/ML
INJECTION, SOLUTION INTRAMUSCULAR; INTRAVENOUS
Status: DISPENSED
Start: 2022-03-17

## (undated) RX ORDER — LIDOCAINE HYDROCHLORIDE 20 MG/ML
INJECTION, SOLUTION EPIDURAL; INFILTRATION; INTRACAUDAL; PERINEURAL
Status: DISPENSED
Start: 2022-03-17

## (undated) RX ORDER — ACETAMINOPHEN 325 MG/1
TABLET ORAL
Status: DISPENSED
Start: 2022-03-17

## (undated) RX ORDER — PROPOFOL 10 MG/ML
INJECTION, EMULSION INTRAVENOUS
Status: DISPENSED
Start: 2022-03-17